# Patient Record
Sex: MALE | Race: WHITE | NOT HISPANIC OR LATINO | Employment: FULL TIME | URBAN - METROPOLITAN AREA
[De-identification: names, ages, dates, MRNs, and addresses within clinical notes are randomized per-mention and may not be internally consistent; named-entity substitution may affect disease eponyms.]

---

## 2017-02-02 ENCOUNTER — ANESTHESIA EVENT (OUTPATIENT)
Dept: PERIOP | Facility: HOSPITAL | Age: 57
End: 2017-02-02
Payer: COMMERCIAL

## 2017-02-03 ENCOUNTER — HOSPITAL ENCOUNTER (OUTPATIENT)
Dept: RADIOLOGY | Facility: HOSPITAL | Age: 57
Setting detail: OUTPATIENT SURGERY
Discharge: HOME | End: 2017-02-03
Payer: COMMERCIAL

## 2017-02-03 ENCOUNTER — ANESTHESIA (OUTPATIENT)
Dept: PERIOP | Facility: HOSPITAL | Age: 57
End: 2017-02-03
Payer: COMMERCIAL

## 2017-02-03 ENCOUNTER — HOSPITAL ENCOUNTER (OUTPATIENT)
Facility: HOSPITAL | Age: 57
Setting detail: OUTPATIENT SURGERY
Discharge: HOME | End: 2017-02-03
Attending: UROLOGY | Admitting: UROLOGY
Payer: COMMERCIAL

## 2017-02-03 VITALS
OXYGEN SATURATION: 96 % | SYSTOLIC BLOOD PRESSURE: 126 MMHG | TEMPERATURE: 98.2 F | DIASTOLIC BLOOD PRESSURE: 74 MMHG | HEART RATE: 55 BPM | RESPIRATION RATE: 18 BRPM

## 2017-02-03 PROCEDURE — 74450 X-RAY URETHRA/BLADDER: CPT

## 2017-02-03 RX ORDER — PROPOFOL 10 MG/ML
INJECTION, EMULSION INTRAVENOUS CONTINUOUS PRN
Status: DISCONTINUED | OUTPATIENT
Start: 2017-02-03 | End: 2017-02-03 | Stop reason: SURG

## 2017-02-03 RX ORDER — FENTANYL CITRATE 50 UG/ML
INJECTION, SOLUTION INTRAMUSCULAR; INTRAVENOUS AS NEEDED
Status: DISCONTINUED | OUTPATIENT
Start: 2017-02-03 | End: 2017-02-03 | Stop reason: SURG

## 2017-02-03 RX ORDER — ONDANSETRON 2 MG/ML
INJECTION INTRAMUSCULAR; INTRAVENOUS AS NEEDED
Status: DISCONTINUED | OUTPATIENT
Start: 2017-02-03 | End: 2017-02-03 | Stop reason: SURG

## 2017-02-03 RX ORDER — PROPOFOL 10 MG/ML
INJECTION, EMULSION INTRAVENOUS AS NEEDED
Status: DISCONTINUED | OUTPATIENT
Start: 2017-02-03 | End: 2017-02-03 | Stop reason: SURG

## 2017-02-03 RX ORDER — SODIUM CHLORIDE, SODIUM LACTATE, POTASSIUM CHLORIDE, CALCIUM CHLORIDE 600; 310; 30; 20 MG/100ML; MG/100ML; MG/100ML; MG/100ML
125 INJECTION, SOLUTION INTRAVENOUS CONTINUOUS
Status: DISCONTINUED | OUTPATIENT
Start: 2017-02-03 | End: 2017-02-03 | Stop reason: HOSPADM

## 2017-02-03 RX ORDER — GENTAMICIN SULFATE 80 MG/50ML
80 INJECTION, SOLUTION INTRAVENOUS ONCE
Status: DISCONTINUED | OUTPATIENT
Start: 2017-02-03 | End: 2017-02-03 | Stop reason: HOSPADM

## 2017-02-03 RX ORDER — MIDAZOLAM HYDROCHLORIDE 1 MG/ML
INJECTION INTRAMUSCULAR; INTRAVENOUS AS NEEDED
Status: DISCONTINUED | OUTPATIENT
Start: 2017-02-03 | End: 2017-02-03 | Stop reason: SURG

## 2017-02-03 RX ORDER — FENTANYL CITRATE/PF 50 MCG/ML
25 SYRINGE (ML) INJECTION
Status: DISCONTINUED | OUTPATIENT
Start: 2017-02-03 | End: 2017-02-03 | Stop reason: HOSPADM

## 2017-02-03 RX ORDER — MAGNESIUM HYDROXIDE 1200 MG/15ML
LIQUID ORAL AS NEEDED
Status: DISCONTINUED | OUTPATIENT
Start: 2017-02-03 | End: 2017-02-03 | Stop reason: HOSPADM

## 2017-02-03 RX ADMIN — FENTANYL CITRATE 50 MCG: 50 INJECTION, SOLUTION INTRAMUSCULAR; INTRAVENOUS at 07:39

## 2017-02-03 RX ADMIN — SODIUM CHLORIDE, SODIUM LACTATE, POTASSIUM CHLORIDE, AND CALCIUM CHLORIDE 125 ML/HR: .6; .31; .03; .02 INJECTION, SOLUTION INTRAVENOUS at 06:58

## 2017-02-03 RX ADMIN — ONDANSETRON 4 MG: 2 INJECTION INTRAMUSCULAR; INTRAVENOUS at 07:50

## 2017-02-03 RX ADMIN — MIDAZOLAM HYDROCHLORIDE 2 MG: 1 INJECTION, SOLUTION INTRAMUSCULAR; INTRAVENOUS at 07:38

## 2017-02-03 RX ADMIN — LIDOCAINE HYDROCHLORIDE 50 MG: 20 INJECTION, SOLUTION INTRAVENOUS at 07:41

## 2017-02-03 RX ADMIN — FENTANYL CITRATE 50 MCG: 50 INJECTION, SOLUTION INTRAMUSCULAR; INTRAVENOUS at 07:45

## 2017-02-03 RX ADMIN — CEFAZOLIN SODIUM 1000 MG: 1 SOLUTION INTRAVENOUS at 07:38

## 2017-02-03 RX ADMIN — PROPOFOL 100 MCG/KG/MIN: 10 INJECTION, EMULSION INTRAVENOUS at 07:42

## 2017-02-03 RX ADMIN — PROPOFOL 100 MG: 10 INJECTION, EMULSION INTRAVENOUS at 07:42

## 2017-02-17 ENCOUNTER — TRANSCRIBE ORDERS (OUTPATIENT)
Dept: ADMINISTRATIVE | Facility: HOSPITAL | Age: 57
End: 2017-02-17

## 2017-02-17 DIAGNOSIS — N13.1 HYDRONEPHROSIS WITH URETERAL STRICTURE: Primary | ICD-10-CM

## 2017-02-25 ENCOUNTER — HOSPITAL ENCOUNTER (OUTPATIENT)
Dept: RADIOLOGY | Facility: HOSPITAL | Age: 57
Discharge: HOME | End: 2017-02-25
Attending: INTERNAL MEDICINE
Payer: COMMERCIAL

## 2017-02-25 DIAGNOSIS — N13.1 HYDRONEPHROSIS WITH URETERAL STRICTURE: ICD-10-CM

## 2017-02-25 PROCEDURE — 76770 US EXAM ABDO BACK WALL COMP: CPT

## 2017-10-06 ENCOUNTER — ALLSCRIPTS OFFICE VISIT (OUTPATIENT)
Dept: OTHER | Facility: OTHER | Age: 57
End: 2017-10-06

## 2017-10-06 DIAGNOSIS — N13.30 HYDRONEPHROSIS: ICD-10-CM

## 2017-10-06 DIAGNOSIS — K57.32 DIVERTICULITIS OF LARGE INTESTINE WITHOUT PERFORATION OR ABSCESS WITHOUT BLEEDING: ICD-10-CM

## 2017-10-07 NOTE — CONSULTS
Assessment  1  History of hydronephrosis (V13 09) (Z87 448)   2  Diverticulitis of colon (562 11) (K57 32)    Plan  Diverticulitis of colon, Hydronephrosis of left kidney    · US KIDNEY AND BLADDER; Status:Hold For - Scheduling; Requested for:[id number];    Perform:[facility] Radiology; Order Comments:Patient with history of diverticulitis and extensive dissection around the left ureter with concern for left ureteral stricture; Due:06Oct2018; Ordered; For:Diverticulitis of colon, Hydronephrosis of left kidney; Ordered By:Tom Gaytan;  Hydronephrosis of left kidney    · * NM KIDNEY W FLOW AND FUNCTION W RX; Status:Need Information - Financial  Authorization; Requested [id number];    Perform:[facility] Radiology; Order Comments:Patient with history of diverticulitis status post sigmoidectomy with extensive dissection around the left ureter and concern for left ureteral stricture with potential obstruction of left kidney; Due:06Oct2018; Ordered;For:Hydronephrosis of left kidney; Ordered By:Tom Gaytan;   · Follow-up visit in 6 weeks Evaluation and Treatment  Follow-up in 6 weeks with Dr Roper at the [facility] for review of renal ultrasound and nuclear medicine  renal scan  Status: Hold For - Scheduling  Requested for: [id number]   Ordered; For: Hydronephrosis of left kidney; Ordered By: Kevin Bhatia Performed:  Due: [id number]    Discussion/Summary  Discussion Summary:   I had a productive visit with Nette Molina today  He looked this up for further urologic follow-up after his last urologist was unable to take his insurance any longer  He does have a very complex urologic history with regard to his history of perforated diverticulitis with abscess and phlegmon requiring extensive dissection around the left ureter with concern for a distal pelvic ureteral narrowing or stricture   Of note, he does not have recurrent infections or stones in his left kidney but does feel that something is going on with his left kidney indicating a possible functional abnormality with kidney  discussed multiple issues today including diverticulitis and its relationship to the left ureter and possible effects on the left ureter after extensive surgery for this  We talked about the ureteral anatomy as well as that of the kidney and discussed various reconstructive surgical procedures in the event that a ureteral stricture is found as the cause of his problems  We discussed that the pacemaker cells for ureteral peristalsis reside in the renal pelvis and that these peristaltic waves are important for proper drainage of the ureter  We discussed that occasionally while the anatomic appearance of the ureter is patent on a retrograde pyelogram that certain segments of the ureter may become functionally silent and cause a functional obstruction of the left renal unit  discussed further workup from a structural and functional perspective including renal and bladder ultrasound as well as a nuclear medicine renal scan with Lasix to assess for split kidney function as well as perfusion and drainage of the left renal unit  I did review with him his operative note from earlier this year with Dr German Lizarraga and gave him a copy of this report for his records  That operative note shows narrowing in the distal left ureter but did show drainage of contrast in real time in the anesthetized setting  discussed ureteral ureterostomy, ureteral calicostomy, the psoas hitch procedure, the Boari flap procedure, simple distal ureteral reimplantation, and autotransplantation for the treatment of ureteral strictures  We did talk about the benefits and the risks of each of these procedures but I did advise him that we are getting ahead of ourselves without knowing whether not there is a functional blockage in his kidney  has no worrisome findings on my examination today    I have ordered a renal and bladder ultrasound for a functional assessment of his left renal unit, I have also ordered a nuclear medicine renal scan with Lasix to assess for the functional drainage of his left kidney as well as split renal function  I have made him a follow-up appointment in the next 4-6 weeks to see me to discuss these results from the studies  I will call him with these results as well as he has requested this  He is a  and has an impressive degree of understanding of the surgical procedures  Chief Complaint  Chief Complaint Free Text Note Form: Patient presents for history of hydronephrosis      History of Present Illness  HPI: Juan Luis Orourke is a 61-year-old gentleman who is referred to the urology clinic for chief complaint of hydronephrosis    hemoglobin in January 2017 was 13 4 and his creatinine from the same set of labs is 1 13 milligrams/deciliter  History of calcium is 9 1 milligrams/deciliter which is within the normal range and his serum carbon dioxide is also normal range at 24 millimoles per liter  A urinalysis from January 2017 is significant for trace leukocyte esterase, 1+ protein, large blood, 10-20 white blood cells per high-powered field, and 20-30 red blood cells per high-powered field  He does not have a PSA on file for my review  renal bladder ultrasound from February 25, 2017 ordered for an indication of history of hydronephrosis and stent placement shows left upper to mid pole scarring the kidney with no hydronephrosis and no shadowing calculi  These images were reviewed by me personally  appears that he has previously followed at the [facility] with Dr Chito Hopper for hydronephrosis and ureteral stricture  A urology consultation note from 12/04/2016 makes reference of a CT scan showing hydronephrosis and hydroureter in the left mid ureter next to a large ileo psoas mass and phlegmon from diverticulitis perforation  He has previously had a 6 [place] by 28 centimeter left ureteral stent    did require resection of his sigmoid colon and extensive dissection around the left ureter for 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  The stent was in place in the left ureter during this time and he returned to the operating room with Dr Arielle Gasca in February of 2017 for cystoscopy and retrograde pyelography, left stent removal, and instillation of gentamicin into the left renal pelvis  operative note makes note of narrowing in the area of the left true pelvis for a few centimeters the length of the left ureter   Preoperative note goes on to say that the left renal unit after retrograde pyelography seems to drain well enough  He also had a small bulbar urethral stricture that easily permitted the cystoscope  presents today the urology office to establish follow-up with a urologist as his previous urologist was not able to see him any longer due to insurance problems  On his urologic review of systems today he denies dysuria, incontinence, urinary hesitancy, gross hematuria, urinary urgency, he wakes 0-2 times per night to urinate, he has an empty sensation after voiding, and his stream quality is good  does endorse occasional feelings that do not feel normal on his left side  He does not have recurrent pyelonephritis but he does feel that something is going on in his left kidney  Review of Systems  Complete-Male Urology:   Constitutional: No fever or chills, feels well, no tiredness, no recent weight gain or weight loss  Respiratory: No complaints of shortness of breath, no wheezing, no cough, no SOB on exertion, no orthopnea or PND  Cardiovascular: No complaints of slow heart rate, no fast heart rate, no chest pain, no palpitations, no leg claudication, no lower extremity  Gastrointestinal: No complaints of abdominal pain, no constipation, no nausea or vomiting, no diarrhea or bloody stools     Genitourinary: Empty sensation-and-stream quality good, but-no dysuria,-no urinary hesitancy,-no hematuria,-no incontinence-and-no feelings of urinary urgency-   The patient presents with complaints of no nocturia (0-2 times)  Musculoskeletal: No complaints of arthralgia, no myalgias, no joint swelling or stiffness, no limb pain or swelling  Integumentary: No complaints of skin rash or skin lesions, no itching, no skin wound, no dry skin  Hematologic/Lymphatic: No complaints of swollen glands, no swollen glands in the neck, does not bleed easily, no easy bruising  Neurological: No compliants of headache, no confusion, no convulsions, no numbness or tingling, no dizziness or fainting, no limb weakness, no difficulty walking  ROS Reviewed:   ROS reviewed  Active Problems  1  Diastasis recti (728 84) (M62 08)   2  Diverticulitis of colon (562 11) (K57 32)   3  H/O ventral hernia repair (V15 29) (Z98 890,Z87 19)   4  Left inguinal hernia (550 90) (K40 90)   5  Left lower quadrant abdominal wall mass (789 34) (R19 04)   6  Right inguinal hernia (550 90) (K40 90)    Past Medical History  1  History of hydronephrosis (V13 09) (Z87 448)   2  History of sleep apnea (V13 89) (Z86 69)   3  History of varicose veins (V12 59) (Z86 79)   4  History of Meniscus tear (836 2) (S83 209A)   5  History of Umbilical hernia (159 8) (K42 9)  Active Problems And Past Medical History Reviewed: The active problems and past medical history were reviewed and updated today  Surgical History  1  History of Colon Surgery  Surgical History Reviewed: The surgical history was reviewed and updated today  Family History  Mother    1  Family history of atrial fibrillation (V17 49) (Z82 49)   2  Family history of diabetes mellitus (V18 0) (Z83 3)   3  Family history of Parkinson's disease (V17 2) (Z82 0)   4  Family history of spinal stenosis (V17 89) (Z82 69)  Father    5  Family history of    6  Family history of aortic aneurysm (V17 49) (Z82 49)  Sibling    7  Family history of    6   Family history of cardiac arrest (V17 49) (Z82 49)   9  Family history of colorectal cancer (V16 0) (Z80 0)   10  Family history of diabetes mellitus (V18 0) (Z83 3)   11  Family history of peritonitis (V18 8) (Z83 1)  Brother    15  Family history of diabetes mellitus (V18 0) (Z83 3)  Family History Reviewed: The family history was reviewed and updated today  Social History   · Current every day smoker (305 1) (F17 200)   · Denied: History of Drug use   · Moderate alcohol use   · Single  Social History Reviewed: The social history was reviewed and updated today  The social history was reviewed and is unchanged  Current Meds   1  Adult Low Dose Aspirin 81 MG TABS; Therapy: (Recorded:06Oct2017) to Recorded   2  Atorvastatin Calcium 10 MG Oral Tablet; Therapy: (Recorded:06Oct2017) to Recorded   3  Centrum Silver Oral Tablet; Therapy: (Recorded:06Oct2017) to Recorded   4  Clarinex TABS; Therapy: (Recorded:[id number]) to Recorded   5  Colace CAPS; Therapy: (Recorded:06Oct2017) to Recorded   6  Fish Oil CAPS; Therapy: (Recorded:37Fzn8618) to Recorded   7  Lipitor 10 MG Oral Tablet; TAKE 1 TABLET AT BEDTIME Recorded   8  Milk Thistle CAPS; Therapy: (Recorded:14May2015) to Recorded   9  Nasonex 50 MCG/ACT Nasal Suspension; Therapy: [id number] to Recorded   10  RaNITidine HCl - 150 MG Oral Capsule; Therapy: (Recorded:06Oct2017) to Recorded   11  Valsartan-Hydrochlorothiazide 160-25 MG Oral Tablet; Take 1 tablet daily Recorded   12  Vitamin C TABS; Therapy: (Recorded:33Shz9492) to Recorded   13  Vitamin D (Ergocalciferol) 01348 UNIT Oral Capsule; Therapy: [id number] to Recorded  Medication List Reviewed: The medication list was reviewed and updated today  Allergies  1  Levaquin TABS   2   Sulfa Drugs    Vitals  Vital Signs    Recorded: [id number] 10:33AM   Heart Rate 60   Systolic 280   Diastolic 80   Height 5 ft 11 in   Weight 277 lb 8 oz   BMI Calculated 38 7   BSA Calculated 2 42     Results/Data  AUA Symptom Score [id number] 10:35AM User, Ahs     Test Name Result Flag Reference   AUA Symptom Score (for prostate disease) 3     Incomplete emptying: Not at all (0)  Frequency: Less than 1 time in 5 (1)  Intermittency: Not at all (0)  Urgency: Not at all (0)  Weak-stream: Not at all (0)  Straining: Not at all (0)  Nocturia: Less than half the time (2)   AUA Symptom Score (for prostate disease) - Quality of Life Due to Urinary Symptoms Delighted     AUA Symptom Score (for prostate disease) - Score Category Mild       Lab Studies Reviewed: In total, 11 radiological studies including CT scans of the abdomen pelvis, interventional radiology procedure imaging, and retrograde pyelography were reviewed by me today personally        Signatures   Electronically signed by : ROSALINA Church ; Oct  6 2017 11:08AM EST                       (Author)

## 2017-11-10 ENCOUNTER — TRANSCRIBE ORDERS (OUTPATIENT)
Dept: ADMINISTRATIVE | Facility: HOSPITAL | Age: 57
End: 2017-11-10

## 2017-11-10 DIAGNOSIS — N13.30 HYDRONEPHROSIS, UNSPECIFIED HYDRONEPHROSIS TYPE: Primary | ICD-10-CM

## 2017-11-19 ENCOUNTER — HOSPITAL ENCOUNTER (OUTPATIENT)
Dept: ULTRASOUND IMAGING | Facility: HOSPITAL | Age: 57
Discharge: HOME | End: 2017-11-19
Attending: UROLOGY
Payer: COMMERCIAL

## 2017-11-19 ENCOUNTER — HOSPITAL ENCOUNTER (OUTPATIENT)
Dept: NUCLEAR MEDICINE | Facility: HOSPITAL | Age: 57
Discharge: HOME | End: 2017-11-19
Attending: UROLOGY
Payer: COMMERCIAL

## 2017-11-19 DIAGNOSIS — N13.30 HYDRONEPHROSIS, UNSPECIFIED HYDRONEPHROSIS TYPE: ICD-10-CM

## 2017-11-19 DIAGNOSIS — N13.30 HYDRONEPHROSIS: ICD-10-CM

## 2017-11-19 DIAGNOSIS — K57.32 DIVERTICULITIS OF LARGE INTESTINE WITHOUT PERFORATION OR ABSCESS WITHOUT BLEEDING: ICD-10-CM

## 2017-11-19 PROCEDURE — 76770 US EXAM ABDO BACK WALL COMP: CPT

## 2017-11-19 PROCEDURE — A9562 TC99M MERTIATIDE: HCPCS

## 2017-11-19 PROCEDURE — 78708 K FLOW/FUNCT IMAGE W/DRUG: CPT

## 2017-11-22 ENCOUNTER — APPOINTMENT (INPATIENT)
Dept: NON INVASIVE DIAGNOSTICS | Facility: HOSPITAL | Age: 57
DRG: 287 | End: 2017-11-22
Payer: COMMERCIAL

## 2017-11-22 ENCOUNTER — GENERIC CONVERSION - ENCOUNTER (OUTPATIENT)
Dept: OTHER | Facility: OTHER | Age: 57
End: 2017-11-22

## 2017-11-22 ENCOUNTER — HOSPITAL ENCOUNTER (INPATIENT)
Facility: HOSPITAL | Age: 57
LOS: 1 days | DRG: 287 | End: 2017-11-23
Attending: EMERGENCY MEDICINE | Admitting: FAMILY MEDICINE
Payer: COMMERCIAL

## 2017-11-22 ENCOUNTER — APPOINTMENT (EMERGENCY)
Dept: RADIOLOGY | Facility: HOSPITAL | Age: 57
DRG: 287 | End: 2017-11-22
Payer: COMMERCIAL

## 2017-11-22 ENCOUNTER — APPOINTMENT (INPATIENT)
Dept: NON INVASIVE DIAGNOSTICS | Facility: HOSPITAL | Age: 57
DRG: 287 | End: 2017-11-22
Attending: INTERNAL MEDICINE
Payer: COMMERCIAL

## 2017-11-22 DIAGNOSIS — I24.9 ACS (ACUTE CORONARY SYNDROME) (HCC): Primary | ICD-10-CM

## 2017-11-22 PROBLEM — Z72.0 NICOTINE ABUSE: Status: ACTIVE | Noted: 2017-11-22

## 2017-11-22 PROBLEM — E66.9 OBESITY: Status: ACTIVE | Noted: 2017-11-22

## 2017-11-22 LAB
ALBUMIN SERPL BCP-MCNC: 4.2 G/DL (ref 3.5–5)
ALP SERPL-CCNC: 91 U/L (ref 46–116)
ALT SERPL W P-5'-P-CCNC: 58 U/L (ref 12–78)
ANION GAP SERPL CALCULATED.3IONS-SCNC: 10 MMOL/L (ref 4–13)
APTT PPP: 24 SECONDS (ref 24–33)
APTT PPP: 28 SECONDS (ref 23–35)
AST SERPL W P-5'-P-CCNC: 34 U/L (ref 5–45)
ATRIAL RATE: 103 BPM
ATRIAL RATE: 63 BPM
ATRIAL RATE: 70 BPM
BASOPHILS # BLD AUTO: 0.1 THOUSANDS/ΜL (ref 0–0.1)
BASOPHILS NFR BLD AUTO: 1 % (ref 0–1)
BILIRUB SERPL-MCNC: 0.6 MG/DL (ref 0.2–1)
BILIRUB UR QL STRIP: NEGATIVE
BUN SERPL-MCNC: 15 MG/DL (ref 5–25)
CALCIUM SERPL-MCNC: 9 MG/DL (ref 8.3–10.1)
CHLORIDE SERPL-SCNC: 102 MMOL/L (ref 100–108)
CHOLEST SERPL-MCNC: 139 MG/DL (ref 50–200)
CLARITY UR: CLEAR
CO2 SERPL-SCNC: 28 MMOL/L (ref 21–32)
COLOR UR: YELLOW
CREAT SERPL-MCNC: 1.13 MG/DL (ref 0.6–1.3)
EOSINOPHIL # BLD AUTO: 0.3 THOUSAND/ΜL (ref 0–0.61)
EOSINOPHIL NFR BLD AUTO: 3 % (ref 0–6)
ERYTHROCYTE [DISTWIDTH] IN BLOOD BY AUTOMATED COUNT: 13.6 % (ref 11.6–15.1)
EST. AVERAGE GLUCOSE BLD GHB EST-MCNC: 140 MG/DL
ETHANOL SERPL-MCNC: <3 MG/DL (ref 0–3)
GFR SERPL CREATININE-BSD FRML MDRD: 73 ML/MIN/1.73SQ M
GLUCOSE SERPL-MCNC: 101 MG/DL (ref 65–140)
GLUCOSE SERPL-MCNC: 110 MG/DL (ref 65–140)
GLUCOSE SERPL-MCNC: 115 MG/DL (ref 65–140)
GLUCOSE SERPL-MCNC: 153 MG/DL (ref 65–140)
GLUCOSE SERPL-MCNC: 187 MG/DL (ref 65–140)
GLUCOSE UR STRIP-MCNC: NEGATIVE MG/DL
HBA1C MFR BLD: 6.5 % (ref 4.2–6.3)
HCT VFR BLD AUTO: 48 % (ref 42–52)
HDLC SERPL-MCNC: 33 MG/DL (ref 40–60)
HGB BLD-MCNC: 16.5 G/DL (ref 14–18)
HGB UR QL STRIP.AUTO: NEGATIVE
INR PPP: 0.97 (ref 0.86–1.16)
KETONES UR STRIP-MCNC: NEGATIVE MG/DL
LDLC SERPL CALC-MCNC: 57 MG/DL (ref 0–100)
LEUKOCYTE ESTERASE UR QL STRIP: NEGATIVE
LYMPHOCYTES # BLD AUTO: 2.4 THOUSANDS/ΜL (ref 0.6–4.47)
LYMPHOCYTES NFR BLD AUTO: 25 % (ref 14–44)
MCH RBC QN AUTO: 34.4 PG (ref 27–31)
MCHC RBC AUTO-ENTMCNC: 34.3 G/DL (ref 31.4–37.4)
MCV RBC AUTO: 101 FL (ref 82–98)
MONOCYTES # BLD AUTO: 0.7 THOUSAND/ΜL (ref 0.17–1.22)
MONOCYTES NFR BLD AUTO: 7 % (ref 4–12)
NEUTROPHILS # BLD AUTO: 6.3 THOUSANDS/ΜL (ref 1.85–7.62)
NEUTS SEG NFR BLD AUTO: 64 % (ref 43–75)
NITRITE UR QL STRIP: NEGATIVE
NRBC BLD AUTO-RTO: 0 /100 WBCS
NT-PROBNP SERPL-MCNC: 26 PG/ML
P AXIS: 61 DEGREES
P AXIS: 64 DEGREES
P AXIS: 71 DEGREES
PH UR STRIP.AUTO: 7 [PH] (ref 5–9)
PLATELET # BLD AUTO: 281 THOUSANDS/UL (ref 130–400)
PMV BLD AUTO: 7.5 FL (ref 8.9–12.7)
POTASSIUM SERPL-SCNC: 3.5 MMOL/L (ref 3.5–5.3)
PR INTERVAL: 170 MS
PR INTERVAL: 180 MS
PROT SERPL-MCNC: 8.5 G/DL (ref 6.4–8.2)
PROT UR STRIP-MCNC: NEGATIVE MG/DL
PROTHROMBIN TIME: 10.2 SECONDS (ref 9.4–11.7)
QRS AXIS: 12 DEGREES
QRS AXIS: 16 DEGREES
QRS AXIS: 26 DEGREES
QRSD INTERVAL: 102 MS
QRSD INTERVAL: 104 MS
QRSD INTERVAL: 98 MS
QT INTERVAL: 334 MS
QT INTERVAL: 382 MS
QT INTERVAL: 400 MS
QTC INTERVAL: 409 MS
QTC INTERVAL: 412 MS
QTC INTERVAL: 437 MS
RBC # BLD AUTO: 4.78 MILLION/UL (ref 4.7–6.1)
SODIUM SERPL-SCNC: 140 MMOL/L (ref 136–145)
SP GR UR STRIP.AUTO: 1.01 (ref 1–1.03)
T WAVE AXIS: -15 DEGREES
T WAVE AXIS: -3 DEGREES
T WAVE AXIS: 3 DEGREES
T4 FREE SERPL-MCNC: 1 NG/DL (ref 0.76–1.46)
TRIGL SERPL-MCNC: 244 MG/DL
TROPONIN I SERPL-MCNC: 0.02 NG/ML
TROPONIN I SERPL-MCNC: <0.02 NG/ML
TROPONIN I SERPL-MCNC: <0.02 NG/ML
TSH SERPL DL<=0.05 MIU/L-ACNC: 2.81 UIU/ML (ref 0.36–3.74)
UROBILINOGEN UR QL STRIP.AUTO: 0.2 E.U./DL
VENTRICULAR RATE: 103 BPM
VENTRICULAR RATE: 63 BPM
VENTRICULAR RATE: 70 BPM
WBC # BLD AUTO: 9.8 THOUSAND/UL (ref 4.8–10.8)

## 2017-11-22 PROCEDURE — 93005 ELECTROCARDIOGRAM TRACING: CPT | Performed by: EMERGENCY MEDICINE

## 2017-11-22 PROCEDURE — C1769 GUIDE WIRE: HCPCS

## 2017-11-22 PROCEDURE — 84439 ASSAY OF FREE THYROXINE: CPT | Performed by: NURSE PRACTITIONER

## 2017-11-22 PROCEDURE — 83880 ASSAY OF NATRIURETIC PEPTIDE: CPT | Performed by: EMERGENCY MEDICINE

## 2017-11-22 PROCEDURE — B215YZZ FLUOROSCOPY OF LEFT HEART USING OTHER CONTRAST: ICD-10-PCS | Performed by: INTERNAL MEDICINE

## 2017-11-22 PROCEDURE — 83036 HEMOGLOBIN GLYCOSYLATED A1C: CPT | Performed by: NURSE PRACTITIONER

## 2017-11-22 PROCEDURE — 84484 ASSAY OF TROPONIN QUANT: CPT | Performed by: EMERGENCY MEDICINE

## 2017-11-22 PROCEDURE — 84484 ASSAY OF TROPONIN QUANT: CPT | Performed by: NURSE PRACTITIONER

## 2017-11-22 PROCEDURE — 71010 HB CHEST X-RAY 1 VIEW FRONTAL (PORTABLE): CPT

## 2017-11-22 PROCEDURE — 85730 THROMBOPLASTIN TIME PARTIAL: CPT | Performed by: NURSE PRACTITIONER

## 2017-11-22 PROCEDURE — 96374 THER/PROPH/DIAG INJ IV PUSH: CPT

## 2017-11-22 PROCEDURE — 84443 ASSAY THYROID STIM HORMONE: CPT | Performed by: NURSE PRACTITIONER

## 2017-11-22 PROCEDURE — 80053 COMPREHEN METABOLIC PANEL: CPT | Performed by: EMERGENCY MEDICINE

## 2017-11-22 PROCEDURE — 85610 PROTHROMBIN TIME: CPT | Performed by: EMERGENCY MEDICINE

## 2017-11-22 PROCEDURE — 85025 COMPLETE CBC W/AUTO DIFF WBC: CPT | Performed by: EMERGENCY MEDICINE

## 2017-11-22 PROCEDURE — 85730 THROMBOPLASTIN TIME PARTIAL: CPT | Performed by: EMERGENCY MEDICINE

## 2017-11-22 PROCEDURE — 99285 EMERGENCY DEPT VISIT HI MDM: CPT

## 2017-11-22 PROCEDURE — 96375 TX/PRO/DX INJ NEW DRUG ADDON: CPT

## 2017-11-22 PROCEDURE — 80320 DRUG SCREEN QUANTALCOHOLS: CPT | Performed by: NURSE PRACTITIONER

## 2017-11-22 PROCEDURE — 36415 COLL VENOUS BLD VENIPUNCTURE: CPT | Performed by: EMERGENCY MEDICINE

## 2017-11-22 PROCEDURE — C1894 INTRO/SHEATH, NON-LASER: HCPCS

## 2017-11-22 PROCEDURE — B210YZZ FLUOROSCOPY OF SINGLE CORONARY ARTERY USING OTHER CONTRAST: ICD-10-PCS | Performed by: INTERNAL MEDICINE

## 2017-11-22 PROCEDURE — 80061 LIPID PANEL: CPT | Performed by: NURSE PRACTITIONER

## 2017-11-22 PROCEDURE — 87081 CULTURE SCREEN ONLY: CPT | Performed by: NURSE PRACTITIONER

## 2017-11-22 PROCEDURE — 93458 L HRT ARTERY/VENTRICLE ANGIO: CPT

## 2017-11-22 PROCEDURE — 82948 REAGENT STRIP/BLOOD GLUCOSE: CPT

## 2017-11-22 PROCEDURE — 81003 URINALYSIS AUTO W/O SCOPE: CPT | Performed by: NURSE PRACTITIONER

## 2017-11-22 PROCEDURE — 4A023N7 MEASUREMENT OF CARDIAC SAMPLING AND PRESSURE, LEFT HEART, PERCUTANEOUS APPROACH: ICD-10-PCS | Performed by: INTERNAL MEDICINE

## 2017-11-22 PROCEDURE — 93306 TTE W/DOPPLER COMPLETE: CPT

## 2017-11-22 RX ORDER — ASPIRIN 81 MG/1
243 TABLET, CHEWABLE ORAL ONCE
Status: COMPLETED | OUTPATIENT
Start: 2017-11-22 | End: 2017-11-22

## 2017-11-22 RX ORDER — ATORVASTATIN CALCIUM 10 MG/1
20 TABLET, FILM COATED ORAL
COMMUNITY
End: 2017-11-25 | Stop reason: HOSPADM

## 2017-11-22 RX ORDER — BIOTIN 1 MG
TABLET ORAL DAILY
COMMUNITY
Start: 2015-03-21

## 2017-11-22 RX ORDER — MELATONIN
2000 DAILY
Status: DISCONTINUED | OUTPATIENT
Start: 2017-11-22 | End: 2017-11-23 | Stop reason: HOSPADM

## 2017-11-22 RX ORDER — DIPHENHYDRAMINE HYDROCHLORIDE 50 MG/ML
INJECTION INTRAMUSCULAR; INTRAVENOUS CODE/TRAUMA/SEDATION MEDICATION
Status: COMPLETED | OUTPATIENT
Start: 2017-11-22 | End: 2017-11-22

## 2017-11-22 RX ORDER — ATORVASTATIN CALCIUM 20 MG/1
20 TABLET, FILM COATED ORAL
Status: DISCONTINUED | OUTPATIENT
Start: 2017-11-22 | End: 2017-11-23 | Stop reason: HOSPADM

## 2017-11-22 RX ORDER — LIDOCAINE HYDROCHLORIDE 10 MG/ML
INJECTION, SOLUTION INFILTRATION; PERINEURAL CODE/TRAUMA/SEDATION MEDICATION
Status: COMPLETED | OUTPATIENT
Start: 2017-11-22 | End: 2017-11-22

## 2017-11-22 RX ORDER — RIBOFLAVIN (VITAMIN B2) 100 MG
100 TABLET ORAL DAILY
Status: DISCONTINUED | OUTPATIENT
Start: 2017-11-22 | End: 2017-11-22 | Stop reason: DRUGHIGH

## 2017-11-22 RX ORDER — HEPARIN SODIUM 1000 [USP'U]/ML
INJECTION, SOLUTION INTRAVENOUS; SUBCUTANEOUS CODE/TRAUMA/SEDATION MEDICATION
Status: COMPLETED | OUTPATIENT
Start: 2017-11-22 | End: 2017-11-22

## 2017-11-22 RX ORDER — FENTANYL CITRATE 50 UG/ML
INJECTION, SOLUTION INTRAMUSCULAR; INTRAVENOUS CODE/TRAUMA/SEDATION MEDICATION
Status: COMPLETED | OUTPATIENT
Start: 2017-11-22 | End: 2017-11-22

## 2017-11-22 RX ORDER — HEPARIN SODIUM 1000 [USP'U]/ML
4000 INJECTION, SOLUTION INTRAVENOUS; SUBCUTANEOUS ONCE
Status: COMPLETED | OUTPATIENT
Start: 2017-11-22 | End: 2017-11-22

## 2017-11-22 RX ORDER — NITROGLYCERIN 0.3 MG/1
0.3 TABLET SUBLINGUAL
COMMUNITY
End: 2020-04-17 | Stop reason: SDUPTHER

## 2017-11-22 RX ORDER — HEPARIN SODIUM 10000 [USP'U]/100ML
3-20 INJECTION, SOLUTION INTRAVENOUS
Status: DISCONTINUED | OUTPATIENT
Start: 2017-11-22 | End: 2017-11-22

## 2017-11-22 RX ORDER — NITROGLYCERIN 20 MG/100ML
INJECTION INTRAVENOUS CODE/TRAUMA/SEDATION MEDICATION
Status: COMPLETED | OUTPATIENT
Start: 2017-11-22 | End: 2017-11-22

## 2017-11-22 RX ORDER — ONDANSETRON 2 MG/ML
4 INJECTION INTRAMUSCULAR; INTRAVENOUS EVERY 6 HOURS PRN
Status: DISCONTINUED | OUTPATIENT
Start: 2017-11-22 | End: 2017-11-23 | Stop reason: HOSPADM

## 2017-11-22 RX ORDER — ACETAMINOPHEN 325 MG/1
650 TABLET ORAL EVERY 4 HOURS PRN
Status: DISCONTINUED | OUTPATIENT
Start: 2017-11-22 | End: 2017-11-23 | Stop reason: HOSPADM

## 2017-11-22 RX ORDER — MULTIVIT WITH MINERALS/LUTEIN
250 TABLET ORAL DAILY
Status: DISCONTINUED | OUTPATIENT
Start: 2017-11-22 | End: 2017-11-23 | Stop reason: HOSPADM

## 2017-11-22 RX ORDER — FLUTICASONE PROPIONATE 50 MCG
2 SPRAY, SUSPENSION (ML) NASAL DAILY
Status: DISCONTINUED | OUTPATIENT
Start: 2017-11-22 | End: 2017-11-23 | Stop reason: HOSPADM

## 2017-11-22 RX ORDER — MULTIVIT-MINERALS/FA/LYCOPENE 0.4 MG-6
TABLET ORAL
COMMUNITY
End: 2017-11-22

## 2017-11-22 RX ORDER — NICOTINE 21 MG/24HR
1 PATCH, TRANSDERMAL 24 HOURS TRANSDERMAL DAILY
Status: DISCONTINUED | OUTPATIENT
Start: 2017-11-22 | End: 2017-11-23 | Stop reason: HOSPADM

## 2017-11-22 RX ORDER — HEPARIN SODIUM 1000 [USP'U]/ML
4000 INJECTION, SOLUTION INTRAVENOUS; SUBCUTANEOUS AS NEEDED
Status: DISCONTINUED | OUTPATIENT
Start: 2017-11-22 | End: 2017-11-22

## 2017-11-22 RX ORDER — AMLODIPINE BESYLATE 5 MG/1
5 TABLET ORAL DAILY
COMMUNITY
End: 2019-07-16 | Stop reason: ALTCHOICE

## 2017-11-22 RX ORDER — CHLORAL HYDRATE 500 MG
CAPSULE ORAL 2 TIMES DAILY
COMMUNITY
End: 2020-11-03

## 2017-11-22 RX ORDER — MOMETASONE FUROATE 50 UG/1
SPRAY, METERED NASAL
Status: ON HOLD | COMMUNITY
End: 2017-11-23

## 2017-11-22 RX ORDER — FAMOTIDINE 20 MG/1
20 TABLET, FILM COATED ORAL DAILY
Status: DISCONTINUED | OUTPATIENT
Start: 2017-11-22 | End: 2017-11-23 | Stop reason: HOSPADM

## 2017-11-22 RX ORDER — LORATADINE 10 MG/1
10 TABLET ORAL
Status: DISCONTINUED | OUTPATIENT
Start: 2017-11-22 | End: 2017-11-23 | Stop reason: HOSPADM

## 2017-11-22 RX ORDER — LANOLIN ALCOHOL/MO/W.PET/CERES
1 CREAM (GRAM) TOPICAL DAILY
Status: DISCONTINUED | OUTPATIENT
Start: 2017-11-22 | End: 2017-11-22

## 2017-11-22 RX ORDER — HEPARIN SODIUM 1000 [USP'U]/ML
2000 INJECTION, SOLUTION INTRAVENOUS; SUBCUTANEOUS AS NEEDED
Status: DISCONTINUED | OUTPATIENT
Start: 2017-11-22 | End: 2017-11-22

## 2017-11-22 RX ORDER — CHLORAL HYDRATE 500 MG
1000 CAPSULE ORAL DAILY
Status: DISCONTINUED | OUTPATIENT
Start: 2017-11-22 | End: 2017-11-23 | Stop reason: HOSPADM

## 2017-11-22 RX ORDER — VERAPAMIL HCL 2.5 MG/ML
AMPUL (ML) INTRAVENOUS CODE/TRAUMA/SEDATION MEDICATION
Status: COMPLETED | OUTPATIENT
Start: 2017-11-22 | End: 2017-11-22

## 2017-11-22 RX ORDER — METOPROLOL TARTRATE 5 MG/5ML
5 INJECTION INTRAVENOUS ONCE
Status: COMPLETED | OUTPATIENT
Start: 2017-11-22 | End: 2017-11-22

## 2017-11-22 RX ORDER — AMLODIPINE BESYLATE 5 MG/1
5 TABLET ORAL DAILY
Status: DISCONTINUED | OUTPATIENT
Start: 2017-11-22 | End: 2017-11-23 | Stop reason: HOSPADM

## 2017-11-22 RX ORDER — NITROGLYCERIN 0.4 MG/1
0.4 TABLET SUBLINGUAL ONCE
Status: DISCONTINUED | OUTPATIENT
Start: 2017-11-22 | End: 2017-11-22

## 2017-11-22 RX ORDER — ASPIRIN 81 MG/1
81 TABLET, CHEWABLE ORAL DAILY
Status: DISCONTINUED | OUTPATIENT
Start: 2017-11-23 | End: 2017-11-23 | Stop reason: HOSPADM

## 2017-11-22 RX ORDER — MIDAZOLAM HYDROCHLORIDE 1 MG/ML
INJECTION INTRAMUSCULAR; INTRAVENOUS CODE/TRAUMA/SEDATION MEDICATION
Status: COMPLETED | OUTPATIENT
Start: 2017-11-22 | End: 2017-11-22

## 2017-11-22 RX ORDER — RANITIDINE 150 MG/1
TABLET ORAL
COMMUNITY
End: 2017-11-22

## 2017-11-22 RX ORDER — MAGNESIUM HYDROXIDE/ALUMINUM HYDROXICE/SIMETHICONE 120; 1200; 1200 MG/30ML; MG/30ML; MG/30ML
30 SUSPENSION ORAL EVERY 6 HOURS PRN
Status: DISCONTINUED | OUTPATIENT
Start: 2017-11-22 | End: 2017-11-23 | Stop reason: HOSPADM

## 2017-11-22 RX ORDER — ASPIRIN 81 MG/1
TABLET, CHEWABLE ORAL
Status: COMPLETED
Start: 2017-11-22 | End: 2017-11-22

## 2017-11-22 RX ORDER — RIBOFLAVIN (VITAMIN B2) 100 MG
500 TABLET ORAL
COMMUNITY

## 2017-11-22 RX ADMIN — ASPIRIN 243 MG: 81 TABLET, CHEWABLE ORAL at 00:16

## 2017-11-22 RX ADMIN — HEPARIN SODIUM AND DEXTROSE 11.1 UNITS/KG/HR: 10000; 5 INJECTION INTRAVENOUS at 01:03

## 2017-11-22 RX ADMIN — FENTANYL CITRATE 25 MCG: 50 INJECTION, SOLUTION INTRAMUSCULAR; INTRAVENOUS at 12:38

## 2017-11-22 RX ADMIN — VERAPAMIL HYDROCHLORIDE 2.5 MG: 2.5 INJECTION, SOLUTION INTRAVENOUS at 12:36

## 2017-11-22 RX ADMIN — ASCORBIC ACID TAB 250 MG 250 MG: 250 TAB at 09:45

## 2017-11-22 RX ADMIN — FAMOTIDINE 20 MG: 20 TABLET, FILM COATED ORAL at 17:30

## 2017-11-22 RX ADMIN — ATORVASTATIN CALCIUM 20 MG: 20 TABLET, FILM COATED ORAL at 17:35

## 2017-11-22 RX ADMIN — IODIXANOL 85 ML: 320 INJECTION, SOLUTION INTRAVASCULAR at 14:22

## 2017-11-22 RX ADMIN — LORATADINE 10 MG: 10 TABLET ORAL at 21:36

## 2017-11-22 RX ADMIN — DIPHENHYDRAMINE HYDROCHLORIDE 25 MG: 50 INJECTION INTRAMUSCULAR; INTRAVENOUS at 12:32

## 2017-11-22 RX ADMIN — NICOTINE 1 PATCH: 14 PATCH TRANSDERMAL at 09:45

## 2017-11-22 RX ADMIN — FENTANYL CITRATE 25 MCG: 50 INJECTION, SOLUTION INTRAMUSCULAR; INTRAVENOUS at 12:46

## 2017-11-22 RX ADMIN — NITROGLYCERIN 200 MCG: 20 INJECTION INTRAVENOUS at 12:36

## 2017-11-22 RX ADMIN — METOPROLOL TARTRATE 5 MG: 5 INJECTION, SOLUTION INTRAVENOUS at 00:25

## 2017-11-22 RX ADMIN — TICAGRELOR 180 MG: 90 TABLET ORAL at 20:31

## 2017-11-22 RX ADMIN — ASPIRIN 81 MG 243 MG: 81 TABLET ORAL at 00:16

## 2017-11-22 RX ADMIN — AMLODIPINE BESYLATE 5 MG: 5 TABLET ORAL at 09:44

## 2017-11-22 RX ADMIN — INSULIN LISPRO 1 UNITS: 100 INJECTION, SOLUTION INTRAVENOUS; SUBCUTANEOUS at 17:42

## 2017-11-22 RX ADMIN — FENTANYL CITRATE 50 MCG: 50 INJECTION, SOLUTION INTRAMUSCULAR; INTRAVENOUS at 12:28

## 2017-11-22 RX ADMIN — MIDAZOLAM HYDROCHLORIDE 0.5 MG: 1 INJECTION, SOLUTION INTRAMUSCULAR; INTRAVENOUS at 12:38

## 2017-11-22 RX ADMIN — Medication 1000 MG: at 09:44

## 2017-11-22 RX ADMIN — MIDAZOLAM HYDROCHLORIDE 1 MG: 1 INJECTION, SOLUTION INTRAMUSCULAR; INTRAVENOUS at 12:28

## 2017-11-22 RX ADMIN — ACETAMINOPHEN 650 MG: 325 TABLET, FILM COATED ORAL at 17:39

## 2017-11-22 RX ADMIN — CHOLECALCIFEROL TAB 25 MCG (1000 UNIT) 2000 UNITS: 25 TAB at 09:45

## 2017-11-22 RX ADMIN — HEPARIN SODIUM 2000 UNITS: 1000 INJECTION INTRAVENOUS; SUBCUTANEOUS at 12:36

## 2017-11-22 RX ADMIN — VERAPAMIL HYDROCHLORIDE 2.5 MG: 2.5 INJECTION, SOLUTION INTRAVENOUS at 12:45

## 2017-11-22 RX ADMIN — Medication 1 TABLET: at 09:45

## 2017-11-22 RX ADMIN — NITROGLYCERIN 200 MCG: 20 INJECTION INTRAVENOUS at 12:47

## 2017-11-22 RX ADMIN — HEPARIN SODIUM 4000 UNITS: 1000 INJECTION, SOLUTION INTRAVENOUS; SUBCUTANEOUS at 01:01

## 2017-11-22 RX ADMIN — LIDOCAINE HYDROCHLORIDE 4 ML: 10 INJECTION, SOLUTION INFILTRATION; PERINEURAL at 12:34

## 2017-11-22 NOTE — ED PROVIDER NOTES
History  Chief Complaint   Patient presents with    Chest Pain     Pt states he has been having chest pain for the last 1 5 weeks, pt saw PMD and given new BP meds and Nitro, pt states tonight the pain went into his left arm      56M hx poorly controlled HTN, hyperlipidemia, diverticulitis, hydronephrosis, PUD, smoker, c/o intermittent chest pains over the past 1-2 weeks  Given a Rx for nitroglycerin PRN by his physician and set up to see Dr Yazmin Molina on 12/1 for cardiology evaluation  Came in tonight because the chest pressure was more intense and associated with L arm tingling  Currently pain free, just has a little HA and flushing of the face  Took his meds today  Prior to Admission Medications   Prescriptions Last Dose Informant Patient Reported? Taking?    Ascorbic Acid (VITAMIN C) 100 MG tablet   Yes Yes   Sig: Take by mouth   Cholecalciferol (VITAMIN D) 2000 UNITS tablet   Yes Yes   Sig: Take 2,000 Units by mouth daily   Cholecalciferol (VITAMIN D3) 1000 units CAPS   Yes Yes   Sig: Take by mouth   Milk Thistle 1000 MG CAPS   Yes Yes   Sig: Take by mouth   Multiple Vitamin (MULTIVITAMIN) capsule   Yes Yes   Sig: Take 1 capsule by mouth daily   Omega-3 Fatty Acids (FISH OIL) 1,000 mg   Yes Yes   Sig: Take by mouth   Triamcinolone Acetonide 55 MCG/ACT AERO   Yes Yes   Sig: into each nostril as needed   amLODIPine (NORVASC) 5 mg tablet   Yes Yes   Sig: Take 5 mg by mouth daily   aspirin 81 MG tablet   Yes Yes   Sig: Take by mouth   atorvastatin (LIPITOR) 10 mg tablet   Yes Yes   Sig: Take 20 mg by mouth   desloratadine (CLARINEX) 5 MG tablet   Yes Yes   Sig: Take 5 mg by mouth daily at bedtime     docusate sodium (COLACE) 100 mg capsule   No No   Sig: Take 1 capsule by mouth daily for 30 days   glucosamine-chondroitin 500-400 MG tablet   Yes Yes   Sig: Take 1 tablet by mouth daily   loratadine (CLARITIN REDITABS) 10 MG dissolvable tablet   Yes Yes   Sig: Take by mouth   mometasone (NASONEX) 50 mcg/act nasal spray   Yes Yes   Sig: into each nostril   nitroglycerin (NITROSTAT) 0 3 mg SL tablet   Yes Yes   Sig: Place 0 3 mg under the tongue every 5 (five) minutes as needed for chest pain   oxyCODONE-acetaminophen (PERCOCET) 5-325 mg per tablet   No No   Sig: Take 2 tablets by mouth every 4 (four) hours as needed for moderate pain Max Daily Amount: 12 tablets   ranitidine (ZANTAC) 150 MG capsule   Yes Yes   Sig: Take 150 mg by mouth every evening      Facility-Administered Medications: None       Past Medical History:   Diagnosis Date    Anemia     Arthritis     Bundle branch block, right     CPAP (continuous positive airway pressure) dependence     Diabetes mellitus (HCC)     borderline, controlled with diet and activity    Diverticulitis     Diverticulitis of large intestine with abscess without bleeding 12/7/2016    GERD (gastroesophageal reflux disease)     Hyperlipidemia     Hypertension     Nasal septal deformity     Seasonal allergies     Sleep apnea     wears c-pap       Past Surgical History:   Procedure Laterality Date    COLON SIGMOID RESECTION N/A 12/16/2016    Procedure: RESECTION COLON SIGMOID;  Surgeon: Andrea Powell MD;  Location: [facility];  Service:     COLON SIGMOID RESECTION LAPAROSCOPIC N/A 12/16/2016    Procedure: RESECTION COLON SIGMOID LAPAROSCOPIC:CONVERTED TO [email or web address];  Surgeon: Andrea Powell MD;  [facility];  Service:     COLON SURGERY      COLONOSCOPY N/A 10/6/2016    Procedure: COLONOSCOPY;  Surgeon: Ludin Morrison MD;  Location: [facility]; Service:    [employer] CYSTOSCOPY W/ RETROGRADES Left 2/3/2017    Procedure: CYSTOSCOPY WITH BILATERAL RETROGRADES, LEFT STENT REMOVAL;  Surgeon: Delonte Warner MD;  Location: [address] [place] [address];  Service:     ESOPHAGOGASTRODUODENOSCOPY N/A 10/6/2016    Procedure: ESOPHAGOGASTRODUODENOSCOPY (EGD); Surgeon: Ludin Morrison MD;  Location: [facility];   Service:     HERNIA REPAIR      KNEE ARTHROSCOPY W/ MENISCECTOMY      MD CYSTOURETHROSCOPY,URETER CATHETER Left 12/4/2016    Procedure: CYSTOSCOPY RETROGRADE PYELOGRAM WITH INSERTION STENT URETERAL;  Surgeon: Delonte Warner MD;  Location: [address] [place] [address];  Service: Urology    [address]         History reviewed  No pertinent family history  I have reviewed and agree with the history as documented  Social History   Substance Use Topics    Smoking status: Current Every Day Smoker     Packs/day: 0 50     Years: 37 00     Types: Cigarettes    Smokeless tobacco: Never Used      Comment: patient refused for now    Alcohol use Yes      Comment: none x 1 month        Review of Systems   Constitutional: Negative for fever  Respiratory: Negative for cough  Gastrointestinal: Negative for abdominal pain  Musculoskeletal: Positive for back pain (one episode earlier today when bending over, resolved)  Neurological: Positive for headaches  Negative for light-headedness  All other systems reviewed and are negative  Physical Exam  ED Triage Vitals   Temperature Pulse Respirations Blood Pressure SpO2   11/22/17 0019 11/22/17 0009 11/22/17 0009 11/22/17 0009 11/22/17 0009   98 7 °F (37 1 °C) (!) 106 18 (!) 232/110 98 %      Temp Source Heart Rate Source Patient Position - Orthostatic VS BP Location FiO2 (%)   11/22/17 0019 11/22/17 0009 11/22/17 0009 11/22/17 0009 --   Tympanic Monitor Sitting Left arm       Pain Score       11/22/17 0009       No Pain           Orthostatic Vital Signs  Vitals:    11/22/17 0009 11/22/17 0030 11/22/17 0045   BP: (!) 232/110 (!) 191/86 145/70   Pulse: (!) 106 90 66   Patient Position - Orthostatic VS: Sitting Sitting        Physical Exam   Constitutional: He is oriented to person, place, and time  He appears well-developed  HENT:   Mouth/Throat: Oropharynx is clear and moist    Eyes: Conjunctivae are normal    Neck: Neck supple  Cardiovascular: Regular rhythm      Hypertensive, mildly tachycardic   Pulmonary/Chest: Effort normal and breath sounds normal  He has no rales  Abdominal: Soft  Bowel sounds are normal    Musculoskeletal: He exhibits no edema  Neurological: He is alert and oriented to person, place, and time  Skin: Skin is warm and dry  Psychiatric: He has a normal mood and affect  Vitals reviewed  ED Medications  Medications   nitroglycerin (NITROSTAT) SL tablet 0 4 mg (0 mg Sublingual Hold 11/22/17 0043)   heparin (porcine) 25,000 units in 250 mL infusion (premix) (11 1 Units/kg/hr × 90 kg (Order-Specific) Intravenous New Bag 11/22/17 0103)   heparin (porcine) injection 4,000 Units (not administered)   heparin (porcine) injection 2,000 Units (not administered)   aspirin chewable tablet 243 mg (243 mg Oral Given 11/22/17 0016)   metoprolol (LOPRESSOR) injection 5 mg (5 mg Intravenous Given 11/22/17 0025)   heparin (porcine) injection 4,000 Units (4,000 Units Intravenous Given 11/22/17 0101)       Diagnostic Studies  Results Reviewed     Procedure Component Value Units Date/Time    Comprehensive metabolic panel [[id number]]  (Abnormal) Collected:  11/22/17 0019    Lab Status:  Final result Specimen:  Blood from Arm, Right Updated:  11/22/17 0053     Sodium 140 mmol/L      Potassium 3 5 mmol/L      Chloride 102 mmol/L      CO2 28 mmol/L      Anion Gap 10 mmol/L      BUN 15 mg/dL      Creatinine 1 13 mg/dL      Glucose 187 (H) mg/dL      Calcium 9 0 mg/dL      AST 34 U/L      ALT 58 U/L      Alkaline Phosphatase 91 U/L      Total Protein 8 5 (H) g/dL      Albumin 4 2 g/dL      Total Bilirubin 0 60 mg/dL      eGFR 73 ml/min/1 73sq m     Narrative:         National Kidney Disease Education Program recommendations are as follows:  GFR calculation is accurate only with a steady state creatinine  Chronic Kidney disease less than 60 ml/min/1 73 sq  meters  Kidney failure less than 15 ml/min/1 73 sq  meters      APTT six (6) hours after Heparin bolus/drip initiation or dosing change [[id number]]     Lab Status:  No result Specimen:  Blood     NT-BNP PRO [[id number]]  (Normal) Collected:  11/22/17 0019    Lab Status:  Final result Specimen:  Blood from Arm, Right Updated:  11/22/17 0049     NT-proBNP 26 pg/mL     Troponin I [[id number]]  (Normal) Collected:  11/22/17 0019    Lab Status:  Final result Specimen:  Blood from Arm, Right Updated:  11/22/17 0047     Troponin I <0 02 ng/mL     Narrative:         [employer] Chemistry analyzer 99% cutoff is > 0 04 ng/mL in network labs    o cTnI 99% cutoff is useful only when applied to patients in the clinical setting of myocardial ischemia  o cTnI 99% cutoff should be interpreted in the context of clinical history, ECG findings and possibly cardiac imaging to establish correct diagnosis  o cTnI 99% cutoff may be suggestive but clearly not indicative of a coronary event without the clinical setting of myocardial ischemia  Protime-INR [[id number]]  (Normal) Collected:  11/22/17 0019    Lab Status:  Final result Specimen:  Blood from Arm, Right Updated:  11/22/17 0041     Protime 10 2 seconds      INR 0 97    APTT [[id number]]  (Normal) Collected:  11/22/17 0019    Lab Status:  Final result Specimen:  Blood from Arm, Right Updated:  11/22/17 0041     PTT 24 seconds     Narrative:          Therapeutic Heparin Range = 60-90 seconds    CBC and differential [[id number]]  (Abnormal) Collected:  11/22/17 0019    Lab Status:  Final result Specimen:  Blood from Arm, Right Updated:  11/22/17 0030     WBC 9 80 Thousand/uL      RBC 4 78 Million/uL      Hemoglobin 16 5 g/dL      Hematocrit 48 0 %       (H) fL      MCH 34 4 (H) pg      MCHC 34 3 g/dL      RDW 13 6 %      MPV 7 5 (L) fL      Platelets 006 Thousands/uL      nRBC 0 /100 WBCs      Neutrophils Relative 64 %      Lymphocytes Relative 25 %      Monocytes Relative 7 %      Eosinophils Relative 3 %      Basophils Relative 1 %      Neutrophils Absolute 6 30 Thousands/µL      Lymphocytes Absolute 2 40 Thousands/µL      Monocytes Absolute 0 70 Thousand/µL      Eosinophils Absolute 0 30 Thousand/µL      Basophils Absolute 0 10 Thousands/µL                  X-ray chest 1 view portable    (Results Pending)              Procedures  Procedures       Phone Contacts  ED Phone Contact    ED Course  ED Course                                MDM  Number of Diagnoses or Management Options  Diagnosis management comments: Pt with concerning story for unstable angina; EKG shows sinus tach with mild ST depressions laterally  No THEE  Normal intervals  Pt given 243mg aspirin PO and metoprolol 5mg IV, started on 2LNC  Developed "burning" sensation in chest; repeat EKG improved from prior  Nitro ordered but not given as pain resolved spontaneously  Discussed case with Dr Rufina Delgado, who agrees with aspirin, heparin and admission here tonight; can transfer out for cath tomorrow if necessary  Pt admitted to tele under Dr Sadiq Bustillo for serial enzymes and cardiology evaluation in AM        The patient presented with a condition in which there was a high probability of imminent or life-threatening deterioration, and critical care services (excluding separately billable procedures) totalled 30-74 minutes  Disposition  Final diagnoses:   ACS (acute coronary syndrome) ([facility] [address] )     Time reflects when diagnosis was documented in both MDM as applicable and the Disposition within this note     Time User Action Codes Description Comment    11/22/2017  1:00 AM Almer Apley Add [I24 9] ACS (acute coronary syndrome) [facility])       ED Disposition     ED Disposition Condition Comment    Admit  Case was discussed with Monalisa DAVILA and Dr uRfina Delgado, and the patient's admission status was agreed to be full inpt tele to the service of Dr Sadiq Bustillo  Follow-up Information    None       Patient's Medications   Discharge Prescriptions    No medications on file     No discharge procedures on file      ED Provider  Electronically Signed by           Kaitlyn Loera DO  11/22/17 0105

## 2017-11-22 NOTE — PROCEDURES
Procedures   Cardiac Catheterization Operative Report    Maylin Johnson  [id number]  11/22/2017  Cyndie Ge    Indication: Abnormal ekg, chest pain  Dm2, htn, smoking  : Kun Handley MD  Contrast: 85cc of Visipaque  Procedure: L Heart Catheterization                     Ventriculogram                     Coronary Angiogram    Procedure Details  The risks, benefits, complications, treatment options, and expected outcomes were discussed with the patient  The patient and/or family concurred with the proposed plan, giving informed consent  Patient was brought to the cath lab after IV hydration was begun and oral premedication was given  He was further sedated with fentanyl and midazolam  He was prepped and draped in the usual manner  Using the modified Seldinger access technique, a 5 Irish sheath was placed in the radial arterys  Heparin, verapamil, and nitroglycerin were administered intra-arterial   A JR4 catheter was used to cannulate the right coronary artery  The JR4 catheter passed through the aortic valve and therefore a catheterization and ventriculogram were performed  After the procedure was completed, sedation was stopped and the sheaths and catheters were all removed  Hemostasis was achieved with TR band    Findings:    Hemodynamics  LVEDP 10, no significant gradient pullback   Left Main Average caliber vessel which bifurcates to LAD and left left circumflex widely patent   RCA  Co-dominant average caliber vessel which supplies an average posterior descending artery  There is a 30-40% proximal RCA stenosis after which there is mild diffuse luminal irregularities   LAD  Average caliber vessel which distally tapers to supply the inferior apex  Mild diffuse luminal irregularities   Circ  Large caliber vessel, co-dominant with a focal 70% stenosis mid-vessel at the level of om1 bifurcation  There is pre and post stenotic dilitation noted      LV  Normal LV size, EF55-60%, No focal wall motion abnormalities       Complications  None     Estimated Blood Loss:  Minimal         Complications:  None; patient tolerated the procedure well             Disposition: hemodynamically stable and PACU - hemodynamically stable           Condition: stable    A/P Severe mid-circumflex stenosis- likely culprit for ekg changes with elevated SBP         Normal filing pressures/EF  -- TR band 2 hours  -- transfer for PCI  -- load with brillinta 180mg tonight  -- IV fluids  -- blood pressure management  -- smoking cessation

## 2017-11-22 NOTE — PLAN OF CARE
DISCHARGE PLANNING     Discharge to home or other facility with appropriate resources Not Progressing        INFECTION - ADULT     Absence or prevention of progression during hospitalization Not Progressing     Absence of fever/infection during neutropenic period Not Progressing        Knowledge Deficit     Patient/family/caregiver demonstrates understanding of disease process, treatment plan, medications, and discharge instructions Not Progressing        PAIN - ADULT     Verbalizes/displays adequate comfort level or baseline comfort level Not Progressing        Potential for Falls     Patient will remain free of falls Not Progressing        SAFETY ADULT     Maintain or return to baseline ADL function Not Progressing     Maintain or return mobility status to optimal level Not Progressing     Patient will remain free of falls Not Progressing

## 2017-11-22 NOTE — CONSULTS
CARDIOLOGY CONSULTATION  Tiki Christopher 49 y o  male MRN: [id number]  Unit/Bed#: [address] [place] [address] Encounter: [id number]      History of Present Illness   Physician Requesting Consult: Gilmar Peña MD  Reason for Consult / Principal Problem: Chest pain    Assessment/Plan   1  Htn urgency with chest pain r/o acute coronary syndrome- noted to have tachycardia with lateral st depressions/SBP above 230/110 on arrival  Trop *2 negative  Given high pre-test probability cardiac cath was done instead of nuclear stress test  Noted with severe focal circumflex stenosis  No evidence of Lmain/proximal LAD  Plan to load with brillinta 180mg today than 90mg twice a day  Transfer for PCI on Friday if creatinine stable  2  Hld/elevated triglycerides- atorvastatin 80mg daily   3  Sleep apnea- CPA   4  Dm2- tight control  5  Nicotine dependence        HPI: Tiki Christopher is a 49y o  year old male admitted with stuttering chest pain with eating/exertion for the past two weeks  Did not want to come in due to work commitments  Night prior to admission ate large meal and than starting having chest pain/tingling in arm + surge in his blood pressures  In ED noted to be tachycardic and hypertensive  Improved with blood pressure control/nitroglycerin  Denies having chest pain since        Historical Information   Past Medical History:   Diagnosis Date    Anemia     Arthritis     Bundle branch block, right     CPAP (continuous positive airway pressure) dependence     Diabetes mellitus (HCC)     borderline, controlled with diet and activity    Diverticulitis     Diverticulitis of large intestine with abscess without bleeding 12/7/2016    GERD (gastroesophageal reflux disease)     Hyperlipidemia     Hypertension     Nasal septal deformity     Seasonal allergies     Sleep apnea     wears c-pap     Past Surgical History:   Procedure Laterality Date    COLON SIGMOID RESECTION N/A 12/16/2016    Procedure: RESECTION COLON SIGMOID;  Surgeon: Sunny Carballo MD;  Location: [facility];  Service:     COLON SIGMOID RESECTION LAPAROSCOPIC N/A 12/16/2016    Procedure: RESECTION COLON SIGMOID LAPAROSCOPIC:CONVERTED TO [email or web address];  Surgeon: Sunny Carballo MD;  Location[facility];  Service:     COLON SURGERY      COLONOSCOPY N/A 10/6/2016    Procedure: COLONOSCOPY;  Surgeon: Telma Bailey MD;  Location: [facility] [address] [facility]; Service:    Stewart Mchugh CYSTOSCOPY W/ RETROGRADES Left 2/3/2017    Procedure: CYSTOSCOPY WITH BILATERAL RETROGRADES, LEFT STENT REMOVAL;  Surgeon: Santosh Encarnacion MD;  Location: [address] [place] [address];  Service:     ESOPHAGOGASTRODUODENOSCOPY N/A 10/6/2016    Procedure: ESOPHAGOGASTRODUODENOSCOPY (EGD); Surgeon: Telma Bailey MD;  Location: [facility] [address] [facility]; Service:     HERNIA REPAIR      KNEE ARTHROSCOPY W/ MENISCECTOMY      VT CYSTOURETHROSCOPY,URETER CATHETER Left 12/4/2016    Procedure: CYSTOSCOPY RETROGRADE PYELOGRAM WITH INSERTION STENT URETERAL;  Surgeon: Santosh Encarnacion MD;  Location: [address] [place] [address];  Service: Urology    VARICOSE VEIN SURGERY       History   Alcohol Use    1 2 oz/week    2 Glasses of wine per week     Comment: last drink was yesterday  History   Drug Use No     History   Smoking Status    Current Every Day Smoker    Packs/day: 0 50    Years: 37 00    Types: Cigarettes   Smokeless Tobacco    Never Used     Comment: patient refused for now     History reviewed  No pertinent family history  Meds/Allergies   Prior to Admission medications    Medication Sig Start Date End Date Taking?  Authorizing Provider   amLODIPine (NORVASC) 5 mg tablet Take 5 mg by mouth daily   Yes Historical Provider, MD   Ascorbic Acid (VITAMIN C) 100 MG tablet Take by mouth   Yes Historical Provider, MD   aspirin 81 MG tablet Take by mouth   Yes Historical Provider, MD   atorvastatin (LIPITOR) 10 mg tablet Take 20 mg by mouth   Yes Historical Provider, MD   Cholecalciferol (VITAMIN D) 2000 UNITS tablet Take 2,000 Units by mouth daily   Yes Historical Provider, MD   Cholecalciferol (VITAMIN D3) 1000 units CAPS Take by mouth 3/21/15  Yes Historical Provider, MD   desloratadine (CLARINEX) 5 MG tablet Take 5 mg by mouth daily at bedtime     Yes Historical Provider, MD   glucosamine-chondroitin 500-400 MG tablet Take 1 tablet by mouth daily   Yes Historical Provider, MD   Multiple Vitamin (MULTIVITAMIN) capsule Take 1 capsule by mouth daily   Yes Historical Provider, MD   Omega-3 Fatty Acids (FISH OIL) 1,000 mg Take by mouth   Yes Historical Provider, MD   ranitidine (ZANTAC) 150 MG capsule Take 150 mg by mouth every evening   Yes Historical Provider, MD   docusate sodium (COLACE) 100 mg capsule Take 1 capsule by mouth daily for 30 days 12/21/16 2/3/17  W Mick Dukes MD   loratadine (CLARITIN REDITABS) 10 MG dissolvable tablet Take by mouth    Historical Provider, MD   Milk Thistle 1000 MG CAPS Take by mouth    Historical Provider, MD   mometasone (NASONEX) 50 mcg/act nasal spray into each nostril    Historical Provider, MD   nitroglycerin (NITROSTAT) 0 3 mg SL tablet Place 0 3 mg under the tongue every 5 (five) minutes as needed for chest pain    Historical Provider, MD   oxyCODONE-acetaminophen (PERCOCET) 5-325 mg per tablet Take 2 tablets by mouth every 4 (four) hours as needed for moderate pain Max Daily Amount: 12 tablets 12/21/16   W Mick Dukes MD   Triamcinolone Acetonide 55 MCG/ACT AERO into each nostril as needed    Historical Provider, MD   Multiple Vitamins-Minerals (PX MENS MULTIVITAMINS) TABS Take by mouth  11/22/17  Historical Provider, MD   polyethylene glycol (MIRALAX) 17 g packet Take 17 g by mouth daily  11/22/17  Historical Provider, MD   ranitidine (ZANTAC) 150 mg tablet Take by mouth  11/22/17  Historical Provider, MD     Current Facility-Administered Medications   Medication Dose Route Frequency Provider Last Rate Last Dose    acetaminophen (TYLENOL) tablet 650 mg  650 mg Oral Q4H PRN LUCERO Figueredo  aluminum-magnesium hydroxide-simethicone (MYLANTA) 200-200-20 mg/5 mL oral suspension 30 mL  30 mL Oral Q6H PRN LUCERO Santiago        amLODIPine (NORVASC) tablet 5 mg  5 mg Oral Daily LUCERO Santiago   5 mg at 11/22/17 0944    ascorbic acid (VITAMIN C) tablet 250 mg  250 mg Oral Daily Dimitrios Toussaint MD   250 mg at 11/22/17 0945    [START ON 11/23/2017] aspirin chewable tablet 81 mg  81 mg Oral Daily LUCERO Santiago        atorvastatin (LIPITOR) tablet 20 mg  20 mg Oral Daily With LUCERO Pace        cholecalciferol (VITAMIN D3) tablet 2,000 Units  2,000 Units Oral Daily LUCERO Santiago   2,000 Units at 11/22/17 0945    famotidine (PEPCID) tablet 20 mg  20 mg Oral Daily LUCERO Santiago        fish oil capsule 1,000 mg  1,000 mg Oral Daily LUCERO Santiago   1,000 mg at 11/22/17 0944    fluticasone (FLONASE) 50 mcg/act nasal spray 2 spray  2 spray Each Nare Daily LUCERO Santiago        heparin (porcine) 25,000 units in 250 mL infusion (premix)  3-20 Units/kg/hr (Order-Specific) Intravenous Titrated Theodoro Square, DO   Stopped at 11/22/17 0932    heparin (porcine) injection 2,000 Units  2,000 Units Intravenous PRN Theodoro Square, DO        heparin (porcine) injection 4,000 Units  4,000 Units Intravenous PRN Theodoro Square, DO        insulin lispro (HumaLOG) 100 units/mL subcutaneous injection 1-5 Units  1-5 Units Subcutaneous Q6H [address] LUCERO Santiago        loratadine (CLARITIN) tablet 10 mg  10 mg Oral HS LUCERO Santiago        multivitamin-minerals (CENTRUM) tablet 1 tablet  1 tablet Oral Daily LUCERO Santiago   1 tablet at 11/22/17 0945    nicotine (NICODERM CQ) 14 mg/24hr TD 24 hr patch 1 patch  1 patch Transdermal Daily LUCERO Santiago   1 patch at 11/22/17 0945    ondansetron (ZOFRAN) injection 4 mg  4 mg Intravenous Q6H PRN LUCERO Santiago         Allergies   Allergen Reactions    Levaquin [Levofloxacin In D5w] Swelling     Knee swelling    friction rub  No murmur heard  Pulmonary/Chest: Effort normal and breath sounds normal  No stridor  No respiratory distress  He has no wheezes  He has no rales  He exhibits no tenderness  Abdominal: Soft  Bowel sounds are normal  He exhibits no distension and no mass  There is no tenderness  There is no rebound and no guarding  Genitourinary:   Genitourinary Comments: No CVA tenderness   Musculoskeletal: He exhibits no edema, tenderness or deformity  Neurological: He is alert and oriented to person, place, and time  He displays normal reflexes  He exhibits normal muscle tone  Skin: Skin is warm and dry  No rash noted  He is not diaphoretic  No erythema  Psychiatric: He has a normal mood and affect  His behavior is normal  Judgment and thought content normal    Nursing note and vitals reviewed              Recent Results (from the past 24 hour(s))   ECG 12 lead    Collection Time: 11/22/17 12:09 AM   Result Value Ref Range    Ventricular Rate 103 BPM    Atrial Rate 103 BPM    PA Interval  ms    QRSD Interval 98 ms    QT Interval 334 ms    QTC Interval 437 ms    P Axis 71 degrees    QRS Axis 26 degrees    T Wave Axis -3 degrees   Comprehensive metabolic panel    Collection Time: 11/22/17 12:19 AM   Result Value Ref Range    Sodium 140 136 - 145 mmol/L    Potassium 3 5 3 5 - 5 3 mmol/L    Chloride 102 100 - 108 mmol/L    CO2 28 21 - 32 mmol/L    Anion Gap 10 4 - 13 mmol/L    BUN 15 5 - 25 mg/dL    Creatinine 1 13 0 60 - 1 30 mg/dL    Glucose 187 (H) 65 - 140 mg/dL    Calcium 9 0 8 3 - 10 1 mg/dL    AST 34 5 - 45 U/L    ALT 58 12 - 78 U/L    Alkaline Phosphatase 91 46 - 116 U/L    Total Protein 8 5 (H) 6 4 - 8 2 g/dL    Albumin 4 2 3 5 - 5 0 g/dL    Total Bilirubin 0 60 0 20 - 1 00 mg/dL    eGFR 73 ml/min/1 73sq m   CBC and differential    Collection Time: 11/22/17 12:19 AM   Result Value Ref Range    WBC 9 80 4 80 - 10 80 Thousand/uL    RBC 4 78 4 70 - 6 10 Million/uL    Hemoglobin 16 5 14 0 - 18 0 g/dL Hematocrit 48 0 42 0 - 52 0 %     (H) 82 - 98 fL    MCH 34 4 (H) 27 0 - 31 0 pg    MCHC 34 3 31 4 - 37 4 g/dL    RDW 13 6 11 6 - 15 1 %    MPV 7 5 (L) 8 9 - 12 7 fL    Platelets 692 559 - 992 Thousands/uL    nRBC 0 /100 WBCs    Neutrophils Relative 64 43 - 75 %    Lymphocytes Relative 25 14 - 44 %    Monocytes Relative 7 4 - 12 %    Eosinophils Relative 3 0 - 6 %    Basophils Relative 1 0 - 1 %    Neutrophils Absolute 6 30 1 85 - 7 62 Thousands/µL    Lymphocytes Absolute 2 40 0 60 - 4 47 Thousands/µL    Monocytes Absolute 0 70 0 17 - 1 22 Thousand/µL    Eosinophils Absolute 0 30 0 00 - 0 61 Thousand/µL    Basophils Absolute 0 10 0 00 - 0 10 Thousands/µL   Protime-INR    Collection Time: 11/22/17 12:19 AM   Result Value Ref Range    Protime 10 2 9 4 - 11 7 seconds    INR 0 97 0 86 - 1 16   APTT    Collection Time: 11/22/17 12:19 AM   Result Value Ref Range    PTT 24 24 - 33 seconds   Troponin I    Collection Time: 11/22/17 12:19 AM   Result Value Ref Range    Troponin I <0 02 <=0 04 ng/mL   NT-BNP PRO    Collection Time: 11/22/17 12:19 AM   Result Value Ref Range    NT-proBNP 26 <125 pg/mL   ECG 12 lead    Collection Time: 11/22/17 12:30 AM   Result Value Ref Range    Ventricular Rate 70 BPM    Atrial Rate 70 BPM    SD Interval 180 ms    QRSD Interval 102 ms    QT Interval 382 ms    QTC Interval 412 ms    P Var[place] 64 degrees    QRS Axis 16 degrees    T Wave Axis 3 degrees   ECG 12 lead    Collection Time: 11/22/17  1:09 AM   Result Value Ref Range    Ventricular Rate 63 BPM    Atrial Rate 63 BPM    SD Interval 170 ms    QRSD Interval 104 ms    QT Interval 400 ms    QTC Interval 409 ms    P Var[place] 61 degrees    QRS Axis 12 degrees    T Wave Axis -15 degrees   Troponin I    Collection Time: 11/22/17  3:59 AM   Result Value Ref Range    Troponin I 0 02 <=0 04 ng/mL   T4, free    Collection Time: 11/22/17  4:04 AM   Result Value Ref Range    Free T4 1 00 0 76 - 1 46 ng/dL   Troponin I    Collection Time: 11/22/17 7:08 AM   Result Value Ref Range    Troponin I <0 02 <=0 04 ng/mL   APTT    Collection Time: 11/22/17  7:08 AM   Result Value Ref Range    PTT 28 23 - 35 seconds   Lipid Panel with Direct LDL reflex    Collection Time: 11/22/17  7:08 AM   Result Value Ref Range    Cholesterol 139 50 - 200 mg/dL    Triglycerides 244 (H) <=150 mg/dL    HDL, Direct 33 (L) 40 - 60 mg/dL    LDL Calculated 57 0 - 100 mg/dL   TSH, 3rd generation    Collection Time: 11/22/17  7:08 AM   Result Value Ref Range    TSH 3RD GENERATON 2 809 0 358 - 3 740 uIU/mL   Hemoglobin A1c    Collection Time: 11/22/17  7:08 AM   Result Value Ref Range    Hemoglobin A1C 6 5 (H) 4 2 - 6 3 %     mg/dl   Ethanol    Collection Time: 11/22/17  8:12 AM   Result Value Ref Range    Ethanol Lvl <3 0 - 3 mg/dL   Fingerstick Glucose (POCT)    Collection Time: 11/22/17 11:18 AM   Result Value Ref Range    POC Glucose 110 65 - 140 mg/dl   UA w Reflex to Microscopic w Reflex to Culture    Collection Time: 11/22/17 11:23 AM   Result Value Ref Range    Color, UA Yellow     Clarity, UA Clear     Specific [place], UA 1 010 1 000 - 1 030    pH, UA 7 0 5 0 - 9 0    Leukocytes, UA Negative Negative    Nitrite, UA Negative Negative    Protein, UA Negative Negative mg/dl    Glucose, UA Negative Negative mg/dl    Ketones, UA Negative Negative mg/dl    Urobilinogen, UA 0 2 0 2, 1 0 E U /dl E U /dl    Bilirubin, UA Negative Negative    Blood, UA Negative Negative     Imaging: I have personally reviewed pertinent films in PACS  EKG: normal sinus rhythm with dynamic lateral 2mm st depression with SBP of 180              Resting ekg normal sinus rhythm nonspecific st changes      Counseling / Coordination of Care  Total floor / unit time spent today 70 minutes   Greater than fifty percent of time spent at bedside for coordination of care, patient counseling, history taking: Htn urgency/chest pain treatment

## 2017-11-22 NOTE — H&P
History and Physical - [facility] Internal Medicine    Patient Information: Kimberly Gallagher 49 y o  male MRN: [id number]  Unit/Bed#: [address] [place] [address] Encounter: [id number]  Admitting Physician: LUCERO Lopez  PCP: Brooke Castillo  Date of Admission:  11/22/17    Assessment/Plan:    Hospital Problem List:     Principal Problem:    ACS (acute coronary syndrome) [facility])  Active Problems:    Hypertension    Obesity    Nicotine abuse      Plan for the Primary Problem(s):  · ACS  Initial EKG in ED showed ST depression in lead V3-V6 which resolved in repeat EKG in about 10 minutes per ED physician  Initial troponin negative  Chest x-ray unremarkable  Will trend troponin, telemetry  Patient was started on heparin drip in ED  Was given aspirin 243 mg po in ED(pt took 81mg at home)  ED physician discussed patient's case with cardiologist on call Dr Robert Hobbs, no urgent intervention necessary tonight  Will check A1c, lipid panel, TSH  Check 2D echo  Keep patient NPO  Consult Cardiology  Plan for Additional Problems:   · Hypertension  /110 on ED arrival   Most likely reactive  Patient received metoprolol 5 mg IV x1 in ED  Latest /63  Continue home medication Norvasc  Monitor  · T2DM  Glucose 187  Patient is diet controlled home  Check A1c  Will order SSI  · GERD  Continue Zantac  · Hyperlipidemia  Continue Lipitor  Check lipid panel  · History of sigmoid diverticulitis with perforation abscess, status post sigmoid colon resection in 12/2016  · History of left hydronephrosis  Recent NM kidney study was unremarkable  · Sleep apnea  Continue CPAP at 9 cmh2o at HS  · Obesity  Body mass index is 38 77 kg/m²  Diet and lifestyle modification  · Nicotine abuse  Smoking cessation, nicotine patch  · Possible alcohol abuse  Pt reports drinking a couple of days a week  Pt appears intoxicated to me during interview and RN reports possible smelling of alcohol   Will order stat alcohol level       VTE Prophylaxis: Heparin Drip  / reason for no mechanical VTE prophylaxis on heparin drip  Code Status: Full code  POLST: POLST form is not discussed and not completed at this time  Anticipated Length of Stay:  Patient will be admitted on an Inpatient basis with an anticipated length of stay of  > 2 midnights  Justification for Hospital Stay: ACS  Total Time for Visit, including Counseling / Coordination of Care: 45 minutes  Greater than 50% of this total time spent on direct patient counseling and coordination of care  Chief Complaint:   Chest pain  History of Present Illness:    Mustapha Pittman is a 49 y o  male with PMH of hypertension, hyperlipidemia, GERD, T2DM, sleep apnea, nicotine abuse, obesity who presents with chest pain  He states he has been having chest pain on and off for a week and half  Usually happens at night while he is in bed  Last night chest pain occurred after he came back from work, it was located in the left side of the chest, radiates down to left arm( reports left arm numbness/ tingling and left small finger pain), intermittent,"annoyance" kind pain, associated with palpitations  He denies headaches, dizziness, SOB, nauseous, vomiting, diaphoresis associated with episodes  He took 1 baby aspirin at home before came to ED  He reports "flick" pain in chest now and then currently  Patient reports SOB at times due to "the shape he is in"  Patient reports he saw his PCP about chest pain, he was started on Norvasc and Nitrostat p r n  Wendi Sinks Patient was scheduled to see cardiologist on 12/1/2017 for chest pain  Patient states he had episode of hematuria on 11/13/2017 which resolved on its own  Patient denies urinary urgency or frequency or dysuria  Denies fever or chills  Patient denies family history of MI, denies regular exercise  Reports 37 years smoking history  Patient reports he had stress test in September 2015 with negative findings    Patient denies history of cardiac catheterization  In ED, patient was started on heparin drip for ACS  Patient received aspirin 243 mg, METOPROLOL 5 MG IV x 1 in ED as well  Review of Systems:    Review of Systems   Respiratory: Positive for shortness of breath  Cardiovascular: Positive for chest pain and palpitations  Negative for leg swelling  Genitourinary: Positive for hematuria  All other systems reviewed and are negative  Past Medical and Surgical History:     Past Medical History:   Diagnosis Date    Anemia     Arthritis     Bundle branch block, right     CPAP (continuous positive airway pressure) dependence     Diabetes mellitus (HCC)     borderline, controlled with diet and activity    Diverticulitis     Diverticulitis of large intestine with abscess without bleeding 12/7/2016    GERD (gastroesophageal reflux disease)     Hyperlipidemia     Hypertension     Nasal septal deformity     Seasonal allergies     Sleep apnea     wears c-pap       Past Surgical History:   Procedure Laterality Date    COLON SIGMOID RESECTION N/A 12/16/2016    Procedure: RESECTION COLON SIGMOID;  Surgeon: Hiro Rodriguez MD;  Location: [facility];  Service:     COLON SIGMOID RESECTION LAPAROSCOPIC N/A 12/16/2016    Procedure: RESECTION COLON SIGMOID LAPAROSCOPIC:CONVERTED TO [email or web address];  Surgeon: Hiro Rodriguez MD;  [facility];  Service:     COLON SURGERY      COLONOSCOPY N/A 10/6/2016    Procedure: COLONOSCOPY;  Surgeon: Kell Howell MD;  Location: [facility] GI LAB; Service:    Monisha Fujita CYSTOSCOPY W/ RETROGRADES Left 2/3/2017    Procedure: CYSTOSCOPY WITH BILATERAL RETROGRADES, LEFT STENT REMOVAL;  Surgeon: Zuhair Ramirez MD;  Location: [address] [place] [address];  Service:     ESOPHAGOGASTRODUODENOSCOPY N/A 10/6/2016    Procedure: ESOPHAGOGASTRODUODENOSCOPY (EGD); Surgeon: Kell Howell MD;  Location: [facility] GI LAB;   Service:     HERNIA REPAIR      KNEE ARTHROSCOPY W/ MENISCECTOMY      VT CYSTOURETHROSCOPY,URETER CATHETER Left 12/4/2016    Procedure: CYSTOSCOPY RETROGRADE PYELOGRAM WITH INSERTION STENT URETERAL;  Surgeon: Olga Nunes MD;  Location: [address] [place] [address];  Service: Urology    VARICOSE VEIN SURGERY         Meds/Allergies:    Prior to Admission medications    Medication Sig Start Date End Date Taking?  Authorizing Provider   amLODIPine (NORVASC) 5 mg tablet Take 5 mg by mouth daily   Yes Historical Provider, MD   Ascorbic Acid (VITAMIN C) 100 MG tablet Take by mouth   Yes Historical Provider, MD   aspirin 81 MG tablet Take by mouth   Yes Historical Provider, MD   atorvastatin (LIPITOR) 10 mg tablet Take 20 mg by mouth   Yes Historical Provider, MD   Cholecalciferol (VITAMIN D) 2000 UNITS tablet Take 2,000 Units by mouth daily   Yes Historical Provider, MD   Cholecalciferol (VITAMIN D3) 1000 units CAPS Take by mouth 3/21/15  Yes Historical Provider, MD   desloratadine (CLARINEX) 5 MG tablet Take 5 mg by mouth daily at bedtime     Yes Historical Provider, MD   glucosamine-chondroitin 500-400 MG tablet Take 1 tablet by mouth daily   Yes Historical Provider, MD   Multiple Vitamin (MULTIVITAMIN) capsule Take 1 capsule by mouth daily   Yes Historical Provider, MD   Omega-3 Fatty Acids (FISH OIL) 1,000 mg Take by mouth   Yes Historical Provider, MD   ranitidine (ZANTAC) 150 MG capsule Take 150 mg by mouth every evening   Yes Historical Provider, MD   docusate sodium (COLACE) 100 mg capsule Take 1 capsule by mouth daily for 30 days 12/21/16 2/3/17  Joe Broussard MD   loratadine (CLARITIN REDITABS) 10 MG dissolvable tablet Take by mouth    Historical Provider, MD   Milk Thistle 1000 MG CAPS Take by mouth    Historical Provider, MD   mometasone (NASONEX) 50 mcg/act nasal spray into each nostril    Historical Provider, MD   nitroglycerin (NITROSTAT) 0 3 mg SL tablet Place 0 3 mg under the tongue every 5 (five) minutes as needed for chest pain    Historical Provider, MD   oxyCODONE-acetaminophen (PERCOCET) 5-325 mg per tablet Take 2 tablets by mouth every 4 (four) hours as needed for moderate pain Max Daily Amount: 12 tablets 12/21/16   W Kristie Lynn MD   Triamcinolone Acetonide 55 MCG/ACT AERO into each nostril as needed    Historical Provider, MD   Multiple Vitamins-Minerals ([facility] MULTIVITAMINS) TABS Take by mouth  11/22/17  Historical Provider, MD   polyethylene glycol (MIRALAX) 17 g packet Take 17 g by mouth daily  11/22/17  Historical Provider, MD   ranitidine (ZANTAC) 150 mg tablet Take by mouth  11/22/17  Historical Provider, MD     I have reviewed home medications with patient personally  Allergies: Allergies   Allergen Reactions    Levaquin [Levofloxacin In D5w] Swelling     Knee swelling    Sulfa Antibiotics GI Intolerance       Social History:     Marital Status: Single   Occupation:    Patient Pre-hospital Living Situation:  Lives alone  Patient Pre-hospital Level of Mobility:  Independent  Patient Pre-hospital Diet Restrictions:  Diabetic diet  Substance Use History:   History   Alcohol Use    1 2 oz/week    2 Glasses of wine per week     Comment: last drink was yesterday  History   Smoking Status    Current Every Day Smoker    Packs/day: 0 50    Years: 37 00    Types: Cigarettes   Smokeless Tobacco    Never Used     Comment: patient refused for now     History   Drug Use No       Family History:    History reviewed  No pertinent family history  Physical Exam:     Vitals:   Blood Pressure: 123/63 (11/22/17 0424)  Pulse: 60 (11/22/17 0424)  Temperature: 98 3 °F (36 8 °C) (11/22/17 0424)  Temp Source: Oral (11/22/17 0424)  Respirations: 18 (11/22/17 0424)  Height: 5' 11" (180 3 cm) (11/22/17 0305)  Weight - Scale: 126 kg (278 lb) (11/22/17 0305)  SpO2: 98 % (11/22/17 0424)    Physical Exam   Constitutional: He is oriented to person, place, and time  The patient is obese,Body mass index is 38 77 kg/m²  HENT:   Head: Normocephalic and atraumatic  Neck: Normal range of motion  Neck supple  Cardiovascular: Normal rate and regular rhythm  Exam reveals no gallop and no friction rub  No murmur heard  Pulmonary/Chest: Effort normal and breath sounds normal  No respiratory distress  He has no wheezes  He has no rales  Abdominal: Soft  Bowel sounds are normal  He exhibits no distension  There is no tenderness  There is no rebound  Musculoskeletal: Normal range of motion  He exhibits no edema, tenderness or deformity  Neurological: He is alert and oriented to person, place, and time  Skin: Skin is warm and dry  Psychiatric: He has a normal mood and affect  Nursing note and vitals reviewed  Additional Data:     Lab Results: I have personally reviewed pertinent reports  Results from last 7 days  Lab Units 11/22/17  0019   WBC Thousand/uL 9 80   HEMOGLOBIN g/dL 16 5   HEMATOCRIT % 48 0   PLATELETS Thousands/uL 281   NEUTROS PCT % 64   LYMPHS PCT % 25   MONOS PCT % 7   EOS PCT % 3       Results from last 7 days  Lab Units 11/22/17  0019   SODIUM mmol/L 140   POTASSIUM mmol/L 3 5   CHLORIDE mmol/L 102   CO2 mmol/L 28   BUN mg/dL 15   CREATININE mg/dL 1 13   CALCIUM mg/dL 9 0   TOTAL PROTEIN g/dL 8 5*   BILIRUBIN TOTAL mg/dL 0 60   ALK PHOS U/L 91   ALT U/L 58   AST U/L 34   GLUCOSE RANDOM mg/dL 187*       Results from last 7 days  Lab Units 11/22/17  0019   INR  0 97       Imaging: I have personally reviewed pertinent reports  Nm Kidney W Flow And Function W Rx    Result Date: 11/20/2017  Narrative: NUCLEAR RENAL SCAN INDICATION: N13 30: Unspecified hydronephrosis  Left hydronephrosis  History taken directly from the electronic ordering system  COMPARISON:   Ultrasound 11/19/2017 and priors TECHNIQUE: The study was obtained following the intravenous administration of 10 24 mCi Tc-99m MAG-3  Posterior, dynamic flow images were obtained at one minute intervals for thirty minutes   Static, posterior, pre and post void images were then obtained  FINDINGS: PERFUSION:   Left kidney:  53 1% Right kidney:  46 9%    UPTAKE/EXCRETION: Time to 1/2 Peak: Left kidney:  1 3 minutes Right kidney:  1 2 minutes    Time to Peak: Left kidney:  3 9 minutes Right kidney:  2 9 minutes    Peak to 1/2 Peak: Left kidney:  6 minutes Right kidney:  7 minutes    Split renal Function: Left kidney:  50 4% Right kidney:  49 6% There is prompt perfusion, cortical uptake, transit and excretion bilaterally  There is no obstructive uropathy  Impression: 1  Unremarkable exam   Symmetric renal function  No scintigraphic evidence for obstructive uropathy  Workstation performed: [id number]       EKG, Pathology, and Other Studies Reviewed on Admission:   · EKG:  Sinus tachycardia, ST depression in V3-V6,rate 103 on ED arrival      [employer] Records Reviewed: Yes     ** Please Note: Dragon 360 Dictation voice to text software may have been used in the creation of this document   **

## 2017-11-22 NOTE — PLAN OF CARE
Problem: DISCHARGE PLANNING - CARE MANAGEMENT  Goal: Discharge to post-acute care or home with appropriate resources  INTERVENTIONS:  - Conduct assessment to determine patient/family and health care team treatment goals, and need for post-acute services based on payer coverage, community resources, and patient preferences, and barriers to discharge  - Address psychosocial, clinical, and financial barriers to discharge as identified in assessment in conjunction with the patient/family and health care team  - Arrange appropriate level of post-acute services according to patient's   needs and preference and payer coverage in collaboration with the physician and health care team  - Communicate with and update the patient/family, physician, and health care team regarding progress on the discharge plan  - Arrange appropriate transportation to post-acute venues  Outcome: Adequate for Discharge  Plan:  Discharged to home vs transfer to other hospital if required

## 2017-11-22 NOTE — CASE MANAGEMENT
Initial Clinical Review    Admission: Date/Time/Statement: 11/22/17 @ 0101     Orders Placed This Encounter   Procedures    Inpatient Admission (expected length of stay for this patient is greater than two midnights)     Standing Status:   Standing     Number of Occurrences:   1     Order Specific Question:   Admitting Physician     Answer:   Tish Espinoza [[id number]]     Order Specific Question:   Level of Care     Answer:   Med Surg [[id number]]     Order Specific Question:   Estimated length of stay     Answer:   More than 2 Midnights     Order Specific Question:   Certification     Answer:   I certify that inpatient services are medically necessary for this patient for a duration of greater than two midnights  See H&P and MD Progress Notes for additional information about the patient's course of treatment  ED: Date/Time/Mode of Arrival:   ED Arrival Information     Expected Arrival Acuity Means of Arrival Escorted By Service Admission Type    - 11/22/2017 00:04 Emergent Walk-In Self General Medicine Emergency    Arrival Complaint    chest pain          Chief Complaint:   Chief Complaint   Patient presents with    Chest Pain     Pt states he has been having chest pain for the last 1 5 weeks, pt saw PMD and given new BP meds and Nitro, pt states tonight the pain went into his left arm        History of Illness: 49 y o  male with PMH of hypertension, hyperlipidemia, GERD, T2DM, sleep apnea, nicotine abuse, obesity who presents with chest pain  He states he has been having chest pain on and off for a week and half  Usually happens at night while he is in bed  Last night chest pain occurred after he came back from work, it was located in the left side of the chest, radiates down to left arm( reports left arm numbness/ tingling and left small finger pain), intermittent,"annoyance" kind pain, associated with palpitations  He took 1 baby aspirin at home before came to ED    He reports "flick" pain in chest now and then currently  Patient reports SOB at times due to "the shape he is in"  Patient reports he saw his PCP about chest pain, he was started on Norvasc and Nitrostat p r n  Wilberto Juan Patient was scheduled to see cardiologist on 2017 for chest pain  Patient states he had episode of hematuria on 2017 which resolved on its own  Patient denies urinary urgency or frequency or dysuria  Denies fever or chills  Patient denies family history of MI, denies regular exercise  Reports 37 years smoking history  Patient reports he had stress test in 2015 with negative findings  ED Vital Signs:   ED Triage Vitals   Temperature Pulse Respirations Blood Pressure SpO2   17 00117   98 7 °F (37 1 °C) (!) 106 18 (!) 232/110 98 %      Temp Source Heart Rate Source Patient Position - Orthostatic VS BP Location FiO2 (%)   17 --   Tympanic Monitor Sitting Left arm       Pain Score       17       No Pain        Wt Readings from Last 1 Encounters:   17 126 kg (278 lb)       Vital Signs (abnormal): Abnormal Labs/Diagnostic Test Results:    Glucose 187 (H) mg/dL       ED Treatment:   Medication Administration from 2017 0004 to 2017 0155       Date/Time Order Dose Route Action Action by Comments     2017 0016 aspirin chewable tablet 243 mg 243 mg Oral Given Lisa E Apgar, RN      2017 0025 metoprolol (LOPRESSOR) injection 5 mg 5 mg Intravenous Given Rocael Doughty E Apgar, RN      2017 0043 nitroglycerin (NITROSTAT) SL tablet 0 4 mg 0 mg Sublingual Hold Lisa E Apgar, RN      2017 0101 heparin (porcine) injection 4,000 Units 4,000 Units Intravenous Given Lisa E Apgar, RN      2017 0103 heparin (porcine) 25,000 units in 250 mL infusion (premix) 11 1 Units/kg/hr Intravenous New Bag Lisa E Apgar, RN           Past Medical/Surgical History:    Active Ambulatory Problems Diagnosis Date Noted    Diabetes mellitus ([place] [address] )     Hyperlipidemia     Hypertension     Sleep apnea     Sepsis ([place] [address] ) 12/04/2016    Abscess, abdomen ([place] [address] ) 12/04/2016    Sigmoid diverticulitis 12/04/2016    Fatigue 12/04/2016    Elevated liver enzymes 12/04/2016    Polyarthralgia 12/04/2016    Vesicocolonic fistula 12/04/2016    Hydronephrosis 12/04/2016    NATALYA (acute kidney injury) ([place] [address] ) 12/04/2016    Anemia 12/04/2016    Diverticulitis of large intestine with abscess without bleeding 12/07/2016     Resolved Ambulatory Problems     Diagnosis Date Noted    No Resolved Ambulatory Problems     Past Medical History:   Diagnosis Date    Anemia     Arthritis     Bundle branch block, right     CPAP (continuous positive airway pressure) dependence     Diabetes mellitus ([place] [address] )     Diverticulitis     Diverticulitis of large intestine with abscess without bleeding 12/7/2016    GERD (gastroesophageal reflux disease)     Hyperlipidemia     Hypertension     Nasal septal deformity     Seasonal allergies     Sleep apnea        Admitting Diagnosis: Chest pain [R07 9]  ACS (acute coronary syndrome) ([place] [address] ) [I24 9]    Age/Sex: 49 y o  male    PER MD   ADMITT FOR UNSTABLE ANGINA AND ST DEPRESSION EKG   Principal Problem:    ACS (acute coronary syndrome) (HCC)  Active Problems:    Hypertension    Obesity    Nicotine abuse  Plan for the Primary Problem(s):  · ACS  Initial EKG in ED showed ST depression in lead V3-V6   Initial troponin negative  Chest x-ray unremarkable  Will trend troponin, telemetry  Patient was started on heparin drip in ED  Was given aspirin 243 mg po in ED(pt took 81mg at home)  ED physician discussed patient's case with cardiologist on call Dr Susana Ortiz, no urgent intervention necessary tonight  Will check A1c, lipid panel, TSH  Check 2D echo  Keep patient NPO  Consult Cardiology      Plan for Additional Problems:   · Hypertension    /110 on ED arrival   Most likely reactive  Patient received metoprolol 5 mg IV x1 in ED  Latest /63  Continue home medication Norvasc  Monitor  · T2DM  Glucose 187  Patient is diet controlled home  Check A1c  Will order SSI  · GERD  Continue Zantac  · Hyperlipidemia  Continue Lipitor  Check lipid panel  · History of sigmoid diverticulitis with perforation abscess, status post sigmoid colon resection in 12/2016  · History of left hydronephrosis  Recent NM kidney study was unremarkable  · Sleep apnea  Continue CPAP at 9 cmh2o at HS  · Obesity  Body mass index is 38 77 kg/m²  Diet and lifestyle modification  · Nicotine abuse  Smoking cessation, nicotine patch  · Possible alcohol abuse  Pt reports drinking a couple of days a week  Pt appears intoxicated to me during interview and RN reports possible smelling of alcohol  Will order stat alcohol level    VTE Prophylaxis: Heparin Drip  / reason for no mechanical VTE prophylaxis on heparin drip  Code Status: Full code  POLST: POLST form is not discussed and not completed at this time  Anticipated Length of Stay:  Patient will be admitted on an Inpatient basis with an anticipated length of stay of  > 2 midnights  Justification for Hospital Stay: ACS     CARDIO  Assessment/Plan      1  Htn urgency with chest pain r/o acute coronary syndrome- noted to have tachycardia with lateral st depressions/SBP above 230/110 on arrival  Trop *2 negative  Given high pre-test probability cardiac cath was done instead of nuclear stress test  Noted with severe focal circumflex stenosis  No evidence of Lmain/proximal LAD  Plan to load with brillinta 180mg today than 90mg twice a day  Transfer for PCI on Friday if creatinine stable  2  Hld/elevated triglycerides- atorvastatin 80mg daily   3  Sleep apnea- CPA   4  Dm2- tight control  5   Nicotine dependence      Admission Orders:  TELE MON  NPO  CONSULT CARDIO  CARIDAC CATHETERIZATION   UA CS  SERIAL TROPONIN  HEPARIN GTT APTT Q6H HOLD WHEN CARDIAC CATH  Scheduled Meds:   amLODIPine 5 mg Oral Daily   vitamin C 250 mg Oral Daily   [START ON 11/23/2017] aspirin 81 mg Oral Daily   atorvastatin 20 mg Oral Daily With Dinner   cholecalciferol 2,000 Units Oral Daily   famotidine 20 mg Oral Daily   fish oil 1,000 mg Oral Daily   fluticasone 2 spray Each Nare Daily   insulin lispro 1-5 Units Subcutaneous Q6H [address]   loratadine 10 mg Oral HS   multivitamin-minerals 1 tablet Oral Daily   nicotine 1 patch Transdermal Daily     Continuous Infusions:   heparin (porcine) 3-20 Units/kg/hr (Order-Specific) Last Rate: Stopped (11/22/17 0932)     PRN Meds:   acetaminophen    aluminum-magnesium hydroxide-simethicone    heparin (porcine)    heparin (porcine)    ondansetron

## 2017-11-22 NOTE — SOCIAL WORK
Met with pt  Resides alone in his home with ramp to enter  Uses no dme, but has some from his mother  Has RW, cane, and shower seat  Has been independent and driving  No hhc  Awaiting result of cardiac cath to determine management  Explained role of cm, no anticipated d/c needs  CM reviewed d/c planning process including the following: identifying help at home, patient preference for d/c planning needs, and availability of the treatment team to discuss questions or concerns patient and/or family may have regarding understanding of medications and recognizing signs and symptoms once discharged  CM also encouraged patient to follow up with all recommended appointments after discharge  Patient advised of importance for patient and family to participate in managing patient's medical well being

## 2017-11-23 ENCOUNTER — HOSPITAL ENCOUNTER (INPATIENT)
Facility: HOSPITAL | Age: 57
LOS: 2 days | Discharge: HOME | DRG: 247 | End: 2017-11-25
Attending: INTERNAL MEDICINE | Admitting: INTERNAL MEDICINE
Payer: COMMERCIAL

## 2017-11-23 VITALS
HEIGHT: 71 IN | TEMPERATURE: 98.4 F | WEIGHT: 278 LBS | BODY MASS INDEX: 38.92 KG/M2 | HEART RATE: 52 BPM | SYSTOLIC BLOOD PRESSURE: 124 MMHG | OXYGEN SATURATION: 96 % | RESPIRATION RATE: 18 BRPM | DIASTOLIC BLOOD PRESSURE: 69 MMHG

## 2017-11-23 DIAGNOSIS — I24.9 ACS (ACUTE CORONARY SYNDROME) (HCC): Primary | ICD-10-CM

## 2017-11-23 LAB
ALBUMIN SERPL BCP-MCNC: 3.5 G/DL (ref 3.5–5)
ALP SERPL-CCNC: 67 U/L (ref 46–116)
ALT SERPL W P-5'-P-CCNC: 47 U/L (ref 12–78)
ANION GAP SERPL CALCULATED.3IONS-SCNC: 9 MMOL/L (ref 4–13)
AST SERPL W P-5'-P-CCNC: 21 U/L (ref 5–45)
BASOPHILS # BLD AUTO: 0 THOUSANDS/ΜL (ref 0–0.1)
BASOPHILS NFR BLD AUTO: 1 % (ref 0–1)
BILIRUB SERPL-MCNC: 0.6 MG/DL (ref 0.2–1)
BUN SERPL-MCNC: 12 MG/DL (ref 5–25)
CALCIUM SERPL-MCNC: 8.8 MG/DL (ref 8.3–10.1)
CHLORIDE SERPL-SCNC: 106 MMOL/L (ref 100–108)
CO2 SERPL-SCNC: 25 MMOL/L (ref 21–32)
CREAT SERPL-MCNC: 1.08 MG/DL (ref 0.6–1.3)
EOSINOPHIL # BLD AUTO: 0.3 THOUSAND/ΜL (ref 0–0.61)
EOSINOPHIL NFR BLD AUTO: 4 % (ref 0–6)
ERYTHROCYTE [DISTWIDTH] IN BLOOD BY AUTOMATED COUNT: 13.6 % (ref 11.6–15.1)
GFR SERPL CREATININE-BSD FRML MDRD: 77 ML/MIN/1.73SQ M
GLUCOSE SERPL-MCNC: 108 MG/DL (ref 65–140)
GLUCOSE SERPL-MCNC: 115 MG/DL (ref 65–140)
GLUCOSE SERPL-MCNC: 116 MG/DL (ref 65–140)
GLUCOSE SERPL-MCNC: 155 MG/DL (ref 65–140)
GLUCOSE SERPL-MCNC: 88 MG/DL (ref 65–140)
GLUCOSE SERPL-MCNC: 90 MG/DL (ref 65–140)
HCT VFR BLD AUTO: 43.1 % (ref 42–52)
HGB BLD-MCNC: 14.9 G/DL (ref 14–18)
LYMPHOCYTES # BLD AUTO: 1.7 THOUSANDS/ΜL (ref 0.6–4.47)
LYMPHOCYTES NFR BLD AUTO: 20 % (ref 14–44)
MAGNESIUM SERPL-MCNC: 2.2 MG/DL (ref 1.6–2.6)
MCH RBC QN AUTO: 34.6 PG (ref 27–31)
MCHC RBC AUTO-ENTMCNC: 34.5 G/DL (ref 31.4–37.4)
MCV RBC AUTO: 100 FL (ref 82–98)
MONOCYTES # BLD AUTO: 0.6 THOUSAND/ΜL (ref 0.17–1.22)
MONOCYTES NFR BLD AUTO: 8 % (ref 4–12)
MRSA NOSE QL CULT: NORMAL
NEUTROPHILS # BLD AUTO: 5.6 THOUSANDS/ΜL (ref 1.85–7.62)
NEUTS SEG NFR BLD AUTO: 68 % (ref 43–75)
NRBC BLD AUTO-RTO: 0 /100 WBCS
PLATELET # BLD AUTO: 220 THOUSANDS/UL (ref 130–400)
PMV BLD AUTO: 7.3 FL (ref 8.9–12.7)
POTASSIUM SERPL-SCNC: 3.8 MMOL/L (ref 3.5–5.3)
PROT SERPL-MCNC: 7.1 G/DL (ref 6.4–8.2)
RBC # BLD AUTO: 4.3 MILLION/UL (ref 4.7–6.1)
SODIUM SERPL-SCNC: 140 MMOL/L (ref 136–145)
WBC # BLD AUTO: 8.3 THOUSAND/UL (ref 4.8–10.8)

## 2017-11-23 PROCEDURE — 83735 ASSAY OF MAGNESIUM: CPT | Performed by: STUDENT IN AN ORGANIZED HEALTH CARE EDUCATION/TRAINING PROGRAM

## 2017-11-23 PROCEDURE — 82948 REAGENT STRIP/BLOOD GLUCOSE: CPT

## 2017-11-23 PROCEDURE — 85025 COMPLETE CBC W/AUTO DIFF WBC: CPT | Performed by: INTERNAL MEDICINE

## 2017-11-23 PROCEDURE — 80053 COMPREHEN METABOLIC PANEL: CPT | Performed by: INTERNAL MEDICINE

## 2017-11-23 RX ORDER — LORATADINE 10 MG/1
10 TABLET ORAL
Status: CANCELLED | OUTPATIENT
Start: 2017-11-23

## 2017-11-23 RX ORDER — ATORVASTATIN CALCIUM 20 MG/1
20 TABLET, FILM COATED ORAL
Status: DISCONTINUED | OUTPATIENT
Start: 2017-11-23 | End: 2017-11-23

## 2017-11-23 RX ORDER — ATORVASTATIN CALCIUM 20 MG/1
20 TABLET, FILM COATED ORAL
Status: CANCELLED | OUTPATIENT
Start: 2017-11-23

## 2017-11-23 RX ORDER — ASCORBIC ACID 500 MG
250 TABLET ORAL DAILY
Status: DISCONTINUED | OUTPATIENT
Start: 2017-11-24 | End: 2017-11-25 | Stop reason: HOSPADM

## 2017-11-23 RX ORDER — MULTIVIT WITH MINERALS/LUTEIN
250 TABLET ORAL DAILY
Status: CANCELLED | OUTPATIENT
Start: 2017-11-24

## 2017-11-23 RX ORDER — FLUTICASONE PROPIONATE 50 MCG
2 SPRAY, SUSPENSION (ML) NASAL DAILY
Status: DISCONTINUED | OUTPATIENT
Start: 2017-11-24 | End: 2017-11-25 | Stop reason: HOSPADM

## 2017-11-23 RX ORDER — MAGNESIUM HYDROXIDE/ALUMINUM HYDROXICE/SIMETHICONE 120; 1200; 1200 MG/30ML; MG/30ML; MG/30ML
30 SUSPENSION ORAL EVERY 6 HOURS PRN
Status: DISCONTINUED | OUTPATIENT
Start: 2017-11-23 | End: 2017-11-25 | Stop reason: HOSPADM

## 2017-11-23 RX ORDER — ATORVASTATIN CALCIUM 40 MG/1
40 TABLET, FILM COATED ORAL
Status: DISCONTINUED | OUTPATIENT
Start: 2017-11-23 | End: 2017-11-25 | Stop reason: HOSPADM

## 2017-11-23 RX ORDER — NICOTINE 21 MG/24HR
1 PATCH, TRANSDERMAL 24 HOURS TRANSDERMAL DAILY
Status: DISCONTINUED | OUTPATIENT
Start: 2017-11-24 | End: 2017-11-25 | Stop reason: HOSPADM

## 2017-11-23 RX ORDER — ACETAMINOPHEN 325 MG/1
650 TABLET ORAL EVERY 4 HOURS PRN
Status: CANCELLED | OUTPATIENT
Start: 2017-11-23

## 2017-11-23 RX ORDER — FLUTICASONE PROPIONATE 50 MCG
2 SPRAY, SUSPENSION (ML) NASAL DAILY
Status: CANCELLED | OUTPATIENT
Start: 2017-11-24

## 2017-11-23 RX ORDER — CHLORAL HYDRATE 500 MG
1000 CAPSULE ORAL DAILY
Status: DISCONTINUED | OUTPATIENT
Start: 2017-11-24 | End: 2017-11-25 | Stop reason: HOSPADM

## 2017-11-23 RX ORDER — ONDANSETRON 2 MG/ML
4 INJECTION INTRAMUSCULAR; INTRAVENOUS EVERY 6 HOURS PRN
Status: CANCELLED | OUTPATIENT
Start: 2017-11-23

## 2017-11-23 RX ORDER — ACETAMINOPHEN 325 MG/1
650 TABLET ORAL EVERY 4 HOURS PRN
Status: DISCONTINUED | OUTPATIENT
Start: 2017-11-23 | End: 2017-11-25 | Stop reason: HOSPADM

## 2017-11-23 RX ORDER — POTASSIUM CHLORIDE 20 MEQ/1
20 TABLET, EXTENDED RELEASE ORAL ONCE
Status: COMPLETED | OUTPATIENT
Start: 2017-11-23 | End: 2017-11-23

## 2017-11-23 RX ORDER — ASPIRIN 81 MG/1
81 TABLET, CHEWABLE ORAL DAILY
Status: DISCONTINUED | OUTPATIENT
Start: 2017-11-24 | End: 2017-11-25 | Stop reason: HOSPADM

## 2017-11-23 RX ORDER — FAMOTIDINE 20 MG/1
20 TABLET, FILM COATED ORAL DAILY
Status: CANCELLED | OUTPATIENT
Start: 2017-11-23

## 2017-11-23 RX ORDER — NICOTINE 21 MG/24HR
1 PATCH, TRANSDERMAL 24 HOURS TRANSDERMAL DAILY
Status: CANCELLED | OUTPATIENT
Start: 2017-11-24

## 2017-11-23 RX ORDER — AMLODIPINE BESYLATE 5 MG/1
5 TABLET ORAL DAILY
Status: DISCONTINUED | OUTPATIENT
Start: 2017-11-24 | End: 2017-11-25 | Stop reason: HOSPADM

## 2017-11-23 RX ORDER — ONDANSETRON 2 MG/ML
4 INJECTION INTRAMUSCULAR; INTRAVENOUS EVERY 6 HOURS PRN
Status: DISCONTINUED | OUTPATIENT
Start: 2017-11-23 | End: 2017-11-23

## 2017-11-23 RX ORDER — LORATADINE 10 MG/1
10 TABLET ORAL
Status: DISCONTINUED | OUTPATIENT
Start: 2017-11-23 | End: 2017-11-25 | Stop reason: HOSPADM

## 2017-11-23 RX ORDER — CHLORAL HYDRATE 500 MG
1000 CAPSULE ORAL DAILY
Status: CANCELLED | OUTPATIENT
Start: 2017-11-24

## 2017-11-23 RX ORDER — MELATONIN
2000 DAILY
Status: DISCONTINUED | OUTPATIENT
Start: 2017-11-24 | End: 2017-11-25 | Stop reason: HOSPADM

## 2017-11-23 RX ORDER — MAGNESIUM HYDROXIDE/ALUMINUM HYDROXICE/SIMETHICONE 120; 1200; 1200 MG/30ML; MG/30ML; MG/30ML
30 SUSPENSION ORAL EVERY 6 HOURS PRN
Status: CANCELLED | OUTPATIENT
Start: 2017-11-23

## 2017-11-23 RX ORDER — AMLODIPINE BESYLATE 5 MG/1
5 TABLET ORAL DAILY
Status: CANCELLED | OUTPATIENT
Start: 2017-11-24

## 2017-11-23 RX ORDER — MELATONIN
2000 DAILY
Status: CANCELLED | OUTPATIENT
Start: 2017-11-24

## 2017-11-23 RX ORDER — FAMOTIDINE 20 MG/1
20 TABLET, FILM COATED ORAL DAILY
Status: DISCONTINUED | OUTPATIENT
Start: 2017-11-23 | End: 2017-11-25 | Stop reason: HOSPADM

## 2017-11-23 RX ORDER — ASPIRIN 81 MG/1
81 TABLET, CHEWABLE ORAL DAILY
Status: CANCELLED | OUTPATIENT
Start: 2017-11-24

## 2017-11-23 RX ADMIN — TICAGRELOR 90 MG: 90 TABLET ORAL at 09:09

## 2017-11-23 RX ADMIN — ATORVASTATIN CALCIUM 40 MG: 40 TABLET, FILM COATED ORAL at 17:13

## 2017-11-23 RX ADMIN — FAMOTIDINE 20 MG: 20 TABLET, FILM COATED ORAL at 17:13

## 2017-11-23 RX ADMIN — Medication 1000 MG: at 09:09

## 2017-11-23 RX ADMIN — ASPIRIN 81 MG 81 MG: 81 TABLET ORAL at 09:10

## 2017-11-23 RX ADMIN — Medication 1 TABLET: at 09:09

## 2017-11-23 RX ADMIN — AMLODIPINE BESYLATE 5 MG: 5 TABLET ORAL at 09:09

## 2017-11-23 RX ADMIN — NICOTINE 1 PATCH: 14 PATCH TRANSDERMAL at 09:11

## 2017-11-23 RX ADMIN — TICAGRELOR 90 MG: 90 TABLET ORAL at 21:01

## 2017-11-23 RX ADMIN — ASCORBIC ACID TAB 250 MG 250 MG: 250 TAB at 09:09

## 2017-11-23 RX ADMIN — CHOLECALCIFEROL TAB 25 MCG (1000 UNIT) 2000 UNITS: 25 TAB at 09:09

## 2017-11-23 RX ADMIN — INSULIN LISPRO 1 UNITS: 100 INJECTION, SOLUTION INTRAVENOUS; SUBCUTANEOUS at 18:35

## 2017-11-23 RX ADMIN — POTASSIUM CHLORIDE 20 MEQ: 1500 TABLET, EXTENDED RELEASE ORAL at 15:44

## 2017-11-23 RX ADMIN — LORATADINE 10 MG: 10 TABLET ORAL at 21:01

## 2017-11-23 NOTE — H&P
Please use the cardiology consult note at [facility] as [address]  Patient was admitted for Hypertensive urgency and chest pain  Troponins negative  EKG showed some ST depressions in lateral leads so Cardiac cath was done which showed significant Mid Cx stenosis  Patient transferred for [facility] tomorrow  NPO post midnight  C/w aspirin, brilinta, statin  Scheduled for cath in AM  Denies any complaints  No chest pain  Patient not on beta blockers as HR in 50s  C/w amlodipine

## 2017-11-23 NOTE — PROGRESS NOTES
Progress Note - Cardiology   Chris Fields 56 y o  male MRN: [id number]  Unit/Bed#: [address] Encounter: [id number]  11/23/17  12:11 PM        Assessment:  1  CAD - Circumflex stenosis seen on catheterization - will transfer to [address] [place] [address] today for percutaneous intervention tomorrow  - continue aspirin and Brilinta  2  Hypertensive urgency with chest pain - troponin was negative  -blood pressure improved with amlodipine   2D echocardiogram was normal   3   Dyslipidemia - atorvastatin   4  Diabetes  5  Obstructive sleep apnea-on CPAP  6  Nicotine abuse - smoking cessation    Plan:  As above  Discussed with hospitalist and cardiologist at [facility]    Subjective: Chris Fields had episode of chest pain overnight which resolved spontaneously  He denies any shortness of breath        Meds/Allergies   current meds:   Current Facility-Administered Medications   Medication Dose Route Frequency    acetaminophen (TYLENOL) tablet 650 mg  650 mg Oral Q4H PRN    aluminum-magnesium hydroxide-simethicone (MYLANTA) 200-200-20 mg/5 mL oral suspension 30 mL  30 mL Oral Q6H PRN    amLODIPine (NORVASC) tablet 5 mg  5 mg Oral Daily    ascorbic acid (VITAMIN C) tablet 250 mg  250 mg Oral Daily    aspirin chewable tablet 81 mg  81 mg Oral Daily    atorvastatin (LIPITOR) tablet 20 mg  20 mg Oral Daily With Dinner    cholecalciferol (VITAMIN D3) tablet 2,000 Units  2,000 Units Oral Daily    famotidine (PEPCID) tablet 20 mg  20 mg Oral Daily    fish oil capsule 1,000 mg  1,000 mg Oral Daily    fluticasone (FLONASE) 50 mcg/act nasal spray 2 spray  2 spray Each Nare Daily    insulin lispro (HumaLOG) 100 units/mL subcutaneous injection 1-5 Units  1-5 Units Subcutaneous TID With Meals    loratadine (CLARITIN) tablet 10 mg  10 mg Oral HS    multivitamin-minerals (CENTRUM) tablet 1 tablet  1 tablet Oral Daily    nicotine (NICODERM CQ) 14 mg/24hr TD 24 hr patch 1 patch  1 patch Transdermal Daily    ondansetron (ZOFRAN) injection 4 mg  4 mg Intravenous Q6H PRN    ticagrelor (BRILINTA) tablet 90 mg  90 mg Oral Q12H [facility]     Allergies   Allergen Reactions    Levaquin [Levofloxacin In D5w] Swelling     Knee swelling    Sulfa Antibiotics GI Intolerance       Objective   Vitals: Blood pressure 124/69, pulse (!) 52, temperature 98 4 °F (36 9 °C), temperature source Oral, resp  rate 18, height 5' 11" (1 803 m), weight 126 kg (278 lb), SpO2 96 %  , Body mass index is 38 77 kg/m²  Intake/Output Summary (Last 24 hours) at 11/23/17 1211  Last data filed at 11/23/17 0701   Gross per 24 hour   Intake                0 ml   Output              850 ml   Net             -850 ml       Physical Exam   Constitutional: He appears healthy  No distress  HENT:   Nose: Nose normal    Mouth/Throat: Dentition is normal  Oropharynx is clear  Eyes: Conjunctivae are normal  Pupils are equal, round, and reactive to light  Neck: Normal range of motion and thyroid normal  Neck supple  No JVD present  Cardiovascular: Normal rate, regular rhythm, normal heart sounds and normal pulses  Exam reveals no gallop and no friction rub  No murmur heard  Pulmonary/Chest: Effort normal and breath sounds normal  He has no wheezes  He has no rales  He exhibits no tenderness  Abdominal: Soft  Bowel sounds are normal  He exhibits no distension  There is no tenderness  Musculoskeletal: He exhibits no edema  Neurological: He is alert and oriented to person, place, and time  Skin: Skin is warm and dry         Lab Results:     Troponins:   Results from last 7 days  Lab Units 11/22/17  0708 11/22/17  0359 11/22/17  0019   TROPONIN I ng/mL <0 02 0 02 <0 02       Recent Results (from the past 24 hour(s))   Fingerstick Glucose (POCT)    Collection Time: 11/22/17  4:15 PM   Result Value Ref Range    POC Glucose 153 (H) 65 - 140 mg/dl   Fingerstick Glucose (POCT)    Collection Time: 11/22/17  8:11 PM   Result Value Ref Range    POC Glucose 115 65 - 140 mg/dl   Fingerstick Glucose (POCT)    Collection Time: 11/22/17 11:19 PM   Result Value Ref Range    POC Glucose 101 65 - 140 mg/dl   Fingerstick Glucose (POCT)    Collection Time: 11/23/17  5:26 AM   Result Value Ref Range    POC Glucose 116 65 - 140 mg/dl   CBC and differential    Collection Time: 11/23/17  5:32 AM   Result Value Ref Range    WBC 8 30 4 80 - 10 80 Thousand/uL    RBC 4 30 (L) 4 70 - 6 10 Million/uL    Hemoglobin 14 9 14 0 - 18 0 g/dL    Hematocrit 43 1 42 0 - 52 0 %     (H) 82 - 98 fL    MCH 34 6 (H) 27 0 - 31 0 pg    MCHC 34 5 31 4 - 37 4 g/dL    RDW 13 6 11 6 - 15 1 %    MPV 7 3 (L) 8 9 - 12 7 fL    Platelets 654 336 - 565 Thousands/uL    nRBC 0 /100 WBCs    Neutrophils Relative 68 43 - 75 %    Lymphocytes Relative 20 14 - 44 %    Monocytes Relative 8 4 - 12 %    Eosinophils Relative 4 0 - 6 %    Basophils Relative 1 0 - 1 %    Neutrophils Absolute 5 60 1 85 - 7 62 Thousands/µL    Lymphocytes Absolute 1 70 0 60 - 4 47 Thousands/µL    Monocytes Absolute 0 60 0 17 - 1 22 Thousand/µL    Eosinophils Absolute 0 30 0 00 - 0 61 Thousand/µL    Basophils Absolute 0 00 0 00 - 0 10 Thousands/µL   Comprehensive metabolic panel    Collection Time: 11/23/17  5:32 AM   Result Value Ref Range    Sodium 140 136 - 145 mmol/L    Potassium 3 8 3 5 - 5 3 mmol/L    Chloride 106 100 - 108 mmol/L    CO2 25 21 - 32 mmol/L    Anion Gap 9 4 - 13 mmol/L    BUN 12 5 - 25 mg/dL    Creatinine 1 08 0 60 - 1 30 mg/dL    Glucose 115 65 - 140 mg/dL    Calcium 8 8 8 3 - 10 1 mg/dL    AST 21 5 - 45 U/L    ALT 47 12 - 78 U/L    Alkaline Phosphatase 67 46 - 116 U/L    Total Protein 7 1 6 4 - 8 2 g/dL    Albumin 3 5 3 5 - 5 0 g/dL    Total Bilirubin 0 60 0 20 - 1 00 mg/dL    eGFR 77 ml/min/1 73sq m   Fingerstick Glucose (POCT)    Collection Time: 11/23/17  7:18 AM   Result Value Ref Range    POC Glucose 108 65 - 140 mg/dl   Fingerstick Glucose (POCT)    Collection Time: 11/23/17 11:26 AM Result Value Ref Range    POC Glucose 88 65 - 140 mg/dl        Cardiac testing:   Cardiac catheterization reviewed with patient  Circumflex disease noted  Echocardiogram:  Mild concentric LVH with normal systolic function  Normal valve function  Telemetry: Personally reviewed  no events    Imaging: I have personally reviewed pertinent reports

## 2017-11-23 NOTE — EMTALA/ACUTE CARE TRANSFER
[address] [facility] [address] [id number]  Dept: [phone]      ACUTE CARE TRANSFER CONSENT    NAME Maryann Sibley                                         1960                              MRN [id number]    I have been informed of my rights regarding examination, treatment, and transfer   by Dr Sawyer Wynne MD    Benefits: Specialized equipment and/or services available at the receiving facility (Include comment)________________________ (PCI)    Risks: Potential for delay in receiving treatment, Potential deterioration of medical condition, Loss of IV, Increased discomfort during transfer, Possible worsening of condition or death during transfer      Transfer Request:  I acknowledge that my medical condition has been evaluated and explained to me by the treating physician or other qualified medical person and/or my attending physician who has recommended and offered to me further medical examination and treatment  I understand the Hospital's obligation with respect to the treatment and stabilization of my medical condition  I nevertheless request to be transferred  I release the Hospital, the doctor, and any other persons caring for me from all responsibility or liability for any injury or ill effects that may result from my transfer and agree to accept all responsibility for the consequences of my choice to transfer, rather than receive stabilizing treatment at the Hospital  I understand that because the transfer is my request, my insurance may not provide reimbursement for the services  The Hospital will assist and direct me and my family in how to make arrangements for transfer, but the hospital is not liable for any fees charged by the transport service  In spite of this understanding, I refuse to consent to further medical examination and treatment which has been offered to me, and request transfer to  [facility] Name, [address] : [place]   I authorize the performance of emergency medical procedures and treatments upon me in both transit and upon arrival at the receiving facility  Additionally, I authorize the release of any and all medical records to the receiving facility and request they be transported with me, if possible  I authorize the performance of emergency medical procedures and treatments upon me in both transit and upon arrival at the receiving facility  Additionally, I authorize the release of any and all medical records to the receiving facility and request they be transported with me, if possible  I understand that the safest mode of transportation during a medical emergency is an ambulance and that the Hospital advocates the use of this mode of transport  Risks of traveling to the receiving facility by car, including absence of medical control, life sustaining equipment, such as oxygen, and medical personnel has been explained to me and I fully understand them  (CLEMENTE CORRECT BOX BELOW)  [ X ]  I consent to the stated transfer and to be transported by ambulance/helicopter  [  ]  I consent to the stated transfer, but refuse transportation by ambulance and accept full responsibility for my transportation by car  I understand the risks of non-ambulance transfers and I exonerate the Hospital and its staff from any deterioration in my condition that results from this refusal     X___________________________________________    DATE  17  TIME________  Signature of patient or legally responsible individual signing on patient behalf           RELATIONSHIP TO PATIENT_________________________          Provider Certification    NAME Mary Cheng                                        [facility] 1960                              MRN [id number]    A medical screening exam was performed on the above named patient    Based on the examination:    Condition Necessitating Transfer CAD requiring PCI    Patient Condition: The patient has been stabilized such that within reasonable medical probability, no material deterioration of the patient condition or the condition of the unborn child(khloe) is likely to result from the transfer    Reason for Transfer: Level of Care needed not available at this facility    Transfer Requirements: Facility \[facility]\""   · Space available and qualified personnel available for treatment as acknowledged by    · Agreed to accept transfer and to provide appropriate medical treatment as acknowledged by       Dr Dread Villa  · Appropriate medical records of the examination and treatment of the patient are provided at the time of transfer   [address] [facility], [address] _______  · Transfer will be performed by qualified personnel from    and appropriate transfer equipment as required, including the use of necessary and appropriate life support measures  Provider Certification: I have examined the patient and explained the following risks and benefits of being transferred/refusing transfer to the patient/family:  General risk, such as traffic hazards, adverse weather conditions, rough terrain or turbulence, possible failure of equipment (including vehicle or aircraft), or consequences of actions of persons outside the control of the transport personnel, Unanticipated needs of medical equipment and personnel during transport, Risk of worsening condition      Based on these reasonable risks and benefits to the patient and/or the unborn child(khloe), and based upon the information available at the time of the patients examination, I certify that the medical benefits reasonably to be expected from the provision of appropriate medical treatments at another medical facility outweigh the increasing risks, if any, to the individuals medical condition, and in the case of labor to the unborn child, from effecting the transfer      X____________________________________________ DATE 11/23/17        TIME_______      ORIGINAL - SEND TO MEDICAL RECORDS COPY - SEND WITH PATIENT DURING TRANSFER

## 2017-11-23 NOTE — DISCHARGE SUMMARY
Discharge Summary - [facility]    Patient Information: Sadi Leavitt 56 y o  male MRN: [id number]  Unit/Bed#: [address] [place] [address] Encounter: [id number]    Discharging Physician / Practitioner: Leeanne Aase, CRNP  PCP: Shiva Nevarez  Admission Date: 11/22/2017  Discharge Date: 11/23/17    Reason for Admission:  Acute coronary syndrome    Discharge Diagnoses:     Principal Problem:    ACS (acute coronary syndrome) [facility])  Active Problems:    Hypertension    Obesity    Nicotine abuse  Resolved Problems:    * No resolved hospital problems  *      Consultations During Hospital Stay:  Cardiology    Procedures Performed:   Lacie December silhouette appears unremarkable  The lungs are clear  No pneumothorax or pleural effusion  Visualized osseous structures appear within normal limits for the patient's age  IMPRESSION: No active pulmonary disease  Significant Findings:   Cardiac catheterization, severe mid circumflex stenosis      Incidental Findings:   · none    Test Results Pending at Discharge (will require follow up):   · none     Outpatient Tests Requested:  · none    Complications:  none    Hospital Course:     Sdai Leavitt is a 56 y o  male patient who originally presented to the hospital on 11/22/2017 with a PMH of hypertension, dyslipidemia, obesity, sleep apnea on CPAP, diet controlled DM type 2, nicotine dependence who presents with left precordial pressure-like chest discomfort with associated left arm tingling and numbness  In the ED, patient was noted to be tachycardic and hypertensive in the EKG revealed lateral T-wave depressions  He was treated  with aspirin, IV heparin, and antihypertensive  His BP normalized  He was admitted to the hospital and place on telemetry  His telemetry revealed no arrhythmias  Serial troponins were negative    Hemoglobin A1c 6 5 and TSH normal  Cardiology was consulted and a cardiac catheterization was completed and showed severe mild circumflex stenosis  IV heparin was discontinued and patient was loaded with Brilinta 180mg and then 90 mg bid  He was transferred to [facility]  Under the care of Dr Steven Barker for PCI  Condition at Discharge: stable     Discharge Day Visit / Exam:     Subjective:  He is observed sitting in bed complaining of intermittent chest discomfort that occurred earlier in the a m  but denies at present  He states he is eating and tolerating his food well  Denies abdominal pain or headache  Denies nausea, vomiting, or diarrhea  Denies shortness of breath, dizziness or lightheadedness  Vitals: Blood Pressure: 124/69 (11/23/17 1152)  Pulse: (!) 52 (11/23/17 1152)  Temperature: 98 4 °F (36 9 °C) (11/23/17 1152)  Temp Source: Oral (11/23/17 1152)  Respirations: 18 (11/23/17 1152)  Height: 5' 11" (180 3 cm) (11/22/17 0305)  Weight - Scale: 126 kg (278 lb) (11/22/17 0305)  SpO2: 96 % (11/23/17 1152)  Exam:   Physical Exam   Constitutional: He is oriented to person, place, and time  He appears well-developed and well-nourished  No distress  Obese   HENT:   Head: Normocephalic and atraumatic  Neck: Normal range of motion  Neck supple  Cardiovascular: Normal rate, regular rhythm and normal heart sounds  Exam reveals no gallop and no friction rub  No murmur heard  NSR to sinus Lucrezia Silvana   Pulmonary/Chest: Effort normal and breath sounds normal  No respiratory distress  He has no wheezes  He has no rales  He exhibits no tenderness  Abdominal: Soft  Bowel sounds are normal  He exhibits no distension  There is no tenderness  There is no rebound and no guarding  Musculoskeletal: Normal range of motion  He exhibits edema  He exhibits no tenderness or deformity  Neurological: He is alert and oriented to person, place, and time  Skin: Skin is warm and dry  No rash noted  He is not diaphoretic  No erythema  No pallor  Psychiatric: He has a normal mood and affect   His behavior is normal  Judgment and thought content normal  Discharge instructions/Information to patient and family:   See after visit summary for information provided to patient and family  Provisions for Follow-Up Care:  See after visit summary for information related to follow-up care and any pertinent home health orders  Disposition:     [address] Transfer to [facility]    For Discharges to [facility] SNF:   · Not Applicable to this Patient - Not Applicable to this Patient    Planned Readmission:  Not anticipated     Discharge Statement:  I spent 30 minutes discharging the patient  This time was spent on the day of discharge  I had direct contact with the patient on the day of discharge  Greater than 50% of the total time was spent examining patient, answering all patient questions, arranging and discussing plan of care with patient as well as directly providing post-discharge instructions  Additional time then spent on discharge activities  Discharge Medications:  See after visit summary for reconciled discharge medications provided to patient and family  ** Please Note: Dragon 360 Dictation voice to text software may have been used in the creation of this document   **

## 2017-11-23 NOTE — PLAN OF CARE
CARDIOVASCULAR - ADULT     Maintains optimal cardiac output and hemodynamic stability Progressing     Absence of cardiac dysrhythmias or at baseline rhythm Progressing        DISCHARGE PLANNING     Discharge to home or other facility with appropriate resources Progressing        DISCHARGE PLANNING - CARE MANAGEMENT     Discharge to post-acute care or home with appropriate resources Progressing        INFECTION - ADULT     Absence or prevention of progression during hospitalization Progressing        Knowledge Deficit     Patient/family/caregiver demonstrates understanding of disease process, treatment plan, medications, and discharge instructions Progressing        METABOLIC, FLUID AND ELECTROLYTES - ADULT     Glucose maintained within target range Progressing        PAIN - ADULT     Verbalizes/displays adequate comfort level or baseline comfort level Progressing        Potential for Falls     Patient will remain free of falls Progressing        SAFETY ADULT     Maintain or return to baseline ADL function Progressing     Maintain or return mobility status to optimal level Progressing     Patient will remain free of falls Progressing

## 2017-11-23 NOTE — PROGRESS NOTES
The patient is transferred to [facility] service of Dr Titi Vu, the patient is in stable condition, in no distress, denies pain in no apparent distress, accompanied by SLETS, with ACLS

## 2017-11-24 ENCOUNTER — APPOINTMENT (INPATIENT)
Dept: NON INVASIVE DIAGNOSTICS | Facility: HOSPITAL | Age: 57
DRG: 247 | End: 2017-11-24
Payer: COMMERCIAL

## 2017-11-24 LAB
ANION GAP SERPL CALCULATED.3IONS-SCNC: 8 MMOL/L (ref 4–13)
BASOPHILS # BLD AUTO: 0.03 THOUSANDS/ΜL (ref 0–0.1)
BASOPHILS NFR BLD AUTO: 0 % (ref 0–1)
BUN SERPL-MCNC: 14 MG/DL (ref 5–25)
CALCIUM SERPL-MCNC: 8.7 MG/DL (ref 8.3–10.1)
CHLORIDE SERPL-SCNC: 110 MMOL/L (ref 100–108)
CO2 SERPL-SCNC: 22 MMOL/L (ref 21–32)
CREAT SERPL-MCNC: 1.1 MG/DL (ref 0.6–1.3)
EOSINOPHIL # BLD AUTO: 0.29 THOUSAND/ΜL (ref 0–0.61)
EOSINOPHIL NFR BLD AUTO: 4 % (ref 0–6)
ERYTHROCYTE [DISTWIDTH] IN BLOOD BY AUTOMATED COUNT: 13.6 % (ref 11.6–15.1)
GFR SERPL CREATININE-BSD FRML MDRD: 75 ML/MIN/1.73SQ M
GLUCOSE SERPL-MCNC: 103 MG/DL (ref 65–140)
GLUCOSE SERPL-MCNC: 113 MG/DL (ref 65–140)
GLUCOSE SERPL-MCNC: 130 MG/DL (ref 65–140)
GLUCOSE SERPL-MCNC: 79 MG/DL (ref 65–140)
GLUCOSE SERPL-MCNC: 90 MG/DL (ref 65–140)
GLUCOSE SERPL-MCNC: 95 MG/DL (ref 65–140)
HCT VFR BLD AUTO: 41.7 % (ref 36.5–49.3)
HGB BLD-MCNC: 14.4 G/DL (ref 12–17)
KCT BLD-ACNC: 342 SEC (ref 89–137)
LYMPHOCYTES # BLD AUTO: 2.15 THOUSANDS/ΜL (ref 0.6–4.47)
LYMPHOCYTES NFR BLD AUTO: 31 % (ref 14–44)
MAGNESIUM SERPL-MCNC: 2.3 MG/DL (ref 1.6–2.6)
MCH RBC QN AUTO: 34.1 PG (ref 26.8–34.3)
MCHC RBC AUTO-ENTMCNC: 34.5 G/DL (ref 31.4–37.4)
MCV RBC AUTO: 99 FL (ref 82–98)
MONOCYTES # BLD AUTO: 0.69 THOUSAND/ΜL (ref 0.17–1.22)
MONOCYTES NFR BLD AUTO: 10 % (ref 4–12)
NEUTROPHILS # BLD AUTO: 3.72 THOUSANDS/ΜL (ref 1.85–7.62)
NEUTS SEG NFR BLD AUTO: 55 % (ref 43–75)
NRBC BLD AUTO-RTO: 0 /100 WBCS
PLATELET # BLD AUTO: 213 THOUSANDS/UL (ref 149–390)
PMV BLD AUTO: 9.5 FL (ref 8.9–12.7)
POTASSIUM SERPL-SCNC: 3.9 MMOL/L (ref 3.5–5.3)
RBC # BLD AUTO: 4.22 MILLION/UL (ref 3.88–5.62)
SODIUM SERPL-SCNC: 140 MMOL/L (ref 136–145)
SPECIMEN SOURCE: ABNORMAL
WBC # BLD AUTO: 6.91 THOUSAND/UL (ref 4.31–10.16)

## 2017-11-24 PROCEDURE — 85025 COMPLETE CBC W/AUTO DIFF WBC: CPT | Performed by: STUDENT IN AN ORGANIZED HEALTH CARE EDUCATION/TRAINING PROGRAM

## 2017-11-24 PROCEDURE — C1887 CATHETER, GUIDING: HCPCS | Performed by: STUDENT IN AN ORGANIZED HEALTH CARE EDUCATION/TRAINING PROGRAM

## 2017-11-24 PROCEDURE — C1769 GUIDE WIRE: HCPCS | Performed by: STUDENT IN AN ORGANIZED HEALTH CARE EDUCATION/TRAINING PROGRAM

## 2017-11-24 PROCEDURE — 92928 PRQ TCAT PLMT NTRAC ST 1 LES: CPT | Performed by: STUDENT IN AN ORGANIZED HEALTH CARE EDUCATION/TRAINING PROGRAM

## 2017-11-24 PROCEDURE — 99152 MOD SED SAME PHYS/QHP 5/>YRS: CPT | Performed by: STUDENT IN AN ORGANIZED HEALTH CARE EDUCATION/TRAINING PROGRAM

## 2017-11-24 PROCEDURE — 83735 ASSAY OF MAGNESIUM: CPT | Performed by: STUDENT IN AN ORGANIZED HEALTH CARE EDUCATION/TRAINING PROGRAM

## 2017-11-24 PROCEDURE — C1874 STENT, COATED/COV W/DEL SYS: HCPCS

## 2017-11-24 PROCEDURE — 027135Z DILATION OF CORONARY ARTERY, TWO ARTERIES WITH TWO DRUG-ELUTING INTRALUMINAL DEVICES, PERCUTANEOUS APPROACH: ICD-10-PCS | Performed by: INTERNAL MEDICINE

## 2017-11-24 PROCEDURE — 82948 REAGENT STRIP/BLOOD GLUCOSE: CPT

## 2017-11-24 PROCEDURE — C9601 PERC DRUG-EL COR STENT BRAN: HCPCS | Performed by: STUDENT IN AN ORGANIZED HEALTH CARE EDUCATION/TRAINING PROGRAM

## 2017-11-24 PROCEDURE — 99153 MOD SED SAME PHYS/QHP EA: CPT | Performed by: STUDENT IN AN ORGANIZED HEALTH CARE EDUCATION/TRAINING PROGRAM

## 2017-11-24 PROCEDURE — 4A023N6 MEASUREMENT OF CARDIAC SAMPLING AND PRESSURE, RIGHT HEART, PERCUTANEOUS APPROACH: ICD-10-PCS | Performed by: INTERNAL MEDICINE

## 2017-11-24 PROCEDURE — 92929 HB PERC DRUG-EL COR STENT BRAN: CPT | Performed by: STUDENT IN AN ORGANIZED HEALTH CARE EDUCATION/TRAINING PROGRAM

## 2017-11-24 PROCEDURE — 80048 BASIC METABOLIC PNL TOTAL CA: CPT | Performed by: STUDENT IN AN ORGANIZED HEALTH CARE EDUCATION/TRAINING PROGRAM

## 2017-11-24 PROCEDURE — B211YZZ FLUOROSCOPY OF MULTIPLE CORONARY ARTERIES USING OTHER CONTRAST: ICD-10-PCS | Performed by: INTERNAL MEDICINE

## 2017-11-24 PROCEDURE — 85347 COAGULATION TIME ACTIVATED: CPT

## 2017-11-24 PROCEDURE — C1725 CATH, TRANSLUMIN NON-LASER: HCPCS | Performed by: STUDENT IN AN ORGANIZED HEALTH CARE EDUCATION/TRAINING PROGRAM

## 2017-11-24 PROCEDURE — C9600 PERC DRUG-EL COR STENT SING: HCPCS | Performed by: STUDENT IN AN ORGANIZED HEALTH CARE EDUCATION/TRAINING PROGRAM

## 2017-11-24 PROCEDURE — 93454 CORONARY ARTERY ANGIO S&I: CPT | Performed by: STUDENT IN AN ORGANIZED HEALTH CARE EDUCATION/TRAINING PROGRAM

## 2017-11-24 PROCEDURE — C1894 INTRO/SHEATH, NON-LASER: HCPCS | Performed by: STUDENT IN AN ORGANIZED HEALTH CARE EDUCATION/TRAINING PROGRAM

## 2017-11-24 RX ORDER — NITROGLYCERIN 0.4 MG/1
0.4 TABLET SUBLINGUAL
Status: DISCONTINUED | OUTPATIENT
Start: 2017-11-24 | End: 2017-11-25 | Stop reason: HOSPADM

## 2017-11-24 RX ORDER — HEPARIN SODIUM 1000 [USP'U]/ML
INJECTION, SOLUTION INTRAVENOUS; SUBCUTANEOUS CODE/TRAUMA/SEDATION MEDICATION
Status: COMPLETED | OUTPATIENT
Start: 2017-11-24 | End: 2017-11-24

## 2017-11-24 RX ORDER — NICOTINE 21 MG/24HR
1 PATCH, TRANSDERMAL 24 HOURS TRANSDERMAL DAILY
Qty: 28 PATCH | Refills: 3 | Status: CANCELLED | OUTPATIENT
Start: 2017-11-25

## 2017-11-24 RX ORDER — VERAPAMIL HYDROCHLORIDE 2.5 MG/ML
INJECTION, SOLUTION INTRAVENOUS CODE/TRAUMA/SEDATION MEDICATION
Status: COMPLETED | OUTPATIENT
Start: 2017-11-24 | End: 2017-11-24

## 2017-11-24 RX ORDER — FENTANYL CITRATE 50 UG/ML
INJECTION, SOLUTION INTRAMUSCULAR; INTRAVENOUS CODE/TRAUMA/SEDATION MEDICATION
Status: COMPLETED | OUTPATIENT
Start: 2017-11-24 | End: 2017-11-24

## 2017-11-24 RX ORDER — NITROGLYCERIN 20 MG/100ML
INJECTION INTRAVENOUS CODE/TRAUMA/SEDATION MEDICATION
Status: COMPLETED | OUTPATIENT
Start: 2017-11-24 | End: 2017-11-24

## 2017-11-24 RX ORDER — SODIUM CHLORIDE 9 MG/ML
75 INJECTION, SOLUTION INTRAVENOUS CONTINUOUS
Status: DISCONTINUED | OUTPATIENT
Start: 2017-11-24 | End: 2017-11-24

## 2017-11-24 RX ORDER — MIDAZOLAM HYDROCHLORIDE 1 MG/ML
INJECTION INTRAMUSCULAR; INTRAVENOUS CODE/TRAUMA/SEDATION MEDICATION
Status: COMPLETED | OUTPATIENT
Start: 2017-11-24 | End: 2017-11-24

## 2017-11-24 RX ORDER — LIDOCAINE HYDROCHLORIDE 10 MG/ML
INJECTION, SOLUTION INFILTRATION; PERINEURAL CODE/TRAUMA/SEDATION MEDICATION
Status: COMPLETED | OUTPATIENT
Start: 2017-11-24 | End: 2017-11-24

## 2017-11-24 RX ORDER — SODIUM CHLORIDE 9 MG/ML
100 INJECTION, SOLUTION INTRAVENOUS CONTINUOUS
Status: DISPENSED | OUTPATIENT
Start: 2017-11-24 | End: 2017-11-25

## 2017-11-24 RX ADMIN — VITAMIN D, TAB 1000IU (100/BT) 2000 UNITS: 25 TAB at 08:03

## 2017-11-24 RX ADMIN — TICAGRELOR 90 MG: 90 TABLET ORAL at 20:19

## 2017-11-24 RX ADMIN — NITROGLYCERIN 200 MCG: 20 INJECTION INTRAVENOUS at 16:12

## 2017-11-24 RX ADMIN — MIDAZOLAM 2 MG: 1 INJECTION INTRAMUSCULAR; INTRAVENOUS at 15:43

## 2017-11-24 RX ADMIN — Medication 1000 MG: at 08:03

## 2017-11-24 RX ADMIN — FENTANYL CITRATE 50 MCG: 50 INJECTION, SOLUTION INTRAMUSCULAR; INTRAVENOUS at 15:43

## 2017-11-24 RX ADMIN — NICOTINE 1 PATCH: 14 PATCH, EXTENDED RELEASE TRANSDERMAL at 08:03

## 2017-11-24 RX ADMIN — TICAGRELOR 90 MG: 90 TABLET ORAL at 08:03

## 2017-11-24 RX ADMIN — ACETAMINOPHEN 650 MG: 325 TABLET, FILM COATED ORAL at 19:55

## 2017-11-24 RX ADMIN — AMLODIPINE BESYLATE 5 MG: 5 TABLET ORAL at 08:03

## 2017-11-24 RX ADMIN — NITROGLYCERIN 200 MCG: 20 INJECTION INTRAVENOUS at 15:51

## 2017-11-24 RX ADMIN — MIDAZOLAM 1 MG: 1 INJECTION INTRAMUSCULAR; INTRAVENOUS at 16:02

## 2017-11-24 RX ADMIN — FENTANYL CITRATE 50 MCG: 50 INJECTION, SOLUTION INTRAMUSCULAR; INTRAVENOUS at 16:22

## 2017-11-24 RX ADMIN — MIDAZOLAM 1 MG: 1 INJECTION INTRAMUSCULAR; INTRAVENOUS at 15:53

## 2017-11-24 RX ADMIN — SODIUM CHLORIDE 100 ML/HR: 0.9 INJECTION, SOLUTION INTRAVENOUS at 17:02

## 2017-11-24 RX ADMIN — VERAPAMIL HYDROCHLORIDE 2.5 MG: 2.5 INJECTION, SOLUTION INTRAVENOUS at 15:51

## 2017-11-24 RX ADMIN — IOHEXOL 100 ML: 350 INJECTION, SOLUTION INTRAVENOUS at 16:30

## 2017-11-24 RX ADMIN — FAMOTIDINE 20 MG: 20 TABLET, FILM COATED ORAL at 17:01

## 2017-11-24 RX ADMIN — SODIUM CHLORIDE 75 ML/HR: 0.9 INJECTION, SOLUTION INTRAVENOUS at 11:04

## 2017-11-24 RX ADMIN — FENTANYL CITRATE 50 MCG: 50 INJECTION, SOLUTION INTRAMUSCULAR; INTRAVENOUS at 16:02

## 2017-11-24 RX ADMIN — NITROGLYCERIN 200 MCG: 20 INJECTION INTRAVENOUS at 16:04

## 2017-11-24 RX ADMIN — LIDOCAINE HYDROCHLORIDE 1 ML: 10 INJECTION, SOLUTION INFILTRATION; PERINEURAL at 15:51

## 2017-11-24 RX ADMIN — FENTANYL CITRATE 50 MCG: 50 INJECTION, SOLUTION INTRAMUSCULAR; INTRAVENOUS at 15:52

## 2017-11-24 RX ADMIN — NITROGLYCERIN 1 INCH: 20 OINTMENT TOPICAL at 15:54

## 2017-11-24 RX ADMIN — OXYCODONE HYDROCHLORIDE AND ACETAMINOPHEN 250 MG: 500 TABLET ORAL at 08:03

## 2017-11-24 RX ADMIN — Medication 1 TABLET: at 08:03

## 2017-11-24 RX ADMIN — ASPIRIN 81 MG 81 MG: 81 TABLET ORAL at 08:03

## 2017-11-24 RX ADMIN — ATORVASTATIN CALCIUM 40 MG: 40 TABLET, FILM COATED ORAL at 17:01

## 2017-11-24 RX ADMIN — HEPARIN SODIUM 11000 UNITS: 1000 INJECTION INTRAVENOUS; SUBCUTANEOUS at 15:52

## 2017-11-24 RX ADMIN — LORATADINE 10 MG: 10 TABLET ORAL at 21:18

## 2017-11-24 NOTE — SOCIAL WORK
Cm met with pt to discuss DCP and cm role  Pt lives alone in [address] [facility] with [address] access  Prior to admission pt was independent with ambulation and with ADLS  No prior hx of VNA  Pt's preference for pharmacy is [facility] in [place]  Pt reports no hx of drug/etoh  No previous inpatient rehab stays  Dc checklist reviewed and provided to pt  CM reviewed d/c planning process including the following: identifying help at home, patient preference for d/c planning needs, Discharge Lounge, [facility] Meds to Bed program, availability of treatment team to discuss questions or concerns patient and/or family may have regarding understanding medications and recognizing signs and symptoms once discharged  CM also encouraged patient to follow up with all recommended appointments after discharge  Patient advised of importance for patient and family to participate in managing patients medical well being

## 2017-11-24 NOTE — CASE MANAGEMENT
[address] [place] [address] in the [facility] by Jm Garcia for 2017  Network Utilization Review Department  Phone: [phone]; Fax [phone]  ATTENTION: The Network Utilization Review Department is now centralized for our 7 Facilities  Make a note that we have a new phone and fax numbers for our Department  Please call with any questions or concerns to [phone] and carefully follow the prompts so that you are directed to the right person  All voicemails are confidential  Fax any determinations, approvals, denials, and requests for initial or continue stay review clinical to [phone]  Due to HIGH CALL volume, it would be easier if you could please send faxed requests to expedite your requests and in part, help us provide discharge notifications faster     ======================================================================================    Initial Clinical Review    Admission: Date/Time/Statement: 11/23/17 @ 1346  Orders Placed This Encounter   Procedures    Inpatient Admission     Standing Status:   Standing     Number of Occurrences:   1     Order Specific Question:   Admitting Physician     Answer:   Allyson Duncan     Order Specific Question:   Level of Care     Answer:   Med Surg [16]     Order Specific Question:   Estimated length of stay     Answer:   More than 2 Midnights     Order Specific Question:   Certification     Answer:   I certify that inpatient services are medically necessary for this patient for a duration of greater than two midnights  See H&P and MD Progress Notes for additional information about the patient's course of treatment       Presented to the ED @ [facility], [facility]    Chief Complaint: Chest Pain    History of Illness:   Lian Vo is a 56 y o  male patient who originally presented to the hospital on 11/22/2017 with a PMH of hypertension, dyslipidemia, obesity, sleep apnea on CPAP, diet controlled DM type 2, nicotine dependence who presents with left precordial pressure-like chest discomfort with associated left arm tingling and numbness  In the ED, patient was noted to be tachycardic and hypertensive in the EKG revealed lateral T-wave depressions  He was treated  with aspirin, IV heparin, and antihypertensive  His BP normalized  He was admitted to the hospital and place on telemetry  His telemetry revealed no arrhythmias  Serial troponins were negative  Hemoglobin A1c 6 5 and TSH normal  Cardiology was consulted and a cardiac catheterization was completed and showed severe mild circumflex stenosis  IV heparin was discontinued and patient was loaded with Brilinta 180mg and then 90 mg bid  He was transferred to [facility]  Under the care of Dr Xena Woods for PCI      Vital Signs:   Temperature Pulse Respirations Blood Pressure SpO2   11/23/17 1324 11/23/17 1324 11/23/17 1324 11/23/17 1324 11/23/17 1324   98 6 °F (37 °C) (!) 52 20 136/67 94 %      Temp Source Heart Rate Source Patient Position - Orthostatic VS BP Location FiO2 (%)   11/23/17 1324 11/24/17 0740 11/23/17 1324 11/23/17 1324 --   Oral Monitor Sitting Left arm       Pain Score       11/23/17 1324       No Pain        Wt Readings from Last 1 Encounters:   11/24/17 122 kg (269 lb 2 9 oz)     Abnormal Labs/Diagnostic Test Results:    Magnesium 11/23/2017 2 2     POC Glucose 11/23/2017 155*    POC Glucose 11/23/2017 90     POC Glucose 11/24/2017 90     WBC 11/24/2017 6 91     RBC 11/24/2017 4 22     Hemoglobin 11/24/2017 14 4     Hematocrit 11/24/2017 41 7     MCV 11/24/2017 99*    MCH 11/24/2017 34 1     MCHC 11/24/2017 34 5     RDW 11/24/2017 13 6     MPV 11/24/2017 9 5     Platelets 38/56/2505 213     nRBC 11/24/2017 0     Neutrophils Relative 11/24/2017 55     Lymphocytes Relative 11/24/2017 31     Monocytes Relative 11/24/2017 10     Eosinophils Relative 11/24/2017 4     Basophils Relative 11/24/2017 0     Neutrophils Absolute 11/24/2017 3 72     Lymphocytes Absolute 11/24/2017 2 15     Monocytes Absolute 11/24/2017 0 69     Eosinophils Absolute 11/24/2017 0 29     Basophils Absolute 11/24/2017 0 03     Sodium 11/24/2017 140     Potassium 11/24/2017 3 9     Chloride 11/24/2017 110*    CO2 11/24/2017 22     Anion Gap 11/24/2017 8     BUN 11/24/2017 14     Creatinine 11/24/2017 1 10     Glucose 11/24/2017 113     Calcium 11/24/2017 8 7     eGFR 11/24/2017 75     Magnesium 11/24/2017 2 3     POC Glucose 11/24/2017 103        Past Medical/Surgical History: Active Ambulatory Problems     Diagnosis Date Noted    Diabetes mellitus ([facility] )     Hyperlipidemia     Hypertension     Sleep apnea     Sepsis ([facility] ) 12/04/2016    Abscess, abdomen ([facility] ) 12/04/2016    Sigmoid diverticulitis 12/04/2016    Fatigue 12/04/2016    Elevated liver enzymes 12/04/2016    Polyarthralgia 12/04/2016    Vesicocolonic fistula 12/04/2016    Hydronephrosis 12/04/2016    NATALYA (acute kidney injury) ([facility] ) 12/04/2016    Anemia 12/04/2016    Diverticulitis of large intestine with abscess without bleeding 12/07/2016    ACS (acute coronary syndrome) ([facility] ) 11/22/2017    Obesity 11/22/2017    Nicotine abuse 11/22/2017     Past Medical History:   Diagnosis Date    Anemia     Arthritis     Bundle branch block, right     CPAP (continuous positive airway pressure) dependence     Diabetes mellitus ([facility] )     Diverticulitis     Diverticulitis of large intestine with abscess without bleeding 12/7/2016    GERD (gastroesophageal reflux disease)     Hyperlipidemia     Hypertension     Nasal septal deformity     Renal disorder     Seasonal allergies     Sleep apnea        Admitting Diagnosis: ACS (acute coronary syndrome) ([facility] ) [I24 9]    Age/Sex: 56 y o  male    Assessment/Plan: 1  Htn urgency with chest pain r/o acute coronary syndrome- noted to have tachycardia with lateral st depressions/SBP above 230/110 on arrival  Trop *2 negative  Given high pre-test probability cardiac cath was done instead of nuclear stress test  Noted with severe focal circumflex stenosis  No evidence of Lmain/proximal LAD  Plan to load with brillinta 180mg today than 90mg twice a day  Transfer for PCI on Friday if creatinine stable  2  Hld/elevated triglycerides- atorvastatin 80mg daily   3  Sleep apnea- CPA   4  Dm2- tight control  5   Nicotine dependence      Admission Orders:  Scheduled Meds:   amLODIPine 5 mg Oral Daily   vitamin C 250 mg Oral Daily   aspirin 81 mg Oral Daily   atorvastatin 40 mg Oral Daily With Dinner   cholecalciferol 2,000 Units Oral Daily   famotidine 20 mg Oral Daily   fish oil 1,000 mg Oral Daily   fluticasone 2 spray Each Nare Daily   insulin lispro 1-5 Units Subcutaneous TID With Meals   loratadine 10 mg Oral HS   multivitamin-minerals 1 tablet Oral Daily   nicotine 1 patch Transdermal Daily   ticagrelor 90 mg Oral Q12H [facility]     Continuous Infusions:   sodium chloride 75 mL/hr Last Rate: 75 mL/hr (11/24/17 1104)     PRN Meds:   acetaminophen    aluminum-magnesium hydroxide-simethicone    NPO > procedure  Cardiac Cath  Accuchecks  TELM

## 2017-11-24 NOTE — DISCHARGE INSTRUCTIONS
11  Please see the post angioplasty discharge instructions  No heavy lifting, greater than 10 lbs  or strenuous activity for 1 week  Follow angioplasty discharge instructions  2 Remove band aid tomorrow  Shower and wash area- {Blank single:10511::"wrist","groin"} gently with soap and water- beginning tomorrow  Rinse and pat dry  Apply new water seal band aid  Repeat this process for 5 days  No powders, creams lotions or antibiotic ointments  for 5 days  No tub baths, hot tubs or swimming for 5 days  3  Call [facility] Office ([phone]) if you develop a fever, redness or drainage at your {Blank single:69597::"wrist","groin"} access site  4  No driving for 2 days    5  Do not stop aspirin or Brilinta (Ticagrelor) any reason without a cardiologists consent, or the stent could block up and cause a heart attack  6  Stent card and book  Coronary Artery Disease   WHAT YOU NEED TO KNOW:   Coronary artery disease (CAD) is narrowing of the arteries to your heart caused by a buildup of plaque  Plaque is made up of cholesterol and other substances  The narrowing in your arteries decreases the amount of blood that can flow to your heart  This causes your heart to get less oxygen  DISCHARGE INSTRUCTIONS:   Call 911 for any of the following:   · You have any of the following signs of a heart attack:      ¨ Squeezing, pressure, or pain in your chest that lasts longer than 5 minutes or returns    ¨ Discomfort or pain in your back, neck, jaw, stomach, or arm     ¨ Trouble breathing    ¨ Nausea or vomiting    ¨ Lightheadedness or a sudden cold sweat, especially with chest pain or trouble breathing    Contact your healthcare provider if:   · You have chest pain that is more frequent, or you have chest pain at rest     · You have questions or concerns about your condition or care    Cardiac rehabilitation:  Your healthcare provider may recommend that you attend cardiac rehabilitation (rehab)  This is a program run by specialists who will help you safely strengthen your heart and reduce the risk for more heart disease  The plan includes exercise, relaxation, stress management, and heart-healthy nutrition  Healthcare providers will also check to make sure any medicines you are taking are working  Medicines: You may  need any of the following:  · Blood pressure medicines  are given to lower your blood pressure  These medicines may include ACE inhibitors and beta-blockers  ACE inhibitors help keep your blood vessels relaxed and open, which helps keep blood flowing into your heart  Beta-blockers keep your heart pumping strongly and regularly  This helps keep your heart from working too hard to get oxygen  · Cholesterol medicines  help lower blood cholesterol levels  · Nitrates , such as nitroglycerin, relax the arteries of your heart so it gets more oxygen  They help to relieve your chest pain  · Antiplatelets , such as aspirin, help prevent blood clots  Take your antiplatelet medicine exactly as directed  These medicines make it more likely for you to bleed or bruise  If you are told to take aspirin, do not take acetaminophen or ibuprofen instead  · Blood thinners    help prevent blood clots  Examples of blood thinners include heparin and warfarin  Clots can cause strokes, heart attacks, and death  The following are general safety guidelines to follow while you are taking a blood thinner:    ¨ Watch for bleeding and bruising while you take blood thinners  Watch for bleeding from your gums or nose  Watch for blood in your urine and bowel movements  Use a soft washcloth on your skin, and a soft toothbrush to brush your teeth  This can keep your skin and gums from bleeding  If you shave, use an electric shaver  Do not play contact sports  ¨ Tell your dentist and other healthcare providers that you take anticoagulants  Wear a bracelet or necklace that says you take this medicine  ¨ Do not start or stop any medicines unless your healthcare provider tells you to  Many medicines cannot be used with blood thinners  ¨ Tell your healthcare provider right away if you forget to take the medicine, or if you take too much  ¨ Warfarin  is a blood thinner that you may need to take  The following are things you should be aware of if you take warfarin  § Foods and medicines can affect the amount of warfarin in your blood  Do not make major changes to your diet while you take warfarin  Warfarin works best when you eat about the same amount of vitamin K every day  Vitamin K is found in green leafy vegetables and certain other foods  Ask for more information about what to eat when you are taking warfarin  § You will need to see your healthcare provider for follow-up visits when you are on warfarin  You will need regular blood tests  These tests are used to decide how much medicine you need  · Do not take certain medicines without asking your healthcare provider first   These include NSAIDs, herbal or vitamin supplements, or hormones (estrogen or progestin)  · Take your medicine as directed  Contact your healthcare provider if you think your medicine is not helping or if you have side effects  Tell him or her if you are allergic to any medicine  Keep a list of the medicines, vitamins, and herbs you take  Include the amounts, and when and why you take them  Bring the list or the pill bottles to follow-up visits  Carry your medicine list with you in case of an emergency  Follow up with your healthcare provider as directed: You may need to return for other tests  You may also be referred to a cardiac surgeon  Write down your questions so you remember to ask them during your visits  Manage CAD:   · Do not smoke  Nicotine and other chemicals in cigarettes and cigars can cause heart and lung damage   Ask your healthcare provider for information if you currently smoke and need help to quit  E-cigarettes or smokeless tobacco still contain nicotine  Talk to your healthcare provider before you use these products  · Exercise regularly  Exercise at least 30 minutes each day, on most days of the week  Exercise helps to lower high cholesterol and high blood pressure  It can also help you maintain a healthy weight  Ask your healthcare provider about the kind of exercise you should do and how to get started  · Maintain a healthy weight  If you are overweight, talk to your healthcare provider about how to lose weight  A weight loss of 10% can improve your heart health  · Eat heart-healthy foods  Include fresh fruits and vegetables in your meal plan  Choose low-fat foods, such as skim or 1% fat milk, low-fat cheese and yogurt, fish, chicken (without skin), and lean meats  Eat two 4-ounce servings of fish high in omega-3 fats each week, such as salmon, fresh tuna, and herring  Do not eat foods that are high in sodium, such as canned foods, potato chips, salty snacks, and cold cuts  Put less table salt on your food  · Limit or do not drink alcohol  A drink of alcohol is 12 ounces of beer, 5 ounces of wine, or 1½ ounces of liquor  · Manage other health conditions  Follow your healthcare provider's advice on how to manage other conditions that can affect your heart health  These include diabetes, high blood pressure, and high cholesterol  You may need to take medicines for these conditions and make other lifestyle changes  · Ask if you should have a flu vaccine  The flu can be dangerous for a person who has CAD  The flu vaccine is available every year in the fall  © 2017 [facility] Information is for End User's use only and may not be sold, redistributed or otherwise used for commercial purposes  All illustrations and images included in CareNotes® are the copyrighted property of [employer]  or [address]    The above information is an educational aid only  It is not intended as medical advice for individual conditions or treatments  Talk to your doctor, nurse or pharmacist before following any medical regimen to see if it is safe and effective for you  Coronary Intravascular Stent Placement   WHAT YOU SHOULD KNOW:   Coronary intravascular stent placement is a procedure to place a stent in an artery of your heart that has plaque buildup  Plaque is a mixture of fat and cholesterol  A stent is a small mesh tube made of metal that helps keep your artery open  Your caregiver may place a bare metal stent or a drug-eluting stent (KARY) in your artery  A KARY is coated with medicine that is slowly released and helps prevent more plaque buildup in the area where the stent is placed  The stent remains in your artery for life  You may need more than one stent  AFTER YOU LEAVE:   Medicines: You will be given any of the following:  · Antiplatelets  prevent blood clots from forming  You will need to take aspirin and another type of platelet medicine  Take this medicine daily as directed  Do not stop taking aspirin or other type of antiplatelet medicine  · Nitrates , such as nitroglycerin, relax the arteries of your heart so it gets more oxygen  This medicine helps to relieve chest pain  · Cholesterol medicine  helps decrease the amount of cholesterol in your blood  · Blood pressure medicine  lowers your blood pressure  · Take your medicine as directed  Call your healthcare provider if you think your medicine is not helping or if you have side effects  Tell him if you are allergic to any medicine  Keep a list of the medicines, vitamins, and herbs you take  Include the amounts, and when and why you take them  Bring the list or the pill bottles to follow-up visits  Carry your medicine list with you in case of an emergency    Follow up with your cardiologist as directed:  Write down your questions so you remember to ask them during your visits  Activity:   · Avoid unnecessary stair climbing for 48 hours, if a catheter was put in your groin  · Do not place pressure on your arm, hand, or wrist, if the catheter was placed in your wrist  Avoid pushing, pulling, or heavy lifting with that arm  · If you need to cough, support the area where the catheter was inserted with your hand  · Ask your cardiologist how long you need to limit movement and avoid certain activities  · You may feel like resting more after your procedure  Slowly start to do more each day  Rest when you feel it is needed  Wound care:  Ask your cardiologist about how to care for your incision wound  Ask when you can get into a tub, shower, or pool  Do not smoke: If you smoke, it is never too late to quit  Smoking increases your risk for heart disease and stroke  Ask your cardiologist for information if you need help quitting  Cardiac rehab:  Your cardiologist may recommend that you attend cardiac rehabilitation (rehab)  This is a program run by specialists who will help you safely strengthen your heart and reduce the risk of more heart disease  The plan includes exercise, relaxation, stress management, and heart-healthy nutrition  Caregivers will also check to make sure any medicines you are taking are working  Contact your cardiologist if:   · You have a fever or chills  · You have questions or concerns about your condition or care  Seek care immediately or call 911 if:   · Your leg or arm, used for the procedure, becomes numb or turns white or blue  · The area where the catheter was placed is swollen, red, or has pus or foul-smelling fluid coming from it  · Your arm or leg feels warm, tender, and painful  It may look swollen and red  · You start to bleed from your catheter site again      · You have any of the following signs of a heart attack:     ¨ Squeezing, pressure, fullness, or pain in your chest that lasts longer than a few minutes or returns ¨ Discomfort or pain in your back, neck, jaw, stomach, or arm    ¨ Shortness of breath or breathing problems    ¨ A sudden cold sweat, lightheadedness, dizziness, or nausea, especially with chest pain or trouble breathing    · You have any of the following signs of a stroke:     ¨ Part of your face droops or is numb    ¨ Weakness in an arm or leg    ¨ Confusion or difficulty speaking    ¨ Dizziness, a severe headache, or vision loss  © 2014 [address] [employer] is for End User's use only and may not be sold, redistributed or otherwise used for commercial purposes  All illustrations and images included in CareNotes® are the copyrighted property of A [employer] M [employer]  or Jm Garcia  The above information is an  only  It is not intended as medical advice for individual conditions or treatments  Talk to your doctor, nurse or pharmacist before following any medical regimen to see if it is safe and effective for you

## 2017-11-24 NOTE — PLAN OF CARE
Problem: Potential for Falls  Goal: Patient will remain free of falls  INTERVENTIONS:  - Assess patient frequently for physical needs  -  Identify cognitive and physical deficits and behaviors that affect risk of falls  -  [place] fall precautions as indicated by assessment   - Educate patient/family on patient safety including physical limitations  - Instruct patient to call for assistance with activity based on assessment  - Modify environment to reduce risk of injury  - Consider OT/PT consult to assist with strengthening/mobility   Outcome: Progressing      Problem: CARDIOVASCULAR - ADULT  Goal: Maintains optimal cardiac output and hemodynamic stability  INTERVENTIONS:  - Monitor I/O, vital signs and rhythm  - Monitor for S/S and trends of decreased cardiac output i e  bleeding, hypotension  - Administer and titrate ordered vasoactive medications to optimize hemodynamic stability  - Assess quality of pulses, skin color and temperature  - Assess for signs of decreased coronary artery perfusion - ex   Angina  - Instruct patient to report change in severity of symptoms  Outcome: Progressing    Goal: Absence of cardiac dysrhythmias or at baseline rhythm  INTERVENTIONS:  - Continuous cardiac monitoring, monitor vital signs, obtain 12 lead EKG if indicated  - Administer antiarrhythmic and heart rate control medications as ordered  - Monitor electrolytes and administer replacement therapy as ordered  Outcome: Progressing      Problem: SKIN/TISSUE INTEGRITY - ADULT  Goal: Incision(s), wounds(s) or drain site(s) healing without S/S of infection  INTERVENTIONS  - Assess and document risk factors for skin impairment   - Assess and document dressing, incision, wound bed, drain sites and surrounding tissue  - Initiate Nutrition services consult and/or wound management as needed  Outcome: Progressing      Problem: DISCHARGE PLANNING  Goal: Discharge to home or other facility with appropriate resources  INTERVENTIONS:  - Identify barriers to discharge w/patient and caregiver  - Arrange for needed discharge resources and transportation as appropriate  - Identify discharge learning needs (meds, wound care, etc )  - Arrange for interpretive services to assist at discharge as needed  - Refer to Case Management Department for coordinating discharge planning if the patient needs post-hospital services based on physician/advanced practitioner order or complex needs related to functional status, cognitive ability, or social support system  Outcome: Progressing      Problem: Knowledge Deficit  Goal: Patient/family/caregiver demonstrates understanding of disease process, treatment plan, medications, and discharge instructions  Complete learning assessment and assess knowledge base    Interventions:  - Provide teaching at level of understanding  - Provide teaching via preferred learning methods  Outcome: Progressing

## 2017-11-24 NOTE — PROGRESS NOTES
Progress Note - Cardiology   Radha Cueto 56 y o  male MRN: [id number]  Unit/Bed#: [facility] 503-01 Encounter: [id number]    Assessment:  56year old man with unstable angina  Diagnostic cath at [facility] showed disease of the circumflex system  Transferred for PCI  Planned for today  Recommendations:  Dual antiplatelet therapy  He has a hernia, not incarcerated  Understands need for uninterrupted DAPT for 1 year ideally  BP is controlled  HR on lower side, not on beta blocker  High intensity atorvastatin  Anticipate discharge home tomorrow  Subjective/Objective     Subjective:  Awaiting cardiac cath  Denies other complaints  Objective:    Vitals: /73   Pulse (!) 49   Temp 97 7 °F (36 5 °C) (Oral)   Resp 16   Ht 5' 11" (1 803 m)   Wt 122 kg (269 lb 2 9 oz)   SpO2 95%   BMI 37 54 kg/m²   Vitals:    11/23/17 1324 11/24/17 0600   Weight: 122 kg (268 lb 9 6 oz) 122 kg (269 lb 2 9 oz)     Orthostatic Blood Pressures    Flowsheet Row Most Recent Value   Blood Pressure  134/73 filed at 11/24/2017 1108   Patient Position - Orthostatic VS  Lying filed at 11/24/2017 1108            Intake/Output Summary (Last 24 hours) at 11/24/17 1637  Last data filed at 11/24/17 1316   Gross per 24 hour   Intake              716 ml   Output             1025 ml   Net             -309 ml       Physical Exam:   General appearance: alert and in no acute distress  Head: Normocephalic, without obvious abnormality, atraumatic  Neck: no carotid bruit, no JVD and supple, symmetrical, trachea midline  Lungs: clear to auscultation bilaterally  Normal air entry  Normal effort  Heart: S1, S2 normal and no S3 or S4  No murmurs    Abdomen: soft, non-tender; bowel sounds normal; no masses,  no organomegaly  Extremities: extremities normal, atraumatic, no cyanosis or edema  Pulses: 2+ and symmetric bilaterally  Skin: Skin color, texture, turgor normal  No rashes or lesions  Neurologic: Grossly normal  Alert and oriented      Medications:    Current Facility-Administered Medications:     acetaminophen (TYLENOL) tablet 650 mg, 650 mg, Oral, Q4H PRN, LUCERO Anderson    aluminum-magnesium hydroxide-simethicone (MYLANTA) 200-200-20 mg/5 mL oral suspension 30 mL, 30 mL, Oral, Q6H PRN, LUCERO Anderson    amLODIPine (NORVASC) tablet 5 mg, 5 mg, Oral, Daily, LUCERO Michaud, 5 mg at 11/24/17 0803    ascorbic acid (VITAMIN C) tablet 250 mg, 250 mg, Oral, Daily, LUCERO Michaud, 250 mg at 11/24/17 0803    aspirin chewable tablet 81 mg, 81 mg, Oral, Daily, LUCERO Anderson, 81 mg at 11/24/17 0803    atorvastatin (LIPITOR) tablet 40 mg, 40 mg, Oral, Daily With Laura Arreola MD, 40 mg at 11/23/17 1713    cholecalciferol (VITAMIN D3) tablet 2,000 Units, 2,000 Units, Oral, Daily, LUCERO Anderson, 2,000 Units at 11/24/17 0803    famotidine (PEPCID) tablet 20 mg, 20 mg, Oral, Daily, LUCERO Michaud, 20 mg at 11/23/17 1713    fish oil capsule 1,000 mg, 1,000 mg, Oral, Daily, LUCERO Michaud, 1,000 mg at 11/24/17 0803    fluticasone (FLONASE) 50 mcg/act nasal spray 2 spray, 2 spray, Each Nare, Daily, LUCERO Michaud    insulin lispro (HumaLOG) 100 units/mL subcutaneous injection 1-5 Units, 1-5 Units, Subcutaneous, TID With Meals, 1 Units at 11/23/17 1835 **AND** Fingerstick Glucose (POCT), , , 4x Daily AC and at bedtime, LUCERO Anderson    loratadine (CLARITIN) tablet 10 mg, 10 mg, Oral, HS, LUCERO Michaud, 10 mg at 11/23/17 2101    multivitamin-minerals (CENTRUM) tablet 1 tablet, 1 tablet, Oral, Daily, LUCERO Anderson, 1 tablet at 11/24/17 0803    nicotine (NICODERM CQ) 14 mg/24hr TD 24 hr patch 1 patch, 1 patch, Transdermal, Daily, LUCERO Anderson, 1 patch at 11/24/17 0803    ticagrelor (BRILINTA) tablet 90 mg, 90 mg, Oral, Q12H [address], LUCERO Anderson, 90 mg at 11/24/17 9611    Lab Results:    Results from last 7 days  Lab Units 11/22/17  0708 11/22/17  0359 11/22/17  0019   TROPONIN I ng/mL <0 02 0 02 <0 02       Results from last 7 days  Lab Units 11/24/17  0555 11/23/17  0532 11/22/17  0019   WBC Thousand/uL 6 91 8 30 9 80   HEMOGLOBIN g/dL 14 4 14 9 16 5   HEMATOCRIT % 41 7 43 1 48 0   PLATELETS Thousands/uL 213 220 281       Results from last 7 days  Lab Units 11/22/17  0708   CHOLESTEROL mg/dL 139   TRIGLYCERIDES mg/dL 244*   HDL mg/dL 33*       Results from last 7 days  Lab Units 11/24/17  0555 11/23/17  0532 11/22/17  0019   SODIUM mmol/L 140 140 140   POTASSIUM mmol/L 3 9 3 8 3 5   CHLORIDE mmol/L 110* 106 102   CO2 mmol/L 22 25 28   BUN mg/dL 14 12 15   CREATININE mg/dL 1 10 1 08 1 13   CALCIUM mg/dL 8 7 8 8 9 0   TOTAL PROTEIN g/dL  --  7 1 8 5*   BILIRUBIN TOTAL mg/dL  --  0 60 0 60   ALK PHOS U/L  --  67 91   ALT U/L  --  47 58   AST U/L  --  21 34   GLUCOSE RANDOM mg/dL 113 115 187*       Results from last 7 days  Lab Units 11/22/17  0708 11/22/17  0019   INR   --  0 97   PTT seconds 28 24       Results from last 7 days  Lab Units 11/24/17  0555 11/23/17  1542   MAGNESIUM mg/dL 2 3 2 2

## 2017-11-24 NOTE — DISCHARGE SUMMARY
Discharge Summary - Misha Robb 56 y o  male MRN: [id number]    Unit/Bed#: [facility] 503-01 Encounter: [id number]    Admission Date: 11/23/2017   Discharge Date: 11/25/17    Disposition: Home      Discharge Diagnosis: Coronary artery disease status post drug-eluting stent placement to left circumflex and OM 2, hypertensive urgency, HLD, BING, tobacco abuse      Principal Problem:    ACS (acute coronary syndrome) ([address] )        Condition at Discharge: good   Consultants: none    HPI and Hospital Course: 56year old male admitted to [address] [place] [address] with stuttering CP and hypertensive urgency  CC showed severe focal circumflex stenosis thus was transferred to [facility] for planned PCI  He was loaded with brilinta  He was placed on atorvastatin 80mg daily, smoking cessation was advised  D/T bradycardia his BB was discontinued  11/24 he underwent PCI with drug-eluting stents placed to proximal left circumflex and OM 2  Patient was monitored overnight and was examined this morning  Vital signs, laboratory data and exam are stable  Patient had no complaints of chest pain but does feel extra heartbeats once in a while  He states he has been feeling this since he was younger  We reviewed that this could be PACs or PVCs and are not dangerous  Telemetry showed normal sinus rhythm with heart rate to 38 beats per minute overnight  Patient is compliant with his CPAP for obstructive sleep apnea  We reviewed the importance of smoking cessation regarding his coronary artery disease  He is currently on the patch and would like Wellbutrin prescribed at discharge  I encouraged him to follow up with his primary care physician in the next 1-2 weeks  He will follow up with Cardiology as scheduled  The importance of dual anti-platelet therapy for the next year was discussed in detail the consequences of not taking these  Brilinta was priced prior to patient's discharge home    If this becomes expensive in the future, he can switch to Plavix and will call his primary cardiologist immediately  The patient was not prescribed a beta-blocker discharge due to baseline bradycardia  This could be added as an outpatient if needed  Increased cardiovascular exercise and dietary modifications were reviewed with the patient for long-term cardiovascular health  Weight loss was also encouraged  The use of nitroglycerin was also reviewed in detail  All questions were answered  Patient will be discharged home today  Echocardiogram revealed normal LVEF with no WMA  Discharge Medications:  See after visit summary for reconciled discharge medications provided to patient and family          Pertinent Labs/diagnostics:  Admission on 11/23/2017   Component Date Value    Magnesium 11/23/2017 2 2     POC Glucose 11/23/2017 155*    POC Glucose 11/23/2017 90     POC Glucose 11/24/2017 90     WBC 11/24/2017 6 91     RBC 11/24/2017 4 22     Hemoglobin 11/24/2017 14 4     Hematocrit 11/24/2017 41 7     MCV 11/24/2017 99*    MCH 11/24/2017 34 1     MCHC 11/24/2017 34 5     RDW 11/24/2017 13 6     MPV 11/24/2017 9 5     Platelets 21/43/5175 213     nRBC 11/24/2017 0     Neutrophils Relative 11/24/2017 55     Lymphocytes Relative 11/24/2017 31     Monocytes Relative 11/24/2017 10     Eosinophils Relative 11/24/2017 4     Basophils Relative 11/24/2017 0     Neutrophils Absolute 11/24/2017 3 72     Lymphocytes Absolute 11/24/2017 2 15     Monocytes Absolute 11/24/2017 0 69     Eosinophils Absolute 11/24/2017 0 29     Basophils Absolute 11/24/2017 0 03     Sodium 11/24/2017 140     Potassium 11/24/2017 3 9     Chloride 11/24/2017 110*    CO2 11/24/2017 22     Anion Gap 11/24/2017 8     BUN 11/24/2017 14     Creatinine 11/24/2017 1 10     Glucose 11/24/2017 113     Calcium 11/24/2017 8 7     eGFR 11/24/2017 75     Magnesium 11/24/2017 2 3     POC Glucose 11/24/2017 103    Admission on 11/22/2017, Discharged on 11/23/2017   Component Date Value    Sodium 11/22/2017 140     Potassium 11/22/2017 3 5     Chloride 11/22/2017 102     CO2 11/22/2017 28     Anion Gap 11/22/2017 10     BUN 11/22/2017 15     Creatinine 11/22/2017 1 13     Glucose 11/22/2017 187*    Calcium 11/22/2017 9 0     AST 11/22/2017 34     ALT 11/22/2017 58     Alkaline Phosphatase 11/22/2017 91     Total Protein 11/22/2017 8 5*    Albumin 11/22/2017 4 2     Total Bilirubin 11/22/2017 0 60     eGFR 11/22/2017 73     WBC 11/22/2017 9 80     RBC 11/22/2017 4 78     Hemoglobin 11/22/2017 16 5     Hematocrit 11/22/2017 48 0     MCV 11/22/2017 101*    MCH 11/22/2017 34 4*    MCHC 11/22/2017 34 3     RDW 11/22/2017 13 6     MPV 11/22/2017 7 5*    Platelets 01/15/9504 281     nRBC 11/22/2017 0     Neutrophils Relative 11/22/2017 64     Lymphocytes Relative 11/22/2017 25     Monocytes Relative 11/22/2017 7     Eosinophils Relative 11/22/2017 3     Basophils Relative 11/22/2017 1     Neutrophils Absolute 11/22/2017 6 30     Lymphocytes Absolute 11/22/2017 2 40     Monocytes Absolute 11/22/2017 0 70     Eosinophils Absolute 11/22/2017 0 30     Basophils Absolute 11/22/2017 0 10     Ventricular Rate 11/22/2017 63     Atrial Rate 11/22/2017 63     ND Interval 11/22/2017 170     QRSD Interval 11/22/2017 104     QT Interval 11/22/2017 400     QTC Interval 11/22/2017 409     P Axis 11/22/2017 61     QRS Axis 11/22/2017 12     T Wave Axis 11/22/2017 -15     Protime 11/22/2017 10 2     INR 11/22/2017 0 97     PTT 11/22/2017 24     Troponin I 11/22/2017 <0 02     NT-proBNP 11/22/2017 26     Ventricular Rate 11/22/2017 70     Atrial Rate 11/22/2017 70     ND Interval 11/22/2017 180     QRSD Interval 11/22/2017 102     QT Interval 11/22/2017 382     QTC Interval 11/22/2017 412     P Axis 11/22/2017 64     QRS Axis 11/22/2017 16     T Wave Axis 11/22/2017 3     Ventricular Rate 11/22/2017 103     Atrial Rate 11/22/2017 103     QRSD Interval 11/22/2017 98     QT Interval 11/22/2017 334     QTC Interval 11/22/2017 437     P Axis 11/22/2017 71     QRS Axis 11/22/2017 26     T Wave Axis 11/22/2017 -3     MRSA Culture Only 11/23/2017 No Methicillin Resistant Staphlyococcus aureus (MRSA) isolated     Troponin I 11/22/2017 0 02     Troponin I 11/22/2017 <0 02     PTT 11/22/2017 28     Cholesterol 11/22/2017 139     Triglycerides 11/22/2017 244*    HDL, Direct 11/22/2017 33*    LDL Calculated 11/22/2017 57     TSH 3RD GENERATON 11/22/2017 2 809     Free T4 11/22/2017 1 00     Hemoglobin A1C 11/22/2017 6 5*    EAG 11/22/2017 140     Color, UA 11/22/2017 Yellow     Clarity, UA 11/22/2017 Clear     Specific Gravity, UA 11/22/2017 1 010     pH, UA 11/22/2017 7 0     Leukocytes, UA 11/22/2017 Negative     Nitrite, UA 11/22/2017 Negative     Protein, UA 11/22/2017 Negative     Glucose, UA 11/22/2017 Negative     Ketones, UA 11/22/2017 Negative     Urobilinogen, UA 11/22/2017 0 2     Bilirubin, UA 11/22/2017 Negative     Blood, UA 11/22/2017 Negative     Ethanol Lvl 11/22/2017 <3     POC Glucose 11/22/2017 110     POC Glucose 11/22/2017 153*    POC Glucose 11/22/2017 115     POC Glucose 11/22/2017 101     WBC 11/23/2017 8 30     RBC 11/23/2017 4 30*    Hemoglobin 11/23/2017 14 9     Hematocrit 11/23/2017 43 1     MCV 11/23/2017 100*    MCH 11/23/2017 34 6*    MCHC 11/23/2017 34 5     RDW 11/23/2017 13 6     MPV 11/23/2017 7 3*    Platelets 01/68/8257 220     nRBC 11/23/2017 0     Neutrophils Relative 11/23/2017 68     Lymphocytes Relative 11/23/2017 20     Monocytes Relative 11/23/2017 8     Eosinophils Relative 11/23/2017 4     Basophils Relative 11/23/2017 1     Neutrophils Absolute 11/23/2017 5 60     Lymphocytes Absolute 11/23/2017 1 70     Monocytes Absolute 11/23/2017 0 60     Eosinophils Absolute 11/23/2017 0 30     Basophils Absolute 11/23/2017 0 00     Sodium 11/23/2017 140     Potassium 11/23/2017 3 8  Chloride 2017 106     CO2 2017 25     Anion Gap 2017 9     BUN 2017 12     Creatinine 2017 1 08     Glucose 2017 115     Calcium 2017 8 8     AST 2017 21     ALT 2017 47     Alkaline Phosphatase 2017 67     Total Protein 2017 7 1     Albumin 2017 3 5     Total Bilirubin 2017 0 60     eGFR 2017 77     POC Glucose 2017 116     POC Glucose 2017 108     POC Glucose 2017 88          None      Condition at Discharge: good      Vitals:    17 1324 17 0600   Weight: 122 kg (268 lb 9 6 oz) 122 kg (269 lb 2 9 oz)          St  [address]  [address] [facility], [facility]  [phone]     Transthoracic Echocardiogram  2D, M-mode, Doppler, and Color Doppler     Study date:  2017     Patient: Nuno Mccormick  MR number: [id number]  Account number: [de-identified]  : 1960  Age: 49 years  Gender: Male  Status: Routine  Location: Echo lab  Height: 71 in  Weight: 277 4 lb  BP: 123/ 63 mmHg     Indications: Chest Tightness     Diagnoses: R07 9 - Chest pain, unspecified     Sonographer:  MAURILIO Elias  Primary Physician: Amy Lang MD  Group:  Rylee Griffin  Interpreting Physician:  Betsey Beckford MD     SUMMARY     LEFT VENTRICLE:  Systolic function was normal  Ejection fraction was estimated in the range of 55 % to 60 %  There were no regional wall motion abnormalities  Wall thickness was mildly increased      TRICUSPID VALVE:  The tricuspid jet envelope definition was inadequate for estimation of RV systolic pressure   There are no indirect findings (abnormal RV volume or geometry, altered pulmonary flow velocity profile, or leftward septal displacement) which  would suggest moderate or severe pulmonary hypertension      HISTORY: PRIOR HISTORY: RBBB,CPAP, Diabetes, Hyperlipidemia, HTN, Diverticulitis     PROCEDURE: The procedure was performed in the echo lab  This was a routine study  The transthoracic approach was used  The study included complete 2D imaging, M-mode, complete spectral Doppler, and color Doppler  The heart rate was 50 bpm,  at the start of the study  Images were obtained from the parasternal, apical, subcostal, and suprasternal notch acoustic windows  Image quality was adequate      LEFT VENTRICLE: Size was normal  Systolic function was normal  Ejection fraction was estimated in the range of 55 % to 60 %  There were no regional wall motion abnormalities  Wall thickness was mildly increased  No evidence of apical  thrombus  DOPPLER: Left ventricular diastolic function parameters were normal for the patient's age      RIGHT VENTRICLE: The size was normal  Systolic function was normal  Wall thickness was normal      LEFT ATRIUM: Size was normal      RIGHT ATRIUM: Size was at the upper limits of normal      MITRAL VALVE: Valve structure was normal  There was normal leaflet separation  DOPPLER: The transmitral velocity was within the normal range  There was no evidence for stenosis  There was no significant regurgitation      AORTIC VALVE: The valve was trileaflet  Leaflets exhibited mildly increased thickness and normal cuspal separation  DOPPLER: Transaortic velocity was within the normal range  There was no evidence for stenosis  There was no significant  regurgitation      TRICUSPID VALVE: The valve structure was normal  There was normal leaflet separation  DOPPLER: The transtricuspid velocity was within the normal range  There was no evidence for stenosis  There was trace regurgitation  The tricuspid jet  envelope definition was inadequate for estimation of RV systolic pressure   There are no indirect findings (abnormal RV volume or geometry, altered pulmonary flow velocity profile, or leftward septal displacement) which would suggest  moderate or severe pulmonary hypertension      PULMONIC VALVE: Leaflets exhibited normal thickness, no calcification, and normal cuspal separation  DOPPLER: The transpulmonic velocity was within the normal range  There was no significant regurgitation      PERICARDIUM: There was no pericardial effusion  The pericardium was normal in appearance      AORTA: The root exhibited normal size      SYSTEMIC VEINS: IVC: The inferior vena cava was normal in size      SYSTEM MEASUREMENT TABLES     2D mode  AoR Diam 2D: 3 5 cm  LA Diam (2D): 3 7 cm  LA/Ao (2D): 1 06  FS (2D Teich): 28 2 %  IVSd (2D): 1 18 cm  LVDEV: 116 cm³  LVESV: 53 cm³  LVIDd(2D): 4 96 cm  LVISd (2D): 3 56 cm  LVOT Area 2D: 3 46 cm squared  LVPWd (2D): 1 32 cm  SV (Teich): 63 cm³     Apical four chamber  LVEF A4C: 61 %     Unspecified Scan Mode  JADE Cont Eq (Peak Radames): 2 67 cm squared  LVOT Diam : 2 1 cm  LVOT Vmax: 1320 mm/s  LVOT Vmax; Mean: 1320 mm/s  Peak Grad ; Mean: 7 mm[Hg]  MV Peak A Radames: 854 mm/s  MV Peak E Radames  Mean: 803 mm/s  MVA (PHT): 4 49 cm squared  PHT: 49 ms  Max P mm[Hg]  V Max: 2250 mm/s  Vmax: 2270 mm/s  RA Area: 22 4 cm squared  RA Volume: 71 5 cm³  TAPSE: 2 8 cm     [facility]     Prepared and electronically signed by  Prince Landrum MD  Signed [id number] 11:12:49         Discharge instructions/Information to patient and family:   See after visit summary for information provided to patient and family  Provisions for Follow-Up Care:  See after visit summary for information related to follow-up care and any pertinent home health orders  Planned Readmission: No    Discharge Statement   I spent 45 minutes minutes discharging the patient  This time was spent on the day of discharge  I had direct contact with the patient on the day of discharge  Additional documentation is required if more than 30 minutes were spent on discharge

## 2017-11-24 NOTE — PROGRESS NOTES
Called Cardiology, made them aware that patient's hr is 38-39, patient asymptomatic, bp 119/67  No new orders placed at this time

## 2017-11-25 VITALS
BODY MASS INDEX: 37.69 KG/M2 | DIASTOLIC BLOOD PRESSURE: 83 MMHG | RESPIRATION RATE: 17 BRPM | SYSTOLIC BLOOD PRESSURE: 153 MMHG | HEART RATE: 60 BPM | HEIGHT: 71 IN | WEIGHT: 269.18 LBS | OXYGEN SATURATION: 95 % | TEMPERATURE: 97.6 F

## 2017-11-25 LAB
ANION GAP SERPL CALCULATED.3IONS-SCNC: 5 MMOL/L (ref 4–13)
BUN SERPL-MCNC: 15 MG/DL (ref 5–25)
CALCIUM SERPL-MCNC: 8.2 MG/DL (ref 8.3–10.1)
CHLORIDE SERPL-SCNC: 109 MMOL/L (ref 100–108)
CO2 SERPL-SCNC: 24 MMOL/L (ref 21–32)
CREAT SERPL-MCNC: 1.13 MG/DL (ref 0.6–1.3)
GFR SERPL CREATININE-BSD FRML MDRD: 73 ML/MIN/1.73SQ M
GLUCOSE SERPL-MCNC: 116 MG/DL (ref 65–140)
GLUCOSE SERPL-MCNC: 143 MG/DL (ref 65–140)
GLUCOSE SERPL-MCNC: 158 MG/DL (ref 65–140)
MAGNESIUM SERPL-MCNC: 2.3 MG/DL (ref 1.6–2.6)
POTASSIUM SERPL-SCNC: 4.5 MMOL/L (ref 3.5–5.3)
SODIUM SERPL-SCNC: 138 MMOL/L (ref 136–145)

## 2017-11-25 PROCEDURE — 80048 BASIC METABOLIC PNL TOTAL CA: CPT | Performed by: INTERNAL MEDICINE

## 2017-11-25 PROCEDURE — 83735 ASSAY OF MAGNESIUM: CPT | Performed by: INTERNAL MEDICINE

## 2017-11-25 PROCEDURE — 82948 REAGENT STRIP/BLOOD GLUCOSE: CPT

## 2017-11-25 RX ORDER — ATORVASTATIN CALCIUM 40 MG/1
40 TABLET, FILM COATED ORAL
Qty: 30 TABLET | Refills: 4 | Status: SHIPPED | OUTPATIENT
Start: 2017-11-25 | End: 2020-01-03 | Stop reason: SDUPTHER

## 2017-11-25 RX ORDER — NICOTINE 21 MG/24HR
1 PATCH, TRANSDERMAL 24 HOURS TRANSDERMAL DAILY
Qty: 28 PATCH | Refills: 0 | Status: SHIPPED | OUTPATIENT
Start: 2017-11-26 | End: 2017-11-25 | Stop reason: HOSPADM

## 2017-11-25 RX ORDER — BUPROPION HYDROCHLORIDE 150 MG/1
150 TABLET ORAL DAILY
Qty: 60 TABLET | Refills: 0 | Status: SHIPPED | OUTPATIENT
Start: 2017-11-25 | End: 2019-04-15

## 2017-11-25 RX ORDER — NICOTINE 21 MG/24HR
1 PATCH, TRANSDERMAL 24 HOURS TRANSDERMAL DAILY
Qty: 28 PATCH | Refills: 0 | Status: SHIPPED | OUTPATIENT
Start: 2017-11-25 | End: 2019-04-15

## 2017-11-25 RX ADMIN — Medication 1000 MG: at 08:46

## 2017-11-25 RX ADMIN — TICAGRELOR 90 MG: 90 TABLET ORAL at 08:47

## 2017-11-25 RX ADMIN — ASPIRIN 81 MG 81 MG: 81 TABLET ORAL at 08:46

## 2017-11-25 RX ADMIN — NITROGLYCERIN 0.4 MG: 0.4 TABLET SUBLINGUAL at 06:46

## 2017-11-25 RX ADMIN — FLUTICASONE PROPIONATE 2 SPRAY: 50 SPRAY, METERED NASAL at 08:47

## 2017-11-25 RX ADMIN — VITAMIN D, TAB 1000IU (100/BT) 2000 UNITS: 25 TAB at 08:43

## 2017-11-25 RX ADMIN — Medication 1 TABLET: at 08:43

## 2017-11-25 RX ADMIN — OXYCODONE HYDROCHLORIDE AND ACETAMINOPHEN 250 MG: 500 TABLET ORAL at 08:43

## 2017-11-25 RX ADMIN — AMLODIPINE BESYLATE 5 MG: 5 TABLET ORAL at 09:02

## 2017-11-25 RX ADMIN — NICOTINE 1 PATCH: 14 PATCH, EXTENDED RELEASE TRANSDERMAL at 08:48

## 2017-11-25 NOTE — PLAN OF CARE
Problem: Potential for Falls  Goal: Patient will remain free of falls  INTERVENTIONS:  - Assess patient frequently for physical needs  -  Identify cognitive and physical deficits and behaviors that affect risk of falls  -  [place] fall precautions as indicated by assessment   - Educate patient/family on patient safety including physical limitations  - Instruct patient to call for assistance with activity based on assessment  - Modify environment to reduce risk of injury  - Consider OT/PT consult to assist with strengthening/mobility   Outcome: Progressing      Problem: CARDIOVASCULAR - ADULT  Goal: Maintains optimal cardiac output and hemodynamic stability  INTERVENTIONS:  - Monitor I/O, vital signs and rhythm  - Monitor for S/S and trends of decreased cardiac output i e  bleeding, hypotension  - Administer and titrate ordered vasoactive medications to optimize hemodynamic stability  - Assess quality of pulses, skin color and temperature  - Assess for signs of decreased coronary artery perfusion - ex   Angina  - Instruct patient to report change in severity of symptoms   Outcome: Progressing    Goal: Absence of cardiac dysrhythmias or at baseline rhythm  INTERVENTIONS:  - Continuous cardiac monitoring, monitor vital signs, obtain 12 lead EKG if indicated  - Administer antiarrhythmic and heart rate control medications as ordered  - Monitor electrolytes and administer replacement therapy as ordered   Outcome: Progressing      Problem: SKIN/TISSUE INTEGRITY - ADULT  Goal: Incision(s), wounds(s) or drain site(s) healing without S/S of infection  INTERVENTIONS  - Assess and document risk factors for skin impairment   - Assess and document dressing, incision, wound bed, drain sites and surrounding tissue  - Initiate Nutrition services consult and/or wound management as needed   Outcome: Progressing      Problem: DISCHARGE PLANNING  Goal: Discharge to home or other facility with appropriate resources  INTERVENTIONS:  - Identify barriers to discharge w/patient and caregiver  - Arrange for needed discharge resources and transportation as appropriate  - Identify discharge learning needs (meds, wound care, etc )  - Arrange for interpretive services to assist at discharge as needed  - Refer to Case Management Department for coordinating discharge planning if the patient needs post-hospital services based on physician/advanced practitioner order or complex needs related to functional status, cognitive ability, or social support system   Outcome: Progressing      Problem: Knowledge Deficit  Goal: Patient/family/caregiver demonstrates understanding of disease process, treatment plan, medications, and discharge instructions  Complete learning assessment and assess knowledge base    Interventions:  - Provide teaching at level of understanding  - Provide teaching via preferred learning methods   Outcome: Progressing      Problem: DISCHARGE PLANNING - CARE MANAGEMENT  Goal: Discharge to post-acute care or home with appropriate resources  INTERVENTIONS:  - Conduct assessment to determine patient/family and health care team treatment goals, and need for post-acute services based on payer coverage, community resources, and patient preferences, and barriers to discharge  - Address psychosocial, clinical, and financial barriers to discharge as identified in assessment in conjunction with the patient/family and health care team  - Arrange appropriate level of post-acute services according to patient's   needs and preference and payer coverage in collaboration with the physician and health care team  - Communicate with and update the patient/family, physician, and health care team regarding progress on the discharge plan  - Arrange appropriate transportation to post-acute venues   Outcome: Progressing

## 2017-11-25 NOTE — SOCIAL WORK
Script for Tera Lyons and &0 copay card were sent to [address] as patient's preference  [facility] will fill 30 free supply and cost of copay for refill is $125 oo  Informed patient and cardiology   Patient will fill free 30 day and will discuss at follow up with his cardiologist

## 2017-11-25 NOTE — NURSING NOTE
Pt d/c home  D/c instructions reviewed  Pt picked up meds from [facility] pharmacy  Pt wheeled off unit accompanied by pca and family

## 2017-11-25 NOTE — PROGRESS NOTES
Upon assessment pt saying that he felt slightly SOB after taking off Cipap which seems to have gone away  Pt o2 sats 94% on RA  Pt then stating that intermittently he is getting what he describes as spasms on the Lower left side of chest  Pt denies that it is chest pain, but does say that he had similar spasms prior to coming in that were much greater in intensity  /57 HR 57  Cardiology notified  Was told by the cardiology fellow to try pt prn nitro  One tablet administer sublingal  Will continue to monitor

## 2017-11-25 NOTE — PROGRESS NOTES
Progress Note - Cardiology   Lian Vo 56 y o  male MRN: [id number]  Unit/Bed#: [facility] 503-01 Encounter: [id number]    Assessment:  56year old man with unstable angina  Diagnostic cath at [facility] showed disease of the circumflex system  Transferred here for PCI, which he underwent yesterday  2DES to LCx system    Recommendations:  ASA, Brilinta  High intensity atorvastatin  No beta blocker given bradycardia  BP control with norvasc  Smoking cessation - requests Wellbutrin  150mg daily for 3 days, then increase to twice daily  Nicotine replacement  Follow up with Dr Joe Medina in [place] already scheduled  Subjective/Objective     Subjective:  One episode of atypical CP/shortness of breath overnight  No further symptoms  PCI yesterday x 2  Objective:    Vitals: /83   Pulse 60   Temp 97 6 °F (36 4 °C) (Oral)   Resp 17   Ht 5' 11" (1 803 m)   Wt 122 kg (269 lb 2 9 oz)   SpO2 95%   BMI 37 54 kg/m²   Vitals:    11/23/17 1324 11/24/17 0600   Weight: 122 kg (268 lb 9 6 oz) 122 kg (269 lb 2 9 oz)     Orthostatic Blood Pressures    Flowsheet Row Most Recent Value   Blood Pressure  153/83 filed at 11/25/2017 0902   Patient Position - Orthostatic VS  Lying filed at 11/25/2017 0654            Intake/Output Summary (Last 24 hours) at 11/25/17 0948  Last data filed at 11/25/17 2750   Gross per 24 hour   Intake             1276 ml   Output              500 ml   Net              776 ml     Physical Exam:   General appearance: alert and in no acute distress  Head: Normocephalic, without obvious abnormality, atraumatic  Neck: no carotid bruit, no JVD and supple, symmetrical, trachea midline  Lungs: clear to auscultation bilaterally  Normal air entry  Normal effort  Heart: S1, S2 normal and no S3 or S4  No murmurs    Abdomen: soft, non-tender; bowel sounds normal; no masses,  no organomegaly  Extremities: extremities normal, atraumatic, no cyanosis or edema  Pulses: 2+ and symmetric bilaterally  Skin: Skin color, texture, turgor normal  No rashes or lesions  Neurologic: Grossly normal  Alert and oriented      Medications:    Current Facility-Administered Medications:     acetaminophen (TYLENOL) tablet 650 mg, 650 mg, Oral, Q4H PRN, LUCERO Rodriguez, 650 mg at 11/24/17 1955    aluminum-magnesium hydroxide-simethicone (MYLANTA) 200-200-20 mg/5 mL oral suspension 30 mL, 30 mL, Oral, Q6H PRN, LUCERO Rodriguez    amLODIPine (NORVASC) tablet 5 mg, 5 mg, Oral, Daily, Ginger LUCERO Belcher, 5 mg at 11/25/17 0902    ascorbic acid (VITAMIN C) tablet 250 mg, 250 mg, Oral, Daily, Ginger LUCERO Belcher, 250 mg at 11/25/17 2551    aspirin chewable tablet 81 mg, 81 mg, Oral, Daily, GingerLUCERO Delgado, 81 mg at 11/25/17 7950    atorvastatin (LIPITOR) tablet 40 mg, 40 mg, Oral, Daily With Dewayne Mckeon MD, 40 mg at 11/24/17 1701    cholecalciferol (VITAMIN D3) tablet 2,000 Units, 2,000 Units, Oral, Daily, LUCERO Rodriguez, 2,000 Units at 11/25/17 0843    famotidine (PEPCID) tablet 20 mg, 20 mg, Oral, Daily, LUCERO Michaud, 20 mg at 11/24/17 1701    fish oil capsule 1,000 mg, 1,000 mg, Oral, Daily, LUCERO Michaud, 1,000 mg at 11/25/17 0846    fluticasone (FLONASE) 50 mcg/act nasal spray 2 spray, 2 spray, Each Nare, Daily, LUCERO Rodriguez, 2 spray at 11/25/17 0847    insulin lispro (HumaLOG) 100 units/mL subcutaneous injection 1-5 Units, 1-5 Units, Subcutaneous, TID With Meals, 1 Units at 11/23/17 1835 **AND** Fingerstick Glucose (POCT), , , 4x Daily AC and at bedtime, LUCERO Rodriguez    loratadine (CLARITIN) tablet 10 mg, 10 mg, Oral, HS, LUCERO Michaud, 10 mg at 11/24/17 2118    multivitamin-minerals (CENTRUM) tablet 1 tablet, 1 tablet, Oral, Daily, LUCERO Rodriguez, 1 tablet at 11/25/17 0843    nicotine (NICODERM CQ) 14 mg/24hr TD 24 hr patch 1 patch, 1 patch, Transdermal, Daily, LUCERO Rodriguez, 1 patch at 11/25/17 0848    nitroglycerin (NITROSTAT) SL tablet 0 4 mg, 0 4 mg, Sublingual, Q5 Min PRN, Ana Orantes MD, 0 4 mg at 11/25/17 0646    ticagrelor (BRILINTA) tablet 90 mg, 90 mg, Oral, Q12H [facility], Alfred Cohen, LUHNP, 90 mg at 11/25/17 0847    Lab Results:    Results from last 7 days  Lab Units 11/22/17  0708 11/22/17  0359 11/22/17  0019   TROPONIN I ng/mL <0 02 0 02 <0 02       Results from last 7 days  Lab Units 11/24/17  0555 11/23/17  0532 11/22/17  0019   WBC Thousand/uL 6 91 8 30 9 80   HEMOGLOBIN g/dL 14 4 14 9 16 5   HEMATOCRIT % 41 7 43 1 48 0   PLATELETS Thousands/uL 213 220 281       Results from last 7 days  Lab Units 11/22/17  0708   CHOLESTEROL mg/dL 139   TRIGLYCERIDES mg/dL 244*   HDL mg/dL 33*       Results from last 7 days  Lab Units 11/25/17  0436 11/24/17  0555 11/23/17  0532 11/22/17  0019   SODIUM mmol/L 138 140 140 140   POTASSIUM mmol/L 4 5 3 9 3 8 3 5   CHLORIDE mmol/L 109* 110* 106 102   CO2 mmol/L 24 22 25 28   BUN mg/dL 15 14 12 15   CREATININE mg/dL 1 13 1 10 1 08 1 13   CALCIUM mg/dL 8 2* 8 7 8 8 9 0   TOTAL PROTEIN g/dL  --   --  7 1 8 5*   BILIRUBIN TOTAL mg/dL  --   --  0 60 0 60   ALK PHOS U/L  --   --  67 91   ALT U/L  --   --  47 58   AST U/L  --   --  21 34   GLUCOSE RANDOM mg/dL 158* 113 115 187*       Results from last 7 days  Lab Units 11/22/17  0708 11/22/17  0019   INR   --  0 97   PTT seconds 28 24       Results from last 7 days  Lab Units 11/25/17  0436 11/24/17  0555 11/23/17  1542   MAGNESIUM mg/dL 2 3 2 3 2 2

## 2017-11-27 NOTE — CASE MANAGEMENT
Uriah Upton PA-C Physician Assistant Signed Cardiology  Discharge Summaries Date of Service: 11/24/2017  2:20 PM   Related encounter: Admission (Discharged) from 11/23/2017 in [address] [place] [address] 5          []Hide copied text        Discharge Summary - Renetta Cabrera 56 y o  male MRN: [id number]     Unit/Bed#: [place] 503-01 Encounter: [id number]     Admission Date: 11/23/2017   Discharge Date:      Disposition: Home        Discharge Diagnosis: Coronary artery disease status post drug-eluting stent placement to left circumflex and OM 2, hypertensive urgency, HLD, BING, tobacco abuse        Principal Problem:    ACS (acute coronary syndrome) ([facility] [address] )           Condition at Discharge: good   Consultants: none     HPI and Hospital Course: 56year old male admitted to [facility] with stuttering CP and hypertensive urgency  CC showed severe focal circumflex stenosis thus was transferred to [facility] for planned PCI  He was loaded with brilinta  He was placed on atorvastatin 80mg daily, smoking cessation was advised  D/T bradycardia his BB was discontinued  11/24 he underwent PCI with drug-eluting stents placed to proximal left circumflex and OM 2  Patient was monitored overnight and was examined this morning  Vital signs, laboratory data and exam are stable  Patient had no complaints of chest pain but does feel extra heartbeats once in a while  He states he has been feeling this since he was younger  We reviewed that this could be PACs or PVCs and are not dangerous  Telemetry showed normal sinus rhythm with heart rate to 38 beats per minute overnight  Patient is compliant with his CPAP for obstructive sleep apnea  We reviewed the importance of smoking cessation regarding his coronary artery disease  He is currently on the patch and would like Wellbutrin prescribed at discharge  I encouraged him to follow up with his primary care physician in the next 1-2 weeks    He will follow up with Cardiology as scheduled  The importance of dual anti-platelet therapy for the next year was discussed in detail the consequences of not taking these  Brilinta was priced prior to patient's discharge home  If this becomes expensive in the future, he can switch to Plavix and will call his primary cardiologist immediately  The patient was not prescribed a beta-blocker discharge due to baseline bradycardia  This could be added as an outpatient if needed  Increased cardiovascular exercise and dietary modifications were reviewed with the patient for long-term cardiovascular health  Weight loss was also encouraged  The use of nitroglycerin was also reviewed in detail  All questions were answered  Patient will be discharged home today      Echocardiogram revealed normal LVEF with no WMA           Discharge Medications:  See after visit summary for reconciled discharge medications provided to patient and family           Pertinent Labs/diagnostics:        Admission on 11/23/2017   Component Date Value    Magnesium 11/23/2017 2 2     POC Glucose 11/23/2017 155*    POC Glucose 11/23/2017 90     POC Glucose 11/24/2017 90     WBC 11/24/2017 6 91     RBC 11/24/2017 4 22     Hemoglobin 11/24/2017 14 4     Hematocrit 11/24/2017 41 7     MCV 11/24/2017 99*    MCH 11/24/2017 34 1     MCHC 11/24/2017 34 5     RDW 11/24/2017 13 6     MPV 11/24/2017 9 5     Platelets 31/81/5496 213     nRBC 11/24/2017 0     Neutrophils Relative 11/24/2017 55     Lymphocytes Relative 11/24/2017 31     Monocytes Relative 11/24/2017 10     Eosinophils Relative 11/24/2017 4     Basophils Relative 11/24/2017 0     Neutrophils Absolute 11/24/2017 3 72     Lymphocytes Absolute 11/24/2017 2 15     Monocytes Absolute 11/24/2017 0 69     Eosinophils Absolute 11/24/2017 0 29     Basophils Absolute 11/24/2017 0 03     Sodium 11/24/2017 140     Potassium 11/24/2017 3 9     Chloride 11/24/2017 110*    CO2 11/24/2017 22     Anion Gap 11/24/2017 8     BUN 11/24/2017 14     Creatinine 11/24/2017 1 10     Glucose 11/24/2017 113     Calcium 11/24/2017 8 7     eGFR 11/24/2017 75     Magnesium 11/24/2017 2 3     POC Glucose 11/24/2017 103    Admission on 11/22/2017, Discharged on 11/23/2017   Component Date Value    Sodium 11/22/2017 140     Potassium 11/22/2017 3 5     Chloride 11/22/2017 102     CO2 11/22/2017 28     Anion Gap 11/22/2017 10     BUN 11/22/2017 15     Creatinine 11/22/2017 1 13     Glucose 11/22/2017 187*    Calcium 11/22/2017 9 0     AST 11/22/2017 34     ALT 11/22/2017 58     Alkaline Phosphatase 11/22/2017 91     Total Protein 11/22/2017 8 5*    Albumin 11/22/2017 4 2     Total Bilirubin 11/22/2017 0 60     eGFR 11/22/2017 73     WBC 11/22/2017 9 80     RBC 11/22/2017 4 78     Hemoglobin 11/22/2017 16 5     Hematocrit 11/22/2017 48 0     MCV 11/22/2017 101*    MCH 11/22/2017 34 4*    MCHC 11/22/2017 34 3     RDW 11/22/2017 13 6     MPV 11/22/2017 7 5*    Platelets 22/81/3262 281     nRBC 11/22/2017 0     Neutrophils Relative 11/22/2017 64     Lymphocytes Relative 11/22/2017 25     Monocytes Relative 11/22/2017 7     Eosinophils Relative 11/22/2017 3     Basophils Relative 11/22/2017 1     Neutrophils Absolute 11/22/2017 6 30     Lymphocytes Absolute 11/22/2017 2 40     Monocytes Absolute 11/22/2017 0 70     Eosinophils Absolute 11/22/2017 0 30     Basophils Absolute 11/22/2017 0 10     Ventricular Rate 11/22/2017 63     Atrial Rate 11/22/2017 63     OK Interval 11/22/2017 170     QRSD Interval 11/22/2017 104     QT Interval 11/22/2017 400     QTC Interval 11/22/2017 409     P Axis 11/22/2017 61     QRS Axis 11/22/2017 12     T Wave Axis 11/22/2017 -15     Protime 11/22/2017 10 2     INR 11/22/2017 0 97     PTT 11/22/2017 24     Troponin I 11/22/2017 <0 02     NT-proBNP 11/22/2017 26     Ventricular Rate 11/22/2017 70     Atrial Rate 11/22/2017 70     OK Interval 11/22/2017 180     QRSD Interval 11/22/2017 102     QT Interval 11/22/2017 382     QTC Interval 11/22/2017 412     P Axis 11/22/2017 64     QRS Axis 11/22/2017 16     T Wave Axis 11/22/2017 3     Ventricular Rate 11/22/2017 103     Atrial Rate 11/22/2017 103     QRSD Interval 11/22/2017 98     QT Interval 11/22/2017 334     QTC Interval 11/22/2017 437     P Axis 11/22/2017 71     QRS Axis 11/22/2017 26     T Wave Axis 11/22/2017 -3     MRSA Culture Only 11/23/2017 No Methicillin Resistant Staphlyococcus aureus (MRSA) isolated     Troponin I 11/22/2017 0 02     Troponin I 11/22/2017 <0 02     PTT 11/22/2017 28     Cholesterol 11/22/2017 139     Triglycerides 11/22/2017 244*    HDL, Direct 11/22/2017 33*    LDL Calculated 11/22/2017 57     TSH 3RD GENERATON 11/22/2017 2 809     Free T4 11/22/2017 1 00     Hemoglobin A1C 11/22/2017 6 5*    EAG 11/22/2017 140     Color, UA 11/22/2017 Yellow     Clarity, UA 11/22/2017 Clear     Specific Gravity, UA 11/22/2017 1 010     pH, UA 11/22/2017 7 0     Leukocytes, UA 11/22/2017 Negative     Nitrite, UA 11/22/2017 Negative     Protein, UA 11/22/2017 Negative     Glucose, UA 11/22/2017 Negative     Ketones, UA 11/22/2017 Negative     Urobilinogen, UA 11/22/2017 0 2     Bilirubin, UA 11/22/2017 Negative     Blood, UA 11/22/2017 Negative     Ethanol Lvl 11/22/2017 <3     POC Glucose 11/22/2017 110     POC Glucose 11/22/2017 153*    POC Glucose 11/22/2017 115     POC Glucose 11/22/2017 101     WBC 11/23/2017 8 30     RBC 11/23/2017 4 30*    Hemoglobin 11/23/2017 14 9     Hematocrit 11/23/2017 43 1     MCV 11/23/2017 100*    MCH 11/23/2017 34 6*    MCHC 11/23/2017 34 5     RDW 11/23/2017 13 6     MPV 11/23/2017 7 3*    Platelets 62/12/9080 220     nRBC 11/23/2017 0     Neutrophils Relative 11/23/2017 68     Lymphocytes Relative 11/23/2017 20     Monocytes Relative 11/23/2017 8     Eosinophils Relative 11/23/2017 4     Basophils Relative 2017 1     Neutrophils Absolute 2017 5 60     Lymphocytes Absolute 2017 1 70     Monocytes Absolute 2017 0 60     Eosinophils Absolute 2017 0 30     Basophils Absolute 2017 0 00     Sodium 2017 140     Potassium 2017 3 8     Chloride 2017 106     CO2 2017 25     Anion Gap 2017 9     BUN 2017 12     Creatinine 2017 1 08     Glucose 2017 115     Calcium 2017 8 8     AST 2017 21     ALT 2017 47     Alkaline Phosphatase 2017 67     Total Protein 2017 7 1     Albumin 2017 3 5     Total Bilirubin 2017 0 60     eGFR 2017 77     POC Glucose 2017 116     POC Glucose 2017 108     POC Glucose 2017 88             None        Condition at Discharge: good           Vitals:     17 1324 17 0600   Weight: 122 kg (268 lb 9 6 oz) 122 kg (269 lb 2 9 oz)            St  [address]  [address] [facility] [address]  [phone]     Transthoracic Echocardiogram  2D, M-mode, Doppler, and Color Doppler     Study date:       Patient: Aniya Bland  MR number: [id number]  Account number: [de-identified]  : 1960  Age: 49 years  Gender: Male  Status: Routine  Location: Echo lab  Height: 71 in  Weight: 277 4 lb  BP: 123/ 63 mmHg     Indications: Chest Tightness     Diagnoses: R07 9 - Chest pain, unspecified     Sonographer: MAURILIO Montaño  Primary Physician: Tom Doshi MD  Group: Courtney Ferguson  Interpreting Physician: Arnaud Humphreys MD     SUMMARY     LEFT VENTRICLE:  Systolic function was normal  Ejection fraction was estimated in the range of 55 % to 60 %  There were no regional wall motion abnormalities  Wall thickness was mildly increased      TRICUSPID VALVE:  The tricuspid jet envelope definition was inadequate for estimation of RV systolic pressure   There are no indirect findings (abnormal RV volume or geometry, altered pulmonary flow velocity profile, or leftward septal displacement) which  would suggest moderate or severe pulmonary hypertension      HISTORY: PRIOR HISTORY: RBBB,CPAP, Diabetes, Hyperlipidemia, HTN, Diverticulitis     PROCEDURE: The procedure was performed in the echo lab  This was a routine study  The transthoracic approach was used  The study included complete 2D imaging, M-mode, complete spectral Doppler, and color Doppler  The heart rate was 50 bpm,  at the start of the study  Images were obtained from the parasternal, apical, subcostal, and suprasternal notch acoustic windows  Image quality was adequate      LEFT VENTRICLE: Size was normal  Systolic function was normal  Ejection fraction was estimated in the range of 55 % to 60 %  There were no regional wall motion abnormalities  Wall thickness was mildly increased  No evidence of apical  thrombus  DOPPLER: Left ventricular diastolic function parameters were normal for the patient's age      RIGHT VENTRICLE: The size was normal  Systolic function was normal  Wall thickness was normal      LEFT ATRIUM: Size was normal      RIGHT ATRIUM: Size was at the upper limits of normal      MITRAL VALVE: Valve structure was normal  There was normal leaflet separation  DOPPLER: The transmitral velocity was within the normal range  There was no evidence for stenosis  There was no significant regurgitation      AORTIC VALVE: The valve was trileaflet  Leaflets exhibited mildly increased thickness and normal cuspal separation  DOPPLER: Transaortic velocity was within the normal range  There was no evidence for stenosis  There was no significant  regurgitation      TRICUSPID VALVE: The valve structure was normal  There was normal leaflet separation  DOPPLER: The transtricuspid velocity was within the normal range  There was no evidence for stenosis  There was trace regurgitation   The tricuspid jet  envelope definition was inadequate for estimation of RV systolic pressure  There are no indirect findings (abnormal RV volume or geometry, altered pulmonary flow velocity profile, or leftward septal displacement) which would suggest  moderate or severe pulmonary hypertension      PULMONIC VALVE: Leaflets exhibited normal thickness, no calcification, and normal cuspal separation  DOPPLER: The transpulmonic velocity was within the normal range  There was no significant regurgitation      PERICARDIUM: There was no pericardial effusion  The pericardium was normal in appearance      AORTA: The root exhibited normal size      SYSTEMIC VEINS: IVC: The inferior vena cava was normal in size      SYSTEM MEASUREMENT TABLES     2D mode  AoR Diam 2D: 3 5 cm  LA Diam (2D): 3 7 cm  LA/Ao (2D): 1 06  FS (2D Teich): 28 2 %  IVSd (2D): 1 18 cm  LVDEV: 116 cm³  LVESV: 53 cm³  LVIDd(2D): 4 96 cm  LVISd (2D): 3 56 cm  LVOT Area 2D: 3 46 cm squared  LVPWd (2D): 1 32 cm  SV (Teich): 63 cm³     Apical four chamber  LVEF A4C: 61 %     Unspecified Scan Mode  JADE Cont Eq (Peak Radames): 2 67 cm squared  LVOT Diam : 2 1 cm  LVOT Vmax: 1320 mm/s  LVOT Vmax; Mean: 1320 mm/s  Peak Grad ; Mean: 7 mm[Hg]  MV Peak A Radames: 854 mm/s  MV Peak E Radames  Mean: 803 mm/s  MVA (PHT): 4 49 cm squared  PHT: 49 ms  Max P mm[Hg]  V Max: 2250 mm/s  Vmax: 2270 mm/s  RA Area: 22 4 cm squared  RA Volume: 71 5 cm³  TAPSE: 2 8 cm     [facility] Accredited Echocardiography Laboratory     Prepared and electronically signed by  Prince Landrum MD  Signed [id number] 11:12:49           Discharge instructions/Information to patient and family:   See after visit summary for information provided to patient and family        Provisions for Follow-Up Care:  See after visit summary for information related to follow-up care and any pertinent home health orders        Planned Readmission: No     Discharge Statement   I spent 45 minutes minutes discharging the patient   This time was spent on the day of discharge  I had direct contact with the patient on the day of discharge   Additional documentation is required if more than 30 minutes were spent on discharge                 Cosigned by: Shen Skinner MD at 11/25/2017  2:26 PM   Revision History

## 2017-11-28 ENCOUNTER — TRANSCRIBE ORDERS (OUTPATIENT)
Dept: ADMINISTRATIVE | Facility: HOSPITAL | Age: 57
End: 2017-11-28

## 2017-11-28 ENCOUNTER — ALLSCRIPTS OFFICE VISIT (OUTPATIENT)
Dept: OTHER | Facility: OTHER | Age: 57
End: 2017-11-28

## 2017-11-28 DIAGNOSIS — R31.0 GROSS HEMATURIA: Primary | ICD-10-CM

## 2017-11-29 NOTE — PROGRESS NOTES
Assessment    1  Hydronephrosis of left kidney (591) (N13 30)   2  Gross hematuria (599 71) (R31 0)    Plan  Gross hematuria    · CT ABDOMEN PELVIS W WO CONTRAST; Status:Need Information - FinancialAuthorization; Requested for:28Nov2017;    Perform:[facility] Radiology; Order Comments:Please schedule CT scan abdomen pelvis with and without contrast with delayed images for characterization of the collecting system (CT urogram) ; Due:28Nov2018; Ordered;hematuria; Ordered By:Tabby Gaytan;   · Urology Follow Up Evaluation and Treatment  Please schedule cystoscopy at [facility] with Dr Rosalind Martinez after CT urogram is performed  Status: Hold For- Scheduling  Requested for: 28Nov2017   Ordered;Gross hematuria; Ordered By: Laine Jordan Performed:  Due: 83HYV6276    Discussion/Summary  Discussion Summary:   Functionally, Denys Azul has no problems with his left renal unit as there is no hydronephrosis and there is normal split renal function with no evidence of obstruction on renal scan  However, he does have new onset gross hematuria and thickened bladder wall on ultrasound  We did discuss gross hematuria and its potential meanings including possible urologic malignancy versus an inflammatory or infectious lesion or other anatomic abnormality given his history of diverticulitis with phlegmon and abscess  discussed the workup of gross hematuria including CT urogram and cystoscopy and the benefit of increased diagnostic information and the risks of potential infection, bleeding, pain, damage to surrounding structures, and risk of diagnosing a urologic malignancy  He does wish to proceed with this workup  Schedule CT urogram and cystoscopy for the near future to workup his gross hematuria  He does have adequate renal function on a recent BMP  Goals and Barriers: The patient has the current Goals: To workup his gross hematuria  The patent has the current Barriers: The patient has no barriers to achieving his goals  Patient's Capacity to Self-Care: Patient is able to Self-Care  Patient Education: Educational resources provided: The patient was given information on gross hematuria, information on the patient's renal scan and renal ultrasound was given to him and printed reports were also given to him from the studies  Medication SE Review and Pt Understands Tx: The treatment plan was reviewed with the patient/guardian  The patient/guardian understands and agrees with the treatment plan   Counseling Documentation With Imm: The patient was counseled regarding diagnostic results,-- instructions for management,-- risk factor reductions,-- prognosis,-- patient and family education,-- impressions,-- risks and benefits of treatment options,-- importance of compliance with treatment  Chief Complaint  Chief Complaint Free Text Note Form: Patient presents for history of hydronephrosis, new finding of gross hematuria      History of Present Illness  HPI: Juan Luis Orourke returns today for urologic follow-up for his very complex urologic history of left hydronephrosis and left ureteral stenosis/stricture in the setting of previous perforated diverticulitis with abscess and phlegmon  At our last visit he did complain of occasional feelings that were ânot normalâ on his left flank and he wished to follow-up to see if he had any functional abnormality on the left side in the left renal unit   did undergo a renal and bladder ultrasound which showed a thickened bladder wall with potential cystitis but no left or right hydronephrosis  A nuclear medicine renal scan was also performed which showed a normal T1 half of drainage from the right and left renal unit and a split renal function which was essentially 50-50 renal function on the right and the left  This indicates that he has no functional abnormalities in his left renal unit    he did recently require cardiac catheterization with placement of 2 cardiac stents due to angina and chest pain  In that time new medications were added including a calcium channel blocker as well as a statin medication and Brilinta and aspirin  In that time he has felt ârun down, washed out, and tired  his urologic review of systems he has had episode of gross hematuria x2 last seen on 11/13/2017  He has had no other symptoms with this including no fever or malaise, and his gross hematuria has since improved  His most recent urinalysis in late November was normal for any abnormalities  remainder of his urologic review of systems is negative for dysuria, incontinence, hesitancy of urination, further gross hematuria, urgency of urination, he has nocturia 0-1 times per night, he is empty after voiding and his stream quality is good  He did not fill out an AUA symptom score today  of systems is otherwise negative except as listed above and below      Review of Systems  Complete-Male Urology:  Constitutional: No fever or chills, feels well, no tiredness, no recent weight gain or weight loss  Respiratory: No complaints of shortness of breath, no wheezing, no cough, no SOB on exertion, no orthopnea or PND  Cardiovascular: No complaints of slow heart rate, no fast heart rate, no chest pain, no palpitations, no leg claudication, no lower extremity  Gastrointestinal: No complaints of abdominal pain, no constipation, no nausea or vomiting, no diarrhea or bloody stools  Genitourinary: Empty sensation-- and-- stream quality good, but-- no dysuria,-- no urinary hesitancy,-- no incontinence-- and-- no feelings of urinary urgency--   The patient presents with complaints of no hematuria (last saw on 11/13)  The patient presents with complaints of no nocturia (0-1 times)  Musculoskeletal: No complaints of arthralgia, no myalgias, no joint swelling or stiffness, no limb pain or swelling  Integumentary: No complaints of skin rash or skin lesions, no itching, no skin wound, no dry skin    Hematologic/Lymphatic: No complaints of swollen glands, no swollen glands in the neck, does not bleed easily, no easy bruising  Neurological: No compliants of headache, no confusion, no convulsions, no numbness or tingling, no dizziness or fainting, no limb weakness, no difficulty walking  ROS Reviewed:   ROS reviewed  Active Problems  1  Arthritis (716 90) (M19 90)   2  Chest pain (786 50) (R07 9)   3  Diabetes (250 00) (E11 9)   4  Diastasis recti (728 84) (M62 08)   5  Diverticulitis of colon (562 11) (K57 32)   6  H/O ventral hernia repair (V15 29) (Z98 890,Z87 19)   7  High blood pressure (401 9) (I10)   8  Hydronephrosis of left kidney (591) (N13 30)   9  Irregular heartbeat (427 9) (I49 9)   10  Left inguinal hernia (550 90) (K40 90)   11  Left lower quadrant abdominal wall mass (789 34) (R19 04)   12  Moderate pain (780 96) (R52)   13  Right inguinal hernia (550 90) (K40 90)   14  Stomach ulcer (531 90) (K25 9)    Past Medical History  1  History of hydronephrosis (V13 09) (Z87 448)   2  History of sleep apnea (V13 89) (Z86 69)   3  History of varicose veins (V12 59) (Z86 79)   4  History of Meniscus tear (836 2) (S83 209A)   5  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)   6  History of Umbilical hernia (595 9) (K42 9)  Active Problems And Past Medical History Reviewed: The active problems and past medical history were reviewed and updated today  Surgical History  1  History of Colon Surgery  Surgical History Reviewed: The surgical history was reviewed and updated today  Family History  Mother    1  Family history of atrial fibrillation (V17 49) (Z82 49)   2  Family history of diabetes mellitus (V18 0) (Z83 3)   3  Family history of hypertension (V17 49) (Z82 49)   4  Family history of Parkinson's disease (V17 2) (Z82 0)   5  Family history of spinal stenosis (V17 89) (Z82 69)  Father    6  Family history of    7  Family history of aortic aneurysm (V17 49) (Z82 49)  Sibling    6  Family history of    5  Family history of cardiac arrest (V17 49) (Z82 49)   10  Family history of colorectal cancer (V16 0) (Z80 0)   11  Family history of diabetes mellitus (V18 0) (Z83 3)   12  Family history of peritonitis (V18 8) (Z83 1)  Brother    15  Family history of colon cancer (V16 0) (Z80 0)   14  Family history of diabetes mellitus (V18 0) (Z83 3)   15  Family history of lymphoma (V16 7) (Z80 2)   16  Family history of malignant neoplasm of thyroid (V16 8) (Z80 8)  Family History Reviewed: The family history was reviewed and updated today  Social History     · Current every day smoker (305 1) (F17 200)   · Denied: History of Drug use   · Moderate alcohol use   · Single  Social History Reviewed: The social history was reviewed and updated today  The social history was reviewed and is unchanged  Current Meds   1  Adult Low Dose Aspirin 81 MG TABS; TAKE 1 TABLET DAILY; Therapy: (Recorded:24Nov2017) to Recorded   2  AmLODIPine Besylate 5 MG Oral Tablet; TAKE 1 TABLET DAILY AS DIRECTED; Therapy: (Recorded:24Nov2017) to Recorded   3  Atorvastatin Calcium 10 MG Oral Tablet; TAKE 1 TABLET AT BEDTIME; Therapy: (Recorded:24Nov2017) to Recorded   4  Brilinta TABS; Therapy: (Recorded:28Nov2017) to Recorded   5  BuPROPion HCl - 100 MG Oral Tablet; Therapy: (Recorded:28Nov2017) to Recorded   6  Centrum Silver Oral Tablet; TAKE 1 TABLET DAILY; Therapy: (Recorded:24Nov2017) to Recorded   7  Clarinex 5 MG Oral Tablet; TAKE 1 TABLET DAILY; Therapy: (Recorded:24Nov2017) to Recorded   8  Desloratadine TABS; Therapy: (Recorded:28Nov2017) to Recorded   9  Docusate Sodium 100 MG Oral Capsule; take 1 capsule daily; Therapy: (Recorded:24Nov2017) to Recorded   10  Fish Oil CAPS; TAKE 1 CAPSULE Daily TDD:1000 MG; Therapy: (Recorded:24Nov2017) to Recorded   11  Lipitor 10 MG Oral Tablet; TAKE 1 TABLET AT BEDTIME Recorded   12  Loratadine 10 MG Oral Tablet; TAKE 1 TABLET DAILY AS NEEDED; Therapy: (Recorded:24Nov2017) to Recorded   13   Milk Thistle 1000 MG Oral Capsule; take 1 capsule daily; Therapy: (Recorded:24Nov2017) to Recorded   14  Nasonex 50 MCG/ACT Nasal Suspension; Therapy: 27RNF9394 to Recorded   15  Nitrostat 0 3 MG Sublingual Tablet Sublingual; TAKE AS DIRECTED; Therapy: (Recorded:24Nov2017) to Recorded   16  Oxycodone-Acetaminophen 5-325 MG Oral Tablet; TAKE 2 TABLET Every 4 hours PRN  MODERATE PAIN;  Therapy: (Recorded:24Nov2017) to Recorded   17  RaNITidine HCl - 150 MG Oral Capsule; TAKE 1 CAPSULE AT BEDTIME; Therapy: (Recorded:24Nov2017) to Recorded   18  Ranitidine TABS; Therapy: (Recorded:28Nov2017) to Recorded   19  Valsartan-Hydrochlorothiazide 160-25 MG Oral Tablet; Take 1 tablet daily Recorded   20  Vitamin C TABS; TAKE 1 TABLET DAILY AS DIRECTED; Therapy: (Recorded:24Nov2017) to Recorded  Medication List Reviewed: The medication list was reviewed and updated today  Allergies  1  Levaquin TABS   2  Sulfa Drugs    Vitals  Vital Signs    Recorded: 15TIH2328 02:55PM   Heart Rate 72   Systolic 402   Diastolic 72   Height 5 ft 8 in   Weight 269 lb    BMI Calculated 40 9   BSA Calculated 2 32       Physical Exam   Constitutional Alert, oriented, normal mood and affect, appears nontoxic, patient is overweight  Pulmonary Breathing comfortably, no increased work of breathing, no coughing, no wheezing  Cardiovascular Peripheral pulses are strong and regular  Examination of extremities for edema and/or varicosities: Normal    Abdomen  Abdomen: Non-tender, no masses  Musculoskeletal  Gait and station: Normal    Digits and nails: Normal without clubbing or cyanosis  Inspection/palpation of joints, bones, and muscles: Normal    Neurologic  Cranial nerves: Cranial nerves 2-12 intact         Future Appointments    Date/Time Provider Specialty Site   12/01/2017 01:40 PM Niya Wicnhester DO Cardiology [facility] [address] [place] [address]       Signatures   Electronically signed by : ROSALINA Phillips ; Nov 28 2017  3:21PM EST (Author)

## 2017-11-30 ENCOUNTER — GENERIC CONVERSION - ENCOUNTER (OUTPATIENT)
Dept: OTHER | Facility: OTHER | Age: 57
End: 2017-11-30

## 2017-12-01 ENCOUNTER — ALLSCRIPTS OFFICE VISIT (OUTPATIENT)
Dept: OTHER | Facility: OTHER | Age: 57
End: 2017-12-01

## 2017-12-01 LAB
BASOPHILS # BLD AUTO: 0.1 X10E3/UL (ref 0–0.2)
BASOPHILS # BLD AUTO: 1 %
BUN SERPL-MCNC: 17 MG/DL (ref 6–24)
BUN/CREA RATIO (HISTORICAL): 16 (ref 9–20)
CALCIUM SERPL-MCNC: 9.4 MG/DL (ref 8.7–10.2)
CHLORIDE SERPL-SCNC: 102 MMOL/L (ref 96–106)
CO2 SERPL-SCNC: 22 MMOL/L (ref 18–29)
CREAT SERPL-MCNC: 1.08 MG/DL (ref 0.76–1.27)
DEPRECATED RDW RBC AUTO: 13.5 % (ref 12.3–15.4)
EGFR AFRICAN AMERICAN (HISTORICAL): 89 ML/MIN/1.73
EGFR-AMERICAN CALC (HISTORICAL): 77 ML/MIN/1.73
EOSINOPHIL # BLD AUTO: 0.3 X10E3/UL (ref 0–0.4)
EOSINOPHIL # BLD AUTO: 3 %
GLUCOSE SERPL-MCNC: 102 MG/DL (ref 65–99)
HCT VFR BLD AUTO: 43.1 % (ref 37.5–51)
HGB BLD-MCNC: 14.7 G/DL (ref 12.6–17.7)
IMM.GRANULOCYTES (CD4/8) (HISTORICAL): 0.1 X10E3/UL (ref 0–0.1)
IMM.GRANULOCYTES (CD4/8) (HISTORICAL): 1 %
LYMPHOCYTES # BLD AUTO: 1.8 X10E3/UL (ref 0.7–3.1)
LYMPHOCYTES # BLD AUTO: 20 %
MCH RBC QN AUTO: 33.9 PG (ref 26.6–33)
MCHC RBC AUTO-ENTMCNC: 34.1 G/DL (ref 31.5–35.7)
MCV RBC AUTO: 100 FL (ref 79–97)
MONOCYTES # BLD AUTO: 0.9 X10E3/UL (ref 0.1–0.9)
MONOCYTES (HISTORICAL): 10 %
NEUTROPHILS # BLD AUTO: 5.9 X10E3/UL (ref 1.4–7)
NEUTROPHILS # BLD AUTO: 65 %
PLATELET # BLD AUTO: 311 X10E3/UL (ref 150–379)
POTASSIUM SERPL-SCNC: 4.2 MMOL/L (ref 3.5–5.2)
RBC (HISTORICAL): 4.33 X10E6/UL (ref 4.14–5.8)
SODIUM SERPL-SCNC: 141 MMOL/L (ref 134–144)
WBC # BLD AUTO: 9 X10E3/UL (ref 3.4–10.8)

## 2017-12-02 ENCOUNTER — TRANSCRIBE ORDERS (OUTPATIENT)
Dept: ADMINISTRATIVE | Facility: HOSPITAL | Age: 57
End: 2017-12-02

## 2017-12-02 ENCOUNTER — HOSPITAL ENCOUNTER (OUTPATIENT)
Dept: RADIOLOGY | Facility: HOSPITAL | Age: 57
Discharge: HOME | End: 2017-12-02
Attending: INTERNAL MEDICINE
Payer: COMMERCIAL

## 2017-12-02 ENCOUNTER — GENERIC CONVERSION - ENCOUNTER (OUTPATIENT)
Dept: OTHER | Facility: OTHER | Age: 57
End: 2017-12-02

## 2017-12-02 DIAGNOSIS — R06.02 SOB (SHORTNESS OF BREATH): Primary | ICD-10-CM

## 2017-12-02 DIAGNOSIS — R06.02 SOB (SHORTNESS OF BREATH): ICD-10-CM

## 2017-12-02 PROCEDURE — 71020 HB CHEST X-RAY 2VW FRONTAL&LATL: CPT

## 2017-12-05 NOTE — PROGRESS NOTES
Assessment   Assessed    1  Exertional shortness of breath (786 05) (R06 02)   2  CAD (coronary artery disease) (414 00) (I25 10)   3  High blood pressure (401 9) (I10)   4  S/P cardiac cath (V45 89) (Z98 890)   5  Dyslipidemia (272 4) (E78 5)    Plan   CAD (coronary artery disease)    · (1) COMPREHENSIVE METABOLIC PANEL; Status:Hold For - Exact Date; Requested    for:Approx [id number]; Perform:[facility];Ordered; For:CAD (coronary artery disease); Ordered By:Giselle Case;   · (1) LIPID PANEL, FASTING; Status:Hold For - Exact Date; Requested for:Approx    [id number]; Perform:[facility];Ordered; For:CAD (coronary artery disease); Ordered By:Giselle Case;  CAD (coronary artery disease), Exertional shortness of breath    · Clopidogrel Bisulfate 75 MG Oral Tablet (Plavix); Take 1 tablet daily   Rx By: Gustavo Li; Dispense: 30 Days ; #:30 Tablet; Refill: 3;For: CAD (coronary artery disease), Exertional shortness of breath; CRISTINE = N; Rx auto-faxed to [id number]; Last Updated By: System, [employer]; 12/1/2017 1:58:34 PM  CAD (coronary artery disease), S/P cardiac cath    · *1 - [facility]  *  Status: Hold For - Scheduling     Requested for: 29VOG2711   Ordered; For: CAD (coronary artery disease), S/P cardiac cath; Ordered By: Gustavo Li Performed:  Due: 44KAH7704  Care Summary provided  : Yes  Chest pain    · EKG/ECG- POC; Status:Complete;   Done: 76TCQ9818   Perform: In Office; [phone]; Last Updated By:Clark Romero; 12/1/2017 1:24:00 PM;Ordered; For:Chest pain; Ordered By:Giselle Case;  S/P cardiac cath    · Brilinta 90 MG Oral Tablet   Dispense: 0 Days ; #: Sufficient Tablet; Refill: 0;For: S/P cardiac cath; CRISTINE = N; Record; Last Updated By: Gustavo Li; 12/1/2017 1:42:36 PM    Discussion/Summary   Cardiology Discussion Summary Free Text Note Form [facility]:    1  CAD - s/p PCI to circumflex and OM  Continue ASA   He has been getting shortness of breath since the stent placement  May represent side effect of Brilinta as he was not getting it previously (even while at [facility])  Will DC Brilinta and begin Plavix  Continue Atorvastatin 80 mg daily  Lipid profile in 3 months  BING - Will follow up with Dr Pasquale Matson for possible CPAP titration Dyslipidemia - Atorvastatin 80 mg  Hypertension - Amlodipine  Chief Complaint   Chief Complaint Free Text Note Form: Veronica Bearden is here today for a followup  He states that he has intermittent chest pain  An EKG was performed  JW      History of Present Illness   Cardiology HPI Free Text Note Form [facility]: 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 for percutaneous intervention  He was loaded with Brilinta and underwent drug-eluting stent placement to left circumflex and OM 2 lesions  hospital discharge, he has been feeling shortness of breath with exertion  He did not have this prior to procedure  He denies any chest pain, palpitations, orthopnea or paroxysmal nocturnal dyspnea  He also denies any lower extremity edema  has smoked 3-4 cigarettes a day but has not smoked today  is taking atorvastatin 80 mg daily  Review of Systems   Cardiology Male ROS:         Cardiac: as noted in HPI  Skin: No complaints of nonhealing sores or skin rash  Genitourinary: No complaints of recurrent urinary tract infections, frequent urination at night, difficult urination, blood in urine, kidney stones, loss of bladder control, no kidney or prostate problems, no erectile dysfunction  Psychological: No complaints of feeling depressed, anxiety, panic attacks, or difficulty concentrating  General: lack of energy/fatigue  Respiratory: shortness of breath,-- cough/sputum-- and-- phlegm  Gastrointestinal: No complaints of liver problems, nausea, vomiting, heartburn, constipation, bloody stools, diarrhea, problems swallowing, adbominal pain, or rectal bleeding  Hematologic: No complaints of bleeding disorders, anemia, blood clots, or excessive brusing  Musculoskeletal: No complaints of arthritis, back pain, or painfull swelling  ROS Reviewed:    ROS reviewed  Active Problems   Problems    1  Arthritis (716 90) (M19 90)   2  CAD (coronary artery disease) (414 00) (I25 10)   3  Chest pain (786 50) (R07 9)   4  Diabetes (250 00) (E11 9)   5  Diastasis recti (728 84) (M62 08)   6  Diverticulitis of colon (562 11) (K57 32)   7  Gross hematuria (599 71) (R31 0)   8  H/O ventral hernia repair (V15 29) (Z98 890,Z87 19)   9  High blood pressure (401 9) (I10)   10  Hydronephrosis of left kidney (591) (N13 30)   11  Irregular heartbeat (427 9) (I49 9)   12  Left inguinal hernia (550 90) (K40 90)   13  Left lower quadrant abdominal wall mass (789 34) (R19 04)   14  Moderate pain (780 96) (R52)   15  Right inguinal hernia (550 90) (K40 90)   16  S/P cardiac cath (V45 89) (Z98 890)   17  Stomach ulcer (531 90) (K25 9)    Past Medical History   Problems    1  History of hydronephrosis (V13 09) (Z87 448)   2  History of sleep apnea (V13 89) (Z86 69)   3  History of varicose veins (V12 59) (Z86 79)   4  History of Meniscus tear (836 2) (S83 209A)   5  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)   6  History of Umbilical hernia (234 2) (K42 9)  Active Problems And Past Medical History Reviewed: The active problems and past medical history were reviewed and updated today  Surgical History   Problems    1  History of Colon Surgery  Surgical History Reviewed: The surgical history was reviewed and updated today  Family History   Mother    1  Family history of atrial fibrillation (V17 49) (Z82 49)   2   Family history of diabetes mellitus (V18 0) (Z83 3)   3  Family history of hypertension (V17 49) (Z82 49)   4  Family history of Parkinson's disease (V17 2) (Z82 0)   5  Family history of spinal stenosis (V17 89) (Z82 69)  Father    6  Family history of    7  Family history of aortic aneurysm (V17 49) (Z82 49)  Sibling    6  Family history of    5  Family history of cardiac arrest (V17 49) (Z82 49)   10  Family history of colorectal cancer (V16 0) (Z80 0)   11  Family history of diabetes mellitus (V18 0) (Z83 3)   12  Family history of peritonitis (V18 8) (Z83 1)  Brother    15  Family history of colon cancer (V16 0) (Z80 0)   14  Family history of diabetes mellitus (V18 0) (Z83 3)   15  Family history of lymphoma (V16 7) (Z80 2)   16  Family history of malignant neoplasm of thyroid (V16 8) (Z80 8)  Family History Reviewed: The family history was reviewed and updated today  Social History   Problems    · Current every day smoker (305 1) (F17 200)   · Denied: History of Drug use   · Moderate alcohol use   · Single  Social History Reviewed: The social history was reviewed and updated today  Current Meds    1  Adult Low Dose Aspirin 81 MG TABS; TAKE 1 TABLET DAILY; Therapy: (Recorded:2017) to Recorded   2  AmLODIPine Besylate 5 MG Oral Tablet; TAKE 1 TABLET DAILY AS DIRECTED; Therapy: (Recorded:2017) to Recorded   3  Atorvastatin Calcium 40 MG Oral Tablet; Take 1 tablet daily; Therapy: (Recorded:2017) to Recorded   4  Brilinta 90 MG Oral Tablet; TAKE 1 TABLET TWICE DAILY; Therapy: (Recorded:2017) to Recorded   5  BuPROPion HCl - 100 MG Oral Tablet; TAKE 150 MG Twice daily; Therapy: (Recorded:2017) to Recorded   6  Centrum Silver Oral Tablet; TAKE 1 TABLET DAILY; Therapy: (Recorded:2017) to Recorded   7  Clarinex 5 MG Oral Tablet; TAKE 1 TABLET DAILY; Therapy: (Recorded:2017) to Recorded   8   MG Oral Capsule;      Therapy: 80ACQ3931 to Recorded   9  Fish Oil CAPS; TAKE 1 CAPSULE Daily TDD:1000 MG; Therapy: (Recorded:24Nov2017) to Recorded   10  Nasonex 50 MCG/ACT Nasal Suspension; Therapy: 32ZGC4454 to Recorded   11  Nitrostat 0 3 MG Sublingual Tablet Sublingual; TAKE AS DIRECTED; Therapy: (Recorded:24Nov2017) to Recorded   12  Osteo Bi-Flex Adv Double St Oral Tablet; TAKE 1 TABLET DAILY AS DIRECTED; Therapy: 70FFY9630 to Recorded   13  RaNITidine HCl - 150 MG Oral Capsule; TAKE 1 CAPSULE AT BEDTIME; Therapy: (Recorded:24Nov2017) to Recorded   14  Vitamin C TABS; TAKE 1 TABLET DAILY AS DIRECTED; Therapy: (Recorded:24Nov2017) to Recorded   15  Vitamin D3 1000 UNIT Oral Tablet; TAKE 1 TABLET DAILY; Therapy: 78PVK2092 to Recorded  Medication List Reviewed: The medication list was reviewed and updated today  Allergies   Medication    1  Levaquin TABS   2  Sulfa Drugs    Vitals   Vital Signs    Recorded: 17WZB5895 01:21PM   Heart Rate 55, Apical   Systolic 212, LUE, Sitting   Diastolic 70, LUE, Sitting   BP CUFF SIZE Large   Height 5 ft 8 in   Weight 268 lb 6 4 oz   BMI Calculated 40 81   BSA Calculated 2 32   O2 Saturation 98, RA     Physical Exam        Constitutional - General appearance: No acute distress, well appearing and well nourished  Eyes - Conjunctiva and Sclera examination: Conjunctiva pink, sclera anicteric  Neck - Normal, no JVD   Pulmonary - Respiratory effort: No signs of respiratory distress  -- Auscultation of lungs: Clear to auscultation  Cardiovascular - Auscultation of heart: Normal rate and rhythm, normal S1 and S2, no murmurs  -- Pedal pulses: Normal, 2+ bilaterally  -- Examination of extremities for edema and/or varicosities: Normal        Abdomen - Soft  Musculoskeletal - Gait and station: Normal gait  Skin - Skin: Normal without rashes  Skin is warm and well perfused  Neurologic - Speech normal  No focal deficits        Psychiatric - Orientation to person, place, and time: Normal -- Mood and affect: Normal       Results/Data   Diagnostic Studies Reviewed Cardio:      Echocardiogram/ESTELA: Ejection fraction 50-60%  ECG Report:      Rhythm and rate:  normal sinus rhythm  P-waves:   the P wave is normal -- ME interval is normal       QRS: the QRS is normal      ST segment: the ST segments are normal       Future Appointments      Date/Time Provider Specialty Site   01/23/2018 02:00 PM ROSALINA Espinal   Urology [facility]     Signatures    Electronically signed by : Angeles Graham DO; Dec  1 2017  5:51PM EST                       (Author)

## 2017-12-14 ENCOUNTER — APPOINTMENT (OUTPATIENT)
Dept: CARDIAC REHAB | Facility: CLINIC | Age: 57
End: 2017-12-14
Payer: COMMERCIAL

## 2017-12-14 DIAGNOSIS — Z98.61 POSTSURGICAL PERCUTANEOUS TRANSLUMINAL CORONARY ANGIOPLASTY STATUS: ICD-10-CM

## 2017-12-14 PROCEDURE — 93797 PHYS/QHP OP CAR RHAB WO ECG: CPT

## 2017-12-18 ENCOUNTER — APPOINTMENT (OUTPATIENT)
Dept: CARDIAC REHAB | Facility: CLINIC | Age: 57
End: 2017-12-18
Payer: COMMERCIAL

## 2017-12-18 DIAGNOSIS — Z98.61 POSTSURGICAL PERCUTANEOUS TRANSLUMINAL CORONARY ANGIOPLASTY STATUS: ICD-10-CM

## 2017-12-18 PROCEDURE — 93798 PHYS/QHP OP CAR RHAB W/ECG: CPT

## 2017-12-20 ENCOUNTER — APPOINTMENT (OUTPATIENT)
Dept: CARDIAC REHAB | Facility: CLINIC | Age: 57
End: 2017-12-20
Payer: COMMERCIAL

## 2017-12-20 DIAGNOSIS — Z98.61 POSTSURGICAL PERCUTANEOUS TRANSLUMINAL CORONARY ANGIOPLASTY STATUS: ICD-10-CM

## 2017-12-20 PROCEDURE — 93798 PHYS/QHP OP CAR RHAB W/ECG: CPT

## 2017-12-22 ENCOUNTER — APPOINTMENT (OUTPATIENT)
Dept: CARDIAC REHAB | Facility: CLINIC | Age: 57
End: 2017-12-22
Payer: COMMERCIAL

## 2017-12-22 DIAGNOSIS — Z98.61 POSTSURGICAL PERCUTANEOUS TRANSLUMINAL CORONARY ANGIOPLASTY STATUS: ICD-10-CM

## 2017-12-22 PROCEDURE — 93798 PHYS/QHP OP CAR RHAB W/ECG: CPT

## 2017-12-27 ENCOUNTER — APPOINTMENT (OUTPATIENT)
Dept: CARDIAC REHAB | Facility: CLINIC | Age: 57
End: 2017-12-27
Payer: COMMERCIAL

## 2017-12-27 DIAGNOSIS — Z98.61 POSTSURGICAL PERCUTANEOUS TRANSLUMINAL CORONARY ANGIOPLASTY STATUS: ICD-10-CM

## 2017-12-27 PROCEDURE — 93798 PHYS/QHP OP CAR RHAB W/ECG: CPT

## 2017-12-29 ENCOUNTER — APPOINTMENT (OUTPATIENT)
Dept: CARDIAC REHAB | Facility: CLINIC | Age: 57
End: 2017-12-29
Payer: COMMERCIAL

## 2017-12-29 DIAGNOSIS — Z98.61 POSTSURGICAL PERCUTANEOUS TRANSLUMINAL CORONARY ANGIOPLASTY STATUS: ICD-10-CM

## 2017-12-29 PROCEDURE — 93798 PHYS/QHP OP CAR RHAB W/ECG: CPT

## 2018-01-06 ENCOUNTER — HOSPITAL ENCOUNTER (OUTPATIENT)
Dept: RADIOLOGY | Facility: HOSPITAL | Age: 58
Discharge: HOME | End: 2018-01-06
Attending: UROLOGY
Payer: COMMERCIAL

## 2018-01-06 DIAGNOSIS — R31.0 GROSS HEMATURIA: ICD-10-CM

## 2018-01-06 PROCEDURE — 74178 CT ABD&PLV WO CNTR FLWD CNTR: CPT

## 2018-01-06 RX ADMIN — IOHEXOL 100 ML: 350 INJECTION, SOLUTION INTRAVENOUS at 12:34

## 2018-01-10 NOTE — CASE MANAGEMENT
Pending authorization # [id number]  Karthik Morfin, RN Registered Nurse Signed   Case Management Date of Service: 11/24/2017  2:26 PM         []Hide copied text  [address]  [facility] in the [facility] by Jm Garcia for 2017  Network Utilization Review Department  Phone: [phone]; Fax [phone]  ATTENTION: The Network Utilization Review Department is now centralized for our 7 Facilities  Make a note that we have a new phone and fax numbers for our Department  Please call with any questions or concerns to [phone] and carefully follow the prompts so that you are directed to the right person  All voicemails are confidential  Fax any determinations, approvals, denials, and requests for initial or continue stay review clinical to [phone]  Due to HIGH CALL volume, it would be easier if you could please send faxed requests to expedite your requests and in part, help us provide discharge notifications faster      ======================================================================================     Initial Clinical Review     Admission: Date/Time/Statement: 11/23/17 @ 1346        Orders Placed This Encounter   Procedures    Inpatient Admission       Standing Status:   Standing       Number of Occurrences:   1       Order Specific Question:   Admitting Physician       Answer:   NENITA Mateo [address]       Order Specific Question:   Level of Care       Answer:   Med Surg [16]       Order Specific Question:   Estimated length of stay       Answer:   More than 2 Midnights       Order Specific Question:   Certification       Answer:   I certify that inpatient services are medically necessary for this patient for a duration of greater than two midnights   See H&P and MD Progress Notes for additional information about the patient's course of treatment       Presented to the ED @ [facility], transferred [facility] via EMS     Chief Complaint: Chest Pain     History of Illness:   Christiane Cheng is a 56 y  o  male patient who originally presented to the hospital on 11/22/2017 with a PMH of hypertension, dyslipidemia, obesity, sleep apnea on CPAP, diet controlled DM type 2, nicotine dependence who presents with left precordial pressure-like chest discomfort with associated left arm tingling and numbness   In the ED, patient was noted to be tachycardic and hypertensive in the EKG revealed lateral T-wave depressions  He was treated  with aspirin, IV heparin, and antihypertensive  His BP normalized  He was admitted to the hospital and place on telemetry   His telemetry revealed no arrhythmias    Serial troponins were negative   Hemoglobin A1c 6 5 and TSH normal  Cardiology was consulted and a cardiac catheterization was completed and showed severe mild circumflex stenosis   IV heparin was discontinued and patient was loaded with Brilinta 180mg and then 90 mg bid   He was transferred to [address] the care of Dr Andrew Chang for PCI      Vital Signs:            Temperature Pulse Respirations Blood Pressure SpO2   11/23/17 1324 11/23/17 1324 11/23/17 1324 11/23/17 1324 11/23/17 1324   98 6 °F (37 °C) (!) 52 20 136/67 94 %       Temp Source Heart Rate Source Patient Position - Orthostatic VS BP Location FiO2 (%)   11/23/17 1324 11/24/17 0740 11/23/17 1324 11/23/17 1324 --   Oral Monitor Sitting Left arm         Pain Score           11/23/17 1324           No Pain                Wt Readings from Last 1 Encounters:   11/24/17 122 kg (269 lb 2 9 oz)      Abnormal Labs/Diagnostic Test Results:    Magnesium 11/23/2017 2 2     POC Glucose 11/23/2017 155*    POC Glucose 11/23/2017 90     POC Glucose 11/24/2017 90     WBC 11/24/2017 6 91     RBC 11/24/2017 4 22     Hemoglobin 11/24/2017 14 4     Hematocrit 11/24/2017 41 7     MCV 11/24/2017 99*    MCH 11/24/2017 34 1     MCHC 11/24/2017 34 5     RDW 11/24/2017 13 6     MPV 11/24/2017 9 5     Platelets 40/93/3822 213  nRBC 11/24/2017 0     Neutrophils Relative 11/24/2017 55     Lymphocytes Relative 11/24/2017 31     Monocytes Relative 11/24/2017 10     Eosinophils Relative 11/24/2017 4     Basophils Relative 11/24/2017 0     Neutrophils Absolute 11/24/2017 3 72     Lymphocytes Absolute 11/24/2017 2 15     Monocytes Absolute 11/24/2017 0 69     Eosinophils Absolute 11/24/2017 0 29     Basophils Absolute 11/24/2017 0 03     Sodium 11/24/2017 140     Potassium 11/24/2017 3 9     Chloride 11/24/2017 110*    CO2 11/24/2017 22     Anion Gap 11/24/2017 8     BUN 11/24/2017 14     Creatinine 11/24/2017 1 10     Glucose 11/24/2017 113     Calcium 11/24/2017 8 7     eGFR 11/24/2017 75     Magnesium 11/24/2017 2 3     POC Glucose 11/24/2017 103          Past Medical/Surgical History:         Active Ambulatory Problems     Diagnosis Date Noted    Diabetes mellitus ([facility] [address] )      Hyperlipidemia      Hypertension      Sleep apnea      [facility]epsis [facility] 12/04/2016    Abscess, abdomen ([facility] 12/04/2016    Sigmoid diverticulitis 12/04/2016    Fatigue 12/04/2016    Elevated liver enzymes 12/04/2016    Polyarthralgia 12/04/2016    Vesicocolonic fistula 12/04/2016    Hydronephrosis 12/04/2016    NATALYA (acute kidney injury) ([facility] [facility] ) 12/04/2016    Anemia 12/04/2016    Diverticulitis of large intestine with abscess without bleeding 12/07/2016    ACS (acute coronary syndrome) ([facility] [address] [facility] ) 11/22/2017    Obesity 11/22/2017    Nicotine abuse 11/22/2017           Past Medical History:   Diagnosis Date    Anemia      Arthritis      Bundle branch block, right      CPAP (continuous positive airway pressure) dependence      Diabetes mellitus ([facility] [facility] )      Diverticulitis      Diverticulitis of large intestine with abscess without bleeding 12/7/2016    GERD (gastroesophageal reflux disease)      Hyperlipidemia      Hypertension      Nasal septal deformity      Renal disorder      Seasonal allergies      Sleep apnea         Admitting Diagnosis: ACS (acute coronary syndrome) ([facility] [address] ) [I24 9]     Age/Sex: 56 y o  male     Assessment/Plan: 1  Htn urgency with chest pain r/o acute coronary syndrome- noted to have tachycardia with lateral st depressions/SBP above 230/110 on arrival  Trop *2 negative  Given high pre-test probability cardiac cath was done instead of nuclear stress test  Noted with severe focal circumflex stenosis  No evidence of Lmain/proximal LAD  Plan to load with brillinta 180mg today than 90mg twice a day  Transfer for PCI on Friday if creatinine stable  2  Hld/elevated triglycerides- atorvastatin 80mg daily   3  Sleep apnea- CPA   4  Dm2- tight control  5  Nicotine dependence       Admission Orders:  Scheduled Meds:   amLODIPine 5 mg Oral Daily   vitamin C 250 mg Oral Daily   aspirin 81 mg Oral Daily   atorvastatin 40 mg Oral Daily With Dinner   cholecalciferol 2,000 Units Oral Daily   famotidine 20 mg Oral Daily   fish oil 1,000 mg Oral Daily   fluticasone 2 spray Each Nare Daily   insulin lispro 1-5 Units Subcutaneous TID With Meals   loratadine 10 mg Oral HS   multivitamin-minerals 1 tablet Oral Daily   nicotine 1 patch Transdermal Daily   ticagrelor 90 mg Oral Q12H [address]      Continuous Infusions:   sodium chloride 75 mL/hr Last Rate: 75 mL/hr (11/24/17 1104)      PRN Meds:   acetaminophen    aluminum-magnesium hydroxide-simethicone     NPO > procedure  Cardiac Cath  Accuchecks  TELM        Thank you,  [address] [facility] in the [facility] by Jm Garcia for 2017  Network Utilization Review Department  Phone: [phone]; Fax [phone]  ATTENTION: The Network Utilization Review Department is now centralized for our 7 Facilities  Make a note that we have a new phone and fax numbers for our Department  Please call with any questions or concerns to [phone] and carefully follow the prompts so that you are directed to the right person   All voicemails are confidential  Fax any determinations, approvals, denials, and requests for initial or continue stay review clinical to [phone]  Due to HIGH CALL volume, it would be easier if you could please send faxed requests to expedite your requests and in part, help us provide discharge notifications faster

## 2018-01-12 VITALS
HEIGHT: 68 IN | BODY MASS INDEX: 40.77 KG/M2 | WEIGHT: 269 LBS | SYSTOLIC BLOOD PRESSURE: 114 MMHG | DIASTOLIC BLOOD PRESSURE: 72 MMHG | HEART RATE: 72 BPM

## 2018-01-12 NOTE — PROCEDURES
Procedures by Juliana Martines MD at 2016 11:44 AM      Author:  Juliana Martines MD Service:  Interventional Radiology  Author Type:  Physician     Filed:  2016 11:47 AM Date of Service:  2016 11:44 AM Status:  Signed     :  Juliana Martines MD (Physician)            Procedures  Interventional Radiology Procedure Note    PATIENT NAME: Nelda Niño  : 1960  MRN: [id number]    Procedure: Left iliopsoas drain check, exchange, reposition    Estimated Blood Loss: minimal     Findings: Small residual collection  Existing drain exchange and repositioned into residual collection  Plan to discontinue flushing and follow-up tube check later this week for possible removal  No fistula is seen and there is no communication  with any other collection      Specimens: None    Complications:  None    Anesthesia: Local and IV fentanyl    Valdez Landrum MD     Date: 2016  Time: 11:44 AM           Received for:Antonio Paniagua MD  Dec 19 2016 11:47AM Eastern Standard Time

## 2018-01-13 NOTE — PROCEDURES
Procedures by Gia Lincoln MD at 11/22/2017   1:13 PM      Author:  Gia Lincoln MD Service:  Cardiology Author Type:  Physician    Filed:  11/22/2017  1:25 PM Date of Service:  11/22/2017  1:13 PM Status:  Signed    :  Gia Lincoln MD (Physician)         Procedures   Cardiac Catheterization Operative Report    Boy Manrique  [id number]  11/22/2017  Bita Bergeron    Indication: Abnormal ekg, chest pain  Dm2, htn, smoking  : Gia Lincoln MD  Contrast: 85cc of Visipaque  Procedure: L Heart Catheterization                     Ventriculogram                     Coronary Angiogram    Procedure Details  The risks, benefits, complications, treatment options, and expected outcomes were discussed with the patient  The patient and/or family concurred with the proposed plan, giving informed consent  Patient was brought to the cath lab after IV hydration was  begun and oral premedication was given  He was further sedated with fentanyl and midazolam  He was prepped and draped in the usual manner  Using the modified Seldinger access technique, a 5 [place] sheath was placed in the  radial arterys  Heparin, verapamil, and nitroglycerin were administered intra-arterial   A JR4 catheter was used to cannulate the right coronary artery  The JR4 catheter passed through the aortic valve and therefore a catheterization and ventriculogram  were performed  After the procedure was completed, sedation was stopped and the sheaths and catheters were all removed  Hemostasis was achieved with TR band    Findings:    Hemodynamics  LVEDP 10, no significant gradient pullback   Left Main Average caliber vessel which bifurcates to LAD and left left circumflex widely patent   RCA  Co-dominant average caliber vessel which supplies an average posterior descending artery   There is a 30-40% proximal RCA stenosis after which there is mild diffuse luminal  irregularities   LAD  Average caliber vessel which distally tapers to supply the inferior apex  Mild diffuse luminal irregularities   Circ  Large caliber vessel, co-dominant with a focal 70% stenosis mid-vessel at the level of om1 bifurcation  There is pre and post stenotic dilitation noted  LV  Normal LV size, EF55-60%, No focal wall motion abnormalities       Complications  None     Estimated Blood Loss:  Minimal         Complications:  None; patient tolerated the procedure well  Disposition: hemodynamically stable and PACU - hemodynamically stable           Condition: stable    A/P Severe mid-circumflex stenosis- likely culprit for ekg changes with elevated SBP         Normal filing pressures/EF  -- TR band 2 hours  -- transfer for PCI  -- load with brillinta 180mg tonight  -- IV fluids  -- blood pressure management  -- smoking cessation               Ladarius OGDEN    Nov 22 2017  1:26PM [facility] Standard Time

## 2018-01-14 VITALS
HEIGHT: 71 IN | DIASTOLIC BLOOD PRESSURE: 80 MMHG | SYSTOLIC BLOOD PRESSURE: 148 MMHG | HEART RATE: 60 BPM | WEIGHT: 277.5 LBS | BODY MASS INDEX: 38.85 KG/M2

## 2018-01-23 ENCOUNTER — ALLSCRIPTS OFFICE VISIT (OUTPATIENT)
Dept: OTHER | Facility: OTHER | Age: 58
End: 2018-01-23

## 2018-01-23 VITALS
SYSTOLIC BLOOD PRESSURE: 130 MMHG | HEART RATE: 55 BPM | OXYGEN SATURATION: 98 % | HEIGHT: 68 IN | DIASTOLIC BLOOD PRESSURE: 70 MMHG | BODY MASS INDEX: 40.68 KG/M2 | WEIGHT: 268.4 LBS

## 2018-01-23 LAB
BILIRUB UR QL STRIP: NORMAL
CLARITY UR: NORMAL
COLOR UR: YELLOW
GLUCOSE (HISTORICAL): NORMAL
HGB UR QL STRIP.AUTO: NORMAL
KETONES UR STRIP-MCNC: NORMAL MG/DL
LEUKOCYTE ESTERASE UR QL STRIP: NORMAL
NITRITE UR QL STRIP: NORMAL
PH UR STRIP.AUTO: 6.5 [PH]
PROT UR STRIP-MCNC: NORMAL MG/DL
SP GR UR STRIP.AUTO: 1
UROBILINOGEN UR QL STRIP.AUTO: NORMAL

## 2018-01-24 NOTE — PROCEDURES
Assessment    1  Gross hematuria (599 71) (R31 0)    Plan  Gross hematuria    · Urine Dip Non-Automated- POC; Status:Complete - Retrospective By Protocol  Authorization;   Done: [id number] 01:56PM   Performed: In Office; SELENE:[id number]; Last Updated By:Ying Gonzalez; 1/23/2018 1:56:34 PM;Ordered; For:Gross hematuria; Ordered By:Heather Gaytan Bound;   · Urology Follow Up Evaluation and Treatment  Follow-up in 6 months with Dr Dominic Hill at  the [address] [place] [address] office  Status: Hold For - Scheduling  Requested for: [id number]   Ordered; For: Gross hematuria; Ordered By: Yris Khan Performed:  Due: 39YVX4923    Discussion/Summary  Discussion Summary:   Ranjan Hawley returns today for cystoscopy for his gross hematuria  I was happy to be able to show him that his CT renal protocol showed no renal masses, no ureteral masses, and no bladder masses or other malignant cause of his gross hematuria  He is likely having bleeding from his enlarged prostate and prostatic varices which is a benign cause  We discussed the use of 5 alpha reductase inhibitors to decrease prostatic size and prostatic vascularity we also discussed Transurethral resection of prostate in the indications for this procedure namely recurrent urinary tract infections, uncontrolled LUTS, bladder stones, bleeding from the prostate which is recurrent causing clot retention, and renal failure due to bladder outlet obstruction  He needs no absolute criteria for bladder outlet surgery at this time and he wishes to consider his options regarding the use of 5 alpha reductase inhibitor in the future  Regarding his history of ureteral dissection as a part of his previous surgery for perforated diverticulitis he appears to have no long-term sequela with normal split renal function on his renal renal scan and no persistent hydronephrosis on a follow-up CT scan  This is good news      Plan: He will follow up in 6 months for a discussion of his lower urinary tract symptoms and for further discussion of finasteride addition versus surgical procedure for bladder outlet obstruction  Medication SE Review and Pt Understands Tx: The treatment plan was reviewed with the patient/guardian  The patient/guardian understands and agrees with the treatment plan   Understands and agrees with treatment plan: The treatment plan was reviewed with the patient/guardian  The patient/guardian understands and agrees with the treatment plan   Counseling Documentation With Imm: The patient was counseled regarding diagnostic results, instructions for management, risk factor reductions, prognosis, impressions, risks and benefits of treatment options, importance of compliance with treatment  Chief Complaint  Chief Complaint Free Text Note Form: Patient presents for cysto; gross hematuria      History of Present Illness  HPI: Danyelle Esparza returns today for cystoscopy as part of his gross hematuria workup  Informed consent for cystoscopy was obtained and he wishes to proceed with this test  Urine dip analysis today showed only trace blood negative for leukocytes nitrites protein ketones and glucose  His urologic review of systems today is negative for dysuria, incontinence, he does occasionally have urinary hesitancy, he has no gross hematuria he has no urgency of urination, he has nocturia 0-1 times per night, he feels empty after voiding and stream quality is âfairâ his AUA symptom score today is a total score of 0 indicating mild to no symptoms and he is delighted with his urinary quality of life at today's visit  His CT scan renal protocol was negative for any malignant causes of gross hematuria  Review of Systems  Complete-Male Urology:   Constitutional: No fever or chills, feels well, no tiredness, no recent weight gain or weight loss  Respiratory: No complaints of shortness of breath, no wheezing, no cough, no SOB on exertion, no orthopnea or PND     Cardiovascular: No complaints of slow heart rate, no fast heart rate, no chest pain, no palpitations, no leg claudication, no lower extremity  Gastrointestinal: No complaints of abdominal pain, no constipation, no nausea or vomiting, no diarrhea or bloody stools  Genitourinary: Empty sensation and stream quality fair, but no dysuria, no hematuria, no incontinence and no feelings of urinary urgency    The patient presents with complaints of urinary hesitancy (sometimes)  The patient presents with complaints of no nocturia (0-1 time)   Musculoskeletal: No complaints of arthralgia, no myalgias, no joint swelling or stiffness, no limb pain or swelling  Integumentary: No complaints of skin rash or skin lesions, no itching, no skin wound, no dry skin  Hematologic/Lymphatic: No complaints of swollen glands, no swollen glands in the neck, does not bleed easily, no easy bruising  Neurological: No compliants of headache, no confusion, no convulsions, no numbness or tingling, no dizziness or fainting, no limb weakness, no difficulty walking  ROS Reviewed:   ROS reviewed  Active Problems    1  Arthritis (716 90) (M19 90)   2  CAD (coronary artery disease) (414 00) (I25 10)   3  Chest pain (786 50) (R07 9)   4  Diabetes (250 00) (E11 9)   5  Diastasis recti (728 84) (M62 08)   6  Diverticulitis of colon (562 11) (K57 32)   7  Dyslipidemia (272 4) (E78 5)   8  Exertional shortness of breath (786 05) (R06 02)   9  Gross hematuria (599 71) (R31 0)   10  H/O ventral hernia repair (V15 29) (Z98 890,Z87 19)   11  High blood pressure (401 9) (I10)   12  History of PTCA (V45 82) (Z98 61)   13  Hydronephrosis of left kidney (591) (N13 30)   14  Irregular heartbeat (427 9) (I49 9)   15  Left inguinal hernia (550 90) (K40 90)   16  Left lower quadrant abdominal wall mass (789 34) (R19 04)   17  Moderate pain (780 96) (R52)   18  Right inguinal hernia (550 90) (K40 90)   19  S/P cardiac cath (V45 89) (Z98 890)   20   Stomach ulcer (531 90) (K25 9)    Past Medical History    1  History of hydronephrosis (V13 09) (Z87 448)   2  History of sleep apnea (V13 89) (Z86 69)   3  History of varicose veins (V12 59) (Z86 79)   4  History of Meniscus tear (836 2) (S83 209A)   5  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)   6  History of Umbilical hernia (820 9) (K42 9)  Active Problems And Past Medical History Reviewed: The active problems and past medical history were reviewed and updated today  Surgical History    1  History of Colon Surgery  Surgical History Reviewed: The surgical history was reviewed and updated today  Family History  Mother    1  Family history of atrial fibrillation (V17 49) (Z82 49)   2  Family history of diabetes mellitus (V18 0) (Z83 3)   3  Family history of hypertension (V17 49) (Z82 49)   4  Family history of Parkinson's disease (V17 2) (Z82 0)   5  Family history of spinal stenosis (V17 89) (Z82 69)  Father    6  Family history of    7  Family history of aortic aneurysm (V17 49) (Z82 49)  Sibling    6  Family history of    5  Family history of cardiac arrest (V17 49) (Z82 49)   10  Family history of colorectal cancer (V16 0) (Z80 0)   11  Family history of diabetes mellitus (V18 0) (Z83 3)   12  Family history of peritonitis (V18 8) (Z83 1)  Brother    15  Family history of colon cancer (V16 0) (Z80 0)   14  Family history of diabetes mellitus (V18 0) (Z83 3)   15  Family history of lymphoma (V16 7) (Z80 2)   16  Family history of malignant neoplasm of thyroid (V16 8) (Z80 8)  Family History Reviewed: The family history was reviewed and updated today  Social History    · Current every day smoker (305 1) (F17 200)   · Denied: History of Drug use   · Moderate alcohol use   · Single  Social History Reviewed: The social history was reviewed and updated today  The social history was reviewed and is unchanged  Current Meds   1   Adult Low Dose Aspirin 81 MG TABS; TAKE 1 TABLET DAILY; Therapy: (Recorded:24Nov2017) to Recorded   2  AmLODIPine Besylate 5 MG Oral Tablet; TAKE 1 TABLET DAILY AS DIRECTED; Therapy: (Recorded:24Nov2017) to Recorded   3  Atorvastatin Calcium 40 MG Oral Tablet; Take 1 tablet daily; Therapy: (Recorded:01Dec2017) to Recorded   4  BuPROPion HCl - 100 MG Oral Tablet; TAKE 150 MG Twice daily; Therapy: (Recorded:01Dec2017) to Recorded   5  Centrum Silver Oral Tablet; TAKE 1 TABLET DAILY; Therapy: (Recorded:24Nov2017) to Recorded   6  Clarinex 5 MG Oral Tablet; TAKE 1 TABLET DAILY; Therapy: (Recorded:24Nov2017) to Recorded   7  Clopidogrel Bisulfate 75 MG Oral Tablet; Take 1 tablet daily; Therapy: 86MBP8855 to (Nataliia Barrios)  Requested for: 00NAB4781; Last   Rx:01Dec2017 Ordered   8   MG Oral Capsule; Therapy: 70PQK9107 to Recorded   9  Fish Oil CAPS; TAKE 1 CAPSULE Daily TDD:1000 MG; Therapy: (Recorded:24Nov2017) to Recorded   10  Nasonex 50 MCG/ACT Nasal Suspension; Therapy: 03VOT4785 to Recorded   11  Nitrostat 0 3 MG Sublingual Tablet Sublingual; TAKE AS DIRECTED; Therapy: (Recorded:24Nov2017) to Recorded   12  Osteo Bi-Flex Adv Double St Oral Tablet; TAKE 1 TABLET DAILY AS DIRECTED; Therapy: 91WOU8195 to Recorded   13  RaNITidine HCl - 150 MG Oral Capsule; TAKE 1 CAPSULE AT BEDTIME; Therapy: (Recorded:24Nov2017) to Recorded   14  Vitamin C TABS; TAKE 1 TABLET DAILY AS DIRECTED; Therapy: (Recorded:24Nov2017) to Recorded   15  Vitamin D3 1000 UNIT Oral Tablet; TAKE 1 TABLET DAILY; Therapy: 57RPT6819 to Recorded  Medication List Reviewed: The medication list was reviewed and updated today  Allergies    1  Levaquin TABS   2   Sulfa Drugs    Vitals  Vital Signs    Recorded: 46MMJ5339 01:45PM   Heart Rate 82   Systolic 658   Diastolic 88   Height 5 ft 8 in   Weight 269 lb 8 oz   BMI Calculated 40 98   BSA Calculated 2 32     Physical Exam    Constitutional   General appearance: No acute distress, well appearing and well nourished  Head and Face   Head and face: Normal     Eyes   Conjunctiva and lids: No erythema, swelling or discharge  Pupils and irises: Equal, round, reactive to light  Ears, Nose, Mouth, and Throat   External inspection of ears and nose: Normal     Hearing: Normal     Lips, teeth, and gums: Normal, good dentition  Neck   Neck: Supple, symmetric, trachea midline, no masses  Pulmonary   Respiratory effort: No increased work of breathing or signs of respiratory distress  Cardiovascular   Examination of extremities for edema and/or varicosities: Normal     Chest   Chest: Normal     Abdomen   Abdomen: Abnormal   Lower midline scars present consistent with history of sigmoid colon resection  Examination for hernias: No hernias appreciated  Genitourinary   Scrotum contents: Normal size, no masses  Epididymis: Normal, no masses  Testes: Normal testes, no masses  Urethral meatus: Normal, no lesions  Penis: Normal, no lesions  Lymphatic   Palpation of lymph nodes in groin: No lymphadenopathy  Palpation of lymph nodes in other areas: No lymphadenopathy  Musculoskeletal   Gait and station: Normal     Inspection/palpation of digits and nails: Normal without clubbing or cyanosis  Inspection/palpation of joints, bones, and muscles: Normal     Stability: Normal     Muscle strength/tone: Normal     Skin   Skin and subcutaneous tissue: Normal without rashes or lesions  Neurologic   Cranial nerves: Cranial nerves 2-12 intact  Cortical function: Normal mental status  Coordination: Normal finger to nose and heel to shin  Psychiatric   Judgment and insight: Normal     Orientation to person, place and time: Normal     Recent and remote memory: Intact      Mood and affect: Normal        Results/Data  Urine Dip Non-Automated- POC [id number] 01:56PM Apple Stanton     Test Name Result Flag Reference   Color Yellow     Clarity Transparent     Leukocytes -     Nitrite -     Blood trace Bilirubin -     Urobilinogen -     Protein -     Ph 6 5     Specific Gravity 1 005     Ketone -     Glucose -       AUA Symptom Score 23Jan2018 01:44PM User, Buddy     Test Name Result Flag Reference   AUA Symptom Score (for prostate disease) 0     Incomplete emptying: Not at all (0)  Frequency: Not at all (0)  Intermittency: Not at all (0)  Urgency: Not at all (0)  Weak-stream: Not at all (0)  Straining: Not at all (0)  Nocturia: Not at all (0)   AUA Symptom Score (for prostate disease) - Quality of Life Due to Urinary Symptoms Delighted     AUA Symptom Score (for prostate disease) - Score Category N/A       CT RENAL PROTOCOL [id number] 11:31AM [facility] Order Number: [id number]   Performing Comments: [facility]   Please schedule CT scan abdomen pelvis with and without contrast with delayed images for characterization of the collecting system (CT urogram)  - Patient Instructions: FAST 3 HOURS PRIOR   ALLOWED CLEAR LIQUIDS ONLY   To reschedule this appointment, please contact Central Scheduling at [phone]  Test Name Result Flag Reference   CT RENAL PROTOCOL (Report)     CT ABDOMEN AND PELVIS WITH AND WITHOUT IV CONTRAST     INDICATION: Hematuria  COMPARISON: 12/13/2016  TECHNIQUE: CT of the kidneys was performed without intravenous contrast  Dynamic postcontrast CT evaluation of the abdomen and pelvis was performed in both nephrographic and delayed phases after the administration of intravenous contrast  Reformatted    images were created in axial, sagittal, and coronal planes  Radiation dose length product (DLP) for this visit: 3998 37 mGy-cm   This examination, like all CT scans performed in the [facility], was performed utilizing techniques to minimize radiation dose exposure, including the use of    iterative reconstruction and automated exposure control       IV Contrast: 100 mL of iohexol (OMNIPAQUE)        Enteric Contrast:        FINDINGS: ABDOMEN     RIGHT KIDNEY AND URETER:   No solid renal mass  No detectable ureteral mass  No hydronephrosis or hydroureter  No urinary tract calculi  No perinephric collection  LEFT KIDNEY AND URETER:   No solid renal mass  No detectable ureteral mass  No hydronephrosis or hydroureter  No urinary tract calculi  No perinephric collection  URINARY BLADDER:   No bladder wall mass  No calculi  LUNG BASES: Unremarkable  LIVER/BILIARY TREE: Unremarkable  GALLBLADDER: No calcified gallstones  No pericholecystic inflammatory change  SPLEEN: Unremarkable  PANCREAS: Unremarkable  ADRENAL GLANDS: Unremarkable  STOMACH, BOWEL AND APPENDIX: Colonic diverticulosis is present  ABDOMINOPELVIC CAVITY: No ascites  No free intraperitoneal air  No lymphadenopathy  VESSELS: Unremarkable for patient's age  PELVIS     REPRODUCTIVE ORGANS: Unremarkable for patient's age  ABDOMINAL WALL/INGUINAL REGIONS: Unremarkable  OSSEOUS STRUCTURES: No acute fracture or destructive osseous lesion  IMPRESSION:     No CT abnormality identified to account for hematuria  Workstation performed: [id number]     Signed by: Vesta East MD   1/8/18     Procedure    Procedure: Indications for the procedure include hematuria  Risks, benefits, alternatives, risk of bleeding, infection risks and possible injury to the urethra, bladder and/or ureters were discussed with the patient  Written consent was obtained prior to the procedure and is detailed in the patient's record  Pre-op evaluation: urinalysis was performed with abnormal results and Hematuria on urine dip  Anesthesia: the urethra was lubricated with 2% lidocaine gel  Procedure Note:    The patient was placed in the supine position  The patient was prepped and draped in the usual sterile fashion using betadine     The periurethral area was exposed and a lubricated 16 [place] flexible cystoscope was introduced into the urethral meatus  The urethra was normal  The cystoscope was advanced to the urethrovesical junction and the bladder was distended with saline  The prostate was visualized at the proximal urethra and bladder neck and the prostate appeared abnormal and Bilateral lateral hypertrophy of the prostate is noted with indentation of the bladder neck and projection of the right and left lateral lobes of the prostate into the body of the bladder  There is no median lobe of the prostate  All regions of the bladder were systematically inspected and the bladder appeared abnormal and Mild to moderate trabeculation of bladder with associated saccules and cellules is noted indicating bladder outlet obstruction  Post-procedure: the bladder was drained and the cystoscope was removed the patient tolerated the procedure well  Complications: none  The patient was instructed to notify a provider for bleeding and signs and symptoms of a urinary tract infection  Prepped and draped in usual fashion in the supine position, urethra is normal, intact sphincter mechanism, normal verumontanum, bilateral hypertrophy of the prostatic lobes is noted with projection the prostate into the bladder without a median lobe  There are no bladder masses lesions or defects or stones, ureteral orifices are orthotopic and effluxing clear yellow urine these findings are confirmed on retroflexed view        Signatures   Electronically signed by : ROSALIAN Martin ; Jan 23 2018  2:46PM EST                       (Author)

## 2018-03-06 ENCOUNTER — APPOINTMENT (OUTPATIENT)
Dept: RADIOLOGY | Facility: CLINIC | Age: 58
End: 2018-03-06
Payer: COMMERCIAL

## 2018-03-06 ENCOUNTER — OFFICE VISIT (OUTPATIENT)
Dept: OBGYN CLINIC | Facility: CLINIC | Age: 58
End: 2018-03-06
Payer: COMMERCIAL

## 2018-03-06 VITALS
SYSTOLIC BLOOD PRESSURE: 151 MMHG | HEART RATE: 52 BPM | HEIGHT: 68 IN | WEIGHT: 270.6 LBS | BODY MASS INDEX: 41.01 KG/M2 | DIASTOLIC BLOOD PRESSURE: 70 MMHG

## 2018-03-06 DIAGNOSIS — M25.562 PAIN IN BOTH KNEES, UNSPECIFIED CHRONICITY: Primary | ICD-10-CM

## 2018-03-06 DIAGNOSIS — M17.0 PRIMARY OSTEOARTHRITIS OF KNEES, BILATERAL: ICD-10-CM

## 2018-03-06 DIAGNOSIS — M25.561 PAIN IN BOTH KNEES, UNSPECIFIED CHRONICITY: Primary | ICD-10-CM

## 2018-03-06 PROBLEM — G89.29 CHRONIC PAIN OF BOTH KNEES: Status: ACTIVE | Noted: 2018-03-06

## 2018-03-06 PROCEDURE — 20610 DRAIN/INJ JOINT/BURSA W/O US: CPT | Performed by: ORTHOPAEDIC SURGERY

## 2018-03-06 PROCEDURE — 99204 OFFICE O/P NEW MOD 45 MIN: CPT | Performed by: ORTHOPAEDIC SURGERY

## 2018-03-06 PROCEDURE — 73562 X-RAY EXAM OF KNEE 3: CPT

## 2018-03-06 RX ORDER — TRIAMCINOLONE ACETONIDE 40 MG/ML
40 INJECTION, SUSPENSION INTRA-ARTICULAR; INTRAMUSCULAR
Status: COMPLETED | OUTPATIENT
Start: 2018-03-06 | End: 2018-03-06

## 2018-03-06 RX ORDER — BUPIVACAINE HYDROCHLORIDE 5 MG/ML
6 INJECTION, SOLUTION EPIDURAL; INTRACAUDAL
Status: COMPLETED | OUTPATIENT
Start: 2018-03-06 | End: 2018-03-06

## 2018-03-06 RX ORDER — CLOPIDOGREL BISULFATE 75 MG/1
TABLET ORAL
Refills: 1 | COMMUNITY
Start: 2018-02-25 | End: 2018-05-31 | Stop reason: SDUPTHER

## 2018-03-06 RX ADMIN — TRIAMCINOLONE ACETONIDE 40 MG: 40 INJECTION, SUSPENSION INTRA-ARTICULAR; INTRAMUSCULAR at 09:11

## 2018-03-06 RX ADMIN — BUPIVACAINE HYDROCHLORIDE 6 ML: 5 INJECTION, SOLUTION EPIDURAL; INTRACAUDAL at 09:11

## 2018-03-06 NOTE — PROGRESS NOTES
Assessment/Plan:  1  Pain in both knees, unspecified chronicity  XR knee 3 vw left non injury    XR knee 3 vw right non injury   2  Primary osteoarthritis of knees, bilateral       Patient is a 56-year-old male here for evaluation of left worse than right activity-related post activity-related knee pain  After history, clinical exam, and review of his x-rays he is experiencing pain and symptoms from his underlying osteoarthritis with the left worse than the right however his x-rays demonstrate his right is worse than the left  His pain is most consistent with left knee patellofemoral osteoarthritis and we discussed this here in the office  He is on Plavix and aspirin due to her recent PCI that occurred in November of 2017 and needs to continue to be able to be mobile for cardiac rehab and his weight loss efforts  With this in mind I have given him a left knee steroid injection here today  We discussed the risks and benefits of this  After the risk benefit discussion he tolerated the procedure well  I advised that he may take Tylenol extra-strength for any breakthrough pain and discomfort he may be having  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  We discussed activity modification and proper lifting mechanics  I advised him to continue his weight loss efforts as well as advised him on low-impact activities  Like to see him back in approximately 4 months time pending the efficacy of the above injection  If he does not receive adequate pain relief from his left knee injection we can consider viscosupplementation injections  No new x-rays are needed as next visit  The patient may call the office at anytime if any questions or concerns should arise      Large joint arthrocentesis  Date/Time: 3/6/2018 9:11 AM  Consent given by: patient  Site marked: site marked  Timeout: Immediately prior to procedure a time out was called to verify the correct patient, procedure, equipment, support staff and site/side marked as required   Supporting Documentation  Indications: pain   Procedure Details  Location: knee - L knee  Preparation: Patient was prepped and draped in the usual sterile fashion  Needle size: 20 G  Ultrasound guidance: no  Approach: anterolateral  Medications administered: 6 mL bupivacaine (PF) 0 5 %; 40 mg triamcinolone acetonide 40 mg/mL    Patient tolerance: patient tolerated the procedure well with no immediate complications  Dressing:  Sterile dressing applied          Subjective:  Left knee pain, right knee stiffness  Patient ID: Samantha Mills is a 56 y o  male  HPI  Patient is pleasant 65-year-old male that goes by "Bud" and is a that in the area locally  He presents today with complaint of left knee pain and right knee stiffness  He states he did have a surgical arthroscopy of his right knee back in 1984 for meniscus tear  He denies any surgeries on the left knee  He currently states his right knee is not much of a problem it is just stiff on occasion and does not cause significant pain  The left knee is more painful and developed a sharp pain over the lateral portion of the knee that radiates diffusely  The pain starts on the anterior lateral portion of the knee and is exacerbated with activity, going up and down steps, getting off a low seated chair, and intensifies after periods of longer activity such as his cardiac rehab  The pain is 5/10 on the pain scale and keeps him from doing his exercises at the gym  He denies any instability  He states he does have some stiffness in his left knee that ultimately resolved on his own after activity  He denies any pain in the right knee only reports stiffness mostly in the morning  He denies swelling in bilateral knees currently  He denies mechanical symptoms in the bilateral knees    He cannot take anti-inflammatories due to his use of Plavix and aspirin from a recent PCI back in November 2017  He has not tried any other forms of conservative measures of treatment  Review of Systems   Constitutional: Positive for activity change  HENT: Negative  Eyes: Negative  Respiratory: Negative  Cardiovascular: Positive for palpitations and leg swelling  Gastrointestinal: Negative  Endocrine: Negative  Musculoskeletal: Positive for arthralgias, joint swelling and myalgias  Skin: Positive for rash  Neurological: Positive for numbness  Psychiatric/Behavioral: Negative  Past Medical History:   Diagnosis Date    Anemia     Arthritis     Bundle branch block, right     CPAP (continuous positive airway pressure) dependence     Diabetes mellitus (HCC)     borderline, controlled with diet and activity    Diverticulitis     Diverticulitis of large intestine with abscess without bleeding 12/7/2016    GERD (gastroesophageal reflux disease)     Hyperlipidemia     Hypertension     Nasal septal deformity     Renal disorder     Seasonal allergies     Sleep apnea     wears c-pap       Past Surgical History:   Procedure Laterality Date    COLON SIGMOID RESECTION N/A 12/16/2016    Procedure: RESECTION COLON SIGMOID;  Surgeon: Sandro Galindo MD;  [facility];  Service:     COLON SIGMOID RESECTION LAPAROSCOPIC N/A 12/16/2016    Procedure: RESECTION COLON SIGMOID LAPAROSCOPIC:CONVERTED TO [email or web address];  Surgeon: Sandro Galindo MD;  [facility];  Service:     COLON SURGERY      COLONOSCOPY N/A 10/6/2016    Procedure: COLONOSCOPY;  Surgeon: Herson Lamb MD;  Location: [place] [address] [facility]; Service:    Clmeredithm Salinas CYSTOSCOPY W/ RETROGRADES Left 2/3/2017    Procedure: CYSTOSCOPY WITH BILATERAL RETROGRADES, LEFT STENT REMOVAL;  Surgeon: Mary Lara MD;  Location: [address] [place] [address];  Service:     ESOPHAGOGASTRODUODENOSCOPY N/A 10/6/2016    Procedure: ESOPHAGOGASTRODUODENOSCOPY (EGD); Surgeon: Herson Lamb MD;  Location: [place] [address] [facility];   Service:    [address] [facility]  KNEE ARTHROSCOPY W/ MENISCECTOMY      MD CYSTOURETHROSCOPY,URETER CATHETER Left 12/4/2016    Procedure: CYSTOSCOPY RETROGRADE PYELOGRAM WITH INSERTION STENT URETERAL;  Surgeon: Lizandro Ramirez MD;  Location: [facility];  Service: Urology    VARICOSE VEIN SURGERY         Family History   Problem Relation Age of Onset    Diabetes Brother     Thyroid cancer Brother     Osteoarthritis Mother     Atrial fibrillation Mother     Aortic aneurysm Father        Social History     Occupational History    Not on file  Social History Main Topics    Smoking status: Former Smoker     Packs/day: 0 50     Years: 37 00     Types: Cigarettes     Quit date: 12/9/2017    Smokeless tobacco: Never Used      Comment: patient refused for now    Alcohol use 1 2 oz/week     2 Glasses of wine per week      Comment: last drink was yesterday       Drug use: No    Sexual activity: No         Current Outpatient Prescriptions:     amLODIPine (NORVASC) 5 mg tablet, Take 5 mg by mouth daily, Disp: , Rfl:     Ascorbic Acid (VITAMIN C) 100 MG tablet, Take by mouth, Disp: , Rfl:     aspirin 81 MG tablet, Take by mouth, Disp: , Rfl:     atorvastatin (LIPITOR) 40 mg tablet, Take 1 tablet by mouth daily with dinner, Disp: 30 tablet, Rfl: 4    buPROPion (WELLBUTRIN XL) 150 mg 24 hr tablet, Take 1 tablet by mouth daily, Disp: 60 tablet, Rfl: 0    Cholecalciferol (VITAMIN D3) 1000 units CAPS, Take by mouth, Disp: , Rfl:     clopidogrel (PLAVIX) 75 mg tablet, , Disp: , Rfl: 1    desloratadine (CLARINEX) 5 MG tablet, Take 5 mg by mouth daily at bedtime  , Disp: , Rfl:     Multiple Vitamin (MULTIVITAMIN) capsule, Take 1 capsule by mouth daily, Disp: , Rfl:     nitroglycerin (NITROSTAT) 0 3 mg SL tablet, Place 0 3 mg under the tongue every 5 (five) minutes as needed for chest pain, Disp: , Rfl:     Omega-3 Fatty Acids (FISH OIL) 1,000 mg, Take by mouth, Disp: , Rfl:     ranitidine (ZANTAC) 150 MG capsule, Take 150 mg by mouth every evening, Disp: , Rfl:     glucosamine-chondroitin 500-400 MG tablet, Take 1 tablet by mouth daily, Disp: , Rfl:     nicotine (NICODERM CQ) 14 mg/24hr TD 24 hr patch, Place 1 patch on the skin daily Apply a new patch every 24 hours to a clean, dry, hairless site on the upper arm or hip , Disp: 28 patch, Rfl: 0    ticagrelor (BRILINTA) 90 MG, Take 1 tablet by mouth every 12 (twelve) hours, Disp: 60 tablet, Rfl: 11    Triamcinolone Acetonide 55 MCG/ACT AERO, into each nostril as needed, Disp: , Rfl:     Allergies   Allergen Reactions    Levaquin [Levofloxacin In D5w] Swelling     Knee swelling    Sulfa Antibiotics GI Intolerance       Objective:  Vitals:    03/06/18 0818   BP: 151/70   Pulse: (!) 52       Body mass index is 41 14 kg/m²  Right Knee Exam     Tenderness   The patient is experiencing tenderness in the medial joint line and patella  Range of Motion   Extension: 0   Flexion: 120     Tests   Juana:  Medial - negative Lateral - negative  Drawer:       Anterior - negative    Posterior - negative  Varus: negative  Valgus: negative  Patellar Apprehension: negative    Other   Erythema: absent  Scars: absent  Sensation: normal  Pulse: present  Swelling: mild (Bilateral pretibial distal)  Other tests: no effusion present    Comments:  Crepitance on AROM and PROM  +PFG  No increased warmth of knee  Knee is stable at 0, 30, 90 degrees         Left Knee Exam     Tenderness   The patient is experiencing tenderness in the lateral joint line and patella      Range of Motion   Extension: 0   Flexion: 120     Tests   Juana:  Medial - negative Lateral - negative  Drawer:       Anterior - negative     Posterior - negative  Varus: negative  Valgus: negative  Patellar Apprehension: negative    Other   Erythema: absent  Scars: absent  Sensation: normal  Pulse: present  Swelling: mild (Bilateral pretibial distal)  Effusion: no effusion present    Comments:  Crepitance on AROM and PROM  +PFG  No increased warmth of knee  Knee is stable at 0, 30, 90 degrees          Observations   Left Knee   Negative for effusion  Right Knee   Negative for effusion  Physical Exam   Constitutional: He is oriented to person, place, and time  He appears well-developed and well-nourished  HENT:   Head: Atraumatic  Eyes: EOM are normal    Neck: Neck supple  Cardiovascular: Normal rate  Pulmonary/Chest: Effort normal    Abdominal: Soft  Musculoskeletal:        Right knee: He exhibits no effusion  Left knee: He exhibits no effusion  See orthopedic exam   Neurological: He is alert and oriented to person, place, and time  Skin: Skin is warm and dry  Psychiatric: He has a normal mood and affect  Nursing note and vitals reviewed  I have personally reviewed pertinent films in PACS  X-rays of the bilateral knee obtained here in the office today demonstrate a right knee with severe osteoarthritis in all 3 compartments with near bone-on-bone contact medially  Sclerosis, osteophyte formation diffusely  There is early tibial subluxation as well  Left knee demonstrates moderate left knee osteoarthritis mostly contained within the patellofemoral joint  There is sclerosis and peripheral osteophyte formation  No lytic or blastic lesions appreciated  No fracture appreciated  Leslie CEBALLOS    Division of Adult Reconstruction  Department of [facility]

## 2018-05-01 ENCOUNTER — OFFICE VISIT (OUTPATIENT)
Dept: CARDIOLOGY CLINIC | Facility: CLINIC | Age: 58
End: 2018-05-01
Payer: COMMERCIAL

## 2018-05-01 VITALS
OXYGEN SATURATION: 97 % | HEIGHT: 68 IN | SYSTOLIC BLOOD PRESSURE: 142 MMHG | BODY MASS INDEX: 41.37 KG/M2 | DIASTOLIC BLOOD PRESSURE: 58 MMHG | WEIGHT: 273 LBS | HEART RATE: 68 BPM

## 2018-05-01 DIAGNOSIS — I50.33 ACUTE ON CHRONIC DIASTOLIC HEART FAILURE (HCC): ICD-10-CM

## 2018-05-01 DIAGNOSIS — I10 ESSENTIAL HYPERTENSION: ICD-10-CM

## 2018-05-01 DIAGNOSIS — I25.10 CORONARY ARTERY DISEASE INVOLVING NATIVE HEART WITHOUT ANGINA PECTORIS, UNSPECIFIED VESSEL OR LESION TYPE: ICD-10-CM

## 2018-05-01 DIAGNOSIS — R06.02 EXERTIONAL SHORTNESS OF BREATH: Primary | ICD-10-CM

## 2018-05-01 DIAGNOSIS — E78.2 MIXED HYPERLIPIDEMIA: ICD-10-CM

## 2018-05-01 DIAGNOSIS — I49.3 ASYMPTOMATIC PVCS: ICD-10-CM

## 2018-05-01 DIAGNOSIS — R42 DIZZINESS: ICD-10-CM

## 2018-05-01 PROBLEM — E78.5 DYSLIPIDEMIA: Status: ACTIVE | Noted: 2017-12-01

## 2018-05-01 PROCEDURE — 93000 ELECTROCARDIOGRAM COMPLETE: CPT | Performed by: INTERNAL MEDICINE

## 2018-05-01 PROCEDURE — 99214 OFFICE O/P EST MOD 30 MIN: CPT | Performed by: INTERNAL MEDICINE

## 2018-05-01 RX ORDER — TORSEMIDE 10 MG/1
10 TABLET ORAL DAILY
Qty: 30 TABLET | Refills: 5 | Status: SHIPPED | OUTPATIENT
Start: 2018-05-01 | End: 2018-11-07 | Stop reason: SDUPTHER

## 2018-05-01 RX ORDER — VALSARTAN 80 MG/1
1 TABLET ORAL DAILY
Refills: 1 | COMMUNITY
Start: 2018-04-10 | End: 2018-08-14 | Stop reason: ALTCHOICE

## 2018-05-01 NOTE — LETTER
May 2, 2018     [facility]  [address]  [address] [facility] [address]    Patient: Maximino Segal   YOB: 1960   Date of Visit: 5/1/2018       Dear Dr Ann Woodford: Thank you for referring Kimberly Mckay to me for evaluation  Below are my notes for this consultation  If you have questions, please do not hesitate to call me  I look forward to following your patient along with you  Sincerely,        Giselle Case DO        CC: No Recipients  Luh Aragon DO  5/2/2018  8:09 AM  Sign at close encounter  Maximino Segal  1960  [id number]  [facility] [address] [address]    Interval History:  Maximino Segal is a 56 y o  male who presents for routine coronary artery disease follow-up  Current symptoms: shortness of breath  Aggravating factors: exertion  He noticed it as he was gardening earlier this week  He denies any orthopnea or PND  Feels intermittent chest discomfort similar to previous angina  Does not get with exertion  He continues to refrain from smoking  Denies LE edema, orthopnea or PND  In November 2017, he underwent drug-eluting stent placement to left circumflex and OM 2 lesions after presenting to hospital with chest pain, elevated BP and ST depressions         Past Medical History:   Diagnosis Date    Anemia     Arthritis     Bundle branch block, right     CPAP (continuous positive airway pressure) dependence     Diabetes mellitus (HCC)     borderline, controlled with diet and activity    Diverticulitis     Diverticulitis of large intestine with abscess without bleeding 12/7/2016    GERD (gastroesophageal reflux disease)     Hyperlipidemia     Hypertension     Nasal septal deformity     Renal disorder     Seasonal allergies     Sleep apnea     wears c-pap     Past Surgical History:   Procedure Laterality Date    COLON SIGMOID RESECTION N/A 12/16/2016    Procedure: RESECTION COLON SIGMOID;  Surgeon: Jes Hawk MD;  Location:  MAIN OR;  Service:     COLON SIGMOID RESECTION LAPAROSCOPIC N/A 12/16/2016    Procedure: RESECTION COLON SIGMOID LAPAROSCOPIC:CONVERTED TO [email or web address];  Surgeon: Jes Hawk MD;  Location:  MAIN OR;  Service:     COLON SURGERY      COLONOSCOPY N/A 10/6/2016    Procedure: COLONOSCOPY;  Surgeon: Orquidea Montilla MD;  Location: [place] [address] [facility]; Service:    Sima Silence CYSTOSCOPY W/ RETROGRADES Left 2/3/2017    Procedure: CYSTOSCOPY WITH BILATERAL RETROGRADES, LEFT STENT REMOVAL;  Surgeon: Vitaliy Gonzalez MD;  Location: [address] [place] [address];  Service:     ESOPHAGOGASTRODUODENOSCOPY N/A 10/6/2016    Procedure: ESOPHAGOGASTRODUODENOSCOPY (EGD); Surgeon: Orquidea Montilla MD;  Location: [place] [address] [facility]; Service:     HERNIA REPAIR      KNEE ARTHROSCOPY W/ MENISCECTOMY      MI CYSTOURETHROSCOPY,URETER CATHETER Left 12/4/2016    Procedure: CYSTOSCOPY RETROGRADE PYELOGRAM WITH INSERTION STENT URETERAL;  Surgeon: Vitaliy Gonzalez MD;  [facility];  Service: Urology    VARICOSE VEIN SURGERY       Social History     Social History    Marital status: Single     Spouse name: N/A    Number of children: N/A    Years of education: N/A     Occupational History    Not on file  Social History Main Topics    Smoking status: Former Smoker     Packs/day: 0 50     Years: 37 00     Types: Cigarettes     Quit date: 12/9/2017    Smokeless tobacco: Never Used      Comment: patient refused for now; requit 3/20/18    Alcohol use Yes      Comment: occ    Drug use: No    Sexual activity: No     Other Topics Concern    Not on file     Social History Narrative    Lives alone       Family History   Problem Relation Age of Onset    Diabetes Brother     Thyroid cancer Brother     Osteoarthritis Mother     Atrial fibrillation Mother     Aortic aneurysm Father        Current Outpatient Prescriptions:     amLODIPine (NORVASC) 5 mg tablet, Take 5 mg by mouth daily, Disp: , Rfl:     Ascorbic Acid (VITAMIN C) 100 MG tablet, Take by mouth, Disp: , Rfl:     aspirin 81 MG tablet, Take by mouth, Disp: , Rfl:     atorvastatin (LIPITOR) 40 mg tablet, Take 1 tablet by mouth daily with dinner, Disp: 30 tablet, Rfl: 4    buPROPion (WELLBUTRIN XL) 150 mg 24 hr tablet, Take 1 tablet by mouth daily, Disp: 60 tablet, Rfl: 0    Cholecalciferol (VITAMIN D3) 1000 units CAPS, Take by mouth, Disp: , Rfl:     clopidogrel (PLAVIX) 75 mg tablet, , Disp: , Rfl: 1    desloratadine (CLARINEX) 5 MG tablet, Take 5 mg by mouth daily at bedtime  , Disp: , Rfl:     glucosamine-chondroitin 500-400 MG tablet, Take 1 tablet by mouth daily, Disp: , Rfl:     Multiple Vitamin (MULTIVITAMIN) capsule, Take 1 capsule by mouth daily, Disp: , Rfl:     nicotine (NICODERM CQ) 14 mg/24hr TD 24 hr patch, Place 1 patch on the skin daily Apply a new patch every 24 hours to a clean, dry, hairless site on the upper arm or hip , Disp: 28 patch, Rfl: 0    nitroglycerin (NITROSTAT) 0 3 mg SL tablet, Place 0 3 mg under the tongue every 5 (five) minutes as needed for chest pain, Disp: , Rfl:     Omega-3 Fatty Acids (FISH OIL) 1,000 mg, Take by mouth, Disp: , Rfl:     ranitidine (ZANTAC) 150 MG capsule, Take 150 mg by mouth every evening, Disp: , Rfl:     Triamcinolone Acetonide 55 MCG/ACT AERO, into each nostril as needed, Disp: , Rfl:     valsartan (DIOVAN) 80 mg tablet, Take 1 tablet by mouth daily, Disp: , Rfl: 1  The following portions of the patient's history were reviewed and updated as appropriate: allergies, current medications, past family history, past medical history, past social history, past surgical history and problem list     Review of Systems:  Review of Systems   Constitutional: Negative for chills, fatigue and fever  HENT: Negative for congestion, nosebleeds and postnasal drip  Respiratory: Positive for shortness of breath  Negative for cough and chest tightness  Cardiovascular: Positive for chest pain and leg swelling  Negative for palpitations  Gastrointestinal: Negative for abdominal distention, abdominal pain, diarrhea, nausea and vomiting  Endocrine: Negative for polydipsia, polyphagia and polyuria  Musculoskeletal: Negative for gait problem and myalgias  Skin: Negative for color change, pallor and rash  Allergic/Immunologic: Negative for environmental allergies, food allergies and immunocompromised state  Neurological: Negative for dizziness, seizures, syncope and light-headedness  Hematological: Negative for adenopathy  Does not bruise/bleed easily  Psychiatric/Behavioral: Negative for dysphoric mood  The patient is not nervous/anxious  Physical Exam:  Physical Exam   Constitutional: He is oriented to person, place, and time  He appears well-developed  No distress  HENT:   Head: Normocephalic and atraumatic  Eyes: Conjunctivae and EOM are normal  Pupils are equal, round, and reactive to light  Neck: Neck supple  No JVD present  No thyromegaly present  Cardiovascular: Normal rate, regular rhythm and normal heart sounds  Exam reveals no gallop and no friction rub  No murmur heard  Pulmonary/Chest: Effort normal and breath sounds normal    Abdominal: Soft  He exhibits no distension  There is no tenderness  Musculoskeletal: He exhibits edema  Neurological: He is alert and oriented to person, place, and time  No cranial nerve deficit  Skin: Skin is warm and dry  No rash noted  He is not diaphoretic  No erythema  Psychiatric: He has a normal mood and affect  His behavior is normal  Judgment and thought content normal        Cardiographics  ECG: sinus bradycardia with Q waves inferiorly  LV Ejection Fraction: LV ejection fraction >= 40%         Lab Review  Lab Results   Component Value Date    CHOL 139 11/22/2017    CHOL 80 12/05/2016    TRIG 244 (H) 11/22/2017    TRIG 106 12/05/2016    HDL 33 (L) 11/22/2017    HDL 13 (L) 12/05/2016     Assessment/Plan   1  Exertional shortness of breath - he has LE edema present on exam as well  ? Diastolic CHF  - torsemide (DEMADEX) 10 mg tablet; Take 1 tablet (10 mg total) by mouth daily    - if no improvement, will obtain BNP, chest Xray and holter monitor  2  Coronary artery disease involving native heart without angina pectoris, unspecified vessel or lesion type  - continue ASA and Plavix  3  Dizziness    4  Asymptomatic PVCs  - POCT ECG    5  Essential hypertension  - BP goal < 130/90     - will adjust medication regimen next visit if BP remains elevated  Continue valsartan  - obtain blood work from Dr Maryann Andrea office  6  Mixed hyperlipidemia  - continue statin   - LDL goal < 70    7  Acute on chronic diastolic heart failure (HCC)  - torsemide (DEMADEX) 10 mg tablet; Take 1 tablet (10 mg total) by mouth daily  Dispense: 30 tablet;  Refill: 5

## 2018-05-01 NOTE — PROGRESS NOTES
Jose Alberto Salazar  1960  [id number]  [facility]  [address] [facility] [address] [address]    Interval History:  Jose Alberto Salazar is a 56 y o  male who presents for routine coronary artery disease follow-up  Current symptoms: shortness of breath  Aggravating factors: exertion  He noticed it as he was gardening earlier this week  He denies any orthopnea or PND  Feels intermittent chest discomfort similar to previous angina  Does not get with exertion  He continues to refrain from smoking  Denies LE edema, orthopnea or PND  In November 2017, he underwent drug-eluting stent placement to left circumflex and OM 2 lesions after presenting to hospital with chest pain, elevated BP and ST depressions  Past Medical History:   Diagnosis Date    Anemia     Arthritis     Bundle branch block, right     CPAP (continuous positive airway pressure) dependence     Diabetes mellitus (HCC)     borderline, controlled with diet and activity    Diverticulitis     Diverticulitis of large intestine with abscess without bleeding 12/7/2016    GERD (gastroesophageal reflux disease)     Hyperlipidemia     Hypertension     Nasal septal deformity     Renal disorder     Seasonal allergies     Sleep apnea     wears c-pap     Past Surgical History:   Procedure Laterality Date    COLON SIGMOID RESECTION N/A 12/16/2016    Procedure: RESECTION COLON SIGMOID;  Surgeon: Harper Herring MD;  [facility];  Service:     COLON SIGMOID RESECTION LAPAROSCOPIC N/A 12/16/2016    Procedure: RESECTION COLON SIGMOID LAPAROSCOPIC:CONVERTED TO [email or web address];  Surgeon: Harper Herring MD;  [facility];  Service:     COLON SURGERY      COLONOSCOPY N/A 10/6/2016    Procedure: COLONOSCOPY;  Surgeon: Maribell Salas MD;  Location: [facility] GI LAB;   Service:     CYSTOSCOPY W/ RETROGRADES Left 2/3/2017    Procedure: CYSTOSCOPY WITH BILATERAL RETROGRADES, LEFT STENT REMOVAL;  Surgeon: Santosh Encarnacion MD;  Location: [address] [place] [address];  Service:     ESOPHAGOGASTRODUODENOSCOPY N/A 10/6/2016    Procedure: ESOPHAGOGASTRODUODENOSCOPY (EGD); Surgeon: Telma Bailey MD;  Location: [facility] GI LAB; Service:     HERNIA REPAIR      KNEE ARTHROSCOPY W/ MENISCECTOMY      VT CYSTOURETHROSCOPY,URETER CATHETER Left 12/4/2016    Procedure: CYSTOSCOPY RETROGRADE PYELOGRAM WITH INSERTION STENT URETERAL;  Surgeon: Santsoh Encarnacion MD;  [facility];  Service: Urology    VARICOSE VEIN SURGERY       Social History     Social History    Marital status: Single     Spouse name: N/A    Number of children: N/A    Years of education: N/A     Occupational History    Not on file  Social History Main Topics    Smoking status: Former Smoker     Packs/day: 0 50     Years: 37 00     Types: Cigarettes     Quit date: 12/9/2017    Smokeless tobacco: Never Used      Comment: patient refused for now; requit 3/20/18    Alcohol use Yes      Comment: occ    Drug use: No    Sexual activity: No     Other Topics Concern    Not on file     Social History Narrative    Lives alone       Family History   Problem Relation Age of Onset    Diabetes Brother     Thyroid cancer Brother     Osteoarthritis Mother     Atrial fibrillation Mother     Aortic aneurysm Father        Current Outpatient Prescriptions:     amLODIPine (NORVASC) 5 mg tablet, Take 5 mg by mouth daily, Disp: , Rfl:     Ascorbic Acid (VITAMIN C) 100 MG tablet, Take by mouth, Disp: , Rfl:     aspirin 81 MG tablet, Take by mouth, Disp: , Rfl:     atorvastatin (LIPITOR) 40 mg tablet, Take 1 tablet by mouth daily with dinner, Disp: 30 tablet, Rfl: 4    buPROPion (WELLBUTRIN XL) 150 mg 24 hr tablet, Take 1 tablet by mouth daily, Disp: 60 tablet, Rfl: 0    Cholecalciferol (VITAMIN D3) 1000 units CAPS, Take by mouth, Disp: , Rfl:     clopidogrel (PLAVIX) 75 mg tablet, , Disp: , Rfl: 1    desloratadine (CLARINEX) 5 MG tablet, Take 5 mg by mouth daily at bedtime  , Disp: , Rfl:     glucosamine-chondroitin 500-400 MG tablet, Take 1 tablet by mouth daily, Disp: , Rfl:     Multiple Vitamin (MULTIVITAMIN) capsule, Take 1 capsule by mouth daily, Disp: , Rfl:     nicotine (NICODERM CQ) 14 mg/24hr TD 24 hr patch, Place 1 patch on the skin daily Apply a new patch every 24 hours to a clean, dry, hairless site on the upper arm or hip , Disp: 28 patch, Rfl: 0    nitroglycerin (NITROSTAT) 0 3 mg SL tablet, Place 0 3 mg under the tongue every 5 (five) minutes as needed for chest pain, Disp: , Rfl:     Omega-3 Fatty Acids (FISH OIL) 1,000 mg, Take by mouth, Disp: , Rfl:     ranitidine (ZANTAC) 150 MG capsule, Take 150 mg by mouth every evening, Disp: , Rfl:     Triamcinolone Acetonide 55 MCG/ACT AERO, into each nostril as needed, Disp: , Rfl:     valsartan (DIOVAN) 80 mg tablet, Take 1 tablet by mouth daily, Disp: , Rfl: 1  The following portions of the patient's history were reviewed and updated as appropriate: allergies, current medications, past family history, past medical history, past social history, past surgical history and problem list     Review of Systems:  Review of Systems   Constitutional: Negative for chills, fatigue and fever  HENT: Negative for congestion, nosebleeds and postnasal drip  Respiratory: Positive for shortness of breath  Negative for cough and chest tightness  Cardiovascular: Positive for chest pain and leg swelling  Negative for palpitations  Gastrointestinal: Negative for abdominal distention, abdominal pain, diarrhea, nausea and vomiting  Endocrine: Negative for polydipsia, polyphagia and polyuria  Musculoskeletal: Negative for gait problem and myalgias  Skin: Negative for color change, pallor and rash  Allergic/Immunologic: Negative for environmental allergies, food allergies and immunocompromised state  Neurological: Negative for dizziness, seizures, syncope and light-headedness  Hematological: Negative for adenopathy  Does not bruise/bleed easily  Psychiatric/Behavioral: Negative for dysphoric mood  The patient is not nervous/anxious  Physical Exam:  Physical Exam   Constitutional: He is oriented to person, place, and time  He appears well-developed  No distress  HENT:   Head: Normocephalic and atraumatic  Eyes: Conjunctivae and EOM are normal  Pupils are equal, round, and reactive to light  Neck: Neck supple  No JVD present  No thyromegaly present  Cardiovascular: Normal rate, regular rhythm and normal heart sounds  Exam reveals no gallop and no friction rub  No murmur heard  Pulmonary/Chest: Effort normal and breath sounds normal    Abdominal: Soft  He exhibits no distension  There is no tenderness  Musculoskeletal: He exhibits edema  Neurological: He is alert and oriented to person, place, and time  No cranial nerve deficit  Skin: Skin is warm and dry  No rash noted  He is not diaphoretic  No erythema  Psychiatric: He has a normal mood and affect  His behavior is normal  Judgment and thought content normal        Cardiographics  ECG: sinus bradycardia with Q waves inferiorly  LV Ejection Fraction: LV ejection fraction >= 40%  Lab Review  Lab Results   Component Value Date    CHOL 139 11/22/2017    CHOL 80 12/05/2016    TRIG 244 (H) 11/22/2017    TRIG 106 12/05/2016    HDL 33 (L) 11/22/2017    HDL 13 (L) 12/05/2016     Assessment/Plan   1  Exertional shortness of breath - he has LE edema present on exam as well  ? Diastolic CHF  - torsemide (DEMADEX) 10 mg tablet; Take 1 tablet (10 mg total) by mouth daily    - if no improvement, will obtain BNP, chest Xray and holter monitor  2  Coronary artery disease involving native heart without angina pectoris, unspecified vessel or lesion type  - continue ASA and Plavix  3  Dizziness    4  Asymptomatic PVCs  - POCT ECG    5   Essential hypertension  - BP goal < 130/90     - will adjust medication regimen next visit if BP remains elevated  Continue valsartan  - obtain blood work from Dr Lawyer Steiner office  6  Mixed hyperlipidemia  - continue statin   - LDL goal < 70    7  Acute on chronic diastolic heart failure (HCC)  - torsemide (DEMADEX) 10 mg tablet; Take 1 tablet (10 mg total) by mouth daily  Dispense: 30 tablet;  Refill: 5

## 2018-05-18 ENCOUNTER — TELEPHONE (OUTPATIENT)
Dept: CARDIOLOGY CLINIC | Facility: CLINIC | Age: 58
End: 2018-05-18

## 2018-05-18 DIAGNOSIS — I50.21 ACUTE SYSTOLIC CONGESTIVE HEART FAILURE (HCC): Primary | ICD-10-CM

## 2018-05-18 NOTE — TELEPHONE ENCOUNTER
Can you ask him to keep it daily for now? We can check some blood work in 2 weeks  Will put order in

## 2018-05-30 RX ORDER — CLOPIDOGREL BISULFATE 75 MG/1
TABLET ORAL
Qty: 30 TABLET | Refills: 3 | Status: CANCELLED | OUTPATIENT
Start: 2018-05-30

## 2018-05-31 DIAGNOSIS — I25.10 CORONARY ARTERY DISEASE INVOLVING NATIVE CORONARY ARTERY OF NATIVE HEART WITHOUT ANGINA PECTORIS: Primary | ICD-10-CM

## 2018-06-01 ENCOUNTER — TELEPHONE (OUTPATIENT)
Dept: CARDIOLOGY CLINIC | Facility: CLINIC | Age: 58
End: 2018-06-01

## 2018-06-01 RX ORDER — CLOPIDOGREL BISULFATE 75 MG/1
75 TABLET ORAL DAILY
Qty: 30 TABLET | Refills: 6 | Status: SHIPPED | OUTPATIENT
Start: 2018-06-01 | End: 2019-01-09 | Stop reason: SDUPTHER

## 2018-06-02 LAB
BUN SERPL-MCNC: 18 MG/DL (ref 6–24)
BUN/CREAT SERPL: 16 (ref 9–20)
CALCIUM SERPL-MCNC: 9.1 MG/DL (ref 8.7–10.2)
CHLORIDE SERPL-SCNC: 107 MMOL/L (ref 96–106)
CO2 SERPL-SCNC: 21 MMOL/L (ref 18–29)
CREAT SERPL-MCNC: 1.16 MG/DL (ref 0.76–1.27)
GLUCOSE SERPL-MCNC: 123 MG/DL (ref 65–99)
POTASSIUM SERPL-SCNC: 4.4 MMOL/L (ref 3.5–5.2)
SL AMB EGFR AFRICAN AMERICAN: 81 ML/MIN/1.73
SL AMB EGFR NON AFRICAN AMERICAN: 70 ML/MIN/1.73
SODIUM SERPL-SCNC: 143 MMOL/L (ref 134–144)

## 2018-07-02 ENCOUNTER — TRANSCRIBE ORDERS (OUTPATIENT)
Dept: ADMINISTRATIVE | Facility: HOSPITAL | Age: 58
End: 2018-07-02

## 2018-07-02 DIAGNOSIS — N13.2 HYDRONEPHROSIS WITH RENAL AND URETERAL CALCULOUS OBSTRUCTION: ICD-10-CM

## 2018-07-02 DIAGNOSIS — N18.30 CHRONIC KIDNEY DISEASE, STAGE III (MODERATE) (HCC): Primary | ICD-10-CM

## 2018-07-05 RX ORDER — BUPROPION HYDROCHLORIDE 100 MG/1
75 TABLET, EXTENDED RELEASE ORAL EVERY OTHER DAY
Refills: 0 | COMMUNITY
Start: 2018-05-19 | End: 2019-04-15

## 2018-07-05 RX ORDER — BUPROPION HYDROCHLORIDE 150 MG/1
TABLET, EXTENDED RELEASE ORAL
Refills: 0 | COMMUNITY
Start: 2018-05-27 | End: 2019-04-15

## 2018-07-06 ENCOUNTER — OFFICE VISIT (OUTPATIENT)
Dept: OBGYN CLINIC | Facility: CLINIC | Age: 58
End: 2018-07-06
Payer: COMMERCIAL

## 2018-07-06 ENCOUNTER — OFFICE VISIT (OUTPATIENT)
Dept: PULMONOLOGY | Facility: MEDICAL CENTER | Age: 58
End: 2018-07-06
Payer: COMMERCIAL

## 2018-07-06 ENCOUNTER — APPOINTMENT (OUTPATIENT)
Dept: RADIOLOGY | Facility: HOSPITAL | Age: 58
End: 2018-07-06
Attending: INTERNAL MEDICINE
Payer: COMMERCIAL

## 2018-07-06 ENCOUNTER — HOSPITAL ENCOUNTER (OUTPATIENT)
Dept: RADIOLOGY | Facility: HOSPITAL | Age: 58
Discharge: HOME | End: 2018-07-06
Attending: INTERNAL MEDICINE
Payer: COMMERCIAL

## 2018-07-06 VITALS
SYSTOLIC BLOOD PRESSURE: 116 MMHG | RESPIRATION RATE: 16 BRPM | HEART RATE: 60 BPM | DIASTOLIC BLOOD PRESSURE: 64 MMHG | OXYGEN SATURATION: 96 % | BODY MASS INDEX: 37.65 KG/M2 | HEIGHT: 72 IN | WEIGHT: 278 LBS | TEMPERATURE: 97.5 F

## 2018-07-06 VITALS
DIASTOLIC BLOOD PRESSURE: 64 MMHG | SYSTOLIC BLOOD PRESSURE: 111 MMHG | BODY MASS INDEX: 42.13 KG/M2 | HEART RATE: 54 BPM | HEIGHT: 68 IN | WEIGHT: 278 LBS

## 2018-07-06 DIAGNOSIS — M17.0 PRIMARY OSTEOARTHRITIS OF KNEES, BILATERAL: Primary | ICD-10-CM

## 2018-07-06 DIAGNOSIS — I10 ESSENTIAL HYPERTENSION: ICD-10-CM

## 2018-07-06 DIAGNOSIS — G89.29 CHRONIC PAIN OF BOTH KNEES: ICD-10-CM

## 2018-07-06 DIAGNOSIS — M25.561 CHRONIC PAIN OF BOTH KNEES: ICD-10-CM

## 2018-07-06 DIAGNOSIS — N13.2 HYDRONEPHROSIS WITH RENAL AND URETERAL CALCULOUS OBSTRUCTION: ICD-10-CM

## 2018-07-06 DIAGNOSIS — N18.30 CHRONIC KIDNEY DISEASE, STAGE III (MODERATE) (HCC): ICD-10-CM

## 2018-07-06 DIAGNOSIS — M25.562 CHRONIC PAIN OF BOTH KNEES: ICD-10-CM

## 2018-07-06 DIAGNOSIS — E66.01 MORBID OBESITY WITH BMI OF 40.0-44.9, ADULT (HCC): ICD-10-CM

## 2018-07-06 DIAGNOSIS — G47.33 OBSTRUCTIVE SLEEP APNEA: Primary | ICD-10-CM

## 2018-07-06 PROCEDURE — 76770 US EXAM ABDO BACK WALL COMP: CPT

## 2018-07-06 PROCEDURE — 99213 OFFICE O/P EST LOW 20 MIN: CPT | Performed by: ORTHOPAEDIC SURGERY

## 2018-07-06 PROCEDURE — 20610 DRAIN/INJ JOINT/BURSA W/O US: CPT | Performed by: PHYSICIAN ASSISTANT

## 2018-07-06 PROCEDURE — 99203 OFFICE O/P NEW LOW 30 MIN: CPT | Performed by: INTERNAL MEDICINE

## 2018-07-06 RX ORDER — TRIAMCINOLONE ACETONIDE 40 MG/ML
40 INJECTION, SUSPENSION INTRA-ARTICULAR; INTRAMUSCULAR
Status: COMPLETED | OUTPATIENT
Start: 2018-07-06 | End: 2018-07-06

## 2018-07-06 RX ORDER — BUPIVACAINE HYDROCHLORIDE 5 MG/ML
6 INJECTION, SOLUTION EPIDURAL; INTRACAUDAL
Status: COMPLETED | OUTPATIENT
Start: 2018-07-06 | End: 2018-07-06

## 2018-07-06 RX ORDER — BUPROPION HYDROCHLORIDE 75 MG/1
TABLET ORAL
Refills: 0 | COMMUNITY
Start: 2018-07-03 | End: 2019-04-15

## 2018-07-06 RX ADMIN — TRIAMCINOLONE ACETONIDE 40 MG: 40 INJECTION, SUSPENSION INTRA-ARTICULAR; INTRAMUSCULAR at 11:26

## 2018-07-06 RX ADMIN — BUPIVACAINE HYDROCHLORIDE 6 ML: 5 INJECTION, SOLUTION EPIDURAL; INTRACAUDAL at 11:26

## 2018-07-06 NOTE — PROGRESS NOTES
Assessment/Plan:  1  Primary osteoarthritis of knees, bilateral  Large joint arthrocentesis    Large joint arthrocentesis   2  Chronic pain of both knees       Large joint arthrocentesis  Date/Time: 7/6/2018 11:26 AM  Consent given by: patient  Site marked: site marked  Timeout: Immediately prior to procedure a time out was called to verify the correct patient, procedure, equipment, support staff and site/side marked as required   Supporting Documentation  Indications: pain   Procedure Details  Location: knee - R knee  Preparation: Patient was prepped and draped in the usual sterile fashion  Needle size: 20 G  Ultrasound guidance: no  Approach: anterolateral  Medications administered: 6 mL bupivacaine (PF) 0 5 %; 40 mg triamcinolone acetonide 40 mg/mL    Patient tolerance: patient tolerated the procedure well with no immediate complications  Dressing:  Sterile dressing applied  Large joint arthrocentesis  Date/Time: 7/6/2018 11:26 AM  Consent given by: patient  Site marked: site marked  Timeout: Immediately prior to procedure a time out was called to verify the correct patient, procedure, equipment, support staff and site/side marked as required   Supporting Documentation  Indications: pain   Procedure Details  Location: knee - L knee  Preparation: Patient was prepped and draped in the usual sterile fashion  Needle size: 20 G  Ultrasound guidance: no  Approach: anterolateral  Medications administered: 6 mL bupivacaine (PF) 0 5 %; 40 mg triamcinolone acetonide 40 mg/mL    Patient tolerance: patient tolerated the procedure well with no immediate complications  Dressing:  Sterile dressing applied      After the risks and benefits of the left knee injection were explained, and verbal consent was obtained for the injection  The left knee was prepped using aseptic technique using isopropyl alcohol and betadine solution    The left knee was successfully injected with 6cc of 0 5% marcaine without epinephrine and 2cc of Kenalog 40 suspension using a 20 gauge needle  After the injection, hemostasis was achieved, and a dressing was applied  Post-injection icing and activity modification instructions were provided  The patient tolerated the procedure well  After the risks and benefits of the right knee injection were explained, and verbal consent was obtained for the injection  The right knee was prepped using aseptic technique using isopropyl alcohol and betadine solution  The right knee was successfully injected with 6cc of 0 5% marcaine without epinephrine and 2cc of Kenalog 40 suspension using a 20 gauge needle  After the injection, hemostasis was achieved, and a dressing was applied  Post-injection icing and activity modification instructions were provided  The patient tolerated the procedure well  Clarisa Cushing is a very pleasant 29-year-old gentleman presenting for bilateral knee pain attributable to his moderate to severe bilateral knee osteoarthritis  He did see significant reduction in his overall pain hip and improved function from his left knee injection 4 months ago  He was recently tested for a hemoglobin A1c which was 5 6  He does still have significant bilateral knee pain and limitations with activities such as stairs, squatting, kneeling, and bending  Therefore, he consented to bilateral knee cortisone injections today, which she tolerated well without difficulty or complication  We did inform him that NSAIDs are contraindicated due to his diuretic use, kidney disease, and use of Plavix  However, it is reasonable for him to take extra-strength Tylenol if his knee pain returns  We would like to see him in 4 months pending the efficacy of today's injections  If he fails to see significant relief we can consider viscosupplementation  All of his questions were addressed today  Subjective: Bilateral knee follow up    Patient ID: Tiki Christopher is a 56 y o  male        Clarisa Cushing is a pleasant 29-year-old gentleman presenting for months since his last appointment for his bilateral knee osteoarthritis  He underwent a cortisone injection in the left knee 4 months ago which she reports provided relief for 2 months  Over the course of the past 2 months, he has had a return of his discomfort  He has not been taking Tylenol  He was recently placed on a diuretic to help with his water retention and weight gain  He remains on Plavix and therefore cannot take any NSAIDs  He has been working to exercise at the gym in an effort to lose weight and strengthen his lower extremities  He has difficulty with stairs, getting off a treadmill, kneeling, bending, and squatting  He denies any paresthesias in the lower extremities  Currently, both knees are a nuisance that limit his ability to perform activities of daily living, but are not nearly as painful as when he originally presented  He is also complaining of cramping in the hamstrings and calves when he 1st wakes up  Review of Systems   Constitutional: Positive for unexpected weight change  HENT: Negative  Eyes: Negative  Respiratory: Positive for shortness of breath  Cardiovascular: Positive for palpitations and leg swelling  Gastrointestinal: Negative  Endocrine: Negative  Genitourinary: Negative  Musculoskeletal: Positive for arthralgias, joint swelling and myalgias  Skin: Negative  Allergic/Immunologic: Negative  Neurological: Negative  Hematological: Negative  Psychiatric/Behavioral: Negative            Past Medical History:   Diagnosis Date    Anemia     Arthritis     Bundle branch block, right     CPAP (continuous positive airway pressure) dependence     Diabetes mellitus (HCC)     borderline, controlled with diet and activity    Diverticulitis     Diverticulitis of large intestine with abscess without bleeding 12/7/2016    GERD (gastroesophageal reflux disease)     Hyperlipidemia     Hypertension     Nasal septal deformity     Renal disorder     Seasonal allergies     Sleep apnea     wears c-pap       Past Surgical History:   Procedure Laterality Date    COLON SIGMOID RESECTION N/A 12/16/2016    Procedure: RESECTION COLON SIGMOID;  Surgeon: Daniel Wang MD;  Location:  MAIN OR;  Service:     COLON SIGMOID RESECTION LAPAROSCOPIC N/A 12/16/2016    Procedure: RESECTION COLON SIGMOID LAPAROSCOPIC:CONVERTED TO [email or web address];  Surgeon: Daniel Wang MD;  Location:  MAIN OR;  Service:     COLON SURGERY      COLONOSCOPY N/A 10/6/2016    Procedure: COLONOSCOPY;  Surgeon: Schuyler Noble MD;  Location: [facility] GI LAB; Service:    Southmayd Miyamoto CYSTOSCOPY W/ RETROGRADES Left 2/3/2017    Procedure: CYSTOSCOPY WITH BILATERAL RETROGRADES, LEFT STENT REMOVAL;  Surgeon: Krissy Anglin MD;  Location: [address] [place] [address];  Service:     ESOPHAGOGASTRODUODENOSCOPY N/A 10/6/2016    Procedure: ESOPHAGOGASTRODUODENOSCOPY (EGD); Surgeon: Schuyler Noble MD;  Location: [facility] GI LAB; Service:     HERNIA REPAIR      KNEE ARTHROSCOPY W/ MENISCECTOMY      IL CYSTOURETHROSCOPY,URETER CATHETER Left 12/4/2016    Procedure: CYSTOSCOPY RETROGRADE PYELOGRAM WITH INSERTION STENT URETERAL;  Surgeon: Krissy Anglin MD;  Location: [facility];  Service: Urology    VARICOSE VEIN SURGERY         Family History   Problem Relation Age of Onset    Diabetes Brother     Thyroid cancer Brother     Osteoarthritis Mother     Atrial fibrillation Mother     Aortic aneurysm Father        Social History     Occupational History    Not on file       Social History Main Topics    Smoking status: Former Smoker     Packs/day: 0 50     Years: 37 00     Types: Cigarettes     Quit date: 3/30/2018    Smokeless tobacco: Never Used      Comment: patient refused for now; requit 3/20/18    Alcohol use Yes      Comment: occ    Drug use: No    Sexual activity: No         Current Outpatient Prescriptions:     amLODIPine (NORVASC) 5 mg tablet, Take 5 mg by mouth daily, Disp: , Rfl:     Ascorbic Acid (VITAMIN C) 100 MG tablet, Take by mouth, Disp: , Rfl:     aspirin 81 MG tablet, Take by mouth, Disp: , Rfl:     atorvastatin (LIPITOR) 40 mg tablet, Take 1 tablet by mouth daily with dinner, Disp: 30 tablet, Rfl: 4    buPROPion (WELLBUTRIN SR) 100 mg 12 hr tablet, , Disp: , Rfl: 0    buPROPion (WELLBUTRIN SR) 150 mg 12 hr tablet, , Disp: , Rfl: 0    buPROPion (WELLBUTRIN XL) 150 mg 24 hr tablet, Take 1 tablet by mouth daily, Disp: 60 tablet, Rfl: 0    buPROPion (WELLBUTRIN) 75 mg tablet, , Disp: , Rfl: 0    Cholecalciferol (VITAMIN D3) 1000 units CAPS, Take by mouth, Disp: , Rfl:     clopidogrel (PLAVIX) 75 mg tablet, Take 1 tablet (75 mg total) by mouth daily, Disp: 30 tablet, Rfl: 6    desloratadine (CLARINEX) 5 MG tablet, Take 5 mg by mouth daily at bedtime  , Disp: , Rfl:     glucosamine-chondroitin 500-400 MG tablet, Take 1 tablet by mouth daily, Disp: , Rfl:     Multiple Vitamin (MULTIVITAMIN) capsule, Take 1 capsule by mouth daily, Disp: , Rfl:     nicotine (NICODERM CQ) 14 mg/24hr TD 24 hr patch, Place 1 patch on the skin daily Apply a new patch every 24 hours to a clean, dry, hairless site on the upper arm or hip , Disp: 28 patch, Rfl: 0    nitroglycerin (NITROSTAT) 0 3 mg SL tablet, Place 0 3 mg under the tongue every 5 (five) minutes as needed for chest pain, Disp: , Rfl:     Omega-3 Fatty Acids (FISH OIL) 1,000 mg, Take by mouth, Disp: , Rfl:     ranitidine (ZANTAC) 150 MG capsule, Take 150 mg by mouth every evening, Disp: , Rfl:     torsemide (DEMADEX) 10 mg tablet, Take 1 tablet (10 mg total) by mouth daily, Disp: 30 tablet, Rfl: 5    Triamcinolone Acetonide 55 MCG/ACT AERO, into each nostril as needed, Disp: , Rfl:     valsartan (DIOVAN) 80 mg tablet, Take 1 tablet by mouth daily, Disp: , Rfl: 1    Allergies   Allergen Reactions    Levaquin [Levofloxacin In D5w] Swelling     Knee swelling    Sulfa Antibiotics GI Intolerance       Objective:  Vitals: 07/06/18 1035   BP: 111/64   Pulse: (!) 54       Body mass index is 42 27 kg/m²  Right Knee Exam     Tenderness   The patient is experiencing tenderness in the medial joint line and patella  Range of Motion   Extension: 0   Flexion: 120     Tests   Juana:  Medial - negative Lateral - negative  Drawer:       Anterior - negative    Posterior - negative  Varus: negative  Valgus: negative  Patellar Apprehension: negative    Other   Erythema: absent  Scars: absent  Sensation: normal  Pulse: present  Swelling: moderate (Bilateral pretibial distal)  Other tests: no effusion present    Comments:  Crepitance on AROM and PROM  +PFG  No increased warmth of knee  Knee is stable at 0, 30, 90 degrees     Chronic venous stasis changes in both lower extremities      Left Knee Exam     Tenderness   The patient is experiencing tenderness in the lateral joint line and patella  Range of Motion   Extension: 0   Flexion: 120     Tests   Juana:  Medial - negative Lateral - negative  Drawer:       Anterior - negative     Posterior - negative  Varus: negative  Valgus: negative  Patellar Apprehension: negative    Other   Erythema: absent  Scars: absent  Sensation: normal  Pulse: present  Swelling: moderate (Bilateral pretibial distal)  Effusion: no effusion present    Comments:  Crepitance on AROM and PROM  +PFG  No increased warmth of knee  Knee is stable at 0, 30, 90 degrees          Observations   Left Knee   Negative for effusion  Right Knee   Negative for effusion  Physical Exam   Constitutional: He is oriented to person, place, and time  He appears well-developed and well-nourished  HENT:   Head: Atraumatic  Eyes: EOM are normal    Neck: Neck supple  Cardiovascular: Normal rate  Pulmonary/Chest: Effort normal    Abdominal: Soft  Musculoskeletal:        Right knee: He exhibits no effusion  Left knee: He exhibits no effusion     See orthopedic exam   Neurological: He is alert and oriented to person, place, and time  Skin: Skin is warm and dry  Psychiatric: He has a normal mood and affect  Nursing note and vitals reviewed

## 2018-07-06 NOTE — ASSESSMENT & PLAN NOTE
Marni Zaman has history of severe obstructive sleep apnea  Initial diagnostic study done March 2015 showed overall AHI of 101 4 with oxygen jodi of 80%  He presently is on CPAP set at 9 cm water and has been compliant with using his CPAP and benefitting from using it  He is not have any excessive daytime somnolence  Compliance data from 6/5 to 7/4/18 was reviewed with the patient  On his setting 9 cm water his average AHI was 4 5  He used the CPAP for an average of 8 hours per night and had 100% usage  He will continue CPAP at the same setting  He does use a [employer]med Airfit F10 full face mask    We did discuss the diagnosis and treatment of obstructive sleep apnea

## 2018-07-06 NOTE — PROGRESS NOTES
Assessment/Plan:       Problem List Items Addressed This Visit        Respiratory    Obstructive sleep apnea - Primary     Jacinto Fernandes has history of severe obstructive sleep apnea  Initial diagnostic study done March 2015 showed overall AHI of 101 4 with oxygen jodi of 80%  He presently is on CPAP set at 9 cm water and has been compliant with using his CPAP and benefitting from using it  He is not have any excessive daytime somnolence  Compliance data from 6/5 to 7/4/18 was reviewed with the patient  On his setting 9 cm water his average AHI was 4 5  He used the CPAP for an average of 8 hours per night and had 100% usage  He will continue CPAP at the same setting  He does use a full face mask  We did discuss the diagnosis and treatment of obstructive sleep apnea            Cardiovascular and Mediastinum    Essential hypertension     Jacinto Fernandes is on medication for his hypertension  Other    Morbid obesity with BMI of 40 0-44 9, adult ([address] )     I did discuss weight loss with the patient                 No Follow-up on file  All questions are answered to the patient's satisfaction and understanding  He verbalizes understanding  He is encouraged to call with any further questions or concerns  Portions of the record may have been created with voice recognition software  Occasional wrong word or "sound a like" substitutions may have occurred due to the inherent limitations of voice recognition software  Read the chart carefully and recognize, using context, where substitutions have occurred  Electronically Signed by Chuy Phillips DO    ______________________________________________________________________    Chief Complaint:   Chief Complaint   Patient presents with    Sleep Apnea     Former patient of Dr Rogelio Diaz        Patient ID: Jacinto Fernandes is a 56 y o  y o  male has a past medical history of Anemia;  Arthritis; Bundle branch block, right; CPAP (continuous positive airway pressure) dependence; Diabetes mellitus ([facility] ); Diverticulitis; Diverticulitis of large intestine with abscess without bleeding (12/7/2016); GERD (gastroesophageal reflux disease); Hyperlipidemia; Hypertension; Nasal septal deformity; Renal disorder; Seasonal allergies; and Sleep apnea  7/6/2018  HPI     Teresa Tavarez is a 61-year-old male who presents for evaluation of his obstructive sleep apnea  He had been following with Dr Paco Marina who just retired  Are 3rd prior to using CPAP did have excessive daytime somnolence as well snoring  He had his initial diagnostic sleep study done March 27, 2015 which showed evidence of severe obstructive sleep apnea  Overall AHI was 101 4 with oxygen jodi of 80%  He had 585 apneas and hypopneas and arousal index of 104 due to his respiratory events  On April 10, 2015 he underwent CPAP titration study and at that time required a CPAP pressure of 10 which reduced his AHI to 7 8  His present CPAP setting is 9 cm of water and he is tolerating this well  He is not have any excessive day time somnolence or nocturnal dyspnea  I did review compliance data from his CPAP machine from June 5 to July 4, 2018  He uses machine 100% of the time and had an average use of approximately 8 hours per night  His resultant average AHI was 4 5 which is satisfactory  Bartholome Pinks He does use a full face mask  Unknown Newness does have a history coronary disease  In November 2017 he was having chest pain and unstable angina  He was transferred to [facility] and had cardiac catheterization done on November 24, 2017  He had a drug eluting stent and angioplasty done of a 70% lesion of the 2nd obtuse marginal artery and also had a 2nd drug-eluting stent and angioplasty done of his 75% lesion of the proximal circumflex coronary artery  Echocardiogram showed normal left ventricular systolic function with ejection fraction of 55-60%  No evidence of any pulmonary artery hypertension      Unknown Newness does have a history of hypertension, diabetes mellitus type 2, and diverticulitis'    He did quit smoking in March of this year and previously smoked a half to 1 pack per day for about 30 years  Past surgical history includes laparoscopic converted to open sigmoidectomy and colorectal anastomosis for diverticular disease/abscess, ureteral stent for left hydronephrosis that developed due to his diverticulitis with left-sided abscess that had developed at the time, inguinal hernia repair, varicose vein stripping from his right lower extremity many years ago    Occupational/Exposure history:   in [place]    Review of Systems   Constitutional: Negative for chills, fever and unexpected weight change  HENT: Negative for congestion, rhinorrhea and sore throat  Eyes: Negative for discharge and redness  Respiratory: Negative for cough and shortness of breath  Cardiovascular: Positive for leg swelling  Negative for chest pain and palpitations  Gastrointestinal: Negative for abdominal distention, abdominal pain and nausea  Endocrine: Negative for polydipsia and polyphagia  Genitourinary: Negative for dysuria  Musculoskeletal: Negative for joint swelling and myalgias  Skin: Negative for rash  Neurological: Negative for light-headedness  Social history: He reports that he quit smoking about 3 months ago  His smoking use included Cigarettes  He has a 18 50 pack-year smoking history  He has never used smokeless tobacco  He reports that he drinks alcohol  He reports that he does not use drugs      Past surgical history:   Past Surgical History:   Procedure Laterality Date    COLON SIGMOID RESECTION N/A 12/16/2016    Procedure: RESECTION COLON SIGMOID;  Surgeon: Sarah Stark MD;  [facility];  Service:     COLON SIGMOID RESECTION LAPAROSCOPIC N/A 12/16/2016    Procedure: RESECTION COLON SIGMOID LAPAROSCOPIC:CONVERTED TO [employer];  Surgeon: Sarah Stark MD;  [facility];  Service:    Vena Carlos Eduardo COLON SURGERY  COLONOSCOPY N/A 10/6/2016    Procedure: COLONOSCOPY;  Surgeon: Lieutenant Ovi MD;  Location: [place] [address] GI LAB; Service:    [facility] CYSTOSCOPY W/ RETROGRADES Left 2/3/2017    Procedure: CYSTOSCOPY WITH BILATERAL RETROGRADES, LEFT STENT REMOVAL;  Surgeon: Celi Brito MD;  Location: [address] [place] [address];  Service:     ESOPHAGOGASTRODUODENOSCOPY N/A 10/6/2016    Procedure: ESOPHAGOGASTRODUODENOSCOPY (EGD); Surgeon: Lieutenant Ovi MD;  Location: [place] [address] GI LAB;   Service:     HERNIA REPAIR      KNEE ARTHROSCOPY W/ MENISCECTOMY      CT CYSTOURETHROSCOPY,URETER CATHETER Left 12/4/2016    Procedure: CYSTOSCOPY RETROGRADE PYELOGRAM WITH INSERTION STENT URETERAL;  Surgeon: Celi Brito MD;  Location: [address] [place] [address];  Service: Urology    VARICOSE VEIN SURGERY       Family history:   Family History   Problem Relation Age of Onset    Diabetes Brother     Thyroid cancer Brother     Osteoarthritis Mother     Atrial fibrillation Mother     Aortic aneurysm Father        Immunization History   Administered Date(s) Administered    Pneumococcal Polysaccharide PPV23 12/06/2016    Tdap 08/07/2016     Current Outpatient Prescriptions   Medication Sig Dispense Refill    amLODIPine (NORVASC) 5 mg tablet Take 5 mg by mouth daily      Ascorbic Acid (VITAMIN C) 100 MG tablet Take by mouth      aspirin 81 MG tablet Take by mouth      atorvastatin (LIPITOR) 40 mg tablet Take 1 tablet by mouth daily with dinner 30 tablet 4    buPROPion (WELLBUTRIN SR) 100 mg 12 hr tablet   0    Cholecalciferol (VITAMIN D3) 1000 units CAPS Take by mouth      clopidogrel (PLAVIX) 75 mg tablet Take 1 tablet (75 mg total) by mouth daily 30 tablet 6    desloratadine (CLARINEX) 5 MG tablet Take 5 mg by mouth daily at bedtime        glucosamine-chondroitin 500-400 MG tablet Take 1 tablet by mouth daily      Multiple Vitamin (MULTIVITAMIN) capsule Take 1 capsule by mouth daily      nitroglycerin (NITROSTAT) 0 3 mg SL tablet Place 0 3 mg under the tongue every 5 (five) minutes as needed for chest pain      Omega-3 Fatty Acids (FISH OIL) 1,000 mg Take by mouth      ranitidine (ZANTAC) 150 MG capsule Take 150 mg by mouth every evening      torsemide (DEMADEX) 10 mg tablet Take 1 tablet (10 mg total) by mouth daily 30 tablet 5    valsartan (DIOVAN) 80 mg tablet Take 1 tablet by mouth daily  1    buPROPion (WELLBUTRIN SR) 150 mg 12 hr tablet   0    buPROPion (WELLBUTRIN XL) 150 mg 24 hr tablet Take 1 tablet by mouth daily 60 tablet 0    buPROPion (WELLBUTRIN) 75 mg tablet   0    nicotine (NICODERM CQ) 14 mg/24hr TD 24 hr patch Place 1 patch on the skin daily Apply a new patch every 24 hours to a clean, dry, hairless site on the upper arm or hip  28 patch 0    Triamcinolone Acetonide 55 MCG/ACT AERO into each nostril as needed       No current facility-administered medications for this visit  Allergies: Levaquin [levofloxacin in d5w] and Sulfa antibiotics    Objective:  Vitals:    07/06/18 0854   BP: 116/64   BP Location: Left arm   Patient Position: Sitting   Cuff Size: Standard   Pulse: 60   Resp: 16   Temp: 97 5 °F (36 4 °C)   TempSrc: Tympanic   SpO2: 96%   Weight: 126 kg (278 lb)   Height: 5' 11 5" (1 816 m)   Oxygen Therapy  SpO2: 96 %    Wt Readings from Last 3 Encounters:   07/06/18 126 kg (278 lb)   07/06/18 126 kg (278 lb)   05/01/18 124 kg (273 lb)     Body mass index is 38 23 kg/m²  Physical Exam   Constitutional: He is oriented to person, place, and time  He appears well-developed and well-nourished  No distress  Patient is overweight  He is awake and alert   HENT:   Head: Normocephalic  Nose: Nose normal    Mouth/Throat: Oropharynx is clear and moist  No oropharyngeal exudate  Mallampati score is 3   Eyes: Conjunctivae are normal  Pupils are equal, round, and reactive to light  Neck: Neck supple  No JVD present  No tracheal deviation present  Cardiovascular: Normal rate, regular rhythm and normal heart sounds  Pulmonary/Chest: Effort normal    Lung sounds are clear to auscultation   Abdominal: Soft  He exhibits no distension  There is no tenderness  There is no guarding  Musculoskeletal: He exhibits edema  Has +1 edema of lower tibial surfaces   Lymphadenopathy:     He has no cervical adenopathy  Neurological: He is alert and oriented to person, place, and time  Skin: Skin is warm and dry  No rash noted  Psychiatric: He has a normal mood and affect   His behavior is normal  Thought content normal

## 2018-07-23 NOTE — PROGRESS NOTES
Problem List Items Addressed This Visit     Hydronephrosis     Patient with a history of severe diverticulitis necessitating surgery with extensive dissection around the ureter, recent ultrasound shows no hydronephrosis, the patient has no flank pain  I previously obtained a renal scan on him which showed essentially even split function between both kidneys and no signs of obstruction  Plan:  The patient is doing well, he does not require long-term imaging of his kidneys, his increasing creatinine to 1 31 is likely explained by his use of diuretics         Gross hematuria - Primary     Patient with a history of gross hematuria, completed a full workup which showed a vascular prostate as the cause of this hematuria  No further episodes of hematuria  His urinalysis is negative today  Plan:  The patient's AUA symptom score is 5 with a bother score of 0 indicating no indication for treatment with finasteride at this time         Relevant Orders    POCT urine dip (Completed)          The patient will follow up in 1 year, at that time we will recheck his AUA symptom score, and send him for PSA testing as part of prostate cancer screening      Assessment and plan:       Please see problem oriented charting for the assessment plan of today's urological complaints    Ashwini Minor MD      Chief Complaint      Benign prostatic enlargement with LUTS  History of gross hematuria  History of hydronephrosis secondary to diverticulitis and subsequent surgery      History of Present Illness     Scarlett Mulligan is a 56 y o   I have been following for gross hematuria as well as history of left hydronephrosis due to history of diverticulitis and surgery for this  I last saw him 6 months ago, he has been doing well since that time  AUA symptom score today is 5 with a bother score of 0 and he would not want any therapies for his lower urinary tract symptoms      Negative urinalysis    On review of systems today he denies dysuria, incontinence, hesitancy of urination, gross hematuria, urgency, nocturia present twice per night, he feels empty after voiding and stream quality is good  A previous cystoscopy showed a large vascular prostate as the cause of his gross hematuria and he has not had any further gross hematuria since that time  He has been placed on diuretics for peripheral edema and his creatinine increased slightly to 1 31, his primary care provider obtained a renal ultrasound which showed no hydronephrosis  A previous renal scan and showed even split renal function with no evidence of obstruction of the left renal unit  He is doing well overall    Detailed Urologic History     - please refer to HPI    Review of Systems     Review of Systems   Constitutional: Negative  HENT: Negative  Eyes: Negative  Respiratory: Negative  Cardiovascular: Positive for leg swelling  Gastrointestinal: Negative  Endocrine: Negative  Genitourinary: Negative  Musculoskeletal: Negative  Skin: Negative  Allergic/Immunologic: Negative  Neurological: Negative  Hematological: Negative  Psychiatric/Behavioral: Negative  Allergies     Allergies   Allergen Reactions    Levaquin [Levofloxacin In D5w] Swelling     Knee swelling    Sulfa Antibiotics GI Intolerance       Physical Exam     Physical Exam   Constitutional: He is oriented to person, place, and time  He appears well-developed and well-nourished  No distress  HENT:   Head: Normocephalic and atraumatic  Right Ear: External ear normal    Left Ear: External ear normal    Mouth/Throat: No oropharyngeal exudate  Eyes: EOM are normal  Pupils are equal, round, and reactive to light  Right eye exhibits no discharge  Left eye exhibits no discharge  No scleral icterus  Neck: Normal range of motion  Neck supple  Cardiovascular: Regular rhythm and intact distal pulses      Pulmonary/Chest: Effort normal  No stridor  No respiratory distress  He has no wheezes  He exhibits no tenderness  Abdominal: Soft  He exhibits no distension and no mass  There is no tenderness  There is no rebound and no guarding  No hernia  There is no CVA tenderness   Musculoskeletal: Normal range of motion  He exhibits edema (Slight)  He exhibits no tenderness or deformity  Neurological: He is alert and oriented to person, place, and time  No cranial nerve deficit  Coordination normal    Skin: Skin is warm and dry  No rash noted  He is not diaphoretic  No erythema  No pallor  Psychiatric: He has a normal mood and affect  His behavior is normal  Judgment and thought content normal    Nursing note and vitals reviewed  Vital Signs  There were no vitals filed for this visit        Current Medications       Current Outpatient Prescriptions:     amLODIPine (NORVASC) 5 mg tablet, Take 5 mg by mouth daily, Disp: , Rfl:     Ascorbic Acid (VITAMIN C) 100 MG tablet, Take by mouth, Disp: , Rfl:     aspirin 81 MG tablet, Take by mouth, Disp: , Rfl:     atorvastatin (LIPITOR) 40 mg tablet, Take 1 tablet by mouth daily with dinner, Disp: 30 tablet, Rfl: 4    buPROPion (WELLBUTRIN SR) 100 mg 12 hr tablet, , Disp: , Rfl: 0    buPROPion (WELLBUTRIN SR) 150 mg 12 hr tablet, , Disp: , Rfl: 0    buPROPion (WELLBUTRIN XL) 150 mg 24 hr tablet, Take 1 tablet by mouth daily, Disp: 60 tablet, Rfl: 0    buPROPion (WELLBUTRIN) 75 mg tablet, , Disp: , Rfl: 0    Cholecalciferol (VITAMIN D3) 1000 units CAPS, Take by mouth, Disp: , Rfl:     clopidogrel (PLAVIX) 75 mg tablet, Take 1 tablet (75 mg total) by mouth daily, Disp: 30 tablet, Rfl: 6    desloratadine (CLARINEX) 5 MG tablet, Take 5 mg by mouth daily at bedtime  , Disp: , Rfl:     glucosamine-chondroitin 500-400 MG tablet, Take 1 tablet by mouth daily, Disp: , Rfl:     Multiple Vitamin (MULTIVITAMIN) capsule, Take 1 capsule by mouth daily, Disp: , Rfl:     nicotine (NICODERM CQ) 14 mg/24hr TD 24 hr patch, Place 1 patch on the skin daily Apply a new patch every 24 hours to a clean, dry, hairless site on the upper arm or hip , Disp: 28 patch, Rfl: 0    nitroglycerin (NITROSTAT) 0 3 mg SL tablet, Place 0 3 mg under the tongue every 5 (five) minutes as needed for chest pain, Disp: , Rfl:     Omega-3 Fatty Acids (FISH OIL) 1,000 mg, Take by mouth, Disp: , Rfl:     ranitidine (ZANTAC) 150 MG capsule, Take 150 mg by mouth every evening, Disp: , Rfl:     torsemide (DEMADEX) 10 mg tablet, Take 1 tablet (10 mg total) by mouth daily, Disp: 30 tablet, Rfl: 5    Triamcinolone Acetonide 55 MCG/ACT AERO, into each nostril as needed, Disp: , Rfl:     valsartan (DIOVAN) 80 mg tablet, Take 1 tablet by mouth daily, Disp: , Rfl: 1      Active Problems     Patient Active Problem List   Diagnosis    Diabetes [facility] )    Hyperlipidemia    Essential hypertension    Sepsis ([facility] )    Abscess, abdomen    Sigmoid diverticulitis    Fatigue    Elevated liver enzymes    Polyarthralgia    Vesicocolonic fistula    Hydronephrosis    NATALYA (acute kidney injury) ([facility] )    Anemia    Diverticulitis of large intestine with abscess without bleeding    ACS (acute coronary syndrome) ([facility])    Obesity    Nicotine abuse    Chronic pain of both knees    Primary osteoarthritis of knees, bilateral    CAD (coronary artery disease)    Dyslipidemia    Exertional shortness of breath    Dizziness    Asymptomatic PVCs    Obstructive sleep apnea    Morbid obesity with BMI of 40 0-44 9, adult ([facility])         Past Medical History     Past Medical History:   Diagnosis Date    Anemia     Arthritis     Bundle branch block, right     CPAP (continuous positive airway pressure) dependence     Diabetes mellitus ([facility])     borderline, controlled with diet and activity    Diverticulitis     Diverticulitis of large intestine with abscess without bleeding 12/7/2016    GERD (gastroesophageal reflux disease)     Hyperlipidemia     Hypertension     Nasal septal deformity     Renal disorder     Seasonal allergies     Sleep apnea     wears c-pap         Surgical History     Past Surgical History:   Procedure Laterality Date    COLON SIGMOID RESECTION N/A 12/16/2016    Procedure: RESECTION COLON SIGMOID;  Surgeon: Lauren Chin MD;  [facility];  Service:     COLON SIGMOID RESECTION LAPAROSCOPIC N/A 12/16/2016    Procedure: RESECTION COLON SIGMOID LAPAROSCOPIC:CONVERTED TO [email or web address];  Surgeon: Lauren Chin MD;  [facility];  Service:     COLON SURGERY      COLONOSCOPY N/A 10/6/2016    Procedure: COLONOSCOPY;  Surgeon: Kapil Colunga MD;  Location: [facility] LAB; Service:    Deadra Kizzy CYSTOSCOPY W/ RETROGRADES Left 2/3/2017    Procedure: CYSTOSCOPY WITH BILATERAL RETROGRADES, LEFT STENT REMOVAL;  Surgeon: Luis F Ramriez MD;  Location: [address] [place] [address];  Service:     ESOPHAGOGASTRODUODENOSCOPY N/A 10/6/2016    Procedure: ESOPHAGOGASTRODUODENOSCOPY (EGD); Surgeon: Kapil Colunga MD;  Location: [facility] GI LAB;   Service:     HERNIA REPAIR      KNEE ARTHROSCOPY W/ MENISCECTOMY      [place] CYSTOURETHROSCOPY,URETER CATHETER Left 12/4/2016    Procedure: CYSTOSCOPY RETROGRADE PYELOGRAM WITH INSERTION STENT URETERAL;  Surgeon: Luis F Ramirez MD;  Location: [address] [place] [address];  Service: Urology    VARICOSE VEIN SURGERY           Family History     Family History   Problem Relation Age of Onset    Diabetes Brother     Thyroid cancer Brother     Osteoarthritis Mother     Atrial fibrillation Mother     Aortic aneurysm Father     Colon cancer Brother          Social History     Social History     History   Smoking Status    Former Smoker    Packs/day: 0 50    Years: 37 00    Types: Cigarettes    Quit date: 3/30/2018   Smokeless Tobacco    Never Used     Comment: patient refused for now; requit 3/20/18         Pertinent Lab Values     Lab Results   Component Value Date    CREATININE 1 16 06/01/2018       No results found for: PSA          Pertinent Imaging      There is no pertinent urological imaging for my review

## 2018-07-23 NOTE — PATIENT INSTRUCTIONS
Decision Aid for Benign Prostatic Hyperplasia   WHAT YOU NEED TO KNOW:   What do I need to know about decisions for benign prostatic hyperplasia (BPH)? You can work with your healthcare provider to make decisions about being screened or treated for BPH  Screening is a test done to find BPH early  Screening is different from diagnosis because screening is used before you first start to have signs or symptoms  This means management or treatment can start early  You can also help plan treatment if BPH is found with screening, or you develop it later on  Your treatment choices include nonsurgical options and surgery  Your healthcare provider may recommend nonsurgical treatments first  Learn about the benefits and risks of nonsurgical treatment and surgery so you can make an informed choice  What do I need to know about BPH?   · BPH is an enlarged prostate  The prostate is a small gland that is part of the reproductive system  It sits around your urethra (tube that carries urine out of your bladder)  The prostate is usually about the size of a walnut  An enlarged prostate will press on the urethra  This can cause problems with storing urine or emptying your bladder completely  · BPH is common in men older than 40 years  The risk increases with age  · Benign means it is not cancer  BPH is not a life-threatening condition, but it can cause problems with your daily activities  BPH usually gets worse over time  Left untreated, BPH can also lead to blood in your urine, bladder stones, or kidney failure  · Talk to your healthcare provider if you think you have signs or symptoms of BPH  Examples include problems starting your urine to flow, or an urgent need to urinate  You may be woken from sleep by the need to urinate  Am I a good candidate for BPH screening? Screening may be helpful for you if any of the following is true:  · You are 40 years or older      · You have a family history of BPH or other prostate problems  · You have symptoms of BPH that cause problems with your daily activities or quality of life  · You want to have treatment as early as possible if needed  · You have heart disease or take a beta-blocker medicine  How is screening done? · The International Prostate Symptom Score  is a set of questions about your ability to urinate over the past month  You will be asked how often you have any of the following:     ¨ A feeling of not fully emptying your bladder when you urinate    ¨ A need to urinate again within 2 hours after you last urinated    ¨ Urine that stops and starts several times when you urinate    ¨ An urgent need to urinate that you could not put off    ¨ A weak urine stream    ¨ Trouble starting your urine flow, or a need to push or strain to get it to start    ¨ Being woken from sleep because you needed to urinate    · A digital rectal exam  is used to check the size of your prostate  Your healthcare provider will insert a gloved finger into your rectum  The provider will be able to feel your prostate  The exam may be repeated over time to check the prostate size  · A PSA test  is used to measure the amount of a protein made by your prostate gland  A blood sample is taken for this test  A high PSA level can increase your risk for more severe urination problems or the need for surgery  What are the benefits and risks of screening? Talk with your healthcare provider about the risks and benefits of screening:  · Benefits  include finding BPH early  This means you can make more decisions about treatments  The PSA test can also find prostate cancer early  Treatment of prostate cancer is more successful when it starts early  · Risks  include a false belief that you will not develop BPH if your screening result is negative  Even though your screening result is negative, you may still develop it later on   You may also need more tests if you have problems urinating but screening shows you do not have BPH  What questions should I ask my healthcare provider to help me make decisions about screening? · How high is my risk for BPH? · How often do I need to have screening? · Where is the screening done? · Do I need to do anything to get ready to have screening? What happens after I have screening? You will meet with your healthcare provider to go over the results of your screening  You may need more tests to diagnose anything that showed up on the screening test  Common tests include a urine test to check for an infection or a biopsy (tissue sample) to check for cancer  You may also need tests to measure the amount of urine left in your bladder after you urinate  The force of your urine flow may also be measured  You and your healthcare provider can talk about your treatment options  Together you can decide which treatment is right for you  How is BPH treated, and what are the benefits of treatment? · Watchful waiting  means you do not receive treatment right away  Your signs and symptoms will be monitored over time to see if they get worse  Your healthcare provider may recommend ways to improve or track your symptoms during watchful waiting  Examples include drinking less liquid or urinating on a regular schedule  Your provider may also recommend lifestyle changes  For example, your symptoms may improve if you lose weight or have less caffeine if needed  You may be asked to keep a record of your symptoms and when you urinate  The record will include when you urinate, how easy or difficult it was, and any changes in urination  You will bring the record to follow-up visits to help you and your provider decide on treatment you may want to try  · Medicines  may be given to help your symptoms and to prevent BPH from getting worse  Medicines may help relax certain muscles to make it easier for you to urinate   You may also need medicine to make your prostate smaller or to relieve an overactive bladder  Medicines may start to relieve your symptoms quickly  Medicines can improve your quality of life  You may also be able to manage your symptoms without surgery if medicine keeps your BPH from getting worse  · Surgery  may be used to relieve your symptoms if other treatments do not work  An ablation is surgery that uses a needle to destroy extra tissue that is causing your symptoms  A laser may instead be used to destroy the tissue  Your prostate may be heated  A tool that gives off heat is inserted into your urethra  Prostate tissue is destroyed, and no other tissues are damaged  Parts of your prostate may be removed during another type of surgery  What are the risks of BPH treatment? · Watchful waiting  may allow BPH to get worse and be more difficult to treat than at an early stage  If you have a high PSA level, you may need to have BPH treated right away  · Medicines  can cause certain side effects, such as erectile dysfunction (ED) or a lowered sex drive  You may also develop urinary retention (trouble starting your urine to flow)  Some medicines can cause hypotension (low blood pressure) when you stand, or dizziness  · Surgery  can damage tissue around your prostate  Surgery can also increase your risk for trouble urinating, incontinence (leaking), or urinary retention  You may bleed more than expected during surgery or develop an infection  You may also need to be treated again if the surgery you have does not relieve your symptoms  What questions should I ask my provider to help me make decisions about treatment? · How will I feel if I continue to have these symptoms for the rest of my life? · Which medicines may work best to treat my symptoms? · What other steps can I take to decrease my symptoms? · Will I have to take medicine for the rest of my life? · What side effects might happen with each medicine I can try?     · Am I a good candidate for surgery? · Which surgery may work best to treat my symptoms? · Where is the surgery done? · How long is recovery after surgery? · What are the possible side effects of surgery? CARE AGREEMENT:   You have the right to help plan your care  Learn about your health condition and how it may be treated  Discuss treatment options with your caregivers to decide what care you want to receive  You always have the right to refuse treatment  The above information is an  only  It is not intended as medical advice for individual conditions or treatments  Talk to your doctor, nurse or pharmacist before following any medical regimen to see if it is safe and effective for you  © 2017 [facility] Information is for End User's use only and may not be sold, redistributed or otherwise used for commercial purposes  All illustrations and images included in CareNotes® are the copyrighted property of A D A M [employer]  or Jm Garcia

## 2018-07-25 ENCOUNTER — OFFICE VISIT (OUTPATIENT)
Dept: UROLOGY | Facility: CLINIC | Age: 58
End: 2018-07-25
Payer: COMMERCIAL

## 2018-07-25 VITALS
BODY MASS INDEX: 38.3 KG/M2 | HEART RATE: 60 BPM | WEIGHT: 273.6 LBS | HEIGHT: 71 IN | SYSTOLIC BLOOD PRESSURE: 122 MMHG | DIASTOLIC BLOOD PRESSURE: 78 MMHG

## 2018-07-25 DIAGNOSIS — N13.30 HYDRONEPHROSIS, UNSPECIFIED HYDRONEPHROSIS TYPE: ICD-10-CM

## 2018-07-25 DIAGNOSIS — R31.0 GROSS HEMATURIA: Primary | ICD-10-CM

## 2018-07-25 LAB
SL AMB  POCT GLUCOSE, UA: NORMAL
SL AMB LEUKOCYTE ESTERASE,UA: NORMAL
SL AMB POCT BILIRUBIN,UA: NORMAL
SL AMB POCT BLOOD,UA: NORMAL
SL AMB POCT CLARITY,UA: CLEAR
SL AMB POCT COLOR,UA: YELLOW
SL AMB POCT KETONES,UA: NORMAL
SL AMB POCT NITRITE,UA: NORMAL
SL AMB POCT PH,UA: 5
SL AMB POCT SPECIFIC GRAVITY,UA: 1.01
SL AMB POCT URINE PROTEIN: NORMAL
SL AMB POCT UROBILINOGEN: NORMAL

## 2018-07-25 PROCEDURE — 81002 URINALYSIS NONAUTO W/O SCOPE: CPT | Performed by: UROLOGY

## 2018-07-25 PROCEDURE — 99214 OFFICE O/P EST MOD 30 MIN: CPT | Performed by: UROLOGY

## 2018-07-25 NOTE — ASSESSMENT & PLAN NOTE
Patient with a history of severe diverticulitis necessitating surgery with extensive dissection around the ureter, recent ultrasound shows no hydronephrosis, the patient has no flank pain  I previously obtained a renal scan on him which showed essentially even split function between both kidneys and no signs of obstruction      Plan:  The patient is doing well, he does not require long-term imaging of his kidneys, his increasing creatinine to 1 31 is likely explained by his use of diuretics

## 2018-07-25 NOTE — ASSESSMENT & PLAN NOTE
Patient with a history of gross hematuria, completed a full workup which showed a vascular prostate as the cause of this hematuria  No further episodes of hematuria  His urinalysis is negative today      Plan:  The patient's AUA symptom score is 5 with a bother score of 0 indicating no indication for treatment with finasteride at this time

## 2018-07-25 NOTE — LETTER
July 25, 2018     Madyson Garcia [facility]  [address] [place] [address]    Patient: Jeniffer Cavazos   YOB: 1960   Date of Visit: 7/25/2018       Dear Dr Jonna Eagle: Thank you for referring Priti Booker to me for evaluation  Below are my notes for this consultation  If you have questions, please do not hesitate to call me  I look forward to following your patient along with you  Sincerely,        Socrates Wheeler MD        CC: No Recipients  Socrates Wheeler MD  7/25/2018  1:17 PM  Sign at close encounter       Problem List Items Addressed This Visit     Hydronephrosis     Patient with a history of severe diverticulitis necessitating surgery with extensive dissection around the ureter, recent ultrasound shows no hydronephrosis, the patient has no flank pain  I previously obtained a renal scan on him which showed essentially even split function between both kidneys and no signs of obstruction  Plan:  The patient is doing well, he does not require long-term imaging of his kidneys, his increasing creatinine to 1 31 is likely explained by his use of diuretics         Gross hematuria - Primary     Patient with a history of gross hematuria, completed a full workup which showed a vascular prostate as the cause of this hematuria  No further episodes of hematuria  His urinalysis is negative today      Plan:  The patient's AUA symptom score is 5 with a bother score of 0 indicating no indication for treatment with finasteride at this time         Relevant Orders    POCT urine dip (Completed)          The patient will follow up in 1 year, at that time we will recheck his AUA symptom score, and send him for PSA testing as part of prostate cancer screening      Assessment and plan:       Please see problem oriented charting for the assessment plan of today's urological complaints    Socrates Wheeler MD      Chief Complaint      Benign prostatic enlargement with LUTS  History of gross hematuria  History of hydronephrosis secondary to diverticulitis and subsequent surgery      History of Present Illness     Renetta Cabrera is a 56 y o   I have been following for gross hematuria as well as history of left hydronephrosis due to history of diverticulitis and surgery for this  I last saw him 6 months ago, he has been doing well since that time  AUA symptom score today is 5 with a bother score of 0 and he would not want any therapies for his lower urinary tract symptoms  Negative urinalysis    On review of systems today he denies dysuria, incontinence, hesitancy of urination, gross hematuria, urgency, nocturia present twice per night, he feels empty after voiding and stream quality is good  A previous cystoscopy showed a large vascular prostate as the cause of his gross hematuria and he has not had any further gross hematuria since that time  He has been placed on diuretics for peripheral edema and his creatinine increased slightly to 1 31, his primary care provider obtained a renal ultrasound which showed no hydronephrosis  A previous renal scan and showed even split renal function with no evidence of obstruction of the left renal unit  He is doing well overall    Detailed Urologic History     - please refer to HPI    Review of Systems     Review of Systems   Constitutional: Negative  HENT: Negative  Eyes: Negative  Respiratory: Negative  Cardiovascular: Positive for leg swelling  Gastrointestinal: Negative  Endocrine: Negative  Genitourinary: Negative  Musculoskeletal: Negative  Skin: Negative  Allergic/Immunologic: Negative  Neurological: Negative  Hematological: Negative  Psychiatric/Behavioral: Negative                Allergies     Allergies   Allergen Reactions    Levaquin [Levofloxacin In D5w] Swelling     Knee swelling    Sulfa Antibiotics GI Intolerance       Physical Exam     Physical Exam Constitutional: He is oriented to person, place, and time  He appears well-developed and well-nourished  No distress  HENT:   Head: Normocephalic and atraumatic  Right Ear: External ear normal    Left Ear: External ear normal    Mouth/Throat: No oropharyngeal exudate  Eyes: EOM are normal  Pupils are equal, round, and reactive to light  Right eye exhibits no discharge  Left eye exhibits no discharge  No scleral icterus  Neck: Normal range of motion  Neck supple  Cardiovascular: Regular rhythm and intact distal pulses  Pulmonary/Chest: Effort normal  No stridor  No respiratory distress  He has no wheezes  He exhibits no tenderness  Abdominal: Soft  He exhibits no distension and no mass  There is no tenderness  There is no rebound and no guarding  No hernia  There is no CVA tenderness   Musculoskeletal: Normal range of motion  He exhibits edema (Slight)  He exhibits no tenderness or deformity  Neurological: He is alert and oriented to person, place, and time  No cranial nerve deficit  Coordination normal    Skin: Skin is warm and dry  No rash noted  He is not diaphoretic  No erythema  No pallor  Psychiatric: He has a normal mood and affect  His behavior is normal  Judgment and thought content normal    Nursing note and vitals reviewed  Vital Signs  There were no vitals filed for this visit        Current Medications       Current Outpatient Prescriptions:     amLODIPine (NORVASC) 5 mg tablet, Take 5 mg by mouth daily, Disp: , Rfl:     Ascorbic Acid (VITAMIN C) 100 MG tablet, Take by mouth, Disp: , Rfl:     aspirin 81 MG tablet, Take by mouth, Disp: , Rfl:     atorvastatin (LIPITOR) 40 mg tablet, Take 1 tablet by mouth daily with dinner, Disp: 30 tablet, Rfl: 4    buPROPion (WELLBUTRIN SR) 100 mg 12 hr tablet, , Disp: , Rfl: 0    buPROPion (WELLBUTRIN SR) 150 mg 12 hr tablet, , Disp: , Rfl: 0    buPROPion (WELLBUTRIN XL) 150 mg 24 hr tablet, Take 1 tablet by mouth daily, Disp: 60 tablet, Rfl: 0    buPROPion (WELLBUTRIN) 75 mg tablet, , Disp: , Rfl: 0    Cholecalciferol (VITAMIN D3) 1000 units CAPS, Take by mouth, Disp: , Rfl:     clopidogrel (PLAVIX) 75 mg tablet, Take 1 tablet (75 mg total) by mouth daily, Disp: 30 tablet, Rfl: 6    desloratadine (CLARINEX) 5 MG tablet, Take 5 mg by mouth daily at bedtime  , Disp: , Rfl:     glucosamine-chondroitin 500-400 MG tablet, Take 1 tablet by mouth daily, Disp: , Rfl:     Multiple Vitamin (MULTIVITAMIN) capsule, Take 1 capsule by mouth daily, Disp: , Rfl:     nicotine (NICODERM CQ) 14 mg/24hr TD 24 hr patch, Place 1 patch on the skin daily Apply a new patch every 24 hours to a clean, dry, hairless site on the upper arm or hip , Disp: 28 patch, Rfl: 0    nitroglycerin (NITROSTAT) 0 3 mg SL tablet, Place 0 3 mg under the tongue every 5 (five) minutes as needed for chest pain, Disp: , Rfl:     Omega-3 Fatty Acids (FISH OIL) 1,000 mg, Take by mouth, Disp: , Rfl:     ranitidine (ZANTAC) 150 MG capsule, Take 150 mg by mouth every evening, Disp: , Rfl:     torsemide (DEMADEX) 10 mg tablet, Take 1 tablet (10 mg total) by mouth daily, Disp: 30 tablet, Rfl: 5    Triamcinolone Acetonide 55 MCG/ACT AERO, into each nostril as needed, Disp: , Rfl:     valsartan (DIOVAN) 80 mg tablet, Take 1 tablet by mouth daily, Disp: , Rfl: 1      Active Problems     Patient Active Problem List   Diagnosis    Diabetes mellitus ([facility] [address] )    Hyperlipidemia    Essential hypertension    Sepsis ([facility] )    Abscess, abdomen    Sigmoid diverticulitis    Fatigue    Elevated liver enzymes    Polyarthralgia    Vesicocolonic fistula    Hydronephrosis    NATALYA (acute kidney injury) ([facility] )    Anemia    Diverticulitis of large intestine with abscess without bleeding    ACS (acute coronary syndrome) (HCC)    Obesity    Nicotine abuse    Chronic pain of both knees    Primary osteoarthritis of knees, bilateral    CAD (coronary artery disease)    Dyslipidemia    Exertional shortness of breath    Dizziness    Asymptomatic PVCs    Obstructive sleep apnea    Morbid obesity with BMI of 40 0-44 9, adult [facility])         Past Medical History     Past Medical History:   Diagnosis Date    Anemia     Arthritis     Bundle branch block, right     CPAP (continuous positive airway pressure) dependence     Diabetes mellitus (HCC)     borderline, controlled with diet and activity    Diverticulitis     Diverticulitis of large intestine with abscess without bleeding 12/7/2016    GERD (gastroesophageal reflux disease)     Hyperlipidemia     Hypertension     Nasal septal deformity     Renal disorder     Seasonal allergies     Sleep apnea     wears c-pap         Surgical History     Past Surgical History:   Procedure Laterality Date    COLON SIGMOID RESECTION N/A 12/16/2016    Procedure: RESECTION COLON SIGMOID;  Surgeon: Charlie Damon MD;  [facility];  Service:     COLON SIGMOID RESECTION LAPAROSCOPIC N/A 12/16/2016    Procedure: RESECTION COLON SIGMOID LAPAROSCOPIC:CONVERTED TO [email or web address];  Surgeon: Charlie Damon MD;  [facility];  Service:     COLON SURGERY      COLONOSCOPY N/A 10/6/2016    Procedure: COLONOSCOPY;  Surgeon: Cas Morrissey MD;  Location: [facility] GI LAB; Service:    [facility] CYSTOSCOPY W/ RETROGRADES Left 2/3/2017    Procedure: CYSTOSCOPY WITH BILATERAL RETROGRADES, LEFT STENT REMOVAL;  Surgeon: Joyce Dodge MD;  Location: [address] [place] [address];  Service:     ESOPHAGOGASTRODUODENOSCOPY N/A 10/6/2016    Procedure: ESOPHAGOGASTRODUODENOSCOPY (EGD); Surgeon: Cas Morrissey MD;  Location: [facility] LAB;   Service:     HERNIA REPAIR      KNEE ARTHROSCOPY W/ MENISCECTOMY      MD CYSTOURETHROSCOPY,URETER CATHETER Left 12/4/2016    Procedure: CYSTOSCOPY RETROGRADE PYELOGRAM WITH INSERTION STENT URETERAL;  Surgeon: Joyce Dodge MD;  Location: [address] [place] [address];  Service: Urology    Alfa 57 History     Family History Problem Relation Age of Onset    Diabetes Brother     Thyroid cancer Brother     Osteoarthritis Mother     Atrial fibrillation Mother     Aortic aneurysm Father     Colon cancer Brother          Social History     Social History     History   Smoking Status    Former Smoker    Packs/day: 0 50    Years: 37 00    Types: Cigarettes    Quit date: 3/30/2018   Smokeless Tobacco    Never Used     Comment: patient refused for now; requit 3/20/18         Pertinent Lab Values     Lab Results   Component Value Date    CREATININE 1 16 06/01/2018       No results found for: PSA          Pertinent Imaging      There is no pertinent urological imaging for my review

## 2018-08-14 ENCOUNTER — OFFICE VISIT (OUTPATIENT)
Dept: CARDIOLOGY CLINIC | Facility: CLINIC | Age: 58
End: 2018-08-14
Payer: COMMERCIAL

## 2018-08-14 VITALS
OXYGEN SATURATION: 96 % | WEIGHT: 276.6 LBS | DIASTOLIC BLOOD PRESSURE: 74 MMHG | SYSTOLIC BLOOD PRESSURE: 140 MMHG | BODY MASS INDEX: 38.58 KG/M2 | HEART RATE: 51 BPM

## 2018-08-14 DIAGNOSIS — R00.1 BRADYCARDIA: Primary | ICD-10-CM

## 2018-08-14 PROCEDURE — 99214 OFFICE O/P EST MOD 30 MIN: CPT | Performed by: INTERNAL MEDICINE

## 2018-08-14 RX ORDER — LOSARTAN POTASSIUM 50 MG/1
50 TABLET ORAL DAILY
Refills: 0 | COMMUNITY
Start: 2018-08-10 | End: 2019-11-18 | Stop reason: SDUPTHER

## 2018-08-14 NOTE — LETTER
August 14, 2018     Samir Garcia [address]  [address] [place] [address]    Patient: Umm Harrison   YOB: 1960   Date of Visit: 8/14/2018       Dear Dr [facility]: Thank you for referring Ishan Morgan to me for evaluation  Below are my notes for this consultation  If you have questions, please do not hesitate to call me  I look forward to following your patient along with you  Sincerely,        Giselle Case DO        CC: No Recipients  Lloyd Ferguson DO  8/14/2018 10:55 PM  Sign at close encounter  Umm Harrison  1960  [id number]  [facility]  [facility] [address] [address]    Interval History:  Umm Harrison is a 56 y o  male who presents for routine coronary artery disease follow-up  He has no symptoms at this time  He denies any chest pressure/discomfort and shortness of breath  Previously was having dyspnea  that improved with torsemide  He denies any LE edema, orthopnea or PND  He continues to refrain from smoking  In November 2017, he underwent drug-eluting stent placement to left circumflex and OM 2 lesions after presenting to hospital with chest pain, elevated BP and ST depressions         Past Medical History:   Diagnosis Date    Anemia     Arthritis     Bundle branch block, right     CPAP (continuous positive airway pressure) dependence     Diabetes mellitus (HCC)     borderline, controlled with diet and activity    Diverticulitis     Diverticulitis of large intestine with abscess without bleeding 12/7/2016    GERD (gastroesophageal reflux disease)     Hyperlipidemia     Hypertension     Nasal septal deformity     Renal disorder     Seasonal allergies     Sleep apnea     wears c-pap     Past Surgical History:   Procedure Laterality Date    COLON SIGMOID RESECTION N/A 12/16/2016    Procedure: RESECTION COLON SIGMOID;  Surgeon: Parker Ritter MD; Location:  MAIN OR;  Service:     COLON SIGMOID RESECTION LAPAROSCOPIC N/A 12/16/2016    Procedure: RESECTION COLON SIGMOID LAPAROSCOPIC:CONVERTED TO [email or web address];  Surgeon: Marlon Ritchie MD;  Location:  MAIN OR;  Service:     COLON SURGERY      COLONOSCOPY N/A 10/6/2016    Procedure: COLONOSCOPY;  Surgeon: Kapil Colunga MD;  Location: [facility] GI LAB; Service:    Deadra Kizzy CYSTOSCOPY W/ RETROGRADES Left 2/3/2017    Procedure: CYSTOSCOPY WITH BILATERAL RETROGRADES, LEFT STENT REMOVAL;  Surgeon: Luis F Ramirez MD;  Location: [address] [place] [address];  Service:     ESOPHAGOGASTRODUODENOSCOPY N/A 10/6/2016    Procedure: ESOPHAGOGASTRODUODENOSCOPY (EGD); Surgeon: Kapil Colunga MD;  Location: [facility] GI LAB; Service:     HERNIA REPAIR      KNEE ARTHROSCOPY W/ MENISCECTOMY      [place] CYSTOURETHROSCOPY,URETER CATHETER Left 12/4/2016    Procedure: CYSTOSCOPY RETROGRADE PYELOGRAM WITH INSERTION STENT URETERAL;  Surgeon: Luis F Ramirez MD;  Location: [facility];  Service: Urology    VARICOSE VEIN SURGERY       Social History     Social History    Marital status: Single     Spouse name: N/A    Number of children: N/A    Years of education: N/A     Occupational History    Not on file  Social History Main Topics    Smoking status: Former Smoker     Packs/day: 0 50     Years: 37 00     Types: Cigarettes     Quit date: 3/30/2018    Smokeless tobacco: Never Used      Comment: patient refused for now; requit 3/30/18    Alcohol use Yes      Comment: occ 0-6    Drug use: No    Sexual activity: No     Other Topics Concern    Not on file     Social History Narrative    Lives alone       Family History   Problem Relation Age of Onset    Diabetes Brother     Thyroid cancer Brother     Osteoarthritis Mother     Atrial fibrillation Mother     Aortic aneurysm Father     Colon cancer Brother        Current Outpatient Prescriptions:     amLODIPine (NORVASC) 5 mg tablet, Take 5 mg by mouth daily, Disp: , Rfl:     Ascorbic Acid (VITAMIN C) 100 MG tablet, Take by mouth, Disp: , Rfl:     aspirin 81 MG tablet, Take by mouth, Disp: , Rfl:     atorvastatin (LIPITOR) 40 mg tablet, Take 1 tablet by mouth daily with dinner, Disp: 30 tablet, Rfl: 4    buPROPion (WELLBUTRIN SR) 100 mg 12 hr tablet, Take 75 mg by mouth every other day  , Disp: , Rfl: 0    Cholecalciferol (VITAMIN D3) 1000 units CAPS, Take by mouth, Disp: , Rfl:     clopidogrel (PLAVIX) 75 mg tablet, Take 1 tablet (75 mg total) by mouth daily, Disp: 30 tablet, Rfl: 6    desloratadine (CLARINEX) 5 MG tablet, Take 5 mg by mouth daily at bedtime  , Disp: , Rfl:     glucosamine-chondroitin 500-400 MG tablet, Take 1 tablet by mouth daily, Disp: , Rfl:     losartan (COZAAR) 50 mg tablet, Take 50 mg by mouth daily, Disp: , Rfl: 0    Multiple Vitamin (MULTIVITAMIN) capsule, Take 1 capsule by mouth daily, Disp: , Rfl:     nitroglycerin (NITROSTAT) 0 3 mg SL tablet, Place 0 3 mg under the tongue every 5 (five) minutes as needed for chest pain, Disp: , Rfl:     Omega-3 Fatty Acids (FISH OIL) 1,000 mg, Take by mouth, Disp: , Rfl:     ranitidine (ZANTAC) 150 MG capsule, Take 150 mg by mouth every evening, Disp: , Rfl:     torsemide (DEMADEX) 10 mg tablet, Take 1 tablet (10 mg total) by mouth daily, Disp: 30 tablet, Rfl: 5    Triamcinolone Acetonide 55 MCG/ACT AERO, into each nostril as needed, Disp: , Rfl:     buPROPion (WELLBUTRIN SR) 150 mg 12 hr tablet, , Disp: , Rfl: 0    buPROPion (WELLBUTRIN XL) 150 mg 24 hr tablet, Take 1 tablet by mouth daily (Patient not taking: Reported on 8/14/2018 ), Disp: 60 tablet, Rfl: 0    buPROPion (WELLBUTRIN) 75 mg tablet, , Disp: , Rfl: 0    nicotine (NICODERM CQ) 14 mg/24hr TD 24 hr patch, Place 1 patch on the skin daily Apply a new patch every 24 hours to a clean, dry, hairless site on the upper arm or hip   (Patient not taking: Reported on 8/14/2018 ), Disp: 28 patch, Rfl: 0  The following portions of the patient's history were reviewed and updated as appropriate: allergies, current medications, past family history, past medical history, past social history, past surgical history and problem list     Review of Systems:  Review of Systems   Constitutional: Negative for chills, fatigue and fever  HENT: Negative for congestion, nosebleeds and postnasal drip  Respiratory: Positive for shortness of breath  Negative for cough and chest tightness  Cardiovascular: Positive for leg swelling  Negative for chest pain and palpitations  Gastrointestinal: Negative for abdominal distention, abdominal pain, diarrhea, nausea and vomiting  Endocrine: Negative for polydipsia, polyphagia and polyuria  Musculoskeletal: Negative for gait problem and myalgias  Skin: Negative for color change, pallor and rash  Allergic/Immunologic: Negative for environmental allergies, food allergies and immunocompromised state  Neurological: Negative for dizziness, seizures, syncope and light-headedness  Hematological: Negative for adenopathy  Does not bruise/bleed easily  Psychiatric/Behavioral: Negative for dysphoric mood  The patient is not nervous/anxious  Physical Exam:  /74 (BP Location: Right arm, Patient Position: Sitting, Cuff Size: Large)   Pulse (!) 51   Wt 125 kg (276 lb 9 6 oz)   SpO2 96%   BMI 38 58 kg/m²      Physical Exam   Constitutional: He is oriented to person, place, and time  He appears well-developed  No distress  HENT:   Head: Normocephalic and atraumatic  Eyes: Conjunctivae and EOM are normal  Pupils are equal, round, and reactive to light  Neck: Neck supple  No JVD present  No thyromegaly present  Cardiovascular: Normal rate and regular rhythm  Exam reveals no gallop and no friction rub  Murmur heard  Pulmonary/Chest: Effort normal and breath sounds normal    Abdominal: Soft  He exhibits no distension  There is no tenderness  Musculoskeletal: He exhibits edema     Neurological: He is alert and oriented to person, place, and time  No cranial nerve deficit  Skin: Skin is warm and dry  No rash noted  He is not diaphoretic  No erythema  Psychiatric: He has a normal mood and affect  His behavior is normal  Judgment and thought content normal        Cardiographics  ECG: sinus bradycardia with Q waves inferiorly  LV Ejection Fraction: LV ejection fraction >= 40%  Lab Review  Lab Results   Component Value Date    CHOL 139 11/22/2017    CHOL 80 12/05/2016    TRIG 244 (H) 11/22/2017    TRIG 106 12/05/2016    HDL 33 (L) 11/22/2017    HDL 13 (L) 12/05/2016     Assessment/Plan   1  Exertional shortness of breath - he has LE edema present on exam as well  ? Diastolic CHF  - continue torsemide (DEMADEX) 10 mg tablet; Take 1 tablet (10 mg total) by mouth daily      2  Coronary artery disease involving native heart without angina pectoris, unspecified vessel or lesion type  - continue ASA and Plavix  3  Dizziness    4  Asymptomatic PVCs  - POCT ECG    5  Essential hypertension  - BP goal < 130/90     - will adjust medication regimen next visit if BP remains elevated  Continue losartan  - obtain blood work from Dr Temple Amen office  6  Mixed hyperlipidemia  - continue statin   - LDL goal < 70    7  Acute on chronic diastolic heart failure (HCC)  - torsemide (DEMADEX) 10 mg tablet; Take 1 tablet (10 mg total) by mouth daily  Dispense: 30 tablet;  Refill: 5

## 2018-08-14 NOTE — PROGRESS NOTES
Nelda Niño  1960  [id number]  [employer]  [facility] [address] [address]    Interval History:  Nelda Niño is a 56 y o  male who presents for routine coronary artery disease follow-up  He has no symptoms at this time  He denies any chest pressure/discomfort and shortness of breath  Previously was having dyspnea  that improved with torsemide  He denies any LE edema, orthopnea or PND  He continues to refrain from smoking  In November 2017, he underwent drug-eluting stent placement to left circumflex and OM 2 lesions after presenting to hospital with chest pain, elevated BP and ST depressions  Past Medical History:   Diagnosis Date    Anemia     Arthritis     Bundle branch block, right     CPAP (continuous positive airway pressure) dependence     Diabetes mellitus (HCC)     borderline, controlled with diet and activity    Diverticulitis     Diverticulitis of large intestine with abscess without bleeding 12/7/2016    GERD (gastroesophageal reflux disease)     Hyperlipidemia     Hypertension     Nasal septal deformity     Renal disorder     Seasonal allergies     Sleep apnea     wears c-pap     Past Surgical History:   Procedure Laterality Date    COLON SIGMOID RESECTION N/A 12/16/2016    Procedure: RESECTION COLON SIGMOID;  Surgeon: Shweta Marroquin MD;  [facility]: [facility];  Service:     COLON SIGMOID RESECTION LAPAROSCOPIC N/A 12/16/2016    Procedure: RESECTION COLON SIGMOID LAPAROSCOPIC:CONVERTED TO [email or web address];  Surgeon: Shweta Marroquin MD;  [facility]: [facility];  Service:     COLON SURGERY      COLONOSCOPY N/A 10/6/2016    Procedure: COLONOSCOPY;  Surgeon: Any Padgett MD;  [facility];   Service:     CYSTOSCOPY W/ RETROGRADES Left 2/3/2017    Procedure: CYSTOSCOPY WITH BILATERAL RETROGRADES, LEFT STENT REMOVAL;  Surgeon: Cathy Rojas MD;  Location: [address] [place] [address];  Service:  ESOPHAGOGASTRODUODENOSCOPY N/A 10/6/2016    Procedure: ESOPHAGOGASTRODUODENOSCOPY (EGD); Surgeon: Leopoldo Salazar MD;  Location: [facility] GI LAB; Service:     HERNIA REPAIR      KNEE ARTHROSCOPY W/ MENISCECTOMY      [place] CYSTOURETHROSCOPY,URETER CATHETER Left 12/4/2016    Procedure: CYSTOSCOPY RETROGRADE PYELOGRAM WITH INSERTION STENT URETERAL;  Surgeon: Alisha Corbin MD;  Location: [facility];  Service: Urology    VARICOSE VEIN SURGERY       Social History     Social History    Marital status: Single     Spouse name: N/A    Number of children: N/A    Years of education: N/A     Occupational History    Not on file  Social History Main Topics    Smoking status: Former Smoker     Packs/day: 0 50     Years: 37 00     Types: Cigarettes     Quit date: 3/30/2018    Smokeless tobacco: Never Used      Comment: patient refused for now; requit 3/30/18    Alcohol use Yes      Comment: occ 0-6    Drug use: No    Sexual activity: No     Other Topics Concern    Not on file     Social History Narrative    Lives alone       Family History   Problem Relation Age of Onset    Diabetes Brother     Thyroid cancer Brother     Osteoarthritis Mother     Atrial fibrillation Mother     Aortic aneurysm Father     Colon cancer Brother        Current Outpatient Prescriptions:     amLODIPine (NORVASC) 5 mg tablet, Take 5 mg by mouth daily, Disp: , Rfl:     Ascorbic Acid (VITAMIN C) 100 MG tablet, Take by mouth, Disp: , Rfl:     aspirin 81 MG tablet, Take by mouth, Disp: , Rfl:     atorvastatin (LIPITOR) 40 mg tablet, Take 1 tablet by mouth daily with dinner, Disp: 30 tablet, Rfl: 4    buPROPion (WELLBUTRIN SR) 100 mg 12 hr tablet, Take 75 mg by mouth every other day  , Disp: , Rfl: 0    Cholecalciferol (VITAMIN D3) 1000 units CAPS, Take by mouth, Disp: , Rfl:     clopidogrel (PLAVIX) 75 mg tablet, Take 1 tablet (75 mg total) by mouth daily, Disp: 30 tablet, Rfl: 6    desloratadine (CLARINEX) 5 MG tablet, Take 5 mg by mouth daily at bedtime  , Disp: , Rfl:     glucosamine-chondroitin 500-400 MG tablet, Take 1 tablet by mouth daily, Disp: , Rfl:     losartan (COZAAR) 50 mg tablet, Take 50 mg by mouth daily, Disp: , Rfl: 0    Multiple Vitamin (MULTIVITAMIN) capsule, Take 1 capsule by mouth daily, Disp: , Rfl:     nitroglycerin (NITROSTAT) 0 3 mg SL tablet, Place 0 3 mg under the tongue every 5 (five) minutes as needed for chest pain, Disp: , Rfl:     Omega-3 Fatty Acids (FISH OIL) 1,000 mg, Take by mouth, Disp: , Rfl:     ranitidine (ZANTAC) 150 MG capsule, Take 150 mg by mouth every evening, Disp: , Rfl:     torsemide (DEMADEX) 10 mg tablet, Take 1 tablet (10 mg total) by mouth daily, Disp: 30 tablet, Rfl: 5    Triamcinolone Acetonide 55 MCG/ACT AERO, into each nostril as needed, Disp: , Rfl:     buPROPion (WELLBUTRIN SR) 150 mg 12 hr tablet, , Disp: , Rfl: 0    buPROPion (WELLBUTRIN XL) 150 mg 24 hr tablet, Take 1 tablet by mouth daily (Patient not taking: Reported on 8/14/2018 ), Disp: 60 tablet, Rfl: 0    buPROPion (WELLBUTRIN) 75 mg tablet, , Disp: , Rfl: 0    nicotine (NICODERM CQ) 14 mg/24hr TD 24 hr patch, Place 1 patch on the skin daily Apply a new patch every 24 hours to a clean, dry, hairless site on the upper arm or hip  (Patient not taking: Reported on 8/14/2018 ), Disp: 28 patch, Rfl: 0  The following portions of the patient's history were reviewed and updated as appropriate: allergies, current medications, past family history, past medical history, past social history, past surgical history and problem list     Review of Systems:  Review of Systems   Constitutional: Negative for chills, fatigue and fever  HENT: Negative for congestion, nosebleeds and postnasal drip  Respiratory: Positive for shortness of breath  Negative for cough and chest tightness  Cardiovascular: Positive for leg swelling  Negative for chest pain and palpitations     Gastrointestinal: Negative for abdominal distention, abdominal pain, diarrhea, nausea and vomiting  Endocrine: Negative for polydipsia, polyphagia and polyuria  Musculoskeletal: Negative for gait problem and myalgias  Skin: Negative for color change, pallor and rash  Allergic/Immunologic: Negative for environmental allergies, food allergies and immunocompromised state  Neurological: Negative for dizziness, seizures, syncope and light-headedness  Hematological: Negative for adenopathy  Does not bruise/bleed easily  Psychiatric/Behavioral: Negative for dysphoric mood  The patient is not nervous/anxious  Physical Exam:  /74 (BP Location: Right arm, Patient Position: Sitting, Cuff Size: Large)   Pulse (!) 51   Wt 125 kg (276 lb 9 6 oz)   SpO2 96%   BMI 38 58 kg/m²     Physical Exam   Constitutional: He is oriented to person, place, and time  He appears well-developed  No distress  HENT:   Head: Normocephalic and atraumatic  Eyes: Conjunctivae and EOM are normal  Pupils are equal, round, and reactive to light  Neck: Neck supple  No JVD present  No thyromegaly present  Cardiovascular: Normal rate and regular rhythm  Exam reveals no gallop and no friction rub  Murmur heard  Pulmonary/Chest: Effort normal and breath sounds normal    Abdominal: Soft  He exhibits no distension  There is no tenderness  Musculoskeletal: He exhibits edema  Neurological: He is alert and oriented to person, place, and time  No cranial nerve deficit  Skin: Skin is warm and dry  No rash noted  He is not diaphoretic  No erythema  Psychiatric: He has a normal mood and affect  His behavior is normal  Judgment and thought content normal        Cardiographics  ECG: sinus bradycardia with Q waves inferiorly  LV Ejection Fraction: LV ejection fraction >= 40%         Lab Review  Lab Results   Component Value Date    CHOL 139 11/22/2017    CHOL 80 12/05/2016    TRIG 244 (H) 11/22/2017    TRIG 106 12/05/2016    HDL 33 (L) 11/22/2017    HDL 13 (L) 12/05/2016 Assessment/Plan   1  Exertional shortness of breath - he has LE edema present on exam as well  ? Diastolic CHF  - continue torsemide (DEMADEX) 10 mg tablet; Take 1 tablet (10 mg total) by mouth daily      2  Coronary artery disease involving native heart without angina pectoris, unspecified vessel or lesion type  - continue ASA and Plavix  3  Dizziness    4  Asymptomatic PVCs  - POCT ECG    5  Essential hypertension  - BP goal < 130/90     - will adjust medication regimen next visit if BP remains elevated  Continue losartan  - obtain blood work from Dr Neville Chou office  6  Mixed hyperlipidemia  - continue statin   - LDL goal < 70    7  Acute on chronic diastolic heart failure (HCC)  - torsemide (DEMADEX) 10 mg tablet; Take 1 tablet (10 mg total) by mouth daily  Dispense: 30 tablet;  Refill: 5

## 2018-09-07 ENCOUNTER — HOSPITAL ENCOUNTER (OUTPATIENT)
Dept: NON INVASIVE DIAGNOSTICS | Facility: HOSPITAL | Age: 58
Discharge: HOME | End: 2018-09-07
Attending: INTERNAL MEDICINE
Payer: COMMERCIAL

## 2018-09-07 DIAGNOSIS — R00.1 BRADYCARDIA: ICD-10-CM

## 2018-09-07 PROCEDURE — 93225 XTRNL ECG REC<48 HRS REC: CPT

## 2018-09-07 PROCEDURE — 93226 XTRNL ECG REC<48 HR SCAN A/R: CPT

## 2018-09-17 PROCEDURE — 93227 XTRNL ECG REC<48 HR R&I: CPT | Performed by: INTERNAL MEDICINE

## 2018-09-24 ENCOUNTER — TELEPHONE (OUTPATIENT)
Dept: CARDIOLOGY CLINIC | Facility: CLINIC | Age: 58
End: 2018-09-24

## 2018-09-28 ENCOUNTER — TELEPHONE (OUTPATIENT)
Dept: CARDIOLOGY CLINIC | Facility: CLINIC | Age: 58
End: 2018-09-28

## 2018-11-07 DIAGNOSIS — I50.33 ACUTE ON CHRONIC DIASTOLIC HEART FAILURE (HCC): ICD-10-CM

## 2018-11-07 DIAGNOSIS — R06.02 EXERTIONAL SHORTNESS OF BREATH: ICD-10-CM

## 2018-11-07 RX ORDER — TORSEMIDE 10 MG/1
10 TABLET ORAL DAILY
Qty: 90 TABLET | Refills: 3 | Status: SHIPPED | OUTPATIENT
Start: 2018-11-07 | End: 2018-11-12 | Stop reason: SDUPTHER

## 2018-11-07 RX ORDER — TORSEMIDE 10 MG/1
TABLET ORAL
Qty: 30 TABLET | Refills: 5 | Status: CANCELLED | OUTPATIENT
Start: 2018-11-07

## 2018-11-07 NOTE — TELEPHONE ENCOUNTER
Patient of Dr Gustavo Soriano only has a few days left of torsemide 10mg takes 1 tablet daily #30 to [facility]

## 2018-11-09 ENCOUNTER — OFFICE VISIT (OUTPATIENT)
Dept: OBGYN CLINIC | Facility: CLINIC | Age: 58
End: 2018-11-09
Payer: COMMERCIAL

## 2018-11-09 VITALS
SYSTOLIC BLOOD PRESSURE: 138 MMHG | HEIGHT: 71 IN | WEIGHT: 280.2 LBS | DIASTOLIC BLOOD PRESSURE: 66 MMHG | BODY MASS INDEX: 39.23 KG/M2 | HEART RATE: 49 BPM

## 2018-11-09 DIAGNOSIS — M17.0 BILATERAL PRIMARY OSTEOARTHRITIS OF KNEE: Primary | ICD-10-CM

## 2018-11-09 PROCEDURE — 99213 OFFICE O/P EST LOW 20 MIN: CPT | Performed by: ORTHOPAEDIC SURGERY

## 2018-11-09 NOTE — PROGRESS NOTES
Assessment/Plan:  1  Bilateral primary osteoarthritis of knee     2  BMI 39 0-39 9,adult       Ora is a pleasant 56year old male with bilateral knee osteoarthritis that is currently asymptomatic  He has done well in losing weight over the past 4 months, which likely has helped with his knee pain  Since he has remained active, is not using tylenol, and does not have any significant limitations in activities of daily living, we have agreed to defer repeat cortisone injections at this time  He understands that there is no role for prophylactic cortisone injections for arthritis  We reaffirmed that he should take tylenol and not NSAIDs if needed due to his plavix use  He will continue with his activity and weight loss efforts  He is welcome to call and return at any time for repeat injections if needed  All questions addressed today  Subjective: Bilateral knee follow up    Patient ID: Chasity Felipe is a 56 y o  male  Ora is a pleasant 56year old gentleman presenting today for follow up of his activity related bilateral knee pain due to his moderate to severe osteoarthritis  He underwent bilateral knee cortisone injections 4 months ago  He reports that he has remained active over the past 4 months and has not had any significant pain  He has not needed tylenol  He notes that he occasionally is slowed down on the stairs, but otherwise does not have any limitations in performing ADLs  He denies any mechanical symptoms or new injuries  Review of Systems   Constitutional: Negative  HENT: Negative  Eyes: Negative  Respiratory: Negative  Cardiovascular: Positive for leg swelling  Gastrointestinal: Negative  Endocrine: Negative  Genitourinary: Negative  Musculoskeletal: Positive for arthralgias and joint swelling  Skin: Negative  Allergic/Immunologic: Negative  Neurological: Negative  Hematological: Negative  Psychiatric/Behavioral: Negative            Past Medical History:   Diagnosis Date    Anemia     Arthritis     Bundle branch block, right     CPAP (continuous positive airway pressure) dependence     Diabetes mellitus (HCC)     borderline, controlled with diet and activity    Diverticulitis     Diverticulitis of large intestine with abscess without bleeding 12/7/2016    GERD (gastroesophageal reflux disease)     Hyperlipidemia     Hypertension     Nasal septal deformity     Renal disorder     Seasonal allergies     Sleep apnea     wears c-pap       Past Surgical History:   Procedure Laterality Date    COLON SIGMOID RESECTION N/A 12/16/2016    Procedure: RESECTION COLON SIGMOID;  Surgeon: Cole Conti MD;  Location:  MAIN OR;  Service:     COLON SIGMOID RESECTION LAPAROSCOPIC N/A 12/16/2016    Procedure: RESECTION COLON SIGMOID LAPAROSCOPIC:CONVERTED TO [email or web address];  Surgeon: Cole Conti MD;  Location:  MAIN OR;  Service:     COLON SURGERY      COLONOSCOPY N/A 10/6/2016    Procedure: COLONOSCOPY;  Surgeon: Glenn Melgar MD;  Location: [facility]; Service:    Paty Leisure CYSTOSCOPY W/ RETROGRADES Left 2/3/2017    Procedure: CYSTOSCOPY WITH BILATERAL RETROGRADES, LEFT STENT REMOVAL;  Surgeon: Jayson Garcia MD;  Location: [address] [place] [address];  Service:     ESOPHAGOGASTRODUODENOSCOPY N/A 10/6/2016    Procedure: ESOPHAGOGASTRODUODENOSCOPY (EGD); Surgeon: Glenn Melgar MD;  Location: [facility];   Service:     HERNIA REPAIR      KNEE ARTHROSCOPY W/ MENISCECTOMY      CT CYSTOURETHROSCOPY,URETER CATHETER Left 12/4/2016    Procedure: CYSTOSCOPY RETROGRADE PYELOGRAM WITH INSERTION STENT URETERAL;  Surgeon: Jayson Garcia MD;  Location: [facility];  Service: Urology    VARICOSE VEIN SURGERY         Family History   Problem Relation Age of Onset    Diabetes Brother     Thyroid cancer Brother     Osteoarthritis Mother     Atrial fibrillation Mother     Aortic aneurysm Father     Colon cancer Brother        Social History     Occupational History  Not on file       Social History Main Topics    Smoking status: Former Smoker     Packs/day: 0 50     Years: 37 00     Types: Cigarettes     Quit date: 3/30/2018    Smokeless tobacco: Never Used      Comment:  requit 3/30/18    Alcohol use Yes      Comment: occ 0-6    Drug use: No    Sexual activity: No         Current Outpatient Prescriptions:     amLODIPine (NORVASC) 5 mg tablet, Take 5 mg by mouth daily, Disp: , Rfl:     Ascorbic Acid (VITAMIN C) 100 MG tablet, Take by mouth, Disp: , Rfl:     aspirin 81 MG tablet, Take by mouth, Disp: , Rfl:     Cholecalciferol (VITAMIN D3) 1000 units CAPS, Take by mouth, Disp: , Rfl:     clopidogrel (PLAVIX) 75 mg tablet, Take 1 tablet (75 mg total) by mouth daily, Disp: 30 tablet, Rfl: 6    desloratadine (CLARINEX) 5 MG tablet, Take 5 mg by mouth daily at bedtime  , Disp: , Rfl:     glucosamine-chondroitin 500-400 MG tablet, Take 1 tablet by mouth daily, Disp: , Rfl:     losartan (COZAAR) 50 mg tablet, Take 50 mg by mouth daily, Disp: , Rfl: 0    Multiple Vitamin (MULTIVITAMIN) capsule, Take 1 capsule by mouth daily, Disp: , Rfl:     nitroglycerin (NITROSTAT) 0 3 mg SL tablet, Place 0 3 mg under the tongue every 5 (five) minutes as needed for chest pain, Disp: , Rfl:     Omega-3 Fatty Acids (FISH OIL) 1,000 mg, Take by mouth, Disp: , Rfl:     ranitidine (ZANTAC) 150 MG capsule, Take 150 mg by mouth every evening, Disp: , Rfl:     torsemide (DEMADEX) 10 mg tablet, Take 1 tablet (10 mg total) by mouth daily, Disp: 90 tablet, Rfl: 3    Triamcinolone Acetonide 55 MCG/ACT AERO, into each nostril as needed, Disp: , Rfl:     atorvastatin (LIPITOR) 40 mg tablet, Take 1 tablet by mouth daily with dinner (Patient not taking: Reported on 11/9/2018 ), Disp: 30 tablet, Rfl: 4    buPROPion (WELLBUTRIN SR) 100 mg 12 hr tablet, Take 75 mg by mouth every other day  , Disp: , Rfl: 0    buPROPion (WELLBUTRIN SR) 150 mg 12 hr tablet, , Disp: , Rfl: 0    buPROPion (WELLBUTRIN XL) 150 mg 24 hr tablet, Take 1 tablet by mouth daily (Patient not taking: Reported on 11/9/2018 ), Disp: 60 tablet, Rfl: 0    buPROPion (WELLBUTRIN) 75 mg tablet, , Disp: , Rfl: 0    nicotine (NICODERM CQ) 14 mg/24hr TD 24 hr patch, Place 1 patch on the skin daily Apply a new patch every 24 hours to a clean, dry, hairless site on the upper arm or hip  (Patient not taking: Reported on 11/9/2018 ), Disp: 28 patch, Rfl: 0    Allergies   Allergen Reactions    Levaquin [Levofloxacin In D5w] Swelling     Knee swelling    Sulfa Antibiotics GI Intolerance       Objective:  Vitals:    11/09/18 0927   BP: 138/66   Pulse: (!) 49       Body mass index is 39 08 kg/m²  Right Knee Exam     Tenderness   The patient is experiencing no tenderness  Range of Motion   Extension:  0 normal   Flexion:  120 normal     Muscle Strength     The patient has normal right knee strength  Tests   Juana:  Medial - negative Lateral - negative  Lachman:  Anterior - negative      Drawer:       Anterior - negative      Varus: negative  Valgus: negative  Patellar Apprehension: negative    Other   Erythema: absent  Scars: absent  Sensation: normal  Pulse: present  Swelling: none  Other tests: no effusion present    Comments:  Bilateral crepitus with PROM  Ambulates with symmetrical gait  Thigh and calf soft and nontender      Left Knee Exam     Tenderness   The patient is experiencing no tenderness  Range of Motion   Extension:  0 normal   Flexion:  120 normal     Muscle Strength     The patient has normal left knee strength  Tests   Juana:  Medial - negative Lateral - negative  Lachman:  Anterior - negative      Drawer:       Anterior - negative       Varus: negative  Valgus: negative  Patellar Apprehension: negative    Other   Erythema: absent  Scars: absent  Sensation: normal  Pulse: present  Swelling: none  Effusion: no effusion present          Observations   Left Knee   Negative for effusion  Right Knee   Negative for effusion  Physical Exam   Constitutional: He is oriented to person, place, and time  He appears well-developed and well-nourished  Body mass index is 39 08 kg/m²  HENT:   Head: Normocephalic and atraumatic  Eyes: EOM are normal    Neck: Normal range of motion  Cardiovascular: Intact distal pulses  Pulmonary/Chest: Effort normal    Musculoskeletal:        Right knee: He exhibits no effusion  Left knee: He exhibits no effusion  See ortho exam   Neurological: He is alert and oriented to person, place, and time  Skin: Skin is warm and dry  Psychiatric: He has a normal mood and affect   His behavior is normal  Judgment and thought content normal

## 2018-11-12 DIAGNOSIS — I50.33 ACUTE ON CHRONIC DIASTOLIC HEART FAILURE (HCC): ICD-10-CM

## 2018-11-12 DIAGNOSIS — R06.02 EXERTIONAL SHORTNESS OF BREATH: ICD-10-CM

## 2018-11-13 RX ORDER — TORSEMIDE 10 MG/1
10 TABLET ORAL DAILY
Qty: 30 TABLET | Refills: 5 | Status: SHIPPED | OUTPATIENT
Start: 2018-11-13 | End: 2020-01-10

## 2018-11-23 LAB — HBA1C MFR BLD HPLC: 6.2 %

## 2019-01-09 DIAGNOSIS — I25.10 CORONARY ARTERY DISEASE INVOLVING NATIVE CORONARY ARTERY OF NATIVE HEART WITHOUT ANGINA PECTORIS: ICD-10-CM

## 2019-01-09 RX ORDER — CLOPIDOGREL BISULFATE 75 MG/1
TABLET ORAL
Qty: 30 TABLET | Refills: 6 | Status: SHIPPED | OUTPATIENT
Start: 2019-01-09 | End: 2019-08-06 | Stop reason: SDUPTHER

## 2019-04-15 ENCOUNTER — OFFICE VISIT (OUTPATIENT)
Dept: URGENT CARE | Facility: CLINIC | Age: 59
End: 2019-04-15
Payer: COMMERCIAL

## 2019-04-15 VITALS
SYSTOLIC BLOOD PRESSURE: 146 MMHG | HEIGHT: 71 IN | DIASTOLIC BLOOD PRESSURE: 80 MMHG | WEIGHT: 279 LBS | HEART RATE: 88 BPM | TEMPERATURE: 100.4 F | OXYGEN SATURATION: 99 % | RESPIRATION RATE: 20 BRPM | BODY MASS INDEX: 39.06 KG/M2

## 2019-04-15 DIAGNOSIS — J06.9 ACUTE URI: Primary | ICD-10-CM

## 2019-04-15 PROCEDURE — 99213 OFFICE O/P EST LOW 20 MIN: CPT | Performed by: PHYSICIAN ASSISTANT

## 2019-04-15 RX ORDER — GUAIFENESIN 600 MG
1200 TABLET, EXTENDED RELEASE 12 HR ORAL EVERY 12 HOURS SCHEDULED
Qty: 20 TABLET | Refills: 0 | Status: SHIPPED | COMMUNITY
Start: 2019-04-15 | End: 2019-09-27

## 2019-04-16 ENCOUNTER — OFFICE VISIT (OUTPATIENT)
Dept: URGENT CARE | Facility: CLINIC | Age: 59
End: 2019-04-16
Payer: COMMERCIAL

## 2019-04-16 VITALS
OXYGEN SATURATION: 100 % | BODY MASS INDEX: 39.06 KG/M2 | HEIGHT: 71 IN | HEART RATE: 80 BPM | TEMPERATURE: 98.6 F | DIASTOLIC BLOOD PRESSURE: 80 MMHG | WEIGHT: 279 LBS | SYSTOLIC BLOOD PRESSURE: 140 MMHG | RESPIRATION RATE: 20 BRPM

## 2019-04-16 DIAGNOSIS — J06.9 UPPER RESPIRATORY INFECTION, VIRAL: ICD-10-CM

## 2019-04-16 DIAGNOSIS — J20.9 ACUTE BRONCHITIS, UNSPECIFIED ORGANISM: Primary | ICD-10-CM

## 2019-04-16 PROCEDURE — 99213 OFFICE O/P EST LOW 20 MIN: CPT | Performed by: PHYSICIAN ASSISTANT

## 2019-04-16 PROCEDURE — 3725F SCREEN DEPRESSION PERFORMED: CPT | Performed by: PHYSICIAN ASSISTANT

## 2019-04-16 RX ORDER — ALBUTEROL SULFATE 90 UG/1
2 AEROSOL, METERED RESPIRATORY (INHALATION) EVERY 4 HOURS PRN
Qty: 1 INHALER | Refills: 0 | Status: SHIPPED | OUTPATIENT
Start: 2019-04-16 | End: 2021-06-04 | Stop reason: SDUPTHER

## 2019-04-16 RX ORDER — BROMPHENIRAMINE MALEATE, PSEUDOEPHEDRINE HYDROCHLORIDE, AND DEXTROMETHORPHAN HYDROBROMIDE 2; 30; 10 MG/5ML; MG/5ML; MG/5ML
5 SYRUP ORAL 4 TIMES DAILY PRN
Qty: 118 ML | Refills: 0 | Status: SHIPPED | OUTPATIENT
Start: 2019-04-16 | End: 2019-09-27

## 2019-04-16 RX ORDER — PREDNISONE 50 MG/1
50 TABLET ORAL DAILY
Qty: 5 TABLET | Refills: 0 | Status: SHIPPED | OUTPATIENT
Start: 2019-04-16 | End: 2019-04-21

## 2019-06-14 LAB — HBA1C MFR BLD HPLC: 6.4 %

## 2019-06-28 ENCOUNTER — TELEPHONE (OUTPATIENT)
Dept: CARDIOLOGY CLINIC | Facility: CLINIC | Age: 59
End: 2019-06-28

## 2019-06-28 ENCOUNTER — OFFICE VISIT (OUTPATIENT)
Dept: PULMONOLOGY | Facility: MEDICAL CENTER | Age: 59
End: 2019-06-28
Payer: COMMERCIAL

## 2019-06-28 VITALS
HEIGHT: 72 IN | DIASTOLIC BLOOD PRESSURE: 64 MMHG | OXYGEN SATURATION: 96 % | HEART RATE: 47 BPM | TEMPERATURE: 97.4 F | SYSTOLIC BLOOD PRESSURE: 122 MMHG | WEIGHT: 284 LBS | BODY MASS INDEX: 38.47 KG/M2 | RESPIRATION RATE: 12 BRPM

## 2019-06-28 DIAGNOSIS — J30.1 SEASONAL ALLERGIC RHINITIS DUE TO POLLEN: ICD-10-CM

## 2019-06-28 DIAGNOSIS — G47.33 OBSTRUCTIVE SLEEP APNEA: Primary | ICD-10-CM

## 2019-06-28 DIAGNOSIS — I25.10 CORONARY ARTERY DISEASE INVOLVING NATIVE CORONARY ARTERY OF NATIVE HEART WITHOUT ANGINA PECTORIS: ICD-10-CM

## 2019-06-28 PROBLEM — R31.0 GROSS HEMATURIA: Status: RESOLVED | Noted: 2018-07-25 | Resolved: 2019-06-28

## 2019-06-28 PROCEDURE — 99214 OFFICE O/P EST MOD 30 MIN: CPT | Performed by: INTERNAL MEDICINE

## 2019-06-28 RX ORDER — MOMETASONE FUROATE 50 UG/1
2 SPRAY, METERED NASAL DAILY PRN
Qty: 1 ACT | Refills: 5 | Status: SHIPPED | OUTPATIENT
Start: 2019-06-28 | End: 2021-01-19

## 2019-07-11 ENCOUNTER — OFFICE VISIT (OUTPATIENT)
Dept: UROLOGY | Facility: CLINIC | Age: 59
End: 2019-07-11
Payer: COMMERCIAL

## 2019-07-11 VITALS
BODY MASS INDEX: 37.48 KG/M2 | HEIGHT: 73 IN | HEART RATE: 68 BPM | SYSTOLIC BLOOD PRESSURE: 128 MMHG | WEIGHT: 282.8 LBS | DIASTOLIC BLOOD PRESSURE: 80 MMHG

## 2019-07-11 DIAGNOSIS — R31.0 GROSS HEMATURIA: Primary | ICD-10-CM

## 2019-07-11 LAB
SL AMB  POCT GLUCOSE, UA: NORMAL
SL AMB LEUKOCYTE ESTERASE,UA: NORMAL
SL AMB POCT BILIRUBIN,UA: NORMAL
SL AMB POCT BLOOD,UA: NORMAL
SL AMB POCT CLARITY,UA: CLEAR
SL AMB POCT COLOR,UA: YELLOW
SL AMB POCT KETONES,UA: NORMAL
SL AMB POCT NITRITE,UA: NORMAL
SL AMB POCT PH,UA: 6
SL AMB POCT SPECIFIC GRAVITY,UA: 1.01
SL AMB POCT URINE PROTEIN: NORMAL
SL AMB POCT UROBILINOGEN: NORMAL

## 2019-07-11 PROCEDURE — 99213 OFFICE O/P EST LOW 20 MIN: CPT | Performed by: PHYSICIAN ASSISTANT

## 2019-07-11 PROCEDURE — 81002 URINALYSIS NONAUTO W/O SCOPE: CPT | Performed by: PHYSICIAN ASSISTANT

## 2019-07-11 RX ORDER — FEXOFENADINE HCL 180 MG/1
90 TABLET ORAL DAILY
COMMUNITY
End: 2019-09-27 | Stop reason: SDUPTHER

## 2019-07-11 NOTE — PROGRESS NOTES
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    2  History of hydronephrosis  - resolved on last ultrasound of the kidney and bladder (2018)  - renal Lasix scan 2017 with even split renal function and negative for obstruction    We will try to obtain patient's PSA to ensure that it is within normal limits  He will be contacted should his PSA be elevated and he needs to return  Otherwise patient can continue with routine prostate cancer screening with his primary care provider  He will follow up in with Urology on an as-needed basis  Encouraged him to contact us with any urologic concern  All questions answered  Joshua Hay PA-C      Chief Complaint     F/u gross hematuria and hydronephrosis    History of Present Illness     Renetta Cabrera is a 66 y o  male patient Dr Patricia Gooden with a history of gross hematuria and hydronephrosis presenting for follow-up  Patient previously had an episode of gross hematuria  He is status post a complete workup including CT urogram and cystoscopy (01/23/2018) which was negative for genitourinary malignancy  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  Patient has a history hydronephrosis  Previous renal scan revealed even split renal function no evidence of obstruction of his left renal unit  Patient does have a history of severe diverticulitis necessity knee surgery and extensive dissection around the ureter  On his last ultrasound of the kidney and bladder 07/06/2018 his hydronephrosis was not present  Patient states that he had a PSA obtained recently, however I do not have access this value  Patient is overall comfortable with his urination at this time    Denies any dysuria, gross hematuria, suprapubic pressure, flank pain, fevers, or chills  Feels like he is emptying his bladder well after urination  Laboratory     Lab Results   Component Value Date    CREATININE 1 16 06/01/2018     Review of Systems     Review of Systems   Constitutional: Negative for activity change, appetite change, chills, diaphoresis, fatigue, fever and unexpected weight change  Respiratory: Negative for chest tightness and shortness of breath  Cardiovascular: Negative for chest pain, palpitations and leg swelling  Gastrointestinal: Negative for abdominal distention, abdominal pain, constipation, diarrhea, nausea and vomiting  Genitourinary: Negative for decreased urine volume, difficulty urinating, dysuria, enuresis, flank pain, frequency, genital sores, hematuria and urgency  Musculoskeletal: Negative for back pain, gait problem and myalgias  Skin: Negative for color change, pallor, rash and wound  Psychiatric/Behavioral: Negative for behavioral problems  The patient is not nervous/anxious  Allergies     Allergies   Allergen Reactions    Levaquin [Levofloxacin In D5w] Swelling     Knee swelling    Sulfa Antibiotics GI Intolerance     Abdominal pain         Physical Exam     Physical Exam   Constitutional: He is oriented to person, place, and time  He appears well-developed and well-nourished  No distress  HENT:   Head: Normocephalic and atraumatic  Eyes: Conjunctivae are normal    Neck: Normal range of motion  No tracheal deviation present  Pulmonary/Chest: Effort normal    Musculoskeletal: Normal range of motion  He exhibits no edema or deformity  Neurological: He is alert and oriented to person, place, and time  Skin: Skin is warm and dry  No rash noted  He is not diaphoretic  No erythema  Psychiatric: He has a normal mood and affect   His behavior is normal          Vital Signs     Vitals:    07/11/19 1255   BP: 128/80   Pulse: 68   Weight: 128 kg (282 lb 12 8 oz)   Height: 6' 1" (1 854 m)         Current Medications       Current Outpatient Medications:     albuterol (PROVENTIL HFA,VENTOLIN HFA) 90 mcg/act inhaler, Inhale 2 puffs every 4 (four) hours as needed for wheezing or shortness of breath, Disp: 1 Inhaler, Rfl: 0    amLODIPine (NORVASC) 5 mg tablet, Take 5 mg by mouth daily, Disp: , Rfl:     Ascorbic Acid (VITAMIN C) 100 MG tablet, Take 500 mg by mouth , Disp: , Rfl:     aspirin 81 MG tablet, Take by mouth, Disp: , Rfl:     atorvastatin (LIPITOR) 40 mg tablet, Take 1 tablet by mouth daily with dinner, Disp: 30 tablet, Rfl: 4    Cholecalciferol (VITAMIN D3) 1000 units CAPS, Take by mouth, Disp: , Rfl:     clopidogrel (PLAVIX) 75 mg tablet, TAKE 1 TABLET BY MOUTH ONCE DAILY, Disp: 30 tablet, Rfl: 6    desloratadine (CLARINEX) 5 MG tablet, Take 5 mg by mouth daily at bedtime  , Disp: , Rfl:     fexofenadine (ALLEGRA) 180 MG tablet, Take 90 mg by mouth daily, Disp: , Rfl:     glucosamine-chondroitin 500-400 MG tablet, Take 1 tablet by mouth daily, Disp: , Rfl:     losartan (COZAAR) 50 mg tablet, Take 50 mg by mouth daily, Disp: , Rfl: 0    mometasone (NASONEX) 50 mcg/act nasal spray, 2 sprays into each nostril daily as needed (nasal congestion), Disp: 1 Act, Rfl: 5    Multiple Vitamin (MULTIVITAMIN) capsule, Take 1 capsule by mouth daily, Disp: , Rfl:     nitroglycerin (NITROSTAT) 0 3 mg SL tablet, Place 0 3 mg under the tongue every 5 (five) minutes as needed for chest pain, Disp: , Rfl:     Omega-3 Fatty Acids (FISH OIL) 1,000 mg, Take by mouth, Disp: , Rfl:     ranitidine (ZANTAC) 150 MG capsule, Take 150 mg by mouth every evening, Disp: , Rfl:     torsemide (DEMADEX) 10 mg tablet, Take 1 tablet (10 mg total) by mouth daily, Disp: 30 tablet, Rfl: 5    brompheniramine-pseudoephedrine-DM 30-2-10 MG/5ML syrup, Take 5 mL by mouth 4 (four) times a day as needed for cough (Patient not taking: Reported on 6/28/2019), Disp: 118 mL, Rfl: 0    guaiFENesin (MUCINEX) 600 mg 12 hr tablet, Take 2 tablets (1,200 mg total) by mouth every 12 (twelve) hours (Patient not taking: Reported on 6/28/2019), Disp: 20 tablet, Rfl: 0      Active Problems     Patient Active Problem List   Diagnosis    Diabetes mellitus ([facility] )    Hyperlipidemia    Essential hypertension    Sepsis ([facility] )    Sigmoid diverticulitis    Fatigue    Elevated liver enzymes    Polyarthralgia    Vesicocolonic fistula    Hydronephrosis    NATALYA (acute kidney injury) ([facility] )    Anemia    Diverticulitis of large intestine with abscess without bleeding    ACS (acute coronary syndrome) ([facility])    Obesity    Nicotine abuse    Chronic pain of both knees    Primary osteoarthritis of knees, bilateral    CAD (coronary artery disease)    Dyslipidemia    Exertional shortness of breath    Dizziness    Asymptomatic PVCs    Obstructive sleep apnea    BMI 39 0-39 9,adult    Seasonal allergic rhinitis due to pollen         Past Medical History     Past Medical History:   Diagnosis Date    Allergic rhinitis     Anemia     Arthritis     Bundle branch block, right     CAD (coronary artery disease)     CPAP (continuous positive airway pressure) dependence     Diabetes mellitus (HCC)     borderline, controlled with diet and activity    Diverticulitis     Diverticulitis of large intestine with abscess without bleeding 12/7/2016    GERD (gastroesophageal reflux disease)     Hyperlipidemia     Hypertension     Nasal septal deformity     Peptic ulceration     gastric    Pneumonia     Renal disorder     Seasonal allergies     Sleep apnea     wears c-pap    Urinary tract infection     Vitamin D deficiency          Surgical History     Past Surgical History:   Procedure Laterality Date    COLON SIGMOID RESECTION N/A 12/16/2016    Procedure: RESECTION COLON SIGMOID;  Surgeon: Shahriar Prakash MD;  [facility];  Service:     COLON SIGMOID RESECTION LAPAROSCOPIC N/A 12/16/2016    Procedure: RESECTION COLON SIGMOID LAPAROSCOPIC:CONVERTED TO [email or web address];  Surgeon: Christin Portillo MD;  Location:  MAIN OR;  Service:     COLON SURGERY      COLONOSCOPY N/A 10/6/2016    Procedure: COLONOSCOPY;  Surgeon: Louis Santana MD;  Location: [place] [address] GI LAB; Service:    Edvin Juliaetta CYSTOSCOPY W/ RETROGRADES Left 2/3/2017    Procedure: CYSTOSCOPY WITH BILATERAL RETROGRADES, LEFT STENT REMOVAL;  Surgeon: Mustapha Mcdaniel MD;  Location: [address] [place] [address];  Service:     ESOPHAGOGASTRODUODENOSCOPY N/A 10/6/2016    Procedure: ESOPHAGOGASTRODUODENOSCOPY (EGD); Surgeon: Louis Santana MD;  Location: [place] [address] GI LAB;   Service:     HERNIA REPAIR      KNEE ARTHROSCOPY W/ MENISCECTOMY      LA CYSTOURETHROSCOPY,URETER CATHETER Left 12/4/2016    Procedure: CYSTOSCOPY RETROGRADE PYELOGRAM WITH INSERTION STENT URETERAL;  Surgeon: Mustapha Mcdaniel MD;  Location: [facility];  Service: Urology    VARICOSE VEIN SURGERY           Family History     Family History   Problem Relation Age of Onset    Diabetes Brother     Thyroid cancer Brother     Osteoarthritis Mother     Atrial fibrillation Mother     Aortic aneurysm Father     Colon cancer Brother          Social History     Social History       Radiology

## 2019-07-14 ENCOUNTER — TELEPHONE (OUTPATIENT)
Dept: UROLOGY | Facility: CLINIC | Age: 59
End: 2019-07-14

## 2019-07-15 NOTE — TELEPHONE ENCOUNTER
Patient reports that he had a PSA obtained at [facility]   Please contact them for this result  Thank you

## 2019-07-15 NOTE — TELEPHONE ENCOUNTER
Called [facility]   Received fax result PSA from 4/12/19 = 0 5    Copy Emailed to Bre Sanders and placed to be scanned to chart

## 2019-07-15 NOTE — TELEPHONE ENCOUNTER
Please let the patient know that his PSA is 0 5, within normal range  Continue routine prostate cancer screening annually with PCP as discussed at his appointment  Thank you

## 2019-07-16 ENCOUNTER — OFFICE VISIT (OUTPATIENT)
Dept: CARDIOLOGY CLINIC | Facility: CLINIC | Age: 59
End: 2019-07-16
Payer: COMMERCIAL

## 2019-07-16 VITALS
DIASTOLIC BLOOD PRESSURE: 82 MMHG | WEIGHT: 284 LBS | HEART RATE: 77 BPM | BODY MASS INDEX: 37.64 KG/M2 | SYSTOLIC BLOOD PRESSURE: 136 MMHG | HEIGHT: 73 IN | OXYGEN SATURATION: 97 %

## 2019-07-16 DIAGNOSIS — I25.10 CORONARY ARTERY DISEASE, ANGINA PRESENCE UNSPECIFIED, UNSPECIFIED VESSEL OR LESION TYPE, UNSPECIFIED WHETHER NATIVE OR TRANSPLANTED HEART: Primary | ICD-10-CM

## 2019-07-16 DIAGNOSIS — I50.33 ACUTE ON CHRONIC DIASTOLIC CONGESTIVE HEART FAILURE (HCC): ICD-10-CM

## 2019-07-16 DIAGNOSIS — I10 ESSENTIAL HYPERTENSION: ICD-10-CM

## 2019-07-16 DIAGNOSIS — I49.3 ASYMPTOMATIC PVCS: ICD-10-CM

## 2019-07-16 DIAGNOSIS — E78.2 MIXED HYPERLIPIDEMIA: ICD-10-CM

## 2019-07-16 PROBLEM — I24.9 ACS (ACUTE CORONARY SYNDROME) (HCC): Status: RESOLVED | Noted: 2017-11-22 | Resolved: 2019-07-16

## 2019-07-16 PROCEDURE — 99214 OFFICE O/P EST MOD 30 MIN: CPT | Performed by: INTERNAL MEDICINE

## 2019-07-16 PROCEDURE — 3075F SYST BP GE 130 - 139MM HG: CPT | Performed by: INTERNAL MEDICINE

## 2019-07-16 RX ORDER — HYDROCHLOROTHIAZIDE 25 MG/1
25 TABLET ORAL DAILY
Qty: 30 TABLET | Refills: 5 | Status: SHIPPED | OUTPATIENT
Start: 2019-07-16 | End: 2020-01-10 | Stop reason: SDUPTHER

## 2019-07-16 NOTE — PROGRESS NOTES
Juice Quinn  1960  [id number]  [employer] PROFESSIONAL [employer]  [facility] [address] [address]    Interval History:  Juice Quinn is a 66 y o  male who presents for routine coronary artery disease follow-up  He was walking on the treadmill and felt a sudden chest pain that lasted for a few seconds  Denied any shortness of breath  He feels a discomfort in his chest at other times as well  He felt it after mowing the lawn  Has exercised since then and has not had any similar symptoms  He denies any chest pressure/discomfort and shortness of breath  Previously was having dyspnea  that improved with torsemide  He denies any LE edema, orthopnea or PND  He continues to refrain from smoking  In November 2017, he underwent drug-eluting stent placement to left circumflex and OM 2 lesions after presenting to hospital with chest and arm pain, elevated BP and ST depressions         Past Medical History:   Diagnosis Date    Allergic rhinitis     Anemia     Arthritis     Bundle branch block, right     CAD (coronary artery disease)     CPAP (continuous positive airway pressure) dependence     Diabetes mellitus (HCC)     borderline, controlled with diet and activity    Diverticulitis     Diverticulitis of large intestine with abscess without bleeding 12/7/2016    GERD (gastroesophageal reflux disease)     Hyperlipidemia     Hypertension     Nasal septal deformity     Peptic ulceration     gastric    Pneumonia     Renal disorder     Seasonal allergies     Sleep apnea     wears c-pap    Urinary tract infection     Vitamin D deficiency      Past Surgical History:   Procedure Laterality Date    COLON SIGMOID RESECTION N/A 12/16/2016    Procedure: RESECTION COLON SIGMOID;  Surgeon: Jorge Luis Colmenares MD;  Location: [facility];  Service:     COLON SIGMOID RESECTION LAPAROSCOPIC N/A 12/16/2016    Procedure: RESECTION COLON SIGMOID LAPAROSCOPIC:CONVERTED TO [email or web address][email or web address];  Surgeon: Vic Galeano MD;  Location: [facility] OR;  Service:     COLON SURGERY      COLONOSCOPY N/A 10/6/2016    Procedure: COLONOSCOPY;  Surgeon: Dorinda Kevin MD;  Location: [facility] GI LAB; Service:    Octavio Linsey CYSTOSCOPY W/ RETROGRADES Left 2/3/2017    Procedure: CYSTOSCOPY WITH BILATERAL RETROGRADES, LEFT STENT REMOVAL;  Surgeon: Jose Rafael Sinclair MD;  Location: [address] [place] [address];  Service:     ESOPHAGOGASTRODUODENOSCOPY N/A 10/6/2016    Procedure: ESOPHAGOGASTRODUODENOSCOPY (EGD); Surgeon: Dorinda Kevin MD;  Location: [facility] GI LAB; Service:     HERNIA REPAIR      KNEE ARTHROSCOPY W/ MENISCECTOMY      [place] CYSTOURETHROSCOPY,URETER CATHETER Left 2016    Procedure: CYSTOSCOPY RETROGRADE PYELOGRAM WITH INSERTION STENT URETERAL;  Surgeon: Jose Rafael Sinclair MD;  Location: [facility];  Service: Urology    VARICOSE VEIN SURGERY       Social History     Socioeconomic History    Marital status: Single     Spouse name: Not on file    Number of children: Not on file    Years of education: Not on file    Highest education level: Not on file   Occupational History    Not on file   Social Needs    Financial resource strain: Not on file    Food insecurity:     Worry: Not on file     Inability: Not on file    Transportation needs:     Medical: Not on file     Non-medical: Not on file   Tobacco Use    Smoking status: Former Smoker     Packs/day: 0 50     Years: 37 00     Pack years: 18 50     Types: Cigarettes     Last attempt to quit: 3/30/2018     Years since quittin 2    Smokeless tobacco: Never Used    Tobacco comment:  requit 3/30/18   Substance and Sexual Activity    Alcohol use:  Yes     Alcohol/week: 6 0 standard drinks     Types: 6 Cans of beer per week     Comment: occ 0-6    Drug use: No    Sexual activity: Never   Lifestyle    Physical activity:     Days per week: Not on file     Minutes per session: Not on file    Stress: Not on file   Relationships    Social connections:     Talks on phone: Not on file     Gets together: Not on file     Attends [employer] service: Not on file     Active member of club or organization: Not on file     Attends meetings of clubs or organizations: Not on file     Relationship status: Not on file    Intimate partner violence:     Fear of current or ex partner: Not on file     Emotionally abused: Not on file     Physically abused: Not on file     Forced sexual activity: Not on file   Other Topics Concern    Not on file   Social History Narrative    Lives alone       Family History   Problem Relation Age of Onset    Diabetes Brother     Thyroid cancer Brother     Osteoarthritis Mother     Atrial fibrillation Mother     Aortic aneurysm Father     Colon cancer Brother        Current Outpatient Medications:     amLODIPine (NORVASC) 5 mg tablet, Take 5 mg by mouth daily, Disp: , Rfl:     Ascorbic Acid (VITAMIN C) 100 MG tablet, Take 500 mg by mouth , Disp: , Rfl:     aspirin 81 MG tablet, Take by mouth, Disp: , Rfl:     atorvastatin (LIPITOR) 40 mg tablet, Take 1 tablet by mouth daily with dinner, Disp: 30 tablet, Rfl: 4    Cholecalciferol (VITAMIN D3) 1000 units CAPS, Take by mouth, Disp: , Rfl:     clopidogrel (PLAVIX) 75 mg tablet, TAKE 1 TABLET BY MOUTH ONCE DAILY, Disp: 30 tablet, Rfl: 6    desloratadine (CLARINEX) 5 MG tablet, Take 5 mg by mouth daily at bedtime  , Disp: , Rfl:     fexofenadine (ALLEGRA) 180 MG tablet, Take 90 mg by mouth daily, Disp: , Rfl:     glucosamine-chondroitin 500-400 MG tablet, Take 1 tablet by mouth daily, Disp: , Rfl:     losartan (COZAAR) 50 mg tablet, Take 50 mg by mouth daily, Disp: , Rfl: 0    mometasone (NASONEX) 50 mcg/act nasal spray, 2 sprays into each nostril daily as needed (nasal congestion), Disp: 1 Act, Rfl: 5    Multiple Vitamin (MULTIVITAMIN) capsule, Take 1 capsule by mouth daily, Disp: , Rfl:     nitroglycerin (NITROSTAT) 0 3 mg SL tablet, Place 0 3 mg under the tongue every 5 (five) minutes as needed for chest pain, Disp: , Rfl:     Omega-3 Fatty Acids (FISH OIL) 1,000 mg, Take by mouth, Disp: , Rfl:     ranitidine (ZANTAC) 150 MG capsule, Take 150 mg by mouth every evening, Disp: , Rfl:     torsemide (DEMADEX) 10 mg tablet, Take 1 tablet (10 mg total) by mouth daily, Disp: 30 tablet, Rfl: 5    albuterol (PROVENTIL HFA,VENTOLIN HFA) 90 mcg/act inhaler, Inhale 2 puffs every 4 (four) hours as needed for wheezing or shortness of breath (Patient not taking: Reported on 7/16/2019), Disp: 1 Inhaler, Rfl: 0    brompheniramine-pseudoephedrine-DM 30-2-10 MG/5ML syrup, Take 5 mL by mouth 4 (four) times a day as needed for cough (Patient not taking: Reported on 6/28/2019), Disp: 118 mL, Rfl: 0    guaiFENesin (MUCINEX) 600 mg 12 hr tablet, Take 2 tablets (1,200 mg total) by mouth every 12 (twelve) hours (Patient not taking: Reported on 6/28/2019), Disp: 20 tablet, Rfl: 0  The following portions of the patient's history were reviewed and updated as appropriate: allergies, current medications, past family history, past medical history, past social history, past surgical history and problem list     Review of Systems:  Review of Systems   Constitutional: Negative for chills, fatigue and fever  HENT: Negative for congestion, nosebleeds and postnasal drip  Respiratory: Positive for shortness of breath  Negative for cough and chest tightness  Cardiovascular: Positive for chest pain  Negative for palpitations and leg swelling  Gastrointestinal: Negative for abdominal distention, abdominal pain, diarrhea, nausea and vomiting  Endocrine: Negative for polydipsia, polyphagia and polyuria  Musculoskeletal: Negative for gait problem and myalgias  Skin: Negative for color change, pallor and rash  Allergic/Immunologic: Negative for environmental allergies, food allergies and immunocompromised state  Neurological: Negative for dizziness, seizures, syncope and light-headedness  Hematological: Negative for adenopathy  Does not bruise/bleed easily  Psychiatric/Behavioral: Negative for dysphoric mood  The patient is not nervous/anxious  Physical Exam:  /82 (BP Location: Left arm, Patient Position: Sitting, Cuff Size: Standard)   Pulse 77   Ht 6' 1" (1 854 m)   Wt 129 kg (284 lb)   SpO2 97%   BMI 37 47 kg/m²     Physical Exam   Constitutional: He is oriented to person, place, and time  He appears well-developed  No distress  HENT:   Head: Normocephalic and atraumatic  Eyes: Pupils are equal, round, and reactive to light  Conjunctivae and EOM are normal    Neck: Neck supple  No JVD present  No thyromegaly present  Cardiovascular: Normal rate, regular rhythm and normal heart sounds  Exam reveals no gallop and no friction rub  No murmur heard  Pulmonary/Chest: Effort normal and breath sounds normal    Abdominal: Soft  He exhibits no distension  There is no tenderness  Musculoskeletal: He exhibits edema  Neurological: He is alert and oriented to person, place, and time  No cranial nerve deficit  Skin: Skin is warm and dry  No rash noted  He is not diaphoretic  No erythema  Psychiatric: He has a normal mood and affect  His behavior is normal  Judgment and thought content normal        Cardiographics  ECG: sinus bradycardia with Q waves inferiorly  LV Ejection Fraction: LV ejection fraction >= 40%  Lab Review  Lab Results   Component Value Date    TRIG 244 (H) 11/22/2017    TRIG 106 12/05/2016    HDL 33 (L) 11/22/2017    HDL 13 (L) 12/05/2016     Assessment/Plan     1  Coronary artery disease, angina presence unspecified, unspecified vessel or lesion type, unspecified whether native or transplanted heart    2  Essential hypertension    3  Asymptomatic PVCs    4  Mixed hyperlipidemia      - Chest pain is atypical and inconsistent with prior anginal episodes - will obtain treadmill stress test to recreate symptoms   He has exercised since pain without any reoccurrence  -  Continue current medication regimen  Discussed discontinuation of plavix as his stent was placed over a year ago  He is hesitant about stopping  Discussed risks and benefits of plavix    Will await results of stress test

## 2019-07-16 NOTE — TELEPHONE ENCOUNTER
Called and spoke to patient  Advised patient PSA result from 4/12/19 = 0 5 and was reviewed by provider, within normal range, and recommend continuing routine prostate cancer screening annually with PCP as discussed at his appointment  Verbal understanding given

## 2019-07-21 PROCEDURE — 93000 ELECTROCARDIOGRAM COMPLETE: CPT | Performed by: INTERNAL MEDICINE

## 2019-07-22 ENCOUNTER — TELEPHONE (OUTPATIENT)
Dept: CARDIOLOGY CLINIC | Facility: CLINIC | Age: 59
End: 2019-07-22

## 2019-07-23 NOTE — TELEPHONE ENCOUNTER
Dr Joe Medina,   Can you please clarify if patient is supposed to be taking both hctz and torsemide? He is confused

## 2019-07-23 NOTE — TELEPHONE ENCOUNTER
SW patient made aware of recommendations  He states he takes other meds in the morning,   Advised to do opposite

## 2019-08-02 ENCOUNTER — HOSPITAL ENCOUNTER (OUTPATIENT)
Dept: NON INVASIVE DIAGNOSTICS | Facility: HOSPITAL | Age: 59
Discharge: HOME | End: 2019-08-02
Attending: INTERNAL MEDICINE
Payer: COMMERCIAL

## 2019-08-02 DIAGNOSIS — I25.10 CORONARY ARTERY DISEASE, ANGINA PRESENCE UNSPECIFIED, UNSPECIFIED VESSEL OR LESION TYPE, UNSPECIFIED WHETHER NATIVE OR TRANSPLANTED HEART: ICD-10-CM

## 2019-08-02 PROCEDURE — 93017 CV STRESS TEST TRACING ONLY: CPT

## 2019-08-02 PROCEDURE — 93016 CV STRESS TEST SUPVJ ONLY: CPT | Performed by: INTERNAL MEDICINE

## 2019-08-02 PROCEDURE — 93018 CV STRESS TEST I&R ONLY: CPT | Performed by: INTERNAL MEDICINE

## 2019-08-05 LAB
CHEST PAIN STATEMENT: NORMAL
MAX DIASTOLIC BP: 80 MMHG
MAX HEART RATE: 141 BPM
MAX PREDICTED HEART RATE: 164 BPM
MAX. SYSTOLIC BP: 180 MMHG
PROTOCOL NAME: NORMAL
REASON FOR TERMINATION: NORMAL
TARGET HR FORMULA: NORMAL
TEST INDICATION: NORMAL
TIME IN EXERCISE PHASE: NORMAL

## 2019-08-06 DIAGNOSIS — I25.10 CORONARY ARTERY DISEASE INVOLVING NATIVE CORONARY ARTERY OF NATIVE HEART WITHOUT ANGINA PECTORIS: ICD-10-CM

## 2019-08-06 RX ORDER — CLOPIDOGREL BISULFATE 75 MG/1
TABLET ORAL
Qty: 30 TABLET | Refills: 6 | Status: SHIPPED | OUTPATIENT
Start: 2019-08-06 | End: 2019-10-11 | Stop reason: ALTCHOICE

## 2019-09-26 PROBLEM — Z99.89 CPAP (CONTINUOUS POSITIVE AIRWAY PRESSURE) DEPENDENCE: Status: ACTIVE | Noted: 2019-09-26

## 2019-09-26 PROBLEM — I45.10 BUNDLE BRANCH BLOCK, RIGHT: Status: ACTIVE | Noted: 2019-09-26

## 2019-09-26 PROBLEM — Z87.81 HISTORY OF VERTEBRAL COMPRESSION FRACTURE: Status: ACTIVE | Noted: 2019-09-26

## 2019-09-26 PROBLEM — J30.9 ALLERGIC RHINITIS: Status: ACTIVE | Noted: 2019-09-26

## 2019-09-26 PROBLEM — Z98.62 HISTORY OF ANGIOPLASTY: Status: ACTIVE | Noted: 2019-09-26

## 2019-09-26 PROBLEM — E55.9 VITAMIN D DEFICIENCY: Status: ACTIVE | Noted: 2019-09-26

## 2019-09-26 PROBLEM — F17.210 DEPENDENCE ON NICOTINE FROM CIGARETTES: Status: ACTIVE | Noted: 2019-09-26

## 2019-09-26 PROBLEM — N39.0 URINARY TRACT INFECTION: Status: RESOLVED | Noted: 2019-09-26 | Resolved: 2019-09-26

## 2019-09-26 PROBLEM — K27.9 PEPTIC ULCERATION: Status: ACTIVE | Noted: 2019-09-26

## 2019-09-26 PROBLEM — N40.0 BPH (BENIGN PROSTATIC HYPERPLASIA): Status: ACTIVE | Noted: 2019-09-26

## 2019-09-26 PROBLEM — K40.90 INGUINAL HERNIA: Status: ACTIVE | Noted: 2019-09-26

## 2019-09-26 PROBLEM — R00.1 BRADYCARDIA: Status: ACTIVE | Noted: 2019-09-26

## 2019-09-26 PROBLEM — K57.92 DIVERTICULITIS: Status: RESOLVED | Noted: 2019-09-26 | Resolved: 2019-09-26

## 2019-09-26 PROBLEM — G43.109 OCULAR MIGRAINE: Status: ACTIVE | Noted: 2019-09-26

## 2019-09-26 PROBLEM — J18.9 PNEUMONIA: Status: RESOLVED | Noted: 2019-09-26 | Resolved: 2019-09-26

## 2019-09-26 PROBLEM — M17.12 PRIMARY OSTEOARTHRITIS OF LEFT KNEE: Status: ACTIVE | Noted: 2019-09-26

## 2019-09-26 PROBLEM — J34.2 NASAL SEPTAL DEFORMITY: Status: ACTIVE | Noted: 2019-09-26

## 2019-09-26 PROBLEM — K63.5 COLON POLYP: Status: ACTIVE | Noted: 2019-09-26

## 2019-09-26 PROBLEM — J30.2 SEASONAL ALLERGIES: Status: ACTIVE | Noted: 2019-09-26

## 2019-09-26 PROBLEM — K21.9 GERD (GASTROESOPHAGEAL REFLUX DISEASE): Status: ACTIVE | Noted: 2019-09-26

## 2019-09-26 PROBLEM — K42.9 UMBILICAL HERNIA: Status: ACTIVE | Noted: 2019-09-26

## 2019-09-26 PROBLEM — I10 HYPERTENSION: Status: ACTIVE | Noted: 2019-09-26

## 2019-09-26 PROBLEM — I83.90 VARICOSE VEIN OF LEG: Status: ACTIVE | Noted: 2019-09-26

## 2019-10-11 ENCOUNTER — OFFICE VISIT (OUTPATIENT)
Dept: CARDIOLOGY CLINIC | Facility: CLINIC | Age: 59
End: 2019-10-11
Payer: COMMERCIAL

## 2019-10-11 VITALS
OXYGEN SATURATION: 98 % | DIASTOLIC BLOOD PRESSURE: 80 MMHG | HEART RATE: 54 BPM | HEIGHT: 73 IN | WEIGHT: 281 LBS | BODY MASS INDEX: 37.24 KG/M2 | SYSTOLIC BLOOD PRESSURE: 122 MMHG

## 2019-10-11 DIAGNOSIS — E78.2 MIXED HYPERLIPIDEMIA: ICD-10-CM

## 2019-10-11 DIAGNOSIS — I45.10 BUNDLE BRANCH BLOCK, RIGHT: Primary | ICD-10-CM

## 2019-10-11 DIAGNOSIS — I10 ESSENTIAL HYPERTENSION: ICD-10-CM

## 2019-10-11 DIAGNOSIS — I49.3 ASYMPTOMATIC PVCS: ICD-10-CM

## 2019-10-11 DIAGNOSIS — I25.10 CORONARY ARTERY DISEASE INVOLVING NATIVE CORONARY ARTERY OF NATIVE HEART WITHOUT ANGINA PECTORIS: ICD-10-CM

## 2019-10-11 PROBLEM — R06.02 EXERTIONAL SHORTNESS OF BREATH: Status: RESOLVED | Noted: 2017-12-01 | Resolved: 2019-10-11

## 2019-10-11 PROBLEM — K27.9 PEPTIC ULCERATION: Status: RESOLVED | Noted: 2019-09-26 | Resolved: 2019-10-11

## 2019-10-11 PROBLEM — F17.210 DEPENDENCE ON NICOTINE FROM CIGARETTES: Status: RESOLVED | Noted: 2019-09-26 | Resolved: 2019-10-11

## 2019-10-11 PROBLEM — Z72.0 NICOTINE ABUSE: Status: RESOLVED | Noted: 2017-11-22 | Resolved: 2019-10-11

## 2019-10-11 PROCEDURE — 99213 OFFICE O/P EST LOW 20 MIN: CPT | Performed by: INTERNAL MEDICINE

## 2019-10-11 PROCEDURE — 3079F DIAST BP 80-89 MM HG: CPT | Performed by: INTERNAL MEDICINE

## 2019-10-11 PROCEDURE — 3074F SYST BP LT 130 MM HG: CPT | Performed by: INTERNAL MEDICINE

## 2019-10-11 NOTE — PROGRESS NOTES
Rajani Major  1960  [id number]  [employer] [facility]  [facility] [address] [address]    Interval History:  Rajani Major is a 66 y o  male who presents for routine coronary artery disease follow-up  He has been feeling well without any chest pain or shortness of breath  He has occasional chest wall pain which he describes as a pinch  He denies any LE edema, orthopnea or PND  Previously, he was walking on the treadmill and felt a sudden chest pain that lasted for a few seconds which has not reoccurred  Denied any shortness of breath  Treadmill stress test was normal without any changes consistent with ischemia  Has exercised since then and has not had any similar symptoms  He denies any chest pressure/discomfort and shortness of breath  Previously was having dyspnea  that improved with torsemide  He denies any LE edema, orthopnea or PND  He continues to refrain from smoking  In November 2017, he underwent drug-eluting stent placement to left circumflex and OM 2 lesions after presenting to hospital with chest and arm pain, elevated BP and ST depressions         Past Medical History:   Diagnosis Date    Allergic rhinitis     Anemia     Arthritis     Bundle branch block, right     CAD (coronary artery disease)     CPAP (continuous positive airway pressure) dependence     Diabetes mellitus (HCC)     borderline, controlled with diet and activity    Diverticulitis     Diverticulitis of large intestine with abscess without bleeding 12/7/2016    GERD (gastroesophageal reflux disease)     Hyperlipidemia     Hypertension     Nasal septal deformity     Peptic ulceration     gastric    Peptic ulceration 9/26/2019    gastric    Pneumonia     Renal disorder     Seasonal allergies     Sleep apnea     wears c-pap    Urinary tract infection     Vitamin D deficiency      Past Surgical History:   Procedure Laterality Date  COLON SIGMOID RESECTION N/A 2016    Procedure: RESECTION COLON SIGMOID;  Surgeon: Fidelia Castañeda MD;  Location:  MAIN OR;  Service:     COLON SIGMOID RESECTION LAPAROSCOPIC N/A 2016    Procedure: RESECTION COLON SIGMOID LAPAROSCOPIC:CONVERTED TO [email or web address];  Surgeon: Fidelia Castañeda MD;  Location:  MAIN OR;  Service:     COLON SURGERY      COLONOSCOPY N/A 10/6/2016    Procedure: COLONOSCOPY;  Surgeon: Zarina Hinojosa MD;  Location: [place] [address] GI LAB; Service:    [place] CYSTOSCOPY W/ RETROGRADES Left 2/3/2017    Procedure: CYSTOSCOPY WITH BILATERAL RETROGRADES, LEFT STENT REMOVAL;  Surgeon: Vesta Graves MD;  Location: [address] [place] [address];  Service:     ESOPHAGOGASTRODUODENOSCOPY N/A 10/6/2016    Procedure: ESOPHAGOGASTRODUODENOSCOPY (EGD); Surgeon: Zarina Hinojosa MD;  Location: [place] [address] GI LAB; Service:     HERNIA REPAIR      KNEE ARTHROSCOPY W/ MENISCECTOMY      [place] CYSTOURETHROSCOPY,URETER CATHETER Left 2016    Procedure: CYSTOSCOPY RETROGRADE PYELOGRAM WITH INSERTION STENT URETERAL;  Surgeon: Vesta Graves MD;  Location: [facility];  Service: Urology    VARICOSE VEIN SURGERY       Social History     Socioeconomic History    Marital status: Single     Spouse name: Not on file    Number of children: Not on file    Years of education: Not on file    Highest education level: Not on file   Occupational History    Not on file   Social Needs    Financial resource strain: Not on file    Food insecurity:     Worry: Not on file     Inability: Not on file    Transportation needs:     Medical: Not on file     Non-medical: Not on file   Tobacco Use    Smoking status: Former Smoker     Packs/day: 0 50     Years: 37 00     Pack years: 18 50     Types: Cigarettes     Last attempt to quit: 3/30/2018     Years since quittin 5    Smokeless tobacco: Never Used    Tobacco comment:  requit 3/30/18   Substance and Sexual Activity    Alcohol use:  Yes     Alcohol/week: 6 0 standard drinks Types: 6 Cans of beer per week     Comment: occ 0-6    Drug use: No    Sexual activity: Never   Lifestyle    Physical activity:     Days per week: Not on file     Minutes per session: Not on file    Stress: Not on file   Relationships    Social connections:     Talks on phone: Not on file     Gets together: Not on file     Attends [employer] service: Not on file     Active member of club or organization: Not on file     Attends meetings of clubs or organizations: Not on file     Relationship status: Not on file    Intimate partner violence:     Fear of current or ex partner: Not on file     Emotionally abused: Not on file     Physically abused: Not on file     Forced sexual activity: Not on file   Other Topics Concern    Not on file   Social History Narrative    Lives alone       Family History   Problem Relation Age of Onset    Diabetes Brother     Thyroid cancer Brother     Osteoarthritis Mother     Atrial fibrillation Mother     Aortic aneurysm Father     Colon cancer Brother        Current Outpatient Medications:     Ascorbic Acid (VITAMIN C) 100 MG tablet, Take 500 mg by mouth , Disp: , Rfl:     aspirin 81 MG tablet, Take by mouth, Disp: , Rfl:     atorvastatin (LIPITOR) 40 mg tablet, Take 1 tablet by mouth daily with dinner, Disp: 30 tablet, Rfl: 4    Cholecalciferol (VITAMIN D3) 1000 units CAPS, Take by mouth, Disp: , Rfl:     clopidogrel (PLAVIX) 75 mg tablet, TAKE 1 TABLET BY MOUTH ONCE DAILY, Disp: 30 tablet, Rfl: 6    desloratadine (CLARINEX) 5 MG tablet, Take 1 tablet (5 mg total) by mouth daily at bedtime, Disp: 90 tablet, Rfl: 1    glucosamine-chondroitin 500-400 MG tablet, Take 1 tablet by mouth daily, Disp: , Rfl:     hydrochlorothiazide (HYDRODIURIL) 25 mg tablet, Take 1 tablet (25 mg total) by mouth daily, Disp: 30 tablet, Rfl: 5    losartan (COZAAR) 50 mg tablet, Take 50 mg by mouth daily, Disp: , Rfl: 0    mometasone (NASONEX) 50 mcg/act nasal spray, 2 sprays into each nostril daily as needed (nasal congestion), Disp: 1 Act, Rfl: 5    Multiple Vitamin (MULTIVITAMIN) capsule, Take 1 capsule by mouth daily, Disp: , Rfl:     nitroglycerin (NITROSTAT) 0 3 mg SL tablet, Place 0 3 mg under the tongue every 5 (five) minutes as needed for chest pain, Disp: , Rfl:     Omega-3 Fatty Acids (FISH OIL) 1,000 mg, Take by mouth, Disp: , Rfl:     ranitidine (ZANTAC) 150 MG capsule, Take 150 mg by mouth every evening, Disp: , Rfl:     torsemide (DEMADEX) 10 mg tablet, Take 1 tablet (10 mg total) by mouth daily, Disp: 30 tablet, Rfl: 5    albuterol (PROVENTIL HFA,VENTOLIN HFA) 90 mcg/act inhaler, Inhale 2 puffs every 4 (four) hours as needed for wheezing or shortness of breath (Patient not taking: Reported on 10/11/2019), Disp: 1 Inhaler, Rfl: 0  The following portions of the patient's history were reviewed and updated as appropriate: allergies, current medications, past family history, past medical history, past social history, past surgical history and problem list     Review of Systems:  Review of Systems   Constitutional: Negative for chills, fatigue and fever  HENT: Negative for congestion, nosebleeds and postnasal drip  Respiratory: Negative for cough, chest tightness and shortness of breath  Cardiovascular: Positive for chest pain  Negative for palpitations and leg swelling  Gastrointestinal: Negative for abdominal distention, abdominal pain, diarrhea, nausea and vomiting  Endocrine: Negative for polydipsia, polyphagia and polyuria  Musculoskeletal: Negative for gait problem and myalgias  Skin: Negative for color change, pallor and rash  Allergic/Immunologic: Negative for environmental allergies, food allergies and immunocompromised state  Neurological: Negative for dizziness, seizures, syncope and light-headedness  Hematological: Negative for adenopathy  Does not bruise/bleed easily  Psychiatric/Behavioral: Negative for dysphoric mood   The patient is not nervous/anxious  Physical Exam:  /80 (BP Location: Left arm, Patient Position: Sitting, Cuff Size: Standard)   Pulse (!) 54   Ht 6' 1" (1 854 m)   Wt 127 kg (281 lb)   SpO2 98%   BMI 37 07 kg/m²     Physical Exam   Constitutional: He is oriented to person, place, and time  He appears well-developed  No distress  HENT:   Head: Normocephalic and atraumatic  Eyes: Pupils are equal, round, and reactive to light  Conjunctivae and EOM are normal    Neck: Neck supple  No JVD present  No thyromegaly present  Cardiovascular: Normal rate, regular rhythm and normal heart sounds  Exam reveals no gallop and no friction rub  No murmur heard  Pulmonary/Chest: Effort normal and breath sounds normal    Abdominal: Soft  He exhibits no distension  There is no tenderness  Musculoskeletal: He exhibits no edema  Neurological: He is alert and oriented to person, place, and time  No cranial nerve deficit  Skin: Skin is warm and dry  No rash noted  He is not diaphoretic  No erythema  Psychiatric: He has a normal mood and affect  His behavior is normal  Judgment and thought content normal        Cardiographics  ECG: sinus bradycardia with Q waves inferiorly  LV Ejection Fraction: LV ejection fraction >= 40%  Lab Review  Lab Results   Component Value Date    TRIG 244 (H) 11/22/2017    TRIG 106 12/05/2016    HDL 33 (L) 11/22/2017    HDL 13 (L) 12/05/2016     Assessment/Plan     1  Bundle branch block, right    2  Essential hypertension    3  Coronary artery disease involving native coronary artery of native heart without angina pectoris    4  Asymptomatic PVCs    5  Mixed hyperlipidemia      - Chest pain is atypical and inconsistent with prior anginal episodes - stress test was overall normal with ST changes not consistent with ischemia  He has exercised since pain without any reoccurrence  -  Continue current medication regimen    Discussed discontinuation of plavix as his stent was placed over a year ago   Will discontinue plavix today

## 2020-01-03 PROBLEM — G62.9 NEUROPATHY: Status: ACTIVE | Noted: 2020-01-03

## 2020-01-10 DIAGNOSIS — I10 ESSENTIAL HYPERTENSION: ICD-10-CM

## 2020-01-10 DIAGNOSIS — I50.33 ACUTE ON CHRONIC DIASTOLIC HEART FAILURE (HCC): ICD-10-CM

## 2020-01-10 DIAGNOSIS — R06.02 EXERTIONAL SHORTNESS OF BREATH: ICD-10-CM

## 2020-01-10 RX ORDER — TORSEMIDE 10 MG/1
TABLET ORAL
Qty: 90 TABLET | Refills: 3 | Status: SHIPPED | OUTPATIENT
Start: 2020-01-10

## 2020-01-10 RX ORDER — HYDROCHLOROTHIAZIDE 25 MG/1
25 TABLET ORAL DAILY
Qty: 90 TABLET | Refills: 3 | Status: SHIPPED | OUTPATIENT
Start: 2020-01-10 | End: 2021-01-27

## 2020-01-23 ENCOUNTER — CONSULT (OUTPATIENT)
Dept: NEUROLOGY | Facility: CLINIC | Age: 60
End: 2020-01-23
Payer: COMMERCIAL

## 2020-01-23 VITALS
DIASTOLIC BLOOD PRESSURE: 80 MMHG | BODY MASS INDEX: 36.18 KG/M2 | HEART RATE: 54 BPM | SYSTOLIC BLOOD PRESSURE: 130 MMHG | HEIGHT: 73 IN | WEIGHT: 273 LBS

## 2020-01-23 DIAGNOSIS — G62.9 NEUROPATHY: Primary | ICD-10-CM

## 2020-01-23 PROBLEM — E11.40 NEUROPATHY DUE TO TYPE 2 DIABETES MELLITUS (HCC): Status: ACTIVE | Noted: 2020-01-23

## 2020-01-23 PROCEDURE — 99244 OFF/OP CNSLTJ NEW/EST MOD 40: CPT | Performed by: PSYCHIATRY & NEUROLOGY

## 2020-01-23 NOTE — ASSESSMENT & PLAN NOTE
I agree this patient has neuropathy, which would be supported by the history of acral sensory loss, without any pain or paresthesias  His exam showed minimal length dependent sensory loss without any muscle weakness, and decreased reflexes  We discussed the various etiologies of neuropathies, and I told the patient the most common cause of neuropathy worldwide is diabetes, which is the likely cause in this patient's case  We also talked about other metabolic etiologies, he already has had blood work for thyroid and B12 which have been normal   I recommended some additional testing including sed rate, ROSALIA and serum protein electrophoresis  Also ordered an EMG to get a baseline for him  Thus far this does not appear to be an autoimmune process  We also talked about role of alcohol in worsening neuropathy symptoms, also talked about the role of lifestyle modification, and controlling diabetes in slowing down the progression of neuropathy  He was started on neurontin, which has been working well  I would not increase the dose considering the lack of paresthesias right now  I will contact the patient once the results of the above mentioned tests become available  He will return for follow-up in 6 months  Patient has my contact information for any questions or concerns

## 2020-01-23 NOTE — PROGRESS NOTES
Patient ID: Olga Parker is a 64 y o  male  Assessment/Plan:    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 2 diabetes mellitus ([facility] )  I agree this patient has neuropathy, which would be supported by the history of acral sensory loss, without any pain or paresthesias  His exam showed minimal length dependent sensory loss without any muscle weakness, and decreased reflexes  We discussed the various etiologies of neuropathies, and I told the patient the most common cause of neuropathy worldwide is diabetes, which is the likely cause in this patient's case  We also talked about other metabolic etiologies, he already has had blood work for thyroid and B12 which have been normal   I recommended some additional testing including sed rate, ROSALIA and serum protein electrophoresis  Also ordered an EMG to get a baseline for him  Thus far this does not appear to be an autoimmune process  We also talked about role of alcohol in worsening neuropathy symptoms, also talked about the role of lifestyle modification, and controlling diabetes in slowing down the progression of neuropathy  He was started on neurontin, which has been working well  I would not increase the dose considering the lack of paresthesias right now  I will contact the patient once the results of the above mentioned tests become available  He will return for follow-up in 6 months  Patient has my contact information for any questions or concerns  Diagnoses and all orders for this visit:    Neuropathy  -     EMG 1 Upper/1 Lower Neuropathy; Future  -     Sedimentation rate, automated; Future  -     ROSALIA Screen w/ Reflex to Titer/Pattern; Future  -     Protein electrophoresis, serum; Future  -     Sedimentation rate, automated  -     Protein electrophoresis, serum         Subjective:    HPI    I had the pleasure of seeing your patient in [facility]rology Clinic for neuromuscular consultation    As you know, he is a 68-year-old man who was referred for evaluation for neuropathy  Please allow me to summarize his history for the record  Complains he has been having numbness in the feet for sometime  It was mainly in the toes after long days of work, might be noticeable only in the evenings  Now, it is getting more prominent over the last few months  Notices it more now even during the day  Still mainly in the toes, first two toes and the ball of feet  On his bad days, numbness may extend to all the toes  It is not going up the ankle  Left foot is worse than the right  He has been getting intermittently lower back pain, does not radiate down his legs  Back pain is not bad, does not bother him enough to take any medications  Does get muscle cramps in his calves, shin, thighs  Comes and goes, mainly in the mornings, needs to get up and try to walk on the legs to relieve the cramps  Occasional twitching in fingers  Does tend to drop things out of his hands  Some numbness in hands (mainly in thenar eminence)  Balance is great, occasional stumble due to arthritis in knees  Has been told needs replacement  No change in speech/swallowing  No droopy eyes, no blurry/double vision  Has numbness in the left thigh since his IR procedure for abscess draingae in 2016  Has left lower leg numbness (patch on the lower leg)  He does have diabetes, most recent hemoglobin A1c was 7 7  Has been borderline DM for years, a1c was 6 4 last year  H/o sigmoidectomy due to abscess in 2016  CAD, s/p angioplasty in 2017  HTN  Osteoarthritis, BPH, sleep apnea, on CPAP, varicose veins  Family hx of DM (brothers), prediabetes in mother, passed away from PD/Dementia (80), father had aortic aneurysm  One of his brothers was thought to have ALS, had weakness in the left arm, lived for more than 10 years with the weakness in left arm  Had diabetes, with neuropathy, had non Hodgkin''s lymphoma, colorectal ca, eventually passed away from a heart attack at age 64       Past smoker, quit in 2018, smoked since he was in college  Does drink beers a few times a week  Not a heavy drinker  He is a   TSH recently done was 1 79, b12 was 470  The following portions of the patient's history were reviewed and updated as appropriate: allergies, current medications, past family history, past medical history, past social history, past surgical history and problem list          Objective:    Blood pressure 130/80, pulse (!) 54, height 6' 1" (1 854 m), weight 124 kg (273 lb)  Physical Exam  General exam: Pt was awake, alert and oriented  HEENT: atraumatic, normocephalic  Normal oral mucosa, neck was supple, no lymphadenopathy  Normal peripheral pulses  Extremities did not show any edema or cyanosis, does have varicose veins (right > leg)  Neurological Exam  Neurologically, pt was awake and alert  Speech was normal, no dysarthria or aphasia  Cranial nerve exam showed normal extraocular movements, no nystagmus or diplopia  There was no ptosis at baseline or with sustained upward gaze  Strength of eye closure muscles was normal   Facial sensations were normal bilaterally  No facial weakness, able to blow out the cheeks and push the tongue in the cheeks well  No tongue atrophy or fasciculations  Motor exam revealed normal tone and muscle bulk  There was no atrophy, scapular winging, high arches, hammertoes, shortening of achilles tendons or any other features of neuromuscular disease  Muscle strength was normal in neck flexors and extensors, and all muscle groups in both upper and lower extremities, including toe extensors/ flexors  Reflexes were graded as 1 in the upper extremities, trace at the knees and absent at the ankles  Toes were downgoing  There was no exaggerated jaw jerk or valentine's sign  No ankle clonus  Sensory exam revealed length dependent, minimally decreased sensation to pin and temp up to base of the toes,  Vibration was severely reduced at toes  Proprioception was normal    Rhomberg was neg, Gait was normal and casual        ROS:  I reviewed the below ROS and what is mentioned in HPI, the remainder of ROS was negative  Review of Systems   Constitutional: Negative  Negative for appetite change and fever  HENT: Negative  Negative for hearing loss, tinnitus, trouble swallowing and voice change  Eyes: Negative  Negative for photophobia and pain  Respiratory: Negative  Negative for shortness of breath  Cardiovascular: Negative  Negative for palpitations  Gastrointestinal: Negative  Negative for nausea and vomiting  Endocrine: Negative  Negative for cold intolerance and heat intolerance  Genitourinary: Negative  Negative for dysuria, frequency and urgency  Musculoskeletal: Positive for back pain and myalgias  Negative for neck pain  Pain while walking   Skin: Negative  Negative for rash  Neurological: Positive for numbness  Negative for dizziness, tremors, seizures, syncope, facial asymmetry, speech difficulty, weakness, light-headedness and headaches  Memory problems  Twitching  Changes in taste  Tingling  Snoring   Hematological: Bruises/bleeds easily (Bruises easily)  Psychiatric/Behavioral: Positive for sleep disturbance (Waking up at night)  Negative for confusion and hallucinations          Depression

## 2020-01-31 ENCOUNTER — TELEPHONE (OUTPATIENT)
Dept: NEUROLOGY | Facility: CLINIC | Age: 60
End: 2020-01-31

## 2020-01-31 NOTE — TELEPHONE ENCOUNTER
Please let the patient know it is okay to use the treadmill as well as he feels his balance is okay  The only worry I have with patients with neuropathy and treadmill is if they lose balance and fall  If he feels he does well and holds on, absolutely okay to use it    Thank you

## 2020-01-31 NOTE — TELEPHONE ENCOUNTER
Dr Hannah Ward,     Patient stopped in [place] office, he is asking if it is okay to continue using treadmill with his Neuropathy or if he should use a different machine  Cell is best number to reach patient, IT IS OKAY to leave detailed message on cell per patient

## 2020-02-04 LAB
ALBUMIN SERPL ELPH-MCNC: 3.4 G/DL (ref 2.9–4.4)
ALBUMIN/GLOB SERPL: 1.1 {RATIO} (ref 0.7–1.7)
ALPHA1 GLOB SERPL ELPH-MCNC: 0.2 G/DL (ref 0–0.4)
ALPHA2 GLOB SERPL ELPH-MCNC: 1.1 G/DL (ref 0.4–1)
ANA TITR SER IF: NEGATIVE {TITER}
B-GLOBULIN SERPL ELPH-MCNC: 1.1 G/DL (ref 0.7–1.3)
ERYTHROCYTE [SEDIMENTATION RATE] IN BLOOD BY WESTERGREN METHOD: 9 MM/HR (ref 0–30)
GAMMA GLOB SERPL ELPH-MCNC: 0.9 G/DL (ref 0.4–1.8)
GLOBULIN SER CALC-MCNC: 3.2 G/DL (ref 2.2–3.9)
LABORATORY COMMENT REPORT: ABNORMAL
M PROTEIN SERPL ELPH-MCNC: ABNORMAL G/DL
PROT SERPL-MCNC: 6.6 G/DL (ref 6–8.5)

## 2020-02-07 ENCOUNTER — HOSPITAL ENCOUNTER (OUTPATIENT)
Dept: NEUROLOGY | Facility: CLINIC | Age: 60
Discharge: HOME | End: 2020-02-07
Payer: COMMERCIAL

## 2020-02-07 DIAGNOSIS — G62.9 NEUROPATHY: ICD-10-CM

## 2020-02-07 PROCEDURE — 95911 NRV CNDJ TEST 9-10 STUDIES: CPT | Performed by: PSYCHIATRY & NEUROLOGY

## 2020-02-07 PROCEDURE — 95886 MUSC TEST DONE W/N TEST COMP: CPT | Performed by: PSYCHIATRY & NEUROLOGY

## 2020-03-06 ENCOUNTER — OFFICE VISIT (OUTPATIENT)
Dept: CARDIOLOGY CLINIC | Facility: CLINIC | Age: 60
End: 2020-03-06
Payer: COMMERCIAL

## 2020-03-06 VITALS
OXYGEN SATURATION: 97 % | WEIGHT: 274 LBS | DIASTOLIC BLOOD PRESSURE: 70 MMHG | HEART RATE: 51 BPM | SYSTOLIC BLOOD PRESSURE: 120 MMHG | BODY MASS INDEX: 36.31 KG/M2 | HEIGHT: 73 IN

## 2020-03-06 DIAGNOSIS — I45.10 BUNDLE BRANCH BLOCK, RIGHT: ICD-10-CM

## 2020-03-06 DIAGNOSIS — R00.1 BRADYCARDIA: ICD-10-CM

## 2020-03-06 DIAGNOSIS — I10 ESSENTIAL HYPERTENSION: ICD-10-CM

## 2020-03-06 DIAGNOSIS — E11.9 TYPE 2 DIABETES MELLITUS WITHOUT COMPLICATION, WITHOUT LONG-TERM CURRENT USE OF INSULIN (HCC): ICD-10-CM

## 2020-03-06 DIAGNOSIS — I25.10 CORONARY ARTERY DISEASE INVOLVING NATIVE CORONARY ARTERY OF NATIVE HEART WITHOUT ANGINA PECTORIS: Primary | ICD-10-CM

## 2020-03-06 DIAGNOSIS — E78.2 MIXED HYPERLIPIDEMIA: ICD-10-CM

## 2020-03-06 DIAGNOSIS — G47.33 OBSTRUCTIVE SLEEP APNEA: ICD-10-CM

## 2020-03-06 PROCEDURE — 99214 OFFICE O/P EST MOD 30 MIN: CPT | Performed by: INTERNAL MEDICINE

## 2020-03-06 PROCEDURE — 3008F BODY MASS INDEX DOCD: CPT | Performed by: INTERNAL MEDICINE

## 2020-03-06 PROCEDURE — 93000 ELECTROCARDIOGRAM COMPLETE: CPT | Performed by: INTERNAL MEDICINE

## 2020-03-06 PROCEDURE — 3078F DIAST BP <80 MM HG: CPT | Performed by: INTERNAL MEDICINE

## 2020-03-06 PROCEDURE — 3074F SYST BP LT 130 MM HG: CPT | Performed by: INTERNAL MEDICINE

## 2020-03-06 PROCEDURE — 1036F TOBACCO NON-USER: CPT | Performed by: INTERNAL MEDICINE

## 2020-03-06 NOTE — PROGRESS NOTES
Earline Hernández  1960  [id number]  [place] [facility] [address] [address]    Interval History:  Earline Hernández is a 64 y o  male who presents for routine coronary artery disease follow-up  Since his last visit, he has been feeling well without any chest pain or shortness of breath  He denies any lower extremity edema, orthopnea or paroxysmal nocturnal dyspnea  He is concerned about recent diagnosis of bilateral peripheral neuropathy  He is exercising regularly at the fitness center  Previously, he was walking on the treadmill and felt a sudden chest pain that lasted for a few seconds which has not reoccurred  Denied any shortness of breath  Treadmill stress test was normal without any changes consistent with ischemia  Previously was having dyspnea  that improved with torsemide  He denies any LE edema, orthopnea or PND  He continues to refrain from smoking  In November 2017, he underwent drug-eluting stent placement to left circumflex and OM 2 lesions after presenting to hospital with chest and arm pain, elevated BP and ST depressions         Past Medical History:   Diagnosis Date    Allergic rhinitis     Anemia     Arthritis     Bundle branch block, right     CAD (coronary artery disease)     CPAP (continuous positive airway pressure) dependence     Diabetes mellitus (HCC)     borderline, controlled with diet and activity    Diverticulitis     Diverticulitis of large intestine with abscess without bleeding 12/7/2016    GERD (gastroesophageal reflux disease)     Hyperlipidemia     Hypertension     Nasal septal deformity     Peptic ulceration     gastric    Peptic ulceration 9/26/2019    gastric    Pneumonia     Renal disorder     Seasonal allergies     Sleep apnea     wears c-pap    Urinary tract infection     Vitamin D deficiency      Past Surgical History:   Procedure Laterality Date    COLON SIGMOID RESECTION N/A 2016    Procedure: RESECTION COLON SIGMOID;  Surgeon: Fredy Dunne MD;  Location:  MAIN OR;  Service:     COLON SIGMOID RESECTION LAPAROSCOPIC N/A 2016    Procedure: RESECTION COLON SIGMOID LAPAROSCOPIC:CONVERTED TO [email or web address];  Surgeon: Fredy Dunne MD;  Location:  MAIN OR;  Service:     COLON SURGERY      COLONOSCOPY N/A 10/6/2016    Procedure: COLONOSCOPY;  Surgeon: Lisa Simmons MD;  Location: [place] [address] GI LAB; Service:    [facility] CYSTOSCOPY W/ RETROGRADES Left 2/3/2017    Procedure: CYSTOSCOPY WITH BILATERAL RETROGRADES, LEFT STENT REMOVAL;  Surgeon: Irma Vasquez MD;  Location: [address] [place] [address];  Service:     ESOPHAGOGASTRODUODENOSCOPY N/A 10/6/2016    Procedure: ESOPHAGOGASTRODUODENOSCOPY (EGD); Surgeon: Lisa Simmons MD;  Location: [place] [address] GI LAB; Service:     HERNIA REPAIR      KNEE ARTHROSCOPY W/ MENISCECTOMY      NH CYSTOURETHROSCOPY,URETER CATHETER Left 2016    Procedure: CYSTOSCOPY RETROGRADE PYELOGRAM WITH INSERTION STENT URETERAL;  Surgeon: Irma Vasquez MD;  Location: [facility];  Service: Urology    VARICOSE VEIN SURGERY       Social History     Socioeconomic History    Marital status: Single     Spouse name: Not on file    Number of children: Not on file    Years of education: Not on file    Highest education level: Not on file   Occupational History    Not on file   Social Needs    Financial resource strain: Not on file    Food insecurity:     Worry: Not on file     Inability: Not on file    Transportation needs:     Medical: Not on file     Non-medical: Not on file   Tobacco Use    Smoking status: Former Smoker     Packs/day: 0 50     Years: 37 00     Pack years: 18 50     Types: Cigarettes     Last attempt to quit: 3/30/2018     Years since quittin 9    Smokeless tobacco: Never Used    Tobacco comment:  requit 3/30/18   Substance and Sexual Activity    Alcohol use:  Yes     Alcohol/week: 3 0 standard drinks     Types: 3 Cans of beer per week    Drug use: No    Sexual activity: Never   Lifestyle    Physical activity:     Days per week: Not on file     Minutes per session: Not on file    Stress: Not on file   Relationships    Social connections:     Talks on phone: Not on file     Gets together: Not on file     Attends Jehov[employer]'s witness service: Not on file     Active member of club or organization: Not on file     Attends meetings of clubs or organizations: Not on file     Relationship status: Not on file    Intimate partner violence:     Fear of current or ex partner: Not on file     Emotionally abused: Not on file     Physically abused: Not on file     Forced sexual activity: Not on file   Other Topics Concern    Not on file   Social History Narrative    Lives alone       Family History   Problem Relation Age of Onset    Diabetes Brother     Thyroid cancer Brother     Osteoarthritis Mother     Atrial fibrillation Mother     Aortic aneurysm Father     Colon cancer Brother        Current Outpatient Medications:     Ascorbic Acid (VITAMIN C) 100 MG tablet, Take 500 mg by mouth , Disp: , Rfl:     aspirin 81 MG tablet, Take 162 mg by mouth, Disp: , Rfl:     atorvastatin (LIPITOR) 40 mg tablet, Take 1 tablet (40 mg total) by mouth daily with dinner, Disp: 90 tablet, Rfl: 1    Cholecalciferol (VITAMIN D3) 1000 units CAPS, Take by mouth, Disp: , Rfl:     desloratadine (CLARINEX) 5 MG tablet, Take 1 tablet (5 mg total) by mouth daily at bedtime, Disp: 90 tablet, Rfl: 1    gabapentin (NEURONTIN) 100 mg capsule, Take 1 capsule (100 mg total) by mouth 2 (two) times a day One tablet daily at HS for first two weeks then one tablet daily AM and one at bed time, Disp: 60 capsule, Rfl: 1    glucosamine-chondroitin 500-400 MG tablet, Take 1 tablet by mouth daily, Disp: , Rfl:     glucose blood test strip, 1 each by Other route daily Use as instructed, Disp: 100 each, Rfl: 6    hydrochlorothiazide (HYDRODIURIL) 25 mg tablet, Take 1 tablet (25 mg total) by mouth daily, Disp: 90 tablet, Rfl: 3    losartan (COZAAR) 50 mg tablet, TAKE 1 TABLET BY MOUTH ONCE DAILY, Disp: 90 tablet, Rfl: 1    metFORMIN (GLUCOPHAGE) 500 mg tablet, Take 1 tablet (500 mg total) by mouth daily, Disp: 90 tablet, Rfl: 1    mometasone (NASONEX) 50 mcg/act nasal spray, 2 sprays into each nostril daily as needed (nasal congestion), Disp: 1 Act, Rfl: 5    Multiple Vitamin (MULTIVITAMIN) capsule, Take 1 capsule by mouth daily, Disp: , Rfl:     Omega-3 Fatty Acids (FISH OIL) 1,000 mg, Take by mouth, Disp: , Rfl:     omeprazole (PriLOSEC) 20 mg delayed release capsule, Take 20 mg by mouth daily, Disp: , Rfl:     ONETOUCH DELICA LANCETS 64N MISC, 1 Units by Does not apply route daily, Disp: 100 each, Rfl: 6    torsemide (DEMADEX) 10 mg tablet, TAKE 1 TABLET BY MOUTH ONCE DAILY, Disp: 90 tablet, Rfl: 3    albuterol (PROVENTIL HFA,VENTOLIN HFA) 90 mcg/act inhaler, Inhale 2 puffs every 4 (four) hours as needed for wheezing or shortness of breath (Patient not taking: Reported on 3/6/2020), Disp: 1 Inhaler, Rfl: 0    losartan (COZAAR) 50 mg tablet, Take 1 tablet (50 mg total) by mouth daily (Patient not taking: Reported on 3/6/2020), Disp: 90 tablet, Rfl: 1    nitroglycerin (NITROSTAT) 0 3 mg SL tablet, Place 0 3 mg under the tongue every 5 (five) minutes as needed for chest pain, Disp: , Rfl:   The following portions of the patient's history were reviewed and updated as appropriate: allergies, current medications, past family history, past medical history, past social history, past surgical history and problem list     Review of Systems:  Review of Systems   Constitutional: Negative for chills, fatigue and fever  HENT: Negative for congestion, nosebleeds and postnasal drip  Respiratory: Negative for cough, chest tightness and shortness of breath  Cardiovascular: Negative for chest pain, palpitations and leg swelling     Gastrointestinal: Negative for abdominal distention, abdominal pain, diarrhea, nausea and vomiting  Endocrine: Negative for polydipsia, polyphagia and polyuria  Musculoskeletal: Negative for gait problem and myalgias  Skin: Negative for color change, pallor and rash  Allergic/Immunologic: Negative for environmental allergies, food allergies and immunocompromised state  Neurological: Positive for numbness  Negative for dizziness, seizures, syncope and light-headedness  Hematological: Negative for adenopathy  Does not bruise/bleed easily  Psychiatric/Behavioral: Negative for dysphoric mood  The patient is not nervous/anxious  Physical Exam:  /70 (BP Location: Right arm, Patient Position: Sitting, Cuff Size: Large)   Pulse (!) 51   Ht 6' 1" (1 854 m)   Wt 124 kg (274 lb)   SpO2 97% Comment: RA  BMI 36 15 kg/m²     Physical Exam   Constitutional: He is oriented to person, place, and time  He appears well-developed  No distress  HENT:   Head: Normocephalic and atraumatic  Eyes: Pupils are equal, round, and reactive to light  Conjunctivae and EOM are normal    Neck: Neck supple  No JVD present  No thyromegaly present  Cardiovascular: Normal rate and regular rhythm  Exam reveals no gallop and no friction rub  Murmur heard  Pulmonary/Chest: Effort normal and breath sounds normal    Abdominal: Soft  He exhibits no distension  There is no tenderness  Musculoskeletal: He exhibits no edema  Neurological: He is alert and oriented to person, place, and time  No cranial nerve deficit  Skin: Skin is warm and dry  No rash noted  He is not diaphoretic  No erythema  Psychiatric: He has a normal mood and affect  His behavior is normal  Judgment and thought content normal        Cardiographics  ECG: sinus bradycardia with Q waves inferiorly rate 51 beats per minute  LV Ejection Fraction: LV ejection fraction >= 40%         Lab Review  Lab Results   Component Value Date    TRIG 260 (H) 12/27/2019    TRIG 244 (H) 11/22/2017    TRIG 106 12/05/2016    HDL 38 (L) 12/27/2019    HDL 33 (L) 11/22/2017    HDL 13 (L) 12/05/2016     Assessment/Plan     1  Coronary artery disease involving native coronary artery of native heart without angina pectoris    2  Bundle branch block, right    3  Type 2 diabetes mellitus without complication, without long-term current use of insulin ([facility] )    4  Obstructive sleep apnea    5  Essential hypertension    6  Bradycardia    7  Mixed hyperlipidemia      - Dr Varun Paiz is doing well without any significant episodes of chest pain or shortness of breath  Blood pressure is stable  Continue current medication regimen including losartan and hydrochlorothiazide   - diabetes management per Dr Juni Mayo  - Continue atorvastatin - last LDL was at goal but TG were elevated  Will repeat lipid panel (ordered by Dr Juni Mayo already)    - Continue current Rx

## 2020-06-04 ENCOUNTER — TELEPHONE (OUTPATIENT)
Dept: PULMONOLOGY | Facility: MEDICAL CENTER | Age: 60
End: 2020-06-04

## 2020-06-05 ENCOUNTER — OFFICE VISIT (OUTPATIENT)
Dept: PULMONOLOGY | Facility: MEDICAL CENTER | Age: 60
End: 2020-06-05
Payer: COMMERCIAL

## 2020-06-05 VITALS
RESPIRATION RATE: 12 BRPM | SYSTOLIC BLOOD PRESSURE: 132 MMHG | DIASTOLIC BLOOD PRESSURE: 74 MMHG | HEIGHT: 73 IN | WEIGHT: 281 LBS | HEART RATE: 62 BPM | BODY MASS INDEX: 37.24 KG/M2 | TEMPERATURE: 98.1 F | OXYGEN SATURATION: 97 %

## 2020-06-05 DIAGNOSIS — E66.09 CLASS 2 OBESITY DUE TO EXCESS CALORIES WITHOUT SERIOUS COMORBIDITY WITH BODY MASS INDEX (BMI) OF 37.0 TO 37.9 IN ADULT: ICD-10-CM

## 2020-06-05 DIAGNOSIS — G47.33 OBSTRUCTIVE SLEEP APNEA: Primary | ICD-10-CM

## 2020-06-05 PROCEDURE — 3075F SYST BP GE 130 - 139MM HG: CPT | Performed by: INTERNAL MEDICINE

## 2020-06-05 PROCEDURE — 3078F DIAST BP <80 MM HG: CPT | Performed by: INTERNAL MEDICINE

## 2020-06-05 PROCEDURE — 99213 OFFICE O/P EST LOW 20 MIN: CPT | Performed by: INTERNAL MEDICINE

## 2020-06-05 PROCEDURE — 1036F TOBACCO NON-USER: CPT | Performed by: INTERNAL MEDICINE

## 2020-06-05 NOTE — PROGRESS NOTES
Assessment/Plan        Problem List Items Addressed This Visit        Respiratory    Obstructive sleep apnea - Primary     Severe obstructive sleep apnea with good compliance to CPAP therapy  Missy Candelario is set on auto CPAP with pressure range of 9-12 cm water  Have average CPAP pressure is 11  He average 8 5 hours of usage per night and has been compliant reviewing data from last month  This resulted in an AHI 1 8 which is satisfactory  Continue auto CPAP with pressure range of  9 to 12 cm water   Relevant Orders    PAP DME Resupply/Reorder       Other    Class 2 obesity due to excess calories without serious comorbidity with body mass index (BMI) of 37 0 to 37 9 in adult     Try to lose weight  This would help his sleep apnea  Sleep Apnea (pt states that machine has been fine ); Shortness of Breath (pt states that he has some issues with humidity/); and Cough ( seasonal allergies  no wheeze)      HPI:   Last CPAP machine was May 2017 through [facility]    Not having any problem with his present CPAP machine  No excessive daytime somnolence  No nocturnal dyspnea  Does use full face mask interface  He was diagnosed with severe BING back in March 2015  Diagnostic sleep study showed severe BING with overall AHI of 101 4 and oxygen jodi of 80%  He has felt benefit from a CPAP and is not have any nocturnal dyspnea  Does have mild seasonal allergies for which he takes Clarinex 5 mg when needed steroid nasal spray  Also has history of coronary disease and in November 2017 had 2 drug-eluting stents placed  He is not having any chest pain with activity  Does have history of hypertension  Overall is doing well  No shortness of breath  He has a   He works in [employer]      Past Medical History:   Diagnosis Date    Allergic rhinitis     Anemia     Arthritis     Bundle branch block, right     CAD (coronary artery disease)     CPAP (continuous positive airway pressure) dependence     Diabetes mellitus (HCC)     borderline, controlled with diet and activity    Diverticulitis     Diverticulitis of large intestine with abscess without bleeding 12/7/2016    GERD (gastroesophageal reflux disease)     Hyperlipidemia     Hypertension     Nasal septal deformity     Neuropathy     Peptic ulceration     gastric    Peptic ulceration 9/26/2019    gastric    Pneumonia     Renal disorder     Seasonal allergies     Sleep apnea     wears c-pap    Urinary tract infection     Vitamin D deficiency        Past Surgical History:   Procedure Laterality Date    COLON SIGMOID RESECTION N/A 12/16/2016    Procedure: RESECTION COLON SIGMOID;  Surgeon: Tori Waite MD;  [facility];  Service:     COLON SIGMOID RESECTION LAPAROSCOPIC N/A 12/16/2016    Procedure: RESECTION COLON SIGMOID LAPAROSCOPIC:CONVERTED TO [email or web address];  Surgeon: Tori Waite MD;  [facility];  Service:     COLON SURGERY      COLONOSCOPY N/A 10/6/2016    Procedure: COLONOSCOPY;  Surgeon: Nazanin Guzman MD;  Location: [place] [address] [facility] LAB; Service:    Syliva Sunil CYSTOSCOPY W/ RETROGRADES Left 2/3/2017    Procedure: CYSTOSCOPY WITH BILATERAL RETROGRADES, LEFT STENT REMOVAL;  Surgeon: Bret Bower MD;  Location: [address] [place] [address];  Service:     ESOPHAGOGASTRODUODENOSCOPY N/A 10/6/2016    Procedure: ESOPHAGOGASTRODUODENOSCOPY (EGD); Surgeon: Nazanin Guzman MD;  Location: [place] [address] [facility] LAB;   Service:     HERNIA REPAIR      KNEE ARTHROSCOPY W/ MENISCECTOMY      MT CYSTOURETHROSCOPY,URETER CATHETER Left 12/4/2016    Procedure: CYSTOSCOPY RETROGRADE PYELOGRAM WITH INSERTION STENT URETERAL;  Surgeon: Bret Bower MD;  Location: [address] [place] [address];  Service: Urology    VARICOSE VEIN SURGERY           Current Outpatient Medications:     albuterol (PROVENTIL HFA,VENTOLIN HFA) 90 mcg/act inhaler, Inhale 2 puffs every 4 (four) hours as needed for wheezing or shortness of breath, Disp: 1 Inhaler, Rfl: 0   Ascorbic Acid (VITAMIN C) 100 MG tablet, Take 500 mg by mouth , Disp: , Rfl:     aspirin 81 MG tablet, Take 162 mg by mouth, Disp: , Rfl:     atorvastatin (LIPITOR) 40 mg tablet, Take 1 tablet (40 mg total) by mouth daily with dinner, Disp: 90 tablet, Rfl: 1    Cholecalciferol (VITAMIN D3) 1000 units CAPS, Take by mouth, Disp: , Rfl:     gabapentin (NEURONTIN) 100 mg capsule, Take 1 capsule (100 mg total) by mouth 2 (two) times a day, Disp: 60 capsule, Rfl: 5    glucosamine-chondroitin 500-400 MG tablet, Take 1 tablet by mouth daily, Disp: , Rfl:     glucose blood test strip, 1 each by Other route daily Use as instructed, Disp: 100 each, Rfl: 6    hydrochlorothiazide (HYDRODIURIL) 25 mg tablet, Take 1 tablet (25 mg total) by mouth daily, Disp: 90 tablet, Rfl: 3    losartan (COZAAR) 50 mg tablet, TAKE 1 TABLET BY MOUTH ONCE DAILY, Disp: 90 tablet, Rfl: 1    losartan (COZAAR) 50 mg tablet, Take 1 tablet (50 mg total) by mouth daily, Disp: 90 tablet, Rfl: 1    Multiple Vitamin (MULTIVITAMIN) capsule, Take 1 capsule by mouth daily, Disp: , Rfl:     nitroglycerin (NITROSTAT) 0 3 mg SL tablet, Place 1 tablet (0 3 mg total) under the tongue every 5 (five) minutes as needed for chest pain, Disp: 25 tablet, Rfl: 1    omeprazole (PriLOSEC) 20 mg delayed release capsule, Take 20 mg by mouth daily, Disp: , Rfl:     ONETOUCH DELICA LANCETS 58M MISC, 1 Units by Does not apply route daily, Disp: 100 each, Rfl: 6    torsemide (DEMADEX) 10 mg tablet, TAKE 1 TABLET BY MOUTH ONCE DAILY, Disp: 90 tablet, Rfl: 3    desloratadine (CLARINEX) 5 MG tablet, Take 1 tablet (5 mg total) by mouth daily at bedtime, Disp: 90 tablet, Rfl: 1    metFORMIN (GLUCOPHAGE) 500 mg tablet, Take 1 tablet (500 mg total) by mouth daily, Disp: 90 tablet, Rfl: 1    mometasone (NASONEX) 50 mcg/act nasal spray, 2 sprays into each nostril daily as needed (nasal congestion) (Patient not taking: Reported on 4/17/2020), Disp: 1 Act, Rfl: 5    Omega-3 Fatty Acids (FISH OIL) 1,000 mg, Take by mouth, Disp: , Rfl:     Allergies   Allergen Reactions    Levaquin [Levofloxacin In D5w] Swelling     Knee swelling    Sulfa Antibiotics GI Intolerance     Abdominal pain         Social History     Tobacco Use    Smoking status: Former Smoker     Packs/day: 0 50     Years: 37 00     Pack years: 18 50     Types: Cigarettes     Last attempt to quit: 3/30/2018     Years since quittin 2    Smokeless tobacco: Never Used    Tobacco comment:  requit 3/30/18   Substance Use Topics    Alcohol use: Yes     Alcohol/week: 3 0 standard drinks     Types: 3 Cans of beer per week         Family History   Problem Relation Age of Onset    Diabetes Brother     Thyroid cancer Brother     Osteoarthritis Mother     Atrial fibrillation Mother     Aortic aneurysm Father     Colon cancer Brother        Review of Systems   Constitutional: Negative for chills, fever and unexpected weight change  HENT: Negative for congestion, rhinorrhea and sore throat  Eyes: Negative for discharge and redness  Respiratory: Negative for shortness of breath  Cardiovascular: Negative for chest pain, palpitations and leg swelling  Gastrointestinal: Negative for abdominal distention, abdominal pain and nausea  Endocrine: Negative for polydipsia and polyphagia  Genitourinary: Negative for dysuria  Musculoskeletal: Negative for joint swelling and myalgias  Skin: Negative for rash  Neurological: Negative for light-headedness  Psychiatric/Behavioral: Negative for decreased concentration  Vitals:    20 0909   BP: 132/74   Pulse: 62   Resp: 12   Temp: 98 1 °F (36 7 °C)   SpO2: 97%           Physical Exam   Constitutional: He is oriented to person, place, and time  He appears well-developed and well-nourished  No distress  HENT:   Head: Normocephalic  Nose: Nose normal    Mouth/Throat: Oropharynx is clear and moist  No oropharyngeal exudate     Mallampati score is 3 Eyes: Pupils are equal, round, and reactive to light  Conjunctivae are normal    Neck: Neck supple  No hepatojugular reflux and no JVD present  Cardiovascular: Normal rate, regular rhythm and normal heart sounds  Pulmonary/Chest: Effort normal    Lung sounds are clear  No wheezes crackles or rhonchi   Abdominal: Soft  He exhibits no distension  There is no tenderness  Musculoskeletal:   No edema, cyanosis or clubbing   Lymphadenopathy:     He has no cervical adenopathy  Neurological: He is alert and oriented to person, place, and time  Skin: Skin is warm and dry  Psychiatric: He has a normal mood and affect              97% on RA resting

## 2020-07-05 PROBLEM — E66.812 CLASS 2 OBESITY DUE TO EXCESS CALORIES WITHOUT SERIOUS COMORBIDITY WITH BODY MASS INDEX (BMI) OF 37.0 TO 37.9 IN ADULT: Status: ACTIVE | Noted: 2017-11-22

## 2020-07-05 PROBLEM — E66.09 CLASS 2 OBESITY DUE TO EXCESS CALORIES WITHOUT SERIOUS COMORBIDITY WITH BODY MASS INDEX (BMI) OF 37.0 TO 37.9 IN ADULT: Status: ACTIVE | Noted: 2017-11-22

## 2020-07-06 NOTE — ASSESSMENT & PLAN NOTE
Severe obstructive sleep apnea with good compliance to CPAP therapy  Marian Sands is set on auto CPAP with pressure range of 9-12 cm water  Have average CPAP pressure is 11  He average 8 5 hours of usage per night and has been compliant reviewing data from last month  This resulted in an AHI 1 8 which is satisfactory  Continue auto CPAP with pressure range of  9 to 12 cm water

## 2020-07-31 PROBLEM — I83.93 ASYMPTOMATIC VARICOSE VEINS OF BOTH LOWER EXTREMITIES: Status: ACTIVE | Noted: 2019-09-26

## 2020-07-31 PROBLEM — B35.1 ONYCHOMYCOSIS OF TOENAIL: Status: ACTIVE | Noted: 2020-07-31

## 2020-08-07 PROBLEM — H61.22 IMPACTED CERUMEN OF LEFT EAR: Status: ACTIVE | Noted: 2020-08-07

## 2020-08-21 ENCOUNTER — OFFICE VISIT (OUTPATIENT)
Dept: NEUROLOGY | Facility: CLINIC | Age: 60
End: 2020-08-21
Payer: COMMERCIAL

## 2020-08-21 VITALS
TEMPERATURE: 97.6 F | WEIGHT: 276 LBS | HEART RATE: 57 BPM | DIASTOLIC BLOOD PRESSURE: 73 MMHG | SYSTOLIC BLOOD PRESSURE: 111 MMHG | BODY MASS INDEX: 38.49 KG/M2

## 2020-08-21 DIAGNOSIS — E11.9 TYPE 2 DIABETES MELLITUS WITHOUT COMPLICATION, WITHOUT LONG-TERM CURRENT USE OF INSULIN (HCC): ICD-10-CM

## 2020-08-21 DIAGNOSIS — G62.9 NEUROPATHY: Primary | ICD-10-CM

## 2020-08-21 DIAGNOSIS — S80.01XS CONTUSION OF RIGHT KNEE, SEQUELA: ICD-10-CM

## 2020-08-21 PROCEDURE — 1036F TOBACCO NON-USER: CPT | Performed by: PHYSICIAN ASSISTANT

## 2020-08-21 PROCEDURE — 3074F SYST BP LT 130 MM HG: CPT | Performed by: PHYSICIAN ASSISTANT

## 2020-08-21 PROCEDURE — 3078F DIAST BP <80 MM HG: CPT | Performed by: PHYSICIAN ASSISTANT

## 2020-08-21 PROCEDURE — 99214 OFFICE O/P EST MOD 30 MIN: CPT | Performed by: PHYSICIAN ASSISTANT

## 2020-08-21 NOTE — PATIENT INSTRUCTIONS
Alpha lipoic acid- daily for diabetic neuropathy prevention  B12- 1000 mcg daily (could also consider the injectable)    Cymbalta- SNRI

## 2020-08-21 NOTE — PROGRESS NOTES
Patient ID: Juan Munoz is a 64 y o  male  Assessment/Plan:    No problem-specific Assessment & Plan notes found for this encounter  {Assess/PlanSmartLinks:90264}       Subjective:    HPI    {[facility] HPI texts:84003}    {Common ambulatory SmartLinks:30213}         Objective: There were no vitals taken for this visit      Physical Exam    Neurological Exam      ROS:    Review of Systems

## 2020-08-21 NOTE — PROGRESS NOTES
Patient ID: Lee Munoz is a 64 y o  male  Assessment/Plan:     Diagnoses and all orders for this visit:    Neuropathy  -     Ambulatory referral to Neurology  -     Ambulatory referral to Endocrinology; Future  -     Sjogren's Antibodies; Future  -     Vitamin D 25 hydroxy; Future  -     Sedimentation rate, automated; Future  -     C-reactive protein; Future  -     Sjogren's Antibodies  -     Vitamin D 25 hydroxy  -     Sedimentation rate, automated  -     C-reactive protein    Type 2 diabetes mellitus without complication, without long-term current use of insulin ([facility] [address] )  -     Ambulatory referral to Endocrinology; Future    Contusion of right knee, sequela         His neuropathy seems to be worse in his opinion  As noted below his A1c is trending up from April to July, from 6 9-7 5% respectively  I explained that neuropathy can get worse with worsening diabetes, and dietary modifications were encouraged  Exercise is encouraged; unfortunately the patient does not feel comfortable walking outside when he gets home from work at a late hour, and his gym is closed  States he does not feel comfortable going to the gym anyway due to covid  We discussed other ways that he can get his exercise as this is so important to his overall health  Simply limiting excessive sugar will also help  We did not discuss this but we can also get him to see weight management or dietitian if needed for extra resources  I referred him to endocrinology today for further advice  Appreciate recommendations  Additional labs were ordered at his request     I recommended trying a low dose of B12 orally:  1000 mcg daily  We also discussed the benefits of alpha lipoic acid supplementation over-the-counter  I highly encouraged starting Cymbalta since his pain is not well controlled  He will consider  Of note, the patient has a fairly large contusion in the inner aspect of the right knee    He does not know how this happened  We discussed it may have happened overnight if he kicked his leg somehow  He stands for long periods of time in surgery which could contribute to decreased healing I suppose  Symptoms of DVT/PE were discussed in detail such as shortness of breath or calf pain  He was urged to seek emergent care with his PCP, urgent care or at the ED if calf pain, swelling or shortness of breath starts  No signs of this today  He does have mild discomfort to palpation of the bruised area  The patient should not hesitate to call me prior to his follow up with any questions or concerns  The patient was instructed to urgently call 911 or present to the nearest emergency room with any new or worsening neurological deficits  Subjective:    HPI    The patient feels that his neuropathy is worse since last seen in January  He describes a burning sensation in his feet, sometimes it is stabbing sensation which radiates up the legs, 1 or the other  Both legs have an equal amount of discomfort  He states he is frustrated at times, and very depressed with the symptoms  He thinks that there is something underlying other than what was described at the last visit, which was that he likely has neuropathy secondary to diabetes and alcohol abuse  The patient was recommended to avoid or limit alcohol use and to modify his diet  His last hemoglobin A1c is 7 5% in July, and 6 9% in April, so it is trending up  He does have a very mildly elevated AST and ALT  The patient had several additional blood tests which were negative:  Serum SPEP, antinuclear antibodies, IFA, sed rate, ROSALIA, TSH  His B12 was on the low limit of normal:  470  Prior documentation:  Complains he has been having numbness in the feet for sometime  It was mainly in the toes after long days of work, might be noticeable only in the evenings  Now, it is getting more prominent over the last few months  Notices it more now even during the day   Still mainly in the toes, first two toes and the ball of feet  On his bad days, numbness may extend to all the toes  It is not going up the ankle  Left foot is worse than the right  He has been getting intermittently lower back pain, does not radiate down his legs  Back pain is not bad, does not bother him enough to take any medications  Does get muscle cramps in his calves, shin, thighs  Comes and goes, mainly in the mornings, needs to get up and try to walk on the legs to relieve the cramps  Occasional twitching in fingers  Does tend to drop things out of his hands  Some numbness in hands (mainly in thenar eminence)  Balance is great, occasional stumble due to arthritis in knees  Has been told needs replacement  No change in speech/swallowing  No droopy eyes, no blurry/double vision       Has numbness in the left thigh since his IR procedure for abscess draingae in 2016  Has left lower leg numbness (patch on the lower leg)        He does have diabetes, most recent hemoglobin A1c was 7 7  Has been borderline DM for years, a1c was 6 4 last year  H/o sigmoidectomy due to abscess in 2016  CAD, s/p angioplasty in 2017  HTN  Osteoarthritis, BPH, sleep apnea, on CPAP, varicose veins      Family hx of DM (brothers), prediabetes in mother, passed away from PD/Dementia (80), father had aortic aneurysm  One of his brothers was thought to have ALS, had weakness in the left arm, lived for more than 10 years with the weakness in left arm  Had diabetes, with neuropathy, had non Hodgkin''s lymphoma, colorectal ca, eventually passed away from a heart attack at age 64       Past smoker, quit in 2018, smoked since he was in college  Does drink beers a few times a week  Not a heavy drinker     He is a        TSH recently done was 1 79, b12 was 470       The following portions of the patient's history were reviewed and updated as appropriate:   He  has a past medical history of Allergic rhinitis, Anemia, Arthritis, Bundle branch block, right, CAD (coronary artery disease), CPAP (continuous positive airway pressure) dependence, Diabetes mellitus ([facility] [address] ), Diverticulitis, Diverticulitis of large intestine with abscess without bleeding (12/7/2016), GERD (gastroesophageal reflux disease), Hyperlipidemia, Hypertension, Nasal septal deformity, Neuropathy, Peptic ulceration, Peptic ulceration (9/26/2019), Pneumonia, Renal disorder, Seasonal allergies, Sleep apnea, Urinary tract infection, and Vitamin D deficiency    He   Patient Active Problem List    Diagnosis Date Noted    Contusion of right knee 08/25/2020    Impacted cerumen of left ear 08/07/2020    Onychomycosis of toenail 07/31/2020    Carpal tunnel syndrome of left wrist     Ulnar neuropathy at elbow of left upper extremity     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 2 diabetes mellitus ([facility] [address] ) 01/23/2020    Neuropathy 01/03/2020    GERD (gastroesophageal reflux disease) 09/26/2019    Nasal septal deformity 09/26/2019    Bundle branch block, right 09/26/2019    CPAP (continuous positive airway pressure) dependence 09/26/2019    Vitamin D deficiency 09/26/2019    BPH (benign prostatic hyperplasia) 09/26/2019    History of vertebral compression fracture 09/26/2019    Colon polyp 09/26/2019    History of angioplasty 09/26/2019    Ocular migraine 09/26/2019    Inguinal hernia 09/26/2019    Primary osteoarthritis of left knee 38/59/2723    Umbilical hernia 56/27/3847    Bradycardia 09/26/2019    Asymptomatic varicose veins of both lower extremities 09/26/2019    Seasonal allergic rhinitis due to pollen 06/28/2019    Obstructive sleep apnea syndrome 07/06/2018    BMI 37 0-37 9, adult 07/06/2018    Dizziness 05/01/2018    Asymptomatic PVCs 05/01/2018    Chronic pain of both knees 03/06/2018    Primary osteoarthritis of knees, bilateral 03/06/2018    CAD (coronary artery disease) 11/29/2017    Class 2 obesity due to excess calories without serious comorbidity with body mass index (BMI) of 37 0 to 37 9 in adult 11/22/2017    Sigmoid diverticulitis 12/04/2016    Fatigue 12/04/2016    Elevated liver enzymes 12/04/2016    Polyarthralgia 12/04/2016    Hydronephrosis 12/04/2016    Diabetes mellitus ([facility] [address] )     Hyperlipidemia     Essential hypertension      He  has a past surgical history that includes Hernia repair; Varicose vein surgery; Knee arthroscopy w/ meniscectomy; Colon surgery; Cystoscopy (Left, 2/3/2017); COLON SIGMOID RESECTION LAPAROSCOPIC (N/A, 12/16/2016); COLON SIGMOID RESECTION (N/A, 12/16/2016); pr cystourethroscopy,ureter catheter (Left, 12/4/2016); Esophagogastroduodenoscopy (N/A, 10/6/2016); and Colonoscopy (N/A, 10/6/2016)  His family history includes Aortic aneurysm in his father; Atrial fibrillation in his mother; Colon cancer in his brother; Diabetes in his brother; Osteoarthritis in his mother; Thyroid cancer in his brother  He  reports that he quit smoking about 2 years ago  His smoking use included cigarettes  He has a 18 50 pack-year smoking history  He has never used smokeless tobacco  He reports current alcohol use of about 3 0 standard drinks of alcohol per week  He reports that he does not use drugs    Current Outpatient Medications   Medication Sig Dispense Refill    albuterol (PROVENTIL HFA,VENTOLIN HFA) 90 mcg/act inhaler Inhale 2 puffs every 4 (four) hours as needed for wheezing or shortness of breath 1 Inhaler 0    Ascorbic Acid (VITAMIN C) 100 MG tablet Take 500 mg by mouth       aspirin 81 MG tablet Take 162 mg by mouth      atorvastatin (LIPITOR) 40 mg tablet Take 1 tablet (40 mg total) by mouth daily with dinner 90 tablet 1    Cholecalciferol (VITAMIN D3) 1000 units CAPS Take by mouth      desloratadine (CLARINEX) 5 MG tablet Take 1 tablet (5 mg total) by mouth daily at bedtime 90 tablet 1    Efinaconazole 10 % SOLN Apply to toe nail daily as directed 8 mL 2    gabapentin (NEURONTIN) 100 mg capsule One tablet daily AM and two tablet daily  capsule 1    glucosamine-chondroitin 500-400 MG tablet Take 1 tablet by mouth daily      glucose blood test strip 1 each by Other route daily Use as instructed 100 each 6    hydrochlorothiazide (HYDRODIURIL) 25 mg tablet Take 1 tablet (25 mg total) by mouth daily 90 tablet 3    losartan (COZAAR) 50 mg tablet TAKE 1 TABLET BY MOUTH ONCE DAILY 90 tablet 1    losartan (COZAAR) 50 mg tablet Take 1 tablet (50 mg total) by mouth daily 90 tablet 1    metFORMIN (GLUCOPHAGE) 500 mg tablet Take 1 tablet (500 mg total) by mouth 2 (two) times a day with meals 180 tablet 1    mometasone (NASONEX) 50 mcg/act nasal spray 2 sprays into each nostril daily as needed (nasal congestion) 1 Act 5    Multiple Vitamin (MULTIVITAMIN) capsule Take 1 capsule by mouth daily      nitroglycerin (NITROSTAT) 0 3 mg SL tablet Place 1 tablet (0 3 mg total) under the tongue every 5 (five) minutes as needed for chest pain 25 tablet 1    Omega-3 Fatty Acids (FISH OIL) 1,000 mg Take by mouth      omeprazole (PriLOSEC) 20 mg delayed release capsule Take 20 mg by mouth daily      ONETOUCH DELICA LANCETS 39H MISC 1 Units by Does not apply route daily 100 each 6    torsemide (DEMADEX) 10 mg tablet TAKE 1 TABLET BY MOUTH ONCE DAILY 90 tablet 3     No current facility-administered medications for this visit  He is allergic to levaquin [levofloxacin in d5w] and sulfa antibiotics            Objective:    Blood pressure 111/73, pulse 57, temperature 97 6 °F (36 4 °C), weight 125 kg (276 lb)  Physical Exam    Neurological Exam  Vital signs reviewed  Well developed, well nourished  Head: Normocephalic, atraumatic  Neck: Neck flexors 5/5, No TTP  CN 2-12: intact and symmetric, including EOMs which are normal b/l and PERRL  Fundi b/l are normal to crude ophthalmological examination  MSK: 5/5 t/o  ROM normal x all 4 extr  There is a contusion in the inner aspect of the right knee    Sensation:  Decreased sensation to temperature and pin below the knee in a length-dependent fashion in both legs  Decreased vibration at the toes and ankles bilaterally  Reflexes:  Trace in the lower extremities  Coordination: Nml x4 extr  Gait: Steady normal gait  ROS:    Review of Systems   Constitutional: Negative  Negative for appetite change and fever  HENT: Negative  Negative for hearing loss, tinnitus, trouble swallowing and voice change  Eyes: Negative  Negative for photophobia and pain  Respiratory: Negative  Negative for shortness of breath  Cardiovascular: Negative  Negative for palpitations  Gastrointestinal: Negative  Negative for nausea and vomiting  Endocrine: Negative  Negative for cold intolerance  Genitourinary: Negative  Negative for dysuria, frequency and urgency  Musculoskeletal: Negative  Negative for myalgias and neck pain  Skin: Negative  Negative for rash  Neurological: Positive for numbness (numbness getting worse, especially in the feet )  Negative for dizziness, tremors, seizures, syncope, facial asymmetry, speech difficulty, weakness, light-headedness and headaches  Hematological: Negative  Does not bruise/bleed easily  Psychiatric/Behavioral: Negative  Negative for confusion, hallucinations and sleep disturbance  The following portions of the patient's history were reviewed and updated as appropriate: allergies, current medications/ medication history, past family history, past medical history, past social history, past surgical history and problem list     Review of systems was reviewed and otherwise unremarkable from a neurological perspective

## 2020-08-25 PROBLEM — S80.01XA CONTUSION OF RIGHT KNEE: Status: ACTIVE | Noted: 2020-08-25

## 2020-09-03 ENCOUNTER — TELEPHONE (OUTPATIENT)
Dept: NEUROLOGY | Facility: CLINIC | Age: 60
End: 2020-09-03

## 2020-09-03 NOTE — TELEPHONE ENCOUNTER
I left Marian Sands a message to call me back regarding his labs  Please for call to me if patient calls back to [phone]  Thank you

## 2020-09-03 NOTE — TELEPHONE ENCOUNTER
----- Message from Tabitha James PA-C sent at 8/25/2020 12:32 PM EDT -----  Can you please let him know that his SPEP result came back with NO monoclonal bands, as we discovered in winnie, and that means a negative result  He can proceed with the additional labs I ordered for him on Friday  Thanks!

## 2020-09-04 NOTE — TELEPHONE ENCOUNTER
Patient called and was blind transferred to the [facility] office  He was not happy and was yelling that "there's no direct line to you people?! I have to call that number and go through this awful never-ending loop"  Apologized and explained that [facility] is only in the [place] office every 3rd Friday so it would actually be more beneficial to talk to the clinical team, etc  that can answer his questions and reach out to her if needed  Relayed message regarding labs to him  He wanted clarification as to whether he should still get labs done that she had previously ordered as he has an appt scheduled for 11am to get them completed  Told her per her note, he should proceed with getting the labs done

## 2020-09-06 LAB
25(OH)D3+25(OH)D2 SERPL-MCNC: 41.2 NG/ML (ref 30–100)
CRP SERPL-MCNC: 2 MG/L (ref 0–10)
ENA SS-A AB SER-ACNC: <0.2 AI (ref 0–0.9)
ENA SS-B AB SER-ACNC: <0.2 AI (ref 0–0.9)
ERYTHROCYTE [SEDIMENTATION RATE] IN BLOOD BY WESTERGREN METHOD: 9 MM/HR (ref 0–30)

## 2020-09-25 ENCOUNTER — CONSULT (OUTPATIENT)
Dept: ENDOCRINOLOGY | Facility: CLINIC | Age: 60
End: 2020-09-25
Payer: COMMERCIAL

## 2020-09-25 ENCOUNTER — OFFICE VISIT (OUTPATIENT)
Dept: CARDIOLOGY CLINIC | Facility: CLINIC | Age: 60
End: 2020-09-25
Payer: COMMERCIAL

## 2020-09-25 VITALS
HEIGHT: 71 IN | DIASTOLIC BLOOD PRESSURE: 70 MMHG | SYSTOLIC BLOOD PRESSURE: 128 MMHG | WEIGHT: 275.4 LBS | BODY MASS INDEX: 38.56 KG/M2 | HEART RATE: 57 BPM | TEMPERATURE: 98.4 F

## 2020-09-25 VITALS
DIASTOLIC BLOOD PRESSURE: 64 MMHG | HEIGHT: 71 IN | WEIGHT: 276 LBS | TEMPERATURE: 98 F | BODY MASS INDEX: 38.64 KG/M2 | SYSTOLIC BLOOD PRESSURE: 130 MMHG | HEART RATE: 58 BPM | OXYGEN SATURATION: 97 %

## 2020-09-25 DIAGNOSIS — I10 ESSENTIAL HYPERTENSION: ICD-10-CM

## 2020-09-25 DIAGNOSIS — E11.65 TYPE 2 DIABETES MELLITUS WITH HYPERGLYCEMIA, WITHOUT LONG-TERM CURRENT USE OF INSULIN (HCC): Primary | ICD-10-CM

## 2020-09-25 DIAGNOSIS — E78.2 MIXED HYPERLIPIDEMIA: ICD-10-CM

## 2020-09-25 DIAGNOSIS — I10 HYPERTENSION GOAL BP (BLOOD PRESSURE) < 140/90: ICD-10-CM

## 2020-09-25 DIAGNOSIS — I25.10 CORONARY ARTERY DISEASE INVOLVING NATIVE CORONARY ARTERY OF NATIVE HEART WITHOUT ANGINA PECTORIS: Primary | ICD-10-CM

## 2020-09-25 DIAGNOSIS — I45.10 BUNDLE BRANCH BLOCK, RIGHT: ICD-10-CM

## 2020-09-25 PROCEDURE — 99244 OFF/OP CNSLTJ NEW/EST MOD 40: CPT | Performed by: INTERNAL MEDICINE

## 2020-09-25 PROCEDURE — 93000 ELECTROCARDIOGRAM COMPLETE: CPT | Performed by: INTERNAL MEDICINE

## 2020-09-25 PROCEDURE — 99214 OFFICE O/P EST MOD 30 MIN: CPT | Performed by: INTERNAL MEDICINE

## 2020-09-25 NOTE — LETTER
September 25, 2020     Jayleen Hardy, [address] [employer] [facility] [address]    Patient: Jennifer Mcfarlane   YOB: 1960   Date of Visit: 9/25/2020       Dear Dr Dawood Lees: Thank you for referring Suha Sawant to me for evaluation  Below are my notes for this consultation  If you have questions, please do not hesitate to call me  I look forward to following your patient along with you  Sincerely,        Wayna Hodgkins, MD        CC: No Recipients  Wayna Hodgkins, MD  9/25/2020  3:08 PM  Incomplete  ENDOCRINOLOGY  NEW PATIENT H&P     ? Reason for Endocrine Consult/Chief Complaint: DM management     Referring Provider: Lauren HATHAWAY  Consults       Medical Decision Making:     Impression  1  Type 2 Diabetes  2  HTN  3  HLD  4  CAD s/p stents      Recommendations:    I discussed the pathophysiology of diabetes and reviewed therapy options based on ADA 2020 guidelines  He is having mild fasting hyperglycemia that improves throughout the day  For now continue metformin 500mg BID AC  Instructed to send me BG log in 3-4 weeks for review to see if metformin has to be adjusted or if needs a 2nd medication class (SGLT-2 inhibitors or GLP-1 agonists given hx of cardiac stents)    Referred to Jorge Sanabria for dietary counseling     Referred to podiatry for ingrown nail and neuropathy evaluation  ? HTN-controlled continue HCTZ, diuretic, ARB     HLD-LDL 62, triglycerides 186 July 2020, continue statin, will consider Vascepa if triglycerides remain elevated for residual cardiovascular risk reduction    RTC 3 months    Nahomy OGDEN  Endocrinology        History of Present Illness:  Mr August Sebastian is a 64year old male who presents for DM evaluation     ?  PMH-DM2, HTN, HLD, CAD s/p stents   PSH-varicose vein surgery, knee surgery, hernia repair, colon surgery   FHx-mother with possible diabetes, brothers with diabetes, nieces with diabetes  SHx-quit smoking, social ETOH use,  Type of DM: 2  Age of onset: 2 years ago   Most recent A1C: 7 5% July 2020  Present home regimen: metformin 500mg BID AC (increased Aug 7th 2020)  SMBG at home: 125-186 reported ---,186---premeal mid 100s  Hypoglycemic events:none reported  Microvascular complications: neuropathy --- started Dec 2019   Macrovascular complications:CAD  Nutrition:breakfast kind bar or protein shake, lunch sandwich, dinner fish with rice and brussels sprouts, 1 snapple diet bottle a day  Exercise:limited      ? Review of Systems:     Review of Systems   Constitutional: Negative for appetite change, chills, diaphoresis, fatigue, fever and unexpected weight change  HENT: Negative for congestion, ear pain, hearing loss, rhinorrhea, sinus pressure, sinus pain, sore throat, trouble swallowing and voice change  Eyes: Negative for photophobia, redness and visual disturbance  Respiratory: Negative for apnea, cough, chest tightness, shortness of breath, wheezing and stridor  Cardiovascular: Negative for chest pain, palpitations and leg swelling  Gastrointestinal: Negative for abdominal distention, abdominal pain, constipation, diarrhea, nausea and vomiting  Endocrine: Negative for cold intolerance, heat intolerance, polydipsia, polyphagia and polyuria  Genitourinary: Negative for difficulty urinating, dysuria, flank pain, frequency, hematuria and urgency  Musculoskeletal: Negative for arthralgias, back pain, gait problem, joint swelling and myalgias  Skin: Negative for color change, pallor, rash and wound  Allergic/Immunologic: Negative for immunocompromised state  Neurological: Negative for dizziness, tremors, syncope, weakness, light-headedness and headaches  Hematological: Negative for adenopathy  Does not bruise/bleed easily  Psychiatric/Behavioral: Negative for confusion and sleep disturbance  The patient is not nervous/anxious  ?   Patient History:     Past Medical History:   Diagnosis Date    Allergic rhinitis     Anemia     Arthritis     Bundle branch block, right     CAD (coronary artery disease)     CPAP (continuous positive airway pressure) dependence     Diabetes mellitus (HCC)     borderline, controlled with diet and activity    Diverticulitis     Diverticulitis of large intestine with abscess without bleeding 12/7/2016    GERD (gastroesophageal reflux disease)     Hyperlipidemia     Hypertension     Nasal septal deformity     Neuropathy     Peptic ulceration     gastric    Peptic ulceration 9/26/2019    gastric    Pneumonia     Renal disorder     Seasonal allergies     Sleep apnea     wears c-pap    Urinary tract infection     Vitamin D deficiency      Past Surgical History:   Procedure Laterality Date    COLON SIGMOID RESECTION N/A 12/16/2016    Procedure: RESECTION COLON SIGMOID;  Surgeon: Feliciano Smith MD;  [facility];  Service:     COLON SIGMOID RESECTION LAPAROSCOPIC N/A 12/16/2016    Procedure: RESECTION COLON SIGMOID LAPAROSCOPIC:CONVERTED TO [email or web address];  Surgeon: Feliciano Smith MD;  [facility];  Service:     COLON SURGERY      COLONOSCOPY N/A 10/6/2016    Procedure: COLONOSCOPY;  Surgeon: Alvarez Schwartz MD;  Location: [place] [address] GI LAB; Service:    Wolf Malave CYSTOSCOPY W/ RETROGRADES Left 2/3/2017    Procedure: CYSTOSCOPY WITH BILATERAL RETROGRADES, LEFT STENT REMOVAL;  Surgeon: Esther Donis MD;  Location: [address] [place] [address];  Service:     ESOPHAGOGASTRODUODENOSCOPY N/A 10/6/2016    Procedure: ESOPHAGOGASTRODUODENOSCOPY (EGD); Surgeon: Alvarez Schwartz MD;  Location: [place] [address] GI LAB;   Service:     HERNIA REPAIR      KNEE ARTHROSCOPY W/ MENISCECTOMY      MS CYSTOURETHROSCOPY,URETER CATHETER Left 12/4/2016    Procedure: CYSTOSCOPY RETROGRADE PYELOGRAM WITH INSERTION STENT URETERAL;  Surgeon: Esther Donis MD;  Location: [address] [place] [address];  Service: Urology    VARICOSE VEIN SURGERY       Social History     Socioeconomic History    Marital status: Single Spouse name: Not on file    Number of children: Not on file    Years of education: Not on file    Highest education level: Not on file   Occupational History    Not on file   Social Needs    Financial resource strain: Not on file    Food insecurity     Worry: Not on file     Inability: Not on file    Transportation needs     Medical: Not on file     Non-medical: Not on file   Tobacco Use    Smoking status: Former Smoker     Packs/day: 0 50     Years: 37 00     Pack years: 18 50     Types: Cigarettes     Last attempt to quit: 3/30/2018     Years since quittin 4    Smokeless tobacco: Never Used    Tobacco comment:  requarmando 3/30/18   Substance and Sexual Activity    Alcohol use: Yes     Alcohol/week: 3 0 standard drinks     Types: 3 Cans of beer per week    Drug use: No    Sexual activity: Never   Lifestyle    Physical activity     Days per week: Not on file     Minutes per session: Not on file    Stress: Not on file   Relationships    Social connections     Talks on phone: Not on file     Gets together: Not on file     Attends Jehovah's witness service: Not on file     Active member of club or organization: Not on file     Attends meetings of clubs or organizations: Not on file     Relationship status: Not on file    Intimate partner violence     Fear of current or ex partner: Not on file     Emotionally abused: Not on file     Physically abused: Not on file     Forced sexual activity: Not on file   Other Topics Concern    Not on file   Social History Narrative    Lives alone  Family History   Problem Relation Age of Onset    Diabetes Brother     Thyroid cancer Brother     Osteoarthritis Mother     Atrial fibrillation Mother     Aortic aneurysm Father     Colon cancer Brother        Current Medications: At the time this note was written these were the medications the patient was on    Current Outpatient Medications   Medication Sig Dispense Refill    albuterol (PROVENTIL HFA,VENTOLIN HFA) 90 mcg/act inhaler Inhale 2 puffs every 4 (four) hours as needed for wheezing or shortness of breath 1 Inhaler 0    Ascorbic Acid (VITAMIN C) 100 MG tablet Take 500 mg by mouth       aspirin 81 MG tablet Take 162 mg by mouth      atorvastatin (LIPITOR) 40 mg tablet Take 1 tablet (40 mg total) by mouth daily with dinner 90 tablet 1    Cholecalciferol (VITAMIN D3) 1000 units CAPS Take by mouth daily       desloratadine (CLARINEX) 5 MG tablet Take 1 tablet (5 mg total) by mouth daily at bedtime 90 tablet 1    gabapentin (NEURONTIN) 100 mg capsule One tablet daily AM and two tablet daily  capsule 1    glucosamine-chondroitin 500-400 MG tablet Take 1 tablet by mouth daily      glucose blood test strip 1 each by Other route daily Use as instructed 100 each 6    hydrochlorothiazide (HYDRODIURIL) 25 mg tablet Take 1 tablet (25 mg total) by mouth daily 90 tablet 3    losartan (COZAAR) 50 mg tablet TAKE 1 TABLET BY MOUTH ONCE DAILY 90 tablet 1    metFORMIN (GLUCOPHAGE) 500 mg tablet Take 1 tablet (500 mg total) by mouth 2 (two) times a day with meals 180 tablet 1    mometasone (NASONEX) 50 mcg/act nasal spray 2 sprays into each nostril daily as needed (nasal congestion) 1 Act 5    Multiple Vitamin (MULTIVITAMIN) capsule Take 1 capsule by mouth daily      nitroglycerin (NITROSTAT) 0 3 mg SL tablet Place 1 tablet (0 3 mg total) under the tongue every 5 (five) minutes as needed for chest pain 25 tablet 1    Omega-3 Fatty Acids (FISH OIL) 1,000 mg Take by mouth 2 (two) times a day       ONETOUCH DELICA LANCETS 51J MISC 1 Units by Does not apply route daily 100 each 6    torsemide (DEMADEX) 10 mg tablet TAKE 1 TABLET BY MOUTH ONCE DAILY (Patient taking differently: daily as needed ) 90 tablet 3    omeprazole (PriLOSEC) 20 mg delayed release capsule Take 20 mg by mouth daily       No current facility-administered medications for this visit          Allergies: Levaquin [levofloxacin in d5w] and Sulfa antibiotics    Physical Exam:   Vital Signs:   /70   Pulse 57   Temp 98 4 °F (36 9 °C)   Ht 5' 11" (1 803 m)   Wt 125 kg (275 lb 6 4 oz)   BMI 38 41 kg/m²     Physical Exam  Vitals signs reviewed  Constitutional:       General: He is not in acute distress  Appearance: Normal appearance  He is not ill-appearing, toxic-appearing or diaphoretic  HENT:      Head: Normocephalic and atraumatic  Right Ear: External ear normal       Left Ear: External ear normal       Nose: Nose normal    Eyes:      General: No scleral icterus  Extraocular Movements: Extraocular movements intact  Conjunctiva/sclera: Conjunctivae normal    Neck:      Musculoskeletal: Normal range of motion and neck supple  No muscular tenderness  Comments: No thyromegaly   Cardiovascular:      Rate and Rhythm: Normal rate and regular rhythm  Heart sounds: Normal heart sounds  No murmur  No friction rub  No gallop  Pulmonary:      Effort: Pulmonary effort is normal  No respiratory distress  Breath sounds: Normal breath sounds  No stridor  No wheezing, rhonchi or rales  Abdominal:      General: Bowel sounds are normal  There is no distension  Palpations: Abdomen is soft  There is no mass  Tenderness: There is no abdominal tenderness  There is no guarding or rebound  Hernia: No hernia is present  Musculoskeletal: Normal range of motion  General: Swelling present  Comments: Trace edema b/l LE    Lymphadenopathy:      Cervical: No cervical adenopathy  Skin:     General: Skin is warm and dry  Coloration: Skin is not pale  Findings: No erythema or rash  Comments: Left big toe ingrown nail    Neurological:      General: No focal deficit present  Mental Status: He is alert and oriented to person, place, and time  Psychiatric:         Mood and Affect: Mood normal          Behavior: Behavior normal          Thought Content:  Thought content normal  Judgment: Judgment normal           10g Monofilament test (large nerve fibers)- minimally diminished b/l feet    Labs and Imaging:      Component      Latest Ref Rng & Units 7/10/2020   Glucose, Random      65 - 99 mg/dL 159 (H)   BUN      6 - 24 mg/dL 14   Creatinine      0 76 - 1 27 mg/dL 1 05   eGFR Non       >59 mL/min/1 73 78   eGFR       >59 mL/min/1 73 91   SL AMB BUN/CREATININE RATIO      9 - 20 13   Sodium      134 - 144 mmol/L 140   Potassium      3 5 - 5 2 mmol/L 4 3   Chloride      96 - 106 mmol/L 102   CO2      20 - 29 mmol/L 22   CALCIUM      8 7 - 10 2 mg/dL 9 3   Total Protein      6 0 - 8 5 g/dL 6 7   Albumin      3 8 - 4 9 g/dL 4 3   Globulin, Total      1 5 - 4 5 g/dL 2 4   Albumin/Globulin Ratio      1 2 - 2 2 1 8   TOTAL BILIRUBIN      0 0 - 1 2 mg/dL 0 5   ALKALINE PHOSPHATASE ISOENZYMES      39 - 117 IU/L 58   AST      0 - 40 IU/L 49 (H)   ALT      0 - 44 IU/L 64 (H)   Cholesterol      100 - 199 mg/dL 135   Triglycerides      0 - 149 mg/dL 186 (H)   HDL      >39 mg/dL 36 (L)   VLDL Cholesterol Huber      5 - 40 mg/dL 37   LDL Calculated      0 - 99 mg/dL 62   Hemoglobin A1C      4 8 - 5 6 % 7 5 (H)     ? Kevin Cuevas MD  9/25/2020 12:43 PM  Incomplete  ENDOCRINOLOGY  NEW PATIENT H&P     ? Reason for Endocrine Consult/Chief Complaint: DM management     Referring Provider: Mami Motta DO  Consults       Medical Decision Making:     Impression  1  Type 2 Diabetes  2  HTN  3  HLD      Recommendations:  ? HTN-controlled continue HCTZ, diuretic, ARB     HLD-LDL 62, triglycerides 186 July 2020, continue statin    RTC 3 months    Shireen Duverney M D  Endocrinology        History of Present Illness:  Mr Yris Peguero is a 64year old male who presents for DM evaluation     ?  PMH-DM2, HTN, HLD, CAD s/p stents   PSH-varicose vein surgery, knee surgery, hernia repair, colon surgery   FHx-mother with possible diabetes, brothers with diabetes, nieces with diabetes  SHx-quit smoking, social ETOH use,       Type of DM: 2  Age of onset: 2 years ago   Most recent A1C: 7 5% July 2020  Present home regimen: metformin 500mg BID AC (increased Aug 7th 2020)  SMBG at home: 125-186 reported ---,186---premeal mid 100s  Hypoglycemic events:none reported  Microvascular complications: neuropathy --- started Dec 2019   Macrovascular complications:CAD  Nutrition:breakfast kind bar or protein shake, lunch sandwich, dinner fish with rice and brussels sprouts, 1 snapple diet bottle a day  Exercise:limited      ? Review of Systems:     Review of Systems   Constitutional: Negative for appetite change, chills, diaphoresis, fatigue, fever and unexpected weight change  HENT: Negative for congestion, ear pain, hearing loss, rhinorrhea, sinus pressure, sinus pain, sore throat, trouble swallowing and voice change  Eyes: Negative for photophobia, redness and visual disturbance  Respiratory: Negative for apnea, cough, chest tightness, shortness of breath, wheezing and stridor  Cardiovascular: Negative for chest pain, palpitations and leg swelling  Gastrointestinal: Negative for abdominal distention, abdominal pain, constipation, diarrhea, nausea and vomiting  Endocrine: Negative for cold intolerance, heat intolerance, polydipsia, polyphagia and polyuria  Genitourinary: Negative for difficulty urinating, dysuria, flank pain, frequency, hematuria and urgency  Musculoskeletal: Negative for arthralgias, back pain, gait problem, joint swelling and myalgias  Skin: Negative for color change, pallor, rash and wound  Allergic/Immunologic: Negative for immunocompromised state  Neurological: Negative for dizziness, tremors, syncope, weakness, light-headedness and headaches  Hematological: Negative for adenopathy  Does not bruise/bleed easily  Psychiatric/Behavioral: Negative for confusion and sleep disturbance  The patient is not nervous/anxious  ?  Patient History:     Past Medical History:   Diagnosis Date    Allergic rhinitis     Anemia     Arthritis     Bundle branch block, right     CAD (coronary artery disease)     CPAP (continuous positive airway pressure) dependence     Diabetes mellitus (HCC)     borderline, controlled with diet and activity    Diverticulitis     Diverticulitis of large intestine with abscess without bleeding 12/7/2016    GERD (gastroesophageal reflux disease)     Hyperlipidemia     Hypertension     Nasal septal deformity     Neuropathy     Peptic ulceration     gastric    Peptic ulceration 9/26/2019    gastric    Pneumonia     Renal disorder     Seasonal allergies     Sleep apnea     wears c-pap    Urinary tract infection     Vitamin D deficiency      Past Surgical History:   Procedure Laterality Date    COLON SIGMOID RESECTION N/A 12/16/2016    Procedure: RESECTION COLON SIGMOID;  Surgeon: Mikey Garza MD;  [facility];  Service:     COLON SIGMOID RESECTION LAPAROSCOPIC N/A 12/16/2016    Procedure: RESECTION COLON SIGMOID LAPAROSCOPIC:CONVERTED TO [email or web address];  Surgeon: Mikey Garza MD;  [facility];  Service:     COLON SURGERY      COLONOSCOPY N/A 10/6/2016    Procedure: COLONOSCOPY;  Surgeon: Bridgette Boss MD;  Location: [facility]; Service:    Suzy Hays CYSTOSCOPY W/ RETROGRADES Left 2/3/2017    Procedure: CYSTOSCOPY WITH BILATERAL RETROGRADES, LEFT STENT REMOVAL;  Surgeon: Khalif Story MD;  Location: [address] [place] [address];  Service:     ESOPHAGOGASTRODUODENOSCOPY N/A 10/6/2016    Procedure: ESOPHAGOGASTRODUODENOSCOPY (EGD); Surgeon: Bridgette Boss MD;  Location: [facility];   Service:     HERNIA REPAIR      KNEE ARTHROSCOPY W/ MENISCECTOMY      MD CYSTOURETHROSCOPY,URETER CATHETER Left 12/4/2016    Procedure: CYSTOSCOPY RETROGRADE PYELOGRAM WITH INSERTION STENT URETERAL;  Surgeon: Khalif Story MD;  Location: [address] [place] [address];  Service: Urology   [address] [place] [address] Social History     Socioeconomic History    Marital status: Single     Spouse name: Not on file    Number of children: Not on file    Years of education: Not on file    Highest education level: Not on file   Occupational History    Not on file   Social Needs    Financial resource strain: Not on file    Food insecurity     Worry: Not on file     Inability: Not on file    Transportation needs     Medical: Not on file     Non-medical: Not on file   Tobacco Use    Smoking status: Former Smoker     Packs/day: 0 50     Years: 37 00     Pack years: 18 50     Types: Cigarettes     Last attempt to quit: 3/30/2018     Years since quittin 4    Smokeless tobacco: Never Used    Tobacco comment:  requit 3/30/18   Substance and Sexual Activity    Alcohol use: Yes     Alcohol/week: 3 0 standard drinks     Types: 3 Cans of beer per week    Drug use: No    Sexual activity: Never   Lifestyle    Physical activity     Days per week: Not on file     Minutes per session: Not on file    Stress: Not on file   Relationships    Social connections     Talks on phone: Not on file     Gets together: Not on file     Attends Hindu service: Not on file     Active member of club or organization: Not on file     Attends meetings of clubs or organizations: Not on file     Relationship status: Not on file    Intimate partner violence     Fear of current or ex partner: Not on file     Emotionally abused: Not on file     Physically abused: Not on file     Forced sexual activity: Not on file   Other Topics Concern    Not on file   Social History Narrative    Lives alone  Family History   Problem Relation Age of Onset    Diabetes Brother     Thyroid cancer Brother     Osteoarthritis Mother     Atrial fibrillation Mother     Aortic aneurysm Father     Colon cancer Brother        Current Medications: At the time this note was written these were the medications the patient was on    Current Outpatient Medications Medication Sig Dispense Refill    albuterol (PROVENTIL HFA,VENTOLIN HFA) 90 mcg/act inhaler Inhale 2 puffs every 4 (four) hours as needed for wheezing or shortness of breath 1 Inhaler 0    Ascorbic Acid (VITAMIN C) 100 MG tablet Take 500 mg by mouth       aspirin 81 MG tablet Take 162 mg by mouth      atorvastatin (LIPITOR) 40 mg tablet Take 1 tablet (40 mg total) by mouth daily with dinner 90 tablet 1    Cholecalciferol (VITAMIN D3) 1000 units CAPS Take by mouth daily       desloratadine (CLARINEX) 5 MG tablet Take 1 tablet (5 mg total) by mouth daily at bedtime 90 tablet 1    gabapentin (NEURONTIN) 100 mg capsule One tablet daily AM and two tablet daily  capsule 1    glucosamine-chondroitin 500-400 MG tablet Take 1 tablet by mouth daily      glucose blood test strip 1 each by Other route daily Use as instructed 100 each 6    hydrochlorothiazide (HYDRODIURIL) 25 mg tablet Take 1 tablet (25 mg total) by mouth daily 90 tablet 3    losartan (COZAAR) 50 mg tablet TAKE 1 TABLET BY MOUTH ONCE DAILY 90 tablet 1    metFORMIN (GLUCOPHAGE) 500 mg tablet Take 1 tablet (500 mg total) by mouth 2 (two) times a day with meals 180 tablet 1    mometasone (NASONEX) 50 mcg/act nasal spray 2 sprays into each nostril daily as needed (nasal congestion) 1 Act 5    Multiple Vitamin (MULTIVITAMIN) capsule Take 1 capsule by mouth daily      nitroglycerin (NITROSTAT) 0 3 mg SL tablet Place 1 tablet (0 3 mg total) under the tongue every 5 (five) minutes as needed for chest pain 25 tablet 1    Omega-3 Fatty Acids (FISH OIL) 1,000 mg Take by mouth 2 (two) times a day       ONETOUCH DELICA LANCETS 27P MISC 1 Units by Does not apply route daily 100 each 6    torsemide (DEMADEX) 10 mg tablet TAKE 1 TABLET BY MOUTH ONCE DAILY (Patient taking differently: daily as needed ) 90 tablet 3    omeprazole (PriLOSEC) 20 mg delayed release capsule Take 20 mg by mouth daily       No current facility-administered medications for this visit  Allergies: Levaquin [levofloxacin in d5w] and Sulfa antibiotics    Physical Exam:   Vital Signs:   /70   Pulse 57   Temp 98 4 °F (36 9 °C)   Ht 5' 11" (1 803 m)   Wt 125 kg (275 lb 6 4 oz)   BMI 38 41 kg/m²     Physical Exam     10g Monofilament test (large nerve fibers)-  128 hz tuning fork (large nerve fibers)-  Pinprick sensation (small nerve fibers)-   Labs and Imaging:      ?

## 2020-09-25 NOTE — PATIENT INSTRUCTIONS
Send me blood sugar log in 3-4 weeks    See Dr Willa Powell or Dr Jackson Franco podiatry     Follow up in 3 months

## 2020-09-25 NOTE — PROGRESS NOTES
ENDOCRINOLOGY  NEW PATIENT H&P     ? Reason for Endocrine Consult/Chief Complaint: DM management     Referring Provider: Mattie HATHAWAY  Consults       Medical Decision Making:     Impression  1  Type 2 Diabetes  2  HTN  3  HLD  4  CAD s/p stents      Recommendations:    I discussed the pathophysiology of diabetes and reviewed therapy options based on ADA 2020 guidelines  He is having mild fasting hyperglycemia that improves throughout the day  For now continue metformin 500mg BID AC  Instructed to send me BG log in 3-4 weeks for review to see if metformin has to be adjusted or if needs a 2nd medication class (SGLT-2 inhibitors or GLP-1 agonists given hx of cardiac stents)    Referred to Mary GALLARDO for dietary counseling     Referred to podiatry for ingrown nail and neuropathy evaluation  ? HTN-controlled continue HCTZ, diuretic, ARB     HLD-LDL 62, triglycerides 186 July 2020, continue statin, will consider Vascepa if triglycerides remain elevated for residual cardiovascular risk reduction    RTC 3 months    Can OGDEN  Endocrinology        History of Present Illness:  Mr Justin Verduzco is a 64year old male who presents for DM evaluation  ?  PMH-DM2, HTN, HLD, CAD s/p stents   PSH-varicose vein surgery, knee surgery, hernia repair, colon surgery   FHx-mother with possible diabetes, brothers with diabetes, nieces with diabetes  SHx-quit smoking, social ETOH use,       Type of DM: 2  Age of onset: 2 years ago   Most recent A1C: 7 5% July 2020  Present home regimen: metformin 500mg BID AC (increased Aug 7th 2020)  SMBG at home: 125-186 reported ---,186---premeal mid 100s  Hypoglycemic events:none reported  Microvascular complications: neuropathy --- started Dec 2019   Macrovascular complications:CAD  Nutrition:breakfast kind bar or protein shake, lunch sandwich, dinner fish with rice and brussels sprouts, 1 snapple diet bottle a day  Exercise:limited      ?   Review of Systems: Review of Systems   Constitutional: Negative for appetite change, chills, diaphoresis, fatigue, fever and unexpected weight change  HENT: Negative for congestion, ear pain, hearing loss, rhinorrhea, sinus pressure, sinus pain, sore throat, trouble swallowing and voice change  Eyes: Negative for photophobia, redness and visual disturbance  Respiratory: Negative for apnea, cough, chest tightness, shortness of breath, wheezing and stridor  Cardiovascular: Negative for chest pain, palpitations and leg swelling  Gastrointestinal: Negative for abdominal distention, abdominal pain, constipation, diarrhea, nausea and vomiting  Endocrine: Negative for cold intolerance, heat intolerance, polydipsia, polyphagia and polyuria  Genitourinary: Negative for difficulty urinating, dysuria, flank pain, frequency, hematuria and urgency  Musculoskeletal: Negative for arthralgias, back pain, gait problem, joint swelling and myalgias  Skin: Negative for color change, pallor, rash and wound  Allergic/Immunologic: Negative for immunocompromised state  Neurological: Negative for dizziness, tremors, syncope, weakness, light-headedness and headaches  Hematological: Negative for adenopathy  Does not bruise/bleed easily  Psychiatric/Behavioral: Negative for confusion and sleep disturbance  The patient is not nervous/anxious  ?   Patient History:     Past Medical History:   Diagnosis Date    Allergic rhinitis     Anemia     Arthritis     Bundle branch block, right     CAD (coronary artery disease)     CPAP (continuous positive airway pressure) dependence     Diabetes mellitus (HCC)     borderline, controlled with diet and activity    Diverticulitis     Diverticulitis of large intestine with abscess without bleeding 12/7/2016    GERD (gastroesophageal reflux disease)     Hyperlipidemia     Hypertension     Nasal septal deformity     Neuropathy     Peptic ulceration     gastric    Peptic ulceration 9/26/2019    gastric    Pneumonia     Renal disorder     Seasonal allergies     Sleep apnea     wears c-pap    Urinary tract infection     Vitamin D deficiency      Past Surgical History:   Procedure Laterality Date    COLON SIGMOID RESECTION N/A 12/16/2016    Procedure: RESECTION COLON SIGMOID;  Surgeon: Miriam Burger MD;  Location:  MAIN OR;  Service:     COLON SIGMOID RESECTION LAPAROSCOPIC N/A 12/16/2016    Procedure: RESECTION COLON SIGMOID LAPAROSCOPIC:CONVERTED TO [email or web address];  Surgeon: Miriam Burger MD;  Location:  MAIN OR;  Service:     COLON SURGERY      COLONOSCOPY N/A 10/6/2016    Procedure: COLONOSCOPY;  Surgeon: Eva Hankins MD;  Location: [facility] GI LAB; Service:    [facility] CYSTOSCOPY W/ RETROGRADES Left 2/3/2017    Procedure: CYSTOSCOPY WITH BILATERAL RETROGRADES, LEFT STENT REMOVAL;  Surgeon: Sharad Klein MD;  Location: [address] [place] [address];  Service:     ESOPHAGOGASTRODUODENOSCOPY N/A 10/6/2016    Procedure: ESOPHAGOGASTRODUODENOSCOPY (EGD); Surgeon: Eva Hankins MD;  Location: [facility] GI LAB;   Service:     HERNIA REPAIR      KNEE ARTHROSCOPY W/ MENISCECTOMY      SC CYSTOURETHROSCOPY,URETER CATHETER Left 12/4/2016    Procedure: CYSTOSCOPY RETROGRADE PYELOGRAM WITH INSERTION STENT URETERAL;  Surgeon: Sharad Klein MD;  Location: [facility];  Service: Urology    VARICOSE VEIN SURGERY       Social History     Socioeconomic History    Marital status: Single     Spouse name: Not on file    Number of children: Not on file    Years of education: Not on file    Highest education level: Not on file   Occupational History    Not on file   Social Needs    Financial resource strain: Not on file    Food insecurity     Worry: Not on file     Inability: Not on file    Transportation needs     Medical: Not on file     Non-medical: Not on file   Tobacco Use    Smoking status: Former Smoker     Packs/day: 0 50     Years: 37 00     Pack years: 18 50     Types: Cigarettes Last attempt to quit: 3/30/2018     Years since quittin 4    Smokeless tobacco: Never Used    Tobacco comment:  requarmando 3/30/18   Substance and Sexual Activity    Alcohol use: Yes     Alcohol/week: 3 0 standard drinks     Types: 3 Cans of beer per week    Drug use: No    Sexual activity: Never   Lifestyle    Physical activity     Days per week: Not on file     Minutes per session: Not on file    Stress: Not on file   Relationships    Social connections     Talks on phone: Not on file     Gets together: Not on file     Attends Mandaeism service: Not on file     Active member of club or organization: Not on file     Attends meetings of clubs or organizations: Not on file     Relationship status: Not on file    Intimate partner violence     Fear of current or ex partner: Not on file     Emotionally abused: Not on file     Physically abused: Not on file     Forced sexual activity: Not on file   Other Topics Concern    Not on file   Social History Narrative    Lives alone  Family History   Problem Relation Age of Onset    Diabetes Brother     Thyroid cancer Brother     Osteoarthritis Mother     Atrial fibrillation Mother     Aortic aneurysm Father     Colon cancer Brother        Current Medications: At the time this note was written these were the medications the patient was on    Current Outpatient Medications   Medication Sig Dispense Refill    albuterol (PROVENTIL HFA,VENTOLIN HFA) 90 mcg/act inhaler Inhale 2 puffs every 4 (four) hours as needed for wheezing or shortness of breath 1 Inhaler 0    Ascorbic Acid (VITAMIN C) 100 MG tablet Take 500 mg by mouth       aspirin 81 MG tablet Take 162 mg by mouth      atorvastatin (LIPITOR) 40 mg tablet Take 1 tablet (40 mg total) by mouth daily with dinner 90 tablet 1    Cholecalciferol (VITAMIN D3) 1000 units CAPS Take by mouth daily       desloratadine (CLARINEX) 5 MG tablet Take 1 tablet (5 mg total) by mouth daily at bedtime 90 tablet 1    gabapentin (NEURONTIN) 100 mg capsule One tablet daily AM and two tablet daily  capsule 1    glucosamine-chondroitin 500-400 MG tablet Take 1 tablet by mouth daily      glucose blood test strip 1 each by Other route daily Use as instructed 100 each 6    hydrochlorothiazide (HYDRODIURIL) 25 mg tablet Take 1 tablet (25 mg total) by mouth daily 90 tablet 3    losartan (COZAAR) 50 mg tablet TAKE 1 TABLET BY MOUTH ONCE DAILY 90 tablet 1    metFORMIN (GLUCOPHAGE) 500 mg tablet Take 1 tablet (500 mg total) by mouth 2 (two) times a day with meals 180 tablet 1    mometasone (NASONEX) 50 mcg/act nasal spray 2 sprays into each nostril daily as needed (nasal congestion) 1 Act 5    Multiple Vitamin (MULTIVITAMIN) capsule Take 1 capsule by mouth daily      nitroglycerin (NITROSTAT) 0 3 mg SL tablet Place 1 tablet (0 3 mg total) under the tongue every 5 (five) minutes as needed for chest pain 25 tablet 1    Omega-3 Fatty Acids (FISH OIL) 1,000 mg Take by mouth 2 (two) times a day       ONETOUCH DELICA LANCETS 09A MISC 1 Units by Does not apply route daily 100 each 6    torsemide (DEMADEX) 10 mg tablet TAKE 1 TABLET BY MOUTH ONCE DAILY (Patient taking differently: daily as needed ) 90 tablet 3    omeprazole (PriLOSEC) 20 mg delayed release capsule Take 20 mg by mouth daily       No current facility-administered medications for this visit  Allergies: Levaquin [levofloxacin in d5w] and Sulfa antibiotics    Physical Exam:   Vital Signs:   /70   Pulse 57   Temp 98 4 °F (36 9 °C)   Ht 5' 11" (1 803 m)   Wt 125 kg (275 lb 6 4 oz)   BMI 38 41 kg/m²     Physical Exam  Vitals signs reviewed  Constitutional:       General: He is not in acute distress  Appearance: Normal appearance  He is not ill-appearing, toxic-appearing or diaphoretic  HENT:      Head: Normocephalic and atraumatic        Right Ear: External ear normal       Left Ear: External ear normal       Nose: Nose normal    Eyes: General: No scleral icterus  Extraocular Movements: Extraocular movements intact  Conjunctiva/sclera: Conjunctivae normal    Neck:      Musculoskeletal: Normal range of motion and neck supple  No muscular tenderness  Comments: No thyromegaly   Cardiovascular:      Rate and Rhythm: Normal rate and regular rhythm  Heart sounds: Normal heart sounds  No murmur  No friction rub  No gallop  Pulmonary:      Effort: Pulmonary effort is normal  No respiratory distress  Breath sounds: Normal breath sounds  No stridor  No wheezing, rhonchi or rales  Abdominal:      General: Bowel sounds are normal  There is no distension  Palpations: Abdomen is soft  There is no mass  Tenderness: There is no abdominal tenderness  There is no guarding or rebound  Hernia: No hernia is present  Musculoskeletal: Normal range of motion  General: Swelling present  Comments: Trace edema b/l LE    Lymphadenopathy:      Cervical: No cervical adenopathy  Skin:     General: Skin is warm and dry  Coloration: Skin is not pale  Findings: No erythema or rash  Comments: Left big toe ingrown nail    Neurological:      General: No focal deficit present  Mental Status: He is alert and oriented to person, place, and time  Psychiatric:         Mood and Affect: Mood normal          Behavior: Behavior normal          Thought Content:  Thought content normal          Judgment: Judgment normal           10g Monofilament test (large nerve fibers)- minimally diminished b/l feet    Labs and Imaging:      Component      Latest Ref Rng & Units 7/10/2020   Glucose, Random      65 - 99 mg/dL 159 (H)   BUN      6 - 24 mg/dL 14   Creatinine      0 76 - 1 27 mg/dL 1 05   eGFR Non       >59 mL/min/1 73 78   eGFR       >59 mL/min/1 73 91   SL AMB BUN/CREATININE RATIO      9 - 20 13   Sodium      134 - 144 mmol/L 140   Potassium      3 5 - 5 2 mmol/L 4 3   Chloride 96 - 106 mmol/L 102   CO2      20 - 29 mmol/L 22   CALCIUM      8 7 - 10 2 mg/dL 9 3   Total Protein      6 0 - 8 5 g/dL 6 7   Albumin      3 8 - 4 9 g/dL 4 3   Globulin, Total      1 5 - 4 5 g/dL 2 4   Albumin/Globulin Ratio      1 2 - 2 2 1 8   TOTAL BILIRUBIN      0 0 - 1 2 mg/dL 0 5   ALKALINE PHOSPHATASE ISOENZYMES      39 - 117 IU/L 58   AST      0 - 40 IU/L 49 (H)   ALT      0 - 44 IU/L 64 (H)   Cholesterol      100 - 199 mg/dL 135   Triglycerides      0 - 149 mg/dL 186 (H)   HDL      >39 mg/dL 36 (L)   VLDL Cholesterol Huber      5 - 40 mg/dL 37   LDL Calculated      0 - 99 mg/dL 62   Hemoglobin A1C      4 8 - 5 6 % 7 5 (H)     ?

## 2020-09-26 NOTE — PROGRESS NOTES
Pawan Pickens  1960  [id number]  [employer] [facility] [address]  [facility] [address] [address]    Interval History:  Pawan Pickens is a 64 y o  male who presents for routine coronary artery disease follow-up  Since his last visit, he has been feeling well without any chest pain or shortness of breath  He has pain from neuropathy  Has been eating poorly and not been exercising because of increase work  Previously, he was walking on the treadmill and felt a sudden chest pain that lasted for a few seconds which has not reoccurred  Denied any shortness of breath  Treadmill stress test was normal without any changes consistent with ischemia  Previously was having dyspnea  that improved with torsemide  He denies any LE edema, orthopnea or PND  He continues to refrain from smoking  In November 2017, he underwent drug-eluting stent placement to left circumflex and OM 2 lesions after presenting to hospital with chest and arm pain, elevated BP and ST depressions         Past Medical History:   Diagnosis Date    Allergic rhinitis     Anemia     Arthritis     Bundle branch block, right     CAD (coronary artery disease)     CPAP (continuous positive airway pressure) dependence     Diabetes mellitus (HCC)     borderline, controlled with diet and activity    Diverticulitis     Diverticulitis of large intestine with abscess without bleeding 12/7/2016    GERD (gastroesophageal reflux disease)     Hyperlipidemia     Hypertension     Nasal septal deformity     Neuropathy     Peptic ulceration     gastric    Peptic ulceration 9/26/2019    gastric    Pneumonia     Renal disorder     Seasonal allergies     Sleep apnea     wears c-pap    Urinary tract infection     Vitamin D deficiency      Past Surgical History:   Procedure Laterality Date    COLON SIGMOID RESECTION N/A 12/16/2016    Procedure: RESECTION COLON SIGMOID;  Surgeon: Sadiq Mehta MD;  Location:  MAIN OR;  Service:     COLON SIGMOID RESECTION LAPAROSCOPIC N/A 2016    Procedure: RESECTION COLON SIGMOID LAPAROSCOPIC:CONVERTED TO [email or web address];  Surgeon: Sadiq Mehta MD;  Location:  MAIN OR;  Service:     COLON SURGERY      COLONOSCOPY N/A 10/6/2016    Procedure: COLONOSCOPY;  Surgeon: Maggy Chase MD;  Location: [place] [address] GI LAB; Service:    [facility] CYSTOSCOPY W/ RETROGRADES Left 2/3/2017    Procedure: CYSTOSCOPY WITH BILATERAL RETROGRADES, LEFT STENT REMOVAL;  Surgeon: Adia Partida MD;  Location: [address] [place] [address];  Service:     ESOPHAGOGASTRODUODENOSCOPY N/A 10/6/2016    Procedure: ESOPHAGOGASTRODUODENOSCOPY (EGD); Surgeon: Maggy Chase MD;  Location: [place] [address] GI LAB; Service:     HERNIA REPAIR      KNEE ARTHROSCOPY W/ MENISCECTOMY      [place] CYSTOURETHROSCOPY,URETER CATHETER Left 2016    Procedure: CYSTOSCOPY RETROGRADE PYELOGRAM WITH INSERTION STENT URETERAL;  Surgeon: Adia Partida MD;  Location: [facility];  Service: Urology    VARICOSE VEIN SURGERY       Social History     Socioeconomic History    Marital status: Single     Spouse name: Not on file    Number of children: Not on file    Years of education: Not on file    Highest education level: Not on file   Occupational History    Not on file   Social Needs    Financial resource strain: Not on file    Food insecurity     Worry: Not on file     Inability: Not on file    Transportation needs     Medical: Not on file     Non-medical: Not on file   Tobacco Use    Smoking status: Former Smoker     Packs/day: 0 50     Years: 37 00     Pack years: 18 50     Types: Cigarettes     Last attempt to quit: 3/30/2018     Years since quittin 4    Smokeless tobacco: Never Used    Tobacco comment:  requit 3/30/18   Substance and Sexual Activity    Alcohol use:  Yes     Alcohol/week: 3 0 standard drinks     Types: 3 Cans of beer per week    Drug use: No    Sexual activity: Never   Lifestyle    Physical activity     Days per week: Not on file     Minutes per session: Not on file    Stress: Not on file   Relationships    Social connections     Talks on phone: Not on file     Gets together: Not on file     Attends Jew service: Not on file     Active member of club or organization: Not on file     Attends meetings of clubs or organizations: Not on file     Relationship status: Not on file    Intimate partner violence     Fear of current or ex partner: Not on file     Emotionally abused: Not on file     Physically abused: Not on file     Forced sexual activity: Not on file   Other Topics Concern    Not on file   Social History Narrative    Lives alone       Family History   Problem Relation Age of Onset    Diabetes Brother     Thyroid cancer Brother     Osteoarthritis Mother     Atrial fibrillation Mother     Aortic aneurysm Father     Colon cancer Brother        Current Outpatient Medications:     albuterol (PROVENTIL HFA,VENTOLIN HFA) 90 mcg/act inhaler, Inhale 2 puffs every 4 (four) hours as needed for wheezing or shortness of breath, Disp: 1 Inhaler, Rfl: 0    Ascorbic Acid (VITAMIN C) 100 MG tablet, Take 500 mg by mouth , Disp: , Rfl:     aspirin 81 MG tablet, Take 162 mg by mouth, Disp: , Rfl:     atorvastatin (LIPITOR) 40 mg tablet, Take 1 tablet (40 mg total) by mouth daily with dinner, Disp: 90 tablet, Rfl: 1    Cholecalciferol (VITAMIN D3) 1000 units CAPS, Take by mouth daily , Disp: , Rfl:     desloratadine (CLARINEX) 5 MG tablet, Take 1 tablet (5 mg total) by mouth daily at bedtime, Disp: 90 tablet, Rfl: 1    gabapentin (NEURONTIN) 100 mg capsule, One tablet daily AM and two tablet daily HS, Disp: 270 capsule, Rfl: 1    glucosamine-chondroitin 500-400 MG tablet, Take 1 tablet by mouth daily, Disp: , Rfl:     glucose blood test strip, 1 each by Other route daily Use as instructed, Disp: 100 each, Rfl: 6    hydrochlorothiazide (HYDRODIURIL) 25 mg tablet, Take 1 tablet (25 mg total) by mouth daily, Disp: 90 tablet, Rfl: 3    losartan (COZAAR) 50 mg tablet, TAKE 1 TABLET BY MOUTH ONCE DAILY, Disp: 90 tablet, Rfl: 1    metFORMIN (GLUCOPHAGE) 500 mg tablet, Take 1 tablet (500 mg total) by mouth 2 (two) times a day with meals, Disp: 180 tablet, Rfl: 1    mometasone (NASONEX) 50 mcg/act nasal spray, 2 sprays into each nostril daily as needed (nasal congestion), Disp: 1 Act, Rfl: 5    Multiple Vitamin (MULTIVITAMIN) capsule, Take 1 capsule by mouth daily, Disp: , Rfl:     nitroglycerin (NITROSTAT) 0 3 mg SL tablet, Place 1 tablet (0 3 mg total) under the tongue every 5 (five) minutes as needed for chest pain, Disp: 25 tablet, Rfl: 1    Omega-3 Fatty Acids (FISH OIL) 1,000 mg, Take by mouth 2 (two) times a day , Disp: , Rfl:     ONETOUCH DELICA LANCETS 11H MISC, 1 Units by Does not apply route daily, Disp: 100 each, Rfl: 6    torsemide (DEMADEX) 10 mg tablet, TAKE 1 TABLET BY MOUTH ONCE DAILY (Patient taking differently: daily as needed ), Disp: 90 tablet, Rfl: 3    omeprazole (PriLOSEC) 20 mg delayed release capsule, Take 20 mg by mouth daily, Disp: , Rfl:   The following portions of the patient's history were reviewed and updated as appropriate: allergies, current medications, past family history, past medical history, past social history, past surgical history and problem list     Review of Systems:  Review of Systems   Constitutional: Negative for chills, fatigue and fever  HENT: Negative for congestion, nosebleeds and postnasal drip  Respiratory: Negative for cough, chest tightness and shortness of breath  Cardiovascular: Negative for chest pain, palpitations and leg swelling  Gastrointestinal: Negative for abdominal distention, abdominal pain, diarrhea, nausea and vomiting  Endocrine: Negative for polydipsia, polyphagia and polyuria  Musculoskeletal: Negative for gait problem and myalgias  Skin: Negative for color change, pallor and rash     Allergic/Immunologic: Negative for environmental allergies, food allergies and immunocompromised state  Neurological: Negative for dizziness, seizures, syncope and light-headedness  Hematological: Negative for adenopathy  Does not bruise/bleed easily  Psychiatric/Behavioral: Negative for dysphoric mood  The patient is not nervous/anxious  Physical Exam:  /64 (BP Location: Left arm, Patient Position: Sitting, Cuff Size: Large)   Pulse 58   Temp 98 °F (36 7 °C) (Temporal)   Ht 5' 11" (1 803 m)   Wt 125 kg (276 lb)   SpO2 97%   BMI 38 49 kg/m²     Physical Exam   Constitutional: He is oriented to person, place, and time  He appears well-developed  No distress  HENT:   Head: Normocephalic and atraumatic  Eyes: Conjunctivae are normal    Neck: Neck supple  No JVD present  No thyromegaly present  Cardiovascular: Normal rate and regular rhythm  Exam reveals no gallop and no friction rub  Murmur heard  Pulmonary/Chest: Effort normal and breath sounds normal    Musculoskeletal:         General: No edema  Neurological: He is alert and oriented to person, place, and time  No cranial nerve deficit  Skin: Skin is warm and dry  No rash noted  He is not diaphoretic  No erythema  Psychiatric: He has a normal mood and affect  His behavior is normal  Judgment and thought content normal        Cardiographics  ECG: sinus bradycardia with Q waves inferiorly rate 51 beats per minute  LV Ejection Fraction: LV ejection fraction >= 40%  Lab Review  Lab Results   Component Value Date    TRIG 186 (H) 07/10/2020    TRIG 251 (H) 04/10/2020    TRIG 260 (H) 12/27/2019    HDL 36 (L) 07/10/2020    HDL 42 04/10/2020    HDL 38 (L) 12/27/2019    LDLDIRECT 79 04/10/2020    LDLDIRECT 83 12/27/2019     Assessment/Plan     1  Coronary artery disease involving native coronary artery of native heart without angina pectoris    2  Essential hypertension    3   Bundle branch block, right      - Discussed diet and weight loss in detail   -  Blood pressure is stable    Continue current medication regimen including losartan and hydrochlorothiazide   - diabetes management per Dr Roger Crawley  - Continue atorvastatin - last LDL was 62     - Continue current Rx

## 2020-10-02 ENCOUNTER — TELEPHONE (OUTPATIENT)
Dept: NEUROLOGY | Facility: CLINIC | Age: 60
End: 2020-10-02

## 2020-10-10 LAB
ALBUMIN SERPL-MCNC: 4.4 G/DL (ref 3.8–4.9)
ALBUMIN/GLOB SERPL: 1.4 {RATIO} (ref 1.2–2.2)
ALP SERPL-CCNC: 62 IU/L (ref 39–117)
ALT SERPL-CCNC: 67 IU/L (ref 0–44)
AST SERPL-CCNC: 51 IU/L (ref 0–40)
BILIRUB SERPL-MCNC: 0.4 MG/DL (ref 0–1.2)
BUN SERPL-MCNC: 12 MG/DL (ref 6–24)
BUN/CREAT SERPL: 13 (ref 9–20)
CALCIUM SERPL-MCNC: 9.9 MG/DL (ref 8.7–10.2)
CHLORIDE SERPL-SCNC: 102 MMOL/L (ref 96–106)
CHOLEST SERPL-MCNC: 129 MG/DL (ref 100–199)
CHOLEST/HDLC SERPL: 3.6 RATIO (ref 0–5)
CO2 SERPL-SCNC: 19 MMOL/L (ref 20–29)
CREAT SERPL-MCNC: 0.96 MG/DL (ref 0.76–1.27)
EST. AVERAGE GLUCOSE BLD GHB EST-MCNC: 160 MG/DL
GLOBULIN SER-MCNC: 3.2 G/DL (ref 1.5–4.5)
GLUCOSE SERPL-MCNC: 147 MG/DL (ref 65–99)
HBA1C MFR BLD: 7.2 % (ref 4.8–5.6)
HDLC SERPL-MCNC: 36 MG/DL
LDLC SERPL CALC-MCNC: 60 MG/DL (ref 0–99)
POTASSIUM SERPL-SCNC: 3.8 MMOL/L (ref 3.5–5.2)
PROT SERPL-MCNC: 7.6 G/DL (ref 6–8.5)
SL AMB EGFR AFRICAN AMERICAN: 101 ML/MIN/1.73
SL AMB EGFR NON AFRICAN AMERICAN: 87 ML/MIN/1.73
SL AMB VLDL CHOLESTEROL CALC: 33 MG/DL (ref 5–40)
SODIUM SERPL-SCNC: 139 MMOL/L (ref 134–144)
TRIGL SERPL-MCNC: 203 MG/DL (ref 0–149)

## 2020-10-13 ENCOUNTER — TELEPHONE (OUTPATIENT)
Dept: ENDOCRINOLOGY | Facility: CLINIC | Age: 60
End: 2020-10-13

## 2020-10-20 DIAGNOSIS — E78.2 MIXED HYPERLIPIDEMIA: Primary | ICD-10-CM

## 2020-10-20 RX ORDER — ICOSAPENT ETHYL 1000 MG/1
2 CAPSULE ORAL 2 TIMES DAILY
Qty: 360 CAPSULE | Refills: 1 | Status: SHIPPED | OUTPATIENT
Start: 2020-10-20 | End: 2021-01-15 | Stop reason: CLARIF

## 2020-10-23 ENCOUNTER — OFFICE VISIT (OUTPATIENT)
Dept: PODIATRY | Facility: CLINIC | Age: 60
End: 2020-10-23
Payer: COMMERCIAL

## 2020-10-23 VITALS
SYSTOLIC BLOOD PRESSURE: 135 MMHG | DIASTOLIC BLOOD PRESSURE: 64 MMHG | HEART RATE: 58 BPM | WEIGHT: 275 LBS | HEIGHT: 71 IN | BODY MASS INDEX: 38.5 KG/M2 | RESPIRATION RATE: 16 BRPM

## 2020-10-23 DIAGNOSIS — E11.40 NEUROPATHY DUE TO TYPE 2 DIABETES MELLITUS (HCC): Primary | ICD-10-CM

## 2020-10-23 DIAGNOSIS — B35.1 ONYCHOMYCOSIS: ICD-10-CM

## 2020-10-23 DIAGNOSIS — M79.672 PAIN IN BOTH FEET: ICD-10-CM

## 2020-10-23 DIAGNOSIS — M79.671 PAIN IN BOTH FEET: ICD-10-CM

## 2020-10-23 PROCEDURE — 99203 OFFICE O/P NEW LOW 30 MIN: CPT | Performed by: PODIATRIST

## 2020-10-23 PROCEDURE — 2028F FOOT EXAM PERFORMED: CPT | Performed by: PODIATRIST

## 2020-10-23 RX ORDER — CAPSAICIN 0.025 %
1 CREAM (GRAM) TOPICAL 2 TIMES DAILY
Qty: 60 G | Refills: 0 | Status: SHIPPED | OUTPATIENT
Start: 2020-10-23 | End: 2021-09-10

## 2020-10-30 ENCOUNTER — TELEPHONE (OUTPATIENT)
Dept: ENDOCRINOLOGY | Facility: CLINIC | Age: 60
End: 2020-10-30

## 2020-10-30 ENCOUNTER — OFFICE VISIT (OUTPATIENT)
Dept: DIABETES SERVICES | Facility: CLINIC | Age: 60
End: 2020-10-30
Payer: COMMERCIAL

## 2020-10-30 VITALS — TEMPERATURE: 97.8 F | WEIGHT: 277 LBS | HEIGHT: 71 IN | BODY MASS INDEX: 38.78 KG/M2

## 2020-10-30 DIAGNOSIS — E11.65 TYPE 2 DIABETES MELLITUS WITH HYPERGLYCEMIA, WITHOUT LONG-TERM CURRENT USE OF INSULIN (HCC): Primary | ICD-10-CM

## 2020-10-30 PROCEDURE — 97802 MEDICAL NUTRITION INDIV IN: CPT | Performed by: DIETITIAN, REGISTERED

## 2020-11-02 ENCOUNTER — TELEPHONE (OUTPATIENT)
Dept: ENDOCRINOLOGY | Facility: CLINIC | Age: 60
End: 2020-11-02

## 2020-11-03 DIAGNOSIS — E78.2 MIXED HYPERLIPIDEMIA: ICD-10-CM

## 2020-11-03 DIAGNOSIS — E11.65 TYPE 2 DIABETES MELLITUS WITH HYPERGLYCEMIA, WITHOUT LONG-TERM CURRENT USE OF INSULIN (HCC): Primary | ICD-10-CM

## 2020-11-03 DIAGNOSIS — I10 HYPERTENSION GOAL BP (BLOOD PRESSURE) < 140/90: ICD-10-CM

## 2020-11-03 RX ORDER — OMEGA-3-ACID ETHYL ESTERS 1 G/1
CAPSULE, LIQUID FILLED ORAL
Qty: 60 CAPSULE | Refills: 3 | Status: SHIPPED | OUTPATIENT
Start: 2020-11-03 | End: 2021-01-15 | Stop reason: CLARIF

## 2020-11-10 DIAGNOSIS — E11.9 TYPE 2 DIABETES MELLITUS WITHOUT COMPLICATION, WITHOUT LONG-TERM CURRENT USE OF INSULIN (HCC): ICD-10-CM

## 2020-11-11 ENCOUNTER — TELEPHONE (OUTPATIENT)
Dept: ENDOCRINOLOGY | Facility: CLINIC | Age: 60
End: 2020-11-11

## 2020-11-13 PROBLEM — R42 DIZZINESS: Status: RESOLVED | Noted: 2018-05-01 | Resolved: 2020-11-13

## 2020-12-03 ENCOUNTER — TELEPHONE (OUTPATIENT)
Dept: NEUROLOGY | Facility: CLINIC | Age: 60
End: 2020-12-03

## 2020-12-04 ENCOUNTER — OFFICE VISIT (OUTPATIENT)
Dept: NEUROLOGY | Facility: CLINIC | Age: 60
End: 2020-12-04
Payer: COMMERCIAL

## 2020-12-04 VITALS
SYSTOLIC BLOOD PRESSURE: 120 MMHG | HEIGHT: 71 IN | TEMPERATURE: 97.5 F | DIASTOLIC BLOOD PRESSURE: 64 MMHG | BODY MASS INDEX: 38.67 KG/M2 | HEART RATE: 85 BPM | WEIGHT: 276.2 LBS

## 2020-12-04 DIAGNOSIS — G62.9 NEUROPATHY: ICD-10-CM

## 2020-12-04 DIAGNOSIS — E11.9 TYPE 2 DIABETES MELLITUS WITHOUT COMPLICATION, WITHOUT LONG-TERM CURRENT USE OF INSULIN (HCC): Primary | ICD-10-CM

## 2020-12-04 PROCEDURE — 99214 OFFICE O/P EST MOD 30 MIN: CPT | Performed by: PHYSICIAN ASSISTANT

## 2020-12-04 NOTE — PROGRESS NOTES
Patient ID: Sherie Ganser is a 64 y o  male  Assessment/Plan:       Diagnoses and all orders for this visit:    Type 2 diabetes mellitus without complication, without long-term current use of insulin (HCC)    Neuropathy       To improve neuropathy we discussed conservative methods/ tactics such as regular aerobic exercise and glucose control or lifestyle modifications  He will continue to see endocrinology for recommendations for diabetic control, and continue metformin  Gabapentin should be continued for neuropathic pain prevention  Side effects reviewed  Continue capsaicin cream p r n , provided by podiatrist   Alessandro Landers may also be therapeutic  We were unable to identify any other reversible cause for neuropathy during our previous workup  He can continue B12 daily for a slightly low level of 470, however this is not the cause for neuropathy  The patient should not hesitate to call me prior to his follow up with any questions or concerns  Subjective:    HPI    Since last seen the patient got a capsaicin cream from his podiatrist for p r n  Use for neuropathy  He had a podiatrist appointment 10/23/2020, next appointment in January  He continues to have similar neuropathy compared to last seen  He feels a pinched sensation on the left first toe which occurs several times per day without warning  It is fairly sharp and alarming  Only lasts a second or 2 and goes away  His feet on both sides constantly feel cold, but then when he touches them they feel warm  He also has a sensation that there is something on the bottom of his feet when he is walking  Prior documentation:  The patient feels that his neuropathy is worse since last seen in January  He describes a burning sensation in his feet, sometimes it is stabbing sensation which radiates up the legs, 1 or the other  Both legs have an equal amount of discomfort    He states he is frustrated at times, and very depressed with the symptoms  He thinks that there is something underlying other than what was described at the last visit, which was that he likely has neuropathy secondary to diabetes and alcohol abuse  The patient was recommended to avoid or limit alcohol use and to modify his diet  His last hemoglobin A1c is 7 5% in July, and 6 9% in April, so it is trending up  He does have a very mildly elevated AST and ALT      The patient had several additional blood tests which were negative:  Serum SPEP, antinuclear antibodies, IFA, sed rate, ROSALIA, TSH  His B12 was on the low limit of normal:  470       Complains he has been having numbness in the feet for sometime  It was mainly in the toes after long days of work, might be noticeable only in the evenings  Now, it is getting more prominent over the last few months  Notices it more now even during the day  Still mainly in the toes, first two toes and the ball of feet  On his bad days, numbness may extend to all the toes  It is not going up the ankle  Left foot is worse than the right  He has been getting intermittently lower back pain, does not radiate down his legs  Back pain is not bad, does not bother him enough to take any medications  Does get muscle cramps in his calves, shin, thighs  Comes and goes, mainly in the mornings, needs to get up and try to walk on the legs to relieve the cramps  Occasional twitching in fingers  Does tend to drop things out of his hands   Some numbness in hands (mainly in thenar eminence)  Balance is great, occasional stumble due to arthritis in knees  Has been told needs replacement  No change in speech/swallowing  No droopy eyes, no blurry/double vision       Has numbness in the left thigh since his IR procedure for abscess draingae in 2016  Has left lower leg numbness (patch on the lower leg)         He does have diabetes, most recent hemoglobin A1c was 7 7  Has been borderline DM for years, a1c was 6 4 last year    H/o sigmoidectomy due to abscess in 2016  CAD, s/p angioplasty in 2017  HTN  Osteoarthritis, BPH, sleep apnea, on CPAP, varicose veins      Family hx of DM (brothers), prediabetes in mother, passed away from PD/Dementia (80), father had aortic aneurysm    One of his brothers was thought to have ALS, had weakness in the left arm, lived for more than 10 years with the weakness in left arm  Had diabetes, with neuropathy, had non Hodgkin''s lymphoma, colorectal ca, eventually passed away from a heart attack at age 65       Past smoker, quit in 2018, smoked since he was in college  Does drink beers a few times a week  Not a heavy drinker  He is a   TSH recently done was 1 79, b12 was 470      The following portions of the patient's history were reviewed and updated as appropriate:   He  has a past medical history of Allergic rhinitis, Anemia, Arthritis, Bundle branch block, right, CAD (coronary artery disease), CPAP (continuous positive airway pressure) dependence, Diabetes mellitus ([facility] [address] ), Diverticulitis, Diverticulitis of large intestine with abscess without bleeding (12/7/2016), GERD (gastroesophageal reflux disease), Hyperlipidemia, Hypertension, Nasal septal deformity, Neuropathy, Peptic ulceration, Peptic ulceration (9/26/2019), Pneumonia, Renal disorder, Seasonal allergies, Sleep apnea, Urinary tract infection, and Vitamin D deficiency    He   Patient Active Problem List    Diagnosis Date Noted    Contusion of right knee 08/25/2020    Impacted cerumen of left ear 08/07/2020    Onychomycosis of toenail 07/31/2020    Carpal tunnel syndrome of left wrist     Ulnar neuropathy at elbow of left upper extremity     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 2 diabetes mellitus ([facility] [address] ) 01/23/2020    Neuropathy 01/03/2020    GERD (gastroesophageal reflux disease) 09/26/2019    Nasal septal deformity 09/26/2019    Bundle branch block, right 09/26/2019    CPAP (continuous positive airway pressure) dependence 09/26/2019    Vitamin D deficiency 09/26/2019    BPH (benign prostatic hyperplasia) 09/26/2019    History of vertebral compression fracture 09/26/2019    Colon polyp 09/26/2019    History of angioplasty 09/26/2019    Ocular migraine 09/26/2019    Inguinal hernia 09/26/2019    Primary osteoarthritis of left knee 96/41/4340    Umbilical hernia 65/63/2428    Bradycardia 09/26/2019    Asymptomatic varicose veins of both lower extremities 09/26/2019    Seasonal allergic rhinitis due to pollen 06/28/2019    Obstructive sleep apnea syndrome 07/06/2018    BMI 37 0-37 9, adult 07/06/2018    Asymptomatic PVCs 05/01/2018    Chronic pain of both knees 03/06/2018    Primary osteoarthritis of knees, bilateral 03/06/2018    CAD (coronary artery disease) 11/29/2017    Class 2 obesity due to excess calories without serious comorbidity with body mass index (BMI) of 37 0 to 37 9 in adult 11/22/2017    Elevated liver enzymes 12/04/2016    Polyarthralgia 12/04/2016    Hydronephrosis 12/04/2016    Diabetes mellitus ([address] )     Hyperlipidemia     Essential hypertension      He  has a past surgical history that includes Hernia repair; Varicose vein surgery; Knee arthroscopy w/ meniscectomy; Colon surgery; Cystoscopy (Left, 2/3/2017); COLON SIGMOID RESECTION LAPAROSCOPIC (N/A, 12/16/2016); COLON SIGMOID RESECTION (N/A, 12/16/2016); pr cystourethroscopy,ureter catheter (Left, 12/4/2016); Esophagogastroduodenoscopy (N/A, 10/6/2016); and Colonoscopy (N/A, 10/6/2016)  His family history includes Aortic aneurysm in his father; Atrial fibrillation in his mother; Colon cancer in his brother; Diabetes in his brother; Osteoarthritis in his mother; Thyroid cancer in his brother  He  reports that he quit smoking about 2 years ago  His smoking use included cigarettes  He has a 18 50 pack-year smoking history  His smokeless tobacco use includes chew  He reports current alcohol use of about 3 0 standard drinks of alcohol per week   He reports that he does not use drugs    Current Outpatient Medications   Medication Sig Dispense Refill    albuterol (PROVENTIL HFA,VENTOLIN HFA) 90 mcg/act inhaler Inhale 2 puffs every 4 (four) hours as needed for wheezing or shortness of breath 1 Inhaler 0    Ascorbic Acid (VITAMIN C) 100 MG tablet Take 500 mg by mouth       aspirin 81 MG tablet Take 162 mg by mouth      atorvastatin (LIPITOR) 40 mg tablet Take 1 tablet (40 mg total) by mouth daily with dinner 90 tablet 1    capsaicin (ZOSTRIX) 0 025 % cream Apply 1 application topically 2 (two) times a day 60 g 0    Cholecalciferol (VITAMIN D3) 1000 units CAPS Take by mouth daily       desloratadine (CLARINEX) 5 MG tablet Take 1 tablet (5 mg total) by mouth daily at bedtime 90 tablet 1    famotidine (PEPCID) 20 mg tablet Take 20 mg by mouth daily      gabapentin (NEURONTIN) 100 mg capsule One tablet daily AM and two tablet daily  capsule 1    glucosamine-chondroitin 500-400 MG tablet Take 1 tablet by mouth daily      glucose blood test strip 1 each by Other route daily Use as instructed 100 each 6    hydrochlorothiazide (HYDRODIURIL) 25 mg tablet Take 1 tablet (25 mg total) by mouth daily 90 tablet 3    losartan (COZAAR) 50 mg tablet Take 1 tablet (50 mg total) by mouth daily 90 tablet 1    metFORMIN (GLUCOPHAGE) 500 mg tablet Take 2 tablets (1000mg) twice a day with meals 360 tablet 2    mometasone (NASONEX) 50 mcg/act nasal spray 2 sprays into each nostril daily as needed (nasal congestion) 1 Act 5    Multiple Vitamin (MULTIVITAMIN) capsule Take 1 capsule by mouth daily      nitroglycerin (NITROSTAT) 0 3 mg SL tablet Place 1 tablet (0 3 mg total) under the tongue every 5 (five) minutes as needed for chest pain 25 tablet 1    omega-3-acid ethyl esters (LOVAZA) 1 g capsule Take one capsule (2g) twice a day 60 capsule 3    ONETOUCH DELICA LANCETS 30O MISC 1 Units by Does not apply route daily 100 each 6    torsemide (DEMADEX) 10 mg tablet TAKE 1 TABLET BY MOUTH ONCE DAILY (Patient taking differently: daily as needed ) 90 tablet 3    Icosapent Ethyl (Vascepa) 1 g CAPS Take 2 capsules (2 g total) by mouth 2 (two) times a day (Patient not taking: Reported on 12/4/2020) 360 capsule 1     No current facility-administered medications for this visit  He is allergic to levaquin [levofloxacin in d5w] and sulfa antibiotics            Objective:    Blood pressure 120/64, pulse 85, temperature 97 5 °F (36 4 °C), height 5' 11" (1 803 m), weight 125 kg (276 lb 3 2 oz)  Body mass index is 38 52 kg/m²  Physical Exam    Neurological Exam  Prior exam/unchanged:  Vital signs reviewed  Well developed, well nourished  Head: Normocephalic, atraumatic  CN 9-56: intact and symmetric, including EOMs which are normal b/l and PERRL  MSK: 5/5 t/o  ROM normal x all 4 extr  Right knee contusion is resolved  Sensation: Decreased sensation to temperature and pin below the knee in a length-dependent fashion in both legs  Decreased vibration at the toes and ankles bilaterally  Reflexes:  Trace in the lower extremities  Coordination: Nml x4 extr  Gait: Steady normal gait  ROS:    Review of Systems   Constitutional: Negative  Negative for appetite change and fever  HENT: Negative  Negative for hearing loss, tinnitus, trouble swallowing and voice change  Eyes: Negative  Negative for photophobia and pain  Respiratory: Negative  Negative for shortness of breath  Cardiovascular: Positive for palpitations  Gastrointestinal: Negative  Negative for nausea and vomiting  Endocrine: Negative  Negative for cold intolerance  Genitourinary: Negative  Negative for dysuria, frequency and urgency  Musculoskeletal: Positive for myalgias  Negative for neck pain  Skin: Negative  Negative for rash  Neurological: Positive for tremors (sometimes in hands), light-headedness (sometimes) and numbness (bottom of feet and toes, sometimes hands, depending on their position )   Negative for dizziness, seizures, syncope, facial asymmetry, speech difficulty, weakness and headaches  Hematological: Bruises/bleeds easily  Psychiatric/Behavioral: Positive for confusion (occassionally)  Negative for hallucinations and sleep disturbance  The following portions of the patient's history were reviewed and updated as appropriate: allergies, current medications/ medication history, past family history, past medical history, past social history, past surgical history and problem list     Review of systems was reviewed and otherwise unremarkable from a neurological perspective

## 2020-12-04 NOTE — PATIENT INSTRUCTIONS
Foot massager- will find brand  Exercise- 5-10 minutes daily or BID  Last resort- increase gabapentin to 200 mg BID/ 300 mg BID

## 2020-12-11 ENCOUNTER — OFFICE VISIT (OUTPATIENT)
Dept: DIABETES SERVICES | Facility: CLINIC | Age: 60
End: 2020-12-11
Payer: COMMERCIAL

## 2020-12-11 VITALS — BODY MASS INDEX: 38.56 KG/M2 | HEIGHT: 71 IN | TEMPERATURE: 97.3 F | WEIGHT: 275.4 LBS

## 2020-12-11 DIAGNOSIS — E11.65 TYPE 2 DIABETES MELLITUS WITH HYPERGLYCEMIA, WITHOUT LONG-TERM CURRENT USE OF INSULIN (HCC): Primary | ICD-10-CM

## 2020-12-11 PROCEDURE — 97803 MED NUTRITION INDIV SUBSEQ: CPT | Performed by: DIETITIAN, REGISTERED

## 2021-01-03 ENCOUNTER — PATIENT MESSAGE (OUTPATIENT)
Dept: NEUROLOGY | Facility: CLINIC | Age: 61
End: 2021-01-03

## 2021-01-08 ENCOUNTER — OFFICE VISIT (OUTPATIENT)
Dept: PODIATRY | Facility: CLINIC | Age: 61
End: 2021-01-08
Payer: COMMERCIAL

## 2021-01-08 DIAGNOSIS — M79.672 PAIN IN BOTH FEET: ICD-10-CM

## 2021-01-08 DIAGNOSIS — B35.1 ONYCHOMYCOSIS: ICD-10-CM

## 2021-01-08 DIAGNOSIS — M79.671 PAIN IN BOTH FEET: ICD-10-CM

## 2021-01-08 DIAGNOSIS — E11.40 NEUROPATHY DUE TO TYPE 2 DIABETES MELLITUS (HCC): Primary | ICD-10-CM

## 2021-01-08 PROCEDURE — 99212 OFFICE O/P EST SF 10 MIN: CPT | Performed by: PODIATRIST

## 2021-01-09 ENCOUNTER — TELEPHONE (OUTPATIENT)
Dept: GASTROENTEROLOGY | Facility: CLINIC | Age: 61
End: 2021-01-09

## 2021-01-09 NOTE — TELEPHONE ENCOUNTER
LVM to inform appt on 1/19 with Dr Madalyn Tinajero at 8 had to be moved to 1pm  Told pt to call in to confirm or reschedule if needed

## 2021-01-15 ENCOUNTER — OFFICE VISIT (OUTPATIENT)
Dept: CARDIOLOGY CLINIC | Facility: CLINIC | Age: 61
End: 2021-01-15
Payer: COMMERCIAL

## 2021-01-15 VITALS
DIASTOLIC BLOOD PRESSURE: 90 MMHG | BODY MASS INDEX: 39.28 KG/M2 | SYSTOLIC BLOOD PRESSURE: 142 MMHG | OXYGEN SATURATION: 98 % | WEIGHT: 280.6 LBS | HEART RATE: 80 BPM | TEMPERATURE: 98.6 F | HEIGHT: 71 IN

## 2021-01-15 DIAGNOSIS — I25.10 CORONARY ARTERY DISEASE INVOLVING NATIVE CORONARY ARTERY OF NATIVE HEART WITHOUT ANGINA PECTORIS: Primary | ICD-10-CM

## 2021-01-15 DIAGNOSIS — I45.10 BUNDLE BRANCH BLOCK, RIGHT: ICD-10-CM

## 2021-01-15 DIAGNOSIS — E11.9 TYPE 2 DIABETES MELLITUS WITHOUT COMPLICATION, WITHOUT LONG-TERM CURRENT USE OF INSULIN (HCC): ICD-10-CM

## 2021-01-15 DIAGNOSIS — E78.2 MIXED HYPERLIPIDEMIA: ICD-10-CM

## 2021-01-15 DIAGNOSIS — R00.1 BRADYCARDIA: ICD-10-CM

## 2021-01-15 DIAGNOSIS — I50.32 CHRONIC DIASTOLIC CONGESTIVE HEART FAILURE (HCC): ICD-10-CM

## 2021-01-15 DIAGNOSIS — I10 ESSENTIAL HYPERTENSION: ICD-10-CM

## 2021-01-15 PROCEDURE — 93000 ELECTROCARDIOGRAM COMPLETE: CPT | Performed by: INTERNAL MEDICINE

## 2021-01-15 PROCEDURE — 1036F TOBACCO NON-USER: CPT | Performed by: INTERNAL MEDICINE

## 2021-01-15 PROCEDURE — 99214 OFFICE O/P EST MOD 30 MIN: CPT | Performed by: INTERNAL MEDICINE

## 2021-01-15 NOTE — PROGRESS NOTES
Tulio Owens  1960  [id number]  [employer]  [facility] [address] [address]    Interval History:  Tulio Owens is a 64 y o  male who presents for routine coronary artery disease follow-up  Since his last visit, he feels intermittent episodes of chest pain that last only for a few seconds  Does not feel pain with exertion  Denies any shortness of breath, orthopnea or PND  He has mild bilateral lower extremity edema  Has been eating extra salt recently because his refrigerator has broken  He has pain in both feet because of neuropathy which is unchanged from previous  He has not been exercising regularly because he is busy with work and is hesitant to return to the gym  Previously was having dyspnea  that improved with torsemide  He denies any LE edema, orthopnea or PND  He continues to refrain from smoking  In November 2017, he underwent drug-eluting stent placement to left circumflex and OM 2 lesions after presenting to hospital with chest and arm pain, elevated BP and ST depressions  He was having chest pain in 2019   Treadmill stress test was done which was normal       Past Medical History:   Diagnosis Date    Allergic rhinitis     Anemia     Arthritis     Bundle branch block, right     CAD (coronary artery disease)     CPAP (continuous positive airway pressure) dependence     Diabetes mellitus (HCC)     borderline, controlled with diet and activity    Diverticulitis     Diverticulitis of large intestine with abscess without bleeding 12/7/2016    GERD (gastroesophageal reflux disease)     Hyperlipidemia     Hypertension     Nasal septal deformity     Neuropathy     Peptic ulceration     gastric    Peptic ulceration 9/26/2019    gastric    Pneumonia     Renal disorder     Seasonal allergies     Sleep apnea     wears c-pap    Urinary tract infection     Vitamin D deficiency      Past Surgical History:   Procedure Laterality Date    COLON SIGMOID RESECTION N/A 2016    Procedure: RESECTION COLON SIGMOID;  Surgeon: Stephani Salazar MD;  Location:  MAIN OR;  Service:     COLON SIGMOID RESECTION LAPAROSCOPIC N/A 2016    Procedure: RESECTION COLON SIGMOID LAPAROSCOPIC:CONVERTED TO [email or web address];  Surgeon: Stephani Salazar MD;  Location:  MAIN OR;  Service:     COLON SURGERY      COLONOSCOPY N/A 10/6/2016    Procedure: COLONOSCOPY;  Surgeon: Maribel Smith MD;  Location: [facility] GI [facility]; Service:    [facility] CYSTOSCOPY W/ RETROGRADES Left 2/3/2017    Procedure: CYSTOSCOPY WITH BILATERAL RETROGRADES, LEFT STENT REMOVAL;  Surgeon: Kwadwo Ruano MD;  Location: [address] [place] [address];  Service:     ESOPHAGOGASTRODUODENOSCOPY N/A 10/6/2016    Procedure: ESOPHAGOGASTRODUODENOSCOPY (EGD); Surgeon: Maribel Smith MD;  Location: [facility] GI [facility];   Service:     HERNIA REPAIR      KNEE ARTHROSCOPY W/ MENISCECTOMY      NM CYSTOURETHROSCOPY,URETER CATHETER Left 2016    Procedure: CYSTOSCOPY RETROGRADE PYELOGRAM WITH INSERTION STENT URETERAL;  Surgeon: Kwadwo Ruano MD;  Location: [facility];  Service: Urology    VARICOSE VEIN SURGERY       Social History     Socioeconomic History    Marital status: Single     Spouse name: Not on file    Number of children: Not on file    Years of education: Not on file    Highest education level: Not on file   Occupational History    Not on file   Social Needs    Financial resource strain: Not on file    Food insecurity     Worry: Not on file     Inability: Not on file    Transportation needs     Medical: Not on file     Non-medical: Not on file   Tobacco Use    Smoking status: Former Smoker     Packs/day: 0 50     Years: 37 00     Pack years: 18 50     Types: Cigarettes     Quit date: 3/30/2018     Years since quittin 8    Smokeless tobacco: Current User     Types: Chew    Tobacco comment:  requit 3/30/18   Substance and Sexual Activity    Alcohol use: Yes     Alcohol/week: 3 0 standard drinks     Types: 3 Cans of beer per week     Frequency: 2-3 times a week    Drug use: No    Sexual activity: Never   Lifestyle    Physical activity     Days per week: Not on file     Minutes per session: Not on file    Stress: Not on file   Relationships    Social connections     Talks on phone: Not on file     Gets together: Not on file     Attends Baptism service: Not on file     Active member of club or organization: Not on file     Attends meetings of clubs or organizations: Not on file     Relationship status: Not on file    Intimate partner violence     Fear of current or ex partner: Not on file     Emotionally abused: Not on file     Physically abused: Not on file     Forced sexual activity: Not on file   Other Topics Concern    Not on file   Social History Narrative    Lives alone       Family History   Problem Relation Age of Onset    Diabetes Brother     Thyroid cancer Brother     Osteoarthritis Mother     Atrial fibrillation Mother     Aortic aneurysm Father     Colon cancer Brother        Current Outpatient Medications:     albuterol (PROVENTIL HFA,VENTOLIN HFA) 90 mcg/act inhaler, Inhale 2 puffs every 4 (four) hours as needed for wheezing or shortness of breath, Disp: 1 Inhaler, Rfl: 0    Ascorbic Acid (VITAMIN C) 100 MG tablet, Take 500 mg by mouth , Disp: , Rfl:     aspirin 81 MG tablet, Take 162 mg by mouth, Disp: , Rfl:     atorvastatin (LIPITOR) 40 mg tablet, Take 1 tablet (40 mg total) by mouth daily with dinner, Disp: 90 tablet, Rfl: 1    Cholecalciferol (VITAMIN D3) 1000 units CAPS, Take by mouth daily , Disp: , Rfl:     desloratadine (CLARINEX) 5 MG tablet, Take 1 tablet (5 mg total) by mouth daily at bedtime, Disp: 90 tablet, Rfl: 1    famotidine (PEPCID) 20 mg tablet, Take 20 mg by mouth daily, Disp: , Rfl:     gabapentin (NEURONTIN) 100 mg capsule, One tablet daily AM and two tablet daily HS, Disp: 270 capsule, Rfl: 1   glucosamine-chondroitin 500-400 MG tablet, Take 1 tablet by mouth daily, Disp: , Rfl:     glucose blood test strip, 1 each by Other route daily Use as instructed, Disp: 100 each, Rfl: 6    hydrochlorothiazide (HYDRODIURIL) 25 mg tablet, Take 1 tablet (25 mg total) by mouth daily, Disp: 90 tablet, Rfl: 3    losartan (COZAAR) 50 mg tablet, Take 1 tablet (50 mg total) by mouth daily, Disp: 90 tablet, Rfl: 1    metFORMIN (GLUCOPHAGE) 500 mg tablet, Take 2 tablets (1000mg) twice a day with meals, Disp: 360 tablet, Rfl: 2    mometasone (NASONEX) 50 mcg/act nasal spray, 2 sprays into each nostril daily as needed (nasal congestion), Disp: 1 Act, Rfl: 5    Multiple Vitamin (MULTIVITAMIN) capsule, Take 1 capsule by mouth daily, Disp: , Rfl:     nitroglycerin (NITROSTAT) 0 3 mg SL tablet, Place 1 tablet (0 3 mg total) under the tongue every 5 (five) minutes as needed for chest pain, Disp: 25 tablet, Rfl: 1    ONETOUCH DELICA LANCETS 81N MISC, 1 Units by Does not apply route daily, Disp: 100 each, Rfl: 6    torsemide (DEMADEX) 10 mg tablet, TAKE 1 TABLET BY MOUTH ONCE DAILY (Patient taking differently: daily as needed ), Disp: 90 tablet, Rfl: 3    capsaicin (ZOSTRIX) 0 025 % cream, Apply 1 application topically 2 (two) times a day (Patient not taking: Reported on 1/15/2021), Disp: 60 g, Rfl: 0  The following portions of the patient's history were reviewed and updated as appropriate: allergies, current medications, past family history, past medical history, past social history, past surgical history and problem list     Review of Systems:  Review of Systems   Constitutional: Negative for chills and fever  HENT: Negative for ear pain and sore throat  Eyes: Negative for pain and visual disturbance  Respiratory: Negative for cough and shortness of breath  Cardiovascular: Positive for chest pain  Negative for palpitations  Gastrointestinal: Negative for abdominal pain and vomiting     Genitourinary: Negative for dysuria and hematuria  Musculoskeletal: Negative for arthralgias and back pain  Skin: Negative for color change and rash  Neurological: Negative for seizures and syncope  All other systems reviewed and are negative  Physical Exam:  /90 (BP Location: Right arm, Patient Position: Sitting, Cuff Size: Large)   Pulse 80   Temp 98 6 °F (37 °C) (Temporal)   Ht 5' 11" (1 803 m)   Wt 127 kg (280 lb 9 6 oz)   SpO2 98%   BMI 39 14 kg/m²     Physical Exam   Constitutional: He is oriented to person, place, and time  He appears well-developed  No distress  HENT:   Head: Normocephalic and atraumatic  Eyes: Pupils are equal, round, and reactive to light  Conjunctivae and EOM are normal    Neck: Neck supple  No JVD present  No thyromegaly present  Cardiovascular: Normal rate and regular rhythm  Exam reveals no gallop and no friction rub  Murmur heard  Pulmonary/Chest: Effort normal and breath sounds normal    Musculoskeletal:         General: Edema present  No tenderness or deformity  Neurological: He is alert and oriented to person, place, and time  No cranial nerve deficit  Skin: Skin is warm and dry  No rash noted  He is not diaphoretic  No erythema  Chronic venostasis changes   Psychiatric: He has a normal mood and affect  His behavior is normal  Judgment and thought content normal        Cardiographics  ECG: normal sinus rhythm  LV Ejection Fraction: LV ejection fraction >= 40%  Lab Review  Lab Results   Component Value Date    TRIG 203 (H) 10/09/2020    TRIG 186 (H) 07/10/2020    TRIG 251 (H) 04/10/2020    HDL 36 (L) 10/09/2020    HDL 36 (L) 07/10/2020    HDL 42 04/10/2020    LDLDIRECT 79 04/10/2020    LDLDIRECT 83 12/27/2019     Assessment/Plan     1  Coronary artery disease involving native coronary artery of native heart without angina pectoris    2  Essential hypertension    3  Bundle branch block, right    4   Type 2 diabetes mellitus without complication, without long-term current use of insulin ([facility] )    5  Mixed hyperlipidemia    6  Bradycardia    7  Chronic diastolic congestive heart failure ([facility] )      - Discussed diet and weight loss in detail   -  Blood pressure is stable  Continue current medication regimen including losartan and hydrochlorothiazide  Blood pressure recheck today shows blood pressure 135/85   - diabetes management per Dr Delroy Valderrama  - Continue atorvastatin - last LDL was 60     - Continue current Rx  - recommend using torsemide when edema is present  He previously developed congestive heart failure  Encouraged to reduce salt in diet if possible

## 2021-01-19 ENCOUNTER — OFFICE VISIT (OUTPATIENT)
Dept: GASTROENTEROLOGY | Facility: CLINIC | Age: 61
End: 2021-01-19
Payer: COMMERCIAL

## 2021-01-19 VITALS
DIASTOLIC BLOOD PRESSURE: 89 MMHG | SYSTOLIC BLOOD PRESSURE: 136 MMHG | WEIGHT: 270 LBS | HEART RATE: 65 BPM | BODY MASS INDEX: 37.8 KG/M2 | HEIGHT: 71 IN

## 2021-01-19 DIAGNOSIS — K63.5 POLYP OF COLON, UNSPECIFIED PART OF COLON, UNSPECIFIED TYPE: Primary | ICD-10-CM

## 2021-01-19 DIAGNOSIS — K21.9 GASTROESOPHAGEAL REFLUX DISEASE WITHOUT ESOPHAGITIS: ICD-10-CM

## 2021-01-19 PROCEDURE — 3008F BODY MASS INDEX DOCD: CPT | Performed by: INTERNAL MEDICINE

## 2021-01-19 PROCEDURE — 99214 OFFICE O/P EST MOD 30 MIN: CPT | Performed by: NURSE PRACTITIONER

## 2021-01-19 NOTE — PROGRESS NOTES
[facility] - Outpatient Follow-up Note  Robert Cheng 64 y o  male MRN: [id number]  Encounter: [id number]          ASSESSMENT AND PLAN:      1  Polyp of colon, unspecified part of colon, unspecified type/ Diverticulosis  Patient currently denies any bowel issues  Patient moves his bowels daily  Denies diarrhea or constipation  Denies abdominal pain  Will schedule for colonoscopy  Prep and procedure explained to patient in detail  Further recommendations pending results of colonoscopy  Patient will need COVID-19 screening prior to colonoscopy  2  Gastroesophageal reflux disease without esophagitis  Patient reports symptoms GERD are controlled with famotidine 20 mg daily  Denies acid reflux or heartburn  No complain of abdomen pain  Will continue current medication regiment of Famotidine 20 mg daily  ______________________________________________________________________    SUBJECTIVE/HPI:    Neo Arriaga is a 42-year-old male with multiple medical problems including GERD and colon polyps who presents to office for colonoscopy  Last colonoscopy on 10/06/2016 suggest diverticulitis  Patient reports that he had sigmoid colon resection secondary to abscess and diverticulosis in 2016  Patient also reports history of GERD with symptoms well controlled on famotidine 20 mg daily  Patient stated he used to take Zantac daily until was removed the marked  Patient also has a history of adenomatous polyps  He currently denies nausea, vomiting, acid reflux, heartburn, epigastric or abdominal pain  Patient denies blood in stool, blood from rectal area, or black tarry stool  No fever or chills  Denies rashes or joint pain  He does have a history of CAD with to his cardiac stents placed for which he is on aspirin  Patient is on no anticoagulation or antiplatelets  Positive family history of colon cancer brother  Patient is a former smoker he quit in 2018    Patient was current care occasionally 2-3 beers every 2 weeks  REVIEW OF SYSTEMS IS OTHERWISE NEGATIVE  Historical Information   Past Medical History:   Diagnosis Date    Allergic rhinitis     Anemia     Arthritis     Bundle branch block, right     CAD (coronary artery disease)     CPAP (continuous positive airway pressure) dependence     Diabetes mellitus (HCC)     borderline, controlled with diet and activity    Diverticulitis     Diverticulitis of large intestine with abscess without bleeding 12/7/2016    GERD (gastroesophageal reflux disease)     Hyperlipidemia     Hypertension     Nasal septal deformity     Neuropathy     Peptic ulceration     gastric    Peptic ulceration 9/26/2019    gastric    Pneumonia     Renal disorder     Seasonal allergies     Sleep apnea     wears c-pap    Urinary tract infection     Vitamin D deficiency      Past Surgical History:   Procedure Laterality Date    COLON SIGMOID RESECTION N/A 12/16/2016    Procedure: RESECTION COLON SIGMOID;  Surgeon: Marcus Diop MD;  [facility];  Service:     COLON SIGMOID RESECTION LAPAROSCOPIC N/A 12/16/2016    Procedure: RESECTION COLON SIGMOID LAPAROSCOPIC:CONVERTED TO [email or web address];  Surgeon: Marcus Diop MD;  [facility];  Service:     COLON SURGERY      COLONOSCOPY N/A 10/6/2016    Procedure: COLONOSCOPY;  Surgeon: Katie Solano MD;  Location: [facility]; Service:    Ruthton Earle CYSTOSCOPY W/ RETROGRADES Left 2/3/2017    Procedure: CYSTOSCOPY WITH BILATERAL RETROGRADES, LEFT STENT REMOVAL;  Surgeon: Nato Kitchen MD;  Location: [address] [place] [address];  Service:     ESOPHAGOGASTRODUODENOSCOPY N/A 10/6/2016    Procedure: ESOPHAGOGASTRODUODENOSCOPY (EGD); Surgeon: Katie Solano MD;  Location: [facility];   Service:     HERNIA REPAIR      KNEE ARTHROSCOPY W/ MENISCECTOMY      VA CYSTOURETHROSCOPY,URETER CATHETER Left 12/4/2016    Procedure: CYSTOSCOPY RETROGRADE PYELOGRAM WITH INSERTION STENT URETERAL; Surgeon: Miley Pimentel MD;  Location: [facility];  Service: Urology    VARICOSE VEIN SURGERY       Social History   Social History     Substance and Sexual Activity   Alcohol Use Yes    Alcohol/week: 3 0 standard drinks    Types: 3 Cans of beer per week    Frequency: 2-3 times a week     Social History     Substance and Sexual Activity   Drug Use No     Social History     Tobacco Use   Smoking Status Former Smoker    Packs/day: 0 50    Years: 37 00    Pack years: 18 50    Types: Cigarettes    Quit date: 3/30/2018    Years since quittin 8   Smokeless Tobacco Current User    Types: Chew   Tobacco Comment     requit 3/30/18     Family History   Problem Relation Age of Onset    Diabetes Brother     Thyroid cancer Brother     Osteoarthritis Mother     Atrial fibrillation Mother     Aortic aneurysm Father     Colon cancer Brother        Meds/Allergies       Current Outpatient Medications:     albuterol (PROVENTIL HFA,VENTOLIN HFA) 90 mcg/act inhaler    Ascorbic Acid (VITAMIN C) 100 MG tablet    aspirin 81 MG tablet    atorvastatin (LIPITOR) 40 mg tablet    Cholecalciferol (VITAMIN D3) 1000 units CAPS    desloratadine (CLARINEX) 5 MG tablet    famotidine (PEPCID) 20 mg tablet    gabapentin (NEURONTIN) 100 mg capsule    glucosamine-chondroitin 500-400 MG tablet    glucose blood test strip    hydrochlorothiazide (HYDRODIURIL) 25 mg tablet    losartan (COZAAR) 50 mg tablet    metFORMIN (GLUCOPHAGE) 500 mg tablet    Multiple Vitamin (MULTIVITAMIN) capsule    ONETOUCH DELICA LANCETS 94V MISC    torsemide (DEMADEX) 10 mg tablet    capsaicin (ZOSTRIX) 0 025 % cream    nitroglycerin (NITROSTAT) 0 3 mg SL tablet    Allergies   Allergen Reactions    Levaquin [Levofloxacin In D5w] Swelling     Knee swelling    Sulfa Antibiotics GI Intolerance     Abdominal pain             Objective     Blood pressure 136/89, pulse 65, height 5' 11" (1 803 m), weight 122 kg (270 lb)   Body mass index is 37 66 kg/m²  PHYSICAL EXAM:      General Appearance:   Alert, cooperative, no distress   HEENT:   Normocephalic, atraumatic, anicteric      Neck:  Supple, symmetrical, trachea midline   Lungs:   Clear to auscultation bilaterally; no rales, rhonchi or wheezing; respirations unlabored    Heart[de-identified]   Regular rate and rhythm; no murmur, rub, or gallop  Abdomen:   Soft, non-tender, non-distended; normal bowel sounds; no masses, no organomegaly    Genitalia:   Deferred    Rectal:   Deferred    Extremities:  No cyanosis, clubbing or edema    Pulses:  2+ and symmetric    Skin:  No jaundice, rashes, or lesions    Lymph nodes:  No palpable cervical lymphadenopathy        Lab Results:   No visits with results within 1 Day(s) from this visit  Latest known visit with results is:   Orders Only on 11/06/2020   Component Date Value    Prostate Specific Antige* 11/06/2020 0 4          Radiology Results:   No results found  (2) chairfast

## 2021-01-24 DIAGNOSIS — Z23 ENCOUNTER FOR IMMUNIZATION: ICD-10-CM

## 2021-01-26 DIAGNOSIS — I10 ESSENTIAL HYPERTENSION: ICD-10-CM

## 2021-01-27 RX ORDER — HYDROCHLOROTHIAZIDE 25 MG/1
TABLET ORAL
Qty: 90 TABLET | Refills: 0 | Status: SHIPPED | OUTPATIENT
Start: 2021-01-27 | End: 2021-05-06

## 2021-01-29 ENCOUNTER — IMMUNIZATIONS (OUTPATIENT)
Dept: FAMILY MEDICINE CLINIC | Facility: HOSPITAL | Age: 61
End: 2021-01-29

## 2021-01-29 DIAGNOSIS — Z23 ENCOUNTER FOR IMMUNIZATION: Primary | ICD-10-CM

## 2021-01-29 PROCEDURE — 91301 SARS-COV-2 / COVID-19 MRNA VACCINE (MODERNA) 100 MCG: CPT

## 2021-01-29 PROCEDURE — 0011A SARS-COV-2 / COVID-19 MRNA VACCINE (MODERNA) 100 MCG: CPT

## 2021-02-02 NOTE — PROGRESS NOTES
Assessment/Plan:  Patient educated on proper supportive shoe gear and use of diabetic insoles, to accommodate his deformity and alleviate pressure prevent ulcerations  Patient advised and maintaining PCP appointment for the management of diabetes, and neuropathy  Patient educated and daily foot inspection and the signs of infection  Patient advised and reporting to the clinic or emergency room if any noticed  Patient opted out of oral and topical anti fungal medications at this time, all onychomycotic dystrophic nails debrided using sterile Nipper and electrical samantha to patient tolerance, Betadine applied, patient educated on daily foot hygiene for the management of fungal infection  Diagnoses and all orders for this visit:    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 2 diabetes mellitus ([facility] [address] )    Pain in both feet    Onychomycosis          Subjective:      Patient ID: Vernell Daniel is a 64 y o  male  A 53-year-old male with past medical history significant diabetes, referred to Podiatry for routine diabetic foot evaluation, patient denies constitutional symptoms, patient denies trauma to the foot, patient states that his fasting blood sugar is under control he does not recall the lumbar, patient last PCP visit was less than 6 month, patient report occasional tingling and numbness to bilateral foot, patient also report painful elongated thickened hallucal toenails, that causes pain upon pressure and in shoe gear      The following portions of the patient's history were reviewed and updated as appropriate: allergies, current medications, past family history, past medical history, past social history, past surgical history and problem list     Review of Systems   Constitutional: Negative  Respiratory: Negative  Cardiovascular: Negative  Musculoskeletal: Negative  Skin: Positive for color change                 Historical Information   Past Medical History:   Diagnosis Date    Allergic rhinitis     Anemia  Arthritis     Bundle branch block, right     CAD (coronary artery disease)     CPAP (continuous positive airway pressure) dependence     Diabetes mellitus (HCC)     borderline, controlled with diet and activity    Diverticulitis     Diverticulitis of large intestine with abscess without bleeding 12/7/2016    GERD (gastroesophageal reflux disease)     Hyperlipidemia     Hypertension     Nasal septal deformity     Neuropathy     Peptic ulceration     gastric    Peptic ulceration 9/26/2019    gastric    Pneumonia     Renal disorder     Seasonal allergies     Sleep apnea     wears c-pap    Urinary tract infection     Vitamin D deficiency      Past Surgical History:   Procedure Laterality Date    COLON SIGMOID RESECTION N/A 12/16/2016    Procedure: RESECTION COLON SIGMOID;  Surgeon: Pérez Radford MD;  [facility];  Service:     COLON SIGMOID RESECTION LAPAROSCOPIC N/A 12/16/2016    Procedure: RESECTION COLON SIGMOID LAPAROSCOPIC:CONVERTED TO [email or web address];  Surgeon: Pérez Radford MD;  [facility];  Service:     COLON SURGERY      COLONOSCOPY N/A 10/6/2016    Procedure: COLONOSCOPY;  Surgeon: Negar Hoffman MD;  Location: [place] [address] GI LAB; Service:    Noel Apodaca CYSTOSCOPY W/ RETROGRADES Left 2/3/2017    Procedure: CYSTOSCOPY WITH BILATERAL RETROGRADES, LEFT STENT REMOVAL;  Surgeon: Blade Liao MD;  Location: [address] [place] [address];  Service:     ESOPHAGOGASTRODUODENOSCOPY N/A 10/6/2016    Procedure: ESOPHAGOGASTRODUODENOSCOPY (EGD); Surgeon: Negar Hoffman MD;  Location: [place] [address] [facility] LAB;   Service:     HERNIA REPAIR      KNEE ARTHROSCOPY W/ MENISCECTOMY      CA CYSTOURETHROSCOPY,URETER CATHETER Left 12/4/2016    Procedure: CYSTOSCOPY RETROGRADE PYELOGRAM WITH INSERTION STENT URETERAL;  Surgeon: Blade Liao MD;  Location: [address] [place] [address];  Service: Urology    VARICOSE VEIN SURGERY       Social History   Social History     Substance and Sexual Activity   Alcohol Use Yes    Alcohol/week: 3 0 standard drinks    Types: 3 Cans of beer per week    Frequency: 2-3 times a week     Social History     Substance and Sexual Activity   Drug Use No     Social History     Tobacco Use   Smoking Status Former Smoker    Packs/day: 0 50    Years: 37 00    Pack years: 18 50    Types: Cigarettes    Quit date: 3/30/2018    Years since quittin 8   Smokeless Tobacco Current User    Types: Chew   Tobacco Comment     requit 3/30/18     Family History:   Family History   Problem Relation Age of Onset    Diabetes Brother     Thyroid cancer Brother     Osteoarthritis Mother     Atrial fibrillation Mother     Aortic aneurysm Father     Colon cancer Brother        Meds/Allergies   all medications and allergies reviewed  Allergies   Allergen Reactions    Levaquin [Levofloxacin In D5w] Swelling     Knee swelling    Sulfa Antibiotics GI Intolerance     Abdominal pain         Objective: There were no vitals taken for this visit  Diabetic Foot Exam    Patient's shoes and socks removed  Right Foot/Ankle   Right Foot Inspection  Skin Exam: no blister, no erythema, no maceration and no ulcer                          Toe Exam: tenderness  Sensory   Vibration: intact  Proprioception: intact   Monofilament testing: diminished  Vascular  Capillary refills: < 3 seconds  The right DP pulse is 2+  The right PT pulse is 2+  Left Foot/Ankle  Left Foot Inspection  Skin Exam: no erythema, no maceration and no ulcer                         Toe Exam: tenderness                   Sensory   Vibration: intact  Proprioception: intact  Monofilament: diminished  Vascular  Capillary refills: < 3 seconds  The left DP pulse is 2+  The left PT pulse is 2+  Assign Risk Category:  Deformity present; Loss of protective sensation; No weak pulses       Risk: 1       Physical Exam  Constitutional:       General: He is not in acute distress  Appearance: He is well-developed   He is not ill-appearing, toxic-appearing or diaphoretic  HENT:      Head: Normocephalic and atraumatic  Cardiovascular:      Pulses: Normal pulses  no weak pulses          Dorsalis pedis pulses are 2+ on the right side and 2+ on the left side  Posterior tibial pulses are 2+ on the right side and 2+ on the left side  Comments: Palpable pedal pulse, CFT is less than 3 seconds, temperature gradient within normal limits, pedal hair present, no trophic skin changes  Pulmonary:      Effort: No respiratory distress  Musculoskeletal: Normal range of motion  Comments: No pain on palpation on range of motion of ankle joint subtalar joint metatarsal joint tarsal joints bilateral foot   Feet:      Right foot:      Protective Sensation: 10 sites tested  10 sites sensed  Skin integrity: No ulcer, blister, skin breakdown or erythema  Left foot:      Protective Sensation: 10 sites tested  10 sites sensed  Skin integrity: No ulcer, blister, skin breakdown or erythema  Skin:     Capillary Refill: Capillary refill takes 2 to 3 seconds  Coloration: Skin is not pale  Comments:  thickened elongated dystrophic toenails, with painful on palpation, subungual debris, no clinical signs of infection, dystrophy is localized to the hallucal toenails   Neurological:      Mental Status: He is alert and oriented to person, place, and time  Comments:  muscle power 5/5, protective sensations intact, no focal motor deficit        Foot Exam    Right Foot/Ankle     Inspection and Palpation  Skin Exam: no blister, no maceration, no ulcer and no erythema     Neurovascular  Dorsalis pedis: 2+  Posterior tibial: 2+      Left Foot/Ankle      Inspection and Palpation  Skin Exam: no blister, no maceration, no ulcer and no erythema     Neurovascular  Dorsalis pedis: 2+  Posterior tibial: 2+

## 2021-02-06 PROCEDURE — 3061F NEG MICROALBUMINURIA REV: CPT | Performed by: INTERNAL MEDICINE

## 2021-02-12 ENCOUNTER — OFFICE VISIT (OUTPATIENT)
Dept: INTERNAL MEDICINE CLINIC | Facility: CLINIC | Age: 61
End: 2021-02-12
Payer: COMMERCIAL

## 2021-02-12 ENCOUNTER — TELEPHONE (OUTPATIENT)
Dept: ADMINISTRATIVE | Facility: OTHER | Age: 61
End: 2021-02-12

## 2021-02-12 VITALS
TEMPERATURE: 95.3 F | HEIGHT: 71 IN | BODY MASS INDEX: 37.94 KG/M2 | DIASTOLIC BLOOD PRESSURE: 70 MMHG | OXYGEN SATURATION: 99 % | HEART RATE: 55 BPM | SYSTOLIC BLOOD PRESSURE: 124 MMHG | WEIGHT: 271 LBS

## 2021-02-12 DIAGNOSIS — E55.9 VITAMIN D DEFICIENCY: ICD-10-CM

## 2021-02-12 DIAGNOSIS — K63.5 POLYP OF COLON, UNSPECIFIED PART OF COLON, UNSPECIFIED TYPE: ICD-10-CM

## 2021-02-12 DIAGNOSIS — U07.1 COVID-19: Primary | ICD-10-CM

## 2021-02-12 DIAGNOSIS — Z99.89 CPAP (CONTINUOUS POSITIVE AIRWAY PRESSURE) DEPENDENCE: ICD-10-CM

## 2021-02-12 DIAGNOSIS — E66.09 CLASS 2 OBESITY DUE TO EXCESS CALORIES WITHOUT SERIOUS COMORBIDITY WITH BODY MASS INDEX (BMI) OF 37.0 TO 37.9 IN ADULT: ICD-10-CM

## 2021-02-12 DIAGNOSIS — I10 ESSENTIAL HYPERTENSION: ICD-10-CM

## 2021-02-12 DIAGNOSIS — G62.9 NEUROPATHY: ICD-10-CM

## 2021-02-12 DIAGNOSIS — J30.1 SEASONAL ALLERGIC RHINITIS DUE TO POLLEN: ICD-10-CM

## 2021-02-12 DIAGNOSIS — N40.0 BENIGN PROSTATIC HYPERPLASIA WITHOUT LOWER URINARY TRACT SYMPTOMS: ICD-10-CM

## 2021-02-12 DIAGNOSIS — I49.3 ASYMPTOMATIC PVCS: ICD-10-CM

## 2021-02-12 DIAGNOSIS — I25.10 CORONARY ARTERY DISEASE INVOLVING NATIVE CORONARY ARTERY OF NATIVE HEART WITHOUT ANGINA PECTORIS: ICD-10-CM

## 2021-02-12 DIAGNOSIS — E78.2 MIXED HYPERLIPIDEMIA: Primary | ICD-10-CM

## 2021-02-12 DIAGNOSIS — E11.9 TYPE 2 DIABETES MELLITUS WITHOUT COMPLICATION, WITHOUT LONG-TERM CURRENT USE OF INSULIN (HCC): ICD-10-CM

## 2021-02-12 DIAGNOSIS — R74.8 ELEVATED LIVER ENZYMES: ICD-10-CM

## 2021-02-12 DIAGNOSIS — K21.9 GASTROESOPHAGEAL REFLUX DISEASE WITHOUT ESOPHAGITIS: ICD-10-CM

## 2021-02-12 PROCEDURE — 4010F ACE/ARB THERAPY RXD/TAKEN: CPT | Performed by: INTERNAL MEDICINE

## 2021-02-12 PROCEDURE — U0005 INFEC AGEN DETEC AMPLI PROBE: HCPCS

## 2021-02-12 PROCEDURE — 99214 OFFICE O/P EST MOD 30 MIN: CPT | Performed by: INTERNAL MEDICINE

## 2021-02-12 PROCEDURE — U0003 INFECTIOUS AGENT DETECTION BY NUCLEIC ACID (DNA OR RNA); SEVERE ACUTE RESPIRATORY SYNDROME CORONAVIRUS 2 (SARS-COV-2) (CORONAVIRUS DISEASE [COVID-19]), AMPLIFIED PROBE TECHNIQUE, MAKING USE OF HIGH THROUGHPUT TECHNOLOGIES AS DESCRIBED BY CMS-2020-01-R: HCPCS

## 2021-02-12 RX ORDER — ATORVASTATIN CALCIUM 40 MG/1
40 TABLET, FILM COATED ORAL
Qty: 90 TABLET | Refills: 1 | Status: SHIPPED | OUTPATIENT
Start: 2021-02-12 | End: 2021-05-14 | Stop reason: SDUPTHER

## 2021-02-12 RX ORDER — LOSARTAN POTASSIUM 50 MG/1
50 TABLET ORAL DAILY
Qty: 90 TABLET | Refills: 1 | Status: SHIPPED | OUTPATIENT
Start: 2021-02-12 | End: 2021-05-14 | Stop reason: SDUPTHER

## 2021-02-12 RX ORDER — DESLORATADINE 5 MG/1
5 TABLET ORAL
Qty: 90 TABLET | Refills: 1 | Status: SHIPPED | OUTPATIENT
Start: 2021-02-12 | End: 2021-03-25 | Stop reason: SDUPTHER

## 2021-02-12 NOTE — H&P (VIEW-ONLY)
[facility] Office Visit Note  21     Adele Cheng 64 y o  male MRN: [id number]  : 1960    Assessment:     Diabetes mellitus ([address] )  Diabetes    Check your fingerstick Accu-Chek once a day  Please check your fingerstick Accu-Chek different time of the day either at 7:00 a m  or 11:00 a m  for for p m  4 9:00 p m  Herrera Candelario Follow Diabetic diet for diabetes as advised  If you would sugar less than 80 or more than 300 to please call us on next visit is day  If the sugar is less than 80 follow-up hypoglycemia instructions as advised  Take your diabetic medicine as advised  Lab Results   Component Value Date    HGBA1C 7 2 (H) 2021       Essential hypertension  Hypertension( High Blood Pressure ):    Continue Home BP check daily and bring log, if you are not checking consider checking daily    Take your Blood Pressure medicine as advised    Do not take your Blood pressure medicine if systolic Blood Pressure (top number) is less than 100 or heart rate less than 60  Notify you physician  CAD (coronary artery disease)  Pt is symptom free with from CAD stand point    Will continue medicine as advised, if any question or side effect, discuss with your physician/cardiologist    Pt advised risk factor control including Blood Pressure, weight, Sugar and cholesterol control for secondary prevention  Pt is advised to continue to follow with cardiologist for close monitoring, periodic evaluation and management of CAD    Go to emergency room/call 911 if you have any chest pain or concerning symptoms as it relates to heart        Neuropathy  fair    BPH (benign prostatic hyperplasia)  Fair symptoms    rec to continue to follow with Urologist    Vitamin D deficiency  Continue surveillance periodically and follow up labs    Continue suplement    Hyperlipidemia  Cholesterol    Eat low cholesterol diet    Continue taking your cholesterol medicine as advised    Call if any side effects    Lipid Profile and LFT prior to next visit or as advised  ( You should Get periodically to monitor liver side effects)    KNOW YOUR DIABETIC GOAL( HBA1C AND SUGAR), BLOOD PRESSURE TARGET NUMBER AND CHOLESTEROL( LDL, HDL AND TRIGLYCERIDE)   Elevated liver enzymes  Steatohepatitis  Continue LFT surveillance periodically    CPAP (continuous positive airway pressure) dependence  Continue CPAP  Recommend to continue to follow with pulmonologist periodically per their guidance    Class 2 obesity due to excess calories without serious comorbidity with body mass index (BMI) of 37 0 to 37 9 in adult  BMI Counseling: The BMI is above normal  Know Body weight goal    Weigh yourself daily or weekly as per your doctor    Nutrition recommendations include decreasing portion sizes, encouraging healthy choices of fruits and vegetables, decreasing     fast food intake, consuming healthier snacks, limiting drinks that contain sugar, moderation in carbohydrate intake, increasing     intake of lean protein, reducing intake of saturated and trans fat and reducing intake of cholesterol      Asymptomatic PVCs  Rec recommend to continue to follow with cardiologist       Diagnoses and all orders for this visit:    Gastroesophageal reflux disease without esophagitis    Mixed hyperlipidemia  -     atorvastatin (LIPITOR) 40 mg tablet; Take 1 tablet (40 mg total) by mouth daily with dinner    Seasonal allergic rhinitis due to pollen  -     desloratadine (CLARINEX) 5 MG tablet; Take 1 tablet (5 mg total) by mouth daily at bedtime    Essential hypertension  -     losartan (COZAAR) 50 mg tablet;  Take 1 tablet (50 mg total) by mouth daily    Type 2 diabetes mellitus without complication, without long-term current use of insulin (HCC)    Coronary artery disease involving native coronary artery of native heart without angina pectoris    Neuropathy    Benign prostatic hyperplasia without lower urinary tract symptoms    Vitamin D deficiency    Elevated liver enzymes    CPAP (continuous positive airway pressure) dependence    Class 2 obesity due to excess calories without serious comorbidity with body mass index (BMI) of 37 0 to 37 9 in adult    Asymptomatic PVCs          Discussion Summary and Plan: Today's care plan and medications were reviewed with patient in detail and all their questions answered to their satisfaction  In summary diabetes improving control he will continue to follow with endocrinologist the his should start doing increasing his activity  No hypoglycemic symptoms overdue to see eye examination  CAD stable for risk factor management  Hypertension excellent control  GERD fair  Neuropathy unchanged  BPH reasonable  H sees urologist he once a year  Vitamin-D deficiency remains on supplement  Elevated LFT baseline secondary to steatohepatitis  Remains on CPAP  BMI 37 8 recommend that diet exercise weight loss  Asymptomatic PVC unchanged  Recommend to follow with the appropriate specialist   PSA up-to-date  Quit for colonoscopy this week  Chief Complaint   Patient presents with    Hypertension    Hyperlipidemia    Diabetes    Coronary Artery Disease    Obesity      Subjective:  Chester Mayo is here for follow-up of chronic disease management  No new sx    Diabetes:  Diagnosed: 5-10 years  Current Medicine for Diabetes: Per Med List  Finger stick: Once a day  Glucose Log: home sugar reivewed  Hypoglycemia event and intervention: None  Diet: reviewed, not watching diet  Exercise: None  Comorbidity: see HPI and Assessment/Plan  Last Eye Exam: 2 year  Last Foot exam: today  HbA1c 7 2      Allergic rhinitis : doing well, no sx, He used to see allergist specialist his to get allergy in the injection insurance was not paying local doctor he does not want to travel to other doctors      Hypertension symptom-free remains on losartan 50 mg daily and amlodipine 5 mg daily    Coronary artery disease symptom-free the remains on aspirin losartan amlodipine and Lipitor, sxfree    Hyperlipidemia symptom-free,lipid profile reviewed, continue statin      Diverticulosis not a problem  Vitamin-D deficiency remains on 1000 unit    Obesity BMI 38 22, extensive discussion, Pt is seeing nutrionist    BPH fair  Sees urologist once a year    Edema of legs fair, remains HCTZ for BP and Torsemide for edema by cardiologist    Colonic polyp colonoscopy done 2016 per GI MD patient is going this week  [facility] History of compression fracture not a problem  GERD fair  PSA 11-6-2020: 0 4    Neuropathy fair; tolerating gabapentin     He does have a varicose vein and chronic stasis dermatitis  No visits with results within 2 Week(s) from this visit  Latest known visit with results is:  Office Visit on 11/13/2020  Creatinine, Urine         Value: 149  3(mg/dL)       Dt: 02/05/2021  Microalbum  ,U,Random      Value: 6 9(ug/mL)         Dt: 02/05/2021  Microalb/Creat Ratio      Value: 5(mg/g creat)      Dt: 02/05/2021  Glucose, Random           Value: 140(mg/dL)*        Dt: 02/05/2021  BUN                       Value: 17(mg/dL)          Dt: 02/05/2021  Creatinine                Value: 1 15(mg/dL)        Dt: 02/05/2021  eGFR Non African American Value: 70(mL/min/1 73)    Dt: 02/05/2021  eGFR      Value: 81(mL/min/1 73)    Dt: 02/05/2021  SL AMB BUN/CREATININE RA* Value: 15                 Dt: 02/05/2021  Sodium                    Value: 138(mmol/L)        Dt: 02/05/2021  Potassium                 Value: 3 9(mmol/L)        Dt: 02/05/2021  Chloride                  Value: 96(mmol/L)         Dt: 02/05/2021  CO2                       Value: 26(mmol/L)         Dt: 02/05/2021  CALCIUM                   Value: 10  1(mg/dL)        Dt: 02/05/2021  Protein, Total            Value: 7 3(g/dL)          Dt: 02/05/2021  Albumin                   Value: 4 6(g/dL)          Dt: 02/05/2021  Globulin, Total           Value: 2 7(g/dL)          Dt: 02/05/2021  Albumin/Globulin Ratio    Value: 1 7                Dt: 02/05/2021  TOTAL BILIRUBIN           Value: 0 5(mg/dL)         Dt: 02/05/2021  Alk Phos Isoenzymes       Value: 51(IU/L)           Dt: 02/05/2021  AST                       Value: 64(IU/L)*          Dt: 02/05/2021  ALT                       Value: 70(IU/L)*          Dt: 02/05/2021  Hemoglobin A1C            Value: 7 2(%)*            Dt: 02/05/2021  Estimated Average Glucose Value: 160(mg/dL)         Dt: 02/05/2021  Cholesterol, Total        Value: 133(mg/dL)         Dt: 02/05/2021  Triglycerides             Value: 145(mg/dL)         Dt: 02/05/2021  HDL                       Value: 40(mg/dL)          Dt: 02/05/2021  VLDL Cholesterol Calcula* Value: 25(mg/dL)          Dt: 02/05/2021  LDL Calculated            Value: 68(mg/dL)          Dt: 02/05/2021  T  Chol/HDL Ratio         Value: 3  3(ratio)         Dt: 02/05/2021  ------------ - 2 weeks      Orders Only on 11/06/2020  Prostate Specific Antige* Value: 0 4(ng/mL)         Dt: 11/06/2020  ------------ - 2 weeks          The following portions of the patient's history were reviewed and updated as appropriate: allergies, current medications, past family history, past medical history, past social history, past surgical history and problem list     Review of Systems   Constitutional: Negative for chills, fatigue, fever and unexpected weight change  HENT: Positive for hearing loss  Negative for ear discharge, ear pain, facial swelling, mouth sores, nosebleeds, postnasal drip, rhinorrhea, sinus pressure and sinus pain  Eyes: Negative for pain and discharge  Respiratory: Negative for cough and shortness of breath  Cardiovascular: Negative for chest pain, palpitations and leg swelling  Gastrointestinal: Negative for abdominal pain, diarrhea and nausea  Endocrine: Negative for cold intolerance, heat intolerance, polydipsia, polyphagia and polyuria     Genitourinary: Negative for dysuria, frequency and urgency  Musculoskeletal: Negative for arthralgias, gait problem and myalgias  Allergic/Immunologic: Negative  Neurological: Positive for numbness  Negative for dizziness, seizures, speech difficulty and headaches  Hematological: Negative  Psychiatric/Behavioral: Negative  All other systems reviewed and are negative          Historical Information   Patient Active Problem List   Diagnosis    Diabetes mellitus ([facility] [address] )    Hyperlipidemia    Essential hypertension    Elevated liver enzymes    Polyarthralgia    Hydronephrosis    Class 2 obesity due to excess calories without serious comorbidity with body mass index (BMI) of 37 0 to 37 9 in adult    Chronic pain of both knees    Primary osteoarthritis of knees, bilateral    CAD (coronary artery disease)    Asymptomatic PVCs    Obstructive sleep apnea syndrome    BMI 37 0-37 9, adult    Seasonal allergic rhinitis due to pollen    GERD (gastroesophageal reflux disease)    Nasal septal deformity    Bundle branch block, right    CPAP (continuous positive airway pressure) dependence    Vitamin D deficiency    BPH (benign prostatic hyperplasia)    History of vertebral compression fracture    Colon polyp    History of angioplasty    Ocular migraine    Inguinal hernia    Primary osteoarthritis of left knee    Umbilical hernia    Bradycardia    Asymptomatic varicose veins of both lower extremities    Neuropathy    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)    Carpal tunnel syndrome of left wrist    Ulnar neuropathy at elbow of left upper extremity    Onychomycosis of toenail    Impacted cerumen of left ear    Contusion of right knee     Past Medical History:   Diagnosis Date    Allergic rhinitis     Anemia     Arthritis     Bundle branch block, right     CAD (coronary artery disease)     CPAP (continuous positive airway pressure) dependence     Diabetes mellitus ([address] )     borderline, controlled with diet and activity  Diverticulitis     Diverticulitis of large intestine with abscess without bleeding 12/7/2016    GERD (gastroesophageal reflux disease)     Hyperlipidemia     Hypertension     Nasal septal deformity     Neuropathy     Peptic ulceration     gastric    Peptic ulceration 9/26/2019    gastric    Pneumonia     Renal disorder     Seasonal allergies     Sleep apnea     wears c-pap    Urinary tract infection     Vitamin D deficiency      Past Surgical History:   Procedure Laterality Date    COLON SIGMOID RESECTION N/A 12/16/2016    Procedure: RESECTION COLON SIGMOID;  Surgeon: Aparna Yee MD;  [facility]: [facility];  Service:     COLON SIGMOID RESECTION LAPAROSCOPIC N/A 12/16/2016    Procedure: RESECTION COLON SIGMOID LAPAROSCOPIC:CONVERTED TO [email or web address];  Surgeon: Aparna Yee MD;  Location[facility];  Service:     COLON SURGERY      COLONOSCOPY N/A 10/6/2016    Procedure: COLONOSCOPY;  Surgeon: Devin March MD;  Location: [facility]; Service:    Field CYSTOSCOPY W/ RETROGRADES Left 2/3/2017    Procedure: CYSTOSCOPY WITH BILATERAL RETROGRADES, LEFT STENT REMOVAL;  Surgeon: Eliecer Oliveros MD;  Location: [address] [place] [address];  Service:     ESOPHAGOGASTRODUODENOSCOPY N/A 10/6/2016    Procedure: ESOPHAGOGASTRODUODENOSCOPY (EGD); Surgeon: Devin March MD;  Location: [facility];   Service:     HERNIA REPAIR      KNEE ARTHROSCOPY W/ MENISCECTOMY      VT CYSTOURETHROSCOPY,URETER CATHETER Left 12/4/2016    Procedure: CYSTOSCOPY RETROGRADE PYELOGRAM WITH INSERTION STENT URETERAL;  Surgeon: Eliecer Oliveros MD;  Location: [address] [place] [address];  Service: Urology    VARICOSE VEIN SURGERY       Social History     Substance and Sexual Activity   Alcohol Use Yes    Alcohol/week: 3 0 standard drinks    Types: 3 Cans of beer per week    Frequency: 2-3 times a week     Social History     Substance and Sexual Activity   Drug Use No     Social History     Tobacco Use   Smoking Status Former Smoker    Packs/day: 0 50    Years: 37 00    Pack years: 18 50    Types: Cigarettes    Quit date: 3/30/2018    Years since quittin 8   Smokeless Tobacco Current User    Types: Chew   Tobacco Comment     requit 3/30/18     Family History   Problem Relation Age of Onset    Diabetes Brother     Thyroid cancer Brother     Osteoarthritis Mother     Atrial fibrillation Mother     Aortic aneurysm Father     Colon cancer Brother      Health Maintenance Due   Topic    Hepatitis C Screening     DM Eye Exam     HIV Screening     BMI: Followup Plan     Annual Physical     Colonoscopy Surveillance       Meds/Allergies       Current Outpatient Medications:     albuterol (PROVENTIL HFA,VENTOLIN HFA) 90 mcg/act inhaler, Inhale 2 puffs every 4 (four) hours as needed for wheezing or shortness of breath, Disp: 1 Inhaler, Rfl: 0    Ascorbic Acid (VITAMIN C) 100 MG tablet, Take 500 mg by mouth , Disp: , Rfl:     aspirin 81 MG tablet, Take 162 mg by mouth, Disp: , Rfl:     atorvastatin (LIPITOR) 40 mg tablet, Take 1 tablet (40 mg total) by mouth daily with dinner, Disp: 90 tablet, Rfl: 1    capsaicin (ZOSTRIX) 0 025 % cream, Apply 1 application topically 2 (two) times a day, Disp: 60 g, Rfl: 0    Cholecalciferol (VITAMIN D3) 1000 units CAPS, Take by mouth daily , Disp: , Rfl:     desloratadine (CLARINEX) 5 MG tablet, Take 1 tablet (5 mg total) by mouth daily at bedtime, Disp: 90 tablet, Rfl: 1    famotidine (PEPCID) 20 mg tablet, Take 20 mg by mouth daily, Disp: , Rfl:     gabapentin (NEURONTIN) 100 mg capsule, One tablet daily AM and two tablet daily HS, Disp: 270 capsule, Rfl: 1    glucosamine-chondroitin 500-400 MG tablet, Take 1 tablet by mouth daily, Disp: , Rfl:     glucose blood test strip, 1 each by Other route daily Use as instructed, Disp: 100 each, Rfl: 6    hydrochlorothiazide (HYDRODIURIL) 25 mg tablet, Take 1 tablet by mouth once daily, Disp: 90 tablet, Rfl: 0    losartan (COZAAR) 50 mg tablet, Take 1 tablet (50 mg total) by mouth daily, Disp: 90 tablet, Rfl: 1    metFORMIN (GLUCOPHAGE) 500 mg tablet, Take 2 tablets (1000mg) twice a day with meals, Disp: 360 tablet, Rfl: 2    Multiple Vitamin (MULTIVITAMIN) capsule, Take 1 capsule by mouth daily, Disp: , Rfl:     nitroglycerin (NITROSTAT) 0 3 mg SL tablet, Place 1 tablet (0 3 mg total) under the tongue every 5 (five) minutes as needed for chest pain, Disp: 25 tablet, Rfl: 1    ONETOUCH DELICA LANCETS 22P MISC, 1 Units by Does not apply route daily, Disp: 100 each, Rfl: 6    torsemide (DEMADEX) 10 mg tablet, TAKE 1 TABLET BY MOUTH ONCE DAILY (Patient taking differently: daily as needed ), Disp: 90 tablet, Rfl: 3      Objective:    Vitals:   Pulse 55   Temp (!) 95 3 °F (35 2 °C)   Ht 5' 11" (1 803 m)   Wt 123 kg (271 lb)   SpO2 99%   BMI 37 80 kg/m²   Body mass index is 37 8 kg/m²  Vitals:    02/12/21 1112   Weight: 123 kg (271 lb)       Physical Exam  Vitals signs and nursing note reviewed  Constitutional:       Appearance: He is well-developed  Comments: Male pattern baldness   HENT:      Head: Normocephalic  Salivary Glands: Right salivary gland is not diffusely enlarged  Left salivary gland is not diffusely enlarged  Right Ear: Decreased hearing noted  Left Ear: Decreased hearing noted  Eyes:      Conjunctiva/sclera: Conjunctivae normal    Neck:      Thyroid: No thyromegaly  Vascular: No JVD  Cardiovascular:      Rate and Rhythm: Regular rhythm  Pulses: Pulses are weak  Dorsalis pedis pulses are 1+ on the right side and 1+ on the left side  Posterior tibial pulses are 1+ on the right side and 1+ on the left side  Heart sounds: Normal heart sounds  No murmur  No systolic murmur  No diastolic murmur  Pulmonary:      Effort: No respiratory distress  Breath sounds: Normal breath sounds  No wheezing or rales  Abdominal:      General: Bowel sounds are normal  There is no distension  Palpations: There is no mass  Tenderness: There is no abdominal tenderness  There is no rebound  Musculoskeletal:      Right lower leg: No edema  Left lower leg: No edema  Feet:      Right foot:      Skin integrity: No ulcer, skin breakdown, erythema, warmth, callus or dry skin  Left foot:      Skin integrity: No ulcer, skin breakdown, erythema, warmth, callus or dry skin  Comments: He does have a bilateral flat feet  He does have a onychomycosis of the toenail  He he does does have a chronic stasis discoloration of the distal extremities there is no open skin area area no thickened how thickening or calluses  Of he is of toenail was taken of little bit more right  Lymphadenopathy:      Cervical: No cervical adenopathy  Skin:     General: Skin is warm  Findings: No rash  Comments: Stasis dermatitis and discoloration present     Diabetic Foot Exam    Patient's shoes and socks removed  Right Foot/Ankle   Right Foot Inspection  Skin Exam: skin normal and skin intact no dry skin, no warmth, no callus, no erythema, no maceration, no abnormal color, no pre-ulcer, no ulcer and no callus                          Toe Exam:  no right toe deformity  Sensory   Vibration: intact  Proprioception: intact     Vascular    The right DP pulse is 1+  The right PT pulse is 1+  Left Foot/Ankle  Left Foot Inspection  Skin Exam: skin normal and skin intactno dry skin, no warmth, no erythema, no maceration, normal color, no pre-ulcer, no ulcer and no callus                         Toe Exam: no left toe deformity                   Sensory   Vibration: intact  Proprioception: intact  Monofilament: diminished  Vascular  Capillary refills: < 3 seconds  The left DP pulse is 1+  The left PT pulse is 1+  Assign Risk Category:  No deformity present; No loss of protective sensation; Weak pulses       Risk: 0   BMI Counseling: Body mass index is 37 8 kg/m²   The BMI is above normal  Nutrition recommendations include decreasing portion sizes, encouraging healthy choices of fruits and vegetables, decreasing fast food intake, consuming healthier snacks, limiting drinks that contain sugar, moderation in carbohydrate intake and increasing intake of lean protein  Exercise recommendations include exercising 3-5 times per week  Patient referred to nutritionist due to patient being overweight  Lab Review   No visits with results within 2 Month(s) from this visit  Latest known visit with results is:   Office Visit on 11/13/2020   Component Date Value Ref Range Status    Creatinine, Urine 02/05/2021 149 3  Not Estab  mg/dL Final    Microalbum  ,U,Random 02/05/2021 6 9  Not Estab  ug/mL Final    Microalb/Creat Ratio 02/05/2021 5  0 - 29 mg/g creat Final    Comment:                        Normal:                0 -  29                         Moderately increased: 30 - 300                         Severely increased:       >300      Glucose, Random 02/05/2021 140* 65 - 99 mg/dL Final    BUN 02/05/2021 17  6 - 24 mg/dL Final    Creatinine 02/05/2021 1 15  0 76 - 1 27 mg/dL Final    eGFR Non  02/05/2021 70  >59 mL/min/1 73 Final    eGFR  02/05/2021 81  >59 mL/min/1 73 Final    SL AMB BUN/CREATININE RATIO 02/05/2021 15  9 - 20 Final    Sodium 02/05/2021 138  134 - 144 mmol/L Final    Potassium 02/05/2021 3 9  3 5 - 5 2 mmol/L Final    Chloride 02/05/2021 96  96 - 106 mmol/L Final    CO2 02/05/2021 26  20 - 29 mmol/L Final    CALCIUM 02/05/2021 10 1  8 7 - 10 2 mg/dL Final    Protein, Total 02/05/2021 7 3  6 0 - 8 5 g/dL Final    Albumin 02/05/2021 4 6  3 8 - 4 9 g/dL Final    Globulin, Total 02/05/2021 2 7  1 5 - 4 5 g/dL Final    Albumin/Globulin Ratio 02/05/2021 1 7  1 2 - 2 2 Final    TOTAL BILIRUBIN 02/05/2021 0 5  0 0 - 1 2 mg/dL Final    Alk Phos Isoenzymes 02/05/2021 51  39 - 117 IU/L Final    AST 02/05/2021 64* 0 - 40 IU/L Final    ALT 02/05/2021 70* 0 - 44 IU/L Final    Hemoglobin A1C 02/05/2021 7 2* 4 8 - 5 6 % Final    Comment:          Prediabetes: 5 7 - 6 4           Diabetes: >6 4           Glycemic control for adults with diabetes: <7 0      Estimated Average Glucose 02/05/2021 160  mg/dL Final    Cholesterol, Total 02/05/2021 133  100 - 199 mg/dL Final    Triglycerides 02/05/2021 145  0 - 149 mg/dL Final    HDL 02/05/2021 40  >39 mg/dL Final    VLDL Cholesterol Calculated 02/05/2021 25  5 - 40 mg/dL Final    LDL Calculated 02/05/2021 68  0 - 99 mg/dL Final    T  Chol/HDL Ratio 02/05/2021 3 3  0 0 - 5 0 ratio Final    Comment:                                   T  Chol/HDL Ratio                                              Men  Women                                1/2 Avg  Risk  3 4    3 3                                    Avg Risk  5 0    4 4                                 2X Avg  Risk  9 6    7 1                                 3X Avg  Risk 23 4   11 0           Patient Instructions   Hypertension( High Blood Pressure ):    Continue Home BP check daily and bring log, if you are not checking consider checking daily    Take your Blood Pressure medicine as advised    Do not take your Blood pressure medicine if systolic Blood Pressure (top number) is less than 100 or heart rate less than 60  Notify you physician  Diabetes    Check your fingerstick Accu-Chek once a day  Please check your fingerstick Accu-Chek different time of the day either at 7:00 a m  or 11:00 a m  for for p m  4 9:00 p m  Yesica Hall Follow Diabetic 1800 calorie diet for diabetes as advised  If you would sugar less than 80 or more than 300 to please call us on next visit is day  If the sugar is less than 80 follow-up hypoglycemia instructions as advised  Take your diabetic medicine as advised  Cholesterol    Eat low cholesterol diet    Continue taking your cholesterol medicine as advised    Call if any side effects    Lipid Profile and LFT prior to next visit or as advised  ( You should Get periodically to monitor liver side effects)    KNOW YOUR DIABETIC GOAL( HBA1C AND SUGAR), BLOOD PRESSURE TARGET NUMBER AND CHOLESTEROL( LDL, HDL AND TRIGLYCERIDE)   BMI Counseling: The BMI is above normal  Know Body weight goal    Weigh yourself daily or weekly as per your doctor    Nutrition recommendations include decreasing portion sizes, encouraging healthy choices of fruits and vegetables, decreasing     fast food intake, consuming healthier snacks, limiting drinks that contain sugar, moderation in carbohydrate intake, increasing     intake of lean protein, reducing intake of saturated and trans fat and reducing intake of cholesterol    Pt is symptom free with from CAD stand point    Will continue medicine as advised, if any question or side effect, discuss with your physician/cardiologist    Pt advised risk factor control including Blood Pressure, weight, Sugar and cholesterol control for secondary prevention  Pt is advised to continue to follow with cardiologist for close monitoring, periodic evaluation and management of CAD    Go to emergency room/call 911 if you have any chest pain or concerning symptoms as it relates to heart  Follow with Consultants as per their and our suggestion    Follow up in 12 week(s) or as needed earlier    Follow all instructions as advised and discussed  Take your medications as prescribed  Call the office immediately if you experience any side effects  Ask questions if you do not understand  Keep your scheduled appointment as advised or come sooner if necessary or in doubt  Best time to call for non-urgent matter or questions on weekdays is between 9am and 12 noon  See physician for any new symptoms or worsening of current symptoms  Urgent or emergent situations call 911 and report to nearest emergency room      I spent  25 minutes taking care of this patient including clinical care, conseling, collaboration, chart, lab and consultaion review as appropriate    Patient is to get labs 1 week(s) prior to next visit if advised               Dr Sanjuana Flanagan MD  [facility] [place]       "This note has been constructed using a voice recognition system  Therefore there may be syntax, spelling, and/or grammatical errors   Please call if you have any questions  "

## 2021-02-12 NOTE — ASSESSMENT & PLAN NOTE
BMI Counseling:       The BMI is above normal  Know Body weight goal    Weigh yourself daily or weekly as per your doctor    Nutrition recommendations include decreasing portion sizes, encouraging healthy choices of fruits and vegetables, decreasing     fast food intake, consuming healthier snacks, limiting drinks that contain sugar, moderation in carbohydrate intake, increasing     intake of lean protein, reducing intake of saturated and trans fat and reducing intake of cholesterol

## 2021-02-12 NOTE — ASSESSMENT & PLAN NOTE
Pt is symptom free with from CAD stand point    Will continue medicine as advised, if any question or side effect, discuss with your physician/cardiologist    Pt advised risk factor control including Blood Pressure, weight, Sugar and cholesterol control for secondary prevention  Pt is advised to continue to follow with cardiologist for close monitoring, periodic evaluation and management of CAD    Go to emergency room/call 911 if you have any chest pain or concerning symptoms as it relates to heart

## 2021-02-12 NOTE — PROGRESS NOTES
[facility] Office Visit Note  21     Samuel Cheng 64 y o  male MRN: [id number]  : 1960    Assessment:     Diabetes mellitus ([address] )  Diabetes    Check your fingerstick Accu-Chek once a day  Please check your fingerstick Accu-Chek different time of the day either at 7:00 a m  or 11:00 a m  for for p m  4 9:00 p m  Krishna Hines Follow Diabetic diet for diabetes as advised  If you would sugar less than 80 or more than 300 to please call us on next visit is day  If the sugar is less than 80 follow-up hypoglycemia instructions as advised  Take your diabetic medicine as advised  Lab Results   Component Value Date    HGBA1C 7 2 (H) 2021       Essential hypertension  Hypertension( High Blood Pressure ):    Continue Home BP check daily and bring log, if you are not checking consider checking daily    Take your Blood Pressure medicine as advised    Do not take your Blood pressure medicine if systolic Blood Pressure (top number) is less than 100 or heart rate less than 60  Notify you physician  CAD (coronary artery disease)  Pt is symptom free with from CAD stand point    Will continue medicine as advised, if any question or side effect, discuss with your physician/cardiologist    Pt advised risk factor control including Blood Pressure, weight, Sugar and cholesterol control for secondary prevention  Pt is advised to continue to follow with cardiologist for close monitoring, periodic evaluation and management of CAD    Go to emergency room/call 911 if you have any chest pain or concerning symptoms as it relates to heart        Neuropathy  fair    BPH (benign prostatic hyperplasia)  Fair symptoms    rec to continue to follow with Urologist    Vitamin D deficiency  Continue surveillance periodically and follow up labs    Continue suplement    Hyperlipidemia  Cholesterol    Eat low cholesterol diet    Continue taking your cholesterol medicine as advised    Call if any side effects    Lipid Profile and LFT prior to next visit or as advised  ( You should Get periodically to monitor liver side effects)    KNOW YOUR DIABETIC GOAL( HBA1C AND SUGAR), BLOOD PRESSURE TARGET NUMBER AND CHOLESTEROL( LDL, HDL AND TRIGLYCERIDE)   Elevated liver enzymes  Steatohepatitis  Continue LFT surveillance periodically    CPAP (continuous positive airway pressure) dependence  Continue CPAP  Recommend to continue to follow with pulmonologist periodically per their guidance    Class 2 obesity due to excess calories without serious comorbidity with body mass index (BMI) of 37 0 to 37 9 in adult  BMI Counseling: The BMI is above normal  Know Body weight goal    Weigh yourself daily or weekly as per your doctor    Nutrition recommendations include decreasing portion sizes, encouraging healthy choices of fruits and vegetables, decreasing     fast food intake, consuming healthier snacks, limiting drinks that contain sugar, moderation in carbohydrate intake, increasing     intake of lean protein, reducing intake of saturated and trans fat and reducing intake of cholesterol      Asymptomatic PVCs  Rec recommend to continue to follow with cardiologist       Diagnoses and all orders for this visit:    Gastroesophageal reflux disease without esophagitis    Mixed hyperlipidemia  -     atorvastatin (LIPITOR) 40 mg tablet; Take 1 tablet (40 mg total) by mouth daily with dinner    Seasonal allergic rhinitis due to pollen  -     desloratadine (CLARINEX) 5 MG tablet; Take 1 tablet (5 mg total) by mouth daily at bedtime    Essential hypertension  -     losartan (COZAAR) 50 mg tablet;  Take 1 tablet (50 mg total) by mouth daily    Type 2 diabetes mellitus without complication, without long-term current use of insulin (HCC)    Coronary artery disease involving native coronary artery of native heart without angina pectoris    Neuropathy    Benign prostatic hyperplasia without lower urinary tract symptoms    Vitamin D deficiency    Elevated liver enzymes    CPAP (continuous positive airway pressure) dependence    Class 2 obesity due to excess calories without serious comorbidity with body mass index (BMI) of 37 0 to 37 9 in adult    Asymptomatic PVCs          Discussion Summary and Plan: Today's care plan and medications were reviewed with patient in detail and all their questions answered to their satisfaction  In summary diabetes improving control he will continue to follow with endocrinologist the his should start doing increasing his activity  No hypoglycemic symptoms overdue to see eye examination  CAD stable for risk factor management  Hypertension excellent control  GERD fair  Neuropathy unchanged  BPH reasonable  H sees urologist he once a year  Vitamin-D deficiency remains on supplement  Elevated LFT baseline secondary to steatohepatitis  Remains on CPAP  BMI 37 8 recommend that diet exercise weight loss  Asymptomatic PVC unchanged  Recommend to follow with the appropriate specialist   PSA up-to-date  Quit for colonoscopy this week  Chief Complaint   Patient presents with    Hypertension    Hyperlipidemia    Diabetes    Coronary Artery Disease    Obesity      Subjective:  Chester Mayo is here for follow-up of chronic disease management  No new sx    Diabetes:  Diagnosed: 5-10 years  Current Medicine for Diabetes: Per Med List  Finger stick: Once a day  Glucose Log: home sugar reivewed  Hypoglycemia event and intervention: None  Diet: reviewed, not watching diet  Exercise: None  Comorbidity: see HPI and Assessment/Plan  Last Eye Exam: 2 year  Last Foot exam: today  HbA1c 7 2      Allergic rhinitis : doing well, no sx, He used to see allergist specialist his to get allergy in the injection insurance was not paying local doctor he does not want to travel to other doctors      Hypertension symptom-free remains on losartan 50 mg daily and amlodipine 5 mg daily    Coronary artery disease symptom-free the remains on aspirin losartan amlodipine and Lipitor, sxfree    Hyperlipidemia symptom-free,lipid profile reviewed, continue statin      Diverticulosis not a problem  Vitamin-D deficiency remains on 1000 unit    Obesity BMI 38 22, extensive discussion, Pt is seeing nutrionist    BPH fair  Sees urologist once a year    Edema of legs fair, remains HCTZ for BP and Torsemide for edema by cardiologist    Colonic polyp colonoscopy done 2016 per GI MD patient is going this week  Darletta Pac History of compression fracture not a problem  GERD fair  PSA 11-6-2020: 0 4    Neuropathy fair; tolerating gabapentin     He does have a varicose vein and chronic stasis dermatitis  No visits with results within 2 Week(s) from this visit  Latest known visit with results is:  Office Visit on 11/13/2020  Creatinine, Urine         Value: 149  3(mg/dL)       Dt: 02/05/2021  Microalbum  ,U,Random      Value: 6 9(ug/mL)         Dt: 02/05/2021  Microalb/Creat Ratio      Value: 5(mg/g creat)      Dt: 02/05/2021  Glucose, Random           Value: 140(mg/dL)*        Dt: 02/05/2021  BUN                       Value: 17(mg/dL)          Dt: 02/05/2021  Creatinine                Value: 1 15(mg/dL)        Dt: 02/05/2021  eGFR Non African American Value: 70(mL/min/1 73)    Dt: 02/05/2021  eGFR      Value: 81(mL/min/1 73)    Dt: 02/05/2021  SL AMB BUN/CREATININE RA* Value: 15                 Dt: 02/05/2021  Sodium                    Value: 138(mmol/L)        Dt: 02/05/2021  Potassium                 Value: 3 9(mmol/L)        Dt: 02/05/2021  Chloride                  Value: 96(mmol/L)         Dt: 02/05/2021  CO2                       Value: 26(mmol/L)         Dt: 02/05/2021  CALCIUM                   Value: 10  1(mg/dL)        Dt: 02/05/2021  Protein, Total            Value: 7 3(g/dL)          Dt: 02/05/2021  Albumin                   Value: 4 6(g/dL)          Dt: 02/05/2021  Globulin, Total           Value: 2 7(g/dL)          Dt: 02/05/2021  Albumin/Globulin Ratio    Value: 1 7                Dt: 02/05/2021  TOTAL BILIRUBIN           Value: 0 5(mg/dL)         Dt: 02/05/2021  Alk Phos Isoenzymes       Value: 51(IU/L)           Dt: 02/05/2021  AST                       Value: 64(IU/L)*          Dt: 02/05/2021  ALT                       Value: 70(IU/L)*          Dt: 02/05/2021  Hemoglobin A1C            Value: 7 2(%)*            Dt: 02/05/2021  Estimated Average Glucose Value: 160(mg/dL)         Dt: 02/05/2021  Cholesterol, Total        Value: 133(mg/dL)         Dt: 02/05/2021  Triglycerides             Value: 145(mg/dL)         Dt: 02/05/2021  HDL                       Value: 40(mg/dL)          Dt: 02/05/2021  VLDL Cholesterol Calcula* Value: 25(mg/dL)          Dt: 02/05/2021  LDL Calculated            Value: 68(mg/dL)          Dt: 02/05/2021  T  Chol/HDL Ratio         Value: 3  3(ratio)         Dt: 02/05/2021  ------------ - 2 weeks      Orders Only on 11/06/2020  Prostate Specific Antige* Value: 0 4(ng/mL)         Dt: 11/06/2020  ------------ - 2 weeks          The following portions of the patient's history were reviewed and updated as appropriate: allergies, current medications, past family history, past medical history, past social history, past surgical history and problem list     Review of Systems   Constitutional: Negative for chills, fatigue, fever and unexpected weight change  HENT: Positive for hearing loss  Negative for ear discharge, ear pain, facial swelling, mouth sores, nosebleeds, postnasal drip, rhinorrhea, sinus pressure and sinus pain  Eyes: Negative for pain and discharge  Respiratory: Negative for cough and shortness of breath  Cardiovascular: Negative for chest pain, palpitations and leg swelling  Gastrointestinal: Negative for abdominal pain, diarrhea and nausea  Endocrine: Negative for cold intolerance, heat intolerance, polydipsia, polyphagia and polyuria     Genitourinary: Negative for dysuria, frequency and urgency  Musculoskeletal: Negative for arthralgias, gait problem and myalgias  Allergic/Immunologic: Negative  Neurological: Positive for numbness  Negative for dizziness, seizures, speech difficulty and headaches  Hematological: Negative  Psychiatric/Behavioral: Negative  All other systems reviewed and are negative          Historical Information   Patient Active Problem List   Diagnosis    Diabetes mellitus ([facility] [address] [facility] )    Hyperlipidemia    Essential hypertension    Elevated liver enzymes    Polyarthralgia    Hydronephrosis    Class 2 obesity due to excess calories without serious comorbidity with body mass index (BMI) of 37 0 to 37 9 in adult    Chronic pain of both knees    Primary osteoarthritis of knees, bilateral    CAD (coronary artery disease)    Asymptomatic PVCs    Obstructive sleep apnea syndrome    BMI 37 0-37 9, adult    Seasonal allergic rhinitis due to pollen    GERD (gastroesophageal reflux disease)    Nasal septal deformity    Bundle branch block, right    CPAP (continuous positive airway pressure) dependence    Vitamin D deficiency    BPH (benign prostatic hyperplasia)    History of vertebral compression fracture    Colon polyp    History of angioplasty    Ocular migraine    Inguinal hernia    Primary osteoarthritis of left knee    Umbilical hernia    Bradycardia    Asymptomatic varicose veins of both lower extremities    Neuropathy    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)    Carpal tunnel syndrome of left wrist    Ulnar neuropathy at elbow of left upper extremity    Onychomycosis of toenail    Impacted cerumen of left ear    Contusion of right knee     Past Medical History:   Diagnosis Date    Allergic rhinitis     Anemia     Arthritis     Bundle branch block, right     CAD (coronary artery disease)     CPAP (continuous positive airway pressure) dependence     Diabetes mellitus ([address] )     borderline, controlled with diet and activity  Diverticulitis     Diverticulitis of large intestine with abscess without bleeding 12/7/2016    GERD (gastroesophageal reflux disease)     Hyperlipidemia     Hypertension     Nasal septal deformity     Neuropathy     Peptic ulceration     gastric    Peptic ulceration 9/26/2019    gastric    Pneumonia     Renal disorder     Seasonal allergies     Sleep apnea     wears c-pap    Urinary tract infection     Vitamin D deficiency      Past Surgical History:   Procedure Laterality Date    COLON SIGMOID RESECTION N/A 12/16/2016    Procedure: RESECTION COLON SIGMOID;  Surgeon: Kavya Treviño MD;  Location: [facility];  Service:     COLON SIGMOID RESECTION LAPAROSCOPIC N/A 12/16/2016    Procedure: RESECTION COLON SIGMOID LAPAROSCOPIC:CONVERTED TO [email or web address];  Surgeon: Kavya Treviño MD;  Location: [facility];  Service:     COLON SURGERY      COLONOSCOPY N/A 10/6/2016    Procedure: COLONOSCOPY;  Surgeon: Kendra Vale MD;  Location: [facility]; Service:    [place] CYSTOSCOPY W/ RETROGRADES Left 2/3/2017    Procedure: CYSTOSCOPY WITH BILATERAL RETROGRADES, LEFT STENT REMOVAL;  Surgeon: Bg Aragon MD;  Location: [address] [place] [address];  Service:     ESOPHAGOGASTRODUODENOSCOPY N/A 10/6/2016    Procedure: ESOPHAGOGASTRODUODENOSCOPY (EGD); Surgeon: Kendra Vale MD;  Location: [facility] GI LAB;   Service:     HERNIA REPAIR      KNEE ARTHROSCOPY W/ MENISCECTOMY      [place] CYSTOURETHROSCOPY,URETER CATHETER Left 12/4/2016    Procedure: CYSTOSCOPY RETROGRADE PYELOGRAM WITH INSERTION STENT URETERAL;  Surgeon: Bg Aragon MD;  Location: [address] [place] [address];  Service: Urology    VARICOSE VEIN SURGERY       Social History     Substance and Sexual Activity   Alcohol Use Yes    Alcohol/week: 3 0 standard drinks    Types: 3 Cans of beer per week    Frequency: 2-3 times a week     Social History     Substance and Sexual Activity   Drug Use No     Social History     Tobacco Use   Smoking Status Former Smoker    Packs/day: 0 50    Years: 37 00    Pack years: 18 50    Types: Cigarettes    Quit date: 3/30/2018    Years since quittin 8   Smokeless Tobacco Current User    Types: Chew   Tobacco Comment     requit 3/30/18     Family History   Problem Relation Age of Onset    Diabetes Brother     Thyroid cancer Brother     Osteoarthritis Mother     Atrial fibrillation Mother     Aortic aneurysm Father     Colon cancer Brother      Health Maintenance Due   Topic    Hepatitis C Screening     DM Eye Exam     HIV Screening     BMI: Followup Plan     Annual Physical     Colonoscopy Surveillance       Meds/Allergies       Current Outpatient Medications:     albuterol (PROVENTIL HFA,VENTOLIN HFA) 90 mcg/act inhaler, Inhale 2 puffs every 4 (four) hours as needed for wheezing or shortness of breath, Disp: 1 Inhaler, Rfl: 0    Ascorbic Acid (VITAMIN C) 100 MG tablet, Take 500 mg by mouth , Disp: , Rfl:     aspirin 81 MG tablet, Take 162 mg by mouth, Disp: , Rfl:     atorvastatin (LIPITOR) 40 mg tablet, Take 1 tablet (40 mg total) by mouth daily with dinner, Disp: 90 tablet, Rfl: 1    capsaicin (ZOSTRIX) 0 025 % cream, Apply 1 application topically 2 (two) times a day, Disp: 60 g, Rfl: 0    Cholecalciferol (VITAMIN D3) 1000 units CAPS, Take by mouth daily , Disp: , Rfl:     desloratadine (CLARINEX) 5 MG tablet, Take 1 tablet (5 mg total) by mouth daily at bedtime, Disp: 90 tablet, Rfl: 1    famotidine (PEPCID) 20 mg tablet, Take 20 mg by mouth daily, Disp: , Rfl:     gabapentin (NEURONTIN) 100 mg capsule, One tablet daily AM and two tablet daily HS, Disp: 270 capsule, Rfl: 1    glucosamine-chondroitin 500-400 MG tablet, Take 1 tablet by mouth daily, Disp: , Rfl:     glucose blood test strip, 1 each by Other route daily Use as instructed, Disp: 100 each, Rfl: 6    hydrochlorothiazide (HYDRODIURIL) 25 mg tablet, Take 1 tablet by mouth once daily, Disp: 90 tablet, Rfl: 0    losartan (COZAAR) 50 mg tablet, Take 1 tablet (50 mg total) by mouth daily, Disp: 90 tablet, Rfl: 1    metFORMIN (GLUCOPHAGE) 500 mg tablet, Take 2 tablets (1000mg) twice a day with meals, Disp: 360 tablet, Rfl: 2    Multiple Vitamin (MULTIVITAMIN) capsule, Take 1 capsule by mouth daily, Disp: , Rfl:     nitroglycerin (NITROSTAT) 0 3 mg SL tablet, Place 1 tablet (0 3 mg total) under the tongue every 5 (five) minutes as needed for chest pain, Disp: 25 tablet, Rfl: 1    ONETOUCH DELICA LANCETS 37K MISC, 1 Units by Does not apply route daily, Disp: 100 each, Rfl: 6    torsemide (DEMADEX) 10 mg tablet, TAKE 1 TABLET BY MOUTH ONCE DAILY (Patient taking differently: daily as needed ), Disp: 90 tablet, Rfl: 3      Objective:    Vitals:   Pulse 55   Temp (!) 95 3 °F (35 2 °C)   Ht 5' 11" (1 803 m)   Wt 123 kg (271 lb)   SpO2 99%   BMI 37 80 kg/m²   Body mass index is 37 8 kg/m²  Vitals:    02/12/21 1112   Weight: 123 kg (271 lb)       Physical Exam  Vitals signs and nursing note reviewed  Constitutional:       Appearance: He is well-developed  Comments: Male pattern baldness   HENT:      Head: Normocephalic  Salivary Glands: Right salivary gland is not diffusely enlarged  Left salivary gland is not diffusely enlarged  Right Ear: Decreased hearing noted  Left Ear: Decreased hearing noted  Eyes:      Conjunctiva/sclera: Conjunctivae normal    Neck:      Thyroid: No thyromegaly  Vascular: No JVD  Cardiovascular:      Rate and Rhythm: Regular rhythm  Pulses: Pulses are weak  Dorsalis pedis pulses are 1+ on the right side and 1+ on the left side  Posterior tibial pulses are 1+ on the right side and 1+ on the left side  Heart sounds: Normal heart sounds  No murmur  No systolic murmur  No diastolic murmur  Pulmonary:      Effort: No respiratory distress  Breath sounds: Normal breath sounds  No wheezing or rales  Abdominal:      General: Bowel sounds are normal  There is no distension  Palpations: There is no mass  Tenderness: There is no abdominal tenderness  There is no rebound  Musculoskeletal:      Right lower leg: No edema  Left lower leg: No edema  Feet:      Right foot:      Skin integrity: No ulcer, skin breakdown, erythema, warmth, callus or dry skin  Left foot:      Skin integrity: No ulcer, skin breakdown, erythema, warmth, callus or dry skin  Comments: He does have a bilateral flat feet  He does have a onychomycosis of the toenail  He he does does have a chronic stasis discoloration of the distal extremities there is no open skin area area no thickened how thickening or calluses  Of he is of toenail was taken of little bit more right  Lymphadenopathy:      Cervical: No cervical adenopathy  Skin:     General: Skin is warm  Findings: No rash  Comments: Stasis dermatitis and discoloration present     Diabetic Foot Exam    Patient's shoes and socks removed  Right Foot/Ankle   Right Foot Inspection  Skin Exam: skin normal and skin intact no dry skin, no warmth, no callus, no erythema, no maceration, no abnormal color, no pre-ulcer, no ulcer and no callus                          Toe Exam:  no right toe deformity  Sensory   Vibration: intact  Proprioception: intact     Vascular    The right DP pulse is 1+  The right PT pulse is 1+  Left Foot/Ankle  Left Foot Inspection  Skin Exam: skin normal and skin intactno dry skin, no warmth, no erythema, no maceration, normal color, no pre-ulcer, no ulcer and no callus                         Toe Exam: no left toe deformity                   Sensory   Vibration: intact  Proprioception: intact  Monofilament: diminished  Vascular  Capillary refills: < 3 seconds  The left DP pulse is 1+  The left PT pulse is 1+  Assign Risk Category:  No deformity present; No loss of protective sensation; Weak pulses       Risk: 0   BMI Counseling: Body mass index is 37 8 kg/m²   The BMI is above normal  Nutrition recommendations include decreasing portion sizes, encouraging healthy choices of fruits and vegetables, decreasing fast food intake, consuming healthier snacks, limiting drinks that contain sugar, moderation in carbohydrate intake and increasing intake of lean protein  Exercise recommendations include exercising 3-5 times per week  Patient referred to nutritionist due to patient being overweight  Lab Review   No visits with results within 2 Month(s) from this visit  Latest known visit with results is:   Office Visit on 11/13/2020   Component Date Value Ref Range Status    Creatinine, Urine 02/05/2021 149 3  Not Estab  mg/dL Final    Microalbum  ,U,Random 02/05/2021 6 9  Not Estab  ug/mL Final    Microalb/Creat Ratio 02/05/2021 5  0 - 29 mg/g creat Final    Comment:                        Normal:                0 -  29                         Moderately increased: 30 - 300                         Severely increased:       >300      Glucose, Random 02/05/2021 140* 65 - 99 mg/dL Final    BUN 02/05/2021 17  6 - 24 mg/dL Final    Creatinine 02/05/2021 1 15  0 76 - 1 27 mg/dL Final    eGFR Non  02/05/2021 70  >59 mL/min/1 73 Final    eGFR  02/05/2021 81  >59 mL/min/1 73 Final    SL AMB BUN/CREATININE RATIO 02/05/2021 15  9 - 20 Final    Sodium 02/05/2021 138  134 - 144 mmol/L Final    Potassium 02/05/2021 3 9  3 5 - 5 2 mmol/L Final    Chloride 02/05/2021 96  96 - 106 mmol/L Final    CO2 02/05/2021 26  20 - 29 mmol/L Final    CALCIUM 02/05/2021 10 1  8 7 - 10 2 mg/dL Final    Protein, Total 02/05/2021 7 3  6 0 - 8 5 g/dL Final    Albumin 02/05/2021 4 6  3 8 - 4 9 g/dL Final    Globulin, Total 02/05/2021 2 7  1 5 - 4 5 g/dL Final    Albumin/Globulin Ratio 02/05/2021 1 7  1 2 - 2 2 Final    TOTAL BILIRUBIN 02/05/2021 0 5  0 0 - 1 2 mg/dL Final    Alk Phos Isoenzymes 02/05/2021 51  39 - 117 IU/L Final    AST 02/05/2021 64* 0 - 40 IU/L Final    ALT 02/05/2021 70* 0 - 44 IU/L Final    Hemoglobin A1C 02/05/2021 7 2* 4 8 - 5 6 % Final    Comment:          Prediabetes: 5 7 - 6 4           Diabetes: >6 4           Glycemic control for adults with diabetes: <7 0      Estimated Average Glucose 02/05/2021 160  mg/dL Final    Cholesterol, Total 02/05/2021 133  100 - 199 mg/dL Final    Triglycerides 02/05/2021 145  0 - 149 mg/dL Final    HDL 02/05/2021 40  >39 mg/dL Final    VLDL Cholesterol Calculated 02/05/2021 25  5 - 40 mg/dL Final    LDL Calculated 02/05/2021 68  0 - 99 mg/dL Final    T  Chol/HDL Ratio 02/05/2021 3 3  0 0 - 5 0 ratio Final    Comment:                                   T  Chol/HDL Ratio                                              Men  Women                                1/2 Avg  Risk  3 4    3 3                                    Avg Risk  5 0    4 4                                 2X Avg  Risk  9 6    7 1                                 3X Avg  Risk 23 4   11 0           Patient Instructions   Hypertension( High Blood Pressure ):    Continue Home BP check daily and bring log, if you are not checking consider checking daily    Take your Blood Pressure medicine as advised    Do not take your Blood pressure medicine if systolic Blood Pressure (top number) is less than 100 or heart rate less than 60  Notify you physician  Diabetes    Check your fingerstick Accu-Chek once a day  Please check your fingerstick Accu-Chek different time of the day either at 7:00 a m  or 11:00 a m  for for p m  4 9:00 p m  Fam Alonzo Follow Diabetic 1800 calorie diet for diabetes as advised  If you would sugar less than 80 or more than 300 to please call us on next visit is day  If the sugar is less than 80 follow-up hypoglycemia instructions as advised  Take your diabetic medicine as advised  Cholesterol    Eat low cholesterol diet    Continue taking your cholesterol medicine as advised    Call if any side effects    Lipid Profile and LFT prior to next visit or as advised  ( You should Get periodically to monitor liver side effects)    KNOW YOUR DIABETIC GOAL( HBA1C AND SUGAR), BLOOD PRESSURE TARGET NUMBER AND CHOLESTEROL( LDL, HDL AND TRIGLYCERIDE)   BMI Counseling: The BMI is above normal  Know Body weight goal    Weigh yourself daily or weekly as per your doctor    Nutrition recommendations include decreasing portion sizes, encouraging healthy choices of fruits and vegetables, decreasing     fast food intake, consuming healthier snacks, limiting drinks that contain sugar, moderation in carbohydrate intake, increasing     intake of lean protein, reducing intake of saturated and trans fat and reducing intake of cholesterol    Pt is symptom free with from CAD stand point    Will continue medicine as advised, if any question or side effect, discuss with your physician/cardiologist    Pt advised risk factor control including Blood Pressure, weight, Sugar and cholesterol control for secondary prevention  Pt is advised to continue to follow with cardiologist for close monitoring, periodic evaluation and management of CAD    Go to emergency room/call 911 if you have any chest pain or concerning symptoms as it relates to heart  Follow with Consultants as per their and our suggestion    Follow up in 12 week(s) or as needed earlier    Follow all instructions as advised and discussed  Take your medications as prescribed  Call the office immediately if you experience any side effects  Ask questions if you do not understand  Keep your scheduled appointment as advised or come sooner if necessary or in doubt  Best time to call for non-urgent matter or questions on weekdays is between 9am and 12 noon  See physician for any new symptoms or worsening of current symptoms  Urgent or emergent situations call 911 and report to nearest emergency room      I spent  25 minutes taking care of this patient including clinical care, conseling, collaboration, chart, lab and consultaion review as appropriate    Patient is to get labs 1 week(s) prior to next visit if advised               Dr Cheri Santacruz MD  [facility] [place]       "This note has been constructed using a voice recognition system  Therefore there may be syntax, spelling, and/or grammatical errors   Please call if you have any questions  "

## 2021-02-12 NOTE — ASSESSMENT & PLAN NOTE
Continue CPAP      Recommend to continue to follow with pulmonologist periodically per their guidance

## 2021-02-12 NOTE — ASSESSMENT & PLAN NOTE
Diabetes    Check your fingerstick Accu-Chek once a day  Please check your fingerstick Accu-Chek different time of the day either at 7:00 a m  or 11:00 a m  for for p m  4 9:00 p m  Moy Ma Follow Diabetic diet for diabetes as advised  If you would sugar less than 80 or more than 300 to please call us on next visit is day  If the sugar is less than 80 follow-up hypoglycemia instructions as advised  Take your diabetic medicine as advised              Lab Results   Component Value Date    HGBA1C 7 2 (H) 02/05/2021

## 2021-02-12 NOTE — PATIENT INSTRUCTIONS
Hypertension( High Blood Pressure ):    Continue Home BP check daily and bring log, if you are not checking consider checking daily    Take your Blood Pressure medicine as advised    Do not take your Blood pressure medicine if systolic Blood Pressure (top number) is less than 100 or heart rate less than 60  Notify you physician  Diabetes    Check your fingerstick Accu-Chek once a day  Please check your fingerstick Accu-Chek different time of the day either at 7:00 a m  or 11:00 a m  for for p m  4 9:00 p m  Timo Petty Follow Diabetic 1800 calorie diet for diabetes as advised  If you would sugar less than 80 or more than 300 to please call us on next visit is day  If the sugar is less than 80 follow-up hypoglycemia instructions as advised  Take your diabetic medicine as advised  Cholesterol    Eat low cholesterol diet    Continue taking your cholesterol medicine as advised    Call if any side effects    Lipid Profile and LFT prior to next visit or as advised  ( You should Get periodically to monitor liver side effects)    KNOW YOUR DIABETIC GOAL( HBA1C AND SUGAR), BLOOD PRESSURE TARGET NUMBER AND CHOLESTEROL( LDL, HDL AND TRIGLYCERIDE)   BMI Counseling: The BMI is above normal  Know Body weight goal    Weigh yourself daily or weekly as per your doctor    Nutrition recommendations include decreasing portion sizes, encouraging healthy choices of fruits and vegetables, decreasing     fast food intake, consuming healthier snacks, limiting drinks that contain sugar, moderation in carbohydrate intake, increasing     intake of lean protein, reducing intake of saturated and trans fat and reducing intake of cholesterol    Pt is symptom free with from CAD stand point    Will continue medicine as advised, if any question or side effect, discuss with your physician/cardiologist    Pt advised risk factor control including Blood Pressure, weight, Sugar and cholesterol control for secondary prevention      Pt is advised to continue to follow with cardiologist for close monitoring, periodic evaluation and management of CAD    Go to emergency room/call 911 if you have any chest pain or concerning symptoms as it relates to heart  Follow with Consultants as per their and our suggestion    Follow up in 12 week(s) or as needed earlier    Follow all instructions as advised and discussed  Take your medications as prescribed  Call the office immediately if you experience any side effects  Ask questions if you do not understand  Keep your scheduled appointment as advised or come sooner if necessary or in doubt  Best time to call for non-urgent matter or questions on weekdays is between 9am and 12 noon  See physician for any new symptoms or worsening of current symptoms  Urgent or emergent situations call 911 and report to nearest emergency room      I spent  25 minutes taking care of this patient including clinical care, conseling, collaboration, chart, lab and consultaion review as appropriate    Patient is to get labs 1 week(s) prior to next visit if advised

## 2021-02-13 LAB — SARS-COV-2 RNA RESP QL NAA+PROBE: NEGATIVE

## 2021-02-15 ENCOUNTER — TELEPHONE (OUTPATIENT)
Dept: ADMINISTRATIVE | Facility: OTHER | Age: 61
End: 2021-02-15

## 2021-02-15 RX ORDER — UBIDECARENONE 75 MG
CAPSULE ORAL DAILY
COMMUNITY

## 2021-02-15 NOTE — PRE-PROCEDURE INSTRUCTIONS
Pre-Surgery Instructions:   Medication Instructions    albuterol (PROVENTIL HFA,VENTOLIN HFA) 90 mcg/act inhaler Patient was instructed by Physician and understands   Alpha-Lipoic Acid 100 MG CAPS Patient was instructed by Physician and understands   Ascorbic Acid (VITAMIN C) 100 MG tablet Patient was instructed by Physician and understands   aspirin 81 MG tablet Patient was instructed by Physician and understands   atorvastatin (LIPITOR) 40 mg tablet Patient was instructed by Physician and understands   capsaicin (ZOSTRIX) 0 025 % cream Patient was instructed by Physician and understands   Cholecalciferol (VITAMIN D3) 1000 units CAPS Patient was instructed by Physician and understands   cyanocobalamin (VITAMIN B-12) 100 mcg tablet Patient was instructed by Physician and understands   desloratadine (CLARINEX) 5 MG tablet Patient was instructed by Physician and understands   famotidine (PEPCID) 20 mg tablet Patient was instructed by Physician and understands   gabapentin (NEURONTIN) 100 mg capsule Patient was instructed by Physician and understands   glucosamine-chondroitin 500-400 MG tablet Patient was instructed by Physician and understands   glucose blood test strip Patient was instructed by Physician and understands   hydrochlorothiazide (HYDRODIURIL) 25 mg tablet Patient was instructed by Physician and understands   losartan (COZAAR) 50 mg tablet Patient was instructed by Physician and understands   metFORMIN (GLUCOPHAGE) 500 mg tablet Patient was instructed by Physician and understands   Multiple Vitamin (MULTIVITAMIN) capsule Patient was instructed by Physician and understands   nitroglycerin (NITROSTAT) 0 3 mg SL tablet Patient was instructed by Physician and understands   Merlyn 27 Patient was instructed by Physician and understands   torsemide (DEMADEX) 10 mg tablet Patient was instructed by Physician and understands

## 2021-02-15 NOTE — TELEPHONE ENCOUNTER
----- Message from Rayray Rodríguez MD sent at 2/12/2021 11:35 AM EST -----  Regarding: Of diabetic eye examination  02/12/21 11:36 AM    Hello, our patient Jonatan Pierce has had Diabetic Eye Exam completed/performed  Please assist in updating the patient chart by making an External outreach to Dr [facility] [address] [place] [address]  The date of service is 2 years ago      Thank you,  Rayray Rodríguez MD  [facility] INTERNAL MED

## 2021-02-18 ENCOUNTER — ANESTHESIA (OUTPATIENT)
Dept: GASTROENTEROLOGY | Facility: AMBULARY SURGERY CENTER | Age: 61
End: 2021-02-18

## 2021-02-18 ENCOUNTER — HOSPITAL ENCOUNTER (OUTPATIENT)
Dept: GASTROENTEROLOGY | Facility: AMBULARY SURGERY CENTER | Age: 61
Setting detail: OUTPATIENT SURGERY
Discharge: HOME | End: 2021-02-18
Admitting: NURSE PRACTITIONER
Payer: COMMERCIAL

## 2021-02-18 ENCOUNTER — ANESTHESIA EVENT (OUTPATIENT)
Dept: GASTROENTEROLOGY | Facility: AMBULARY SURGERY CENTER | Age: 61
End: 2021-02-18

## 2021-02-18 VITALS
HEIGHT: 71 IN | DIASTOLIC BLOOD PRESSURE: 54 MMHG | HEART RATE: 54 BPM | SYSTOLIC BLOOD PRESSURE: 113 MMHG | TEMPERATURE: 96.3 F | RESPIRATION RATE: 16 BRPM | BODY MASS INDEX: 37.94 KG/M2 | WEIGHT: 271 LBS | OXYGEN SATURATION: 97 %

## 2021-02-18 VITALS — HEART RATE: 62 BPM

## 2021-02-18 DIAGNOSIS — K63.5 POLYP OF COLON, UNSPECIFIED PART OF COLON, UNSPECIFIED TYPE: ICD-10-CM

## 2021-02-18 LAB — GLUCOSE SERPL-MCNC: 144 MG/DL (ref 65–140)

## 2021-02-18 PROCEDURE — 45385 COLONOSCOPY W/LESION REMOVAL: CPT | Performed by: INTERNAL MEDICINE

## 2021-02-18 PROCEDURE — 82948 REAGENT STRIP/BLOOD GLUCOSE: CPT

## 2021-02-18 PROCEDURE — 88305 TISSUE EXAM BY PATHOLOGIST: CPT | Performed by: PATHOLOGY

## 2021-02-18 RX ORDER — PROPOFOL 10 MG/ML
INJECTION, EMULSION INTRAVENOUS CONTINUOUS PRN
Status: DISCONTINUED | OUTPATIENT
Start: 2021-02-18 | End: 2021-02-18

## 2021-02-18 RX ORDER — PROPOFOL 10 MG/ML
INJECTION, EMULSION INTRAVENOUS AS NEEDED
Status: DISCONTINUED | OUTPATIENT
Start: 2021-02-18 | End: 2021-02-18

## 2021-02-18 RX ORDER — SODIUM CHLORIDE, SODIUM LACTATE, POTASSIUM CHLORIDE, CALCIUM CHLORIDE 600; 310; 30; 20 MG/100ML; MG/100ML; MG/100ML; MG/100ML
INJECTION, SOLUTION INTRAVENOUS CONTINUOUS PRN
Status: DISCONTINUED | OUTPATIENT
Start: 2021-02-18 | End: 2021-02-18

## 2021-02-18 RX ADMIN — PROPOFOL 120 MCG/KG/MIN: 10 INJECTION, EMULSION INTRAVENOUS at 09:54

## 2021-02-18 RX ADMIN — SODIUM CHLORIDE, SODIUM LACTATE, POTASSIUM CHLORIDE, AND CALCIUM CHLORIDE: .6; .31; .03; .02 INJECTION, SOLUTION INTRAVENOUS at 09:45

## 2021-02-18 RX ADMIN — PROPOFOL 100 MG: 10 INJECTION, EMULSION INTRAVENOUS at 09:54

## 2021-02-18 NOTE — INTERVAL H&P NOTE
H&P reviewed  After examining the patient I find no changes in the patients condition since the H&P had been written      Vitals:    02/18/21 0936   BP: 140/80   Pulse: 56   Resp: 16   Temp: (!) 96 3 °F (35 7 °C)   SpO2: 97%

## 2021-02-18 NOTE — ANESTHESIA PREPROCEDURE EVALUATION
Procedure:  COLONOSCOPY    Relevant Problems   CARDIO   (+) Asymptomatic PVCs   (+) Bundle branch block, right   (+) CAD (coronary artery disease)   (+) Essential hypertension   (+) Hyperlipidemia      GI/HEPATIC   (+) GERD (gastroesophageal reflux disease)      /RENAL   (+) BPH (benign prostatic hyperplasia)   (+) Hydronephrosis      MUSCULOSKELETAL   (+) Primary osteoarthritis of left knee      NEURO/PSYCH   (+) History of vertebral compression fracture   (+) 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)      PULMONARY   (+) Obstructive sleep apnea syndrome        Physical Exam    Airway    Mallampati score: II  TM Distance: >3 FB  Neck ROM: full     Dental   No notable dental hx     Cardiovascular  Cardiovascular exam normal    Pulmonary  Pulmonary exam normal     Other Findings        Anesthesia Plan  ASA Score- 3     Anesthesia Type- IV sedation with anesthesia with ASA Monitors  Additional Monitors:   Airway Plan:           Plan Factors-Exercise tolerance (METS): >4 METS  Chart reviewed  Imaging results reviewed  Existing labs reviewed  Patient summary reviewed  Patient is not a current smoker  Obstructive sleep apnea risk education given perioperatively  Induction-     Postoperative Plan-     Informed Consent- Anesthetic plan and risks discussed with patient  I personally reviewed this patient with the CRNA  Discussed and agreed on the Anesthesia Plan with the CRNA  Mira Jerry

## 2021-02-18 NOTE — ANESTHESIA POSTPROCEDURE EVALUATION
Post-Op Assessment Note    CV Status:  Stable  Pain Score: 0    Pain management: adequate     Mental Status:  Alert and awake   Hydration Status:  Euvolemic and stable   PONV Controlled:  Controlled   Airway Patency:  Patent      Post Op Vitals Reviewed: Yes      Staff: Anesthesiologist, CRNA   Comments: Report given to recovering RN, VSS< Pt states he is comfortable        No complications documented      /50 (02/18/21 1015)    Temp     Pulse 66 (02/18/21 1015)   Resp 16 (02/18/21 1015)    SpO2 95 % (02/18/21 1015)

## 2021-02-23 NOTE — TELEPHONE ENCOUNTER
Upon review of the In Basket request we PCP office did not provide enough information  Office was notify  Any additional questions or concerns should be emailed to the Practice Liaisons via [email or web address] email, please do not reply via In Basket      Thank you  Chen Arboldea MA

## 2021-02-26 ENCOUNTER — IMMUNIZATIONS (OUTPATIENT)
Dept: FAMILY MEDICINE CLINIC | Facility: HOSPITAL | Age: 61
End: 2021-02-26

## 2021-02-26 DIAGNOSIS — Z23 ENCOUNTER FOR IMMUNIZATION: Primary | ICD-10-CM

## 2021-02-26 PROCEDURE — 91301 SARS-COV-2 / COVID-19 MRNA VACCINE (MODERNA) 100 MCG: CPT

## 2021-02-26 PROCEDURE — 0012A SARS-COV-2 / COVID-19 MRNA VACCINE (MODERNA) 100 MCG: CPT

## 2021-03-12 ENCOUNTER — OFFICE VISIT (OUTPATIENT)
Dept: DIABETES SERVICES | Facility: CLINIC | Age: 61
End: 2021-03-12
Payer: COMMERCIAL

## 2021-03-12 VITALS — BODY MASS INDEX: 38.42 KG/M2 | HEIGHT: 71 IN | WEIGHT: 274.4 LBS

## 2021-03-12 DIAGNOSIS — E11.65 TYPE 2 DIABETES MELLITUS WITH HYPERGLYCEMIA, WITHOUT LONG-TERM CURRENT USE OF INSULIN (HCC): Primary | ICD-10-CM

## 2021-03-12 PROCEDURE — 97803 MED NUTRITION INDIV SUBSEQ: CPT | Performed by: DIETITIAN, REGISTERED

## 2021-03-12 NOTE — PROGRESS NOTES
Medical Nutrition Therapy      Assessment    Chief complaint T2DM    Visit Type: Follow-up visit    HPI: Sharan Narayanan returned for follow-up today  HbA1c has remained stable at 7 2%  Bobo provided BG log today, scanned into media  He is checking preprandial BG, rotating meals, and is also occasionally checking q h s  Preprandial readings range 118- 156 mg/dL, before bed readings usually more elevated ranging 139-186 mg/dL  Maryam food record reveals inconsistent carbohydrate intake and excess intake of saturated fat  Overall, Maryam meals reported in his food record range  15-75 grams of carbohydrate per meal   Sharan Narayanan states that he is often not very hungry at breakfast or when eating his dinner around 8:30 p m  most nights  He states that he is most often hungry around his usual lunch time which is 12:45 p m  and also hungry around 4-5 p m  Reviewed recommendation for meals to be about 4-5 hours apart  Sharan Narayanan reports that he has 5-6 p m  blocked off for dinner time at work, 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, 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 that time  He states that he rarely actually gets to eat anything more than a snack around this time and that his actual dinner meal ends up usually being around 8:30 p m  when he gets home from work  Together, we discussed many different troubleshooting possibilities to help improve timing of meals so that he can eat his main evening meal around 5:00 p m  This would allow him to use the 8:30 time slot when he gets home to have a smaller snack before going to bed around 9 pm, which would likely help improve his appetite at his normal 7:45 a m  breakfast time  This would in turn help with improving consistency of carbohydrate intake  Sharan Narayanan feels that there are barriers to this ideal meal plan in relation to the dynamics of work    However, the plan that he is most confident in that we discussed today is having his more traditional dinner style meal during his lunch time which she usually has time to sit down for, and then having something such as a sandwich on whole grain bread and apple around 5:00 p m  which may be more realistic at that time  Abdirashid Lemus is also  ambivalent about returning to the gym and reports having been given different recommendations by different doctors  He enjoys lifting weights but does also realize the benefits of aerobic activity  He states that he does not do well with trying to exercise at home and prefers the setting of a gym  He believes having a follow-up conversation with his PCP regarding recommendations about going to the gym is needed  Abdirashid Lemus verbalized good understanding of topics and recommendations discussed today and will call with any questions prior to next follow-up appointment in 6 months  Ht Readings from Last 1 Encounters:   02/18/21 5' 11" (1 803 m)     Wt Readings from Last 2 Encounters:   02/18/21 123 kg (271 lb)   02/12/21 123 kg (271 lb)     Weight Change: No    Medical Diagnosis/reason for visit   E11 65 (ICD-10-CM) - Type 2 diabetes mellitus with hyperglycemia, without long-term current use of insulin (HCC)   I10 (ICD-10-CM) - Hypertension goal BP (blood pressure) < 140/90   E78 2 (ICD-10-CM) - Mixed hyperlipidemia       Food Log:    Please see scanned 3 day food diary    Beverages: tea, diet snapple, water, beer  Eating out/Take out:often when refrigerator stopped working for 6 weeks  Exercise shoveling in February, no other currently       Calorie needs 2,000 kcals/day Carbs: 45-60 g/meal, 15 g/snack     Fat: 5 servings/day    Protein:10 oz/day    Nutrition Diagnosis:  Inconsistent carbohydrate intake  intake related to Food and nutrition compliance limitations (i e  lack of willingness or failure to modify carbohydrate intake in response to recommendations from a dietitian, physician, or caregiver) as evidenced by  Estimated carbohydrate intake that is different from recommended types or ingested on an irregular basis    Intervention: reduced fat intake, carbohydrate counting and meal timing     Treatment Goals: Patient understands education and recommendations and Patient will count carbohydrates    Monitoring and evaluation:    Term code indicator  FH 1 6 3 Carbohydrate Intake Criteria: 45-60 g of carbohydrate per meal, 15 g of carbohydrate per snack  Term code indicator  FH 4 4 Mealtime Behavior Criteria: Three meals per day, 4-5 hours apart  Up to 3 snacks per day, at least 2 hours apart for meals  Patients Response to Instruction:  Caleen Bloch  Expected Compliancegood    Thank you for coming to the [facility] for education today  Please feel free to call with any questions or concerns  Start:  9:30 a m  Stop:   10:22 a m    Referred by: MD Angie Mejía, [address] [place] [address] Robert David  [address] [place] [phone]

## 2021-03-12 NOTE — PATIENT INSTRUCTIONS
Try to have meals about 4-5 hours apart (45-60 g carb per meal) and then after getting home from work have 15-30 g carb snack  - meals don't have to look a certain way as long as staying in parameters of meal plan and trying to include nutrient dense foods (I e  dinner for lunch and dinner could be sandwich on whole grain bread a serving of high fiber fruit)    Try to reduce intake of saturated fat

## 2021-03-15 ENCOUNTER — TELEPHONE (OUTPATIENT)
Dept: NEUROLOGY | Facility: CLINIC | Age: 61
End: 2021-03-15

## 2021-03-15 NOTE — TELEPHONE ENCOUNTER
THE [facility] for patient to CB and confirm appt with [address] on 3/19/21  Gave my direct number in [place]

## 2021-03-19 ENCOUNTER — OFFICE VISIT (OUTPATIENT)
Dept: NEUROLOGY | Facility: CLINIC | Age: 61
End: 2021-03-19
Payer: COMMERCIAL

## 2021-03-19 VITALS
HEART RATE: 73 BPM | DIASTOLIC BLOOD PRESSURE: 74 MMHG | SYSTOLIC BLOOD PRESSURE: 119 MMHG | TEMPERATURE: 97.2 F | HEIGHT: 71 IN | BODY MASS INDEX: 38.08 KG/M2 | WEIGHT: 272 LBS

## 2021-03-19 DIAGNOSIS — G89.29 CHRONIC LEFT-SIDED LOW BACK PAIN WITH LEFT-SIDED SCIATICA: Primary | ICD-10-CM

## 2021-03-19 DIAGNOSIS — I83.893 VARICOSE VEINS OF LEG WITH SWELLING, BILATERAL: ICD-10-CM

## 2021-03-19 DIAGNOSIS — M54.42 CHRONIC LEFT-SIDED LOW BACK PAIN WITH LEFT-SIDED SCIATICA: Primary | ICD-10-CM

## 2021-03-19 DIAGNOSIS — G62.9 NEUROPATHY: ICD-10-CM

## 2021-03-19 PROCEDURE — 99215 OFFICE O/P EST HI 40 MIN: CPT | Performed by: PHYSICIAN ASSISTANT

## 2021-03-19 NOTE — PROGRESS NOTES
Patient ID: Tulio Owens is a 64 y o  male  Assessment/Plan:     Diagnoses and all orders for this visit:    Chronic left-sided low back pain with left-sided sciatica  -     MRI lumbar spine without contrast; Future    Varicose veins of leg with swelling, bilateral    Neuropathy         The patient has left greater than right lower extremity pain and worsening numbness in the left distal lower extremity with poor reflexes  Prior x-ray suggest L5-S1 degenerative changes, and I want to look closer with an MRI at this time  He occasionally has weakness in the left leg  I will look in to the pressure which would be appropriate for compression stockings for him  I will let him know  He should continue gabapentin at the current dose 100/200 mg  Doses any higher would cause drowsiness for him  At the last visit we discussed increasing activity levels and inserting aerobic exercise were possible  The patient should not hesitate to call me prior to his follow up with any questions or concerns  Subjective:    HPI    Mr Tulio Owens is a 63 yo male patient here for neurological f/u for neuropathy  Most recent A1c 7 2% on 2/5/21  Pinching sensation in the toes have not been as prominent as described at the last visit  He is now having more back pain and sharp shooting pain in the left hamstring region, and more numbness in the left lower extremity versus the right  Denies injury  He has a capsaicin cream from his podiatrist for p r n  Use for neuropathy  He had a podiatrist appointment 10/23/2020, next appointment in January  His feet on both sides constantly feel cold, but then when he touches them they feel warm    He also has a sensation that there is something on the bottom of his feet when he is walking      Prior documentation:  The patient feels that his neuropathy is worse since last seen in January  Candida Preciado describes a burning sensation in his feet, sometimes it is stabbing sensation which radiates up the legs, 1 or the other   Both legs have an equal amount of discomfort   He states he is frustrated at times, and very depressed with the symptoms  Candida Preciado thinks that there is something underlying other than what was described at the last visit, which was that he likely has neuropathy secondary to diabetes and alcohol abuse   The patient was recommended to avoid or limit alcohol use and to modify his diet   His last hemoglobin A1c is 7 5% in July, and 6 9% in April, so it is trending up  Candida Preciado does have a very mildly elevated AST and ALT      The patient had several additional blood tests which were negative:  Serum SPEP, antinuclear antibodies, IFA, sed rate, ROSALIA, TSH   His B12 was on the low limit of normal:  470       Complains he has been having numbness in the feet for sometime  It was mainly in the toes after long days of work, might be noticeable only in the evenings  Now, it is getting more prominent over the last few months  Notices it more now even during the day  Still mainly in the toes, first two toes and the ball of feet  On his bad days, numbness may extend to all the toes  It is not going up the ankle  Left foot is worse than the right  He has been getting intermittently lower back pain, does not radiate down his legs  Back pain is not bad, does not bother him enough to take any medications  Does get muscle cramps in his calves, shin, thighs  Comes and goes, mainly in the mornings, needs to get up and try to walk on the legs to relieve the cramps  Occasional twitching in fingers  Does tend to drop things out of his hands   Some numbness in hands (mainly in thenar eminence)  Balance is great, occasional stumble due to arthritis in knees  Has been told needs replacement  No change in speech/swallowing  No droopy eyes, no blurry/double vision       Has numbness in the left thigh since his IR procedure for abscess draingae in 2016   Has left lower leg numbness (patch on the lower leg)         He does have diabetes, most recent hemoglobin A1c was 7 7  Has been borderline DM for years, a1c was 6 4 last year    H/o sigmoidectomy due to abscess in 2016  CAD, s/p angioplasty in 2017  HTN  Osteoarthritis, BPH, sleep apnea, on CPAP, varicose veins      Family hx of DM (brothers), prediabetes in mother, passed away from PD/Dementia (80), father had aortic aneurysm    One of his brothers was thought to have ALS, had weakness in the left arm, lived for more than 10 years with the weakness in left arm  Had diabetes, with neuropathy, had non Hodgkin''s lymphoma, colorectal ca, eventually passed away from a heart attack at age 65       Past smoker, quit in 2018, smoked since he was in college  Does drink beers a few times a week  Not a heavy drinker  He is a   TSH recently done was 1 79, b12 was 470      The following portions of the patient's history were reviewed and updated as appropriate:   He  has a past medical history of Allergic rhinitis, Anemia, Arthritis, Bundle branch block, right, CAD (coronary artery disease), CPAP (continuous positive airway pressure) dependence, Diabetes mellitus ([facility] [address] ), Diverticulitis, Diverticulitis of large intestine with abscess without bleeding (12/7/2016), GERD (gastroesophageal reflux disease), Hyperlipidemia, Hypertension, Nasal septal deformity, Neuropathy, Peptic ulceration, Peptic ulceration (9/26/2019), Pneumonia, Renal disorder, Seasonal allergies, Sleep apnea, Urinary tract infection, and Vitamin D deficiency    He   Patient Active Problem List    Diagnosis Date Noted    Varicose veins of leg with swelling, bilateral 03/21/2021    Chronic left-sided low back pain with left-sided sciatica 03/19/2021    Contusion of right knee 08/25/2020    Impacted cerumen of left ear 08/07/2020    Onychomycosis of toenail 07/31/2020    Carpal tunnel syndrome of left wrist     Ulnar neuropathy at elbow of left upper extremity     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 2 diabetes mellitus ([facility] ) 01/23/2020    Neuropathy 01/03/2020    GERD (gastroesophageal reflux disease) 09/26/2019    Nasal septal deformity 09/26/2019    Bundle branch block, right 09/26/2019    CPAP (continuous positive airway pressure) dependence 09/26/2019    Vitamin D deficiency 09/26/2019    BPH (benign prostatic hyperplasia) 09/26/2019    History of vertebral compression fracture 09/26/2019    Colon polyp 09/26/2019    History of angioplasty 09/26/2019    Ocular migraine 09/26/2019    Inguinal hernia 09/26/2019    Primary osteoarthritis of left knee 47/08/4252    Umbilical hernia 92/09/9428    Bradycardia 09/26/2019    Asymptomatic varicose veins of both lower extremities 09/26/2019    Seasonal allergic rhinitis due to pollen 06/28/2019    Obstructive sleep apnea syndrome 07/06/2018    BMI 37 0-37 9, adult 07/06/2018    Asymptomatic PVCs 05/01/2018    Chronic pain of both knees 03/06/2018    Primary osteoarthritis of knees, bilateral 03/06/2018    CAD (coronary artery disease) 11/29/2017    Class 2 obesity due to excess calories without serious comorbidity with body mass index (BMI) of 37 0 to 37 9 in adult 11/22/2017    Elevated liver enzymes 12/04/2016    Polyarthralgia 12/04/2016    Hydronephrosis 12/04/2016    Diabetes mellitus ([facility] )     Hyperlipidemia     Essential hypertension      He  has a past surgical history that includes Hernia repair; Varicose vein surgery; Knee arthroscopy w/ meniscectomy; Cystoscopy (Left, 2/3/2017); COLON SIGMOID RESECTION LAPAROSCOPIC (N/A, 12/16/2016); COLON SIGMOID RESECTION (N/A, 12/16/2016); pr cystourethroscopy,ureter catheter (Left, 12/4/2016); Esophagogastroduodenoscopy (N/A, 10/6/2016); Colonoscopy (N/A, 10/6/2016); and Colon surgery    His family history includes Aortic aneurysm in his father; Atrial fibrillation in his mother; Colon cancer in his brother; Diabetes in his brother; Osteoarthritis in his mother; Thyroid cancer in his brother  He  reports that he quit smoking about 2 years ago  His smoking use included cigarettes  He has a 18 50 pack-year smoking history  His smokeless tobacco use includes chew  He reports current alcohol use of about 3 0 standard drinks of alcohol per week  He reports that he does not use drugs    Current Outpatient Medications   Medication Sig Dispense Refill    albuterol (PROVENTIL HFA,VENTOLIN HFA) 90 mcg/act inhaler Inhale 2 puffs every 4 (four) hours as needed for wheezing or shortness of breath 1 Inhaler 0    Alpha-Lipoic Acid 100 MG CAPS Take by mouth      Ascorbic Acid (VITAMIN C) 100 MG tablet Take 500 mg by mouth       aspirin 81 MG tablet Take 162 mg by mouth      atorvastatin (LIPITOR) 40 mg tablet Take 1 tablet (40 mg total) by mouth daily with dinner 90 tablet 1    capsaicin (ZOSTRIX) 0 025 % cream Apply 1 application topically 2 (two) times a day 60 g 0    Cholecalciferol (VITAMIN D3) 1000 units CAPS Take by mouth daily       cyanocobalamin (VITAMIN B-12) 100 mcg tablet Take by mouth daily      desloratadine (CLARINEX) 5 MG tablet Take 1 tablet (5 mg total) by mouth daily at bedtime 90 tablet 1    famotidine (PEPCID) 20 mg tablet Take 20 mg by mouth daily      gabapentin (NEURONTIN) 100 mg capsule TAKE 1 CAPSULE BY MOUTH IN THE MORNING AND 2 AT BEDTIME 270 capsule 0    glucosamine-chondroitin 500-400 MG tablet Take 1 tablet by mouth daily      glucose blood test strip 1 each by Other route daily Use as instructed 100 each 6    hydrochlorothiazide (HYDRODIURIL) 25 mg tablet Take 1 tablet by mouth once daily 90 tablet 0    losartan (COZAAR) 50 mg tablet Take 1 tablet (50 mg total) by mouth daily 90 tablet 1    metFORMIN (GLUCOPHAGE) 500 mg tablet Take 2 tablets (1000mg) twice a day with meals 360 tablet 2    Multiple Vitamin (MULTIVITAMIN) capsule Take 1 capsule by mouth daily      nitroglycerin (NITROSTAT) 0 3 mg SL tablet Place 1 tablet (0 3 mg total) under the tongue every 5 (five) minutes as needed for chest pain 25 tablet 1    ONETOUCH DELICA LANCETS 11L MISC 1 Units by Does not apply route daily 100 each 6    torsemide (DEMADEX) 10 mg tablet TAKE 1 TABLET BY MOUTH ONCE DAILY (Patient taking differently: daily as needed ) 90 tablet 3     No current facility-administered medications for this visit  He is allergic to levaquin [levofloxacin in d5w] and sulfa antibiotics  Objective:    Blood pressure 119/74, pulse 73, temperature (!) 97 2 °F (36 2 °C), temperature source Tympanic, height 5' 11" (1 803 m), weight 123 kg (272 lb)  Body mass index is 37 94 kg/m²  Physical Exam    Neurological Exam  Vital signs reviewed  Well developed, well nourished  Head: Normocephalic, atraumatic  CN 6-34: intact and symmetric, including EOMs which are normal b/l and PERRL  MSK: 5/5 t/o  ROM normal x all 4 extr  Sensation: Inact to LT in both LEs below the knee, however reduced to pin in a length dependent fashion in both LEs more so in the left  Vibration also reduced, at the ankles b/l  Reflexes: 2+ and symmetric in all 4 extr  Coordination: Nml x4 extr  Gait: Steady normal gait  ROS:    Review of Systems   Constitutional: Negative  Negative for appetite change and fever  HENT: Negative  Negative for hearing loss, tinnitus, trouble swallowing and voice change  Eyes: Negative  Negative for photophobia and pain  Respiratory: Negative  Negative for shortness of breath  Cardiovascular: Negative for palpitations  Gastrointestinal: Negative  Negative for nausea and vomiting  Endocrine: Negative  Negative for cold intolerance  Genitourinary: Negative  Negative for dysuria, frequency and urgency  Musculoskeletal: Negative for myalgias and neck pain  Skin: Negative  Negative for rash  Neurological: Positive for numbness (bottom of feet and toes, sometimes hands, depending on their position )   Negative for dizziness, tremors (sometimes in hands), seizures, syncope, facial asymmetry, speech difficulty, weakness, light-headedness (sometimes) and headaches  Patient stated that he has bilateral feet numbness  Hematological: Does not bruise/bleed easily  Psychiatric/Behavioral: Negative for confusion (occassionally), hallucinations and sleep disturbance  The following portions of the patient's history were reviewed and updated as appropriate: allergies, current medications/ medication history, past family history, past medical history, past social history, past surgical history and problem list     Review of systems was reviewed and otherwise unremarkable from a neurological perspective  I have spent 40 minutes with the patient today in which greater than 50% of this time was spent in counseling/coordination of care regarding diagnoses, test results, impression, plan and potential medication side effects

## 2021-03-19 NOTE — PATIENT INSTRUCTIONS
Compression stockings- I will research pressure needed for you, can also ask PCP this  Will consider vascular surgery evaluation    Lumbar MRI- r/o reason for radicular symptoms in the LLE

## 2021-03-21 PROBLEM — I83.893 VARICOSE VEINS OF LEG WITH SWELLING, BILATERAL: Status: ACTIVE | Noted: 2021-03-21

## 2021-03-25 DIAGNOSIS — J30.1 SEASONAL ALLERGIC RHINITIS DUE TO POLLEN: ICD-10-CM

## 2021-03-25 RX ORDER — DESLORATADINE 5 MG/1
5 TABLET ORAL
Qty: 90 TABLET | Refills: 1 | Status: SHIPPED | OUTPATIENT
Start: 2021-03-25 | End: 2022-05-12

## 2021-03-26 ENCOUNTER — OFFICE VISIT (OUTPATIENT)
Dept: ENDOCRINOLOGY | Facility: CLINIC | Age: 61
End: 2021-03-26
Payer: COMMERCIAL

## 2021-03-26 VITALS
HEART RATE: 52 BPM | DIASTOLIC BLOOD PRESSURE: 72 MMHG | TEMPERATURE: 97 F | WEIGHT: 274 LBS | SYSTOLIC BLOOD PRESSURE: 112 MMHG | HEIGHT: 71 IN | BODY MASS INDEX: 38.36 KG/M2

## 2021-03-26 DIAGNOSIS — E04.1 THYROID NODULE: ICD-10-CM

## 2021-03-26 DIAGNOSIS — I10 HYPERTENSION GOAL BP (BLOOD PRESSURE) < 140/90: ICD-10-CM

## 2021-03-26 DIAGNOSIS — E11.9 TYPE 2 DIABETES MELLITUS WITHOUT COMPLICATION, WITHOUT LONG-TERM CURRENT USE OF INSULIN (HCC): Primary | ICD-10-CM

## 2021-03-26 DIAGNOSIS — E78.2 MIXED HYPERLIPIDEMIA: ICD-10-CM

## 2021-03-26 PROCEDURE — 3008F BODY MASS INDEX DOCD: CPT | Performed by: INTERNAL MEDICINE

## 2021-03-26 PROCEDURE — 3078F DIAST BP <80 MM HG: CPT | Performed by: INTERNAL MEDICINE

## 2021-03-26 PROCEDURE — 99214 OFFICE O/P EST MOD 30 MIN: CPT | Performed by: INTERNAL MEDICINE

## 2021-03-26 PROCEDURE — 3074F SYST BP LT 130 MM HG: CPT | Performed by: INTERNAL MEDICINE

## 2021-03-26 PROCEDURE — 1036F TOBACCO NON-USER: CPT | Performed by: INTERNAL MEDICINE

## 2021-03-26 NOTE — PROGRESS NOTES
ENDOCRINOLOGY  FOLLOW UP VISIT      Reason for Endocrine Consult/Chief Complaint: DM follow up    ? Medical Decision Making:     Impression  1  Type 2 Diabetes  2  HTN  3  HLD  4  CAD s/p stents  5  Right sided thyroid nodularity     Recommendations:     Having mild fasting and post-prandial hyperglycemia    Continue metformin 1000mg BID AC, added DPP-4 inhibitor januvia 100mg qday  Counseled on adverse side effects of januvia including risk of pancreatitis  He would like avoid SGLT-2 inhibitor and GLP-1 agonist for now due to potential adverse side effects           HTN-controlled continue HCTZ, diuretic, ARB      HLD-LDL 68, triglycerides 145 Feb 2021, continue statin    Right sided thyroid nodularity- will check US thyroid for evaluation      RTC 3 months  Mary OGDEN  Endocrinology           History of Present Illness:  Mr Sindy Galicia is a 63 year old male who presents for DM evaluation-follow up    ?  PMH-DM2, HTN, HLD, CAD s/p stents   PSH-varicose vein surgery, knee surgery, hernia repair, colon surgery   FHx-mother with possible diabetes, brothers with diabetes, nieces with diabetes  SHx-quit smoking, social ETOH use,       Type of DM: 2  Age of onset: 2 years ago   Microvascular complications: neuropathy --- started Dec 2019   Macrovascular complications:CAD    Events since last visit:   remains on metformin 1000mg BID AC, rare nausea 2hrs after morning metformin     FBG-low mid 100s  Premeal/qHS BG- low to high 100s  No hypoglycemia     Brother had thyroid CA    ? Review of Systems:     Review of Systems   Constitutional: Negative for appetite change, chills, diaphoresis, fatigue, fever and unexpected weight change  HENT: Negative for congestion, ear pain, hearing loss, rhinorrhea, sinus pressure, sinus pain, sore throat, trouble swallowing and voice change  Eyes: Negative for photophobia, redness and visual disturbance     Respiratory: Negative for apnea, cough, chest tightness, shortness of breath, wheezing and stridor  Cardiovascular: Negative for chest pain, palpitations and leg swelling  Gastrointestinal: Negative for abdominal distention, abdominal pain, constipation, diarrhea, vomiting   +rare nausea  Endocrine: Negative for cold intolerance, heat intolerance, polydipsia, polyphagia and polyuria  Genitourinary: Negative for difficulty urinating, dysuria, flank pain, frequency, hematuria and urgency  Musculoskeletal: Negative for arthralgias, back pain, gait problem, joint swelling and myalgias  Skin: Negative for color change, pallor, rash and wound  Allergic/Immunologic: Negative for immunocompromised state  Neurological: Negative for dizziness, tremors, syncope, weakness, light-headedness and headaches  Hematological: Negative for adenopathy  Does not bruise/bleed easily  Psychiatric/Behavioral: Negative for confusion and sleep disturbance  The patient is not nervous/anxious  ?   Patient History:     Past Medical History:   Diagnosis Date    Allergic rhinitis     Anemia     Arthritis     Bundle branch block, right     CAD (coronary artery disease)     CPAP (continuous positive airway pressure) dependence     Diabetes mellitus (HCC)     borderline, controlled with diet and activity    Diverticulitis     Diverticulitis of large intestine with abscess without bleeding 12/7/2016    GERD (gastroesophageal reflux disease)     Hyperlipidemia     Hypertension     Nasal septal deformity     Neuropathy     Peptic ulceration     gastric    Peptic ulceration 9/26/2019    gastric    Pneumonia     Renal disorder     Seasonal allergies     Sleep apnea     wears c-pap    Urinary tract infection     Vitamin D deficiency      Past Surgical History:   Procedure Laterality Date    COLON SIGMOID RESECTION N/A 12/16/2016    Procedure: RESECTION COLON SIGMOID;  Surgeon: Maggy Hernandez MD;  Location: [facility];  Service:    [facility] COLON SIGMOID RESECTION LAPAROSCOPIC N/A 2016    Procedure: RESECTION COLON SIGMOID LAPAROSCOPIC:CONVERTED TO [email or web address];  Surgeon: Surekha Wilks MD;  Location:  MAIN OR;  Service:     COLON SURGERY      Sigmoidectomy    COLONOSCOPY N/A 10/6/2016    Procedure: COLONOSCOPY;  Surgeon: Dominick Ford MD;  Location: [place] [address] [facility]; Service:    [employer] CYSTOSCOPY W/ RETROGRADES Left 2/3/2017    Procedure: CYSTOSCOPY WITH BILATERAL RETROGRADES, LEFT STENT REMOVAL;  Surgeon: Kit Harris MD;  Location: [address] [place] [address];  Service:     ESOPHAGOGASTRODUODENOSCOPY N/A 10/6/2016    Procedure: ESOPHAGOGASTRODUODENOSCOPY (EGD); Surgeon: Dominick Ford MD;  Location: [place] [address] [facility]; Service:     HERNIA REPAIR      KNEE ARTHROSCOPY W/ MENISCECTOMY      TX CYSTOURETHROSCOPY,URETER CATHETER Left 2016    Procedure: CYSTOSCOPY RETROGRADE PYELOGRAM WITH INSERTION STENT URETERAL;  Surgeon: Kit Harris MD;  Location[facility];  Service: Urology    VARICOSE VEIN SURGERY       Social History     Socioeconomic History    Marital status: Single     Spouse name: Not on file    Number of children: Not on file    Years of education: Not on file    Highest education level: Not on file   Occupational History    Not on file   Social Needs    Financial resource strain: Not on file    Food insecurity     Worry: Not on file     Inability: Not on file    Transportation needs     Medical: Not on file     Non-medical: Not on file   Tobacco Use    Smoking status: Former Smoker     Packs/day: 0 50     Years: 37 00     Pack years: 18 50     Types: Cigarettes     Quit date: 3/30/2018     Years since quittin 9    Smokeless tobacco: Current User     Types: Chew    Tobacco comment:  requit 3/30/18   Substance and Sexual Activity    Alcohol use:  Yes     Alcohol/week: 3 0 standard drinks     Types: 3 Cans of beer per week     Frequency: 2-3 times a week    Drug use: No    Sexual activity: Never   Lifestyle    Physical activity Days per week: Not on file     Minutes per session: Not on file    Stress: Not on file   Relationships    Social connections     Talks on phone: Not on file     Gets together: Not on file     Attends Zoroastrian service: Not on file     Active member of club or organization: Not on file     Attends meetings of clubs or organizations: Not on file     Relationship status: Not on file    Intimate partner violence     Fear of current or ex partner: Not on file     Emotionally abused: Not on file     Physically abused: Not on file     Forced sexual activity: Not on file   Other Topics Concern    Not on file   Social History Narrative    Lives alone  Family History   Problem Relation Age of Onset    Diabetes Brother     Thyroid cancer Brother     Osteoarthritis Mother     Atrial fibrillation Mother     Aortic aneurysm Father     Colon cancer Brother        Current Medications: At the time this note was written these were the medications the patient was on    Current Outpatient Medications   Medication Sig Dispense Refill    albuterol (PROVENTIL HFA,VENTOLIN HFA) 90 mcg/act inhaler Inhale 2 puffs every 4 (four) hours as needed for wheezing or shortness of breath 1 Inhaler 0    Alpha-Lipoic Acid 100 MG CAPS Take by mouth      Ascorbic Acid (VITAMIN C) 100 MG tablet Take 500 mg by mouth       aspirin 81 MG tablet Take 162 mg by mouth      atorvastatin (LIPITOR) 40 mg tablet Take 1 tablet (40 mg total) by mouth daily with dinner 90 tablet 1    capsaicin (ZOSTRIX) 0 025 % cream Apply 1 application topically 2 (two) times a day 60 g 0    Cholecalciferol (VITAMIN D3) 1000 units CAPS Take by mouth daily       cyanocobalamin (VITAMIN B-12) 100 mcg tablet Take by mouth daily      desloratadine (CLARINEX) 5 MG tablet Take 1 tablet (5 mg total) by mouth daily at bedtime 90 tablet 1    famotidine (PEPCID) 20 mg tablet Take 20 mg by mouth daily      gabapentin (NEURONTIN) 100 mg capsule TAKE 1 CAPSULE BY MOUTH IN THE MORNING AND 2 AT BEDTIME 270 capsule 0    glucosamine-chondroitin 500-400 MG tablet Take 1 tablet by mouth daily      glucose blood test strip 1 each by Other route daily Use as instructed 100 each 6    hydrochlorothiazide (HYDRODIURIL) 25 mg tablet Take 1 tablet by mouth once daily 90 tablet 0    losartan (COZAAR) 50 mg tablet Take 1 tablet (50 mg total) by mouth daily 90 tablet 1    metFORMIN (GLUCOPHAGE) 500 mg tablet Take 2 tablets (1000mg) twice a day with meals 360 tablet 2    Multiple Vitamin (MULTIVITAMIN) capsule Take 1 capsule by mouth daily      nitroglycerin (NITROSTAT) 0 3 mg SL tablet Place 1 tablet (0 3 mg total) under the tongue every 5 (five) minutes as needed for chest pain 25 tablet 1    ONETOUCH DELICA LANCETS 31S MISC 1 Units by Does not apply route daily 100 each 6    torsemide (DEMADEX) 10 mg tablet TAKE 1 TABLET BY MOUTH ONCE DAILY (Patient taking differently: daily as needed ) 90 tablet 3     No current facility-administered medications for this visit  Allergies: Levaquin [levofloxacin in d5w] and Sulfa antibiotics    Physical Exam:   Vital Signs:   /72   Pulse (!) 52   Temp (!) 97 °F (36 1 °C)   Ht 5' 11" (1 803 m)   Wt 124 kg (274 lb)   BMI 38 22 kg/m²     Physical Exam  Vitals signs reviewed  Constitutional:       General: He is not in acute distress  Appearance: Normal appearance  He is not ill-appearing, toxic-appearing or diaphoretic  HENT:      Head: Normocephalic and atraumatic  Right Ear: External ear normal       Left Ear: External ear normal       Nose: Nose normal    Eyes:      General: No scleral icterus  Extraocular Movements: Extraocular movements intact  Conjunctiva/sclera: Conjunctivae normal    Neck:      Musculoskeletal: Normal range of motion and neck supple  No muscular tenderness  Comments: +right sided thyroid nodularity   Cardiovascular:      Rate and Rhythm: Normal rate and regular rhythm        Heart sounds: Normal heart sounds  No murmur  No friction rub  No gallop  Pulmonary:      Effort: Pulmonary effort is normal  No respiratory distress  Breath sounds: Normal breath sounds  No stridor  No wheezing, rhonchi or rales  Abdominal:      General: Bowel sounds are normal  There is no distension  Palpations: Abdomen is soft  There is no mass  Tenderness: There is no abdominal tenderness  There is no guarding or rebound  Hernia: No hernia is present  Musculoskeletal: Normal range of motion  General: No swelling  Lymphadenopathy:      Cervical: No cervical adenopathy  Skin:     General: Skin is warm and dry  Coloration: Skin is not pale  Findings: No erythema or rash  Neurological:      General: No focal deficit present  Mental Status: He is alert and oriented to person, place, and time  Psychiatric:         Mood and Affect: Mood normal          Behavior: Behavior normal          Thought Content:  Thought content normal          Judgment: Judgment normal              Labs and Imaging:      Component      Latest Ref Rng & Units 2/5/2021   Glucose, Random      65 - 99 mg/dL 140 (H)   BUN      6 - 24 mg/dL 17   Creatinine      0 76 - 1 27 mg/dL 1 15   eGFR Non       >59 mL/min/1 73 70   eGFR       >59 mL/min/1 73 81   SL AMB BUN/CREATININE RATIO      9 - 20 15   Sodium      134 - 144 mmol/L 138   Potassium      3 5 - 5 2 mmol/L 3 9   Chloride      96 - 106 mmol/L 96   CO2      20 - 29 mmol/L 26   CALCIUM      8 7 - 10 2 mg/dL 10 1   Total Protein      6 0 - 8 5 g/dL 7 3   Albumin      3 8 - 4 9 g/dL 4 6   Globulin, Total      1 5 - 4 5 g/dL 2 7   Albumin/Globulin Ratio      1 2 - 2 2 1 7   TOTAL BILIRUBIN      0 0 - 1 2 mg/dL 0 5   ALKALINE PHOSPHATASE ISOENZYMES      39 - 117 IU/L 51   AST      0 - 40 IU/L 64 (H)   ALT      0 - 44 IU/L 70 (H)   Cholesterol      100 - 199 mg/dL 133   Triglycerides      0 - 149 mg/dL 145   HDL >39 mg/dL 40   VLDL Cholesterol Huber      5 - 40 mg/dL 25   LDL Calculated      0 - 99 mg/dL 68   T   Chol/HDL Ratio      0 0 - 5 0 ratio 3 3   EXT Creatinine Urine      Not Estab  mg/dL 149 3   MICROALBUM ,U,RANDOM      Not Estab  ug/mL 6 9   MICROALBUMIN/CREATININE RATIO      0 - 29 mg/g creat 5   Hemoglobin A1C      4 8 - 5 6 % 7 2 (H)   eAG, EST AVG Glucose      mg/dL 160

## 2021-04-07 ENCOUNTER — TELEPHONE (OUTPATIENT)
Dept: NEUROLOGY | Facility: CLINIC | Age: 61
End: 2021-04-07

## 2021-04-07 NOTE — TELEPHONE ENCOUNTER
Ward Matthews- can you please call the pt and let him know #pressure stockings /recommendations below, and apologize for my late reply to him  Thanks

## 2021-04-07 NOTE — TELEPHONE ENCOUNTER
----- Message from Esther Rainey MD sent at 4/5/2021  4:05 PM EDT -----  Regarding: RE: compression stockings  Recommend 20-30 range Stockings  ----- Message -----  From: Hetal Peter PA-C  Sent: 3/21/2021   8:55 PM EDT  To: Esther Rainey MD, Hetal Peter PA-C, #  Subject: compression stockings                            Hi Dr Severa Bos,  The patient and I discussed the option of using compression stockings for his swelling/ varicosities  Would you recommend any specific mm hg? When I place the order the suggestions vary from 20-35 mmhg      Thank you for your assistance,  Pancho Ulrich

## 2021-04-16 ENCOUNTER — OFFICE VISIT (OUTPATIENT)
Dept: PODIATRY | Facility: CLINIC | Age: 61
End: 2021-04-16
Payer: COMMERCIAL

## 2021-04-16 ENCOUNTER — HOSPITAL ENCOUNTER (OUTPATIENT)
Dept: RADIOLOGY | Facility: HOSPITAL | Age: 61
Discharge: HOME | End: 2021-04-16
Payer: COMMERCIAL

## 2021-04-16 VITALS
WEIGHT: 274 LBS | DIASTOLIC BLOOD PRESSURE: 72 MMHG | BODY MASS INDEX: 38.36 KG/M2 | HEIGHT: 71 IN | RESPIRATION RATE: 17 BRPM | SYSTOLIC BLOOD PRESSURE: 112 MMHG

## 2021-04-16 DIAGNOSIS — M54.42 CHRONIC LEFT-SIDED LOW BACK PAIN WITH LEFT-SIDED SCIATICA: ICD-10-CM

## 2021-04-16 DIAGNOSIS — G89.29 CHRONIC LEFT-SIDED LOW BACK PAIN WITH LEFT-SIDED SCIATICA: ICD-10-CM

## 2021-04-16 DIAGNOSIS — M79.672 PAIN IN BOTH FEET: ICD-10-CM

## 2021-04-16 DIAGNOSIS — M79.671 PAIN IN BOTH FEET: ICD-10-CM

## 2021-04-16 DIAGNOSIS — E11.40 NEUROPATHY DUE TO TYPE 2 DIABETES MELLITUS (HCC): Primary | ICD-10-CM

## 2021-04-16 DIAGNOSIS — B35.1 ONYCHOMYCOSIS: ICD-10-CM

## 2021-04-16 PROCEDURE — 99212 OFFICE O/P EST SF 10 MIN: CPT | Performed by: PODIATRIST

## 2021-04-16 PROCEDURE — 72148 MRI LUMBAR SPINE W/O DYE: CPT

## 2021-04-16 PROCEDURE — G1004 CDSM NDSC: HCPCS

## 2021-04-22 ENCOUNTER — TELEPHONE (OUTPATIENT)
Dept: NEUROLOGY | Facility: CLINIC | Age: 61
End: 2021-04-22

## 2021-04-22 DIAGNOSIS — M51.37 DISC DISEASE, DEGENERATIVE, LUMBAR OR LUMBOSACRAL: Primary | ICD-10-CM

## 2021-04-22 NOTE — TELEPHONE ENCOUNTER
Left patient detailed message regarding MRI results and recommendations   Instructed patient to return call to office

## 2021-04-22 NOTE — TELEPHONE ENCOUNTER
----- Message from Kirt Witt PA-C sent at 4/22/2021 10:40 AM EDT -----  With lumbar MRI changes as noted, I would recommend he see pain management  If pt agreeable please let me know and I will place order  Could also see Nsx to address chronic T11 compression fx- but being it is chronic it likely cannot be fixed  He should contact his pcp to let them know- potentially have a DEXA scan

## 2021-04-26 NOTE — TELEPHONE ENCOUNTER
Second attempt to reach patient regarding MRI results/recommendations  Left message for return call to office       Letter mailed to address on file

## 2021-04-30 NOTE — TELEPHONE ENCOUNTER
Patient called and left vm requesting additional information regarding his MRI results    Attempted to call patient to further discuss   Left message for return call to office

## 2021-05-04 NOTE — TELEPHONE ENCOUNTER
Attempted to reach patient again regarding his vm that was left wanting to review MRI results   Left message for return call to office

## 2021-05-06 DIAGNOSIS — I10 ESSENTIAL HYPERTENSION: ICD-10-CM

## 2021-05-06 RX ORDER — HYDROCHLOROTHIAZIDE 25 MG/1
TABLET ORAL
Qty: 90 TABLET | Refills: 0 | Status: SHIPPED | OUTPATIENT
Start: 2021-05-06 | End: 2021-07-30

## 2021-05-07 ENCOUNTER — OFFICE VISIT (OUTPATIENT)
Dept: CARDIOLOGY CLINIC | Facility: CLINIC | Age: 61
End: 2021-05-07
Payer: COMMERCIAL

## 2021-05-07 VITALS
HEIGHT: 71 IN | OXYGEN SATURATION: 98 % | SYSTOLIC BLOOD PRESSURE: 132 MMHG | WEIGHT: 279.9 LBS | TEMPERATURE: 98 F | HEART RATE: 46 BPM | DIASTOLIC BLOOD PRESSURE: 84 MMHG | BODY MASS INDEX: 39.19 KG/M2

## 2021-05-07 DIAGNOSIS — I10 ESSENTIAL HYPERTENSION: ICD-10-CM

## 2021-05-07 DIAGNOSIS — E78.2 MIXED HYPERLIPIDEMIA: ICD-10-CM

## 2021-05-07 DIAGNOSIS — E11.9 TYPE 2 DIABETES MELLITUS WITHOUT COMPLICATION, WITHOUT LONG-TERM CURRENT USE OF INSULIN (HCC): ICD-10-CM

## 2021-05-07 DIAGNOSIS — I25.10 CORONARY ARTERY DISEASE INVOLVING NATIVE CORONARY ARTERY OF NATIVE HEART WITHOUT ANGINA PECTORIS: Primary | ICD-10-CM

## 2021-05-07 DIAGNOSIS — R00.1 BRADYCARDIA: ICD-10-CM

## 2021-05-07 PROCEDURE — 93000 ELECTROCARDIOGRAM COMPLETE: CPT | Performed by: INTERNAL MEDICINE

## 2021-05-07 PROCEDURE — 99214 OFFICE O/P EST MOD 30 MIN: CPT | Performed by: INTERNAL MEDICINE

## 2021-05-07 NOTE — TELEPHONE ENCOUNTER
Patient calling in to further discuss MRI results  He would like to know how these results are contributing to the neuropathy he is experiencing  Patient is interested in the referrals to pain and neurosurgery but would like to hold off at this time   He will let us know when he is ready for them or will discuss further at his follow up    Please advise    Varinder#: [phone], okay to leave detailed message

## 2021-05-07 NOTE — PROGRESS NOTES
Cody Pineda  1960  [id number]  [employer] PROFESSIONAL [facility]  [facility] [address] [address]    Interval History:  Cody Pineda is a 64 y o  male who presents for routine coronary artery disease follow-up  Since his last visit, he had episodes of shortness of breath that occurred with exertion, while he was mowing the lawn  He denies any chest pain  Denies any orthopnea or PND  He has pain in both feet because of neuropathy which is unchanged from previous  He has not been exercising regularly because he is busy with work and is hesitant to return to the gym  Previously was having dyspnea  that improved with torsemide  He denies any LE edema, orthopnea or PND  He continues to refrain from smoking  In November 2017, he underwent drug-eluting stent placement to left circumflex and OM 2 lesions after presenting to hospital with chest and arm pain, elevated BP and ST depressions  He was having chest pain in 2019   Treadmill stress test was done which was normal       Past Medical History:   Diagnosis Date    Allergic rhinitis     Anemia     Arthritis     Bundle branch block, right     CAD (coronary artery disease)     CPAP (continuous positive airway pressure) dependence     Diabetes mellitus (HCC)     borderline, controlled with diet and activity    Diverticulitis     Diverticulitis of large intestine with abscess without bleeding 12/7/2016    GERD (gastroesophageal reflux disease)     Hyperlipidemia     Hypertension     Nasal septal deformity     Neuropathy     Peptic ulceration     gastric    Peptic ulceration 9/26/2019    gastric    Pneumonia     Renal disorder     Seasonal allergies     Sleep apnea     wears c-pap    Urinary tract infection     Vitamin D deficiency      Past Surgical History:   Procedure Laterality Date    COLON SIGMOID RESECTION N/A 12/16/2016    Procedure: RESECTION COLON SIGMOID; Surgeon: Mickey Benitez MD;  Location:  MAIN OR;  Service:     COLON SIGMOID RESECTION LAPAROSCOPIC N/A 12/16/2016    Procedure: RESECTION COLON SIGMOID LAPAROSCOPIC:CONVERTED TO [email or web address];  Surgeon: Mickey Benitez MD;  Location:  MAIN OR;  Service:     COLON SURGERY      Sigmoidectomy    COLONOSCOPY N/A 10/6/2016    Procedure: COLONOSCOPY;  Surgeon: Roro Mihcael MD;  Location: [facility]; Service:    [facility] CYSTOSCOPY W/ RETROGRADES Left 2/3/2017    Procedure: CYSTOSCOPY WITH BILATERAL RETROGRADES, LEFT STENT REMOVAL;  Surgeon: Eulalio Rivera MD;  Location: [address] [place] [address];  Service:     ESOPHAGOGASTRODUODENOSCOPY N/A 10/6/2016    Procedure: ESOPHAGOGASTRODUODENOSCOPY (EGD); Surgeon: Roro Michael MD;  Location: [facility]; Service:     HERNIA REPAIR      KNEE ARTHROSCOPY W/ MENISCECTOMY      DE CYSTOURETHROSCOPY,URETER CATHETER Left 12/4/2016    Procedure: CYSTOSCOPY RETROGRADE PYELOGRAM WITH INSERTION STENT URETERAL;  Surgeon: Eulalio Rivera MD;  Location: [facility];  Service: Urology    VARICOSE VEIN SURGERY       Social History     Socioeconomic History    Marital status: Single     Spouse name: Not on file    Number of children: Not on file    Years of education: Not on file    Highest education level: Not on file   Occupational History    Not on file   Social Needs    Financial resource strain: Not on file    Food insecurity     Worry: Not on file     Inability: Not on file    Transportation needs     Medical: Not on file     Non-medical: Not on file   Tobacco Use    Smoking status: Former Smoker     Packs/day: 0 50     Years: 37 00     Pack years: 18 50     Types: Cigarettes     Quit date: 3/30/2018     Years since quitting: 3 1    Smokeless tobacco: Current User     Types: Chew    Tobacco comment:  requit 3/30/18   Substance and Sexual Activity    Alcohol use:  Yes     Alcohol/week: 3 0 standard drinks     Types: 3 Cans of beer per week     Frequency: 2-3 times a week  Drug use: No    Sexual activity: Never   Lifestyle    Physical activity     Days per week: Not on file     Minutes per session: Not on file    Stress: Not on file   Relationships    Social connections     Talks on phone: Not on file     Gets together: Not on file     Attends Restoration[employer] service: Not on file     Active member of club or organization: Not on file     Attends meetings of clubs or organizations: Not on file     Relationship status: Not on file    Intimate partner violence     Fear of current or ex partner: Not on file     Emotionally abused: Not on file     Physically abused: Not on file     Forced sexual activity: Not on file   Other Topics Concern    Not on file   Social History Narrative    Lives alone       Family History   Problem Relation Age of Onset    Diabetes Brother     Thyroid cancer Brother     Osteoarthritis Mother     Atrial fibrillation Mother     Aortic aneurysm Father     Colon cancer Brother        Current Outpatient Medications:     albuterol (PROVENTIL HFA,VENTOLIN HFA) 90 mcg/act inhaler, Inhale 2 puffs every 4 (four) hours as needed for wheezing or shortness of breath, Disp: 1 Inhaler, Rfl: 0    Alpha-Lipoic Acid 100 MG CAPS, Take by mouth, Disp: , Rfl:     Ascorbic Acid (VITAMIN C) 100 MG tablet, Take 500 mg by mouth , Disp: , Rfl:     aspirin 81 MG tablet, Take 162 mg by mouth, Disp: , Rfl:     atorvastatin (LIPITOR) 40 mg tablet, Take 1 tablet (40 mg total) by mouth daily with dinner, Disp: 90 tablet, Rfl: 1    capsaicin (ZOSTRIX) 0 025 % cream, Apply 1 application topically 2 (two) times a day, Disp: 60 g, Rfl: 0    Cholecalciferol (VITAMIN D3) 1000 units CAPS, Take by mouth daily , Disp: , Rfl:     cyanocobalamin (VITAMIN B-12) 100 mcg tablet, Take by mouth daily, Disp: , Rfl:     desloratadine (CLARINEX) 5 MG tablet, Take 1 tablet (5 mg total) by mouth daily at bedtime, Disp: 90 tablet, Rfl: 1    famotidine (PEPCID) 20 mg tablet, Take 20 mg by mouth daily, Disp: , Rfl:     gabapentin (NEURONTIN) 100 mg capsule, TAKE 1 CAPSULE BY MOUTH IN THE MORNING AND 2 AT BEDTIME, Disp: 270 capsule, Rfl: 0    glucosamine-chondroitin 500-400 MG tablet, Take 1 tablet by mouth daily, Disp: , Rfl:     glucose blood test strip, 1 each by Other route daily Use as instructed, Disp: 100 each, Rfl: 6    hydrochlorothiazide (HYDRODIURIL) 25 mg tablet, Take 1 tablet by mouth once daily, Disp: 90 tablet, Rfl: 0    losartan (COZAAR) 50 mg tablet, Take 1 tablet (50 mg total) by mouth daily, Disp: 90 tablet, Rfl: 1    metFORMIN (GLUCOPHAGE) 500 mg tablet, Take 2 tablets (1000mg) twice a day with meals, Disp: 360 tablet, Rfl: 2    Multiple Vitamin (MULTIVITAMIN) capsule, Take 1 capsule by mouth daily, Disp: , Rfl:     nitroglycerin (NITROSTAT) 0 3 mg SL tablet, Place 1 tablet (0 3 mg total) under the tongue every 5 (five) minutes as needed for chest pain, Disp: 25 tablet, Rfl: 1    ONETOUCH DELICA LANCETS 67S MISC, 1 Units by Does not apply route daily, Disp: 100 each, Rfl: 6    sitaGLIPtin (JANUVIA) 100 mg tablet, Take 1 tablet (100 mg total) by mouth daily, Disp: 90 tablet, Rfl: 1    torsemide (DEMADEX) 10 mg tablet, TAKE 1 TABLET BY MOUTH ONCE DAILY (Patient taking differently: daily as needed ), Disp: 90 tablet, Rfl: 3  The following portions of the patient's history were reviewed and updated as appropriate: allergies, current medications, past family history, past medical history, past social history, past surgical history and problem list     Review of Systems:  Review of Systems   Constitutional: Negative for chills and fever  HENT: Negative for ear pain and sore throat  Eyes: Negative for pain and visual disturbance  Respiratory: Positive for shortness of breath  Negative for cough  Cardiovascular: Negative for chest pain and palpitations  Gastrointestinal: Negative for abdominal pain and vomiting  Genitourinary: Negative for dysuria and hematuria  Musculoskeletal: Positive for arthralgias  Negative for back pain  Skin: Negative for color change and rash  Neurological: Negative for seizures and syncope  All other systems reviewed and are negative  Physical Exam:  /84 (BP Location: Left arm, Patient Position: Sitting, Cuff Size: Large)   Pulse (!) 46   Temp 98 °F (36 7 °C) (Temporal)   Ht 5' 11" (1 803 m)   Wt 127 kg (279 lb 14 4 oz)   SpO2 98%   BMI 39 04 kg/m²     Physical Exam   Constitutional: He is oriented to person, place, and time  He appears well-developed  No distress  HENT:   Head: Normocephalic and atraumatic  Eyes: Pupils are equal, round, and reactive to light  Conjunctivae and EOM are normal    Neck: Neck supple  No JVD present  No thyromegaly present  Cardiovascular: Regular rhythm  Bradycardia present  Exam reveals no gallop and no friction rub  No murmur heard  Pulmonary/Chest: Effort normal and breath sounds normal    Musculoskeletal:         General: No edema  Neurological: He is alert and oriented to person, place, and time  No cranial nerve deficit  Skin: Skin is warm and dry  No rash noted  He is not diaphoretic  No erythema  Psychiatric: He has a normal mood and affect  His behavior is normal  Judgment and thought content normal        Cardiographics  ECG: sinus bradycardia at 45 bpm  LV Ejection Fraction: LV ejection fraction >= 40%  Lab Review  Lab Results   Component Value Date    TRIG 145 02/05/2021    TRIG 203 (H) 10/09/2020    TRIG 186 (H) 07/10/2020    HDL 40 02/05/2021    HDL 36 (L) 10/09/2020    HDL 36 (L) 07/10/2020    LDLDIRECT 79 04/10/2020    LDLDIRECT 83 12/27/2019     Assessment/Plan     1  Coronary artery disease involving native coronary artery of native heart without angina pectoris    2  Essential hypertension    3  Bradycardia    4  Mixed hyperlipidemia    5   Type 2 diabetes mellitus without complication, without long-term current use of insulin ([address] )      - Discussed diet and weight loss in detail   -  Blood pressure is stable  Continue current medication regimen including losartan and hydrochlorothiazide  Target BP is < 130/80 mmHg  - diabetes management per Dr Melyssa Eng  - Continue atorvastatin - last LDL was 60     - Continue current Rx  - recommend using torsemide when edema is present  He previously developed congestive heart failure  Encouraged to reduce salt in diet if possible  - Will obtain Holter monitor for evaluation of bradycardia seen on ECG today

## 2021-05-08 LAB
ALBUMIN SERPL-MCNC: 4.3 G/DL (ref 3.8–4.9)
ALBUMIN/CREAT UR: 5 MG/G CREAT (ref 0–29)
ALBUMIN/GLOB SERPL: 1.7 {RATIO} (ref 1.2–2.2)
ALP SERPL-CCNC: 48 IU/L (ref 39–117)
ALT SERPL-CCNC: 55 IU/L (ref 0–44)
AST SERPL-CCNC: 35 IU/L (ref 0–40)
BILIRUB SERPL-MCNC: 0.4 MG/DL (ref 0–1.2)
BUN SERPL-MCNC: 16 MG/DL (ref 6–24)
BUN/CREAT SERPL: 15 (ref 9–20)
CALCIUM SERPL-MCNC: 9.2 MG/DL (ref 8.7–10.2)
CHLORIDE SERPL-SCNC: 104 MMOL/L (ref 96–106)
CHOLEST SERPL-MCNC: 123 MG/DL (ref 100–199)
CHOLEST/HDLC SERPL: 3.1 RATIO (ref 0–5)
CO2 SERPL-SCNC: 25 MMOL/L (ref 20–29)
CREAT SERPL-MCNC: 1.07 MG/DL (ref 0.76–1.27)
CREAT UR-MCNC: 196.4 MG/DL
EST. AVERAGE GLUCOSE BLD GHB EST-MCNC: 160 MG/DL
GLOBULIN SER-MCNC: 2.6 G/DL (ref 1.5–4.5)
GLUCOSE SERPL-MCNC: 160 MG/DL (ref 65–99)
HBA1C MFR BLD: 7.2 % (ref 4.8–5.6)
HDLC SERPL-MCNC: 40 MG/DL
LDLC SERPL CALC-MCNC: 61 MG/DL (ref 0–99)
MICROALBUMIN UR-MCNC: 10.8 UG/ML
POTASSIUM SERPL-SCNC: 4.5 MMOL/L (ref 3.5–5.2)
PROT SERPL-MCNC: 6.9 G/DL (ref 6–8.5)
SL AMB EGFR AFRICAN AMERICAN: 88 ML/MIN/1.73
SL AMB EGFR NON AFRICAN AMERICAN: 76 ML/MIN/1.73
SL AMB VLDL CHOLESTEROL CALC: 22 MG/DL (ref 5–40)
SODIUM SERPL-SCNC: 141 MMOL/L (ref 134–144)
TRIGL SERPL-MCNC: 123 MG/DL (ref 0–149)

## 2021-05-08 PROCEDURE — 3051F HG A1C>EQUAL 7.0%<8.0%: CPT | Performed by: INTERNAL MEDICINE

## 2021-05-08 PROCEDURE — 3061F NEG MICROALBUMINURIA REV: CPT | Performed by: INTERNAL MEDICINE

## 2021-05-10 ENCOUNTER — RA CDI HCC (OUTPATIENT)
Dept: OTHER | Facility: HOSPITAL | Age: 61
End: 2021-05-10

## 2021-05-10 NOTE — PROGRESS NOTES
[place] [id number]  coding opportunities             Chart reviewed, (number of) suggestions sent to provider: 1     Problem listed updated  Provider Accepted, (number of) suggestions accepted: 1     Provider Rejected Suggestions for: suggestion not used     Patients insurance company: [employer] (Medicare and Commercial for [employer] and [employer])     Visit status: Patient arrived for their scheduled appointment        [place] [address]  coding opportunities             Chart reviewed, (number of) suggestions sent to provider: 1     Problem listed updated   Provider Accepted, (number of) suggestions accepted: 1        Patients insurance company: [employer] (Medicare and Commercial for [employer] and [employer])           [place] [address]  coding opportunities             Chart reviewed, (number of) suggestions sent to provider: 1  E66 01  Morbid (severe) obesity due to excess calories-BMI of 40 and higher or BMI of 35-39 9 with comorbid condition         Patients insurance company: [address] (Medicare and Commercial for [employer] and [employer])

## 2021-05-14 ENCOUNTER — OFFICE VISIT (OUTPATIENT)
Dept: INTERNAL MEDICINE CLINIC | Facility: CLINIC | Age: 61
End: 2021-05-14
Payer: COMMERCIAL

## 2021-05-14 VITALS
OXYGEN SATURATION: 96 % | BODY MASS INDEX: 36.71 KG/M2 | HEIGHT: 73 IN | HEART RATE: 76 BPM | TEMPERATURE: 96.9 F | WEIGHT: 277 LBS | DIASTOLIC BLOOD PRESSURE: 80 MMHG | SYSTOLIC BLOOD PRESSURE: 134 MMHG

## 2021-05-14 DIAGNOSIS — M54.42 CHRONIC LEFT-SIDED LOW BACK PAIN WITH LEFT-SIDED SCIATICA: ICD-10-CM

## 2021-05-14 DIAGNOSIS — E66.09 CLASS 2 OBESITY DUE TO EXCESS CALORIES WITHOUT SERIOUS COMORBIDITY WITH BODY MASS INDEX (BMI) OF 37.0 TO 37.9 IN ADULT: ICD-10-CM

## 2021-05-14 DIAGNOSIS — J30.1 SEASONAL ALLERGIC RHINITIS DUE TO POLLEN: ICD-10-CM

## 2021-05-14 DIAGNOSIS — E11.40 NEUROPATHY DUE TO TYPE 2 DIABETES MELLITUS (HCC): ICD-10-CM

## 2021-05-14 DIAGNOSIS — S22.000A COMPRESSION FRACTURE OF BODY OF THORACIC VERTEBRA (HCC): ICD-10-CM

## 2021-05-14 DIAGNOSIS — E55.9 VITAMIN D DEFICIENCY: ICD-10-CM

## 2021-05-14 DIAGNOSIS — Z98.62 HISTORY OF ANGIOPLASTY: ICD-10-CM

## 2021-05-14 DIAGNOSIS — G56.22 ULNAR NEUROPATHY AT ELBOW OF LEFT UPPER EXTREMITY: ICD-10-CM

## 2021-05-14 DIAGNOSIS — G62.9 NEUROPATHY: ICD-10-CM

## 2021-05-14 DIAGNOSIS — I10 ESSENTIAL HYPERTENSION: ICD-10-CM

## 2021-05-14 DIAGNOSIS — I25.10 CORONARY ARTERY DISEASE INVOLVING NATIVE CORONARY ARTERY OF NATIVE HEART WITHOUT ANGINA PECTORIS: ICD-10-CM

## 2021-05-14 DIAGNOSIS — E78.2 MIXED HYPERLIPIDEMIA: ICD-10-CM

## 2021-05-14 DIAGNOSIS — G56.02 CARPAL TUNNEL SYNDROME OF LEFT WRIST: ICD-10-CM

## 2021-05-14 DIAGNOSIS — E11.9 TYPE 2 DIABETES MELLITUS WITHOUT COMPLICATION, WITHOUT LONG-TERM CURRENT USE OF INSULIN (HCC): Primary | ICD-10-CM

## 2021-05-14 DIAGNOSIS — R74.8 ELEVATED LIVER ENZYMES: ICD-10-CM

## 2021-05-14 DIAGNOSIS — N40.0 BENIGN PROSTATIC HYPERPLASIA WITHOUT LOWER URINARY TRACT SYMPTOMS: ICD-10-CM

## 2021-05-14 DIAGNOSIS — R00.1 BRADYCARDIA: ICD-10-CM

## 2021-05-14 DIAGNOSIS — K21.9 GASTROESOPHAGEAL REFLUX DISEASE WITHOUT ESOPHAGITIS: ICD-10-CM

## 2021-05-14 DIAGNOSIS — K63.5 POLYP OF COLON, UNSPECIFIED PART OF COLON, UNSPECIFIED TYPE: ICD-10-CM

## 2021-05-14 DIAGNOSIS — G89.29 CHRONIC LEFT-SIDED LOW BACK PAIN WITH LEFT-SIDED SCIATICA: ICD-10-CM

## 2021-05-14 PROBLEM — E04.1 THYROID NODULE: Status: ACTIVE | Noted: 2021-05-14

## 2021-05-14 PROCEDURE — 3725F SCREEN DEPRESSION PERFORMED: CPT | Performed by: INTERNAL MEDICINE

## 2021-05-14 PROCEDURE — 99215 OFFICE O/P EST HI 40 MIN: CPT | Performed by: INTERNAL MEDICINE

## 2021-05-14 PROCEDURE — 1036F TOBACCO NON-USER: CPT | Performed by: INTERNAL MEDICINE

## 2021-05-14 RX ORDER — ATORVASTATIN CALCIUM 40 MG/1
40 TABLET, FILM COATED ORAL
Qty: 90 TABLET | Refills: 1 | Status: SHIPPED | OUTPATIENT
Start: 2021-05-14 | End: 2022-03-25

## 2021-05-14 RX ORDER — LOSARTAN POTASSIUM 50 MG/1
50 TABLET ORAL DAILY
Qty: 90 TABLET | Refills: 1 | Status: SHIPPED | OUTPATIENT
Start: 2021-05-14 | End: 2021-05-20

## 2021-05-14 NOTE — ASSESSMENT & PLAN NOTE
Remote history of thyroid nodule, seen by endocrinologist   Madie Garrett date 05/14/2021:  Patient is scheduled for thyroid ultrasound by them

## 2021-05-14 NOTE — PATIENT INSTRUCTIONS
Recommend lifestyle modifications  Recommend 5 healthy male  Recommend to proceed with the Holter monitor due to bradycardia  Recommend strongly recommend to see eye doctor  Follow with Consultants as per their and our suggestion    Follow up in 3  month(s) or as needed earlier    Follow all instructions as advised and discussed  Take your medications as prescribed  Call the office immediately if you experience any side effects  Ask questions if you do not understand  Keep your scheduled appointment as advised or come sooner if necessary or in doubt  Best time to call for non-urgent matter or questions on weekdays is between 9am and 12 noon  See physician for any new symptoms or worsening of current symptoms  Urgent or emergent situations call 911 and report to nearest emergency room  I spent  45 minutes taking care of this patient including clinical care, conseling, collaboration, chart, lab and consultaion review      Patient is to get labs 1 week(s) prior to next visit if advised

## 2021-05-14 NOTE — PROGRESS NOTES
[facility] Office Visit Note  21     Karena Cheng 64 y o  male MRN: [id number]  : 1960    Assessment:     CAD (coronary artery disease)  In 2017, he underwent drug-eluting stent placement to left circumflex and OM 2 lesions after presenting to hospital with chest and arm pain, elevated BP and ST depressions  He was having chest pain in   Treadmill stress test was done which was normal        Pt is symptom free with from CAD stand point    Will continue medicine as advised, if any question or side effect, discuss with your physician/cardiologist    Pt advised risk factor control including Blood Pressure, weight, Sugar and cholesterol control for secondary prevention  Pt is advised to continue to follow with cardiologist for close monitoring, periodic evaluation and management of CAD    Go to emergency room/call 911 if you have any chest pain or concerning symptoms as it relates to heart  Colon polyp  2021:  Colonoscopy  1  Small polyp removed  2  Few small diverticuli seen and normal appearing anastomosis in the rectosigmoid area    Thyroid nodule  Remote history of thyroid nodule, seen by endocrinologist   Frida Bucio date 2021:  Patient is scheduled for thyroid ultrasound by them    History of vertebral compression fracture  2021:  MRI of the lumbar spine:     Multilevel degenerative changes as described above         Focal disc protrusion  at L3-4 possibly impinging on the L4 descending nerve root      Chronic T11 compression fracture with mild kyphosis    Recommend DEXA scan will order  Patient will be seeing pain management and or neurosurgeon per Orthopedic and patient's choice    Neuropathy  EMG nerve conduction study done in 2020: Ronnie Boswell Abnormal study  These electrodiagnostic findings are most consistent with:   1  A very mild, predominantly axonal sensorimotor neuropathy      2  A moderate median mononeuropathy across the left wrist (carpal tunnel syndrome)  3  A chronic, mild to moderate, left ulnar neuropathy across the elbow  04/16/2021 MRI of lumbar spine  Focal disc protrusion  at L3-4 possibly impinging on the L4 descending nerve root      Chronic T11 compression fracture with mild kyphosis    Recommend DEXA scan will order  Patient will be seeing pain management and or neurosurgeon per Orthopedic and patient's choice    Home mild neuropathy sensory motor most likely related to diabetes  There is no obvious radiculopathy based upon the EMG nerve conduction study  Moderate carpal tunnel on the left as well as mild to moderate left ulnar neuropathy  Carpal tunnel syndrome of left wrist  EMG nerve conduction study done in February 2020: Andre Andres Abnormal study  These electrodiagnostic findings are most consistent with:   1  A very mild, predominantly axonal sensorimotor neuropathy   2  A moderate median mononeuropathy across the left wrist (carpal tunnel syndrome)  3  A chronic, mild to moderate, left ulnar neuropathy across the elbow  Refer to hand surgeon    Ulnar neuropathy at elbow of left upper extremity  EMG nerve conduction study done in February 2020: Andre Andres Abnormal study  These electrodiagnostic findings are most consistent with:   1  A very mild, predominantly axonal sensorimotor neuropathy   2  A moderate median mononeuropathy across the left wrist (carpal tunnel syndrome)  3  A chronic, mild to moderate, left ulnar neuropathy across the elbow  refer to enhance her surgeon    Essential hypertension  Hypertension( High Blood Pressure ):    Continue Home BP check daily and bring log, if you are not checking consider checking daily    Take your Blood Pressure medicine as advised    Do not take your Blood pressure medicine if systolic Blood Pressure (top number) is less than 100 or heart rate less than 60  Notify you physician               Diagnoses and all orders for this visit:    Gastroesophageal reflux disease without esophagitis    Essential hypertension  -     losartan (COZAAR) 50 mg tablet; Take 1 tablet (50 mg total) by mouth daily  -     Comprehensive metabolic panel; Future  -     Lipid panel; Future  -     Comprehensive metabolic panel  -     Lipid panel    Type 2 diabetes mellitus without complication, without long-term current use of insulin (HCC)  -     metFORMIN (GLUCOPHAGE) 1000 MG tablet; Take 1 tablet (1,000 mg total) by mouth 2 (two) times a day with meals Take 2 tablets (1000mg) twice a day with meals  -     Hemoglobin A1C; Future  -     Microalbumin / creatinine urine ratio  -     Hemoglobin A1C    Mixed hyperlipidemia  -     atorvastatin (LIPITOR) 40 mg tablet; Take 1 tablet (40 mg total) by mouth daily with dinner  -     Comprehensive metabolic panel; Future  -     Lipid panel; Future  -     Comprehensive metabolic panel  -     Lipid panel    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)    Seasonal allergic rhinitis due to pollen    Coronary artery disease involving native coronary artery of native heart without angina pectoris  -     Comprehensive metabolic panel; Future  -     Hemoglobin A1C; Future  -     Microalbumin / creatinine urine ratio  -     Lipid panel; Future  -     CBC; Future  -     Comprehensive metabolic panel  -     Hemoglobin A1C  -     Lipid panel  -     CBC    Neuropathy    Benign prostatic hyperplasia without lower urinary tract symptoms    Polyp of colon, unspecified part of colon, unspecified type    Chronic left-sided low back pain with left-sided sciatica    Elevated liver enzymes    Class 2 obesity due to excess calories without serious comorbidity with body mass index (BMI) of 37 0 to 37 9 in adult  -     Ambulatory referral to Weight Management; Future    Vitamin D deficiency    History of angioplasty    Bradycardia  -     TSH, 3rd generation;  Future  -     TSH, 3rd generation    Ulnar neuropathy at elbow of left upper extremity  -     Ambulatory referral to Hand Surgery; Future    Carpal tunnel syndrome of left wrist  -     Ambulatory referral to Hand Surgery; Future    Compression fracture of body of thoracic vertebra (HCC)  -     DXA bone density spine hip and pelvis; Future          Discussion Summary and Plan: Today's care plan and medications were reviewed with patient in detail and all their questions answered to their satisfaction  In summary hypertension well controlled  Diabetes chest borderline control he is going to try diet and exercise  Hyperlipidemia excellent control tolerating statin  GERD fair  Allergic rhinitis somewhat worse as expected in this season he will continue over-the-counter medications including saline nasal spray, Flonase and Claritin as needed  CAD symptom-free seen by cardiologist for the secondary prevention for the risk management  Neuropathy most likely secondary to diabetes mild sensory motor in both lower extremity  Compressive carpal tunnel will refer to the hand surgeon on the left moderate the also has a mild to moderate left ulnar of the await none hand surgeon's opinion  Colonic polyp up-to-date with colonoscopy now in January of 2023 for surveillance a per them  Mildly elevated ALT baseline secondary to steatohepatitis  Low back pain at times with activity but no major radiculopathy  Vitamin-D remains on supplement  Recently diagnosed bradycardia going for Holter monitor  Obesity extensive discussions  He will follow through  His renal functions are fair  Of    Strongly recommend to see eye doctor at this point  Newly reported thyroid nodule felt by endocrinologist patient reports that he had in the past   I was not aware of this    Going for thyroid ultrasound no compressive symptoms will check TSH    Chief Complaint   Patient presents with    Hypertension    Hyperlipidemia    Diabetes    Coronary Artery Disease    GERD    Obesity    Allergic Rhinitis    Follow-up     Neuropathy, carpal tunnel, thyroid nodule, colonic polyp, obesity    Back Pain      Subjective:  Bret Mathur is here for follow-up of chronic disease management  No new sx    Diabetes:  Diagnosed: 5-10 years  Current Medicine for Diabetes: Per Med List  Finger stick: Once a day  Glucose Log: home sugar reivewed  Hypoglycemia event and intervention: None  Diet: reviewed, not watching diet  Exercise: None  Comorbidity: see HPI and Assessment/Plan  Last Eye Exam: 2 year  Last Foot exam: today  HbA1c 7 2  He could not afford Januvia due to 1700 dollar cost   We talked that he if he commits himself to the diet exercise a think he will be able to bring down hemoglobin A1c less than 7  He will try    Allergic rhinitis : doing well, + sx, He used to see allergist specialist his to get allergy in the injection insurance was not paying local doctor he does not want to travel to other doctors  , rec saline,    Hypertension symptom-free remains on losartan 50 mg daily and amlodipine 5 mg daily    Coronary artery disease symptom-free the remains on aspirin losartan amlodipine and Lipitor, sxfree, seen by cardiologist the notes were reviewed  Hyperlipidemia symptom-free,lipid profile reviewed, continue statin      Diverticulosis not a problem  Vitamin-D deficiency remains on 1000 unit    ity BMI 38 22, extensive discussion, Pt is seeing nutrionist    BPH fair  Sees urologist once a year    Edema of legs fair, remains HCTZ for BP and Torsemide for edema by cardiologist    Colonic polyp colonoscopy January 2021  Colonoscopic findings were reviewed  History of compression fracture not a problem  Will do formal DEXA scan    GERD fair  PSA 11-6-2020: 0 4    Neuropathy fair; tolerating gabapentin  He had a EMG nerve conduction study done in month of February    He does have a moderate carpal tunnel on the left hand, mild to moderate ulnar compression, he he does have a 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 likely related to diabetes  Will refer to the hand surgeon for carpal tunnel  MRI of the lumbar spine did not reveal any hand major sciatica  Please refer to the full finding  He does have a varicose vein and chronic stasis dermatitis  Thyroid nodule:  Patient seen by endocrinologist going for thyroid ultrasound for surveillance no compressive symptoms    Obesity:  Extensive discussions regarding patient  He is seeing nutrition specialist   We talked about lifestyle modifications 3 healthy meal of he needs to commit himself for the diet, lifestyle, exercise  Will refer to the medically supervised weight management he will benefit given his multiple comorbidities  05/07/2021:  Hemoglobin A1c 7 2, cholesterol 123 LDL 61 HDL 40 blood sugar 123 rest of the CMP normal except ALT 55 and urine for microalbumin negative  02/05/2021:  Urine for microalbumin negative, blood sugar 140, creatinine 1 15, GFR 70, a ALT 70 AST 64 hemoglobin A1c 7 2 LDL 68 HDL 40 rest of the CMP and lipid profile normal    Orders Only on 11/06/2020  Prostate Specific Antige* Value: 0 4(ng/mL)         Dt: 11/06/2020  ------------ - 2 weeks          The following portions of the patient's history were reviewed and updated as appropriate: allergies, current medications, past family history, past medical history, past social history, past surgical history and problem list     Review of Systems   Constitutional: Negative for chills, fatigue, fever and unexpected weight change  HENT: Positive for hearing loss  Negative for ear discharge, ear pain, facial swelling, mouth sores, nosebleeds, postnasal drip, rhinorrhea, sinus pressure and sinus pain  Eyes: Negative for pain and discharge  Respiratory: Negative for cough and shortness of breath  Cardiovascular: Negative for chest pain, palpitations and leg swelling  Gastrointestinal: Negative for abdominal pain, diarrhea and nausea     Endocrine: Negative for cold intolerance, heat intolerance, polydipsia, polyphagia and polyuria  Genitourinary: Negative for dysuria, frequency and urgency  Musculoskeletal: Negative for arthralgias, gait problem and myalgias  Allergic/Immunologic: Negative  Neurological: Positive for numbness  Negative for dizziness, seizures, speech difficulty and headaches  Hematological: Negative  Psychiatric/Behavioral: Negative  All other systems reviewed and are negative          Historical Information   Patient Active Problem List   Diagnosis    Diabetes mellitus ([facility] [address] )    Hyperlipidemia    Essential hypertension    Elevated liver enzymes    Polyarthralgia    Hydronephrosis    Class 2 obesity due to excess calories without serious comorbidity with body mass index (BMI) of 37 0 to 37 9 in adult    Chronic pain of both knees    Primary osteoarthritis of knees, bilateral    CAD (coronary artery disease)    Asymptomatic PVCs    Obstructive sleep apnea syndrome    BMI 37 0-37 9, adult    Seasonal allergic rhinitis due to pollen    GERD (gastroesophageal reflux disease)    Nasal septal deformity    Bundle branch block, right    CPAP (continuous positive airway pressure) dependence    Vitamin D deficiency    BPH (benign prostatic hyperplasia)    History of vertebral compression fracture    Colon polyp    History of angioplasty    Ocular migraine    Inguinal hernia    Primary osteoarthritis of left knee    Umbilical hernia    Bradycardia    Asymptomatic varicose veins of both lower extremities    Neuropathy    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)    Carpal tunnel syndrome of left wrist    Ulnar neuropathy at elbow of left upper extremity    Onychomycosis of toenail    Impacted cerumen of left ear    Contusion of right knee    Chronic left-sided low back pain with left-sided sciatica    Varicose veins of leg with swelling, bilateral    Thyroid nodule     Past Medical History:   Diagnosis Date    Allergic rhinitis     Anemia     Arthritis     Bundle branch block, right     CAD (coronary artery disease)     CPAP (continuous positive airway pressure) dependence     Diabetes mellitus (HCC)     borderline, controlled with diet and activity    Diverticulitis     Diverticulitis of large intestine with abscess without bleeding 12/7/2016    GERD (gastroesophageal reflux disease)     Hyperlipidemia     Hypertension     Nasal septal deformity     Neuropathy     Peptic ulceration     gastric    Peptic ulceration 9/26/2019    gastric    Pneumonia     Renal disorder     Seasonal allergies     Sleep apnea     wears c-pap    Urinary tract infection     Vitamin D deficiency      Past Surgical History:   Procedure Laterality Date    COLON SIGMOID RESECTION N/A 12/16/2016    Procedure: RESECTION COLON SIGMOID;  Surgeon: Mikey Garza MD;  [facility]: [facility];  Service:     COLON SIGMOID RESECTION LAPAROSCOPIC N/A 12/16/2016    Procedure: RESECTION COLON SIGMOID LAPAROSCOPIC:CONVERTED TO [email or web address];  Surgeon: Mikey Garza MD;  Location[facility];  Service:     COLON SURGERY      Sigmoidectomy    COLONOSCOPY N/A 10/6/2016    Procedure: COLONOSCOPY;  Surgeon: Bridgette Boss MD;  Location: [facility]; Service:    Suzy Hays CYSTOSCOPY W/ RETROGRADES Left 2/3/2017    Procedure: CYSTOSCOPY WITH BILATERAL RETROGRADES, LEFT STENT REMOVAL;  Surgeon: Khalif Story MD;  Location: [address] [place] [address];  Service:     ESOPHAGOGASTRODUODENOSCOPY N/A 10/6/2016    Procedure: ESOPHAGOGASTRODUODENOSCOPY (EGD); Surgeon: Bridgette Boss MD;  Location: [facility];   Service:     HERNIA REPAIR      KNEE ARTHROSCOPY W/ MENISCECTOMY      IA CYSTOURETHROSCOPY,URETER CATHETER Left 12/4/2016    Procedure: CYSTOSCOPY RETROGRADE PYELOGRAM WITH INSERTION STENT URETERAL;  Surgeon: Khalif Story MD;  Location: [address] [place] [address];  Service: Urology    VARICOSE VEIN SURGERY       Social History     Substance and Sexual Activity Alcohol Use Yes    Alcohol/week: 3 0 standard drinks    Types: 3 Cans of beer per week    Frequency: 2-3 times a week     Social History     Substance and Sexual Activity   Drug Use No     Social History     Tobacco Use   Smoking Status Former Smoker    Packs/day: 0 50    Years: 37 00    Pack years: 18 50    Types: Cigarettes    Quit date: 3/30/2018    Years since quitting: 3 1   Smokeless Tobacco Current User    Types: Chew   Tobacco Comment     requit 3/30/18     Family History   Problem Relation Age of Onset    Diabetes Brother     Thyroid cancer Brother     Osteoarthritis Mother     Atrial fibrillation Mother     Aortic aneurysm Father     Colon cancer Brother      Health Maintenance Due   Topic    DM Eye Exam     Annual Physical     Depression Screening PHQ       Meds/Allergies       Current Outpatient Medications:     albuterol (PROVENTIL HFA,VENTOLIN HFA) 90 mcg/act inhaler, Inhale 2 puffs every 4 (four) hours as needed for wheezing or shortness of breath, Disp: 1 Inhaler, Rfl: 0    Alpha-Lipoic Acid 100 MG CAPS, Take by mouth, Disp: , Rfl:     Ascorbic Acid (VITAMIN C) 100 MG tablet, Take 500 mg by mouth , Disp: , Rfl:     aspirin 81 MG tablet, Take 162 mg by mouth, Disp: , Rfl:     atorvastatin (LIPITOR) 40 mg tablet, Take 1 tablet (40 mg total) by mouth daily with dinner, Disp: 90 tablet, Rfl: 1    capsaicin (ZOSTRIX) 0 025 % cream, Apply 1 application topically 2 (two) times a day, Disp: 60 g, Rfl: 0    Cholecalciferol (VITAMIN D3) 1000 units CAPS, Take by mouth daily , Disp: , Rfl:     cyanocobalamin (VITAMIN B-12) 100 mcg tablet, Take by mouth daily, Disp: , Rfl:     desloratadine (CLARINEX) 5 MG tablet, Take 1 tablet (5 mg total) by mouth daily at bedtime, Disp: 90 tablet, Rfl: 1    famotidine (PEPCID) 20 mg tablet, Take 20 mg by mouth daily, Disp: , Rfl:     gabapentin (NEURONTIN) 100 mg capsule, TAKE 1 CAPSULE BY MOUTH IN THE MORNING AND 2 AT BEDTIME, Disp: 270 capsule, Rfl: 0    glucosamine-chondroitin 500-400 MG tablet, Take 1 tablet by mouth daily, Disp: , Rfl:     glucose blood test strip, 1 each by Other route daily Use as instructed, Disp: 100 each, Rfl: 6    hydrochlorothiazide (HYDRODIURIL) 25 mg tablet, Take 1 tablet by mouth once daily, Disp: 90 tablet, Rfl: 0    losartan (COZAAR) 50 mg tablet, Take 1 tablet (50 mg total) by mouth daily, Disp: 90 tablet, Rfl: 1    metFORMIN (GLUCOPHAGE) 1000 MG tablet, Take 1 tablet (1,000 mg total) by mouth 2 (two) times a day with meals Take 2 tablets (1000mg) twice a day with meals, Disp: 180 tablet, Rfl: 1    Multiple Vitamin (MULTIVITAMIN) capsule, Take 1 capsule by mouth daily, Disp: , Rfl:     nitroglycerin (NITROSTAT) 0 3 mg SL tablet, Place 1 tablet (0 3 mg total) under the tongue every 5 (five) minutes as needed for chest pain, Disp: 25 tablet, Rfl: 1    ONETOUCH DELICA LANCETS 31S MISC, 1 Units by Does not apply route daily, Disp: 100 each, Rfl: 6    torsemide (DEMADEX) 10 mg tablet, TAKE 1 TABLET BY MOUTH ONCE DAILY (Patient taking differently: daily as needed ), Disp: 90 tablet, Rfl: 3    sitaGLIPtin (JANUVIA) 100 mg tablet, Take 1 tablet (100 mg total) by mouth daily (Patient not taking: Reported on 5/14/2021), Disp: 90 tablet, Rfl: 1      Objective:    Vitals:   /80   Pulse 76   Temp (!) 96 9 °F (36 1 °C)   Ht 6' 1" (1 854 m)   Wt 126 kg (277 lb)   SpO2 96%   BMI 36 55 kg/m²   Body mass index is 36 55 kg/m²  Vitals:    05/14/21 0954   Weight: 126 kg (277 lb)       Physical Exam  Vitals signs and nursing note reviewed  Constitutional:       Appearance: He is well-developed  He is obese  He is not ill-appearing or diaphoretic  Comments: Male pattern baldness   HENT:      Head: Normocephalic  Salivary Glands: Right salivary gland is not diffusely enlarged  Left salivary gland is not diffusely enlarged  Right Ear: Decreased hearing noted  Left Ear: Decreased hearing noted  Eyes:      General:         Right eye: No discharge  Left eye: No discharge  Conjunctiva/sclera: Conjunctivae normal    Neck:      Musculoskeletal: No neck rigidity or muscular tenderness  Thyroid: No thyromegaly  Vascular: No carotid bruit or JVD  Cardiovascular:      Rate and Rhythm: Regular rhythm  Pulses:           Dorsalis pedis pulses are 1+ on the right side and 1+ on the left side  Posterior tibial pulses are 1+ on the right side and 1+ on the left side  Heart sounds: Normal heart sounds  No murmur  No systolic murmur  No diastolic murmur  Pulmonary:      Effort: No respiratory distress  Breath sounds: Normal breath sounds  No wheezing or rales  Abdominal:      General: Bowel sounds are normal  There is no distension  Palpations: There is no mass  Tenderness: There is no abdominal tenderness  There is no rebound  Musculoskeletal:      Right lower leg: No edema  Left lower leg: No edema  Feet:      Right foot:      Skin integrity: No ulcer, skin breakdown, erythema, warmth, callus or dry skin  Left foot:      Skin integrity: No ulcer, skin breakdown, erythema, warmth, callus or dry skin  Comments: He does have a bilateral flat feet  He does have a onychomycosis of the toenail  He he does does have a chronic stasis discoloration of the distal extremities there is no open skin area area no thickened how thickening or calluses  Of he is of toenail was taken of little bit more right  Lymphadenopathy:      Cervical: No cervical adenopathy  Skin:     General: Skin is warm  Findings: No rash  Comments: Stasis dermatitis and discoloration present   Psychiatric:         Mood and Affect: Mood normal          Behavior: Behavior normal          Thought Content: Thought content normal          Judgment: Judgment normal          Lab Review   No visits with results within 2 Month(s) from this visit     Latest known visit with results is:   Hospital Outpatient Visit on 02/18/2021   Component Date Value Ref Range Status    POC Glucose 02/18/2021 144* 65 - 140 mg/dl Final    Case Report 02/18/2021    Final                    Value:Surgical Pathology Report                         Case: [id number]                                   Authorizing Provider:  Morris Watson MD           Collected:           02/18/2021 1003              Ordering Location:     [facility]   Received:            02/18/2021 [address]                                                                       Pathologist:           David Martínez MD                                                   Specimen:    Large Intestine, Transverse Colon, Transverse Colon Polyp  Cold Snare   Final Diagnosis 02/18/2021    Final                    Value: This result contains rich text formatting which cannot be displayed here   Additional Information 02/18/2021    Final                    Value: This result contains rich text formatting which cannot be displayed here   Synoptic Checklist 02/18/2021    Final                    Value:  (COLON/RECTUM POLYP FORM - GI - All Specimens)                                                                                                                 : Adenoma(s)     Mariana Carranzaing Description 02/18/2021    Final                    Value: This result contains rich text formatting which cannot be displayed here  Patient Instructions   Recommend lifestyle modifications  Recommend 3 healthy male  Recommend to proceed with the Holter monitor due to bradycardia  Recommend strongly recommend to see eye doctor  Follow with Consultants as per their and our suggestion    Follow up in 3  month(s) or as needed earlier    Follow all instructions as advised and discussed  Take your medications as prescribed      Call the office immediately if you experience any side effects  Ask questions if you do not understand  Keep your scheduled appointment as advised or come sooner if necessary or in doubt  Best time to call for non-urgent matter or questions on weekdays is between 9am and 12 noon  See physician for any new symptoms or worsening of current symptoms  Urgent or emergent situations call 911 and report to nearest emergency room  I spent  45 minutes taking care of this patient including clinical care, conseling, collaboration, chart, lab and consultaion review  Patient is to get labs 1 week(s) prior to next visit if advised         Dr Skyla Bryant MD  [facility] [place], [place]       "This note has been constructed using a voice recognition system  Therefore there may be syntax, spelling, and/or grammatical errors   Please call if you have any questions  "

## 2021-05-14 NOTE — ASSESSMENT & PLAN NOTE
EMG nerve conduction study done in February 2020: Genesis Guerra Abnormal study  These electrodiagnostic findings are most consistent with:   1  A very mild, predominantly axonal sensorimotor neuropathy   2  A moderate median mononeuropathy across the left wrist (carpal tunnel syndrome)  3  A chronic, mild to moderate, left ulnar neuropathy across the elbow  04/16/2021 MRI of lumbar spine  Focal disc protrusion  at L3-4 possibly impinging on the L4 descending nerve root      Chronic T11 compression fracture with mild kyphosis    Recommend DEXA scan will order  Patient will be seeing pain management and or neurosurgeon per Orthopedic and patient's choice    Home mild neuropathy sensory motor most likely related to diabetes  There is no obvious radiculopathy based upon the EMG nerve conduction study  Moderate carpal tunnel on the left as well as mild to moderate left ulnar neuropathy

## 2021-05-14 NOTE — ASSESSMENT & PLAN NOTE
EMG nerve conduction study done in February 2020: Lance Reddy Abnormal study  These electrodiagnostic findings are most consistent with:   1  A very mild, predominantly axonal sensorimotor neuropathy   2  A moderate median mononeuropathy across the left wrist (carpal tunnel syndrome)  3  A chronic, mild to moderate, left ulnar neuropathy across the elbow       Refer to hand surgeon

## 2021-05-14 NOTE — ASSESSMENT & PLAN NOTE
In November 2017, he underwent drug-eluting stent placement to left circumflex and OM 2 lesions after presenting to hospital with chest and arm pain, elevated BP and ST depressions  He was having chest pain in 2019  Treadmill stress test was done which was normal        Pt is symptom free with from CAD stand point    Will continue medicine as advised, if any question or side effect, discuss with your physician/cardiologist    Pt advised risk factor control including Blood Pressure, weight, Sugar and cholesterol control for secondary prevention  Pt is advised to continue to follow with cardiologist for close monitoring, periodic evaluation and management of CAD    Go to emergency room/call 911 if you have any chest pain or concerning symptoms as it relates to heart

## 2021-05-14 NOTE — ASSESSMENT & PLAN NOTE
04/16/2021:  MRI of the lumbar spine:     Multilevel degenerative changes as described above         Focal disc protrusion  at L3-4 possibly impinging on the L4 descending nerve root      Chronic T11 compression fracture with mild kyphosis    Recommend DEXA scan will order      Patient will be seeing pain management and or neurosurgeon per Orthopedic and patient's choice

## 2021-05-14 NOTE — ASSESSMENT & PLAN NOTE
01/19/2021:  Colonoscopy  1  Small polyp removed    2  Few small diverticuli seen and normal appearing anastomosis in the rectosigmoid area

## 2021-05-14 NOTE — ASSESSMENT & PLAN NOTE
EMG nerve conduction study done in February 2020: Mirian Eden Abnormal study  These electrodiagnostic findings are most consistent with:   1  A very mild, predominantly axonal sensorimotor neuropathy   2  A moderate median mononeuropathy across the left wrist (carpal tunnel syndrome)  3  A chronic, mild to moderate, left ulnar neuropathy across the elbow     refer to enhance her surgeon

## 2021-05-18 NOTE — TELEPHONE ENCOUNTER
At the last visit the patient described left greater than right lower extremity pain and worsening numbness in the left distal lower extremity with poor reflexes, per my last note  I think the lumbar MRI suggesting focal disc protrusion impinging L4 nerve root may contribute  If the patient is noticing more weakness in the left lower extremity, urinary or bowel incontinence, he should let me know, otherwise this can be dealt with on an ambulatory basis

## 2021-05-20 PROCEDURE — 4010F ACE/ARB THERAPY RXD/TAKEN: CPT | Performed by: INTERNAL MEDICINE

## 2021-05-21 ENCOUNTER — HOSPITAL ENCOUNTER (OUTPATIENT)
Dept: RADIOLOGY | Facility: HOSPITAL | Age: 61
Discharge: HOME | End: 2021-05-21
Attending: INTERNAL MEDICINE
Payer: COMMERCIAL

## 2021-05-21 DIAGNOSIS — E04.1 THYROID NODULE: ICD-10-CM

## 2021-05-21 PROCEDURE — 76536 US EXAM OF HEAD AND NECK: CPT

## 2021-05-28 ENCOUNTER — HOSPITAL ENCOUNTER (OUTPATIENT)
Dept: NON INVASIVE DIAGNOSTICS | Facility: HOSPITAL | Age: 61
Discharge: HOME | End: 2021-05-28
Attending: INTERNAL MEDICINE
Payer: COMMERCIAL

## 2021-05-28 DIAGNOSIS — R00.1 BRADYCARDIA: ICD-10-CM

## 2021-05-28 PROCEDURE — 93226 XTRNL ECG REC<48 HR SCAN A/R: CPT

## 2021-05-28 PROCEDURE — 93225 XTRNL ECG REC<48 HRS REC: CPT

## 2021-05-30 ENCOUNTER — TELEPHONE (OUTPATIENT)
Dept: ENDOCRINOLOGY | Facility: CLINIC | Age: 61
End: 2021-05-30

## 2021-05-30 NOTE — TELEPHONE ENCOUNTER
Please let pt know his ultrasound thyroid shows a 1 3cm left sided thyroid nodule less dense on imaging than normal thyroid tissue    I'd recommend FNA of the nodule in order to make sure not thyroid cancer  If agreeable please let me know and I will place order

## 2021-06-04 ENCOUNTER — OFFICE VISIT (OUTPATIENT)
Dept: PULMONOLOGY | Facility: MEDICAL CENTER | Age: 61
End: 2021-06-04
Payer: COMMERCIAL

## 2021-06-04 VITALS
WEIGHT: 278 LBS | DIASTOLIC BLOOD PRESSURE: 70 MMHG | HEIGHT: 73 IN | BODY MASS INDEX: 36.84 KG/M2 | RESPIRATION RATE: 12 BRPM | HEART RATE: 58 BPM | TEMPERATURE: 97.7 F | OXYGEN SATURATION: 97 % | SYSTOLIC BLOOD PRESSURE: 118 MMHG

## 2021-06-04 DIAGNOSIS — E04.1 THYROID NODULE: Primary | ICD-10-CM

## 2021-06-04 DIAGNOSIS — G47.33 OBSTRUCTIVE SLEEP APNEA: Primary | ICD-10-CM

## 2021-06-04 DIAGNOSIS — R06.02 SOB (SHORTNESS OF BREATH): ICD-10-CM

## 2021-06-04 DIAGNOSIS — R06.2 WHEEZING: ICD-10-CM

## 2021-06-04 DIAGNOSIS — E66.9 OBESITY (BMI 30-39.9): ICD-10-CM

## 2021-06-04 PROCEDURE — 99214 OFFICE O/P EST MOD 30 MIN: CPT | Performed by: INTERNAL MEDICINE

## 2021-06-04 RX ORDER — ALBUTEROL SULFATE 90 UG/1
2 AEROSOL, METERED RESPIRATORY (INHALATION) EVERY 4 HOURS PRN
Qty: 18 G | Refills: 6 | Status: SHIPPED | OUTPATIENT
Start: 2021-06-04

## 2021-06-04 NOTE — PATIENT INSTRUCTIONS
Peak flow rate normal 590    Peak flow range normal: 470-590    Best today 580    Can use albuterol inhaler 5-10 min prior to mowing the lawn    Eligible for new machine 5/5/2022

## 2021-06-04 NOTE — TELEPHONE ENCOUNTER
Patient returned call and advised him of your recommendations  Patient is agreeable to a pain management referral regarding his MRI results  I have placed this for you   TY

## 2021-06-04 NOTE — PROGRESS NOTES
Assessment/Plan        Problem List Items Addressed This Visit        Respiratory    Obstructive sleep apnea - Primary      He does have severe obstructive sleep apnea and has been compliant and benefiting from using auto CPAP set at pressure range of 9-12 cm water  He uses an air fit F10 mask  His initial CPAP machine was  Issued in  May of 2017  Renee Read continue same settings as he is doing well on it  Medical supply company is [address]   He will be eligible for new machine on 05/05/2022         Relevant Orders    PAP DME Resupply/Reorder       Other    SOB (shortness of breath)      He does have shortness of breath with activity  He quit smoking 2018 and smoked half pack cigarettes per day for 37 years  I did order complete PFT to assess his lung function  peak flow rate measured today was normal at 580 liters/minute  Predicted flow rate 470-590 liters/minute  He does get periodic wheeze  I did time tried using albuterol 2 puffs prior to doing any exertional activity to see if this helps his breathing  Relevant Orders    Complete PFT with post bronchodilator    Obesity (BMI 30-39  9)      I did encourage him to lose weight  He is overweight  This would help with his BING and shortness of breath with activity         Wheezing    Relevant Medications    albuterol (PROVENTIL HFA,VENTOLIN HFA) 90 mcg/act inhaler    Other Relevant Orders    Complete PFT with post bronchodilator            Cc; Has occasional wheeze  Is doing satisfactory with his CPAP machine      GRAY Lyons presents for follow-up of his severe obstructive sleep apnea  Initial diagnostic sleep study done March 2015 showed overall AHI of 101 4 with oxygen jodi of 80%  Does use medium size AirFit F10 full face mask  He now gets his supplies from [employer]   He is on auto CPAP pressure 9-12 cm H20    He does have occasional wheeze  has rescue inhaler at home  occasional palpitations, no CP  cpap machine is from 2015  with high humidity has a little more difficulty with SOBOE  air fit 10 mask  Feels well rested     he does have history of hypertension      He quit smoking in 2018 and smoked half pack of cigarettes per day for 37 years      Past Medical History:   Diagnosis Date    Allergic rhinitis     Anemia     Arthritis     Bundle branch block, right     CAD (coronary artery disease)     CPAP (continuous positive airway pressure) dependence     Diabetes mellitus (HCC)     borderline, controlled with diet and activity    Diverticulitis     Diverticulitis of large intestine with abscess without bleeding 12/7/2016    GERD (gastroesophageal reflux disease)     Hyperlipidemia     Hypertension     Nasal septal deformity     Neuropathy     Obesity (BMI 30-39  9)     Peptic ulceration     gastric    Peptic ulceration 9/26/2019    gastric    Pneumonia     Renal disorder     Seasonal allergies     Sleep apnea     wears c-pap    Urinary tract infection     Vitamin D deficiency        Past Surgical History:   Procedure Laterality Date    COLON SIGMOID RESECTION N/A 12/16/2016    Procedure: RESECTION COLON SIGMOID;  Surgeon: Tahmina Avila MD;  Location: [facility];  Service:     COLON SIGMOID RESECTION LAPAROSCOPIC N/A 12/16/2016    Procedure: RESECTION COLON SIGMOID LAPAROSCOPIC:CONVERTED TO [email or web address];  Surgeon: Tahmina Avila MD;  Location[facility];  Service:     COLON SURGERY      Sigmoidectomy    COLONOSCOPY N/A 10/6/2016    Procedure: COLONOSCOPY;  Surgeon: Baltazar Guerra MD;  Location: [facility]; Service:    Perry Polio CYSTOSCOPY W/ RETROGRADES Left 2/3/2017    Procedure: CYSTOSCOPY WITH BILATERAL RETROGRADES, LEFT STENT REMOVAL;  Surgeon: Leandra King MD;  Location: [address] [place] [address];  Service:     ESOPHAGOGASTRODUODENOSCOPY N/A 10/6/2016    Procedure: ESOPHAGOGASTRODUODENOSCOPY (EGD); Surgeon: Baltazar Guerra MD;  Location: [facility];   Service:     HERNIA REPAIR      KNEE ARTHROSCOPY W/ MENISCECTOMY      OR CYSTOURETHROSCOPY,URETER CATHETER Left 12/4/2016    Procedure: CYSTOSCOPY RETROGRADE PYELOGRAM WITH INSERTION STENT URETERAL;  Surgeon: Matilde Escamilla MD;  Location: [address] [place] [address];  Service: Urology    VARICOSE VEIN SURGERY           Current Outpatient Medications:     albuterol (PROVENTIL HFA,VENTOLIN HFA) 90 mcg/act inhaler, Inhale 2 puffs every 4 (four) hours as needed for wheezing or shortness of breath, Disp: 18 g, Rfl: 6    Alpha-Lipoic Acid 100 MG CAPS, Take by mouth, Disp: , Rfl:     Ascorbic Acid (VITAMIN C) 100 MG tablet, Take 500 mg by mouth , Disp: , Rfl:     aspirin 81 MG tablet, Take 162 mg by mouth, Disp: , Rfl:     atorvastatin (LIPITOR) 40 mg tablet, Take 1 tablet (40 mg total) by mouth daily with dinner, Disp: 90 tablet, Rfl: 1    Cholecalciferol (VITAMIN D3) 1000 units CAPS, Take by mouth daily , Disp: , Rfl:     cyanocobalamin (VITAMIN B-12) 100 mcg tablet, Take by mouth daily, Disp: , Rfl:     desloratadine (CLARINEX) 5 MG tablet, Take 1 tablet (5 mg total) by mouth daily at bedtime, Disp: 90 tablet, Rfl: 1    famotidine (PEPCID) 20 mg tablet, Take 20 mg by mouth daily, Disp: , Rfl:     gabapentin (NEURONTIN) 100 mg capsule, TAKE 1 CAPSULE BY MOUTH IN THE MORNING AND 2 CAPSULES AT BEDTIME, Disp: 270 capsule, Rfl: 1    glucosamine-chondroitin 500-400 MG tablet, Take 1 tablet by mouth daily, Disp: , Rfl:     glucose blood test strip, 1 each by Other route daily Use as instructed, Disp: 100 each, Rfl: 6    hydrochlorothiazide (HYDRODIURIL) 25 mg tablet, Take 1 tablet by mouth once daily, Disp: 90 tablet, Rfl: 0    losartan (COZAAR) 50 mg tablet, Take 1 tablet by mouth once daily, Disp: 90 tablet, Rfl: 1    metFORMIN (GLUCOPHAGE) 1000 MG tablet, Take 1 tablet (1,000 mg total) by mouth 2 (two) times a day with meals Take 2 tablets (1000mg) twice a day with meals, Disp: 180 tablet, Rfl: 1    Multiple Vitamin (MULTIVITAMIN) capsule, Take 1 capsule by mouth daily, Disp: , Rfl:     nitroglycerin (NITROSTAT) 0 3 mg SL tablet, Place 1 tablet (0 3 mg total) under the tongue every 5 (five) minutes as needed for chest pain, Disp: 25 tablet, Rfl: 1    ONETOUCH DELICA LANCETS 15W MISC, 1 Units by Does not apply route daily, Disp: 100 each, Rfl: 6    torsemide (DEMADEX) 10 mg tablet, TAKE 1 TABLET BY MOUTH ONCE DAILY (Patient taking differently: daily as needed ), Disp: 90 tablet, Rfl: 3    capsaicin (ZOSTRIX) 0 025 % cream, Apply 1 application topically 2 (two) times a day (Patient not taking: Reported on 6/4/2021), Disp: 60 g, Rfl: 0    Allergies   Allergen Reactions    Levaquin [Levofloxacin In D5w] Swelling     Knee swelling    Sulfa Antibiotics GI Intolerance     Abdominal pain         Social History     Tobacco Use    Smoking status: Former Smoker     Packs/day: 0 50     Years: 37 00     Pack years: 18 50     Types: Cigarettes     Quit date: 3/30/2018     Years since quitting: 3 2    Smokeless tobacco: Current User     Types: Chew    Tobacco comment:  requit 3/30/18   Substance Use Topics    Alcohol use: Yes     Alcohol/week: 3 0 standard drinks     Types: 3 Cans of beer per week         Family History   Problem Relation Age of Onset    Diabetes Brother     Thyroid cancer Brother     Osteoarthritis Mother     Atrial fibrillation Mother     Aortic aneurysm Father     Colon cancer Brother        Review of Systems   Constitutional: Negative for chills, fever and unexpected weight change  HENT: Negative for congestion, rhinorrhea and sore throat  Eyes: Negative for discharge and redness  Respiratory:        Has some mild shortness of breath at times  Periodic wheeze   Cardiovascular: Negative for chest pain, palpitations and leg swelling  Gastrointestinal: Negative for abdominal distention, abdominal pain and nausea  Endocrine: Negative for polydipsia and polyphagia  Genitourinary: Negative for dysuria     Musculoskeletal: Negative for joint swelling and myalgias  Skin: Negative for rash  Neurological: Negative for light-headedness  Psychiatric/Behavioral: Negative for decreased concentration  Vitals:    06/04/21 1007   BP: 118/70   Pulse: 58   Resp: 12   Temp: 97 7 °F (36 5 °C)   SpO2: 97%       SPO2 96% after 1 flight of stairs    Physical Exam  Vitals signs reviewed  Constitutional:       General: He is not in acute distress  Appearance: He is well-developed  He is obese  HENT:      Head: Normocephalic  Nose: Nose normal       Mouth/Throat:      Mouth: Mucous membranes are moist       Pharynx: Oropharynx is clear  No oropharyngeal exudate  Comments: Moderate narrowing of oropharyngeal airway  Eyes:      Conjunctiva/sclera: Conjunctivae normal       Pupils: Pupils are equal, round, and reactive to light  Neck:      Musculoskeletal: Neck supple  No neck rigidity  Cardiovascular:      Rate and Rhythm: Normal rate and regular rhythm  Heart sounds: Normal heart sounds  No murmur  Pulmonary:      Effort: Pulmonary effort is normal  No respiratory distress  Breath sounds: Normal breath sounds  No stridor  No wheezing, rhonchi or rales  Abdominal:      General: There is no distension  Palpations: Abdomen is soft  Tenderness: There is no abdominal tenderness  Musculoskeletal:      Right lower leg: No edema  Left lower leg: No edema  Comments: No edema,  Has  reddish brown discoloration of  Scan of lower tibial surfaces   Skin:     General: Skin is warm and dry  Neurological:      Mental Status: He is alert and oriented to person, place, and time  Psychiatric:         Mood and Affect: Mood normal          Behavior: Behavior normal          Thought Content: Thought content normal          Peak flow  Measurement:    Measure peak flow rate today is 580 liters/minute    Predicted ranges 470 to 590 liters/minute

## 2021-06-11 PROCEDURE — 93227 XTRNL ECG REC<48 HR R&I: CPT | Performed by: INTERNAL MEDICINE

## 2021-06-15 ENCOUNTER — TELEPHONE (OUTPATIENT)
Dept: CARDIOLOGY CLINIC | Facility: CLINIC | Age: 61
End: 2021-06-15

## 2021-06-15 NOTE — TELEPHONE ENCOUNTER
----- Message from Marjan Segovia DO sent at 6/12/2021 10:20 AM EDT -----  Can you please let the patient know holter monitor was normal - Heart rate average was 75

## 2021-06-18 ENCOUNTER — OFFICE VISIT (OUTPATIENT)
Dept: BARIATRICS | Facility: CLINIC | Age: 61
End: 2021-06-18
Payer: COMMERCIAL

## 2021-06-18 VITALS
DIASTOLIC BLOOD PRESSURE: 78 MMHG | TEMPERATURE: 97.1 F | HEART RATE: 98 BPM | BODY MASS INDEX: 37.57 KG/M2 | WEIGHT: 277.4 LBS | SYSTOLIC BLOOD PRESSURE: 130 MMHG | HEIGHT: 72 IN

## 2021-06-18 DIAGNOSIS — I25.10 CORONARY ARTERY DISEASE INVOLVING NATIVE CORONARY ARTERY OF NATIVE HEART WITHOUT ANGINA PECTORIS: ICD-10-CM

## 2021-06-18 DIAGNOSIS — E11.40 NEUROPATHY DUE TO TYPE 2 DIABETES MELLITUS (HCC): ICD-10-CM

## 2021-06-18 DIAGNOSIS — M17.0 PRIMARY OSTEOARTHRITIS OF KNEES, BILATERAL: ICD-10-CM

## 2021-06-18 DIAGNOSIS — E66.09 CLASS 2 OBESITY DUE TO EXCESS CALORIES WITHOUT SERIOUS COMORBIDITY WITH BODY MASS INDEX (BMI) OF 37.0 TO 37.9 IN ADULT: Primary | ICD-10-CM

## 2021-06-18 DIAGNOSIS — G47.33 OBSTRUCTIVE SLEEP APNEA SYNDROME: ICD-10-CM

## 2021-06-18 PROCEDURE — 3008F BODY MASS INDEX DOCD: CPT | Performed by: INTERNAL MEDICINE

## 2021-06-18 PROCEDURE — 99244 OFF/OP CNSLTJ NEW/EST MOD 40: CPT | Performed by: INTERNAL MEDICINE

## 2021-06-18 NOTE — PATIENT INSTRUCTIONS
Goals:  Do not skip any meals! Food log (ie ) www myfitnesspal com,sparkpeople  com,loseit com,calorieking  com,etc  baritastic (use skinnytaste  com or smartphone [employer] for recipes)  No sugary beverages  At least 64oz of water daily  Increase physical activity by 10 minutes daily   Gradually increase physical activity to a goal of 5 days per week for 30 minutes of MODERATE intensity PLUS 2 days per week of FULL BODY resistance training (use smartphone apps UBIKOD, Home Workout, etc )  Goal protein 100-120g per day  Goal carbohydrates 120g per day   [id number] calories

## 2021-06-18 NOTE — PROGRESS NOTES
Assessment/Plan:  Klarissa Fischer was seen today for consult  Diagnoses and all orders for this visit:      Obstructive sleep apnea syndrome  Continue to use CPAP daily  Can improve with significant weight loss    Coronary artery disease involving native coronary artery of native heart without angina pectoris  Avoid phentermine  Weight loss plan    Primary osteoarthritis of knees, bilateral  Weight loss can improve symptoms    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)  Currently on gabapentin, and pt is not sure if is helping or not  Possibly causing increased fatigue and cognitive dysfunction he is experiencing in the morning  Also discussed weight gain from gabapentin   Advised the pt to discuss with Neurology regarding weaning off the medication  Consider topiramate as a weight loss medication- Explained topamax can further increase cognitive dysfunction he may be already experiencing with gabapentin, thus recommended weaning off and see if that helps before starting topiramate  Class 2 obesity due to excess calories without serious comorbidity with body mass index (BMI) of 37 0 to 37 9 in adult  -     Ambulatory referral to Weight Management  - Discussed options of Healthy[facility]-Intensive Lifestyle Intervention Program, Very Low Calorie Diet-VLCD, Conservative Program, Aubree-En-Y Gastric Bypass and Vertical Sleeve Gastrectomy and the role of weight loss medications  - Explained the importance of making lifestyle changes first before starting any anti-obesity medications  Patient should demonstrate lifestyle changes first before anti-obesity medication can be initiated  - Patient is interested in pursuing Conservative Program   Had a lengthy discussion between medical vs surgical route  Patient is possibly interested in sleeve gastrectomy but given hx of sigmoid resection (from abscess) and adhesions, if the surgery would be a good option for him   Wants to avoid bypass but gastric sleeve he wants to consider in the future  Offered he can meet with the surgeon at any point to discuss the options  Pt first wants to try conservative with medication ---   He is to find out from his niece what kind of thyroid cancer his brother had and pt wishes to discuss with endocrinologist regarding starting on GLP 1 if his brother exam didn't have [address]  If GLP-1 is not an option, consider topiramate as he is not a candidate for sympathomimetics and he did not like the SE from Wellbutrin in the past  Explained topamax can further increase cognitive dysfunction he may be already experiencing with gabapentin, thus recommended weaning off gabapentin and see if that helps before starting topiramate  - Initial weight loss goal of 5-10% weight loss for improved health  - Screening labs reviewed    Goals:  Do not skip any meals! Food log (ie ) www myfitnesspal com,sparkpeople  com,loseit com,calorieking  com,etc  baritastic (use skinnytaste  com or smartphone esthela [employer] for recipes)  No sugary beverages  At least 64oz of water daily  Increase physical activity by 10 minutes daily  Gradually increase physical activity to a goal of 5 days per week for 30 minutes of MODERATE intensity PLUS 2 days per week of FULL BODY resistance training (use smartphone apps FitON, Home Workout, etc )  Goal protein 100-120g per day  Goal carbohydrates 120g per day   7585-1667 calories    45 minute visit, >50% face-to-face time spent counseling patient on surgical and nonsurgical interventions for the treatment of excess weight  Discussed the advantages and long-term outcomes with regards to bariatric surgery  Discussed in detail nonsurgical options including intensive lifestyle intervention program, very low-calorie diet program and conservative program   Discussed the role of weight loss medications  Counseled patient on diet behavior and exercise modification for weight loss        Follow up in approximately 6 weeks with Non-Surgical Physician/Advanced Practitioner  Subjective:   Chief Complaint   Patient presents with    Consult     Patient is here for initial MWM consult with Dr Binh Nunes  BMI 37 62  Patient has BING and uses CPAP  Patient ID: Vicki Phipps  is a 64 y o  male with excess weight/obesity here to pursue weight management  Past Medical History:   Diagnosis Date    Allergic rhinitis     Anemia     Arthritis     Bundle branch block, right     CAD (coronary artery disease)     CPAP (continuous positive airway pressure) dependence     Diabetes mellitus (HCC)     borderline, controlled with diet and activity    Diverticulitis     Diverticulitis of large intestine with abscess without bleeding 12/7/2016    GERD (gastroesophageal reflux disease)     Hyperlipidemia     Hypertension     Nasal septal deformity     Neuropathy     Obesity (BMI 30-39  9)     Peptic ulceration     gastric    Peptic ulceration 9/26/2019    gastric    Pneumonia     Renal disorder     Seasonal allergies     Sleep apnea     wears c-pap    Urinary tract infection     Vitamin D deficiency      Past Surgical History:   Procedure Laterality Date    COLON SIGMOID RESECTION N/A 12/16/2016    Procedure: RESECTION COLON SIGMOID;  Surgeon: Sekou Cabrera MD;  [facility];  Service:     COLON SIGMOID RESECTION LAPAROSCOPIC N/A 12/16/2016    Procedure: RESECTION COLON SIGMOID LAPAROSCOPIC:CONVERTED TO [email or web address];  Surgeon: Sekou Cabrera MD;  [facility];  Service:     COLON SURGERY      Sigmoidectomy    COLONOSCOPY N/A 10/6/2016    Procedure: COLONOSCOPY;  Surgeon: Carlo Eid MD;  Location: [place] [address] [facility] LAB; Service:    Arlen Narayanan CYSTOSCOPY W/ RETROGRADES Left 2/3/2017    Procedure: CYSTOSCOPY WITH BILATERAL RETROGRADES, LEFT STENT REMOVAL;  Surgeon: Jesusita Monroe MD;  Location: [address] [place] [address];  Service:     ESOPHAGOGASTRODUODENOSCOPY N/A 10/6/2016    Procedure: ESOPHAGOGASTRODUODENOSCOPY (EGD);   Surgeon: Carlo Eid MD;  [facility];   Service:     HERNIA REPAIR      KNEE ARTHROSCOPY W/ MENISCECTOMY      IN CYSTOURETHROSCOPY,URETER CATHETER Left 12/4/2016    Procedure: CYSTOSCOPY RETROGRADE PYELOGRAM WITH INSERTION STENT URETERAL;  Surgeon: Anaya Rodas MD;  Location: [address] [place] [address];  Service: Urology    VARICOSE VEIN SURGERY         HPI:  Wt Readings from Last 20 Encounters:   06/18/21 126 kg (277 lb 6 4 oz)   06/04/21 126 kg (278 lb)   05/14/21 126 kg (277 lb)   05/07/21 127 kg (279 lb 14 4 oz)   04/16/21 124 kg (274 lb)   03/26/21 124 kg (274 lb)   03/19/21 123 kg (272 lb)   03/12/21 124 kg (274 lb 6 4 oz)   02/18/21 123 kg (271 lb)   02/12/21 123 kg (271 lb)   01/19/21 122 kg (270 lb)   01/15/21 127 kg (280 lb 9 6 oz)   12/11/20 125 kg (275 lb 6 4 oz)   12/04/20 125 kg (276 lb 3 2 oz)   11/13/20 124 kg (274 lb)   10/30/20 126 kg (277 lb)   10/23/20 125 kg (275 lb)   09/25/20 125 kg (275 lb 6 4 oz)   09/25/20 125 kg (276 lb)   08/21/20 125 kg (276 lb)     Severity: Severe  Onset: since as a child   Modifiers: Diet and Exercise  Contributing factors: Poor Food Choices and Insufficient Physical Activity  Associated symptoms: comorbid conditions  Goal weight: 230  Works as a vet  Used to [employer] before  Feels weak or can't think straight in the morning; glucose in 110's - thinks he has been feeling this way since going up on metformin  B- protein shake premier or a Kind bar or oatmeal  S- figs or dates   L- sandwich with fruit or ligia slaw or 3 bean salad, diet snapple   S-   D- 8pm; fish 3 days with rice/veggies, chicken or pork, rarely red meat   S- once in awhile 1 scoop regular ice cream     Hydration: not sure  Alcohol: a few times a week 1 beer   Smoking: no  Exercise: stopped during COVID   Sleep: 8 hours  STOP bang: +BING on CPAP    The following portions of the patient's history were reviewed and updated as appropriate: allergies, current medications, past family history, past medical history, past social history, past surgical history, and problem list     Review of Systems   Constitutional: Negative for appetite change, chills and fever  HENT: Negative for rhinorrhea and sore throat  Respiratory: Positive for shortness of breath  Negative for cough and chest tightness  Cardiovascular: Negative for chest pain and leg swelling  Gastrointestinal: Negative for abdominal pain, constipation, diarrhea, nausea and vomiting  Endocrine: Negative for cold intolerance and heat intolerance  Genitourinary: Negative for difficulty urinating  Musculoskeletal: Positive for arthralgias  Skin: Negative for color change  Neurological: Positive for numbness  Negative for dizziness and headaches  +neuropathy   Psychiatric/Behavioral: Negative for sleep disturbance  The patient is nervous/anxious  +depression   All other systems reviewed and are negative  Objective:  /78 (BP Location: Right arm, Patient Position: Sitting, Cuff Size: Large)   Pulse 98   Temp (!) 97 1 °F (36 2 °C) (Temporal)   Ht 6' (1 829 m)   Wt 126 kg (277 lb 6 4 oz)   BMI 37 62 kg/m²   Constitutional: Well-developed, well-nourished and obese Body mass index is 37 62 kg/m²  Yoselyn Sarkar HEENT: No conjunctival pallor or jaundice  Pulmonary: No increased work of breathing or signs of respiratory distress  CV:  well-perfused  GI: Obese  Non-distended  MSK: No edema   Neuro: Oriented to person, place and time  Normal Speech  Normal gait  Psych: Normal affect and mood       Labs and Imaging  Lab Results   Component Value Date    WBC 9 0 11/30/2017    HGB 14 7 11/30/2017    HCT 43 1 11/30/2017     (H) 11/30/2017     11/30/2017     Lab Results   Component Value Date     11/30/2017    SODIUM 141 05/07/2021    K 4 5 05/07/2021     05/07/2021    CO2 25 05/07/2021    AGAP 5 11/25/2017    BUN 16 05/07/2021    CREATININE 1 07 05/07/2021    GLUC 160 (H) 05/07/2021    CALCIUM 9 4 11/30/2017    AST 35 05/07/2021 ALT 55 (H) 05/07/2021    ALKPHOS 67 11/23/2017    TP 6 9 05/07/2021    TBILI 0 4 05/07/2021    EGFR 73 11/25/2017     Lab Results   Component Value Date    HGBA1C 7 2 (H) 05/07/2021     Lab Results   Component Value Date    ZDU7RBQQUJIJ 2 809 11/22/2017    TSH 1 790 01/03/2020     Lab Results   Component Value Date    CHOLESTEROL 123 05/07/2021     Lab Results   Component Value Date    HDL 40 05/07/2021     Lab Results   Component Value Date    TRIG 123 05/07/2021     Lab Results   Component Value Date    LDLDIRECT 79 04/10/2020

## 2021-06-20 PROBLEM — R06.2 WHEEZING: Status: ACTIVE | Noted: 2021-06-20

## 2021-06-20 PROBLEM — E66.9 OBESITY (BMI 30-39.9): Status: ACTIVE | Noted: 2018-07-06

## 2021-06-20 NOTE — ASSESSMENT & PLAN NOTE
He does have severe obstructive sleep apnea and has been compliant and benefiting from using auto CPAP set at pressure range of 9-12 cm water  He uses an air fit F10 mask  His initial CPAP machine was  Issued in  May of 2017     I did review compliance data from past 1 month  His CPAP machine was issued on 05/05/2017  He used to CPAP every night and average 8 5 hours per night  His average CPAP pressure was 10 cm water    Will continue same settings as he is doing well on it    [employer] is [place]   He will be eligible for new machine on 05/05/2022

## 2021-06-20 NOTE — ASSESSMENT & PLAN NOTE
I did encourage him to lose weight  He is overweight    This would help with his BING and shortness of breath with activity

## 2021-06-20 NOTE — ASSESSMENT & PLAN NOTE
He does have shortness of breath with activity  He quit smoking 2018 and smoked half pack cigarettes per day for 37 years  I did order complete PFT to assess his lung function  peak flow rate measured today was normal at 580 liters/minute  Predicted flow rate 470-590 liters/minute  He does get periodic wheeze  I did time tried using albuterol 2 puffs prior to doing any exertional activity to see if this helps his breathing

## 2021-06-21 ENCOUNTER — TELEPHONE (OUTPATIENT)
Dept: BARIATRICS | Facility: CLINIC | Age: 61
End: 2021-06-21

## 2021-06-21 NOTE — TELEPHONE ENCOUNTER
Post initial phone call made  Pt stated during the visit it was discuss that his brother had thyriod cancer and he found out it was benign      Just GUILLAUME

## 2021-07-01 ENCOUNTER — HOSPITAL ENCOUNTER (OUTPATIENT)
Dept: PULMONOLOGY | Facility: HOSPITAL | Age: 61
Discharge: HOME | End: 2021-07-01
Attending: INTERNAL MEDICINE
Payer: COMMERCIAL

## 2021-07-01 DIAGNOSIS — R06.2 WHEEZING: ICD-10-CM

## 2021-07-01 DIAGNOSIS — R06.02 SOB (SHORTNESS OF BREATH): ICD-10-CM

## 2021-07-01 PROCEDURE — 94726 PLETHYSMOGRAPHY LUNG VOLUMES: CPT | Performed by: INTERNAL MEDICINE

## 2021-07-01 PROCEDURE — 94760 N-INVAS EAR/PLS OXIMETRY 1: CPT

## 2021-07-01 PROCEDURE — 94060 EVALUATION OF WHEEZING: CPT | Performed by: INTERNAL MEDICINE

## 2021-07-01 PROCEDURE — 94729 DIFFUSING CAPACITY: CPT | Performed by: INTERNAL MEDICINE

## 2021-07-01 PROCEDURE — 94729 DIFFUSING CAPACITY: CPT

## 2021-07-01 PROCEDURE — 94726 PLETHYSMOGRAPHY LUNG VOLUMES: CPT

## 2021-07-01 PROCEDURE — 94060 EVALUATION OF WHEEZING: CPT

## 2021-07-01 RX ORDER — ALBUTEROL SULFATE 2.5 MG/3ML
2.5 SOLUTION RESPIRATORY (INHALATION) ONCE
Status: COMPLETED | OUTPATIENT
Start: 2021-07-01 | End: 2021-07-01

## 2021-07-01 RX ADMIN — ALBUTEROL SULFATE 2.5 MG: 2.5 SOLUTION RESPIRATORY (INHALATION) at 16:12

## 2021-07-02 ENCOUNTER — OFFICE VISIT (OUTPATIENT)
Dept: OBGYN CLINIC | Facility: CLINIC | Age: 61
End: 2021-07-02
Payer: COMMERCIAL

## 2021-07-02 VITALS
WEIGHT: 277 LBS | DIASTOLIC BLOOD PRESSURE: 78 MMHG | HEIGHT: 72 IN | SYSTOLIC BLOOD PRESSURE: 132 MMHG | HEART RATE: 65 BPM | BODY MASS INDEX: 37.52 KG/M2

## 2021-07-02 DIAGNOSIS — G56.03 CARPAL TUNNEL SYNDROME, BILATERAL: Primary | ICD-10-CM

## 2021-07-02 DIAGNOSIS — G56.23 CUBITAL TUNNEL SYNDROME, BILATERAL: ICD-10-CM

## 2021-07-02 PROCEDURE — 99213 OFFICE O/P EST LOW 20 MIN: CPT | Performed by: ORTHOPAEDIC SURGERY

## 2021-07-02 PROCEDURE — 3078F DIAST BP <80 MM HG: CPT | Performed by: ORTHOPAEDIC SURGERY

## 2021-07-02 PROCEDURE — 20526 THER INJECTION CARP TUNNEL: CPT | Performed by: ORTHOPAEDIC SURGERY

## 2021-07-02 PROCEDURE — 3075F SYST BP GE 130 - 139MM HG: CPT | Performed by: ORTHOPAEDIC SURGERY

## 2021-07-02 RX ORDER — LIDOCAINE HYDROCHLORIDE 10 MG/ML
0.5 INJECTION, SOLUTION INFILTRATION; PERINEURAL
Status: COMPLETED | OUTPATIENT
Start: 2021-07-02 | End: 2021-07-02

## 2021-07-02 RX ORDER — TRIAMCINOLONE ACETONIDE 40 MG/ML
20 INJECTION, SUSPENSION INTRA-ARTICULAR; INTRAMUSCULAR
Status: COMPLETED | OUTPATIENT
Start: 2021-07-02 | End: 2021-07-02

## 2021-07-02 RX ADMIN — LIDOCAINE HYDROCHLORIDE 0.5 ML: 10 INJECTION, SOLUTION INFILTRATION; PERINEURAL at 11:57

## 2021-07-02 RX ADMIN — TRIAMCINOLONE ACETONIDE 20 MG: 40 INJECTION, SUSPENSION INTRA-ARTICULAR; INTRAMUSCULAR at 11:57

## 2021-07-02 NOTE — PROGRESS NOTES
Assessment/Plan:  1  Carpal tunnel syndrome, bilateral  Hand/upper extremity injection: bilateral carpal tunnel    Cock Up Wrist Splint   2  Cubital tunnel syndrome, bilateral         Scribe Attestation    I,:  Sharyn Gibbs MA am acting as a scribe while in the presence of the attending physician :       I,:  Yadira Bello DO personally performed the services described in this documentation    as scribed in my presence :             [facility] that his sins and symptoms are consistent with bilateral carpal tunnel syndrome and bilateral cubital tunnel syndrome  The EMG LUE was reviewed which did demonstrate mild to moderate  Non operative versus surgical intervention was discussed  Patient elected to proceed with non operative treatment in the form of bracing and steroid injections  He consented and underwent bilateral carpal tunnel injections in the office today without any complications  He was fitted and provided with bilateral cock-up wrist braces  Patient is aware I do not do injections for cubital tunnel due to the proximity of the nerve  He was advised to bulk up a towel at night to help with this  He is aware he does need to have an EMG for the right before undergoing any surgical intervention for this die  He does have an upcoming appointment with neurology  He will follow up in 4 weeks for repeat evaluation  Subjective:   Lisa Gaffney is a 64 y o  male who presents to the office today for evaluation of bilateral hand numbness and tingling  Patient states this has been ongoing for a while and has been gradually getting worse  He notes numbness and tingling to all digits  He notes increased numbness after operating, driving, and a night  he states this does not wake him from sleep at night  He has not tried any treatment yet  He has not tried bracing  He did have an EMG performed in the left  He does take Aspirin OTC  Patient is a          Review of Systems Constitutional: Negative for chills and fever  HENT: Negative for drooling and sneezing  Eyes: Negative for redness  Respiratory: Negative for cough and wheezing  Gastrointestinal: Negative for nausea and vomiting  Musculoskeletal: Negative for arthralgias, joint swelling and myalgias  Neurological: Negative for weakness and numbness  Psychiatric/Behavioral: Negative for behavioral problems  The patient is not nervous/anxious  Past Medical History:   Diagnosis Date    Allergic rhinitis     Anemia     Arthritis     Bundle branch block, right     CAD (coronary artery disease)     CPAP (continuous positive airway pressure) dependence     Diabetes mellitus (HCC)     borderline, controlled with diet and activity    Diverticulitis     Diverticulitis of large intestine with abscess without bleeding 12/7/2016    GERD (gastroesophageal reflux disease)     Hyperlipidemia     Hypertension     Nasal septal deformity     Neuropathy     Obesity (BMI 30-39  9)     Peptic ulceration     gastric    Peptic ulceration 9/26/2019    gastric    Pneumonia     Renal disorder     Seasonal allergies     Sleep apnea     wears c-pap    Urinary tract infection     Vitamin D deficiency        Past Surgical History:   Procedure Laterality Date    COLON SIGMOID RESECTION N/A 12/16/2016    Procedure: RESECTION COLON SIGMOID;  Surgeon: Johnna Quezada MD;  [facility];  Service:     COLON SIGMOID RESECTION LAPAROSCOPIC N/A 12/16/2016    Procedure: RESECTION COLON SIGMOID LAPAROSCOPIC:CONVERTED TO [email or web address];  Surgeon: Johnna Quezada MD;  [facility];  Service:     COLON SURGERY      Sigmoidectomy    COLONOSCOPY N/A 10/6/2016    Procedure: COLONOSCOPY;  Surgeon: Daksha Ramires MD;  Location: [place] [address] [facility] LAB;   Service:     CYSTOSCOPY W/ RETROGRADES Left 2/3/2017    Procedure: CYSTOSCOPY WITH BILATERAL RETROGRADES, LEFT STENT REMOVAL;  Surgeon: Chata Tolliver MD;  Location: [facility] Disp: , Rfl:     desloratadine (CLARINEX) 5 MG tablet, Take 1 tablet (5 mg total) by mouth daily at bedtime, Disp: 90 tablet, Rfl: 1    famotidine (PEPCID) 20 mg tablet, Take 20 mg by mouth daily, Disp: , Rfl:     gabapentin (NEURONTIN) 100 mg capsule, TAKE 1 CAPSULE BY MOUTH IN THE MORNING AND 2 CAPSULES AT BEDTIME, Disp: 270 capsule, Rfl: 1    glucosamine-chondroitin 500-400 MG tablet, Take 1 tablet by mouth daily, Disp: , Rfl:     glucose blood test strip, 1 each by Other route daily Use as instructed, Disp: 100 each, Rfl: 6    hydrochlorothiazide (HYDRODIURIL) 25 mg tablet, Take 1 tablet by mouth once daily, Disp: 90 tablet, Rfl: 0    losartan (COZAAR) 50 mg tablet, Take 1 tablet by mouth once daily, Disp: 90 tablet, Rfl: 1    metFORMIN (GLUCOPHAGE) 1000 MG tablet, Take 1 tablet (1,000 mg total) by mouth 2 (two) times a day with meals Take 2 tablets (1000mg) twice a day with meals, Disp: 180 tablet, Rfl: 1    Multiple Vitamin (MULTIVITAMIN) capsule, Take 1 capsule by mouth daily, Disp: , Rfl:     nitroglycerin (NITROSTAT) 0 3 mg SL tablet, Place 1 tablet (0 3 mg total) under the tongue every 5 (five) minutes as needed for chest pain, Disp: 25 tablet, Rfl: 1    ONETOUCH DELICA LANCETS 81U MISC, 1 Units by Does not apply route daily, Disp: 100 each, Rfl: 6    torsemide (DEMADEX) 10 mg tablet, TAKE 1 TABLET BY MOUTH ONCE DAILY (Patient taking differently: daily as needed ), Disp: 90 tablet, Rfl: 3    capsaicin (ZOSTRIX) 0 025 % cream, Apply 1 application topically 2 (two) times a day (Patient not taking: Reported on 6/4/2021), Disp: 60 g, Rfl: 0  No current facility-administered medications for this visit      Allergies   Allergen Reactions    Levaquin [Levofloxacin In D5w] Swelling     Knee swelling    Sulfa Antibiotics GI Intolerance     Abdominal pain         Objective:  Vitals:    07/02/21 1109   BP: 132/78   Pulse: 65       Ortho Exam     Right hand      - tinel's  - atrophy   - durkan's  + tinel's cubital tunnel  Compartments soft  Brisk capillary refill  S/m intact median, radial, and ulnar nerve     Left hand    + tinel's at wrist  + tinel's at the elbow  - atrophy  - durkan's  Compartments soft   Brisk capillary refill  S/m intact median, radial, and ulnar nerve     Physical Exam  Constitutional:       Appearance: He is well-developed  HENT:      Head: Normocephalic and atraumatic  Eyes:      General:         Right eye: No discharge  Left eye: No discharge  Conjunctiva/sclera: Conjunctivae normal    Cardiovascular:      Rate and Rhythm: Normal rate  Pulmonary:      Effort: Pulmonary effort is normal  No respiratory distress  Musculoskeletal:      Cervical back: Normal range of motion and neck supple  Comments: As noted in HPI   Skin:     General: Skin is warm and dry  Neurological:      Mental Status: He is alert and oriented to person, place, and time  Psychiatric:         Behavior: Behavior normal          Thought Content: Thought content normal          Judgment: Judgment normal      Hand/upper extremity injection: bilateral carpal tunnel  [place] Protocol:  Consent: Verbal consent obtained  Consent given by: patient  Patient identity confirmed: verbally with patient    Supporting Documentation  Indications: pain   Procedure Details  Condition:carpal tunnel syndrome Site: bilateral carpal tunnel   Preparation: Patient was prepped and draped in the usual sterile fashion  Needle size: 27 G  Ultrasound guidance: no  Medications (Right): 0 5 mL lidocaine 1 %; 20 mg triamcinolone acetonide 40 mg/mLMedications (Left): 0 5 mL lidocaine 1 %; 20 mg triamcinolone acetonide 40 mg/mL   Patient tolerance: patient tolerated the procedure well with no immediate complications  Dressing:  Sterile dressing applied             I have personally reviewed pertinent films in PACS and my interpretation is as follows:EMG LUE demonstrates left carpal tunnel and left cubital tunnel

## 2021-07-08 NOTE — PROGRESS NOTES
Assessment/Plan:     patient obtain an MRI the lower back to rule out radiculopathy at the possible etiology of his neuropathy  Patient educated on proper supportive shoe gear and use of diabetic insoles, to accommodate his deformity and alleviate pressure prevent ulcerations  Patient advised and maintaining PCP appointment for the management of diabetes, and neuropathy  Patient educated and daily foot inspection and the signs of infection  Patient advised and reporting to the clinic or emergency room if any noticed  Patient opted out of oral and topical anti fungal medications at this time, all onychomycotic dystrophic nails debrided using sterile Nipper and electrical samantha to patient tolerance, Betadine applied, patient educated on daily foot hygiene for the management of fungal infection  Diagnoses and all orders for this visit:    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 2 diabetes mellitus ([address] )    Pain in both feet    Onychomycosis          Subjective:      Patient ID: Juice Quinn is a 64 y o  male  A 65-year-old male with past medical history significant diabetes, referred to Podiatry for routine diabetic foot evaluation, patient denies constitutional symptoms, patient denies trauma to the foot, patient states that his fasting blood sugar is under control he does not recall the lumbar, patient last PCP visit was less than 6 month, patient report occasional tingling and numbness to bilateral foot, patient also report painful elongated thickened hallucal toenails, that causes pain upon pressure and in shoe gear      The following portions of the patient's history were reviewed and updated as appropriate: allergies, current medications, past family history, past medical history, past social history, past surgical history and problem list     Review of Systems   Constitutional: Negative  Respiratory: Negative  Cardiovascular: Negative  Musculoskeletal: Negative  Skin: Positive for color change  Historical Information   Past Medical History:   Diagnosis Date    Allergic rhinitis     Anemia     Arthritis     Bundle branch block, right     CAD (coronary artery disease)     CPAP (continuous positive airway pressure) dependence     Diabetes mellitus (HCC)     borderline, controlled with diet and activity    Diverticulitis     Diverticulitis of large intestine with abscess without bleeding 12/7/2016    GERD (gastroesophageal reflux disease)     Hyperlipidemia     Hypertension     Nasal septal deformity     Neuropathy     Obesity (BMI 30-39  9)     Peptic ulceration     gastric    Peptic ulceration 9/26/2019    gastric    Pneumonia     Renal disorder     Seasonal allergies     Sleep apnea     wears c-pap    Urinary tract infection     Vitamin D deficiency      Past Surgical History:   Procedure Laterality Date    COLON SIGMOID RESECTION N/A 12/16/2016    Procedure: RESECTION COLON SIGMOID;  Surgeon: Marlon Ritchie MD;  [facility];  Service:     COLON SIGMOID RESECTION LAPAROSCOPIC N/A 12/16/2016    Procedure: RESECTION COLON SIGMOID LAPAROSCOPIC:CONVERTED TO [email or web address];  Surgeon: Marlon Ritchie MD;  [facility];  Service:     COLON SURGERY      Sigmoidectomy    COLONOSCOPY N/A 10/6/2016    Procedure: COLONOSCOPY;  Surgeon: Kapil Colunga MD;  Location: [place] [address] [facility]; Service:    Deadandra Durand CYSTOSCOPY W/ RETROGRADES Left 2/3/2017    Procedure: CYSTOSCOPY WITH BILATERAL RETROGRADES, LEFT STENT REMOVAL;  Surgeon: Luis F Ramirez MD;  Location: [address] [place] [address];  Service:     ESOPHAGOGASTRODUODENOSCOPY N/A 10/6/2016    Procedure: ESOPHAGOGASTRODUODENOSCOPY (EGD); Surgeon: Kapil Colunga MD;  Location: [place] [address] [facility];   Service:     HERNIA REPAIR      KNEE ARTHROSCOPY W/ MENISCECTOMY      [place] CYSTOURETHROSCOPY,URETER CATHETER Left 12/4/2016    Procedure: CYSTOSCOPY RETROGRADE PYELOGRAM WITH INSERTION STENT URETERAL;  Surgeon: Luis F Ramirez MD;  [place] [facility];  Service: Urology    VARICOSE VEIN SURGERY       Social History   Social History     Substance and Sexual Activity   Alcohol Use Yes    Alcohol/week: 3 0 standard drinks    Types: 3 Cans of beer per week     Social History     Substance and Sexual Activity   Drug Use No     Social History     Tobacco Use   Smoking Status Former Smoker    Packs/day: 0 50    Years: 37 00    Pack years: 18 50    Types: Cigarettes    Quit date: 3/30/2018    Years since quitting: 3 2   Smokeless Tobacco Current User    Types: Chew   Tobacco Comment     requit 3/30/18     Family History:   Family History   Problem Relation Age of Onset    Diabetes Brother     Thyroid cancer Brother     Osteoarthritis Mother     Atrial fibrillation Mother     Aortic aneurysm Father     Colon cancer Brother        Meds/Allergies   all medications and allergies reviewed  Allergies   Allergen Reactions    Levaquin [Levofloxacin In D5w] Swelling     Knee swelling    Sulfa Antibiotics GI Intolerance     Abdominal pain         Objective:      /72   Resp 17   Ht 5' 11" (1 803 m)   Wt 124 kg (274 lb)   BMI 38 22 kg/m²     Diabetic Foot Exam    Patient's shoes and socks removed  Right Foot/Ankle   Right Foot Inspection  Skin Exam: no blister, no erythema, no maceration and no ulcer                          Toe Exam: tenderness  Sensory   Vibration: intact  Proprioception: intact   Monofilament testing: diminished  Vascular  Capillary refills: < 3 seconds  The right DP pulse is 2+  The right PT pulse is 2+  Left Foot/Ankle  Left Foot Inspection  Skin Exam: no erythema, no maceration and no ulcer                         Toe Exam: tenderness                   Sensory   Vibration: intact  Proprioception: intact  Monofilament: diminished  Vascular  Capillary refills: < 3 seconds  The left DP pulse is 2+  The left PT pulse is 2+  Assign Risk Category:  Deformity present; Loss of protective sensation;  No weak pulses       Risk: 1 Physical Exam  Constitutional:       General: He is not in acute distress  Appearance: He is well-developed  He is not ill-appearing, toxic-appearing or diaphoretic  HENT:      Head: Normocephalic and atraumatic  Cardiovascular:      Pulses: Normal pulses  no weak pulses          Dorsalis pedis pulses are 2+ on the right side and 2+ on the left side  Posterior tibial pulses are 2+ on the right side and 2+ on the left side  Comments: Palpable pedal pulse, CFT is less than 3 seconds, temperature gradient within normal limits, pedal hair present, no trophic skin changes  Pulmonary:      Effort: No respiratory distress  Musculoskeletal:         General: Normal range of motion  Comments: No pain on palpation on range of motion of ankle joint subtalar joint metatarsal joint tarsal joints bilateral foot   Feet:      Right foot:      Protective Sensation: 10 sites tested  10 sites sensed  Skin integrity: No ulcer, blister, skin breakdown or erythema  Left foot:      Protective Sensation: 10 sites tested  10 sites sensed  Skin integrity: No ulcer, blister, skin breakdown or erythema  Skin:     Capillary Refill: Capillary refill takes 2 to 3 seconds  Coloration: Skin is not pale  Comments:  thickened elongated dystrophic toenails, with painful on palpation, subungual debris, no clinical signs of infection, dystrophy is localized to the hallucal toenails   Neurological:      Mental Status: He is alert and oriented to person, place, and time  Comments:  muscle power 5/5, protective sensations intact, no focal motor deficit        Foot Exam    Right Foot/Ankle     Inspection and Palpation  Skin Exam: no blister, no maceration, no ulcer and no erythema     Neurovascular  Dorsalis pedis: 2+  Posterior tibial: 2+      Left Foot/Ankle      Inspection and Palpation  Skin Exam: no blister, no maceration, no ulcer and no erythema     Neurovascular  Dorsalis pedis: 2+  Posterior tibial: 2+

## 2021-07-09 ENCOUNTER — OFFICE VISIT (OUTPATIENT)
Dept: ENDOCRINOLOGY | Facility: CLINIC | Age: 61
End: 2021-07-09
Payer: COMMERCIAL

## 2021-07-09 VITALS
SYSTOLIC BLOOD PRESSURE: 130 MMHG | DIASTOLIC BLOOD PRESSURE: 80 MMHG | WEIGHT: 274 LBS | BODY MASS INDEX: 37.16 KG/M2 | TEMPERATURE: 97 F | HEART RATE: 44 BPM

## 2021-07-09 DIAGNOSIS — E11.9 TYPE 2 DIABETES MELLITUS WITHOUT COMPLICATION, WITHOUT LONG-TERM CURRENT USE OF INSULIN (HCC): ICD-10-CM

## 2021-07-09 DIAGNOSIS — E78.2 MIXED HYPERLIPIDEMIA: ICD-10-CM

## 2021-07-09 DIAGNOSIS — E04.1 THYROID NODULE: Primary | ICD-10-CM

## 2021-07-09 DIAGNOSIS — I10 HYPERTENSION GOAL BP (BLOOD PRESSURE) < 140/90: ICD-10-CM

## 2021-07-09 PROCEDURE — 1036F TOBACCO NON-USER: CPT | Performed by: INTERNAL MEDICINE

## 2021-07-09 PROCEDURE — 99214 OFFICE O/P EST MOD 30 MIN: CPT | Performed by: INTERNAL MEDICINE

## 2021-07-09 RX ORDER — ORAL SEMAGLUTIDE 3 MG/1
3 TABLET ORAL
Qty: 60 TABLET | Refills: 0 | Status: SHIPPED | OUTPATIENT
Start: 2021-07-09 | End: 2021-09-17

## 2021-07-09 NOTE — PATIENT INSTRUCTIONS
Have thyroid biopsy done    Start Rybelsus 3mg once a day    Have labs done    Follow up in 3 months

## 2021-07-09 NOTE — PROGRESS NOTES
ENDOCRINOLOGY  FOLLOW UP VISIT      Reason for Endocrine Consult/Chief Complaint: DM follow up    Medical Decision Making:     Impression  1  Type 2 Diabetes  2  HTN  3  HLD  4  CAD s/p stents  5  Thyroid nodule left  6  Fatty liver, elevated LFTs     Recommendations:     BGs having improved sometimes having fasting hyperglycemia, continue metformin 1000mg BID AC, desires weight loss, would like to try GLP-1 agonist which will help with weight and also decrease progression of TORRES if present, he is agreeable to trying Rybelsus 3mg qday    Counseled on adverse side effects of rybelsus including nausea, vomiting, risk of pancreatitis, medullary thyroid cancer, cholecystitis, gallstones  No FHx of MEN2  He understood these risks and wished to start therapy         He would like avoid SGLT-2 inhibitor and GLP-1 agonist for now due to potential adverse side effects           HTN-controlled continue HCTZ, diuretic, ARB      HLD-LDL 61, triglycerides 123 may 2021, continue statin     Left sided 1 3cm nodule- will FNA with Mayela GOMESC 3 months  Jg OGDEN  Endocrinology           History of Present Illness:  Mr Adan Parks is a 63 year old male who presents for DM evaluation-follow up    ? PMH-DM2, HTN, HLD, CAD s/p stents   PSH-varicose vein surgery, knee surgery, hernia repair, colon surgery   FHx-mother with possible diabetes, brothers with diabetes, nieces with diabetes  SHx-quit smoking, social ETOH use,       Type of DM: 2  Age of onset: 2 years ago   Microvascular complications: neuropathy --- started Dec 2019   Macrovascular complications:CAD     Events since last visit:   remains on metformin 1000mg BID AC, januvia was too expensive    Met with weight management- desires weight loss      FBG-low mid 100s  Premeal/qHS BG- low to high 100s  No hypoglycemia      Brother had thyroid CA but now he received more information and likely benign after further investigation      ?   Review of Systems: Review of Systems   Constitutional: Negative for appetite change, chills, diaphoresis, fatigue, fever and unexpected weight change  HENT: Negative for congestion, ear pain, hearing loss, rhinorrhea, sinus pressure, sinus pain, sore throat, trouble swallowing and voice change  Eyes: Negative for photophobia, redness and visual disturbance  Respiratory: Negative for apnea, cough, chest tightness, shortness of breath, wheezing and stridor  Cardiovascular: Negative for chest pain, palpitations and leg swelling  Gastrointestinal: Negative for abdominal distention, abdominal pain, constipation, diarrhea, nausea and vomiting  Endocrine: Negative for cold intolerance, heat intolerance, polydipsia, polyphagia and polyuria  Genitourinary: Negative for difficulty urinating, dysuria, flank pain, frequency, hematuria and urgency  Musculoskeletal: Negative for arthralgias, back pain, gait problem, joint swelling and myalgias  Skin: Negative for color change, pallor, rash and wound  Allergic/Immunologic: Negative for immunocompromised state  Neurological: Negative for dizziness, tremors, syncope, weakness, light-headedness and headaches  Hematological: Negative for adenopathy  Does not bruise/bleed easily  Psychiatric/Behavioral: Negative for confusion and sleep disturbance  The patient is not nervous/anxious  ? Patient History:     Past Medical History:   Diagnosis Date    Allergic rhinitis     Anemia     Arthritis     Bundle branch block, right     CAD (coronary artery disease)     CPAP (continuous positive airway pressure) dependence     Diabetes mellitus (HCC)     borderline, controlled with diet and activity    Diverticulitis     Diverticulitis of large intestine with abscess without bleeding 12/7/2016    GERD (gastroesophageal reflux disease)     Hyperlipidemia     Hypertension     Nasal septal deformity     Neuropathy     Obesity (BMI 30-39  9)     Peptic ulceration gastric    Peptic ulceration 9/26/2019    gastric    Pneumonia     Renal disorder     Seasonal allergies     Sleep apnea     wears c-pap    Urinary tract infection     Vitamin D deficiency      Past Surgical History:   Procedure Laterality Date    COLON SIGMOID RESECTION N/A 12/16/2016    Procedure: RESECTION COLON SIGMOID;  Surgeon: Fariha Reyes MD;  [facility];  Service:     COLON SIGMOID RESECTION LAPAROSCOPIC N/A 12/16/2016    Procedure: RESECTION COLON SIGMOID LAPAROSCOPIC:CONVERTED TO [email or web address];  Surgeon: Fariha Reyes MD;  Location:  MAIN OR;  Service:     COLON SURGERY      Sigmoidectomy    COLONOSCOPY N/A 10/6/2016    Procedure: COLONOSCOPY;  Surgeon: Leopoldo Salazar MD;  Location: [facility]; Service:    Mahala Saleem CYSTOSCOPY W/ RETROGRADES Left 2/3/2017    Procedure: CYSTOSCOPY WITH BILATERAL RETROGRADES, LEFT STENT REMOVAL;  Surgeon: Alisha Corbin MD;  Location: [address] [place] [address];  Service:     ESOPHAGOGASTRODUODENOSCOPY N/A 10/6/2016    Procedure: ESOPHAGOGASTRODUODENOSCOPY (EGD); Surgeon: Leopoldo Salazar MD;  Location: [facility];   Service:     HERNIA REPAIR      KNEE ARTHROSCOPY W/ MENISCECTOMY      [place] CYSTOURETHROSCOPY,URETER CATHETER Left 12/4/2016    Procedure: CYSTOSCOPY RETROGRADE PYELOGRAM WITH INSERTION STENT URETERAL;  Surgeon: Alisha Corbin MD;  Location: [facility];  Service: Urology    VARICOSE VEIN SURGERY       Social History     Socioeconomic History    Marital status: Single     Spouse name: Not on file    Number of children: Not on file    Years of education: Not on file    Highest education level: Not on file   Occupational History    Not on file   Tobacco Use    Smoking status: Former Smoker     Packs/day: 0 50     Years: 37 00     Pack years: 18 50     Types: Cigarettes     Quit date: 3/30/2018     Years since quitting: 3 2    Smokeless tobacco: Current User     Types: Chew    Tobacco comment:  requit 3/30/18   Vaping Use    Vaping Use: Some days    Substances: Nicotine   Substance and Sexual Activity    Alcohol use: Yes     Alcohol/week: 3 0 standard drinks     Types: 3 Cans of beer per week    Drug use: No    Sexual activity: Never   Other Topics Concern    Not on file   Social History Narrative    Lives alone  Social Determinants of Health     Financial Resource Strain:     Difficulty of Paying Living Expenses:    Food Insecurity:     Worried About Running Out of Food in the Last Year:     [address] in the Last Year:    Transportation Needs:     Lack of Transportation (Medical):  Lack of Transportation (Non-Medical):    Physical Activity:     Days of Exercise per Week:     Minutes of Exercise per Session:    Stress:     Feeling of Stress :    Social Connections:     Frequency of Communication with Friends and Family:     Frequency of Social Gatherings with Friends and Family:     Attends [employer] Services:     Active Member of Clubs or Organizations:     Attends Club or Organization Meetings:     Marital Status:    Intimate Partner Violence:     Fear of Current or Ex-Partner:     Emotionally Abused:     Physically Abused:     Sexually Abused:      Family History   Problem Relation Age of Onset    Diabetes Brother     Thyroid cancer Brother     Osteoarthritis Mother     Atrial fibrillation Mother     Aortic aneurysm Father     Colon cancer Brother        Current Medications: At the time this note was written these were the medications the patient was on    Current Outpatient Medications   Medication Sig Dispense Refill    albuterol (PROVENTIL HFA,VENTOLIN HFA) 90 mcg/act inhaler Inhale 2 puffs every 4 (four) hours as needed for wheezing or shortness of breath 18 g 6    Alpha-Lipoic Acid 100 MG CAPS Take by mouth      Ascorbic Acid (VITAMIN C) 100 MG tablet Take 500 mg by mouth       aspirin 81 MG tablet Take 162 mg by mouth      atorvastatin (LIPITOR) 40 mg tablet Take 1 tablet (40 mg total) by mouth daily with dinner 90 tablet 1    capsaicin (ZOSTRIX) 0 025 % cream Apply 1 application topically 2 (two) times a day (Patient not taking: Reported on 6/4/2021) 60 g 0    Cholecalciferol (VITAMIN D3) 1000 units CAPS Take by mouth daily       cyanocobalamin (VITAMIN B-12) 100 mcg tablet Take by mouth daily      desloratadine (CLARINEX) 5 MG tablet Take 1 tablet (5 mg total) by mouth daily at bedtime 90 tablet 1    famotidine (PEPCID) 20 mg tablet Take 20 mg by mouth daily      gabapentin (NEURONTIN) 100 mg capsule TAKE 1 CAPSULE BY MOUTH IN THE MORNING AND 2 CAPSULES AT BEDTIME 270 capsule 1    glucosamine-chondroitin 500-400 MG tablet Take 1 tablet by mouth daily      glucose blood test strip 1 each by Other route daily Use as instructed 100 each 6    hydrochlorothiazide (HYDRODIURIL) 25 mg tablet Take 1 tablet by mouth once daily 90 tablet 0    losartan (COZAAR) 50 mg tablet Take 1 tablet by mouth once daily 90 tablet 1    metFORMIN (GLUCOPHAGE) 1000 MG tablet Take 1 tablet (1,000 mg total) by mouth 2 (two) times a day with meals Take 2 tablets (1000mg) twice a day with meals 180 tablet 1    Multiple Vitamin (MULTIVITAMIN) capsule Take 1 capsule by mouth daily      nitroglycerin (NITROSTAT) 0 3 mg SL tablet Place 1 tablet (0 3 mg total) under the tongue every 5 (five) minutes as needed for chest pain 25 tablet 1    ONETOUCH DELICA LANCETS 80S MISC 1 Units by Does not apply route daily 100 each 6    torsemide (DEMADEX) 10 mg tablet TAKE 1 TABLET BY MOUTH ONCE DAILY (Patient taking differently: daily as needed ) 90 tablet 3     No current facility-administered medications for this visit  Allergies: Levaquin [levofloxacin in d5w] and Sulfa antibiotics    Physical Exam:   Vital Signs:   /80   Pulse (!) 44   Temp (!) 97 °F (36 1 °C)   Wt 124 kg (274 lb)   BMI 37 16 kg/m²     Physical Exam  Vitals reviewed  Constitutional:       General: He is not in acute distress       Appearance: Normal appearance  He is not ill-appearing, toxic-appearing or diaphoretic  HENT:      Head: Normocephalic and atraumatic  Right Ear: External ear normal       Left Ear: External ear normal       Nose: Nose normal    Eyes:      General: No scleral icterus  Extraocular Movements: Extraocular movements intact  Conjunctiva/sclera: Conjunctivae normal    Neck:      Comments: No thyromegaly or nodules  Cardiovascular:      Rate and Rhythm: Normal rate and regular rhythm  Heart sounds: Normal heart sounds  No murmur heard  No friction rub  No gallop  Pulmonary:      Effort: Pulmonary effort is normal  No respiratory distress  Breath sounds: Normal breath sounds  No stridor  No wheezing, rhonchi or rales  Abdominal:      General: Bowel sounds are normal  There is no distension  Palpations: Abdomen is soft  There is no mass  Tenderness: There is no abdominal tenderness  There is no guarding or rebound  Hernia: No hernia is present  Musculoskeletal:         General: No swelling  Normal range of motion  Cervical back: Normal range of motion and neck supple  No tenderness  Lymphadenopathy:      Cervical: No cervical adenopathy  Skin:     General: Skin is warm and dry  Coloration: Skin is not pale  Findings: No erythema or rash  Neurological:      General: No focal deficit present  Mental Status: He is alert and oriented to person, place, and time  Psychiatric:         Mood and Affect: Mood normal          Behavior: Behavior normal          Thought Content: Thought content normal          Judgment: Judgment normal              Labs and Imaging:    THYROID ULTRASOUND     INDICATION:    E04 1: Nontoxic single thyroid nodule      Palpable abnormality        COMPARISON:  None     TECHNIQUE:   Ultrasound of the thyroid was performed with a high frequency linear transducer in transverse and sagittal planes including volumetric imaging sweeps as well as traditional still imaging technique      FINDINGS:  Normal homogeneous smooth echotexture      Right lobe:  4 7 x 1 8 x 2 0 cm  Left lobe:  5 3 x 1 8 x 1 9 cm  Isthmus:  0 2 cm      Nodule #1  Images 40 and 41  LEFT midgland nodule measuring 1 0 x 0 9 x 1 3 cm  COMPOSITION:  2 points, solid or almost completely solid   ECHOGENICITY:  2 points, hypoechoic  SHAPE:  0 points, wider-than-tall  MARGIN: 0 points, smooth  ECHOGENIC FOCI:  0 points, none or large comet-tail artifacts  TI-RADS Classification: TR 4 (4-6 points), FNA if > 1 5 cm  Follow if > 1cm        There are additional nodules of lesser size and/or TI-RADS score  These do not necessitate additional evaluation based on ACR criteria             IMPRESSION:     Several small left lobe nodules  No nodule meets current ACR criteria for requiring biopsy but followup ultrasound is recommended in 1 year             Reference: ACR Thyroid Imaging, Reporting and Data System (TI-RADS): White Paper of the [employer]   J AM Essence Radiol 3927;25:979-599  (additional recommendations based on American Thyroid Association 2015 guidelines )        Workstation performed: [id number]

## 2021-07-13 ENCOUNTER — HOSPITAL ENCOUNTER (OUTPATIENT)
Dept: RADIOLOGY | Facility: HOSPITAL | Age: 61
Discharge: HOME | End: 2021-07-13
Attending: INTERNAL MEDICINE
Payer: COMMERCIAL

## 2021-07-13 DIAGNOSIS — S22.000A COMPRESSION FRACTURE OF BODY OF THORACIC VERTEBRA (HCC): ICD-10-CM

## 2021-07-13 PROCEDURE — 77080 DXA BONE DENSITY AXIAL: CPT

## 2021-07-14 ENCOUNTER — TELEPHONE (OUTPATIENT)
Dept: NEUROLOGY | Facility: CLINIC | Age: 61
End: 2021-07-14

## 2021-07-14 NOTE — TELEPHONE ENCOUNTER
THE [facility] for patient to [facility] and confirm appointment with ROSALINA Guerrero  Gave direct numbers in [place]

## 2021-07-16 ENCOUNTER — OFFICE VISIT (OUTPATIENT)
Dept: NEUROLOGY | Facility: CLINIC | Age: 61
End: 2021-07-16
Payer: COMMERCIAL

## 2021-07-16 VITALS
SYSTOLIC BLOOD PRESSURE: 120 MMHG | WEIGHT: 274 LBS | DIASTOLIC BLOOD PRESSURE: 80 MMHG | BODY MASS INDEX: 38.36 KG/M2 | HEART RATE: 50 BPM | HEIGHT: 71 IN

## 2021-07-16 DIAGNOSIS — M51.37 DISC DISEASE, DEGENERATIVE, LUMBAR OR LUMBOSACRAL: ICD-10-CM

## 2021-07-16 DIAGNOSIS — G62.9 NEUROPATHY: Primary | ICD-10-CM

## 2021-07-16 DIAGNOSIS — S22.000A COMPRESSION FRACTURE OF BODY OF THORACIC VERTEBRA (HCC): ICD-10-CM

## 2021-07-16 DIAGNOSIS — G89.29 CHRONIC LEFT-SIDED LOW BACK PAIN WITH LEFT-SIDED SCIATICA: ICD-10-CM

## 2021-07-16 DIAGNOSIS — M54.42 CHRONIC LEFT-SIDED LOW BACK PAIN WITH LEFT-SIDED SCIATICA: ICD-10-CM

## 2021-07-16 DIAGNOSIS — E53.8 B12 DEFICIENCY: ICD-10-CM

## 2021-07-16 DIAGNOSIS — E11.9 TYPE 2 DIABETES MELLITUS WITHOUT COMPLICATION, WITHOUT LONG-TERM CURRENT USE OF INSULIN (HCC): ICD-10-CM

## 2021-07-16 PROCEDURE — 3008F BODY MASS INDEX DOCD: CPT | Performed by: INTERNAL MEDICINE

## 2021-07-16 PROCEDURE — 96372 THER/PROPH/DIAG INJ SC/IM: CPT | Performed by: PHYSICIAN ASSISTANT

## 2021-07-16 PROCEDURE — 99214 OFFICE O/P EST MOD 30 MIN: CPT | Performed by: PHYSICIAN ASSISTANT

## 2021-07-16 RX ORDER — CYANOCOBALAMIN 1000 UG/ML
1000 INJECTION INTRAMUSCULAR; SUBCUTANEOUS ONCE
Status: COMPLETED | OUTPATIENT
Start: 2021-07-16 | End: 2021-07-16

## 2021-07-16 RX ADMIN — CYANOCOBALAMIN 1000 MCG: 1000 INJECTION INTRAMUSCULAR; SUBCUTANEOUS at 09:56

## 2021-07-16 NOTE — PROGRESS NOTES
Patient ID: Nelda Niño is a 64 y o  male  Assessment/Plan:     Diagnoses and all orders for this visit:    Neuropathy  -     Heavy metals screen; Future  -     Heavy metals screen    B12 deficiency  -     cyanocobalamin injection 1,000 mcg    Disc disease, degenerative, lumbar or lumbosacral    Chronic left-sided low back pain with left-sided sciatica    Type 2 diabetes mellitus without complication, without long-term current use of insulin (HCC)    Compression fracture of body of thoracic vertebra (HCC)         We discussed that the probable cause of neuropathy is diabetes which has thankfully become increasingly better controlled  In May last A1c 7 2%  He will continue to follow with endocrinology, and considering taking the medication semaglutide for weight reduction  He was asked to consider Topamax for weight loss per weight management group, however I do not know if this would be the best option for him since he already has numbness and tingling, and this is a potential side effect of Topamax  Let's see how semaglutide works for him  He will also continue to work on exercise when possible, however his schedule does not allow for frequent aerobic exercise at this time  A large component of back pain and possible left greater than right lower extremity pain and numbness, could correlate with lumbar MRI findings of chronic T11 compression fracture ( DEXA scan normal, and he is unsure where he got this or when he got this), focal disc protrusion at L3-4 and multilevel degenerative changes as described in the report  He was instructed to proceed with either physical therapy or pain management opinion for these findings  Continue B12 injections weekly for now  Eventually we will transition to monthly B12 injections  The patient should not hesitate to call me prior to his follow up with any questions or concerns  Subjective:    HPI    He continues to follow with endocrinology    It was noted that blood glucose levels have improved  He was prescribed semaglutide, in particular for weight loss  He did not try it yet for fear of potential side effects  He is considering when the best time is to try it  He saw weight management in June  They recommended to consider Topamax as a weight loss medication, however the patient is hesitant to try this because it can worsen numbness and tingling which he already experiences mostly in the lower extremities  Continues to follow with pulmonology for severe obstructive sleep apnea, on auto CPAP        lumbar MRI was performed to assess worsening lower extremity numbness and pain left greater than right, done on 4/16/2021:  "Multilevel degenerative changes as described above  Focal disc protrusion  at L3-4 possibly impinging on the L4 descending nerve root  Chronic T11 compression fracture with mild kyphosis "    The patient does feel that his numbness is worsening slightly and moving up the feet  He is wondering if there is any other cause other than blood glucose levels, history of alcohol abuse  Will also refer to last HPI on 3/19/2021  The following portions of the patient's history were reviewed and updated as appropriate:   He  has a past medical history of Allergic rhinitis, Anemia, Arthritis, Bundle branch block, right, CAD (coronary artery disease), CPAP (continuous positive airway pressure) dependence, Diabetes mellitus ([address] ), Diverticulitis, Diverticulitis of large intestine with abscess without bleeding (12/7/2016), GERD (gastroesophageal reflux disease), Hyperlipidemia, Hypertension, Nasal septal deformity, Neuropathy, Obesity (BMI 30-39 9), Peptic ulceration, Peptic ulceration (9/26/2019), Pneumonia, Renal disorder, Seasonal allergies, Sleep apnea, Urinary tract infection, and Vitamin D deficiency    He   Patient Active Problem List    Diagnosis Date Noted    Compression fracture of body of thoracic vertebra ([address] ) 08/20/2021    Disc disease, degenerative, lumbar or lumbosacral 08/20/2021    B12 deficiency 07/16/2021    Wheezing 06/20/2021    Thyroid nodule 05/14/2021    Varicose veins of leg with swelling, bilateral 03/21/2021    Chronic left-sided low back pain with left-sided sciatica 03/19/2021    Contusion of right knee 08/25/2020    Impacted cerumen of left ear 08/07/2020    Onychomycosis of toenail 07/31/2020    Carpal tunnel syndrome of left wrist     Ulnar neuropathy at elbow of left upper extremity     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 2 diabetes mellitus ([facility] [address] ) 01/23/2020    Neuropathy 01/03/2020    GERD (gastroesophageal reflux disease) 09/26/2019    Nasal septal deformity 09/26/2019    Bundle branch block, right 09/26/2019    CPAP (continuous positive airway pressure) dependence 09/26/2019    Vitamin D deficiency 09/26/2019    BPH (benign prostatic hyperplasia) 09/26/2019    History of vertebral compression fracture 09/26/2019    Colon polyp 09/26/2019    History of angioplasty 09/26/2019    Ocular migraine 09/26/2019    Inguinal hernia 09/26/2019    Primary osteoarthritis of left knee 33/45/3367    Umbilical hernia 61/05/1210    Bradycardia 09/26/2019    Asymptomatic varicose veins of both lower extremities 09/26/2019    Seasonal allergic rhinitis due to pollen 06/28/2019    Obstructive sleep apnea 07/06/2018    Obesity (BMI 30-39 9) 07/06/2018    Asymptomatic PVCs 05/01/2018    Chronic pain of both knees 03/06/2018    Primary osteoarthritis of knees, bilateral 03/06/2018    SOB (shortness of breath) 12/01/2017    CAD (coronary artery disease) 11/29/2017    Class 2 obesity due to excess calories without serious comorbidity with body mass index (BMI) of 37 0 to 37 9 in adult 11/22/2017    Elevated liver enzymes 12/04/2016    Polyarthralgia 12/04/2016    Hydronephrosis 12/04/2016    Diabetes mellitus ([facility] [address] )     Hyperlipidemia     Essential hypertension     Sleep apnea      He  has a past surgical history that includes Hernia repair; Varicose vein surgery; Knee arthroscopy w/ meniscectomy; Cystoscopy (Left, 2/3/2017); COLON SIGMOID RESECTION LAPAROSCOPIC (N/A, 12/16/2016); COLON SIGMOID RESECTION (N/A, 12/16/2016); pr cystourethroscopy,ureter catheter (Left, 12/4/2016); Esophagogastroduodenoscopy (N/A, 10/6/2016); Colonoscopy (N/A, 10/6/2016); and Colon surgery  His family history includes Aortic aneurysm in his father; Atrial fibrillation in his mother; Colon cancer in his brother; Diabetes in his brother; Osteoarthritis in his mother  He  reports that he quit smoking about 3 years ago  His smoking use included cigarettes  He has a 18 50 pack-year smoking history  His smokeless tobacco use includes chew  He reports current alcohol use of about 3 0 standard drinks of alcohol per week  He reports that he does not use drugs    Current Outpatient Medications   Medication Sig Dispense Refill    albuterol (PROVENTIL HFA,VENTOLIN HFA) 90 mcg/act inhaler Inhale 2 puffs every 4 (four) hours as needed for wheezing or shortness of breath 18 g 6    Alpha-Lipoic Acid 100 MG CAPS Take by mouth      Ascorbic Acid (VITAMIN C) 100 MG tablet Take 500 mg by mouth       aspirin 81 MG tablet Take 162 mg by mouth      atorvastatin (LIPITOR) 40 mg tablet Take 1 tablet (40 mg total) by mouth daily with dinner 90 tablet 1    Cholecalciferol (VITAMIN D3) 1000 units CAPS Take by mouth daily       cyanocobalamin (VITAMIN B-12) 100 mcg tablet Take by mouth daily      desloratadine (CLARINEX) 5 MG tablet Take 1 tablet (5 mg total) by mouth daily at bedtime 90 tablet 1    famotidine (PEPCID) 20 mg tablet Take 20 mg by mouth daily      gabapentin (NEURONTIN) 100 mg capsule TAKE 1 CAPSULE BY MOUTH IN THE MORNING AND 2 CAPSULES AT BEDTIME 270 capsule 1    glucosamine-chondroitin 500-400 MG tablet Take 1 tablet by mouth daily      glucose blood test strip 1 each by Other route daily Use as instructed 100 each 6    losartan (COZAAR) 50 mg tablet Take 1 tablet by mouth once daily 90 tablet 1    metFORMIN (GLUCOPHAGE) 1000 MG tablet Take 1 tablet (1,000 mg total) by mouth 2 (two) times a day with meals Take 2 tablets (1000mg) twice a day with meals 180 tablet 1    Multiple Vitamin (MULTIVITAMIN) capsule Take 1 capsule by mouth daily      nitroglycerin (NITROSTAT) 0 3 mg SL tablet Place 1 tablet (0 3 mg total) under the tongue every 5 (five) minutes as needed for chest pain 25 tablet 1    ONETOUCH DELICA LANCETS 86B MISC 1 Units by Does not apply route daily 100 each 6    torsemide (DEMADEX) 10 mg tablet TAKE 1 TABLET BY MOUTH ONCE DAILY (Patient taking differently: daily as needed ) 90 tablet 3    capsaicin (ZOSTRIX) 0 025 % cream Apply 1 application topically 2 (two) times a day (Patient not taking: Reported on 6/4/2021) 60 g 0    hydrochlorothiazide (HYDRODIURIL) 25 mg tablet Take 1 tablet by mouth once daily 90 tablet 0    Semaglutide (Rybelsus) 3 MG TABS Take 3 mg by mouth daily with breakfast 60 tablet 0     Current Facility-Administered Medications   Medication Dose Route Frequency Provider Last Rate Last Admin    cyanocobalamin injection 1,000 mcg  1,000 mcg Intramuscular Weekly Nataliia Sarabia PA-C   1,000 mcg at 08/20/21 1200     He is allergic to levaquin [levofloxacin in d5w] and sulfa antibiotics            Objective:    Blood pressure 120/80, pulse (!) 50, height 5' 11" (1 803 m), weight 124 kg (274 lb)  Body mass index is 38 22 kg/m²  Physical Exam    Neurological Exam  Vital signs reviewed  Well developed, well nourished  Head: Normocephalic, atraumatic  CN 4-09: intact and symmetric, including EOMs which are normal b/l and PERRL  MSK: 5/5 t/o  ROM normal x all 4 extr  Sensation: Inact to LT in both LEs below the knee, however reduced to pin in a length dependent fashion in both LEs more so in the left  Vibration also reduced, at the ankles b/l  Reflexes: 2+ and symmetric in all 4 extr  , 1+ ankles b/l   Nonfocal t/o   Coordination: Nml x4 extr  Gait: Steady normal gait  ROS:    Review of Systems   Constitutional: Negative  Negative for appetite change and fever  HENT: Positive for hearing loss and tinnitus  Negative for trouble swallowing and voice change  Eyes: Negative  Negative for photophobia and pain  Respiratory: Positive for shortness of breath  Cardiovascular: Positive for palpitations  Gastrointestinal: Negative  Negative for nausea and vomiting  Endocrine: Negative  Negative for cold intolerance  Genitourinary: Negative  Negative for dysuria, frequency and urgency  Musculoskeletal: Negative  Negative for myalgias and neck pain  Skin: Negative  Negative for rash  Neurological: Positive for numbness  Negative for dizziness, tremors, seizures, syncope, facial asymmetry, speech difficulty, weakness, light-headedness and headaches  Patient stated that he has bilateral numbness and tingling in feet  Hematological: Negative  Does not bruise/bleed easily  Psychiatric/Behavioral: Positive for confusion  Negative for hallucinations and sleep disturbance  The following portions of the patient's history were reviewed and updated as appropriate: allergies, current medications/ medication history, past family history, past medical history, past social history, past surgical history and problem list     Review of systems was reviewed and otherwise unremarkable from a neurological perspective

## 2021-07-23 ENCOUNTER — TELEPHONE (OUTPATIENT)
Dept: NEUROLOGY | Facility: CLINIC | Age: 61
End: 2021-07-23

## 2021-07-23 DIAGNOSIS — G62.9 NEUROPATHY: Primary | ICD-10-CM

## 2021-07-23 NOTE — TELEPHONE ENCOUNTER
Pt  Not happy with Dr Dana Albarado office  Would like to be referred to different podiatrist   Please advise

## 2021-07-29 DIAGNOSIS — I10 ESSENTIAL HYPERTENSION: ICD-10-CM

## 2021-07-30 DIAGNOSIS — I10 ESSENTIAL HYPERTENSION: ICD-10-CM

## 2021-07-30 RX ORDER — HYDROCHLOROTHIAZIDE 25 MG/1
TABLET ORAL
Qty: 90 TABLET | Refills: 0 | Status: SHIPPED | OUTPATIENT
Start: 2021-07-30 | End: 2022-02-03

## 2021-07-30 RX ORDER — HYDROCHLOROTHIAZIDE 25 MG/1
TABLET ORAL
Qty: 90 TABLET | Refills: 0 | Status: SHIPPED | OUTPATIENT
Start: 2021-07-30 | End: 2021-07-30

## 2021-08-04 ENCOUNTER — TELEPHONE (OUTPATIENT)
Dept: BARIATRICS | Facility: CLINIC | Age: 61
End: 2021-08-04

## 2021-08-04 ENCOUNTER — TELEPHONE (OUTPATIENT)
Dept: NEUROLOGY | Facility: CLINIC | Age: 61
End: 2021-08-04

## 2021-08-04 NOTE — TELEPHONE ENCOUNTER
THE [facility] for patient to [facility] and confirm appointment with Deirdre Pillai  Gave direct numbers in [place]

## 2021-08-05 ENCOUNTER — TELEPHONE (OUTPATIENT)
Dept: BARIATRICS | Facility: CLINIC | Age: 61
End: 2021-08-05

## 2021-08-05 NOTE — TELEPHONE ENCOUNTER
Left several msgs to give the office a call to reschedule appt  I also sent a email to [email or web address] confirming cancellation of appt

## 2021-08-06 ENCOUNTER — CLINICAL SUPPORT (OUTPATIENT)
Dept: NEUROLOGY | Facility: CLINIC | Age: 61
End: 2021-08-06
Payer: COMMERCIAL

## 2021-08-06 ENCOUNTER — OFFICE VISIT (OUTPATIENT)
Dept: PODIATRY | Facility: CLINIC | Age: 61
End: 2021-08-06
Payer: COMMERCIAL

## 2021-08-06 ENCOUNTER — HOSPITAL ENCOUNTER (EMERGENCY)
Facility: HOSPITAL | Age: 61
Discharge: HOME | End: 2021-08-06
Attending: EMERGENCY MEDICINE
Payer: OTHER MISCELLANEOUS

## 2021-08-06 VITALS
BODY MASS INDEX: 38.36 KG/M2 | TEMPERATURE: 97 F | WEIGHT: 274 LBS | OXYGEN SATURATION: 98 % | DIASTOLIC BLOOD PRESSURE: 88 MMHG | SYSTOLIC BLOOD PRESSURE: 184 MMHG | RESPIRATION RATE: 16 BRPM | HEIGHT: 71 IN | HEART RATE: 57 BPM

## 2021-08-06 VITALS
HEIGHT: 71 IN | SYSTOLIC BLOOD PRESSURE: 120 MMHG | WEIGHT: 274 LBS | BODY MASS INDEX: 38.36 KG/M2 | DIASTOLIC BLOOD PRESSURE: 80 MMHG | RESPIRATION RATE: 17 BRPM

## 2021-08-06 DIAGNOSIS — E11.40 NEUROPATHY DUE TO TYPE 2 DIABETES MELLITUS (HCC): Primary | ICD-10-CM

## 2021-08-06 DIAGNOSIS — E53.8 B12 DEFICIENCY: Primary | ICD-10-CM

## 2021-08-06 DIAGNOSIS — B35.1 ONYCHOMYCOSIS: ICD-10-CM

## 2021-08-06 DIAGNOSIS — M79.671 PAIN IN BOTH FEET: ICD-10-CM

## 2021-08-06 DIAGNOSIS — Z20.3 NEED FOR POST EXPOSURE PROPHYLAXIS FOR RABIES: Primary | ICD-10-CM

## 2021-08-06 DIAGNOSIS — M79.672 PAIN IN BOTH FEET: ICD-10-CM

## 2021-08-06 PROCEDURE — 99281 EMR DPT VST MAYX REQ PHY/QHP: CPT | Performed by: PHYSICIAN ASSISTANT

## 2021-08-06 PROCEDURE — 86382 NEUTRALIZATION TEST VIRAL: CPT | Performed by: PHYSICIAN ASSISTANT

## 2021-08-06 PROCEDURE — 36415 COLL VENOUS BLD VENIPUNCTURE: CPT | Performed by: PHYSICIAN ASSISTANT

## 2021-08-06 PROCEDURE — 90471 IMMUNIZATION ADMIN: CPT

## 2021-08-06 PROCEDURE — 99282 EMERGENCY DEPT VISIT SF MDM: CPT

## 2021-08-06 PROCEDURE — 96372 THER/PROPH/DIAG INJ SC/IM: CPT | Performed by: NURSE PRACTITIONER

## 2021-08-06 PROCEDURE — 99212 OFFICE O/P EST SF 10 MIN: CPT | Performed by: PODIATRIST

## 2021-08-06 PROCEDURE — 11721 DEBRIDE NAIL 6 OR MORE: CPT | Performed by: PODIATRIST

## 2021-08-06 PROCEDURE — 90675 RABIES VACCINE IM: CPT | Performed by: PHYSICIAN ASSISTANT

## 2021-08-06 RX ORDER — CYANOCOBALAMIN 1000 UG/ML
1000 INJECTION INTRAMUSCULAR; SUBCUTANEOUS
Status: CANCELLED | OUTPATIENT
Start: 2021-08-06

## 2021-08-06 RX ADMIN — Medication 1 ML: at 13:15

## 2021-08-06 RX ADMIN — CYANOCOBALAMIN 1000 MCG: 1000 INJECTION INTRAMUSCULAR; SUBCUTANEOUS at 16:15

## 2021-08-06 NOTE — PROGRESS NOTES
Patient came into [address] [place] [address] office today for B 12 injection given into left deltoid, Patient done well

## 2021-08-06 NOTE — ED PROVIDER NOTES
History  Chief Complaint   Patient presents with    Follow Up Rabies     patient decapitated feral cat on Monday, states he found out that the cat came back positive for rabies  Was wearing all necessary PPE  64year old male presents with rabies exposure  He states he was handling a dead feral cat a few days ago while wearing appropriate PPE  He states he is already immunized against rabies however has needed rabies boosters several times in the past due to this  Requesting to have his rabies titers checked  He sustained no injuries  He offers no complaints  Prior to Admission Medications   Prescriptions Last Dose Informant Patient Reported? Taking?    Alpha-Lipoic Acid 100 MG CAPS   Yes No   Sig: Take by mouth   Ascorbic Acid (VITAMIN C) 100 MG tablet  Self Yes No   Sig: Take 500 mg by mouth    Cholecalciferol (VITAMIN D3) 1000 units CAPS  Self Yes No   Sig: Take by mouth daily    Multiple Vitamin (MULTIVITAMIN) capsule  Self Yes No   Sig: Take 1 capsule by mouth daily   ONETOUCH DELICA LANCETS 90N MISC  Self No No   Si Units by Does not apply route daily   Semaglutide (Rybelsus) 3 MG TABS   No No   Sig: Take 3 mg by mouth daily with breakfast   Patient not taking: Reported on 2021   albuterol (PROVENTIL HFA,VENTOLIN HFA) 90 mcg/act inhaler   No No   Sig: Inhale 2 puffs every 4 (four) hours as needed for wheezing or shortness of breath   aspirin 81 MG tablet  Self Yes No   Sig: Take 162 mg by mouth   atorvastatin (LIPITOR) 40 mg tablet   No No   Sig: Take 1 tablet (40 mg total) by mouth daily with dinner   capsaicin (ZOSTRIX) 0 025 % cream   No No   Sig: Apply 1 application topically 2 (two) times a day   Patient not taking: Reported on 2021   cyanocobalamin (VITAMIN B-12) 100 mcg tablet   Yes No   Sig: Take by mouth daily   desloratadine (CLARINEX) 5 MG tablet   No No   Sig: Take 1 tablet (5 mg total) by mouth daily at bedtime   famotidine (PEPCID) 20 mg tablet   Yes No   Sig: Take 20 mg by mouth daily   gabapentin (NEURONTIN) 100 mg capsule   No No   Sig: TAKE 1 CAPSULE BY MOUTH IN THE MORNING AND 2 CAPSULES AT BEDTIME   glucosamine-chondroitin 500-400 MG tablet  Self Yes No   Sig: Take 1 tablet by mouth daily   glucose blood test strip  Self No No   Si each by Other route daily Use as instructed   hydrochlorothiazide (HYDRODIURIL) 25 mg tablet   No No   Sig: Take 1 tablet by mouth once daily   losartan (COZAAR) 50 mg tablet   No No   Sig: Take 1 tablet by mouth once daily   metFORMIN (GLUCOPHAGE) 1000 MG tablet   No No   Sig: Take 1 tablet (1,000 mg total) by mouth 2 (two) times a day with meals Take 2 tablets (1000mg) twice a day with meals   nitroglycerin (NITROSTAT) 0 3 mg SL tablet  Self No No   Sig: Place 1 tablet (0 3 mg total) under the tongue every 5 (five) minutes as needed for chest pain   torsemide (DEMADEX) 10 mg tablet  Self No No   Sig: TAKE 1 TABLET BY MOUTH ONCE DAILY   Patient taking differently: daily as needed       Facility-Administered Medications: None       Past Medical History:   Diagnosis Date    Allergic rhinitis     Anemia     Arthritis     Bundle branch block, right     CAD (coronary artery disease)     CPAP (continuous positive airway pressure) dependence     Diabetes mellitus (HCC)     borderline, controlled with diet and activity    Diverticulitis     Diverticulitis of large intestine with abscess without bleeding 2016    GERD (gastroesophageal reflux disease)     Hyperlipidemia     Hypertension     Nasal septal deformity     Neuropathy     Obesity (BMI 30-39  9)     Peptic ulceration     gastric    Peptic ulceration 2019    gastric    Pneumonia     Renal disorder     Seasonal allergies     Sleep apnea     wears c-pap    Urinary tract infection     Vitamin D deficiency        Past Surgical History:   Procedure Laterality Date    COLON SIGMOID RESECTION N/A 2016    Procedure: RESECTION COLON SIGMOID;  Surgeon: Li Barkley Sam Holcomb MD;  Location:  MAIN OR;  Service:     COLON SIGMOID RESECTION LAPAROSCOPIC N/A 12/16/2016    Procedure: RESECTION COLON SIGMOID LAPAROSCOPIC:CONVERTED TO [email or web address];  Surgeon: Vanessa Osman MD;  Location:  MAIN OR;  Service:     COLON SURGERY      Sigmoidectomy    COLONOSCOPY N/A 10/6/2016    Procedure: COLONOSCOPY;  Surgeon: Alfred Garcia MD;  Location: [facility] GI LAB; Service:    Lacy Yenifer CYSTOSCOPY W/ RETROGRADES Left 2/3/2017    Procedure: CYSTOSCOPY WITH BILATERAL RETROGRADES, LEFT STENT REMOVAL;  Surgeon: Rafael Barajas MD;  Location: [address] [place] [address];  Service:     ESOPHAGOGASTRODUODENOSCOPY N/A 10/6/2016    Procedure: ESOPHAGOGASTRODUODENOSCOPY (EGD); Surgeon: Alfred Garcia MD;  Location: [facility] GI LAB; Service:     HERNIA REPAIR      KNEE ARTHROSCOPY W/ MENISCECTOMY      [place] CYSTOURETHROSCOPY,URETER CATHETER Left 12/4/2016    Procedure: CYSTOSCOPY RETROGRADE PYELOGRAM WITH INSERTION STENT URETERAL;  Surgeon: Rafael Barajas MD;  Location: [facility];  Service: Urology    VARICOSE VEIN SURGERY         Family History   Problem Relation Age of Onset    Diabetes Brother     Osteoarthritis Mother     Atrial fibrillation Mother     Aortic aneurysm Father     Colon cancer Brother      I have reviewed and agree with the history as documented  E-Cigarette/Vaping    E-Cigarette Use Current Some Day User      E-Cigarette/Vaping Substances    Nicotine Yes     THC No     CBD No     Flavoring No     Other No     Unknown No      Social History     Tobacco Use    Smoking status: Former Smoker     Packs/day: 0 50     Years: 37 00     Pack years: 18 50     Types: Cigarettes     Quit date: 3/30/2018     Years since quitting: 3 3    Smokeless tobacco: Current User     Types: Chew    Tobacco comment:  requit 3/30/18   Vaping Use    Vaping Use: Some days    Substances: Nicotine   Substance Use Topics    Alcohol use:  Yes     Alcohol/week: 3 0 standard drinks     Types: 3 Cans of beer per week    Drug use: No       Review of Systems   Constitutional: Negative for chills and fever  HENT: Negative for sneezing and sore throat  Respiratory: Negative for cough and shortness of breath  Cardiovascular: Negative for chest pain, palpitations and leg swelling  Gastrointestinal: Negative for abdominal pain, constipation, diarrhea, nausea and vomiting  Musculoskeletal: Negative for back pain, gait problem, joint swelling and myalgias  Skin: Negative for color change, pallor, rash and wound  Neurological: Negative for dizziness, syncope, weakness, light-headedness, numbness and headaches  All other systems reviewed and are negative  Physical Exam  Physical Exam  Vitals and nursing note reviewed  Constitutional:       General: He is not in acute distress  Appearance: Normal appearance  He is well-developed  He is obese  He is not diaphoretic  HENT:      Head: Normocephalic and atraumatic  Nose: Nose normal    Eyes:      Extraocular Movements: Extraocular movements intact  Conjunctiva/sclera: Conjunctivae normal    Cardiovascular:      Rate and Rhythm: Normal rate and regular rhythm  Pulses: Normal pulses  Heart sounds: Normal heart sounds  No murmur heard  No friction rub  No gallop  Pulmonary:      Effort: Pulmonary effort is normal  No respiratory distress  Breath sounds: Normal breath sounds  No stridor  No wheezing or rales  Comments: Sp02 is 98% indicating adequate oxygenation on room air  Musculoskeletal:         General: Normal range of motion  Cervical back: Normal range of motion and neck supple  Skin:     General: Skin is warm and dry  Capillary Refill: Capillary refill takes less than 2 seconds  Coloration: Skin is not pale  Findings: No erythema or rash  Neurological:      Mental Status: He is alert  Mental status is at baseline           Vital Signs  ED Triage Vitals [08/06/21 1133]   Temperature Pulse Respirations Blood Pressure SpO2   (!) 97 °F (36 1 °C) 57 16 (!) 184/88 98 %      Temp src Heart Rate Source Patient Position - Orthostatic VS BP Location FiO2 (%)   -- -- Sitting Right arm --      Pain Score       --           Vitals:    08/06/21 1133   BP: (!) 184/88   Pulse: 57   Patient Position - Orthostatic VS: Sitting         Visual Acuity      ED Medications  Medications   rabies vaccine, human diploid (IMOVAX RABIES) IM injection 1 mL (1 mL Intramuscular Given 8/6/21 1315)       Diagnostic Studies  Results Reviewed     Procedure Component Value Units Date/Time    Rabies antibody titer [[id number]] Collected: 08/06/21 1316    Lab Status: In process Specimen: Blood from Arm, Left Updated: 08/06/21 1319                 No orders to display              Procedures  Procedures         ED Course                                           MDM  Number of Diagnoses or Management Options  Need for post exposure prophylaxis for rabies  Diagnosis management comments: Return in 3 days for repeat rabies booster  Sent for rabies titer  Gave patient proper education regarding diagnosis  Answered all questions  Return to ED for any worsening of symptoms otherwise follow up with primary care physician for re-evaluation  Discussed plan with patient who verbalized understanding and agreed to plan         Amount and/or Complexity of Data Reviewed  Discussion of test results with the performing providers: yes  Review and summarize past medical records: yes  Discuss the patient with other providers: yes  Independent visualization of images, tracings, or specimens: yes        Disposition  Final diagnoses:   Need for post exposure prophylaxis for rabies     Time reflects when diagnosis was documented in both MDM as applicable and the Disposition within this note     Time User Action Codes Description Comment    8/6/2021 12:07 PM Sona Greco Add [Z20 3] Need for post exposure prophylaxis for rabies       ED Disposition     ED Disposition Condition Date/Time Comment    Discharge Stable Fri Aug 6, 2021 12:07 PM Isabell Pallas [facility] discharge to home/self care              Follow-up Information     Follow up With Specialties Details Why Contact Info Additional [address] Emergency Department Emergency Medicine Go in 3 days repeat rabies booster [address] [address] [address] [address] Emergency Department, [place], [place], [address]          Discharge Medication List as of 8/6/2021 12:08 PM      CONTINUE these medications which have NOT CHANGED    Details   albuterol (PROVENTIL HFA,VENTOLIN HFA) 90 mcg/act inhaler Inhale 2 puffs every 4 (four) hours as needed for wheezing or shortness of breath, Starting Fri 6/4/2021, Normal      Alpha-Lipoic Acid 100 MG CAPS Take by mouth, Historical Med      Ascorbic Acid (VITAMIN C) 100 MG tablet Take 500 mg by mouth , Historical Med      aspirin 81 MG tablet Take 162 mg by mouth, Historical Med      atorvastatin (LIPITOR) 40 mg tablet Take 1 tablet (40 mg total) by mouth daily with dinner, Starting Fri 5/14/2021, Normal      capsaicin (ZOSTRIX) 0 025 % cream Apply 1 application topically 2 (two) times a day, Starting Fri 10/23/2020, Normal      Cholecalciferol (VITAMIN D3) 1000 units CAPS Take by mouth daily , Starting Sat 3/21/2015, Historical Med      cyanocobalamin (VITAMIN B-12) 100 mcg tablet Take by mouth daily, Historical Med      desloratadine (CLARINEX) 5 MG tablet Take 1 tablet (5 mg total) by mouth daily at bedtime, Starting Thu 3/25/2021, Normal      famotidine (PEPCID) 20 mg tablet Take 20 mg by mouth daily, Historical Med      gabapentin (NEURONTIN) 100 mg capsule TAKE 1 CAPSULE BY MOUTH IN THE MORNING AND 2 CAPSULES AT BEDTIME, Normal      glucosamine-chondroitin 500-400 MG tablet Take 1 tablet by mouth daily, Historical Med      glucose blood test strip 1 each by Other route daily Use as instructed, Starting Fri 2/7/2020, Normal      hydrochlorothiazide (HYDRODIURIL) 25 mg tablet Take 1 tablet by mouth once daily, Normal      losartan (COZAAR) 50 mg tablet Take 1 tablet by mouth once daily, Normal      metFORMIN (GLUCOPHAGE) 1000 MG tablet Take 1 tablet (1,000 mg total) by mouth 2 (two) times a day with meals Take 2 tablets (1000mg) twice a day with meals, Starting Fri 5/14/2021, Normal      Multiple Vitamin (MULTIVITAMIN) capsule Take 1 capsule by mouth daily, Historical Med      nitroglycerin (NITROSTAT) 0 3 mg SL tablet Place 1 tablet (0 3 mg total) under the tongue every 5 (five) minutes as needed for chest pain, Starting Fri 4/17/2020, Normal      ONETOUCH DELICA LANCETS 89S MISC 1 Units by Does not apply route daily, Starting Fri 2/7/2020, Normal      Semaglutide (Rybelsus) 3 MG TABS Take 3 mg by mouth daily with breakfast, Starting Fri 7/9/2021, Normal      torsemide (DEMADEX) 10 mg tablet TAKE 1 TABLET BY MOUTH ONCE DAILY, Normal           No discharge procedures on file      PDMP Review     None          ED Provider  Electronically Signed by           Dez Reyes PA-C  08/06/21 9215

## 2021-08-07 LAB
ALBUMIN SERPL-MCNC: 4.4 G/DL (ref 3.8–4.9)
ALBUMIN/CREAT UR: 4 MG/G CREAT (ref 0–29)
ALBUMIN/GLOB SERPL: 1.7 {RATIO} (ref 1.2–2.2)
ALP SERPL-CCNC: 46 IU/L (ref 48–121)
ALT SERPL-CCNC: 49 IU/L (ref 0–44)
AST SERPL-CCNC: 34 IU/L (ref 0–40)
BILIRUB SERPL-MCNC: 0.5 MG/DL (ref 0–1.2)
BUN SERPL-MCNC: 15 MG/DL (ref 6–24)
BUN/CREAT SERPL: 13 (ref 9–20)
CALCIUM SERPL-MCNC: 9.4 MG/DL (ref 8.7–10.2)
CHLORIDE SERPL-SCNC: 102 MMOL/L (ref 96–106)
CHOLEST SERPL-MCNC: 143 MG/DL (ref 100–199)
CHOLEST/HDLC SERPL: 3 RATIO (ref 0–5)
CO2 SERPL-SCNC: 24 MMOL/L (ref 20–29)
CREAT SERPL-MCNC: 1.12 MG/DL (ref 0.76–1.27)
CREAT UR-MCNC: 126.9 MG/DL
ERYTHROCYTE [DISTWIDTH] IN BLOOD BY AUTOMATED COUNT: 13.1 % (ref 11.6–15.4)
EST. AVERAGE GLUCOSE BLD GHB EST-MCNC: 140 MG/DL
GLOBULIN SER-MCNC: 2.6 G/DL (ref 1.5–4.5)
GLUCOSE SERPL-MCNC: 126 MG/DL (ref 65–99)
HBA1C MFR BLD: 6.5 % (ref 4.8–5.6)
HCT VFR BLD AUTO: 42.1 % (ref 37.5–51)
HDLC SERPL-MCNC: 48 MG/DL
HGB BLD-MCNC: 14.7 G/DL (ref 13–17.7)
LDLC SERPL CALC-MCNC: 70 MG/DL (ref 0–99)
MCH RBC QN AUTO: 34.9 PG (ref 26.6–33)
MCHC RBC AUTO-ENTMCNC: 34.9 G/DL (ref 31.5–35.7)
MCV RBC AUTO: 100 FL (ref 79–97)
MICROALBUMIN UR-MCNC: 5.1 UG/ML
PLATELET # BLD AUTO: 258 X10E3/UL (ref 150–450)
POTASSIUM SERPL-SCNC: 4.4 MMOL/L (ref 3.5–5.2)
PROT SERPL-MCNC: 7 G/DL (ref 6–8.5)
RBC # BLD AUTO: 4.21 X10E6/UL (ref 4.14–5.8)
SL AMB EGFR AFRICAN AMERICAN: 83 ML/MIN/1.73
SL AMB EGFR NON AFRICAN AMERICAN: 72 ML/MIN/1.73
SL AMB VLDL CHOLESTEROL CALC: 25 MG/DL (ref 5–40)
SODIUM SERPL-SCNC: 140 MMOL/L (ref 134–144)
TRIGL SERPL-MCNC: 142 MG/DL (ref 0–149)
TSH SERPL DL<=0.005 MIU/L-ACNC: 1.09 UIU/ML (ref 0.45–4.5)
WBC # BLD AUTO: 6.8 X10E3/UL (ref 3.4–10.8)

## 2021-08-07 PROCEDURE — 3061F NEG MICROALBUMINURIA REV: CPT | Performed by: INTERNAL MEDICINE

## 2021-08-07 PROCEDURE — 3044F HG A1C LEVEL LT 7.0%: CPT | Performed by: INTERNAL MEDICINE

## 2021-08-10 ENCOUNTER — HOSPITAL ENCOUNTER (EMERGENCY)
Facility: HOSPITAL | Age: 61
Discharge: HOME | End: 2021-08-10
Attending: EMERGENCY MEDICINE | Admitting: EMERGENCY MEDICINE
Payer: OTHER MISCELLANEOUS

## 2021-08-10 ENCOUNTER — OFFICE VISIT (OUTPATIENT)
Dept: OBGYN CLINIC | Facility: CLINIC | Age: 61
End: 2021-08-10
Payer: COMMERCIAL

## 2021-08-10 VITALS
HEIGHT: 71 IN | HEART RATE: 58 BPM | SYSTOLIC BLOOD PRESSURE: 133 MMHG | WEIGHT: 274 LBS | DIASTOLIC BLOOD PRESSURE: 62 MMHG | BODY MASS INDEX: 38.36 KG/M2

## 2021-08-10 VITALS
RESPIRATION RATE: 20 BRPM | DIASTOLIC BLOOD PRESSURE: 74 MMHG | HEART RATE: 58 BPM | OXYGEN SATURATION: 98 % | SYSTOLIC BLOOD PRESSURE: 134 MMHG | TEMPERATURE: 98.1 F

## 2021-08-10 DIAGNOSIS — G56.03 BILATERAL CARPAL TUNNEL SYNDROME: Primary | ICD-10-CM

## 2021-08-10 DIAGNOSIS — G56.23 CUBITAL TUNNEL SYNDROME, BILATERAL: ICD-10-CM

## 2021-08-10 DIAGNOSIS — Z23 ENCOUNTER FOR REPEAT ADMINISTRATION OF RABIES VACCINATION: Primary | ICD-10-CM

## 2021-08-10 LAB
ARSENIC BLD-MCNC: 8 UG/L (ref 2–23)
CADMIUM BLD-MCNC: <0.5 UG/L (ref 0–1.2)
LEAD BLD-MCNC: <1 UG/DL (ref 0–4)
MERCURY BLD-MCNC: 2.6 UG/L (ref 0–14.9)

## 2021-08-10 PROCEDURE — 99281 EMR DPT VST MAYX REQ PHY/QHP: CPT | Performed by: EMERGENCY MEDICINE

## 2021-08-10 PROCEDURE — 90675 RABIES VACCINE IM: CPT | Performed by: EMERGENCY MEDICINE

## 2021-08-10 PROCEDURE — 90471 IMMUNIZATION ADMIN: CPT

## 2021-08-10 PROCEDURE — 3008F BODY MASS INDEX DOCD: CPT | Performed by: INTERNAL MEDICINE

## 2021-08-10 PROCEDURE — 1036F TOBACCO NON-USER: CPT | Performed by: ORTHOPAEDIC SURGERY

## 2021-08-10 PROCEDURE — 99213 OFFICE O/P EST LOW 20 MIN: CPT | Performed by: ORTHOPAEDIC SURGERY

## 2021-08-10 RX ADMIN — Medication 1 ML: at 17:56

## 2021-08-10 NOTE — PROGRESS NOTES
Assessment/Plan:  1  Bilateral carpal tunnel syndrome  Ambulatory referral to Hand Surgery   2  Cubital tunnel syndrome, bilateral       Scribe Attestation    I,:  Juan Arellano am acting as a scribe while in the presence of the attending physician :       I,:  Hernandez Sawant, DO personally performed the services described in this documentation    as scribed in my presence :         Joi Chakraborty is a pleasant 64year old who presents to the office today for a follow-up evaluation of his bilateral carpal tunnel syndrome and bilateral cubital tunnel syndrome  He underwent a bilateral carpal tunnel injection on 7/2/2021  He is still experiencing relief from the bilateral corticosteroid injections  I discussed with the patient that we will continue to monitor the numbness and tingling into his hands  I discussed with the patient that he should try bulking up a towel at night for his bilateral cubital tunnel syndrome  I discussed with him that he should continue to wear the bilateral cock-up wrist braces at night when he sleeps  I will follow-up with him in 2 months for a clinical re-evaluation  He understood and had no further questions  Subjective:   Patient ID: Chris Fields is a 64 y o  male who presents to the office today for a follow-up evaluation of his bilateral carpal tunnel syndrome and bilateral cubital tunnel syndrome  He underwent a bilateral carpal tunnel injection on 7/2/2021  He states that he received relief from the bilateral corticosteroid injections  He states that he has been complaint with the use of the bilateral cock-up wrist braces at night while he sleeps  He states that the numbness and tingling he experiences is not as constant and notes mild numbness into his middle, ring and pinky fingers bilaterally  He states that he has not tired using the towel at night for his bilateral cubital tunnel syndrome       Review of Systems   Constitutional: Negative for appetite change and unexpected weight change  HENT: Negative for congestion and trouble swallowing  Eyes: Negative for visual disturbance  Respiratory: Negative for cough and shortness of breath  Cardiovascular: Negative for chest pain and palpitations  Gastrointestinal: Negative for nausea and vomiting  Endocrine: Negative for cold intolerance and heat intolerance  Genitourinary: Negative  Musculoskeletal: Negative for gait problem and myalgias  Skin: Negative for rash  Allergic/Immunologic: Negative  Neurological: Negative for weakness and numbness  Hematological: Negative  Psychiatric/Behavioral: Negative for decreased concentration  The patient is not nervous/anxious  Past Medical History:   Diagnosis Date    Allergic rhinitis     Anemia     Arthritis     Bundle branch block, right     CAD (coronary artery disease)     CPAP (continuous positive airway pressure) dependence     Diabetes mellitus (HCC)     borderline, controlled with diet and activity    Diverticulitis     Diverticulitis of large intestine with abscess without bleeding 12/7/2016    GERD (gastroesophageal reflux disease)     Hyperlipidemia     Hypertension     Nasal septal deformity     Neuropathy     Obesity (BMI 30-39  9)     Peptic ulceration     gastric    Peptic ulceration 9/26/2019    gastric    Pneumonia     Renal disorder     Seasonal allergies     Sleep apnea     wears c-pap    Urinary tract infection     Vitamin D deficiency        Past Surgical History:   Procedure Laterality Date    COLON SIGMOID RESECTION N/A 12/16/2016    Procedure: RESECTION COLON SIGMOID;  Surgeon: Andrea Powell MD;  [facility];  Service:     COLON SIGMOID RESECTION LAPAROSCOPIC N/A 12/16/2016    Procedure: RESECTION COLON SIGMOID LAPAROSCOPIC:CONVERTED TO [email or web address];  Surgeon: Andrea Powell MD;  [facility];  Service:     COLON SURGERY      Sigmoidectomy    COLONOSCOPY N/A 10/6/2016    Procedure: COLONOSCOPY; Surgeon: Louis Santana MD;  Location: [facility] GI LAB; Service:    Edvin Hunter CYSTOSCOPY W/ RETROGRADES Left 2/3/2017    Procedure: CYSTOSCOPY WITH BILATERAL RETROGRADES, LEFT STENT REMOVAL;  Surgeon: Mustapha Mcdaniel MD;  Location: [address] [place] [address];  Service:     ESOPHAGOGASTRODUODENOSCOPY N/A 10/6/2016    Procedure: ESOPHAGOGASTRODUODENOSCOPY (EGD); Surgeon: Louis Santana MD;  Location: [facility] GI LAB; Service:     HERNIA REPAIR      KNEE ARTHROSCOPY W/ MENISCECTOMY      [place] CYSTOURETHROSCOPY,URETER CATHETER Left 12/4/2016    Procedure: CYSTOSCOPY RETROGRADE PYELOGRAM WITH INSERTION STENT URETERAL;  Surgeon: Mustapha Mcdaniel MD;  Location: [facility];  Service: Urology    VARICOSE VEIN SURGERY         Family History   Problem Relation Age of Onset    Diabetes Brother     Osteoarthritis Mother     Atrial fibrillation Mother     Aortic aneurysm Father     Colon cancer Brother        Social History     Occupational History    Not on file   Tobacco Use    Smoking status: Former Smoker     Packs/day: 0 50     Years: 37 00     Pack years: 18 50     Types: Cigarettes     Quit date: 3/30/2018     Years since quitting: 3 3    Smokeless tobacco: Current User     Types: Chew    Tobacco comment:  requit 3/30/18   Vaping Use    Vaping Use: Some days    Substances: Nicotine   Substance and Sexual Activity    Alcohol use:  Yes     Alcohol/week: 3 0 standard drinks     Types: 3 Cans of beer per week    Drug use: No    Sexual activity: Never         Current Outpatient Medications:     albuterol (PROVENTIL HFA,VENTOLIN HFA) 90 mcg/act inhaler, Inhale 2 puffs every 4 (four) hours as needed for wheezing or shortness of breath, Disp: 18 g, Rfl: 6    Alpha-Lipoic Acid 100 MG CAPS, Take by mouth, Disp: , Rfl:     Ascorbic Acid (VITAMIN C) 100 MG tablet, Take 500 mg by mouth , Disp: , Rfl:     aspirin 81 MG tablet, Take 162 mg by mouth, Disp: , Rfl:     atorvastatin (LIPITOR) 40 mg tablet, Take 1 tablet (40 mg total) by mouth daily with dinner, Disp: 90 tablet, Rfl: 1    Cholecalciferol (VITAMIN D3) 1000 units CAPS, Take by mouth daily , Disp: , Rfl:     cyanocobalamin (VITAMIN B-12) 100 mcg tablet, Take by mouth daily, Disp: , Rfl:     desloratadine (CLARINEX) 5 MG tablet, Take 1 tablet (5 mg total) by mouth daily at bedtime, Disp: 90 tablet, Rfl: 1    famotidine (PEPCID) 20 mg tablet, Take 20 mg by mouth daily, Disp: , Rfl:     gabapentin (NEURONTIN) 100 mg capsule, TAKE 1 CAPSULE BY MOUTH IN THE MORNING AND 2 CAPSULES AT BEDTIME, Disp: 270 capsule, Rfl: 1    glucosamine-chondroitin 500-400 MG tablet, Take 1 tablet by mouth daily, Disp: , Rfl:     glucose blood test strip, 1 each by Other route daily Use as instructed, Disp: 100 each, Rfl: 6    hydrochlorothiazide (HYDRODIURIL) 25 mg tablet, Take 1 tablet by mouth once daily, Disp: 90 tablet, Rfl: 0    losartan (COZAAR) 50 mg tablet, Take 1 tablet by mouth once daily, Disp: 90 tablet, Rfl: 1    metFORMIN (GLUCOPHAGE) 1000 MG tablet, Take 1 tablet (1,000 mg total) by mouth 2 (two) times a day with meals Take 2 tablets (1000mg) twice a day with meals, Disp: 180 tablet, Rfl: 1    Multiple Vitamin (MULTIVITAMIN) capsule, Take 1 capsule by mouth daily, Disp: , Rfl:     nitroglycerin (NITROSTAT) 0 3 mg SL tablet, Place 1 tablet (0 3 mg total) under the tongue every 5 (five) minutes as needed for chest pain, Disp: 25 tablet, Rfl: 1    ONETOUCH DELICA LANCETS 90O MISC, 1 Units by Does not apply route daily, Disp: 100 each, Rfl: 6    Semaglutide (Rybelsus) 3 MG TABS, Take 3 mg by mouth daily with breakfast, Disp: 60 tablet, Rfl: 0    torsemide (DEMADEX) 10 mg tablet, TAKE 1 TABLET BY MOUTH ONCE DAILY (Patient taking differently: daily as needed ), Disp: 90 tablet, Rfl: 3    capsaicin (ZOSTRIX) 0 025 % cream, Apply 1 application topically 2 (two) times a day (Patient not taking: Reported on 6/4/2021), Disp: 60 g, Rfl: 0    Allergies   Allergen Reactions    Levaquin [Levofloxacin In D5w] Swelling     Knee swelling    Sulfa Antibiotics GI Intolerance     Abdominal pain         Objective:  Vitals:    08/10/21 1537   BP: 133/62   Pulse: 58       Ortho Exam   Left Upper Extremity  + Tinel's at Guyon's canal  - Durkan's at Guyon's canal  Compartments soft  Brisk capillary refill  S/m intact median, radial, and ulnar nerve     Right Upper Extremity  - Tinel's at Guyon's canal  - Durkan's at Guyon's canal  Compartments soft  Brisk capillary refill  S/m intact median, radial, and ulnar nerve     Physical Exam  Vitals reviewed  Constitutional:       Appearance: He is well-developed  HENT:      Head: Normocephalic and atraumatic  Eyes:      Conjunctiva/sclera: Conjunctivae normal       Pupils: Pupils are equal, round, and reactive to light  Cardiovascular:      Rate and Rhythm: Normal rate  Pulmonary:      Effort: Pulmonary effort is normal  No respiratory distress  Musculoskeletal:      Cervical back: Normal range of motion and neck supple  Comments: As noted in HPI   Skin:     General: Skin is warm and dry  Neurological:      Mental Status: He is alert and oriented to person, place, and time  Psychiatric:         Behavior: Behavior normal          I have personally reviewed pertinent films in PACS and my interpretation is as follows: no new images reviewed today

## 2021-08-11 NOTE — ED PROVIDER NOTES
History  Chief Complaint   Patient presents with    Follow Up Rabies     here for rabies booster      61-year-old male presents here for his 2nd rabies booster  1st 1 1 fine no other complaints      History provided by:  Patient   used: No        Prior to Admission Medications   Prescriptions Last Dose Informant Patient Reported? Taking?    Alpha-Lipoic Acid 100 MG CAPS   Yes No   Sig: Take by mouth   Ascorbic Acid (VITAMIN C) 100 MG tablet  Self Yes No   Sig: Take 500 mg by mouth    Cholecalciferol (VITAMIN D3) 1000 units CAPS  Self Yes No   Sig: Take by mouth daily    Multiple Vitamin (MULTIVITAMIN) capsule  Self Yes No   Sig: Take 1 capsule by mouth daily   ONETOUCH DELICA LANCETS 35Z MISC  Self No No   Si Units by Does not apply route daily   Semaglutide (Rybelsus) 3 MG TABS   No No   Sig: Take 3 mg by mouth daily with breakfast   albuterol (PROVENTIL HFA,VENTOLIN HFA) 90 mcg/act inhaler   No No   Sig: Inhale 2 puffs every 4 (four) hours as needed for wheezing or shortness of breath   aspirin 81 MG tablet  Self Yes No   Sig: Take 162 mg by mouth   atorvastatin (LIPITOR) 40 mg tablet   No No   Sig: Take 1 tablet (40 mg total) by mouth daily with dinner   capsaicin (ZOSTRIX) 0 025 % cream   No No   Sig: Apply 1 application topically 2 (two) times a day   Patient not taking: Reported on 2021   cyanocobalamin (VITAMIN B-12) 100 mcg tablet   Yes No   Sig: Take by mouth daily   desloratadine (CLARINEX) 5 MG tablet   No No   Sig: Take 1 tablet (5 mg total) by mouth daily at bedtime   famotidine (PEPCID) 20 mg tablet   Yes No   Sig: Take 20 mg by mouth daily   gabapentin (NEURONTIN) 100 mg capsule   No No   Sig: TAKE 1 CAPSULE BY MOUTH IN THE MORNING AND 2 CAPSULES AT BEDTIME   glucosamine-chondroitin 500-400 MG tablet  Self Yes No   Sig: Take 1 tablet by mouth daily   glucose blood test strip  Self No No   Si each by Other route daily Use as instructed   hydrochlorothiazide (HYDRODIURIL) 25 mg tablet   No No   Sig: Take 1 tablet by mouth once daily   losartan (COZAAR) 50 mg tablet   No No   Sig: Take 1 tablet by mouth once daily   metFORMIN (GLUCOPHAGE) 1000 MG tablet   No No   Sig: Take 1 tablet (1,000 mg total) by mouth 2 (two) times a day with meals Take 2 tablets (1000mg) twice a day with meals   nitroglycerin (NITROSTAT) 0 3 mg SL tablet  Self No No   Sig: Place 1 tablet (0 3 mg total) under the tongue every 5 (five) minutes as needed for chest pain   torsemide (DEMADEX) 10 mg tablet  Self No No   Sig: TAKE 1 TABLET BY MOUTH ONCE DAILY   Patient taking differently: daily as needed       Facility-Administered Medications: None       Past Medical History:   Diagnosis Date    Allergic rhinitis     Anemia     Arthritis     Bundle branch block, right     CAD (coronary artery disease)     CPAP (continuous positive airway pressure) dependence     Diabetes mellitus (HCC)     borderline, controlled with diet and activity    Diverticulitis     Diverticulitis of large intestine with abscess without bleeding 12/7/2016    GERD (gastroesophageal reflux disease)     Hyperlipidemia     Hypertension     Nasal septal deformity     Neuropathy     Obesity (BMI 30-39  9)     Peptic ulceration     gastric    Peptic ulceration 9/26/2019    gastric    Pneumonia     Renal disorder     Seasonal allergies     Sleep apnea     wears c-pap    Urinary tract infection     Vitamin D deficiency        Past Surgical History:   Procedure Laterality Date    COLON SIGMOID RESECTION N/A 12/16/2016    Procedure: RESECTION COLON SIGMOID;  Surgeon: Loly Iyer MD;  [facility];  Service:     COLON SIGMOID RESECTION LAPAROSCOPIC N/A 12/16/2016    Procedure: RESECTION COLON SIGMOID LAPAROSCOPIC:CONVERTED TO [email or web address];  Surgeon: Loly Iyer MD;  [facility];  Service:     COLON SURGERY      Sigmoidectomy    COLONOSCOPY N/A 10/6/2016    Procedure: COLONOSCOPY;  Surgeon: Roe Enamorado MD;  Location: [place] [address] [facility]; Service:    [facility] CYSTOSCOPY W/ RETROGRADES Left 2/3/2017    Procedure: CYSTOSCOPY WITH BILATERAL RETROGRADES, LEFT STENT REMOVAL;  Surgeon: Olga Nunes MD;  Location: [address] [place] [address];  Service:     ESOPHAGOGASTRODUODENOSCOPY N/A 10/6/2016    Procedure: ESOPHAGOGASTRODUODENOSCOPY (EGD); Surgeon: Roe Enamorado MD;  Location: [place] [address] [facility]; Service:     HERNIA REPAIR      KNEE ARTHROSCOPY W/ MENISCECTOMY      AR CYSTOURETHROSCOPY,URETER CATHETER Left 12/4/2016    Procedure: CYSTOSCOPY RETROGRADE PYELOGRAM WITH INSERTION STENT URETERAL;  Surgeon: Olga Nunes MD;  Location: [facility];  Service: Urology    VARICOSE VEIN SURGERY         Family History   Problem Relation Age of Onset    Diabetes Brother     Osteoarthritis Mother     Atrial fibrillation Mother     Aortic aneurysm Father     Colon cancer Brother      I have reviewed and agree with the history as documented  E-Cigarette/Vaping    E-Cigarette Use Current Some Day User      E-Cigarette/Vaping Substances    Nicotine Yes     THC No     CBD No     Flavoring No     Other No     Unknown No      Social History     Tobacco Use    Smoking status: Former Smoker     Packs/day: 0 50     Years: 37 00     Pack years: 18 50     Types: Cigarettes     Quit date: 3/30/2018     Years since quitting: 3 3    Smokeless tobacco: Current User     Types: Chew    Tobacco comment:  requit 3/30/18   Vaping Use    Vaping Use: Some days    Substances: Nicotine   Substance Use Topics    Alcohol use: Yes     Alcohol/week: 3 0 standard drinks     Types: 3 Cans of beer per week    Drug use: No       Review of Systems   Constitutional: Negative for activity change, chills, diaphoresis and fever  HENT: Negative for congestion, ear pain, nosebleeds, sore throat, trouble swallowing and voice change  Eyes: Negative for pain, discharge and redness     Respiratory: Negative for apnea, cough, choking, shortness of breath, wheezing and stridor  Cardiovascular: Negative for chest pain and palpitations  Gastrointestinal: Negative for abdominal distention, abdominal pain, constipation, diarrhea, nausea and vomiting  Endocrine: Negative for polydipsia  Genitourinary: Negative for difficulty urinating, dysuria, flank pain, frequency, hematuria and urgency  Musculoskeletal: Negative for back pain, gait problem, joint swelling, myalgias, neck pain and neck stiffness  Skin: Negative for pallor and rash  Neurological: Negative for dizziness, tremors, syncope, speech difficulty, weakness, numbness and headaches  Hematological: Negative for adenopathy  Psychiatric/Behavioral: Negative for confusion, hallucinations, self-injury and suicidal ideas  The patient is not nervous/anxious  Physical Exam  Physical Exam  Vitals and nursing note reviewed  Constitutional:       General: He is not in acute distress  Appearance: He is well-developed  He is not diaphoretic  HENT:      Head: Normocephalic and atraumatic  Right Ear: External ear normal       Left Ear: External ear normal       Nose: Nose normal    Eyes:      Conjunctiva/sclera: Conjunctivae normal       Pupils: Pupils are equal, round, and reactive to light  Cardiovascular:      Rate and Rhythm: Normal rate and regular rhythm  Heart sounds: Normal heart sounds  Pulmonary:      Effort: Pulmonary effort is normal       Breath sounds: Normal breath sounds  Abdominal:      General: Bowel sounds are normal       Palpations: Abdomen is soft  Musculoskeletal:         General: Normal range of motion  Cervical back: Normal range of motion and neck supple  Skin:     General: Skin is warm and dry  Neurological:      Mental Status: He is alert and oriented to person, place, and time  Deep Tendon Reflexes: Reflexes are normal and symmetric           Vital Signs  ED Triage Vitals [08/10/21 1759]   Temperature Pulse Respirations Blood Pressure SpO2   98 1 °F (36 7 °C) 58 20 134/74 98 %      Temp Source Heart Rate Source Patient Position - Orthostatic VS BP Location FiO2 (%)   Tympanic Monitor -- -- --      Pain Score       --           Vitals:    08/10/21 1759   BP: 134/74   Pulse: 58         Visual Acuity      ED Medications  Medications   rabies vaccine, human diploid (IMOVAX RABIES) IM injection 1 mL (1 mL Intramuscular Given 8/10/21 1756)       Diagnostic Studies  Results Reviewed     None                 No orders to display              Procedures  Procedures         ED Course                                           MDM    Disposition  Final diagnoses:   Encounter for repeat administration of rabies vaccination     Time reflects when diagnosis was documented in both MDM as applicable and the Disposition within this note     Time User Action Codes Description Comment    8/10/2021  5:44 PM Ap Michael Add [Z23] Encounter for repeat administration of rabies vaccination       ED Disposition     ED Disposition Condition Date/Time Comment    Discharge Stable Tue Aug 10, 2021  5:44 PM Benito MATHEWS [facility] discharge to home/self care              Follow-up Information     Follow up With Specialties Details Why Contact Info    Naya Mancia MD Family Medicine  As needed [facility] [employer] [facility]  [address] [place] [id number]            Discharge Medication List as of 8/10/2021  5:45 PM      CONTINUE these medications which have NOT CHANGED    Details   albuterol (PROVENTIL HFA,VENTOLIN HFA) 90 mcg/act inhaler Inhale 2 puffs every 4 (four) hours as needed for wheezing or shortness of breath, Starting Fri 6/4/2021, Normal      Alpha-Lipoic Acid 100 MG CAPS Take by mouth, Historical Med      Ascorbic Acid (VITAMIN C) 100 MG tablet Take 500 mg by mouth , Historical Med      aspirin 81 MG tablet Take 162 mg by mouth, Historical Med      atorvastatin (LIPITOR) 40 mg tablet Take 1 tablet (40 mg total) by mouth daily with dinner, Starting Fri 5/14/2021, Normal      capsaicin (ZOSTRIX) 0 025 % cream Apply 1 application topically 2 (two) times a day, Starting Fri 10/23/2020, Normal      Cholecalciferol (VITAMIN D3) 1000 units CAPS Take by mouth daily , Starting Sat 3/21/2015, Historical Med      cyanocobalamin (VITAMIN B-12) 100 mcg tablet Take by mouth daily, Historical Med      desloratadine (CLARINEX) 5 MG tablet Take 1 tablet (5 mg total) by mouth daily at bedtime, Starting Thu 3/25/2021, Normal      famotidine (PEPCID) 20 mg tablet Take 20 mg by mouth daily, Historical Med      gabapentin (NEURONTIN) 100 mg capsule TAKE 1 CAPSULE BY MOUTH IN THE MORNING AND 2 CAPSULES AT BEDTIME, Normal      glucosamine-chondroitin 500-400 MG tablet Take 1 tablet by mouth daily, Historical Med      glucose blood test strip 1 each by Other route daily Use as instructed, Starting Fri 2/7/2020, Normal      hydrochlorothiazide (HYDRODIURIL) 25 mg tablet Take 1 tablet by mouth once daily, Normal      losartan (COZAAR) 50 mg tablet Take 1 tablet by mouth once daily, Normal      metFORMIN (GLUCOPHAGE) 1000 MG tablet Take 1 tablet (1,000 mg total) by mouth 2 (two) times a day with meals Take 2 tablets (1000mg) twice a day with meals, Starting Fri 5/14/2021, Normal      Multiple Vitamin (MULTIVITAMIN) capsule Take 1 capsule by mouth daily, Historical Med      nitroglycerin (NITROSTAT) 0 3 mg SL tablet Place 1 tablet (0 3 mg total) under the tongue every 5 (five) minutes as needed for chest pain, Starting Fri 4/17/2020, Normal      ONETOUCH DELICA LANCETS 58A MISC 1 Units by Does not apply route daily, Starting Fri 2/7/2020, Normal      Semaglutide (Rybelsus) 3 MG TABS Take 3 mg by mouth daily with breakfast, Starting Fri 7/9/2021, Normal      torsemide (DEMADEX) 10 mg tablet TAKE 1 TABLET BY MOUTH ONCE DAILY, Normal           No discharge procedures on file      PDMP Review     None          ED Provider  Electronically Signed by           Burt Morris DO  08/11/21 0019

## 2021-08-13 ENCOUNTER — CLINICAL SUPPORT (OUTPATIENT)
Dept: NEUROLOGY | Facility: CLINIC | Age: 61
End: 2021-08-13
Payer: COMMERCIAL

## 2021-08-13 DIAGNOSIS — E53.8 B12 DEFICIENCY: Primary | ICD-10-CM

## 2021-08-13 PROCEDURE — 96372 THER/PROPH/DIAG INJ SC/IM: CPT | Performed by: NURSE PRACTITIONER

## 2021-08-13 RX ORDER — CYANOCOBALAMIN 1000 UG/ML
1000 INJECTION INTRAMUSCULAR; SUBCUTANEOUS
Status: CANCELLED | OUTPATIENT
Start: 2021-08-13

## 2021-08-13 RX ADMIN — CYANOCOBALAMIN 1000 MCG: 1000 INJECTION INTRAMUSCULAR; SUBCUTANEOUS at 14:52

## 2021-08-20 ENCOUNTER — CLINICAL SUPPORT (OUTPATIENT)
Dept: NEUROLOGY | Facility: CLINIC | Age: 61
End: 2021-08-20
Payer: COMMERCIAL

## 2021-08-20 VITALS — TEMPERATURE: 96.1 F

## 2021-08-20 DIAGNOSIS — E53.8 B12 DEFICIENCY: Primary | ICD-10-CM

## 2021-08-20 PROBLEM — S22.000A COMPRESSION FRACTURE OF BODY OF THORACIC VERTEBRA (HCC): Status: ACTIVE | Noted: 2021-08-20

## 2021-08-20 PROBLEM — M51.37 DISC DISEASE, DEGENERATIVE, LUMBAR OR LUMBOSACRAL: Status: ACTIVE | Noted: 2021-08-20

## 2021-08-20 PROBLEM — M51.379 DISC DISEASE, DEGENERATIVE, LUMBAR OR LUMBOSACRAL: Status: ACTIVE | Noted: 2021-08-20

## 2021-08-20 PROCEDURE — 96372 THER/PROPH/DIAG INJ SC/IM: CPT | Performed by: PHYSICIAN ASSISTANT

## 2021-08-20 RX ADMIN — CYANOCOBALAMIN 1000 MCG: 1000 INJECTION INTRAMUSCULAR; SUBCUTANEOUS at 12:00

## 2021-08-24 NOTE — PROGRESS NOTES
Patient presented for a Vitamin B12 injection  This was administered IM in his left deltoid   He tolerated the injection well and there were no immediate complications

## 2021-08-26 LAB — RABBIT EPITH IGE QN: NORMAL IU/ML

## 2021-08-27 ENCOUNTER — CLINICAL SUPPORT (OUTPATIENT)
Dept: NEUROLOGY | Facility: CLINIC | Age: 61
End: 2021-08-27
Payer: COMMERCIAL

## 2021-08-27 DIAGNOSIS — E53.8 B12 DEFICIENCY: Primary | ICD-10-CM

## 2021-08-27 PROCEDURE — 96372 THER/PROPH/DIAG INJ SC/IM: CPT | Performed by: PHYSICIAN ASSISTANT

## 2021-08-27 RX ADMIN — CYANOCOBALAMIN 1000 MCG: 1000 INJECTION INTRAMUSCULAR; SUBCUTANEOUS at 11:26

## 2021-08-27 NOTE — PROGRESS NOTES
Patient presented today for a Vitamin B12 injection  This was administered in his right deltoid  He tolerated the injection well and there were no immediate complications

## 2021-08-31 ENCOUNTER — RA CDI HCC (OUTPATIENT)
Dept: OTHER | Facility: HOSPITAL | Age: 61
End: 2021-08-31

## 2021-08-31 NOTE — PROGRESS NOTES
[facility] 75  coding opportunities          Number of diagnosis code(s) already on the problem list added to FYI fla                     Patients insurance company: [employer] (Medicare and Commercial for [employer] and [employer])           Based on clinical documentation indicated in your record, it appears that the patient may have the following conditions:    E66 01 Morbid Obesity     If this is correct, please document and assess at your next visit, September 10    [facility] [employer] 75  coding opportunities          Number of diagnosis code(s) already on the problem list added to FY fla                  Number of suggestions NOT actually used: 1     Patients insurance company: [employer] (Medicare and Commercial for [employer] and [employer])     Visit status: Patient arrived for their scheduled appointment

## 2021-09-03 ENCOUNTER — CLINICAL SUPPORT (OUTPATIENT)
Dept: NEUROLOGY | Facility: CLINIC | Age: 61
End: 2021-09-03
Payer: COMMERCIAL

## 2021-09-03 ENCOUNTER — TELEPHONE (OUTPATIENT)
Dept: NEUROLOGY | Facility: CLINIC | Age: 61
End: 2021-09-03

## 2021-09-03 DIAGNOSIS — E53.8 B12 DEFICIENCY: ICD-10-CM

## 2021-09-03 PROCEDURE — 96372 THER/PROPH/DIAG INJ SC/IM: CPT | Performed by: PHYSICIAN ASSISTANT

## 2021-09-03 RX ADMIN — CYANOCOBALAMIN 1000 MCG: 1000 INJECTION INTRAMUSCULAR; SUBCUTANEOUS at 16:07

## 2021-09-03 NOTE — PROGRESS NOTES
The patient presented in the [facility] office today for a Vitamin B12 injection  Patient received the injection in the left deltoid and tolerated this injection well  No questions or concerns at this time

## 2021-09-03 NOTE — TELEPHONE ENCOUNTER
Patient called and left   He states he forgot his appointment today and would like to reschedule for later     Rescheduled appointment for today 9/3/21 @ 3:45 at [facility]  Spoke with Moshe Morales, she will make staff aware  Patient made aware of time change

## 2021-09-03 NOTE — TELEPHONE ENCOUNTER
You can give him the 9/10 injection  The schedule did get a little messed up  After 9/10, can schedule monthly  Thanks

## 2021-09-03 NOTE — TELEPHONE ENCOUNTER
Hi [address],    Patient came into the office today which was listed as his 6/6 final B12 injection in the appt notes  Patient states he was supposed to have another injection on 9/10 because the injection that he received during the office visit with you did not count  He also states that the original injection schedule got messed up  I saw in your last note that the pt will begin to get monthly B12 injections once the weekly injections were finished so we wanted to clarify that the 10/8/21 b12 injection was the start of this? Pt was not sure why this was scheduled  Please advise if the 9/10/21 b12 is needed so we can r/s it because it was cancelled and if the 10/8/21 b12 is correct  Thank you!

## 2021-09-03 NOTE — TELEPHONE ENCOUNTER
Called patient to make him aware that I am the only one in the office today that can give injections as the other MA's have left for the day and that I am scheduled until 4:00 pm and cannot give injections past this time  Asked pt to please make sure he arrives before then to ensure he gets his injection  I let pt know if today does not work out we can schedule him next week if he cannot make it in this afternoon  LMOM for pt with this info

## 2021-09-09 ENCOUNTER — TELEPHONE (OUTPATIENT)
Dept: ENDOCRINOLOGY | Facility: CLINIC | Age: 61
End: 2021-09-09

## 2021-09-10 ENCOUNTER — OFFICE VISIT (OUTPATIENT)
Dept: CARDIOLOGY CLINIC | Facility: CLINIC | Age: 61
End: 2021-09-10
Payer: COMMERCIAL

## 2021-09-10 ENCOUNTER — CLINICAL SUPPORT (OUTPATIENT)
Dept: NEUROLOGY | Facility: CLINIC | Age: 61
End: 2021-09-10
Payer: COMMERCIAL

## 2021-09-10 ENCOUNTER — OFFICE VISIT (OUTPATIENT)
Dept: INTERNAL MEDICINE CLINIC | Facility: CLINIC | Age: 61
End: 2021-09-10
Payer: COMMERCIAL

## 2021-09-10 ENCOUNTER — TELEPHONE (OUTPATIENT)
Dept: CARDIOLOGY CLINIC | Facility: CLINIC | Age: 61
End: 2021-09-10

## 2021-09-10 ENCOUNTER — TELEPHONE (OUTPATIENT)
Dept: NEUROLOGY | Facility: CLINIC | Age: 61
End: 2021-09-10

## 2021-09-10 VITALS
OXYGEN SATURATION: 97 % | TEMPERATURE: 98.9 F | DIASTOLIC BLOOD PRESSURE: 70 MMHG | HEIGHT: 71 IN | HEART RATE: 52 BPM | SYSTOLIC BLOOD PRESSURE: 128 MMHG | WEIGHT: 279 LBS | BODY MASS INDEX: 39.06 KG/M2

## 2021-09-10 VITALS
HEART RATE: 56 BPM | WEIGHT: 276 LBS | HEIGHT: 71 IN | TEMPERATURE: 97 F | SYSTOLIC BLOOD PRESSURE: 118 MMHG | BODY MASS INDEX: 38.64 KG/M2 | DIASTOLIC BLOOD PRESSURE: 81 MMHG

## 2021-09-10 VITALS
OXYGEN SATURATION: 97 % | BODY MASS INDEX: 38.36 KG/M2 | DIASTOLIC BLOOD PRESSURE: 74 MMHG | HEART RATE: 92 BPM | HEIGHT: 71 IN | SYSTOLIC BLOOD PRESSURE: 124 MMHG | WEIGHT: 274 LBS

## 2021-09-10 DIAGNOSIS — G56.22 ULNAR NEUROPATHY AT ELBOW OF LEFT UPPER EXTREMITY: ICD-10-CM

## 2021-09-10 DIAGNOSIS — E11.40 NEUROPATHY DUE TO TYPE 2 DIABETES MELLITUS (HCC): ICD-10-CM

## 2021-09-10 DIAGNOSIS — Z12.5 SCREENING PSA (PROSTATE SPECIFIC ANTIGEN): ICD-10-CM

## 2021-09-10 DIAGNOSIS — I10 ESSENTIAL HYPERTENSION: ICD-10-CM

## 2021-09-10 DIAGNOSIS — I50.32 CHRONIC DIASTOLIC CONGESTIVE HEART FAILURE (HCC): ICD-10-CM

## 2021-09-10 DIAGNOSIS — E04.1 THYROID NODULE: ICD-10-CM

## 2021-09-10 DIAGNOSIS — E53.8 B12 DEFICIENCY: Primary | ICD-10-CM

## 2021-09-10 DIAGNOSIS — E78.2 MIXED HYPERLIPIDEMIA: ICD-10-CM

## 2021-09-10 DIAGNOSIS — E55.9 VITAMIN D DEFICIENCY: ICD-10-CM

## 2021-09-10 DIAGNOSIS — E66.09 CLASS 2 OBESITY DUE TO EXCESS CALORIES WITHOUT SERIOUS COMORBIDITY WITH BODY MASS INDEX (BMI) OF 37.0 TO 37.9 IN ADULT: ICD-10-CM

## 2021-09-10 DIAGNOSIS — E11.65 TYPE 2 DIABETES MELLITUS WITH HYPERGLYCEMIA, WITHOUT LONG-TERM CURRENT USE OF INSULIN (HCC): Primary | ICD-10-CM

## 2021-09-10 DIAGNOSIS — I25.10 CORONARY ARTERY DISEASE INVOLVING NATIVE CORONARY ARTERY OF NATIVE HEART WITHOUT ANGINA PECTORIS: ICD-10-CM

## 2021-09-10 DIAGNOSIS — I49.3 ASYMPTOMATIC PVCS: ICD-10-CM

## 2021-09-10 DIAGNOSIS — Z72.0 NICOTINE ABUSE: ICD-10-CM

## 2021-09-10 DIAGNOSIS — N40.0 BENIGN PROSTATIC HYPERPLASIA WITHOUT LOWER URINARY TRACT SYMPTOMS: ICD-10-CM

## 2021-09-10 DIAGNOSIS — G56.02 CARPAL TUNNEL SYNDROME OF LEFT WRIST: ICD-10-CM

## 2021-09-10 DIAGNOSIS — E53.8 B12 DEFICIENCY: ICD-10-CM

## 2021-09-10 DIAGNOSIS — R06.02 EXERTIONAL SHORTNESS OF BREATH: ICD-10-CM

## 2021-09-10 DIAGNOSIS — E11.9 TYPE 2 DIABETES MELLITUS WITHOUT COMPLICATION, WITHOUT LONG-TERM CURRENT USE OF INSULIN (HCC): ICD-10-CM

## 2021-09-10 DIAGNOSIS — R00.1 BRADYCARDIA: ICD-10-CM

## 2021-09-10 DIAGNOSIS — I25.10 CORONARY ARTERY DISEASE INVOLVING NATIVE CORONARY ARTERY OF NATIVE HEART WITHOUT ANGINA PECTORIS: Primary | ICD-10-CM

## 2021-09-10 PROCEDURE — 3074F SYST BP LT 130 MM HG: CPT | Performed by: INTERNAL MEDICINE

## 2021-09-10 PROCEDURE — 96372 THER/PROPH/DIAG INJ SC/IM: CPT | Performed by: NURSE PRACTITIONER

## 2021-09-10 PROCEDURE — 99214 OFFICE O/P EST MOD 30 MIN: CPT | Performed by: INTERNAL MEDICINE

## 2021-09-10 PROCEDURE — 1036F TOBACCO NON-USER: CPT | Performed by: INTERNAL MEDICINE

## 2021-09-10 PROCEDURE — 3078F DIAST BP <80 MM HG: CPT | Performed by: INTERNAL MEDICINE

## 2021-09-10 RX ORDER — CYANOCOBALAMIN 1000 UG/ML
1000 INJECTION INTRAMUSCULAR; SUBCUTANEOUS
Status: SHIPPED | OUTPATIENT
Start: 2021-09-10

## 2021-09-10 RX ADMIN — CYANOCOBALAMIN 1000 MCG: 1000 INJECTION INTRAMUSCULAR; SUBCUTANEOUS at 11:41

## 2021-09-10 NOTE — TELEPHONE ENCOUNTER
Hi [address],    Patient was in the office today for his final weekly B12 injection  Pt's next injections will be his monthly rounds  Can you please place an order in the system for monthly b12 injections so they can be scheduled? Pt would like to know if he should be supplementing in addition to the injections? He had stopped due to the injections  Pt would also like to know if he needs his B12 levels checked- states he does not believe he has had one done before  Thank you!

## 2021-09-10 NOTE — ASSESSMENT & PLAN NOTE
Seen by cardiologist   Clinically asymptomatic    For risk factor management including hypertension hyperlipidemia diabetes obesity hand continue statin as well as anti-platelet

## 2021-09-10 NOTE — ASSESSMENT & PLAN NOTE
Started smoking at age 25 has smoking 3/4 pack a day had transiently quit in between total 30 pack year    Facet CT scan of chest for screening lung cancer rec treat treatment option for the nicotine abuse known to him

## 2021-09-10 NOTE — PROGRESS NOTES
Priscilla He[facility] Office Visit Note  09/10/21     Rosalia Cheng 64 y o  male MRN: [id number]  : 1960    Assessment:     1  Type 2 diabetes mellitus with hyperglycemia, without long-term current use of insulin ([facility] [address] )    2  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 2 diabetes mellitus ([facility] [address] )  Assessment & Plan:  Neuropathy multifactorial getting vitamin B12 injections  Also component of diabetic neuropathy suspected  Symptoms are fair control  Lab Results   Component Value Date    HGBA1C 6 5 (H) 2021         3  Thyroid nodule  Assessment & Plan:  May 2021:  Ultrasound of the thyroid:Several small left lobe nodules  No nodule meets current ACR criteria for requiring biopsy but followup ultrasound is recommended in 1 year         Seen by endocrinologist   Patient is scheduled for thyroid biopsy      4  Coronary artery disease involving native coronary artery of native heart without angina pectoris  Assessment & Plan:  Seen by cardiologist   Clinically asymptomatic  For risk factor management including hypertension hyperlipidemia diabetes obesity hand continue statin as well as anti-platelet      5  Essential hypertension    6  Carpal tunnel syndrome of left wrist  Assessment & Plan:  EMG nerve conduction study done in 2020  Seen by hand surgeon  CTS surgery recommended  He will arrange according to his work schedule  Continue to recommend to use hand splint for CTS to prevent flexion of the wrist at nighttime      7  Ulnar neuropathy at elbow of left upper extremity    8  Benign prostatic hyperplasia without lower urinary tract symptoms  Assessment & Plan:   BPH reasonable  9  Mixed hyperlipidemia  Assessment & Plan:  Hyperlipidemia to the target  Continue statin  ALT normal       10  B12 deficiency  Assessment & Plan:  The recommend a B12 supplement      11  Vitamin D deficiency    12   Class 2 obesity due to excess calories without serious comorbidity with body mass index (BMI) of 37 0 to 37 9 in adult  Assessment & Plan:   BMI 38 units      13  Asymptomatic PVCs  Assessment & Plan:  Symptom-free  14  Nicotine abuse  Assessment & Plan:  Started smoking at age 25 has smoking 3/4 pack a day had transiently quit in between total 30 pack year  Facet CT scan of chest for screening lung cancer rec treat treatment option for the nicotine abuse known to him    Orders:  -     CT lung screening program; Future; Expected date: 09/10/2021    15  Screening PSA (prostate specific antigen)  -     PSA, Total Screen; Future; Expected date: 12/10/2021        Discussion Summary and Plan: Today's care plan and medications were reviewed with patient in detail and all their questions answered to their satisfaction  In summary CAD stable hypertension well controlled thyroid nodule , going for biopsy, hyperlipidemia to the target diabetes to the target BPH reasonable home B12 getting supplement vitamin-D review the getting supplement BMI 38 of overdue to see eye doctor recommend PVCs symptom-free  Neuropathy fair carpal tunnel considering surgery sometime in the future ulnar neuropathy will monitor  Diabetic neuropathy fair control continue gabapentin  Home    The left upper extremity incidental swelling noted last day couple of days no subjective finding  Will proceed with CT scan of the chest for lung cancer screening due for PSA next time  No blood test next time  Otherwise he will let us know if the a left upper extremity swelling gets worse  Will follow back in 3 months the or 1-2 weeks per his judgment  Chief Complaint   Patient presents with    Hypertension    Hyperlipidemia    Diabetes    Coronary Artery Disease    Allergic Rhinitis    Follow-up     Multiple problem as outlined in HPI, carpal tunnel,    Obesity    Vitamin D Deficiency      Subjective:  Mart Peterson is here for follow-up of chronic disease management    No new sx    Diabetes:  Diagnosed: 5-10 years  Current Medicine for Diabetes: Per Med List  Finger stick: Once a day  Glucose Log: home sugar reivewed  Hypoglycemia event and intervention: None  Diet: reviewed, not watching diet  Exercise: None  Comorbidity: see HPI and Assessment/Plan  Last Eye Exam: 2 year  Last Foot exam:  Done previously  HbA1c 7 2 last time this time 6 5 much better  Patient remains on metformin and rybelsus    Allergic rhinitis : doing well, + sx, He used to see allergist specialist his to get allergy in the injection insurance was not paying local doctor he does not want to travel to other doctors  , rec saline,    Hypertension symptom-free remains on losartan 50 mg daily and amlodipine 5 mg daily    Coronary artery disease symptom-free the remains on aspirin losartan amlodipine and Lipitor, sxfree, seen by cardiologist the notes were reviewed  Their notes were reviewed  Patient is a new incidental finding of left upper extremity swelling  Patient denies any cough chest congestion so lump in the axilla lump in the supraclavicular region no weight loss no other infectious issues  No cellulitis no lymphadenopathy no obvious etiology of left upper extremity patient does notice for few days ago we talked about doing venous Doppler or or MRI of upper extremity however is totally asymptomatic 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  Hyperlipidemia symptom-free,lipid profile reviewed, continue statin LDL is 70 to the target ALT normal      Diverticulosis not a problem  Vitamin-D deficiency remains on 1000 unit    ity BMI 38 22, extensive discussion, Pt is seeing nutrionist    BPH fair  Sees urologist once a year    Edema of legs fair, remains HCTZ for BP and Torsemide for edema by cardiologist    Colonic polyp colonoscopy January 2021  Colonoscopic findings were reviewed  History of compression fracture not a problem  Will do formal DEXA scan    GERD fair  PSA 11-6-2020: 0 4    Neuropathy fair; tolerating gabapentin    He had a EMG nerve conduction study done in month of February  He does have a moderate carpal tunnel on the left hand, mild to moderate ulnar compression, he he does have a 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 likely related to diabetes  Patient was recently seen by hand surgeon carpal tunnel surgery recommended he will think about it he is to make adjustment in his office        MRI of the lumbar spine did not reveal any hand major sciatica  Please refer to the full finding  He does have a varicose vein and chronic stasis dermatitis  Thyroid nodule:  Patient seen by endocrinologist going for thyroid ultrasound for surveillance no compressive symptoms    Obesity:  A hand surgery or a continue  Diet exercise weight loss management    08/06/2021:  Urine for microalbumin/creatinine ratio normal   Hemoglobin A1c 6 5  The lipid profile reveals LDL of 70  CMP unremarkable except blood sugar 126  GFR normal 72     05/07/2021:  Hemoglobin A1c 7 2, cholesterol 123 LDL 61 HDL 40 blood sugar 123 rest of the CMP normal except ALT 55 and urine for microalbumin negative  02/05/2021:  Urine for microalbumin negative, blood sugar 140, creatinine 1 15, GFR 70, a ALT 70 AST 64 hemoglobin A1c 7 2 LDL 68 HDL 40 rest of the CMP and lipid profile normal    Orders Only on 11/06/2020  Prostate Specific Antige* Value: 0 4(ng/mL)         Dt: 11/06/2020  ------------ - 2 weeks          The following portions of the patient's history were reviewed and updated as appropriate: allergies, current medications, past family history, past medical history, past social history, past surgical history and problem list     Review of Systems   All other systems reviewed and are negative          Historical Information   Patient Active Problem List   Diagnosis    Diabetes mellitus ([facility] [address] )    Hyperlipidemia    Essential hypertension    Sleep apnea    Elevated liver enzymes    Polyarthralgia    Hydronephrosis    Class 2 obesity due to excess calories without serious comorbidity with body mass index (BMI) of 37 0 to 37 9 in adult    Nicotine abuse    Chronic pain of both knees    Primary osteoarthritis of knees, bilateral    CAD (coronary artery disease)    SOB (shortness of breath)    Asymptomatic PVCs    Obstructive sleep apnea    Obesity (BMI 30-39  9)    Seasonal allergic rhinitis due to pollen    GERD (gastroesophageal reflux disease)    Nasal septal deformity    Bundle branch block, right    CPAP (continuous positive airway pressure) dependence    Vitamin D deficiency    BPH (benign prostatic hyperplasia)    History of vertebral compression fracture    Colon polyp    History of angioplasty    Ocular migraine    Inguinal hernia    Primary osteoarthritis of left knee    Umbilical hernia    Bradycardia    Asymptomatic varicose veins of both lower extremities    Neuropathy    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)    Carpal tunnel syndrome of left wrist    Ulnar neuropathy at elbow of left upper extremity    Onychomycosis of toenail    Impacted cerumen of left ear    Contusion of right knee    Chronic left-sided low back pain with left-sided sciatica    Varicose veins of leg with swelling, bilateral    Thyroid nodule    Wheezing    B12 deficiency    Compression fracture of body of thoracic vertebra (HCC)    Disc disease, degenerative, lumbar or lumbosacral     Past Medical History:   Diagnosis Date    Allergic rhinitis     Anemia     Arthritis     Bundle branch block, right     CAD (coronary artery disease)     CPAP (continuous positive airway pressure) dependence     Diabetes mellitus (HCC)     borderline, controlled with diet and activity    Diverticulitis     Diverticulitis of large intestine with abscess without bleeding 12/7/2016    GERD (gastroesophageal reflux disease)     Hyperlipidemia     Hypertension     Nasal septal deformity     Neuropathy     Obesity (BMI 30-39  9)     Peptic ulceration     gastric    Peptic ulceration 9/26/2019    gastric    Pneumonia     Renal disorder     Seasonal allergies     Sleep apnea     wears c-pap    Urinary tract infection     Vitamin D deficiency      Past Surgical History:   Procedure Laterality Date    COLON SIGMOID RESECTION N/A 12/16/2016    Procedure: RESECTION COLON SIGMOID;  Surgeon: Neela Farrell MD;  [facility];  Service:     COLON SIGMOID RESECTION LAPAROSCOPIC N/A 12/16/2016    Procedure: RESECTION COLON SIGMOID LAPAROSCOPIC:CONVERTED TO [email or web address];  Surgeon: Neela Farrell MD;  [facility];  Service:     COLON SURGERY      Sigmoidectomy    COLONOSCOPY N/A 10/6/2016    Procedure: COLONOSCOPY;  Surgeon: Harpreet Quan MD;  Location: [place] [address] [facility]; Service:    Antonietta Ravel CYSTOSCOPY W/ RETROGRADES Left 2/3/2017    Procedure: CYSTOSCOPY WITH BILATERAL RETROGRADES, LEFT STENT REMOVAL;  Surgeon: Yung Mcdonnell MD;  Location: [address] [place] [address];  Service:     ESOPHAGOGASTRODUODENOSCOPY N/A 10/6/2016    Procedure: ESOPHAGOGASTRODUODENOSCOPY (EGD); Surgeon: Harpreet Quan MD;  Location: [place] [address] [facility];   Service:     HERNIA REPAIR      KNEE ARTHROSCOPY W/ MENISCECTOMY      LA CYSTOURETHROSCOPY,URETER CATHETER Left 12/4/2016    Procedure: CYSTOSCOPY RETROGRADE PYELOGRAM WITH INSERTION STENT URETERAL;  Surgeon: Yung Mcdonnell MD;  Location: [address] [place] [address];  Service: Urology    VARICOSE VEIN SURGERY       Social History     Substance and Sexual Activity   Alcohol Use Yes    Alcohol/week: 3 0 standard drinks    Types: 3 Cans of beer per week     Social History     Substance and Sexual Activity   Drug Use No     Social History     Tobacco Use   Smoking Status Former Smoker    Packs/day: 0 50    Years: 37 00    Pack years: 18 50    Types: Cigarettes    Quit date: 3/30/2018    Years since quitting: 3 4   Smokeless Tobacco Current User    Types: Chew   Tobacco Comment     requit 3/30/18     Family History   Problem Relation Age of Onset    Diabetes Brother     Osteoarthritis Mother     Atrial fibrillation Mother     Aortic aneurysm Father     Colon cancer Brother      Health Maintenance Due   Topic    DM Eye Exam     HIV Screening     Annual Physical     Influenza Vaccine (1)      Meds/Allergies       Current Outpatient Medications:     albuterol (PROVENTIL HFA,VENTOLIN HFA) 90 mcg/act inhaler, Inhale 2 puffs every 4 (four) hours as needed for wheezing or shortness of breath, Disp: 18 g, Rfl: 6    Alpha-Lipoic Acid 100 MG CAPS, Take by mouth, Disp: , Rfl:     Ascorbic Acid (VITAMIN C) 100 MG tablet, Take 500 mg by mouth , Disp: , Rfl:     aspirin 81 MG tablet, Take 162 mg by mouth, Disp: , Rfl:     atorvastatin (LIPITOR) 40 mg tablet, Take 1 tablet (40 mg total) by mouth daily with dinner, Disp: 90 tablet, Rfl: 1    Cholecalciferol (VITAMIN D3) 1000 units CAPS, Take by mouth daily , Disp: , Rfl:     desloratadine (CLARINEX) 5 MG tablet, Take 1 tablet (5 mg total) by mouth daily at bedtime, Disp: 90 tablet, Rfl: 1    gabapentin (NEURONTIN) 100 mg capsule, TAKE 1 CAPSULE BY MOUTH IN THE MORNING AND 2 CAPSULES AT BEDTIME, Disp: 270 capsule, Rfl: 1    hydrochlorothiazide (HYDRODIURIL) 25 mg tablet, Take 1 tablet by mouth once daily, Disp: 90 tablet, Rfl: 0    losartan (COZAAR) 50 mg tablet, Take 1 tablet by mouth once daily, Disp: 90 tablet, Rfl: 1    metFORMIN (GLUCOPHAGE) 1000 MG tablet, Take 1 tablet (1,000 mg total) by mouth 2 (two) times a day with meals Take 2 tablets (1000mg) twice a day with meals, Disp: 180 tablet, Rfl: 1    Multiple Vitamin (MULTIVITAMIN) capsule, Take 1 capsule by mouth daily, Disp: , Rfl:     nitroglycerin (NITROSTAT) 0 3 mg SL tablet, Place 1 tablet (0 3 mg total) under the tongue every 5 (five) minutes as needed for chest pain, Disp: 25 tablet, Rfl: 1    ONETOUCH DELICA LANCETS 26X MISC, 1 Units by Does not apply route daily, Disp: 100 each, Rfl: 6    Semaglutide (Rybelsus) 3 MG TABS, Take 3 mg by mouth daily with breakfast, Disp: 60 tablet, Rfl: 0    torsemide (DEMADEX) 10 mg tablet, TAKE 1 TABLET BY MOUTH ONCE DAILY, Disp: 90 tablet, Rfl: 3    cyanocobalamin (VITAMIN B-12) 100 mcg tablet, Take by mouth daily, Disp: , Rfl:     famotidine (PEPCID) 20 mg tablet, Take 20 mg by mouth daily, Disp: , Rfl:     FREESTYLE LITE test strip, USE 1 STRIP TO CHECK GLUCOSE ONCE DAILY AS DIRECTED, Disp: 100 each, Rfl: 0    glucosamine-chondroitin 500-400 MG tablet, Take 1 tablet by mouth daily, Disp: , Rfl:     Current Facility-Administered Medications:     cyanocobalamin injection 1,000 mcg, 1,000 mcg, Intramuscular, Q30 Days, [employer], CRNP, 1,000 mcg at 09/10/21 1141      Objective:    Vitals:   /74   Pulse 92   Ht 5' 11" (1 803 m)   Wt 124 kg (274 lb)   SpO2 97%   BMI 38 22 kg/m²   Body mass index is 38 22 kg/m²  Vitals:    09/10/21 1350   Weight: 124 kg (274 lb)       Physical Exam  Vitals and nursing note reviewed  Constitutional:       Appearance: He is well-developed  He is obese  He is not ill-appearing or diaphoretic  Comments: Male pattern baldness   HENT:      Head: Normocephalic  Salivary Glands: Right salivary gland is not diffusely enlarged  Left salivary gland is not diffusely enlarged  Right Ear: External ear normal  Decreased hearing noted  Left Ear: External ear normal  Decreased hearing noted  Eyes:      General:         Right eye: No discharge  Left eye: No discharge  Conjunctiva/sclera: Conjunctivae normal    Neck:      Thyroid: No thyroid mass, thyromegaly or thyroid tenderness  Vascular: Normal carotid pulses  No carotid bruit, hepatojugular reflux or JVD  Trachea: Trachea normal       Comments: No axillary mass, nose suppressed live clavicular mass, a bone right or left  Cardiovascular:      Rate and Rhythm: Regular rhythm        Pulses:           Dorsalis pedis pulses are 1+ on the right side and 1+ on the left side  Posterior tibial pulses are 1+ on the right side and 1+ on the left side  Heart sounds: Normal heart sounds  No murmur heard  No systolic murmur is present  No diastolic murmur is present  No gallop  Pulmonary:      Effort: No respiratory distress  Breath sounds: Normal breath sounds  No wheezing or rales  Abdominal:      General: Bowel sounds are normal  There is no distension  Palpations: There is no mass  Tenderness: There is no abdominal tenderness  There is no rebound  Musculoskeletal:      Cervical back: Normal range of motion  No rigidity  No muscular tenderness  Right lower leg: No edema  Left lower leg: No edema  Comments: Mild left upper extremity swelling noticed very subtle  No palpable mass anywhere no cellulitis no infection no joint swelling  No axillary lymphadenopathy  Patient will monitor   Feet:      Right foot:      Skin integrity: No ulcer, skin breakdown, erythema, warmth, callus or dry skin  Left foot:      Skin integrity: No ulcer, skin breakdown, erythema, warmth, callus or dry skin  Comments: He does have a bilateral flat feet  He does have a onychomycosis of the toenail  He he does does have a chronic stasis discoloration of the distal extremities there is no open skin area area no thickened how thickening or calluses  Of he is of toenail was taken of little bit more right  Lymphadenopathy:      Cervical: No cervical adenopathy  Right cervical: No superficial, deep or posterior cervical adenopathy  Skin:     General: Skin is warm  Coloration: Skin is not jaundiced or pale  Findings: No rash  Comments: Stasis dermatitis and discoloration present   Neurological:      Motor: No weakness  Gait: Gait normal    Psychiatric:         Mood and Affect: Mood normal          Behavior: Behavior normal          Thought Content:  Thought content normal          Judgment: Judgment normal          Lab Review   Admission on 08/06/2021, Discharged on 08/06/2021   Component Date Value Ref Range Status    Rabies Titer - Response 08/06/2021 >/=0 5  IU/mL Final    Less than 0 1 IU/mL: Below detection limit  >/= 0 1 IU/mL:Above detection limit but below 0 5 IU/mL  >/= 0 5 IU/mL: Equal to or above 0 5 IU/mL  In humans, a result of 0 5 IU/mL or higher is  considered an acceptable response to rabies vaccination  according to the [address])  guidelines; see WHO and Advisory committee on  [employer] documents for additional  guidance  Duwaine Agent is more information at  www vet [facility]/rabies  (Note: the symbol ">" means  "greater than" and ">/=" means "greater than or  equal to")   Office Visit on 07/16/2021   Component Date Value Ref Range Status    Lead Blood 08/06/2021 <1  0 - 4 ug/dL Final    Comment: Testing performed by Inductively coupled plasma/Mass Spectrometry  Environmental Exposure:                             WHO Recommendation    <20                            Occupational Exposure:                             [employer] Lead Std          40                             [employer]                    30                                  Detection Limit =  1  This test was developed and its performance  characteristics determined by [facility]  It has not been  cleared or approved by the Food and Drug Administration        Arsenic 08/06/2021 8  2 - 23 ug/L Final                                    Detection Limit = 1    Mercury 08/06/2021 2 6  0 0 - 14 9 ug/L Final    Comment:                         Environmental Exposure:  <15 0                          Occupational Exposure:                           [employer] - Inorganic Mercury: 15 0                                  Detection Limit =  1 0      Cadmium 08/06/2021 <0 5  0 0 - 1 2 ug/L Final    Comment:                            Environmental Exposure:                              Nonsmokers      0 3 - 1 2 Smokers         0 6 - 3 9                             Occupational Exposure:                              [employer] Cadmium Std      5 0                              [employer]                   5 0                                  Detection Limit = 0 5           Patient Instructions   Continue to go to weight management Center a gain  The go for the CT scan of the lung for how lung cancer screen  You will be due for PSA next visit  A in December  If your swelling of the left upper extremity gets was you will require further evaluation immediately please let me know  Follow with Consultants as per their and our suggestion    Follow up in 12 week(s) or as needed earlier    Follow all instructions as advised and discussed  Take your medications as prescribed  Call the office immediately if you experience any side effects  Ask questions if you do not understand  Keep your scheduled appointment as advised or come sooner if necessary or in doubt  Best time to call for non-urgent matter or questions on weekdays is between 9am and 12 noon  See physician for any new symptoms or worsening of current symptoms  Urgent or emergent situations call 911 and report to nearest emergency room  I spent  30 -40 minutes taking care of this patient including clinical care, conseling, collaboration, chart, lab and consultaion review as appropriate    Patient is to get labs 1 week(s) prior to next visit if advised               Dr Michelle Fraire MD  [facility]       "This note has been constructed using a voice recognition system  Therefore there may be syntax, spelling, and/or grammatical errors   Please call if you have any questions  "

## 2021-09-10 NOTE — PATIENT INSTRUCTIONS
Continue to go to weight management Center a gain  The go for the CT scan of the lung for how lung cancer screen  You will be due for PSA next visit  A in December  If your swelling of the left upper extremity gets was you will require further evaluation immediately please let me know  Follow with Consultants as per their and our suggestion    Follow up in 12 week(s) or as needed earlier    Follow all instructions as advised and discussed  Take your medications as prescribed  Call the office immediately if you experience any side effects  Ask questions if you do not understand  Keep your scheduled appointment as advised or come sooner if necessary or in doubt  Best time to call for non-urgent matter or questions on weekdays is between 9am and 12 noon  See physician for any new symptoms or worsening of current symptoms  Urgent or emergent situations call 911 and report to nearest emergency room      I spent  30 -40 minutes taking care of this patient including clinical care, conseling, collaboration, chart, lab and consultaion review as appropriate    Patient is to get labs 1 week(s) prior to next visit if advised

## 2021-09-10 NOTE — ASSESSMENT & PLAN NOTE
Neuropathy multifactorial getting vitamin B12 injections  Also component of diabetic neuropathy suspected  Symptoms are fair control    Lab Results   Component Value Date    HGBA1C 6 5 (H) 08/06/2021

## 2021-09-10 NOTE — ASSESSMENT & PLAN NOTE
May 2021:  Ultrasound of the thyroid:Several small left lobe nodules    No nodule meets current ACR criteria for requiring biopsy but followup ultrasound is recommended in 1 year         Seen by endocrinologist   Patient is scheduled for thyroid biopsy

## 2021-09-10 NOTE — ASSESSMENT & PLAN NOTE
EMG nerve conduction study done in February 2020  Seen by hand surgeon  CTS surgery recommended  He will arrange according to his work schedule    Continue to recommend to use hand splint for CTS to prevent flexion of the wrist at nighttime

## 2021-09-10 NOTE — PROGRESS NOTES
Documenting for Neil Stuart CMA  She is unable to document as she is a new hire and her system is not working in epic  Epic has been notified and working on the issue  Shante Alba is working along side of me  The patient presented in the [place] office today for a Vitamin B12 injection  Patient received the injection in the right deltoid and tolerated this injection well  No questions or concerns at this time

## 2021-09-10 NOTE — PROGRESS NOTES
Amauri Self  1960  [id number]  [facility]  [facility] [address] [id number]    Interval History:  Amauri Self is a 64 y o  male who presents for routine coronary artery disease follow-up  Since his last visit, he continues to have episodes of shortness of breath  Does not have any chest pain  Denies any LE edema, orthopnea, or PND  He had a PFT study in June which was normal     Treadmill stress test in 2019 was normal   He wore a holter monitor because of resting bradycardia  Monitor showed an average HR of 75 bpm and no heart blocks  He has not been exercising regularly because he is busy with work and is hesitant to return to the gym  Previously was having dyspnea  that improved with torsemide  Now with some left upper extremity swelling that he believes started after a picnic  He continues to refrain from smoking  In November 2017, he underwent drug-eluting stent placement to left circumflex and OM 2 lesions after presenting to hospital with chest and arm pain, elevated BP and ST depressions  Past Medical History:   Diagnosis Date    Allergic rhinitis     Anemia     Arthritis     Bundle branch block, right     CAD (coronary artery disease)     CPAP (continuous positive airway pressure) dependence     Diabetes mellitus (HCC)     borderline, controlled with diet and activity    Diverticulitis     Diverticulitis of large intestine with abscess without bleeding 12/7/2016    GERD (gastroesophageal reflux disease)     Hyperlipidemia     Hypertension     Nasal septal deformity     Neuropathy     Obesity (BMI 30-39  9)     Peptic ulceration     gastric    Peptic ulceration 9/26/2019    gastric    Pneumonia     Renal disorder     Seasonal allergies     Sleep apnea     wears c-pap    Urinary tract infection     Vitamin D deficiency      Past Surgical History:   Procedure Laterality Date    COLON SIGMOID RESECTION N/A 12/16/2016    Procedure: RESECTION COLON SIGMOID;  Surgeon: Kala Altman MD;  Location:  MAIN OR;  Service:     COLON SIGMOID RESECTION LAPAROSCOPIC N/A 12/16/2016    Procedure: RESECTION COLON SIGMOID LAPAROSCOPIC:CONVERTED TO [email or web address];  Surgeon: Kala Altman MD;  Location:  MAIN OR;  Service:     COLON SURGERY      Sigmoidectomy    COLONOSCOPY N/A 10/6/2016    Procedure: COLONOSCOPY;  Surgeon: Miriam Medina MD;  Location: [place] [address] GI LAB; Service:    [facility] CYSTOSCOPY W/ RETROGRADES Left 2/3/2017    Procedure: CYSTOSCOPY WITH BILATERAL RETROGRADES, LEFT STENT REMOVAL;  Surgeon: Igor Conde MD;  Location: [address] [place] [address];  Service:     ESOPHAGOGASTRODUODENOSCOPY N/A 10/6/2016    Procedure: ESOPHAGOGASTRODUODENOSCOPY (EGD); Surgeon: Miriam Medina MD;  Location: [place] [address] GI LAB; Service:     HERNIA REPAIR      KNEE ARTHROSCOPY W/ MENISCECTOMY      NV CYSTOURETHROSCOPY,URETER CATHETER Left 12/4/2016    Procedure: CYSTOSCOPY RETROGRADE PYELOGRAM WITH INSERTION STENT URETERAL;  Surgeon: Igor Conde MD;  Location: [place] [facility]IN OR;  Service: Urology    VARICOSE VEIN SURGERY       Social History     Socioeconomic History    Marital status: Single     Spouse name: Not on file    Number of children: Not on file    Years of education: Not on file    Highest education level: Not on file   Occupational History    Not on file   Tobacco Use    Smoking status: Former Smoker     Packs/day: 0 50     Years: 37 00     Pack years: 18 50     Types: Cigarettes     Quit date: 3/30/2018     Years since quitting: 3 4    Smokeless tobacco: Current User     Types: Chew    Tobacco comment:  requit 3/30/18   Vaping Use    Vaping Use: Some days    Substances: Nicotine   Substance and Sexual Activity    Alcohol use:  Yes     Alcohol/week: 3 0 standard drinks     Types: 3 Cans of beer per week    Drug use: No    Sexual activity: Never   Other Topics Concern    Not on file   Social History Narrative    Lives alone  Social Determinants of Health     Financial Resource Strain:     Difficulty of Paying Living Expenses:    Food Insecurity:     Worried About Running Out of Food in the Last Year:     [address] in the Last Year:    Transportation Needs:     Lack of Transportation (Medical):      Lack of Transportation (Non-Medical):    Physical Activity:     Days of Exercise per Week:     Minutes of Exercise per Session:    Stress:     Feeling of Stress :    Social Connections:     Frequency of Communication with Friends and Family:     Frequency of Social Gatherings with Friends and Family:     Attends Zoroastrian Services:     Active Member of Clubs or Organizations:     Attends Club or Organization Meetings:     Marital Status:    Intimate Partner Violence:     Fear of Current or Ex-Partner:     Emotionally Abused:     Physically Abused:     Sexually Abused:      Family History   Problem Relation Age of Onset    Diabetes Brother     Osteoarthritis Mother     Atrial fibrillation Mother     Aortic aneurysm Father     Colon cancer Brother        Current Outpatient Medications:     albuterol (PROVENTIL HFA,VENTOLIN HFA) 90 mcg/act inhaler, Inhale 2 puffs every 4 (four) hours as needed for wheezing or shortness of breath, Disp: 18 g, Rfl: 6    Alpha-Lipoic Acid 100 MG CAPS, Take by mouth, Disp: , Rfl:     Ascorbic Acid (VITAMIN C) 100 MG tablet, Take 500 mg by mouth , Disp: , Rfl:     aspirin 81 MG tablet, Take 162 mg by mouth, Disp: , Rfl:     atorvastatin (LIPITOR) 40 mg tablet, Take 1 tablet (40 mg total) by mouth daily with dinner, Disp: 90 tablet, Rfl: 1    Cholecalciferol (VITAMIN D3) 1000 units CAPS, Take by mouth daily , Disp: , Rfl:     cyanocobalamin (VITAMIN B-12) 100 mcg tablet, Take by mouth daily, Disp: , Rfl:     desloratadine (CLARINEX) 5 MG tablet, Take 1 tablet (5 mg total) by mouth daily at bedtime, Disp: 90 tablet, Rfl: 1   famotidine (PEPCID) 20 mg tablet, Take 20 mg by mouth daily, Disp: , Rfl:     gabapentin (NEURONTIN) 100 mg capsule, TAKE 1 CAPSULE BY MOUTH IN THE MORNING AND 2 CAPSULES AT BEDTIME, Disp: 270 capsule, Rfl: 1    glucosamine-chondroitin 500-400 MG tablet, Take 1 tablet by mouth daily, Disp: , Rfl:     hydrochlorothiazide (HYDRODIURIL) 25 mg tablet, Take 1 tablet by mouth once daily, Disp: 90 tablet, Rfl: 0    losartan (COZAAR) 50 mg tablet, Take 1 tablet by mouth once daily, Disp: 90 tablet, Rfl: 1    metFORMIN (GLUCOPHAGE) 1000 MG tablet, Take 1 tablet (1,000 mg total) by mouth 2 (two) times a day with meals Take 2 tablets (1000mg) twice a day with meals, Disp: 180 tablet, Rfl: 1    Multiple Vitamin (MULTIVITAMIN) capsule, Take 1 capsule by mouth daily, Disp: , Rfl:     nitroglycerin (NITROSTAT) 0 3 mg SL tablet, Place 1 tablet (0 3 mg total) under the tongue every 5 (five) minutes as needed for chest pain, Disp: 25 tablet, Rfl: 1    ONETOUCH DELICA LANCETS 76U MISC, 1 Units by Does not apply route daily, Disp: 100 each, Rfl: 6    Semaglutide (Rybelsus) 3 MG TABS, Take 3 mg by mouth daily with breakfast, Disp: 60 tablet, Rfl: 0    FREESTYLE LITE test strip, USE 1 STRIP TO CHECK GLUCOSE ONCE DAILY AS DIRECTED, Disp: 100 each, Rfl: 0    torsemide (DEMADEX) 10 mg tablet, TAKE 1 TABLET BY MOUTH ONCE DAILY, Disp: 90 tablet, Rfl: 3    Current Facility-Administered Medications:     cyanocobalamin injection 1,000 mcg, 1,000 mcg, Intramuscular, Q30 Days, [employer], CRNP, 1,000 mcg at 09/10/21 1141  The following portions of the patient's history were reviewed and updated as appropriate: allergies, current medications, past family history, past medical history, past social history, past surgical history and problem list     Review of Systems:  Review of Systems   Respiratory: Positive for shortness of breath  Cardiovascular: Negative for chest pain, palpitations and leg swelling  Musculoskeletal: Positive for arthralgias and myalgias  Neurological: Positive for numbness  All other systems reviewed and are negative  Physical Exam:  /70 (BP Location: Right arm, Patient Position: Sitting, Cuff Size: Large)   Pulse (!) 52   Temp 98 9 °F (37 2 °C)   Ht 5' 11" (1 803 m)   Wt 127 kg (279 lb)   SpO2 97%   BMI 38 91 kg/m²     Physical Exam  Constitutional:       General: He is not in acute distress  Appearance: He is well-developed  He is not diaphoretic  HENT:      Head: Normocephalic and atraumatic  Eyes:      Conjunctiva/sclera: Conjunctivae normal       Pupils: Pupils are equal, round, and reactive to light  Neck:      Thyroid: No thyromegaly  Vascular: No JVD  Cardiovascular:      Rate and Rhythm: Regular rhythm  Bradycardia present  Heart sounds: Normal heart sounds  No murmur heard  No friction rub  No gallop  Pulmonary:      Effort: Pulmonary effort is normal       Breath sounds: Normal breath sounds  Musculoskeletal:      Cervical back: Neck supple  Skin:     General: Skin is warm and dry  Findings: No erythema or rash  Neurological:      Mental Status: He is alert and oriented to person, place, and time  Cranial Nerves: No cranial nerve deficit  Psychiatric:         Mood and Affect: Mood normal          Behavior: Behavior normal          Thought Content: Thought content normal          Judgment: Judgment normal          Cardiographics  ECG: sinus bradycardia   LV Ejection Fraction: LV ejection fraction >= 40%  Lab Review  Lab Results   Component Value Date    TRIG 142 08/06/2021    TRIG 123 05/07/2021    TRIG 145 02/05/2021    HDL 48 08/06/2021    HDL 40 05/07/2021    HDL 40 02/05/2021    LDLDIRECT 79 04/10/2020    LDLDIRECT 83 12/27/2019     Assessment/Plan     1  Coronary artery disease involving native coronary artery of native heart without angina pectoris    2   Exertional shortness of breath    3  Bradycardia 4  Mixed hyperlipidemia    5  Essential hypertension    6  Type 2 diabetes mellitus without complication, without long-term current use of insulin ([facility])    7  Chronic diastolic congestive heart failure ([address] )      - He continues to have exertional dyspnea  PFT was normal   Will obtain stress echocardiogram to evaluate HR response to exercise and to rule out any ischemia  He does not have any anginal pain  - Discussed diet and weight loss in detail   -  Blood pressure is stable  Continue current medication regimen including losartan and hydrochlorothiazide  Target BP is < 130/80 mmHg  - diabetes management per Dr Anastacio Kruger  Last A1C was at goal     - Continue atorvastatin - last LDL was 70     - Continue current Rx  - recommend using torsemide when edema is present  He previously developed congestive heart failure  Encouraged to reduce salt in diet if possible

## 2021-09-16 DIAGNOSIS — E04.1 THYROID NODULE: ICD-10-CM

## 2021-09-16 DIAGNOSIS — E11.9 TYPE 2 DIABETES MELLITUS WITHOUT COMPLICATION, WITHOUT LONG-TERM CURRENT USE OF INSULIN (HCC): ICD-10-CM

## 2021-09-16 DIAGNOSIS — E78.2 MIXED HYPERLIPIDEMIA: ICD-10-CM

## 2021-09-16 DIAGNOSIS — I10 HYPERTENSION GOAL BP (BLOOD PRESSURE) < 140/90: ICD-10-CM

## 2021-09-17 ENCOUNTER — OFFICE VISIT (OUTPATIENT)
Dept: DIABETES SERVICES | Facility: CLINIC | Age: 61
End: 2021-09-17
Payer: COMMERCIAL

## 2021-09-17 VITALS — HEIGHT: 71 IN | BODY MASS INDEX: 38.64 KG/M2 | WEIGHT: 276 LBS

## 2021-09-17 DIAGNOSIS — E11.65 TYPE 2 DIABETES MELLITUS WITH HYPERGLYCEMIA, WITHOUT LONG-TERM CURRENT USE OF INSULIN (HCC): Primary | ICD-10-CM

## 2021-09-17 PROCEDURE — 97803 MED NUTRITION INDIV SUBSEQ: CPT | Performed by: DIETITIAN, REGISTERED

## 2021-09-17 PROCEDURE — 3008F BODY MASS INDEX DOCD: CPT | Performed by: INTERNAL MEDICINE

## 2021-09-17 RX ORDER — CYANOCOBALAMIN 1000 UG/ML
1000 INJECTION INTRAMUSCULAR; SUBCUTANEOUS
Status: DISCONTINUED | OUTPATIENT
Start: 2021-10-08 | End: 2022-03-11

## 2021-09-17 RX ORDER — ORAL SEMAGLUTIDE 3 MG/1
TABLET ORAL
Qty: 60 TABLET | Refills: 0 | Status: SHIPPED | OUTPATIENT
Start: 2021-09-17 | End: 2021-12-03

## 2021-09-17 NOTE — PATIENT INSTRUCTIONS
Be consistent with carbohydrate intake of 45-60 grams of carbohydrate pre meal and 15 grams per snack  Increase overall intake of non-starchy vegetables  Increase exercise with goal of at least 150 minutes per week  Please complete another 3 day food record prior to follow-up appointment in 3 months

## 2021-09-17 NOTE — TELEPHONE ENCOUNTER
Patient aware of recommendation  I mailed him script for labwork as he uses [facility]   All questions/concerns addressed at this time

## 2021-09-17 NOTE — PROGRESS NOTES
Medical Nutrition Therapy      Assessment    Chief complaint T2DM    Visit Type: Follow-up visit    HPI: Satish Sosa returned for follow-up today  Most recent HbA1c improved to 6 5% from 7 2%  No SMBG log or meter to review today  Satish Sosa states that he is frustrated with his weight, specifically that he has been unable to lose weight  He reports that since his last visit he has seen a weight loss specialist and also had an appointment with a [employer] dietitian in [place], [place]  He states being very disappointed with the weight loss specialist due to them giving him options of surgery, reducing caloric intake to 800 kcal/day, or buying their food  He does not seem as disappointed with the grocery store dietitian, however, states that he was told by her that the only artificial sweetener that she was okay with him consuming was the artifical sweetener in his diet green tea, likely sucralose  This has resulted in Satish Sosa starting to use actual sucrose in his diet such as in his cereal at breakfast  Provided Satish Sosa with Position Statement for the Academy of Nutrition and Dietetics on Use of Nutritive and Nonnutritive Sweeteners as well as the article "Sugar Substitutes: How Much is too Much?" from the Academy of Nutrition and Dietetics published in 2018  Maryam food record reveals inconsistent carbohydrate intake  Source of excess caloric intake that is preventing weight loss is not evident in his 3 day food record based on portions of foods listed  Discussed with Satish Sosa the possibility of excess calories coming from fat such as if he is using large portions of cream based sauces, condiments, nuts, buttery spreads, etc  He states that this is not the case and that he follows portion sizes  He does not typically incorporate much meat into his diet based on food record and only has 2-3 oz when he does   Overall, Maryam meals listed in his food record range 4-70 grams of carbohydrate per meal  Based on meal plan review, discussion with Ulysses Kelp, and food record provided education about keeping carbohydrate consistently to 45-60 g CHO per meal and 15 grams or less per snack  He has some inaccuracies with carbohydrate counting due to counting carbs in fats such as nuts and also some of the non-starchy vegetables  Reviewed foods to count and not count carbs in as well as some suggestions on how to use the portion book to make counting carbs easier for foods such as combination foods  Advised to be more consistent with intake of non-starchy vegetables as some days he has a very high intake and other days his intake is minimal  Reviewed benefits of having a high intake of non-starchy vegetables such as high nutrient density with minimal calories, fiber, and not elevating BG  Discussed increasing exercise intake, Ulysses Kelp states he is still hesitant to return to the gym and that he is also still not keen to workout out at home either  Reviewed benefits of exercise and Ulysses Kelp mentioned some ideas such as getting a [place] that may interest him  Ulysses Kelp verbalized good understanding of topics and recommendations discussed today and will call with any questions prior to follow-up appointment in 3 months      Ht Readings from Last 1 Encounters:   09/10/21 5' 11" (1 803 m)     Wt Readings from Last 2 Encounters:   09/10/21 124 kg (274 lb)   09/10/21 125 kg (276 lb)     Weight Change: No    Medical Diagnosis/reason for visit   E11 65 (ICD-10-CM) - Type 2 diabetes mellitus with hyperglycemia, without long-term current use of insulin ([facility])   I10 (ICD-10-CM) - Hypertension goal BP (blood pressure) < 140/90   E78 2 (ICD-10-CM) - Mixed hyperlipidemia         Food Log:      Please see scanned 3 day food diary    Exercise none    Calorie needs 2,000 kcals/day Carbs: 45-60 g/meal, 15 g/snack     Fat: 5 servings/day    Protein:10 oz/day    Nutrition Diagnosis:  Inconsistent carbohydrate intake  intake related to Food and nutrition compliance limitations (i e  lack of willingness or failure to modify carbohydrate intake in response to recommendations from a dietitian, physician, or caregiver) as evidenced by  Estimated carbohydrate intake that is different from recommended types or ingested on an irregular basis    Intervention: plate method, increased fiber intake, carbohydrate counting, increased plant based foods, meal planning, exercise guidelines and food diary     Treatment Goals: Patient understands education and recommendations, Patient will monitor portion control, Patient will consume 25-35 grams of fiber a day, Patient will increase their intake of plant based foods, Patient will count carbohydrates and Patient will exercise    Monitoring and evaluation:    Term code indicator  FH 1 6 3 Carbohydrate Intake Criteria: 45-60 g CHO per meal, 15 g CHO per snack  Term code indicator  FH 4 4 Mealtime Behavior Criteria: 3 meals per day, 4-5 hours apart  Up to 3 snacks per day, at least 2 hours apart from meals  Patients Response to Instruction:  Ivania Ortega  Expected Compliancegood    Thank you for coming to the [facility] for education today  Please feel free to call with any questions or concerns      Start: 9:30 am  Stop: 10:30 am  Referred by: MD Loida Mejía, [address] [place] [phone]

## 2021-09-17 NOTE — TELEPHONE ENCOUNTER
I ordered B12 in office injections once monthyl start 10/8- but he can come whenever he wants- on a monthly basis  Please let him know that I ordered B12 lab to get when possible  After that, will decide if he needs PO as well  Best to get the labs approx 2 weeks after last B12 injection

## 2021-09-24 ENCOUNTER — VBI (OUTPATIENT)
Dept: ADMINISTRATIVE | Facility: OTHER | Age: 61
End: 2021-09-24

## 2021-09-27 ENCOUNTER — HOSPITAL ENCOUNTER (OUTPATIENT)
Dept: RADIOLOGY | Facility: HOSPITAL | Age: 61
Discharge: HOME | End: 2021-09-27
Attending: INTERNAL MEDICINE | Admitting: RADIOLOGY
Payer: COMMERCIAL

## 2021-09-27 DIAGNOSIS — E04.1 THYROID NODULE: ICD-10-CM

## 2021-09-27 PROCEDURE — 88173 CYTOPATH EVAL FNA REPORT: CPT | Performed by: PATHOLOGY

## 2021-09-27 PROCEDURE — 10005 FNA BX W/US GDN 1ST LES: CPT

## 2021-09-27 RX ORDER — LIDOCAINE HYDROCHLORIDE 10 MG/ML
5 INJECTION, SOLUTION EPIDURAL; INFILTRATION; INTRACAUDAL; PERINEURAL ONCE
Status: COMPLETED | OUTPATIENT
Start: 2021-09-27 | End: 2021-09-27

## 2021-09-27 RX ADMIN — LIDOCAINE HYDROCHLORIDE 2 ML: 10 INJECTION, SOLUTION EPIDURAL; INFILTRATION; INTRACAUDAL; PERINEURAL at 09:24

## 2021-10-01 ENCOUNTER — TELEPHONE (OUTPATIENT)
Dept: ENDOCRINOLOGY | Facility: CLINIC | Age: 61
End: 2021-10-01

## 2021-10-08 ENCOUNTER — OFFICE VISIT (OUTPATIENT)
Dept: OBGYN CLINIC | Facility: CLINIC | Age: 61
End: 2021-10-08
Payer: COMMERCIAL

## 2021-10-08 ENCOUNTER — CLINICAL SUPPORT (OUTPATIENT)
Dept: NEUROLOGY | Facility: CLINIC | Age: 61
End: 2021-10-08
Payer: COMMERCIAL

## 2021-10-08 VITALS
HEART RATE: 46 BPM | BODY MASS INDEX: 38.64 KG/M2 | HEIGHT: 71 IN | SYSTOLIC BLOOD PRESSURE: 122 MMHG | DIASTOLIC BLOOD PRESSURE: 79 MMHG | WEIGHT: 276 LBS

## 2021-10-08 DIAGNOSIS — G56.03 CARPAL TUNNEL SYNDROME, BILATERAL: ICD-10-CM

## 2021-10-08 DIAGNOSIS — R20.2 NUMBNESS AND TINGLING IN RIGHT HAND: ICD-10-CM

## 2021-10-08 DIAGNOSIS — G56.23 CUBITAL TUNNEL SYNDROME, BILATERAL: ICD-10-CM

## 2021-10-08 DIAGNOSIS — R20.0 NUMBNESS AND TINGLING IN RIGHT HAND: ICD-10-CM

## 2021-10-08 DIAGNOSIS — G56.03 BILATERAL CARPAL TUNNEL SYNDROME: Primary | ICD-10-CM

## 2021-10-08 DIAGNOSIS — E53.8 B12 DEFICIENCY: Primary | ICD-10-CM

## 2021-10-08 PROCEDURE — 3008F BODY MASS INDEX DOCD: CPT | Performed by: ORTHOPAEDIC SURGERY

## 2021-10-08 PROCEDURE — 99214 OFFICE O/P EST MOD 30 MIN: CPT | Performed by: ORTHOPAEDIC SURGERY

## 2021-10-08 PROCEDURE — 96372 THER/PROPH/DIAG INJ SC/IM: CPT | Performed by: PHYSICIAN ASSISTANT

## 2021-10-08 PROCEDURE — 3078F DIAST BP <80 MM HG: CPT | Performed by: ORTHOPAEDIC SURGERY

## 2021-10-08 PROCEDURE — 20526 THER INJECTION CARP TUNNEL: CPT | Performed by: ORTHOPAEDIC SURGERY

## 2021-10-08 PROCEDURE — 1036F TOBACCO NON-USER: CPT | Performed by: ORTHOPAEDIC SURGERY

## 2021-10-08 PROCEDURE — 3074F SYST BP LT 130 MM HG: CPT | Performed by: ORTHOPAEDIC SURGERY

## 2021-10-08 RX ORDER — TRIAMCINOLONE ACETONIDE 40 MG/ML
20 INJECTION, SUSPENSION INTRA-ARTICULAR; INTRAMUSCULAR
Status: COMPLETED | OUTPATIENT
Start: 2021-10-08 | End: 2021-10-08

## 2021-10-08 RX ORDER — LIDOCAINE HYDROCHLORIDE 10 MG/ML
0.5 INJECTION, SOLUTION INFILTRATION; PERINEURAL
Status: COMPLETED | OUTPATIENT
Start: 2021-10-08 | End: 2021-10-08

## 2021-10-08 RX ADMIN — TRIAMCINOLONE ACETONIDE 20 MG: 40 INJECTION, SUSPENSION INTRA-ARTICULAR; INTRAMUSCULAR at 09:02

## 2021-10-08 RX ADMIN — CYANOCOBALAMIN 1000 MCG: 1000 INJECTION INTRAMUSCULAR; SUBCUTANEOUS at 10:14

## 2021-10-08 RX ADMIN — LIDOCAINE HYDROCHLORIDE 0.5 ML: 10 INJECTION, SOLUTION INFILTRATION; PERINEURAL at 09:02

## 2021-10-09 LAB — VIT B12 SERPL-MCNC: >2000 PG/ML (ref 232–1245)

## 2021-10-11 ENCOUNTER — TELEPHONE (OUTPATIENT)
Dept: NEUROLOGY | Facility: CLINIC | Age: 61
End: 2021-10-11

## 2021-10-13 PROBLEM — Z95.5 HISTORY OF CORONARY ARTERY STENT PLACEMENT: Status: ACTIVE | Noted: 2017-11-24

## 2021-10-15 ENCOUNTER — TELEPHONE (OUTPATIENT)
Dept: CARDIOLOGY CLINIC | Facility: CLINIC | Age: 61
End: 2021-10-15

## 2021-10-19 ENCOUNTER — TELEPHONE (OUTPATIENT)
Dept: ENDOCRINOLOGY | Facility: CLINIC | Age: 61
End: 2021-10-19

## 2021-10-22 ENCOUNTER — HOSPITAL ENCOUNTER (OUTPATIENT)
Dept: NON INVASIVE DIAGNOSTICS | Facility: HOSPITAL | Age: 61
Discharge: HOME | End: 2021-10-22
Attending: INTERNAL MEDICINE
Payer: COMMERCIAL

## 2021-10-22 ENCOUNTER — HOSPITAL ENCOUNTER (OUTPATIENT)
Dept: RADIOLOGY | Facility: HOSPITAL | Age: 61
Discharge: HOME | End: 2021-10-22
Attending: INTERNAL MEDICINE
Payer: COMMERCIAL

## 2021-10-22 VITALS
OXYGEN SATURATION: 99 % | DIASTOLIC BLOOD PRESSURE: 68 MMHG | HEIGHT: 71 IN | BODY MASS INDEX: 38.64 KG/M2 | SYSTOLIC BLOOD PRESSURE: 158 MMHG | HEART RATE: 66 BPM | WEIGHT: 276 LBS

## 2021-10-22 DIAGNOSIS — Z72.0 NICOTINE ABUSE: ICD-10-CM

## 2021-10-22 DIAGNOSIS — I25.10 CORONARY ARTERY DISEASE INVOLVING NATIVE CORONARY ARTERY OF NATIVE HEART WITHOUT ANGINA PECTORIS: ICD-10-CM

## 2021-10-22 LAB
BASELINE ST DEPRESSION: 0 MM
MAX HR PERCENT: 88 %
MAX HR: 138 BPM
RATE PRESSURE PRODUCT: NORMAL
SL CV STRESS RECOVERY BP: NORMAL MMHG
SL CV STRESS RECOVERY HR: 109 BPM
SL CV STRESS STAGE REACHED: 3
STRESS ANGINA INDEX: 0
STRESS BASELINE BP: NORMAL MMHG
STRESS BASELINE HR: 66 BPM
STRESS DUKE TREADMILL SCORE: 9
STRESS O2 SAT REST: 99 %
STRESS PEAK HR: 150 BPM
STRESS POST ESTIMATED WORKLOAD: 10.1 METS
STRESS POST EXERCISE DUR MIN: 8 MIN
STRESS POST EXERCISE DUR SEC: 36 SEC
STRESS POST O2 SAT PEAK: 98 %
STRESS POST PEAK BP: NORMAL MMHG
STRESS ST DEPRESSION: 0 MM
STRESS TARGET HR: 150 BPM

## 2021-10-22 PROCEDURE — 93351 STRESS TTE COMPLETE: CPT | Performed by: INTERNAL MEDICINE

## 2021-10-22 PROCEDURE — 93350 STRESS TTE ONLY: CPT

## 2021-10-22 PROCEDURE — 71271 CT THORAX LUNG CANCER SCR C-: CPT

## 2021-10-25 ENCOUNTER — TELEPHONE (OUTPATIENT)
Dept: CARDIOLOGY CLINIC | Facility: CLINIC | Age: 61
End: 2021-10-25

## 2021-10-25 LAB
MAX DIASTOLIC BP: 86 MMHG
MAX HEART RATE: 162 BPM
MAX PREDICTED HEART RATE: 162 BPM
MAX. SYSTOLIC BP: 204 MMHG
PROTOCOL NAME: NORMAL
REASON FOR TERMINATION: NORMAL
TARGET HR FORMULA: NORMAL
TEST INDICATION: NORMAL
TIME IN EXERCISE PHASE: NORMAL

## 2021-10-29 ENCOUNTER — IMMUNIZATIONS (OUTPATIENT)
Dept: FAMILY MEDICINE CLINIC | Facility: HOSPITAL | Age: 61
End: 2021-10-29

## 2021-10-29 DIAGNOSIS — Z23 ENCOUNTER FOR IMMUNIZATION: Primary | ICD-10-CM

## 2021-11-23 DIAGNOSIS — I10 ESSENTIAL HYPERTENSION: ICD-10-CM

## 2021-11-24 PROCEDURE — 4010F ACE/ARB THERAPY RXD/TAKEN: CPT | Performed by: ORTHOPAEDIC SURGERY

## 2021-11-24 RX ORDER — LOSARTAN POTASSIUM 50 MG/1
TABLET ORAL
Qty: 90 TABLET | Refills: 0 | Status: SHIPPED | OUTPATIENT
Start: 2021-11-24 | End: 2022-06-06

## 2021-12-03 ENCOUNTER — OFFICE VISIT (OUTPATIENT)
Dept: ENDOCRINOLOGY | Facility: CLINIC | Age: 61
End: 2021-12-03
Payer: COMMERCIAL

## 2021-12-03 ENCOUNTER — OFFICE VISIT (OUTPATIENT)
Dept: DIABETES SERVICES | Facility: CLINIC | Age: 61
End: 2021-12-03
Payer: COMMERCIAL

## 2021-12-03 VITALS
HEIGHT: 71 IN | SYSTOLIC BLOOD PRESSURE: 140 MMHG | DIASTOLIC BLOOD PRESSURE: 76 MMHG | WEIGHT: 279 LBS | HEART RATE: 79 BPM | BODY MASS INDEX: 39.06 KG/M2

## 2021-12-03 VITALS — HEIGHT: 71 IN | BODY MASS INDEX: 39.14 KG/M2 | WEIGHT: 279.6 LBS

## 2021-12-03 DIAGNOSIS — E78.2 MIXED HYPERLIPIDEMIA: ICD-10-CM

## 2021-12-03 DIAGNOSIS — E11.9 TYPE 2 DIABETES MELLITUS WITHOUT COMPLICATION, WITHOUT LONG-TERM CURRENT USE OF INSULIN (HCC): ICD-10-CM

## 2021-12-03 DIAGNOSIS — E11.65 TYPE 2 DIABETES MELLITUS WITH HYPERGLYCEMIA, WITHOUT LONG-TERM CURRENT USE OF INSULIN (HCC): Primary | ICD-10-CM

## 2021-12-03 DIAGNOSIS — E04.1 THYROID NODULE: ICD-10-CM

## 2021-12-03 DIAGNOSIS — I25.10 CORONARY ARTERY DISEASE INVOLVING NATIVE CORONARY ARTERY OF NATIVE HEART WITHOUT ANGINA PECTORIS: ICD-10-CM

## 2021-12-03 DIAGNOSIS — I10 HYPERTENSION GOAL BP (BLOOD PRESSURE) < 140/90: ICD-10-CM

## 2021-12-03 DIAGNOSIS — R74.8 ELEVATED LIVER ENZYMES: ICD-10-CM

## 2021-12-03 DIAGNOSIS — I10 ESSENTIAL HYPERTENSION: ICD-10-CM

## 2021-12-03 DIAGNOSIS — E55.9 VITAMIN D DEFICIENCY: ICD-10-CM

## 2021-12-03 PROCEDURE — 97803 MED NUTRITION INDIV SUBSEQ: CPT | Performed by: DIETITIAN, REGISTERED

## 2021-12-03 PROCEDURE — 99214 OFFICE O/P EST MOD 30 MIN: CPT | Performed by: INTERNAL MEDICINE

## 2021-12-03 PROCEDURE — 3008F BODY MASS INDEX DOCD: CPT | Performed by: INTERNAL MEDICINE

## 2021-12-03 RX ORDER — ORAL SEMAGLUTIDE 7 MG/1
7 TABLET ORAL DAILY
Qty: 90 TABLET | Refills: 3 | Status: SHIPPED | OUTPATIENT
Start: 2021-12-03 | End: 2022-06-03

## 2021-12-03 RX ORDER — ORAL SEMAGLUTIDE 3 MG/1
TABLET ORAL
Qty: 60 TABLET | Refills: 0 | Status: SHIPPED | OUTPATIENT
Start: 2021-12-03 | End: 2021-12-03

## 2021-12-10 ENCOUNTER — OFFICE VISIT (OUTPATIENT)
Dept: INTERNAL MEDICINE CLINIC | Facility: CLINIC | Age: 61
End: 2021-12-10
Payer: COMMERCIAL

## 2021-12-10 VITALS
DIASTOLIC BLOOD PRESSURE: 70 MMHG | SYSTOLIC BLOOD PRESSURE: 122 MMHG | HEART RATE: 63 BPM | BODY MASS INDEX: 38.77 KG/M2 | OXYGEN SATURATION: 99 % | WEIGHT: 278 LBS

## 2021-12-10 DIAGNOSIS — I49.3 ASYMPTOMATIC PVCS: ICD-10-CM

## 2021-12-10 DIAGNOSIS — Z12.5 SCREENING PSA (PROSTATE SPECIFIC ANTIGEN): ICD-10-CM

## 2021-12-10 DIAGNOSIS — E11.40 NEUROPATHY DUE TO TYPE 2 DIABETES MELLITUS (HCC): ICD-10-CM

## 2021-12-10 DIAGNOSIS — I25.10 CORONARY ARTERY DISEASE INVOLVING NATIVE CORONARY ARTERY OF NATIVE HEART WITHOUT ANGINA PECTORIS: ICD-10-CM

## 2021-12-10 DIAGNOSIS — R74.8 ELEVATED LIVER ENZYMES: ICD-10-CM

## 2021-12-10 DIAGNOSIS — E66.09 CLASS 2 OBESITY DUE TO EXCESS CALORIES WITHOUT SERIOUS COMORBIDITY WITH BODY MASS INDEX (BMI) OF 37.0 TO 37.9 IN ADULT: ICD-10-CM

## 2021-12-10 DIAGNOSIS — E78.2 MIXED HYPERLIPIDEMIA: ICD-10-CM

## 2021-12-10 DIAGNOSIS — I10 ESSENTIAL HYPERTENSION: ICD-10-CM

## 2021-12-10 DIAGNOSIS — N40.0 BENIGN PROSTATIC HYPERPLASIA WITHOUT LOWER URINARY TRACT SYMPTOMS: ICD-10-CM

## 2021-12-10 DIAGNOSIS — E11.65 TYPE 2 DIABETES MELLITUS WITH HYPERGLYCEMIA, WITHOUT LONG-TERM CURRENT USE OF INSULIN (HCC): Primary | ICD-10-CM

## 2021-12-10 DIAGNOSIS — K21.9 GASTROESOPHAGEAL REFLUX DISEASE WITHOUT ESOPHAGITIS: ICD-10-CM

## 2021-12-10 DIAGNOSIS — E55.9 VITAMIN D DEFICIENCY: ICD-10-CM

## 2021-12-10 DIAGNOSIS — G47.30 SLEEP APNEA, UNSPECIFIED TYPE: ICD-10-CM

## 2021-12-10 DIAGNOSIS — I83.893 VARICOSE VEINS OF LEG WITH SWELLING, BILATERAL: ICD-10-CM

## 2021-12-10 DIAGNOSIS — J30.1 SEASONAL ALLERGIC RHINITIS DUE TO POLLEN: ICD-10-CM

## 2021-12-10 LAB
EST. AVERAGE GLUCOSE BLD GHB EST-MCNC: 160 MG/DL
HBA1C MFR BLD: 7.2 % (ref 4.8–5.6)

## 2021-12-10 PROCEDURE — 3051F HG A1C>EQUAL 7.0%<8.0%: CPT | Performed by: INTERNAL MEDICINE

## 2021-12-10 PROCEDURE — 3074F SYST BP LT 130 MM HG: CPT | Performed by: INTERNAL MEDICINE

## 2021-12-10 PROCEDURE — 3078F DIAST BP <80 MM HG: CPT | Performed by: INTERNAL MEDICINE

## 2021-12-10 PROCEDURE — 99214 OFFICE O/P EST MOD 30 MIN: CPT | Performed by: INTERNAL MEDICINE

## 2021-12-11 LAB
25(OH)D3+25(OH)D2 SERPL-MCNC: 53.4 NG/ML (ref 30–100)
ALBUMIN SERPL-MCNC: 4.2 G/DL (ref 3.8–4.9)
ALBUMIN/GLOB SERPL: 1.6 {RATIO} (ref 1.2–2.2)
ALP SERPL-CCNC: 47 IU/L (ref 44–121)
ALT SERPL-CCNC: 57 IU/L (ref 0–44)
AST SERPL-CCNC: 40 IU/L (ref 0–40)
BILIRUB SERPL-MCNC: 0.5 MG/DL (ref 0–1.2)
BUN SERPL-MCNC: 12 MG/DL (ref 6–24)
BUN/CREAT SERPL: 11 (ref 9–20)
CALCIUM SERPL-MCNC: 9.5 MG/DL (ref 8.7–10.2)
CHLORIDE SERPL-SCNC: 99 MMOL/L (ref 96–106)
CHOLEST SERPL-MCNC: 137 MG/DL (ref 100–199)
CHOLEST/HDLC SERPL: 3.3 RATIO (ref 0–5)
CO2 SERPL-SCNC: 24 MMOL/L (ref 20–29)
CREAT SERPL-MCNC: 1.1 MG/DL (ref 0.76–1.27)
GLOBULIN SER-MCNC: 2.6 G/DL (ref 1.5–4.5)
GLUCOSE SERPL-MCNC: 155 MG/DL (ref 65–99)
HDLC SERPL-MCNC: 42 MG/DL
LDLC SERPL CALC-MCNC: 64 MG/DL (ref 0–99)
MAGNESIUM SERPL-MCNC: 1.5 MG/DL (ref 1.6–2.3)
POTASSIUM SERPL-SCNC: 4.4 MMOL/L (ref 3.5–5.2)
PROT SERPL-MCNC: 6.8 G/DL (ref 6–8.5)
SL AMB EGFR AFRICAN AMERICAN: 84 ML/MIN/1.73
SL AMB EGFR NON AFRICAN AMERICAN: 73 ML/MIN/1.73
SL AMB VLDL CHOLESTEROL CALC: 31 MG/DL (ref 5–40)
SODIUM SERPL-SCNC: 139 MMOL/L (ref 134–144)
T4 FREE SERPL-MCNC: 0.96 NG/DL (ref 0.82–1.77)
TRIGL SERPL-MCNC: 183 MG/DL (ref 0–149)
TSH SERPL DL<=0.005 MIU/L-ACNC: 1.41 UIU/ML (ref 0.45–4.5)

## 2021-12-12 DIAGNOSIS — E11.9 TYPE 2 DIABETES MELLITUS WITHOUT COMPLICATION, WITHOUT LONG-TERM CURRENT USE OF INSULIN (HCC): ICD-10-CM

## 2021-12-15 ENCOUNTER — TELEPHONE (OUTPATIENT)
Dept: ENDOCRINOLOGY | Facility: CLINIC | Age: 61
End: 2021-12-15

## 2021-12-17 DIAGNOSIS — Z20.822 EXPOSURE TO COVID-19 VIRUS: Primary | ICD-10-CM

## 2021-12-17 PROCEDURE — U0005 INFEC AGEN DETEC AMPLI PROBE: HCPCS | Performed by: INTERNAL MEDICINE

## 2021-12-17 PROCEDURE — U0003 INFECTIOUS AGENT DETECTION BY NUCLEIC ACID (DNA OR RNA); SEVERE ACUTE RESPIRATORY SYNDROME CORONAVIRUS 2 (SARS-COV-2) (CORONAVIRUS DISEASE [COVID-19]), AMPLIFIED PROBE TECHNIQUE, MAKING USE OF HIGH THROUGHPUT TECHNOLOGIES AS DESCRIBED BY CMS-2020-01-R: HCPCS | Performed by: INTERNAL MEDICINE

## 2021-12-21 ENCOUNTER — TELEMEDICINE (OUTPATIENT)
Dept: INTERNAL MEDICINE CLINIC | Facility: CLINIC | Age: 61
End: 2021-12-21
Payer: COMMERCIAL

## 2021-12-21 ENCOUNTER — HOSPITAL ENCOUNTER (OUTPATIENT)
Dept: NEUROLOGY | Facility: CLINIC | Age: 61
Discharge: HOME | End: 2021-12-21
Payer: COMMERCIAL

## 2021-12-21 DIAGNOSIS — R20.2 NUMBNESS AND TINGLING IN RIGHT HAND: ICD-10-CM

## 2021-12-21 DIAGNOSIS — Z20.822 CLOSE EXPOSURE TO COVID-19 VIRUS: Primary | ICD-10-CM

## 2021-12-21 DIAGNOSIS — R20.0 NUMBNESS AND TINGLING IN RIGHT HAND: ICD-10-CM

## 2021-12-21 PROCEDURE — 99212 OFFICE O/P EST SF 10 MIN: CPT | Performed by: INTERNAL MEDICINE

## 2021-12-21 PROCEDURE — 95886 MUSC TEST DONE W/N TEST COMP: CPT | Performed by: PSYCHIATRY & NEUROLOGY

## 2021-12-21 PROCEDURE — 95909 NRV CNDJ TST 5-6 STUDIES: CPT | Performed by: PSYCHIATRY & NEUROLOGY

## 2021-12-21 PROCEDURE — 1036F TOBACCO NON-USER: CPT | Performed by: INTERNAL MEDICINE

## 2022-01-10 NOTE — TELEPHONE ENCOUNTER
01/10/22 3:44 PM     See documentation in the [facility] SmartForm       Marylin Layer, [place]

## 2022-01-12 ENCOUNTER — TELEPHONE (OUTPATIENT)
Dept: NEUROLOGY | Facility: CLINIC | Age: 62
End: 2022-01-12

## 2022-01-14 ENCOUNTER — OFFICE VISIT (OUTPATIENT)
Dept: OBGYN CLINIC | Facility: CLINIC | Age: 62
End: 2022-01-14
Payer: COMMERCIAL

## 2022-01-14 ENCOUNTER — OFFICE VISIT (OUTPATIENT)
Dept: NEUROLOGY | Facility: CLINIC | Age: 62
End: 2022-01-14
Payer: COMMERCIAL

## 2022-01-14 VITALS
TEMPERATURE: 96.6 F | WEIGHT: 276 LBS | DIASTOLIC BLOOD PRESSURE: 79 MMHG | BODY MASS INDEX: 38.64 KG/M2 | HEART RATE: 97 BPM | HEIGHT: 71 IN | SYSTOLIC BLOOD PRESSURE: 148 MMHG

## 2022-01-14 VITALS
HEIGHT: 71 IN | WEIGHT: 278 LBS | SYSTOLIC BLOOD PRESSURE: 132 MMHG | HEART RATE: 78 BPM | DIASTOLIC BLOOD PRESSURE: 80 MMHG | BODY MASS INDEX: 38.92 KG/M2

## 2022-01-14 DIAGNOSIS — G25.81 RESTLESS LEG SYNDROME: Primary | ICD-10-CM

## 2022-01-14 DIAGNOSIS — G56.22 CUBITAL TUNNEL SYNDROME ON LEFT: ICD-10-CM

## 2022-01-14 DIAGNOSIS — G56.03 BILATERAL CARPAL TUNNEL SYNDROME: Primary | ICD-10-CM

## 2022-01-14 DIAGNOSIS — E11.9 TYPE 2 DIABETES MELLITUS WITHOUT COMPLICATION, WITHOUT LONG-TERM CURRENT USE OF INSULIN (HCC): ICD-10-CM

## 2022-01-14 DIAGNOSIS — G62.9 NEUROPATHY: ICD-10-CM

## 2022-01-14 DIAGNOSIS — E53.8 B12 DEFICIENCY: ICD-10-CM

## 2022-01-14 PROCEDURE — 3079F DIAST BP 80-89 MM HG: CPT | Performed by: ORTHOPAEDIC SURGERY

## 2022-01-14 PROCEDURE — 20526 THER INJECTION CARP TUNNEL: CPT | Performed by: ORTHOPAEDIC SURGERY

## 2022-01-14 PROCEDURE — 1036F TOBACCO NON-USER: CPT | Performed by: ORTHOPAEDIC SURGERY

## 2022-01-14 PROCEDURE — 99214 OFFICE O/P EST MOD 30 MIN: CPT | Performed by: NURSE PRACTITIONER

## 2022-01-14 PROCEDURE — 99213 OFFICE O/P EST LOW 20 MIN: CPT | Performed by: ORTHOPAEDIC SURGERY

## 2022-01-14 PROCEDURE — 3008F BODY MASS INDEX DOCD: CPT | Performed by: ORTHOPAEDIC SURGERY

## 2022-01-14 PROCEDURE — 3075F SYST BP GE 130 - 139MM HG: CPT | Performed by: ORTHOPAEDIC SURGERY

## 2022-01-14 RX ORDER — TRIAMCINOLONE ACETONIDE 40 MG/ML
20 INJECTION, SUSPENSION INTRA-ARTICULAR; INTRAMUSCULAR
Status: COMPLETED | OUTPATIENT
Start: 2022-01-14 | End: 2022-01-14

## 2022-01-14 RX ORDER — PRAMIPEXOLE DIHYDROCHLORIDE 0.12 MG/1
0.12 TABLET ORAL
Qty: 30 TABLET | Refills: 2 | Status: SHIPPED | OUTPATIENT
Start: 2022-01-14 | End: 2022-05-13 | Stop reason: ALTCHOICE

## 2022-01-14 RX ORDER — LIDOCAINE HYDROCHLORIDE 10 MG/ML
0.5 INJECTION, SOLUTION INFILTRATION; PERINEURAL
Status: COMPLETED | OUTPATIENT
Start: 2022-01-14 | End: 2022-01-14

## 2022-01-14 RX ADMIN — LIDOCAINE HYDROCHLORIDE 0.5 ML: 10 INJECTION, SOLUTION INFILTRATION; PERINEURAL at 09:58

## 2022-01-14 RX ADMIN — TRIAMCINOLONE ACETONIDE 20 MG: 40 INJECTION, SUSPENSION INTRA-ARTICULAR; INTRAMUSCULAR at 09:58

## 2022-01-14 NOTE — PROGRESS NOTES
Assessment/Plan:  1  Bilateral carpal tunnel syndrome     2  Cubital tunnel syndrome on left       63year old male with bilateral carpal tunnel syndrome and left cubital tunnel syndrome  I discussed patient's EMG results with him today  At this time, patient would like to continue with conservative measures as he states he would not be able to get any sort of surgery until October  He agreed to proceed with bilateral carpal tunnel injections today  These were performed and patient tolerated the procedures well  He will continue use of his nighttime braces as well  Follow up PRN  Subjective:   William Martínez is a 63 y o  male who presents to the office today for follow up of bilateral hand numbness  Patient states that overall his symptoms are improved  He is a  and states he's noticed his hands don't fall asleep as often during surgery  Patient does occasionally drop items  He denies nocturnal symptoms  He has been using his braces at night  He states he is unsure if it was the injections versus the braces which provided more relief  He describes his symptoms as a 2-3/10  Review of Systems   All other systems reviewed and are negative  Past Medical History:   Diagnosis Date    Allergic rhinitis     Anemia     Arthritis     Bundle branch block, right     CAD (coronary artery disease)     CPAP (continuous positive airway pressure) dependence     Diabetes mellitus (HCC)     borderline, controlled with diet and activity    Diverticulitis     Diverticulitis of large intestine with abscess without bleeding 12/7/2016    GERD (gastroesophageal reflux disease)     History of coronary artery stent placement- pCx and OM2 11/24/2017    Hyperlipidemia     Hypertension     Nasal septal deformity     Neuropathy     Obesity (BMI 30-39  9)     Peptic ulceration     gastric    Peptic ulceration 9/26/2019    gastric    Pneumonia     Renal disorder     Seasonal allergies     Sleep apnea     wears c-pap    Urinary tract infection     Vitamin D deficiency        Past Surgical History:   Procedure Laterality Date    COLON SIGMOID RESECTION N/A 12/16/2016    Procedure: RESECTION COLON SIGMOID;  Surgeon: Destin Recio MD;  Location: [facility];  Service:     COLON SIGMOID RESECTION LAPAROSCOPIC N/A 12/16/2016    Procedure: RESECTION COLON SIGMOID LAPAROSCOPIC:CONVERTED TO [facility];  Surgeon: Destin Recio MD;  Location: [facility];  Service:     COLON SURGERY      Sigmoidectomy    COLONOSCOPY N/A 10/6/2016    Procedure: COLONOSCOPY;  Surgeon: Yolanda Portillo MD;  Location: [place] [address] [facility]; Service:    [facility] CYSTOSCOPY W/ RETROGRADES Left 2/3/2017    Procedure: CYSTOSCOPY WITH BILATERAL RETROGRADES, LEFT STENT REMOVAL;  Surgeon: Viola Bourne MD;  Location: [address] [place] [address];  Service:     ESOPHAGOGASTRODUODENOSCOPY N/A 10/6/2016    Procedure: ESOPHAGOGASTRODUODENOSCOPY (EGD); Surgeon: Yolanda Portillo MD;  Location: [place] [address] [facility];   Service:     HERNIA REPAIR      KNEE ARTHROSCOPY W/ MENISCECTOMY      WI CYSTOURETHROSCOPY,URETER CATHETER Left 12/4/2016    Procedure: CYSTOSCOPY RETROGRADE PYELOGRAM WITH INSERTION STENT URETERAL;  Surgeon: Viola Bourne MD;  Location: [address] [place] [address];  Service: Urology    US GUIDED THYROID BIOPSY  9/27/2021    VARICOSE VEIN SURGERY         Family History   Problem Relation Age of Onset    Diabetes Brother     Osteoarthritis Mother     Atrial fibrillation Mother     Aortic aneurysm Father     Colon cancer Brother        Social History     Occupational History    Not on file   Tobacco Use    Smoking status: Former Smoker     Packs/day: 0 50     Years: 37 00     Pack years: 18 50     Types: Cigarettes     Quit date: 3/30/2018     Years since quitting: 3 7    Smokeless tobacco: Former User     Types: Chew    Tobacco comment:  requit 3/30/18   Vaping Use    Vaping Use: Some days    Substances: Nicotine   Substance and Sexual Activity    Alcohol use: Yes     Alcohol/week: 3 0 standard drinks     Types: 3 Cans of beer per week    Drug use: No    Sexual activity: Never         Current Outpatient Medications:     albuterol (PROVENTIL HFA,VENTOLIN HFA) 90 mcg/act inhaler, Inhale 2 puffs every 4 (four) hours as needed for wheezing or shortness of breath, Disp: 18 g, Rfl: 6    Alpha-Lipoic Acid 100 MG CAPS, Take by mouth, Disp: , Rfl:     Ascorbic Acid (VITAMIN C) 100 MG tablet, Take 500 mg by mouth , Disp: , Rfl:     aspirin 81 MG tablet, Take 162 mg by mouth daily  , Disp: , Rfl:     atorvastatin (LIPITOR) 40 mg tablet, Take 1 tablet (40 mg total) by mouth daily with dinner, Disp: 90 tablet, Rfl: 1    Cholecalciferol (VITAMIN D3) 1000 units CAPS, Take by mouth daily , Disp: , Rfl:     cyanocobalamin (VITAMIN B-12) 100 mcg tablet, Take by mouth daily, Disp: , Rfl:     desloratadine (CLARINEX) 5 MG tablet, Take 1 tablet (5 mg total) by mouth daily at bedtime, Disp: 90 tablet, Rfl: 1    famotidine (PEPCID) 20 mg tablet, Take 20 mg by mouth daily, Disp: , Rfl:     FREESTYLE LITE test strip, USE 1 STRIP TO CHECK GLUCOSE ONCE DAILY AS DIRECTED, Disp: 100 each, Rfl: 0    gabapentin (NEURONTIN) 100 mg capsule, TAKE 1 CAPSULE BY MOUTH ONCE DAILY IN THE MORNING AND 2 EVERY DAY AT BEDTIME, Disp: 270 capsule, Rfl: 0    glucosamine-chondroitin 500-400 MG tablet, Take 1 tablet by mouth daily, Disp: , Rfl:     hydrochlorothiazide (HYDRODIURIL) 25 mg tablet, Take 1 tablet by mouth once daily, Disp: 90 tablet, Rfl: 0    losartan (COZAAR) 50 mg tablet, Take 1 tablet by mouth once daily, Disp: 90 tablet, Rfl: 0    metFORMIN (GLUCOPHAGE) 1000 MG tablet, TAKE 1 TABLET BY MOUTH TWICE DAILY WITH MEALS, Disp: 180 tablet, Rfl: 0    Multiple Vitamin (MULTIVITAMIN) capsule, Take 1 capsule by mouth daily, Disp: , Rfl:     nitroglycerin (NITROSTAT) 0 3 mg SL tablet, Place 1 tablet (0 3 mg total) under the tongue every 5 (five) minutes as needed for chest pain, Disp: 25 tablet, Rfl: 1    ONETOUCH DELICA LANCETS 56R MISC, 1 Units by Does not apply route daily, Disp: 100 each, Rfl: 6    Semaglutide (Rybelsus) 7 MG TABS, Take 7 mg by mouth daily, Disp: 90 tablet, Rfl: 3    torsemide (DEMADEX) 10 mg tablet, TAKE 1 TABLET BY MOUTH ONCE DAILY (Patient taking differently: Take by mouth as needed  ), Disp: 90 tablet, Rfl: 3    Current Facility-Administered Medications:     cyanocobalamin injection 1,000 mcg, 1,000 mcg, Intramuscular, Q30 Days, LUCERO Deluca, 1,000 mcg at 09/10/21 1141    cyanocobalamin injection 1,000 mcg, 1,000 mcg, Intramuscular, Q30 Days, Marcos Quan PA-C, 1,000 mcg at 10/08/21 1014    Allergies   Allergen Reactions    Levaquin [Levofloxacin In D5w] Swelling     Knee swelling    Sulfa Antibiotics GI Intolerance     Abdominal pain         Objective:  Vitals:    01/14/22 0922   BP: 132/80   Pulse: 78       Ortho Exam   Left Upper Extremity:  No atrophy  - Tinel's at carpal tunnel  - Durkan's compression carpal tunnel  5/5 opposition strength  No ulnar nerve subluxation  + Tinel's cubital tunnel  5/5 interosseous      Right Upper Extremity:  No atrophy  - Tinel's carpal tunnel  - Durkan's compression carpal tunnel  No ulnar nerve subluxation  - Tinel's cubital tunnel  5/5 interosseous       Physical Exam  Vitals reviewed  Constitutional:       General: He is not in acute distress  Appearance: Normal appearance  HENT:      Head: Normocephalic and atraumatic  Eyes:      Extraocular Movements: Extraocular movements intact  Conjunctiva/sclera: Conjunctivae normal    Cardiovascular:      Pulses: Normal pulses  Pulmonary:      Effort: Pulmonary effort is normal  No respiratory distress  Abdominal:      Tenderness: There is no guarding  Musculoskeletal:      Cervical back: No rigidity  Skin:     General: Skin is warm and dry  Neurological:      Mental Status: He is alert and oriented to person, place, and time     Psychiatric: Mood and Affect: Mood normal          Behavior: Behavior normal          Thought Content: Thought content normal          Studies Reviewed:  I have personally reviewed the patients RUE EMG from 12/21/21 which demonstrates evidence of carpal tunnel syndrome of moderate degree, no evidence of cubital tunnel  EMG of the LUE from 01/23/20 demonstrates moderate carpal tunnel, mild-moderate cubital tunnel, very mild predominantly axonal sensorimotor neuropathy   Procedures Performed:  Hand/upper extremity injection: R carpal tunnel  Leopold Protocol:  Consent: Verbal consent obtained  Risks and benefits: risks, benefits and alternatives were discussed  Consent given by: patient  Time out: Immediately prior to procedure a "time out" was called to verify the correct patient, procedure, equipment, support staff and site/side marked as required  Patient understanding: patient states understanding of the procedure being performed  Patient identity confirmed: verbally with patient    Supporting Documentation  Indications: therapeutic   Procedure Details  Condition:carpal tunnel syndrome Site: R carpal tunnel   Needle size: 27 G  Ultrasound guidance: no  Approach: volar  Medications administered: 0 5 mL lidocaine 1 %; 20 mg triamcinolone acetonide 40 mg/mL    Patient tolerance: patient tolerated the procedure well with no immediate complications  Dressing:  Sterile dressing applied     Hand/upper extremity injection: L carpal tunnel  Leopold Protocol:  Consent: Verbal consent obtained  Risks and benefits: risks, benefits and alternatives were discussed  Consent given by: patient  Time out: Immediately prior to procedure a "time out" was called to verify the correct patient, procedure, equipment, support staff and site/side marked as required    Patient understanding: patient states understanding of the procedure being performed  Patient identity confirmed: verbally with patient    Supporting Documentation  Indications: therapeutic   Procedure Details  Condition:carpal tunnel syndrome Site: L carpal tunnel   Needle size: 27 G  Ultrasound guidance: no  Approach: volar  Medications administered: 0 5 mL lidocaine 1 %; 20 mg triamcinolone acetonide 40 mg/mL    Patient tolerance: patient tolerated the procedure well with no immediate complications  Dressing:  Sterile dressing applied

## 2022-01-14 NOTE — PROGRESS NOTES
Patient ID: Valeria Sidhu is a 63 y o  male  Assessment/Plan:     Diagnoses and all orders for this visit:    Restless leg syndrome  -     pramipexole (MIRAPEX) 0 125 mg tablet; Take 1 tablet (0 125 mg total) by mouth daily at bedtime    Neuropathy    Type 2 diabetes mellitus without complication, without long-term current use of insulin (HCC)    B12 deficiency    Other orders  -     Magnesium Hydroxide (MAGNESIA PO); Take by mouth       Can start on Magnesium 400mg daily  Can continue with Gabapentin 100mg and 200mg at bedtime   Start on Mirapex 0 125mg tab at bedtime  You with vitamin B12 injections monthly  Follow up with Neurology office in 4 months or sooner if needed    Subjective/HPI:  Valeria Sidhu is a 66yr old male who has been followed by outpatient Neurology office for medical management of neuropathies  Patient has a medical history of diabetes for which he has been working with his endocrinologist to maintain better control  His A1cs have been 6 5-7 2 most recently  He has been working to maintain control with diet and exercise, is also on Rybelsus  Patient has been taking vitamin-D repletion and has had evidence of vitamin B12 deficiencies for which he takes IM injections monthly at this point in time  Patient stated that he did just recently have some lab works, he has adjusted his medications per his endocrinologist   Patient did note having some leg cramping for which she has discussed with his endocrinologist   They did indicate the possibility this may be related to his statin however patient's magnesium came back at 1 5 for which he independently started on 250 mg of magnesium daily  When addressed with his endocrinologist they did defer him to his PCP  Patient has a component of lumbar disc disease as well as carpal/ulnar tunnel disease in the mix of his neuropathies  MRI of his lumbar spine with multilevel degenerative disc changes    Focal disc protrusion at L3-4 with possible impingement on L4 descending nerve root  Patient has chronic T11 compression fracture with mild kyphosis  Bone density was normal   EMG of upper extremity with evidence of median nerve entrapment consistent with carpal tunnel syndrome  Lleft ulnar neuropathy across the elbow   Patient has been following with orthopedics and had bilateral injections today  Patient has obstructive sleep apnea, he sees Pulmonary and Sleep Medicine for this  Recommended and uses CPAP for this  Patient reports during his studies he was also advised he has restless legs syndrome however he has not ever been treated for this  Patient does have PMH of CAD, BING, anemia, diabetes, heart disease, HTN, neuropathy, obesity, ulcer disease, renal disease and vitamin deficiencies  Patient's PCP has 1 gabapentin 100 mg during the day and 200 mg at night  Patient does not wish to make any changes to this at this time  The patient is seen in the Neurology office today, he reports that he no longer gets the sharp shooting pains in his lower extremities however does note continued to have odd over min like sensation to his feet and lower legs  States it feels like a creepy crawly like sensation  He believes that this does happen during the day however he is usually busy and it does not bother him quite as much however as he rest it does bother him more  States mostly in the evening  Patient has not taken much notice as to determine if it does improve after he is up walking around however can be quite bothersome at night when he is trying to rest   States that this sensation is mostly in the bottom of his feet now extended to the top of his feet in up into his legs  Patient does feel as though the sensations have worsened recently and are bothersome  Encourage patient to continue to work towards more euglycemic A1c    Although his endocrinologist is satisfied with his current A1c, steady state will help to stabilize his neuropathy  Encourage patient to increase his magnesium to 400 mg daily  Advised this could soften his stools in which case he could go to every other day or returned back to 250 mg daily  Patient continues on vitamin B12 injections monthly  Patient does not wish to make any changes to his gabapentin  He does do surgeries during the day, 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  Patient denies having any issues with sleeping at night, he does on occasion get up once to urinate  Sometimes he is able to return to sleep sometimes not  Patient denies issues with his legs keeping him from sleeping  Will maintain the same gabapentin dosing at this time  Patient has previously had indications of restless legs syndrome, his symptoms described today lead to the possibility of his neuropathies also being complicated by the possibility of RLS  Will start with low dose of pramipexole at 0 125 mg nightly  Patient can take this at bedtime or start in the evening after he sits to rest for the evening  Patient will contact the neurology office if this does help a little bit wants to increase his dosing however will follow-up in 4 months to continue to review  If patient has no effect from the pramipexole, may consider increasing his nighttime dosing of gabapentin instead         The following portions of the patient's history were reviewed and updated as appropriate: allergies, current medications, past family history, past medical history, past social history, past surgical history and problem list         Past Medical History:   Diagnosis Date    Allergic rhinitis     Anemia     Arthritis     Bundle branch block, right     CAD (coronary artery disease)     CPAP (continuous positive airway pressure) dependence     Diabetes mellitus ([facility] [address] )     borderline, controlled with diet and activity    Diverticulitis     Diverticulitis of large intestine with abscess without bleeding 12/7/2016    GERD (gastroesophageal reflux disease)     History of coronary artery stent placement- pCx and OM2 11/24/2017    Hyperlipidemia     Hypertension     Nasal septal deformity     Neuropathy     Obesity (BMI 30-39  9)     Peptic ulceration     gastric    Peptic ulceration 9/26/2019    gastric    Pneumonia     Renal disorder     Seasonal allergies     Sleep apnea     wears c-pap    Urinary tract infection     Vitamin D deficiency        Past Surgical History:   Procedure Laterality Date    COLON SIGMOID RESECTION N/A 12/16/2016    Procedure: RESECTION COLON SIGMOID;  Surgeon: Destin Recio MD;  [facility];  Service:     COLON SIGMOID RESECTION LAPAROSCOPIC N/A 12/16/2016    Procedure: RESECTION COLON SIGMOID LAPAROSCOPIC:CONVERTED TO [employer];  Surgeon: Destin Recio MD;  [facility];  Service:     COLON SURGERY      Sigmoidectomy    COLONOSCOPY N/A 10/6/2016    Procedure: COLONOSCOPY;  Surgeon: Yolanda Portillo MD;  Location: [place] [address] GI LAB; Service:    [facility] CYSTOSCOPY W/ RETROGRADES Left 2/3/2017    Procedure: CYSTOSCOPY WITH BILATERAL RETROGRADES, LEFT STENT REMOVAL;  Surgeon: Viola Bourne MD;  Location: [address] [place] [address];  Service:     ESOPHAGOGASTRODUODENOSCOPY N/A 10/6/2016    Procedure: ESOPHAGOGASTRODUODENOSCOPY (EGD); Surgeon: Yolanda Portillo MD;  Location: [place] [address] GI LAB;   Service:     HERNIA REPAIR      KNEE ARTHROSCOPY W/ MENISCECTOMY      KS CYSTOURETHROSCOPY,URETER CATHETER Left 12/4/2016    Procedure: CYSTOSCOPY RETROGRADE PYELOGRAM WITH INSERTION STENT URETERAL;  Surgeon: Viola Bourne MD;  Location: [address] [place] [address];  Service: Urology   [place] [address] THYROID BIOPSY  9/27/2021    VARICOSE VEIN SURGERY         Social History     Socioeconomic History    Marital status: Single     Spouse name: None    Number of children: None    Years of education: None    Highest education level: None   Occupational History    None   Tobacco Use    Smoking status: Former Smoker Packs/day: 0 50     Years: 37 00     Pack years: 18 50     Types: Cigarettes     Quit date: 3/30/2018     Years since quitting: 3 7    Smokeless tobacco: Former User     Types: Chew    Tobacco comment:  requarmando 3/30/18   Vaping Use    Vaping Use: Some days    Substances: Nicotine   Substance and Sexual Activity    Alcohol use: Yes     Alcohol/week: 3 0 standard drinks     Types: 3 Cans of beer per week    Drug use: No    Sexual activity: Never   Other Topics Concern    None   Social History Narrative    Lives alone       Social Determinants of Health     Financial Resource Strain: Not on file   Food Insecurity: Not on file   Transportation Needs: Not on file   Physical Activity: Not on file   Stress: Not on file   Social Connections: Not on file   Intimate Partner Violence: Not on file   Housing Stability: Not on file       Family History   Problem Relation Age of Onset    Diabetes Brother     Osteoarthritis Mother     Atrial fibrillation Mother     Aortic aneurysm Father     Colon cancer Brother          Current Outpatient Medications:     albuterol (PROVENTIL HFA,VENTOLIN HFA) 90 mcg/act inhaler, Inhale 2 puffs every 4 (four) hours as needed for wheezing or shortness of breath, Disp: 18 g, Rfl: 6    Alpha-Lipoic Acid 100 MG CAPS, Take by mouth, Disp: , Rfl:     Ascorbic Acid (VITAMIN C) 100 MG tablet, Take 500 mg by mouth , Disp: , Rfl:     aspirin 81 MG tablet, Take 162 mg by mouth daily  , Disp: , Rfl:     atorvastatin (LIPITOR) 40 mg tablet, Take 1 tablet (40 mg total) by mouth daily with dinner, Disp: 90 tablet, Rfl: 1    Cholecalciferol (VITAMIN D3) 1000 units CAPS, Take by mouth daily , Disp: , Rfl:     cyanocobalamin (VITAMIN B-12) 100 mcg tablet, Take by mouth daily, Disp: , Rfl:     desloratadine (CLARINEX) 5 MG tablet, Take 1 tablet (5 mg total) by mouth daily at bedtime, Disp: 90 tablet, Rfl: 1    famotidine (PEPCID) 20 mg tablet, Take 20 mg by mouth daily, Disp: , Rfl:    FREESTYLE LITE test strip, USE 1 STRIP TO CHECK GLUCOSE ONCE DAILY AS DIRECTED, Disp: 100 each, Rfl: 0    gabapentin (NEURONTIN) 100 mg capsule, TAKE 1 CAPSULE BY MOUTH ONCE DAILY IN THE MORNING AND 2 EVERY DAY AT BEDTIME, Disp: 270 capsule, Rfl: 0    glucosamine-chondroitin 500-400 MG tablet, Take 1 tablet by mouth daily, Disp: , Rfl:     hydrochlorothiazide (HYDRODIURIL) 25 mg tablet, Take 1 tablet by mouth once daily, Disp: 90 tablet, Rfl: 0    losartan (COZAAR) 50 mg tablet, Take 1 tablet by mouth once daily, Disp: 90 tablet, Rfl: 0    Magnesium Hydroxide (MAGNESIA PO), Take by mouth, Disp: , Rfl:     metFORMIN (GLUCOPHAGE) 1000 MG tablet, TAKE 1 TABLET BY MOUTH TWICE DAILY WITH MEALS, Disp: 180 tablet, Rfl: 0    Multiple Vitamin (MULTIVITAMIN) capsule, Take 1 capsule by mouth daily, Disp: , Rfl:     nitroglycerin (NITROSTAT) 0 3 mg SL tablet, Place 1 tablet (0 3 mg total) under the tongue every 5 (five) minutes as needed for chest pain, Disp: 25 tablet, Rfl: 1    ONETOUCH DELICA LANCETS 39J MISC, 1 Units by Does not apply route daily, Disp: 100 each, Rfl: 6    Semaglutide (Rybelsus) 7 MG TABS, Take 7 mg by mouth daily, Disp: 90 tablet, Rfl: 3    torsemide (DEMADEX) 10 mg tablet, TAKE 1 TABLET BY MOUTH ONCE DAILY (Patient taking differently: Take by mouth as needed  ), Disp: 90 tablet, Rfl: 3    pramipexole (MIRAPEX) 0 125 mg tablet, Take 1 tablet (0 125 mg total) by mouth daily at bedtime, Disp: 30 tablet, Rfl: 2    Current Facility-Administered Medications:     cyanocobalamin injection 1,000 mcg, 1,000 mcg, Intramuscular, Q30 Days, [address] [facility], LUCERO, 1,000 mcg at 09/10/21 1141    cyanocobalamin injection 1,000 mcg, 1,000 mcg, Intramuscular, Q30 Days, Valerie Beltran PA-C, 1,000 mcg at 10/08/21 1014    Allergies   Allergen Reactions    Levaquin [Levofloxacin In D5w] Swelling     Knee swelling    Sulfa Antibiotics GI Intolerance     Abdominal pain          Blood pressure 148/79, pulse 97, temperature (!) 96 6 °F (35 9 °C), temperature source Tympanic, height 5' 11" (1 803 m), weight 125 kg (276 lb)  Objective:    Blood pressure 148/79, pulse 97, temperature (!) 96 6 °F (35 9 °C), temperature source Tympanic, height 5' 11" (1 803 m), weight 125 kg (276 lb)  Physical Exam  Vitals reviewed  Constitutional:       Appearance: Normal appearance  He is well-developed  HENT:      Head: Normocephalic  Right Ear: Hearing normal       Left Ear: Hearing normal       Nose: Nose normal       Mouth/Throat:      Mouth: Mucous membranes are moist    Eyes:      General: Lids are normal       Extraocular Movements: Extraocular movements intact  Conjunctiva/sclera: Conjunctivae normal       Pupils: Pupils are equal, round, and reactive to light  Cardiovascular:      Rate and Rhythm: Normal rate  Pulmonary:      Effort: Pulmonary effort is normal  No respiratory distress  Abdominal:      Tenderness: There is no abdominal tenderness  Musculoskeletal:         General: Normal range of motion  Cervical back: Normal range of motion  Skin:     General: Skin is warm and dry  Neurological:      Mental Status: He is alert  Coordination: Coordination is intact  Romberg sign negative  Deep Tendon Reflexes: Strength normal    Psychiatric:         Attention and Perception: Attention and perception normal          Mood and Affect: Mood and affect normal          Speech: Speech normal          Behavior: Behavior normal  Behavior is cooperative  Thought Content: Thought content normal          Cognition and Memory: Cognition and memory normal          Judgment: Judgment normal          Neurological Exam  Mental Status  Alert  Oriented to person, place, time and situation  Recent and remote memory are intact  Speech is normal  Language is fluent with no aphasia  Attention and concentration are normal  Fund of knowledge is appropriate for level of education      Cranial Nerves  CN II: Visual acuity is normal  Visual fields full to confrontation  CN III, IV, VI: Extraocular movements intact bilaterally  Normal lids and orbits bilaterally  Pupils equal round and reactive to light bilaterally  CN V: Facial sensation is normal   CN VII: Full and symmetric facial movement  CN VIII: Hearing is normal   Right: Hearing is normal   Left: Hearing is normal   CN IX, X: Palate elevates symmetrically  Normal gag reflex  CN XI: Shoulder shrug strength is normal   CN XII: Tongue midline without atrophy or fasciculations  Motor  Normal muscle bulk throughout  Normal muscle tone  No abnormal involuntary movements  Strength is 5/5 throughout all four extremities  Sensory  Light touch is normal in upper and lower extremities  Temperature is normal in upper and lower extremities  Proprioception is normal in upper and lower extremities  Sensation: Altered vibratory sense to the lower extremities greater to left thigh verses right eye  Greater to right knee verses left knee  Patient able to sense touch to the ankles bilaterally unable to since vibratory touch        Reflexes  Deep tendon reflexes are 2+ and symmetric except as noted  Right                      Left  Patellar                                Tr                         Tr    Right pathological reflexes: Azul's absent  Left pathological reflexes: Azul's absent  Coordination  Finger-to-nose, rapid alternating movements and heel-to-shin normal bilaterally without dysmetria  Gait  Normal casual, toe, heel and tandem gait  Romberg is absent  Able to rise from chair without using arms  ROS:    Review of Systems   Constitutional: Positive for appetite change  Negative for fever  HENT: Positive for tinnitus  Negative for hearing loss, trouble swallowing and voice change  Eyes: Negative  Negative for photophobia and pain  Respiratory: Positive for shortness of breath  Cardiovascular: Positive for palpitations  Gastrointestinal: Negative  Negative for nausea and vomiting  Endocrine: Negative  Negative for cold intolerance  Genitourinary: Negative  Negative for dysuria, frequency and urgency  Musculoskeletal: Negative  Negative for myalgias and neck pain  Skin: Negative  Negative for rash  Neurological: Positive for tremors, light-headedness and numbness  Negative for dizziness, seizures, syncope, facial asymmetry, speech difficulty, weakness and headaches  Hematological: Negative  Negative for adenopathy  Does not bruise/bleed easily  Psychiatric/Behavioral: Negative  Negative for confusion, hallucinations and sleep disturbance  ROS reviewed and discussed with patient

## 2022-01-14 NOTE — PATIENT INSTRUCTIONS
Can start on Magnesium 400mg daily  Can continue with Gabapentin 100mg and 200mg at bedtime   Start on Mirapex 0 125mg tab at bedtime  You with vitamin B12 injections monthly  Follow up with Neurology office in 4 months or sooner if needed

## 2022-01-15 LAB — PSA SERPL-MCNC: 0.5 NG/ML (ref 0–4)

## 2022-02-02 DIAGNOSIS — I10 ESSENTIAL HYPERTENSION: ICD-10-CM

## 2022-02-03 RX ORDER — HYDROCHLOROTHIAZIDE 25 MG/1
TABLET ORAL
Qty: 90 TABLET | Refills: 0 | Status: SHIPPED | OUTPATIENT
Start: 2022-02-03 | End: 2022-05-23

## 2022-02-10 ENCOUNTER — TELEPHONE (OUTPATIENT)
Dept: ENDOCRINOLOGY | Facility: CLINIC | Age: 62
End: 2022-02-10

## 2022-02-10 NOTE — TELEPHONE ENCOUNTER
I with recommend meeting with ophthalmology sooner     If patient is having a lot of high/low blood sugars, he should send blood sugar log for review as that can also cause blurriness in vision

## 2022-02-10 NOTE — TELEPHONE ENCOUNTER
He said blurry vision- if he looks at a lamppost he sees a fog around it  On cars halogen lights he sees starburst   He sees the eye doctor on March 3rd

## 2022-02-10 NOTE — TELEPHONE ENCOUNTER
Please asked patient what kind of vision problems he is having  When he was seen in clinic 12/2021-he had not met with an ophthalmologist for over 2 years-has he followed up with them?

## 2022-02-10 NOTE — TELEPHONE ENCOUNTER
Patient called he is having vision problems with his eyes  He is currently taking Rybelsus which can cause eye problems    He is not sure if he should stop taking it and what should he take instead

## 2022-02-10 NOTE — TELEPHONE ENCOUNTER
Left a detailed message that he should see eye doctor sooner  Advised him to bring in a blood sugar log for review

## 2022-02-11 ENCOUNTER — OFFICE VISIT (OUTPATIENT)
Dept: CARDIOLOGY CLINIC | Facility: CLINIC | Age: 62
End: 2022-02-11
Payer: COMMERCIAL

## 2022-02-11 VITALS
BODY MASS INDEX: 38.22 KG/M2 | DIASTOLIC BLOOD PRESSURE: 74 MMHG | HEIGHT: 71 IN | SYSTOLIC BLOOD PRESSURE: 132 MMHG | OXYGEN SATURATION: 99 % | HEART RATE: 74 BPM | TEMPERATURE: 98.3 F | WEIGHT: 273 LBS

## 2022-02-11 DIAGNOSIS — I25.10 CORONARY ARTERY DISEASE INVOLVING NATIVE CORONARY ARTERY OF NATIVE HEART WITHOUT ANGINA PECTORIS: Primary | ICD-10-CM

## 2022-02-11 DIAGNOSIS — E78.2 MIXED HYPERLIPIDEMIA: ICD-10-CM

## 2022-02-11 DIAGNOSIS — I10 ESSENTIAL HYPERTENSION: ICD-10-CM

## 2022-02-11 DIAGNOSIS — I50.32 CHRONIC DIASTOLIC CONGESTIVE HEART FAILURE (HCC): ICD-10-CM

## 2022-02-11 PROCEDURE — 1036F TOBACCO NON-USER: CPT | Performed by: INTERNAL MEDICINE

## 2022-02-11 PROCEDURE — 99214 OFFICE O/P EST MOD 30 MIN: CPT | Performed by: INTERNAL MEDICINE

## 2022-02-11 PROCEDURE — 3008F BODY MASS INDEX DOCD: CPT | Performed by: INTERNAL MEDICINE

## 2022-02-11 PROCEDURE — 3078F DIAST BP <80 MM HG: CPT | Performed by: INTERNAL MEDICINE

## 2022-02-11 PROCEDURE — 3075F SYST BP GE 130 - 139MM HG: CPT | Performed by: INTERNAL MEDICINE

## 2022-02-11 NOTE — PROGRESS NOTES
Arlet Hollins  1960  [id number]  [employer]  [facility] [address] [address]    Interval History:  Arlet Hollins is a 63 y o  male who presents for routine coronary artery disease follow-up  Since his last visit, he has been feeling well   he denies any palpitations, chest pain, shortness of breath, LE edema, orthopnea or PND  Diet is overall unchanged  There has not been a significant change in weight  He had a PFT study in June which was normal     Treadmill stress test in 2019 was normal   He wore a holter monitor because of resting bradycardia  Monitor showed an average HR of 75 bpm and no heart blocks  He has not been exercising regularly because he is busy with work and is hesitant to return to the gym  Previously was having dyspnea  that improved with torsemide  Now with some left upper extremity swelling that he believes started after a picnic  He continues to refrain from smoking  In November 2017, he underwent drug-eluting stent placement to left circumflex and OM 2 lesions after presenting to hospital with chest and arm pain, elevated BP and ST depressions  Past Medical History:   Diagnosis Date    Allergic rhinitis     Anemia     Arthritis     Bundle branch block, right     CAD (coronary artery disease)     CPAP (continuous positive airway pressure) dependence     Diabetes mellitus (HCC)     borderline, controlled with diet and activity    Diverticulitis     Diverticulitis of large intestine with abscess without bleeding 12/7/2016    GERD (gastroesophageal reflux disease)     History of coronary artery stent placement- pCx and OM2 11/24/2017    Hyperlipidemia     Hypertension     Nasal septal deformity     Neuropathy     Obesity (BMI 30-39  9)     Peptic ulceration     gastric    Peptic ulceration 9/26/2019    gastric    Pneumonia     Renal disorder     Seasonal allergies     Sleep apnea     wears c-pap    Urinary tract infection     Vitamin D deficiency      Past Surgical History:   Procedure Laterality Date    COLON SIGMOID RESECTION N/A 12/16/2016    Procedure: RESECTION COLON SIGMOID;  Surgeon: Silvina Ruffin MD;  [facility];  Service:     COLON SIGMOID RESECTION LAPAROSCOPIC N/A 12/16/2016    Procedure: RESECTION COLON SIGMOID LAPAROSCOPIC:CONVERTED TO [email or web address];  Surgeon: Silvina Ruffin MD;  [facility];  Service:     COLON SURGERY      Sigmoidectomy    COLONOSCOPY N/A 10/6/2016    Procedure: COLONOSCOPY;  Surgeon: Carolann Ovalle MD;  Location: [facility]; Service:    Josette Lazaro CYSTOSCOPY W/ RETROGRADES Left 2/3/2017    Procedure: CYSTOSCOPY WITH BILATERAL RETROGRADES, LEFT STENT REMOVAL;  Surgeon: Anaya Rodas MD;  Location: [address] [place] [address];  Service:     ESOPHAGOGASTRODUODENOSCOPY N/A 10/6/2016    Procedure: ESOPHAGOGASTRODUODENOSCOPY (EGD); Surgeon: Carolann Ovalle MD;  Location: [facility];   Service:     HERNIA REPAIR      KNEE ARTHROSCOPY W/ MENISCECTOMY      MI CYSTOURETHROSCOPY,URETER CATHETER Left 12/4/2016    Procedure: CYSTOSCOPY RETROGRADE PYELOGRAM WITH INSERTION STENT URETERAL;  Surgeon: Anaya Rodas MD;  Location: [address] [place] [address];  Service: Urology    US GUIDED THYROID BIOPSY  9/27/2021    VARICOSE VEIN SURGERY       Social History     Socioeconomic History    Marital status: Single     Spouse name: Not on file    Number of children: Not on file    Years of education: Not on file    Highest education level: Not on file   Occupational History    Not on file   Tobacco Use    Smoking status: Former Smoker     Packs/day: 0 50     Years: 37 00     Pack years: 18 50     Types: Cigarettes     Quit date: 3/30/2018     Years since quitting: 3 8    Smokeless tobacco: Former User     Types: Chew    Tobacco comment:  requit 3/30/18   Vaping Use    Vaping Use: Some days    Substances: Nicotine   Substance and Sexual Activity    Alcohol use: Yes     Alcohol/week: 3 0 standard drinks     Types: 3 Cans of beer per week    Drug use: No    Sexual activity: Never   Other Topics Concern    Not on file   Social History Narrative    Lives alone       Social Determinants of Health     Financial Resource Strain: Not on file   Food Insecurity: Not on file   Transportation Needs: Not on file   Physical Activity: Not on file   Stress: Not on file   Social Connections: Not on file   Intimate Partner Violence: Not on file   Housing Stability: Not on file     Family History   Problem Relation Age of Onset    Diabetes Brother     Osteoarthritis Mother     Atrial fibrillation Mother     Aortic aneurysm Father     Colon cancer Brother        Current Outpatient Medications:     albuterol (PROVENTIL HFA,VENTOLIN HFA) 90 mcg/act inhaler, Inhale 2 puffs every 4 (four) hours as needed for wheezing or shortness of breath, Disp: 18 g, Rfl: 6    Alpha-Lipoic Acid 100 MG CAPS, Take by mouth, Disp: , Rfl:     Ascorbic Acid (VITAMIN C) 100 MG tablet, Take 500 mg by mouth , Disp: , Rfl:     aspirin 81 MG tablet, Take 162 mg by mouth daily  , Disp: , Rfl:     atorvastatin (LIPITOR) 40 mg tablet, Take 1 tablet (40 mg total) by mouth daily with dinner, Disp: 90 tablet, Rfl: 1    Cholecalciferol (VITAMIN D3) 1000 units CAPS, Take by mouth daily , Disp: , Rfl:     cyanocobalamin (VITAMIN B-12) 100 mcg tablet, Take by mouth daily, Disp: , Rfl:     desloratadine (CLARINEX) 5 MG tablet, Take 1 tablet (5 mg total) by mouth daily at bedtime, Disp: 90 tablet, Rfl: 1    famotidine (PEPCID) 20 mg tablet, Take 20 mg by mouth daily, Disp: , Rfl:     FREESTYLE LITE test strip, USE 1 STRIP TO CHECK GLUCOSE ONCE DAILY AS DIRECTED, Disp: 100 each, Rfl: 0    gabapentin (NEURONTIN) 100 mg capsule, TAKE 1 CAPSULE BY MOUTH ONCE DAILY IN THE MORNING AND 2 EVERY DAY AT BEDTIME, Disp: 270 capsule, Rfl: 0    glucosamine-chondroitin 500-400 MG tablet, Take 1 tablet by mouth daily, Disp: , Rfl:     hydrochlorothiazide (HYDRODIURIL) 25 mg tablet, Take 1 tablet by mouth once daily, Disp: 90 tablet, Rfl: 0    losartan (COZAAR) 50 mg tablet, Take 1 tablet by mouth once daily, Disp: 90 tablet, Rfl: 0    Magnesium Hydroxide (MAGNESIA PO), Take by mouth, Disp: , Rfl:     metFORMIN (GLUCOPHAGE) 1000 MG tablet, TAKE 1 TABLET BY MOUTH TWICE DAILY WITH MEALS, Disp: 180 tablet, Rfl: 0    Multiple Vitamin (MULTIVITAMIN) capsule, Take 1 capsule by mouth daily, Disp: , Rfl:     nitroglycerin (NITROSTAT) 0 3 mg SL tablet, Place 1 tablet (0 3 mg total) under the tongue every 5 (five) minutes as needed for chest pain, Disp: 25 tablet, Rfl: 1    ONETOUCH DELICA LANCETS 89S MISC, 1 Units by Does not apply route daily, Disp: 100 each, Rfl: 6    Semaglutide (Rybelsus) 7 MG TABS, Take 7 mg by mouth daily, Disp: 90 tablet, Rfl: 3    torsemide (DEMADEX) 10 mg tablet, TAKE 1 TABLET BY MOUTH ONCE DAILY (Patient taking differently: Take by mouth as needed  ), Disp: 90 tablet, Rfl: 3    pramipexole (MIRAPEX) 0 125 mg tablet, Take 1 tablet (0 125 mg total) by mouth daily at bedtime (Patient not taking: Reported on 2/11/2022 ), Disp: 30 tablet, Rfl: 2    Current Facility-Administered Medications:     cyanocobalamin injection 1,000 mcg, 1,000 mcg, Intramuscular, Q30 Days, [address], 1,000 mcg at 09/10/21 1141    cyanocobalamin injection 1,000 mcg, 1,000 mcg, Intramuscular, Q30 Days, Javid Stone PA-C, 1,000 mcg at 10/08/21 1014  The following portions of the patient's history were reviewed and updated as appropriate: allergies, current medications, past family history, past medical history, past social history, past surgical history and problem list     Review of Systems:  Review of Systems   Respiratory: Negative for shortness of breath  Cardiovascular: Negative for chest pain, palpitations and leg swelling  Musculoskeletal: Positive for arthralgias     All other systems reviewed and are negative  Physical Exam:  /74 (BP Location: Left arm, Patient Position: Sitting, Cuff Size: Large)   Pulse 74   Temp 98 3 °F (36 8 °C)   Ht 5' 11" (1 803 m)   Wt 124 kg (273 lb)   SpO2 99%   BMI 38 08 kg/m²     Physical Exam  Constitutional:       General: He is not in acute distress  Appearance: He is well-developed  He is not diaphoretic  HENT:      Head: Normocephalic and atraumatic  Eyes:      Conjunctiva/sclera: Conjunctivae normal       Pupils: Pupils are equal, round, and reactive to light  Neck:      Thyroid: No thyromegaly  Vascular: No JVD  Cardiovascular:      Rate and Rhythm: Normal rate and regular rhythm  Heart sounds: Normal heart sounds  No murmur heard  No friction rub  No gallop  Pulmonary:      Effort: Pulmonary effort is normal       Breath sounds: Normal breath sounds  Abdominal:      Tenderness: There is no abdominal tenderness  Musculoskeletal:      Cervical back: Neck supple  Skin:     General: Skin is warm and dry  Findings: No erythema or rash  Neurological:      General: No focal deficit present  Mental Status: He is alert and oriented to person, place, and time  Cranial Nerves: No cranial nerve deficit  Psychiatric:         Mood and Affect: Mood normal          Behavior: Behavior normal          Thought Content: Thought content normal          Judgment: Judgment normal          Cardiographics  ECG: sinus bradycardia   LV Ejection Fraction: LV ejection fraction >= 40%  Lab Review  Lab Results   Component Value Date    TRIG 183 (H) 12/10/2021    TRIG 142 08/06/2021    TRIG 123 05/07/2021    HDL 42 12/10/2021    HDL 48 08/06/2021    HDL 40 05/07/2021    LDLDIRECT 79 04/10/2020    LDLDIRECT 83 12/27/2019     Assessment/Plan     1  Coronary artery disease involving native coronary artery of native heart without angina pectoris    2  Essential hypertension    3  Mixed hyperlipidemia    4   Chronic diastolic congestive heart failure ([facility] [address] )      - Discussed diet and weight loss in detail   -  Blood pressure is stable  Continue current medication regimen including losartan and hydrochlorothiazide  Target BP is < 130/80 mmHg  - diabetes management per Dr Bre Alvarez  Last A1C was elevated  - Continue atorvastatin - last LDL was 70     - Continue current Rx  - recommend using torsemide when edema is present  He previously developed congestive heart failure  Encouraged to reduce salt in diet if possible

## 2022-02-15 ENCOUNTER — NEW PATIENT (OUTPATIENT)
Dept: URBAN - METROPOLITAN AREA CLINIC 6 | Facility: CLINIC | Age: 62
End: 2022-02-15

## 2022-02-15 DIAGNOSIS — H25.813: ICD-10-CM

## 2022-02-15 DIAGNOSIS — E11.9: ICD-10-CM

## 2022-02-15 LAB
LEFT EYE DIABETIC RETINOPATHY: NORMAL
RIGHT EYE DIABETIC RETINOPATHY: NORMAL

## 2022-02-15 PROCEDURE — 92004 COMPRE OPH EXAM NEW PT 1/>: CPT

## 2022-02-15 ASSESSMENT — VISUAL ACUITY
OD_CC: 20/25-2
OD_GLARE: 20/30-1
OD_CC: J1+
OS_GLARE: 20/30
OS_CC: J1+
OS_CC: 20/20

## 2022-02-15 ASSESSMENT — TONOMETRY
OS_IOP_MMHG: 18
OD_IOP_MMHG: 19

## 2022-03-04 ENCOUNTER — RA CDI HCC (OUTPATIENT)
Dept: OTHER | Facility: HOSPITAL | Age: 62
End: 2022-03-04

## 2022-03-04 PROBLEM — I11.0 HYPERTENSIVE HEART DISEASE WITH CONGESTIVE HEART FAILURE (HCC): Status: ACTIVE | Noted: 2022-03-04

## 2022-03-04 PROBLEM — E11.65 TYPE 2 DIABETES MELLITUS WITH HYPERGLYCEMIA (HCC): Status: ACTIVE | Noted: 2022-03-04

## 2022-03-04 NOTE — PROGRESS NOTES
[facility]  coding opportunities             Chart Reviewed * (Number of) Melinda suggestions sent to Provider: 2      I11 0 Hypertensive heart disease with heart failure    *Per ICD 10 CM coding guidelines 2020-21: The classification presumes a causal relationship between hypertension and heart involvement     E11 65 Type 2 diabetes mellitus with hyperglycemia   *Elevated A1c:Labs    If this is correct, please document and assess at your next visit,3/11/2022            Patients insurance company: [address] [place] [address] (Medicare and Commercial for [employer] and [employer])

## 2022-03-11 ENCOUNTER — OFFICE VISIT (OUTPATIENT)
Dept: INTERNAL MEDICINE CLINIC | Facility: CLINIC | Age: 62
End: 2022-03-11
Payer: COMMERCIAL

## 2022-03-11 VITALS
DIASTOLIC BLOOD PRESSURE: 80 MMHG | OXYGEN SATURATION: 97 % | SYSTOLIC BLOOD PRESSURE: 124 MMHG | WEIGHT: 275 LBS | HEIGHT: 71 IN | HEART RATE: 60 BPM | BODY MASS INDEX: 38.5 KG/M2 | TEMPERATURE: 97.9 F

## 2022-03-11 DIAGNOSIS — I25.10 CORONARY ARTERY DISEASE INVOLVING NATIVE CORONARY ARTERY OF NATIVE HEART WITHOUT ANGINA PECTORIS: ICD-10-CM

## 2022-03-11 DIAGNOSIS — E11.65 TYPE 2 DIABETES MELLITUS WITH HYPERGLYCEMIA, UNSPECIFIED WHETHER LONG TERM INSULIN USE (HCC): ICD-10-CM

## 2022-03-11 DIAGNOSIS — E55.9 VITAMIN D DEFICIENCY: ICD-10-CM

## 2022-03-11 DIAGNOSIS — E11.40 NEUROPATHY DUE TO TYPE 2 DIABETES MELLITUS (HCC): ICD-10-CM

## 2022-03-11 DIAGNOSIS — K21.9 GASTROESOPHAGEAL REFLUX DISEASE WITHOUT ESOPHAGITIS: ICD-10-CM

## 2022-03-11 DIAGNOSIS — E11.65 TYPE 2 DIABETES MELLITUS WITH HYPERGLYCEMIA, WITHOUT LONG-TERM CURRENT USE OF INSULIN (HCC): Primary | ICD-10-CM

## 2022-03-11 DIAGNOSIS — I49.3 ASYMPTOMATIC PVCS: ICD-10-CM

## 2022-03-11 DIAGNOSIS — E66.09 CLASS 2 OBESITY DUE TO EXCESS CALORIES WITHOUT SERIOUS COMORBIDITY WITH BODY MASS INDEX (BMI) OF 38.0 TO 38.9 IN ADULT: ICD-10-CM

## 2022-03-11 DIAGNOSIS — E78.2 MIXED HYPERLIPIDEMIA: ICD-10-CM

## 2022-03-11 DIAGNOSIS — I10 ESSENTIAL HYPERTENSION: ICD-10-CM

## 2022-03-11 DIAGNOSIS — M25.50 POLYARTHRALGIA: ICD-10-CM

## 2022-03-11 DIAGNOSIS — N40.0 BENIGN PROSTATIC HYPERPLASIA WITHOUT LOWER URINARY TRACT SYMPTOMS: ICD-10-CM

## 2022-03-11 DIAGNOSIS — E53.8 B12 DEFICIENCY: ICD-10-CM

## 2022-03-11 DIAGNOSIS — J30.1 SEASONAL ALLERGIC RHINITIS DUE TO POLLEN: ICD-10-CM

## 2022-03-11 PROCEDURE — 1036F TOBACCO NON-USER: CPT | Performed by: INTERNAL MEDICINE

## 2022-03-11 PROCEDURE — 3079F DIAST BP 80-89 MM HG: CPT | Performed by: INTERNAL MEDICINE

## 2022-03-11 PROCEDURE — 3074F SYST BP LT 130 MM HG: CPT | Performed by: INTERNAL MEDICINE

## 2022-03-11 PROCEDURE — 3725F SCREEN DEPRESSION PERFORMED: CPT | Performed by: INTERNAL MEDICINE

## 2022-03-11 PROCEDURE — 99214 OFFICE O/P EST MOD 30 MIN: CPT | Performed by: INTERNAL MEDICINE

## 2022-03-11 PROCEDURE — 3008F BODY MASS INDEX DOCD: CPT | Performed by: INTERNAL MEDICINE

## 2022-03-11 NOTE — ASSESSMENT & PLAN NOTE
Neuropathy mild chronic unchanged,   He is on vitamin B12  Patient was started on Mirapex by neurological services  He has picked up has not tried    Will see how it works out he remains on gabapentin 100 mg 1 in the morning 2 at nighttime the patient will follow with neurologist  Lab Results   Component Value Date    HGBA1C 7 2 (H) 12/10/2021

## 2022-03-11 NOTE — ASSESSMENT & PLAN NOTE
A more symptomatic usually in the morning with scratchy eyes        Recommend saline nasal spray twice a day for, he will continue Clarinex once a day,    Recommend either saline eyedrops or consider Pataday or pattern all per your eye doctor    Patient will see her optometrist next week

## 2022-03-11 NOTE — PATIENT INSTRUCTIONS
Follow with Consultants as per their and our suggestion    Follow up in 12 week(s) or as needed earlier    Follow all instructions as advised and discussed  Take your medications as prescribed  Call the office immediately if you experience any side effects  Ask questions if you do not understand  Keep your scheduled appointment as advised or come sooner if necessary or in doubt  Best time to call for non-urgent matter or questions on weekdays is between 9am and 12 noon  See physician for any new symptoms or worsening of current symptoms  Urgent or emergent situations call 911 and report to nearest emergency room  I spent  30 -40 minutes taking care of this patient including clinical care, conseling, collaboration, chart, lab and consultaion review as appropriate    Patient is to get labs 1 week(s) prior to next visit if advised            Basic Carbohydrate Counting   AMBULATORY CARE:   Carbohydrate counting  is a way to plan your meals by counting the amount of carbohydrate in foods  Carbohydrates are the sugars, starches, and fiber found in fruit, grains, vegetables, and milk products  Carbohydrates increase your blood sugar levels  Carbohydrate counting can help you eat the right amount of carbohydrate to keep your blood sugar levels under control  What you need to know about planning meals using carbohydrate counting:  · A dietitian or healthcare provider will help you develop a healthy meal plan that works best for you  You will be taught how much carbohydrate to eat or drink for each meal and snack  Your meal plan will be based on your age, weight, usual food intake, and physical activity level  If you have diabetes, it will also include your blood sugar levels and diabetes medicine  Once you know how much carbohydrate you should eat, you can decide what type of food you want to eat  · You will need to know what foods contain carbohydrate and how much they contain   Keep track of the amount of carbohydrate in meals and snacks in order to follow your meal plan  Do not avoid carbohydrates or skip meals  Your blood sugar may fall too low if you do not eat enough carbohydrate or you skip meals  Foods that contain carbohydrate:   · Breads:  Each serving of food listed below contains about 15 g of carbohydrate   ? 1 slice of bread (1 ounce) or 1 flour or corn tortilla (6 inch)    ? ½ of a hamburger bun or ¼ of a large bagel (about 1 ounce)    ? 1 pancake (about 4 inches across and ¼ inch thick)    · Cereals and grains:  Serving sizes of ready-to-eat cereals vary  Look at the serving size and the total carbohydrate amount listed on the food label  Each serving of food listed below contains about 15 g of carbohydrate   ? ¾ cup of dry, unsweetened, ready-to-eat cereal or ¼ cup of low-fat granola     ? ½ cup of oatmeal or other cooked cereal     ? ? cup of cooked rice or pasta    · Starchy vegetables and beans:  Each serving of food listed below contains about 15 g of carbohydrate   ? ½ cup of corn, green peas, sweet potatoes, or mashed potatoes    ? ¼ of a large baked potato    ? ½ cup of beans, lentils, and peas (garbanzo, deng, kidney, white, split, black-eyed)    · Crackers and snacks:  Each serving of food listed below contains about 15 g of carbohydrate   ? 3 alicia cracker squares or 8 animal crackers     ? 6 saltine-type crackers    ? 3 cups of popcorn or ¾ ounce of pretzels, potato chips, or tortilla chips    · Fruit:  Each serving of food listed below contains about 15 g of carbohydrate   ? 1 small (4 ounce) piece of fresh fruit or ¾ to 1 cup of fresh fruit    ? ½ cup of canned or frozen fruit, packed in natural juice    ? ½ cup (4 ounces) of unsweetened fruit juice    ? 2 tablespoons of dried fruit    · Desserts or sugary foods:  Each serving of food listed below contains about 15 g of carbohydrate   ?  2-inch square unfrosted cake or brownie     ? 2 small cookies    ? ½ cup of ice cream, frozen yogurt, or nondairy frozen yogurt    ? ¼ cup of sherbet or sorbet    ? 1 tablespoon of regular syrup, jam, or jelly    ? 2 tablespoons of light syrup    · Milk and yogurt:  Foods from the milk group contain about 12 g of carbohydrate per serving  ? 1 cup of fat-free or low-fat milk    ? 1 cup of soy milk    ? ? cup of fat-free, yogurt sweetened with artificial sweetener    · Non-starchy vegetables:  Each serving contains about 5 g of carbohydrate   Three servings of non-starch vegetables count as 1 carbohydrate serving  ? ½ cup of cooked vegetables or 1 cup of raw vegetables  This includes beets, broccoli, cabbage, cauliflower, cucumber, mushrooms, tomatoes, and zucchini    ? ½ cup of vegetable juice    How to use carbohydrate counting to plan meals:   · Count carbohydrate amounts using serving sizes:      ? Pasta dinner example: You plan to have pasta, tossed salad, and an 8-ounce glass of milk  Your healthcare provider tells you that you may have 4 carbohydrate servings for dinner  One carbohydrate serving of pasta is ? cup  One cup of pasta will equal 3 carbohydrate servings  An 8-ounce glass of milk will count as 1 carbohydrate serving  These amounts of food would equal 4 carbohydrate servings  One cup of tossed salad does not count toward your carbohydrate servings  · Count carbohydrate amounts using food labels:  Find the total amount of carbohydrate in a packaged food by reading the food label  Food labels tell you the serving size of the food and the total carbohydrate amount in each serving  Find the serving size on the food label and then decide how many servings you will eat  Multiply the number of servings you plan to eat by the carbohydrate amount per serving  ? Granola bar snack example: Your meal plan allows you to have 2 carbohydrate servings (30 grams) of carbohydrate for a snack   You plan to eat 1 package of granola bars, which contains 2 bars  According to the food label, the serving size of food in this package is 1 bar  Each serving (1 bar) contains 25 grams of carbohydrate  The total amount of carbohydrate in this package of granola bars would be 50 g  Based on your meal plan, you should eat only 1 bar  Follow up with your doctor as directed:  Write down your questions so you remember to ask them during your visits  © Copyright [employer] 2022 Information is for End User's use only and may not be sold, redistributed or otherwise used for commercial purposes  All illustrations and images included in CareNotes® are the copyrighted property of [employer]  or [facility] Rosario Morocho   The above information is an  only  It is not intended as medical advice for individual conditions or treatments  Talk to your doctor, nurse or pharmacist before following any medical regimen to see if it is safe and effective for you

## 2022-03-11 NOTE — ASSESSMENT & PLAN NOTE
A continue losartan 50 mg daily, hydro Diuril 25 mg daily, wife prior blood pressure well controlled

## 2022-03-11 NOTE — PROGRESS NOTES
[facility] Office Visit Note  22     Josette Cheng 63 y o  male MRN: [id number]  : 1960    Assessment:     1  Type 2 diabetes mellitus with hyperglycemia, without long-term current use of insulin [facility])  Assessment & Plan:    Lab Results   Component Value Date    HGBA1C 7 2 (H) 12/10/2021   He had hemoglobin A1c done today report of which is awaited  Recommend to continue to watch diet do exercise we will do urine for microalbumin next visit  Patient is currently on reveals a 7 mg and metformin 1000 mg twice a day    Orders:  -     Comprehensive metabolic panel; Future; Expected date: 2022  -     Hemoglobin A1C; Future  -     Microalbumin / creatinine urine ratio  -     Lipid panel; Future  -     Comprehensive metabolic panel  -     Hemoglobin A1C  -     Lipid panel    2  Essential hypertension  Assessment & Plan:  A continue losartan 50 mg daily, hydro Diuril 25 mg daily, wife prior blood pressure well controlled    Orders:  -     Comprehensive metabolic panel; Future; Expected date: 2022  -     Hemoglobin A1C; Future  -     Microalbumin / creatinine urine ratio  -     Lipid panel; Future  -     Comprehensive metabolic panel  -     Hemoglobin A1C  -     Lipid panel    3  Coronary artery disease involving native coronary artery of native heart without angina pectoris  Assessment & Plan:  The CAD symptom-free, continue the risk factor management with hypertension diabetes hyperlipidemia    Orders:  -     Comprehensive metabolic panel; Future; Expected date: 2022  -     Hemoglobin A1C; Future  -     Microalbumin / creatinine urine ratio  -     Lipid panel; Future  -     Comprehensive metabolic panel  -     Hemoglobin A1C  -     Lipid panel    4  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)  Assessment & Plan:  Neuropathy mild chronic unchanged,   He is on vitamin B12  Patient was started on Mirapex by neurological services  He has picked up has not tried    Will see how it works out he remains on gabapentin 100 mg 1 in the morning 2 at nighttime the patient will follow with neurologist  Lab Results   Component Value Date    HGBA1C 7 2 (H) 12/10/2021         5  Mixed hyperlipidemia  -     Comprehensive metabolic panel; Future; Expected date: 06/11/2022  -     Hemoglobin A1C; Future  -     Microalbumin / creatinine urine ratio  -     Lipid panel; Future  -     Comprehensive metabolic panel  -     Hemoglobin A1C  -     Lipid panel    6  Benign prostatic hyperplasia without lower urinary tract symptoms    7  Class 2 obesity due to excess calories without serious comorbidity with body mass index (BMI) of 38 0 to 38 9 in adult    8  B12 deficiency    9  Type 2 diabetes mellitus with hyperglycemia, unspecified whether long term insulin use ([facility] [address] )    10  Seasonal allergic rhinitis due to pollen  Assessment & Plan:  A more symptomatic usually in the morning with scratchy eyes  Recommend saline nasal spray twice a day for, he will continue Clarinex once a day,    Recommend either saline eyedrops or consider Pataday or pattern all per your eye doctor    Patient will see her optometrist next week      11  Asymptomatic PVCs  Assessment & Plan:  Seen by a cardiologist the notes were reviewed relatively symptom-free      12  Polyarthralgia    13  Vitamin D deficiency    14  Gastroesophageal reflux disease without esophagitis        Discussion Summary and Plan: Today's care plan and medications were reviewed with patient in detail and all their questions answered to their satisfaction  Chief Complaint   Patient presents with    Hypertension    Hyperlipidemia    Diabetes    Coronary Artery Disease    GERD    Allergic Rhinitis    Arthritis    Obesity    Follow-up     bph      Subjective:  Ranjan Hawley is here for follow-up of chronic disease management    No new sx    Diabetes:  Last 5-10 years, check sugar once a day, seen by optometrist or ophthalmologist formal report not available, underwent hemoglobin A1c today report awaited, home will do urine for microalbumin next visit  Remains on metformin and reveals sinus 7 mg daily  Allergic rhinitis:  Fair at this time  Of last 2 weeks or more symptomatic  Recommend to start using saline nasal spray, Clarinex, has some eye symptoms home he has appointment with optometrist next week may require eyedrops per them  Hypertension:  Symptom-free remains on amlodipine 5 mg daily    Diabetes:  Diagnosed: 5-10 years  Current Medicine for Diabetes: Per Med List  Finger stick: Once a day  Glucose Log: home sugar reivewed  Hypoglycemia event and intervention: None  Diet: reviewed, not watching diet  Exercise: None  Comorbidity: see HPI and Assessment/Plan  Last Eye Exam: 2 year  Last Foot exam:  Done previously  HbA1c hemoglobin A1c done today report awaited  Patient remains on metformin and rybelsus dose adjusted    Allergic rhinitis : doing well, + sx, He used to see allergist specialist his to get allergy in the injection insurance was not paying local doctor he does not want to travel to other doctors  , rec saline,    Hypertension symptom-free remains on losartan 50 mg daily and amlodipin hydrochlorothiazide daily  He uses torsemide p r n  Lance Goody Coronary artery disease symptom-free the remains on aspirin losartan , hydrochlorothiazide and Lipitor, sxfree, seen by cardiologist the notes were reviewed  Seen by cardiologist in February the notes were reviewed  Hyperlipidemia symptom-free,lipid profile reviewed, continue statin atorvastatin 40 mg daily lipid profile awaited  Diverticulosis not a problem  Vitamin-D deficiency remains on 1000 unit    ity BMI 38 35, Pt is seeing nutrionist, he does carbohydrate controlled 14-16 g carbohydrate with each meal   He sees nutrition specialist   A he does not do formal exercise  BPH fair   Sees urologist once a year the  Lab Results       Component                Value               Date PSA                      0 5                 01/14/2022                 PSA                      0 4                 11/06/2020              Edema of legs fair, remains HCTZ for BP and Torsemide for edema by cardiologist    Colonic polyp colonoscopy January 2021  Colonoscopic findings were reviewed  History of compression fracture not a problem  Will do formal DEXA scan    GERD fair  PSA 11-6-2020: 0 4    Neuropathy fair; tolerating gabapentin  He had a EMG nerve conduction study done in month of February  He does have a moderate carpal tunnel on the left hand, mild to moderate ulnar compression, he he does have a 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 likely related to diabetes  Patient was recently seen by hand surgeon carpal tunnel surgery recommended, is going for repeat EMG nerve conduction study of upper extremity on right side  Neuropathy symptoms are fair control  Surgery is on hold  Remains on gabapentin 100 mg daily and 20 mg at nighttime  Patient is started on pramipexole the home however he did not start yet  He will also follow with them they empirically give vitamin B12 he is level was normal in 2020 and last year it were what it was more than 2000 partly because of the post injection    MRI of the lumbar spine did not reveal any hand major sciatica  Please refer to the full finding  He does have a varicose vein and chronic stasis dermatitis  Thyroid nodule:  Patient seen by endocrinologist going for thyroid ultrasound for surveillance no compressive symptoms thyroid blood test were done today seen by endocrinologist recently will monitor    Obesity:  A hand surgery or a continue  Diet exercise weight loss management    08/06/2021:  Urine for microalbumin/creatinine ratio normal   Hemoglobin A1c 6 5  The lipid profile reveals LDL of 70  CMP unremarkable except blood sugar 126   GFR normal 72     05/07/2021:  Hemoglobin A1c 7 2, cholesterol 123 LDL 61 HDL 40 blood sugar 123 rest of the CMP normal except ALT 55 and urine for microalbumin negative  02/05/2021:  Urine for microalbumin negative, blood sugar 140, creatinine 1 15, GFR 70, a ALT 70 AST 64 hemoglobin A1c 7 2 LDL 68 HDL 40 rest of the CMP and lipid profile normal              The following portions of the patient's history were reviewed and updated as appropriate: allergies, current medications, past family history, past medical history, past social history, past surgical history and problem list     Review of Systems   All other systems reviewed and are negative  Historical Information   Patient Active Problem List   Diagnosis    Diabetes mellitus ([facility] [address] )    Hyperlipidemia    Essential hypertension    Sleep apnea    Elevated liver enzymes    Polyarthralgia    Hydronephrosis    Class 2 obesity due to excess calories without serious comorbidity with body mass index (BMI) of 38 0 to 38 9 in adult    Chronic pain of both knees    Primary osteoarthritis of knees, bilateral    CAD (coronary artery disease)    SOB (shortness of breath)    Asymptomatic PVCs    Obstructive sleep apnea    Obesity (BMI 30-39  9)    Seasonal allergic rhinitis due to pollen    GERD (gastroesophageal reflux disease)    Nasal septal deformity    Bundle branch block, right    CPAP (continuous positive airway pressure) dependence    Vitamin D deficiency    BPH (benign prostatic hyperplasia)    History of vertebral compression fracture    Colon polyp    History of angioplasty    Ocular migraine    Inguinal hernia    Umbilical hernia    Bradycardia    Asymptomatic varicose veins of both lower extremities    Neuropathy    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)    Carpal tunnel syndrome of left wrist    Ulnar neuropathy at elbow of left upper extremity    Onychomycosis of toenail    Impacted cerumen of left ear    Contusion of right knee    Chronic left-sided low back pain with left-sided sciatica    Varicose veins of leg with swelling, bilateral    Thyroid nodule    B12 deficiency    Compression fracture of body of thoracic vertebra (HCC)    Disc disease, degenerative, lumbar or lumbosacral    History of coronary artery stent placement- pCx and OM2    Carpal tunnel syndrome on right    Numbness and tingling in right hand    Close exposure to COVID-19 virus    Restless leg syndrome    Hypertensive heart disease with congestive heart failure (HCC)    Type 2 diabetes mellitus with hyperglycemia (HCC)     Past Medical History:   Diagnosis Date    Anemia     Bundle branch block, right     CAD (coronary artery disease)     CPAP (continuous positive airway pressure) dependence     Diabetes mellitus (HCC)     borderline, controlled with diet and activity    Diverticulitis     GERD (gastroesophageal reflux disease)     History of coronary artery stent placement- pCx and OM2 11/24/2017    Hyperlipidemia     Nasal septal deformity     Neuropathy     Obesity (BMI 30-39  9)     Sleep apnea     wears c-pap    Vitamin D deficiency      Past Surgical History:   Procedure Laterality Date    COLON SIGMOID RESECTION N/A 12/16/2016    Procedure: RESECTION COLON SIGMOID;  Surgeon: Mikey Garza MD;  [facility];  Service:     COLON SIGMOID RESECTION LAPAROSCOPIC N/A 12/16/2016    Procedure: RESECTION COLON SIGMOID LAPAROSCOPIC:CONVERTED TO [email or web address];  Surgeon: Mikey Garza MD;  [facility];  Service:     COLON SURGERY      Sigmoidectomy    COLONOSCOPY N/A 10/6/2016    Procedure: COLONOSCOPY;  Surgeon: Bridgette Boss MD;  Location[facility] GI LAB; Service:    Suzy Hays CYSTOSCOPY W/ RETROGRADES Left 2/3/2017    Procedure: CYSTOSCOPY WITH BILATERAL RETROGRADES, LEFT STENT REMOVAL;  Surgeon: Khalif Story MD;  Location: [address] [place] [address];  Service:     ESOPHAGOGASTRODUODENOSCOPY N/A 10/6/2016    Procedure: ESOPHAGOGASTRODUODENOSCOPY (EGD);   Surgeon: Bridgette Boss MD; [facility];   Service:     HERNIA REPAIR      KNEE ARTHROSCOPY W/ MENISCECTOMY      OK CYSTOURETHROSCOPY,URETER CATHETER Left 12/4/2016    Procedure: CYSTOSCOPY RETROGRADE PYELOGRAM WITH INSERTION STENT URETERAL;  Surgeon: Jazmyn Beavers MD;  Location: [address] [place] [address];  Service: Urology    US GUIDED THYROID BIOPSY  9/27/2021    VARICOSE VEIN SURGERY       Social History     Substance and Sexual Activity   Alcohol Use Yes    Alcohol/week: 3 0 standard drinks    Types: 3 Cans of beer per week     Social History     Substance and Sexual Activity   Drug Use No     Social History     Tobacco Use   Smoking Status Former Smoker    Packs/day: 0 50    Years: 37 00    Pack years: 18 50    Types: Cigarettes    Quit date: 3/30/2018    Years since quitting: 3 9   Smokeless Tobacco Former User    Types: Chew   Tobacco Comment     requit 3/30/18     Family History   Problem Relation Age of Onset    Diabetes Brother     Osteoarthritis Mother     Atrial fibrillation Mother     Aortic aneurysm Father     Colon cancer Brother      Health Maintenance Due   Topic    DM Eye Exam     HIV Screening     Annual Physical     Depression Screening     HEMOGLOBIN A1C     BMI: Followup Plan       Meds/Allergies       Current Outpatient Medications:     albuterol (PROVENTIL HFA,VENTOLIN HFA) 90 mcg/act inhaler, Inhale 2 puffs every 4 (four) hours as needed for wheezing or shortness of breath, Disp: 18 g, Rfl: 6    Alpha-Lipoic Acid 100 MG CAPS, Take by mouth, Disp: , Rfl:     Ascorbic Acid (VITAMIN C) 100 MG tablet, Take 500 mg by mouth , Disp: , Rfl:     aspirin 81 MG tablet, Take 162 mg by mouth daily  , Disp: , Rfl:     atorvastatin (LIPITOR) 40 mg tablet, Take 1 tablet (40 mg total) by mouth daily with dinner, Disp: 90 tablet, Rfl: 1    Cholecalciferol (VITAMIN D3) 1000 units CAPS, Take by mouth daily , Disp: , Rfl:     desloratadine (CLARINEX) 5 MG tablet, Take 1 tablet (5 mg total) by mouth daily at bedtime, Disp: 90 tablet, Rfl: 1    famotidine (PEPCID) 20 mg tablet, Take 20 mg by mouth daily, Disp: , Rfl:     FREESTYLE LITE test strip, USE 1 STRIP TO CHECK GLUCOSE ONCE DAILY AS DIRECTED, Disp: 100 each, Rfl: 0    gabapentin (NEURONTIN) 100 mg capsule, TAKE 1 CAPSULE BY MOUTH IN THE MORNING AND 2 AT BEDTIME, Disp: 270 capsule, Rfl: 0    glucosamine-chondroitin 500-400 MG tablet, Take 1 tablet by mouth daily, Disp: , Rfl:     hydrochlorothiazide (HYDRODIURIL) 25 mg tablet, Take 1 tablet by mouth once daily, Disp: 90 tablet, Rfl: 0    losartan (COZAAR) 50 mg tablet, Take 1 tablet by mouth once daily, Disp: 90 tablet, Rfl: 0    Magnesium Hydroxide (MAGNESIA PO), Take by mouth, Disp: , Rfl:     metFORMIN (GLUCOPHAGE) 1000 MG tablet, TAKE 1 TABLET BY MOUTH TWICE DAILY WITH MEALS, Disp: 180 tablet, Rfl: 0    Multiple Vitamin (MULTIVITAMIN) capsule, Take 1 capsule by mouth daily, Disp: , Rfl:     nitroglycerin (NITROSTAT) 0 3 mg SL tablet, Place 1 tablet (0 3 mg total) under the tongue every 5 (five) minutes as needed for chest pain, Disp: 25 tablet, Rfl: 1    ONETOUCH DELICA LANCETS 13K MISC, 1 Units by Does not apply route daily, Disp: 100 each, Rfl: 6    pramipexole (MIRAPEX) 0 125 mg tablet, Take 1 tablet (0 125 mg total) by mouth daily at bedtime, Disp: 30 tablet, Rfl: 2    Semaglutide (Rybelsus) 7 MG TABS, Take 7 mg by mouth daily, Disp: 90 tablet, Rfl: 3    torsemide (DEMADEX) 10 mg tablet, TAKE 1 TABLET BY MOUTH ONCE DAILY (Patient taking differently: Take by mouth as needed  ), Disp: 90 tablet, Rfl: 3    cyanocobalamin (VITAMIN B-12) 100 mcg tablet, Take by mouth daily, Disp: , Rfl:     Current Facility-Administered Medications:     cyanocobalamin injection 1,000 mcg, 1,000 mcg, Intramuscular, Q30 Days, LUCERO Tasng, 1,000 mcg at 09/10/21 1141      Objective:    Vitals:   /80   Pulse 60   Temp 97 9 °F (36 6 °C)   Ht 5' 11" (1 803 m)   Wt 125 kg (275 lb) SpO2 97%   BMI 38 35 kg/m²   Body mass index is 38 35 kg/m²  Vitals:    03/11/22 1246   Weight: 125 kg (275 lb)       Physical Exam    Lab Review   Orders Only on 01/14/2022   Component Date Value Ref Range Status    Prostate Specific Antigen Total 01/14/2022 0 5  0 0 - 4 0 ng/mL Final    Comment: Roche ECLIA methodology  According to the American Urological Association, Serum PSA should  decrease and remain at undetectable levels after radical  prostatectomy  The AUA defines biochemical recurrence as an initial  PSA value 0 2 ng/mL or greater followed by a subsequent confirmatory  PSA value 0 2 ng/mL or greater  Values obtained with different assay methods or kits cannot be used  interchangeably  Results cannot be interpreted as absolute evidence  of the presence or absence of malignant disease  Patient Instructions       Follow with Consultants as per their and our suggestion    Follow up in 12 week(s) or as needed earlier    Follow all instructions as advised and discussed  Take your medications as prescribed  Call the office immediately if you experience any side effects  Ask questions if you do not understand  Keep your scheduled appointment as advised or come sooner if necessary or in doubt  Best time to call for non-urgent matter or questions on weekdays is between 9am and 12 noon  See physician for any new symptoms or worsening of current symptoms  Urgent or emergent situations call 911 and report to nearest emergency room  I spent  30 -40 minutes taking care of this patient including clinical care, conseling, collaboration, chart, lab and consultaion review as appropriate    Patient is to get labs 1 week(s) prior to next visit if advised            Basic Carbohydrate Counting   AMBULATORY CARE:   Carbohydrate counting  is a way to plan your meals by counting the amount of carbohydrate in foods   Carbohydrates are the sugars, starches, and fiber found in fruit, grains, vegetables, and milk products  Carbohydrates increase your blood sugar levels  Carbohydrate counting can help you eat the right amount of carbohydrate to keep your blood sugar levels under control  What you need to know about planning meals using carbohydrate counting:  · A dietitian or healthcare provider will help you develop a healthy meal plan that works best for you  You will be taught how much carbohydrate to eat or drink for each meal and snack  Your meal plan will be based on your age, weight, usual food intake, and physical activity level  If you have diabetes, it will also include your blood sugar levels and diabetes medicine  Once you know how much carbohydrate you should eat, you can decide what type of food you want to eat  · You will need to know what foods contain carbohydrate and how much they contain  Keep track of the amount of carbohydrate in meals and snacks in order to follow your meal plan  Do not avoid carbohydrates or skip meals  Your blood sugar may fall too low if you do not eat enough carbohydrate or you skip meals  Foods that contain carbohydrate:   · Breads:  Each serving of food listed below contains about 15 g of carbohydrate   ? 1 slice of bread (1 ounce) or 1 flour or corn tortilla (6 inch)    ? ½ of a hamburger bun or ¼ of a large bagel (about 1 ounce)    ? 1 pancake (about 4 inches across and ¼ inch thick)    · Cereals and grains:  Serving sizes of ready-to-eat cereals vary  Look at the serving size and the total carbohydrate amount listed on the food label  Each serving of food listed below contains about 15 g of carbohydrate   ? ¾ cup of dry, unsweetened, ready-to-eat cereal or ¼ cup of low-fat granola     ? ½ cup of oatmeal or other cooked cereal     ? ? cup of cooked rice or pasta    · Starchy vegetables and beans:  Each serving of food listed below contains about 15 g of carbohydrate   ? ½ cup of corn, green peas, sweet potatoes, or mashed potatoes    ?  ¼ of a large baked potato    ? ½ cup of beans, lentils, and peas (garbanzo, deng, kidney, white, split, black-eyed)    · Crackers and snacks:  Each serving of food listed below contains about 15 g of carbohydrate   ? 3 alicia cracker squares or 8 animal crackers     ? 6 saltine-type crackers    ? 3 cups of popcorn or ¾ ounce of pretzels, potato chips, or tortilla chips    · Fruit:  Each serving of food listed below contains about 15 g of carbohydrate   ? 1 small (4 ounce) piece of fresh fruit or ¾ to 1 cup of fresh fruit    ? ½ cup of canned or frozen fruit, packed in natural juice    ? ½ cup (4 ounces) of unsweetened fruit juice    ? 2 tablespoons of dried fruit    · Desserts or sugary foods:  Each serving of food listed below contains about 15 g of carbohydrate   ? 2-inch square unfrosted cake or brownie     ? 2 small cookies    ? ½ cup of ice cream, frozen yogurt, or nondairy frozen yogurt    ? ¼ cup of sherbet or sorbet    ? 1 tablespoon of regular syrup, jam, or jelly    ? 2 tablespoons of light syrup    · Milk and yogurt:  Foods from the milk group contain about 12 g of carbohydrate per serving  ? 1 cup of fat-free or low-fat milk    ? 1 cup of soy milk    ? ? cup of fat-free, yogurt sweetened with artificial sweetener    · Non-starchy vegetables:  Each serving contains about 5 g of carbohydrate   Three servings of non-starch vegetables count as 1 carbohydrate serving  ? ½ cup of cooked vegetables or 1 cup of raw vegetables  This includes beets, broccoli, cabbage, cauliflower, cucumber, mushrooms, tomatoes, and zucchini    ? ½ cup of vegetable juice    How to use carbohydrate counting to plan meals:   · Count carbohydrate amounts using serving sizes:      ? Pasta dinner example: You plan to have pasta, tossed salad, and an 8-ounce glass of milk  Your healthcare provider tells you that you may have 4 carbohydrate servings for dinner  One carbohydrate serving of pasta is ? cup   One cup of pasta will equal 3 carbohydrate servings  An 8-ounce glass of milk will count as 1 carbohydrate serving  These amounts of food would equal 4 carbohydrate servings  One cup of tossed salad does not count toward your carbohydrate servings  · Count carbohydrate amounts using food labels:  Find the total amount of carbohydrate in a packaged food by reading the food label  Food labels tell you the serving size of the food and the total carbohydrate amount in each serving  Find the serving size on the food label and then decide how many servings you will eat  Multiply the number of servings you plan to eat by the carbohydrate amount per serving  ? Granola bar snack example: Your meal plan allows you to have 2 carbohydrate servings (30 grams) of carbohydrate for a snack  You plan to eat 1 package of granola bars, which contains 2 bars  According to the food label, the serving size of food in this package is 1 bar  Each serving (1 bar) contains 25 grams of carbohydrate  The total amount of carbohydrate in this package of granola bars would be 50 g  Based on your meal plan, you should eat only 1 bar  Follow up with your doctor as directed:  Write down your questions so you remember to ask them during your visits  © Copyright [employer] 2022 Information is for End User's use only and may not be sold, redistributed or otherwise used for commercial purposes  All illustrations and images included in CareNotes® are the copyrighted property of [employer]  or [facility] Rosario Morocho   The above information is an  only  It is not intended as medical advice for individual conditions or treatments  Talk to your doctor, nurse or pharmacist before following any medical regimen to see if it is safe and effective for you  Dr Zara Giang MD  [facility]       "This note has been constructed using a voice recognition system  Therefore there may be syntax, spelling, and/or grammatical errors   Please call if you have any questions  "

## 2022-03-11 NOTE — ASSESSMENT & PLAN NOTE
Lab Results   Component Value Date    HGBA1C 7 2 (H) 12/10/2021   He had hemoglobin A1c done today report of which is awaited  Recommend to continue to watch diet do exercise we will do urine for microalbumin next visit      Patient is currently on reveals a 7 mg and metformin 1000 mg twice a day

## 2022-03-15 ENCOUNTER — TELEPHONE (OUTPATIENT)
Dept: ENDOCRINOLOGY | Facility: CLINIC | Age: 62
End: 2022-03-15

## 2022-03-15 NOTE — TELEPHONE ENCOUNTER
----- Message from Magy Cardenas MD sent at 3/14/2022  3:49 PM EDT -----  Renal function stable    A1c has improved to 6 7%

## 2022-03-25 DIAGNOSIS — E78.2 MIXED HYPERLIPIDEMIA: ICD-10-CM

## 2022-03-25 DIAGNOSIS — E11.9 TYPE 2 DIABETES MELLITUS WITHOUT COMPLICATION, WITHOUT LONG-TERM CURRENT USE OF INSULIN (HCC): ICD-10-CM

## 2022-03-25 RX ORDER — ATORVASTATIN CALCIUM 40 MG/1
TABLET, FILM COATED ORAL
Qty: 90 TABLET | Refills: 0 | Status: SHIPPED | OUTPATIENT
Start: 2022-03-25 | End: 2022-06-16

## 2022-04-15 ENCOUNTER — IMMUNIZATIONS (OUTPATIENT)
Dept: FAMILY MEDICINE CLINIC | Facility: HOSPITAL | Age: 62
End: 2022-04-15

## 2022-04-15 DIAGNOSIS — Z23 ENCOUNTER FOR IMMUNIZATION: Primary | ICD-10-CM

## 2022-04-15 PROCEDURE — 91306 COVID-19 MODERNA VACC 0.25 ML BOOSTER: CPT

## 2022-04-15 PROCEDURE — 0064A COVID-19 MODERNA VACC 0.25 ML BOOSTER: CPT

## 2022-04-25 ENCOUNTER — TELEPHONE (OUTPATIENT)
Dept: NEUROLOGY | Facility: CLINIC | Age: 62
End: 2022-04-25

## 2022-05-05 ENCOUNTER — TELEPHONE (OUTPATIENT)
Dept: PULMONOLOGY | Facility: MEDICAL CENTER | Age: 62
End: 2022-05-05

## 2022-05-12 DIAGNOSIS — J30.1 SEASONAL ALLERGIC RHINITIS DUE TO POLLEN: ICD-10-CM

## 2022-05-12 RX ORDER — DESLORATADINE 5 MG/1
TABLET ORAL
Qty: 90 TABLET | Refills: 0 | Status: SHIPPED | OUTPATIENT
Start: 2022-05-12

## 2022-05-13 ENCOUNTER — OFFICE VISIT (OUTPATIENT)
Dept: NEUROLOGY | Facility: CLINIC | Age: 62
End: 2022-05-13
Payer: COMMERCIAL

## 2022-05-13 VITALS
WEIGHT: 281 LBS | BODY MASS INDEX: 39.34 KG/M2 | SYSTOLIC BLOOD PRESSURE: 128 MMHG | HEIGHT: 71 IN | HEART RATE: 62 BPM | DIASTOLIC BLOOD PRESSURE: 76 MMHG | TEMPERATURE: 96.6 F

## 2022-05-13 DIAGNOSIS — E53.8 B12 DEFICIENCY: ICD-10-CM

## 2022-05-13 DIAGNOSIS — E83.42 HYPOMAGNESEMIA: ICD-10-CM

## 2022-05-13 DIAGNOSIS — E11.40 NEUROPATHY DUE TO TYPE 2 DIABETES MELLITUS (HCC): ICD-10-CM

## 2022-05-13 DIAGNOSIS — G25.81 RESTLESS LEG SYNDROME: Primary | ICD-10-CM

## 2022-05-13 PROCEDURE — 3074F SYST BP LT 130 MM HG: CPT | Performed by: NURSE PRACTITIONER

## 2022-05-13 PROCEDURE — 3078F DIAST BP <80 MM HG: CPT | Performed by: NURSE PRACTITIONER

## 2022-05-13 PROCEDURE — 1036F TOBACCO NON-USER: CPT | Performed by: NURSE PRACTITIONER

## 2022-05-13 PROCEDURE — 3008F BODY MASS INDEX DOCD: CPT | Performed by: NURSE PRACTITIONER

## 2022-05-13 PROCEDURE — 99214 OFFICE O/P EST MOD 30 MIN: CPT | Performed by: NURSE PRACTITIONER

## 2022-05-13 RX ORDER — PRAMIPEXOLE DIHYDROCHLORIDE 0.12 MG/1
TABLET ORAL
Qty: 90 TABLET | Refills: 3 | Status: SHIPPED | OUTPATIENT
Start: 2022-05-13

## 2022-05-13 RX ORDER — GABAPENTIN 100 MG/1
CAPSULE ORAL
Qty: 360 CAPSULE | Refills: 1 | Status: SHIPPED | OUTPATIENT
Start: 2022-05-13

## 2022-05-13 NOTE — PROGRESS NOTES
Patient ID: Jasmina Odonnell is a 63 y o  male  Assessment/Plan:       Diagnoses and all orders for this visit:    Restless leg syndrome  -     pramipexole (MIRAPEX) 0 125 mg tablet; Take 1 tablet (0 125 mg total) by mouth in the morning AND 2 tablets (0 25 mg total) daily at bedtime  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)  -     gabapentin (NEURONTIN) 100 mg capsule; TAKE 1 CAPSULE BY MOUTH IN THE MORNING AND 3 AT BEDTIME    Hypomagnesemia  -     Magnesium; Future  -     Magnesium    B12 deficiency     Continue Gabapentin 100mg in am and increase to 300mg at bedtime  Restart on Mirapex 0 125mg in the am and increase bedtime dose to 0 25mg  Magnesium level with your next blood work  Continue with Magnesium 250mg twice a day  Continue with daily Vitamin B12  Follow up with neurology office in 5 months     Subjective/HPI:  Jasmina Odonnell is a 65yo male follows in outpatient neurology office for medical management of neuropathy  Patient has PMH of diabetes for which he is working with endocrinology  His last A1c had been 6 57 2  He has been working to maintain good control with diet exercise and Rybelsus  Patient takes vitamin-D repletion and has had evidence of vitamin B12 deficiencies for which he takes IM injections monthly at this point in time  Patient has had reports of leg cramping however suspected may be related to medication changes and magnesium level 1 5  He is started on oral daily magnesium  MRI of the lumbar spine completed showing multilevel degenerative disc changes, focal disc protrusion at L3-4 possible impingement on L4 descending nerve root  Patient has T11 chronic compression fracture with mild kyphosis  Bone density was normal  EMG of the upper extremities with evidence of median nerve entrapment consistent with carpal tunnel syndrome  Left ulnar neuropathy across the elbow    Patient had been following with orthopedics and receiving bilateral injections which he reports has been effective  Patient has obstructive sleep apnea see sleep medicine and recommendations for CPAP  Patient also advised he has restless leg  Patient reported having sharp shooting pains in the lower extremities however seemed to have improvement of this  Patient also with neuropathic pain to the bottoms of his feet now standing over the top of his foot up into his legs  His PCP placed him on gabapentin, last office appointment in January 2022 we discussed increasing his dosages  Patient is a , 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  Feels cautious about anything that might make his mentation foggy  We discussed increasing his bedtime dose to 200 mg, maintaining 100 mg during the day for which patient was agreeable  Patient reports as though he does not feel as if he has had much change with this  We did discuss increasing his gabapentin however would only be able to increase his evening dose  Patient was increased to 300 mg at bedtime and can continue 100 mg daily in a m  Matt [employer]les Patient also reported having odd sensations to his feet and lower legs  He feels creepy crawly sensation he believes this happens during the day however he is usually busy in it does not bother him quite as much as it does at rest   Patient notes increased twitching and cramping in his legs for which he was started on magnesium  Patient was taking 250 mg, as magnesium was 1 5 therefore patient instructed to increase to 400 mg  Patient reports today that he is taking 250 mg twice a day  He has not had follow-up on his magnesium level in over year, will get follow-up level with his next yearly blood work  Patient was started on Mirapex 0 125 at bedtime during last office appointment for his RLS and nighttime twitching  Patient did not feel as though it made much of a difference and did not continue it  Patient reports that the majority of his issues are in the morning when he wakes  He has involuntary twitching and jerking of his upper extremity  States that occasionally when he is holding something in his hands will begin to tremor  Patient does do surgical intervention, is generally steady however sometimes when timing sutures his right hand will tremor  He is able to stabilize and complete his work however also reports during the night and into the morning having increased twitching and jerking  States that when he holds his phone he will tremor  Discussed with patient 0 125 Mirapex dosing may have been too low, recommendations for increasing the dosing  Patient will increase his nighttime dose to 0 25 mg  With regards to a m  Twitching, recommended patient trial Mirapex 0 125 mg in the morning when he wakes  Recommendations for him to initiate this on days when he is not working to determine if he has increased fatigue or mental fogginess  Patient was vitamin B12 deficient, he received his monthly injections and has been on daily dosing  Currently patient is biggest issue is muscle movement and twitching, through the night and into the morning  He has seen restless legs syndrome during sleep study, he was started on gabapentin  He also has neuropathy of the lower extremities and mild tremor, possible essential tremor versus myoclonus  Patient unable to reproduce the action  Will increase gabapentin to cover neuropathy and sharp shooting lower extremity pains which he reports come and go occasionally with no rhyme or reason  Will resume Mirapex at higher doses were bedtime for his restless leg and nighttime twitching/myoclonus  Will trial low-dose in a m  For a m  Symptoms  Patient will start this when he is off to determine any issues with his mental fogginess  Patient will follow-up magnesium level with next lab draws and continues on 250 mg b i d  Jean Pierre Toribio Patient will follow-up in the outpatient neurology office in 5 months or sooner if needed        The following portions of the patient's history were reviewed and updated as appropriate: allergies, current medications, past family history, past medical history, past social history, past surgical history and problem list       Past Medical History:   Diagnosis Date    Anemia     Bundle branch block, right     CAD (coronary artery disease)     Cataract     CPAP (continuous positive airway pressure) dependence     Diabetes mellitus ([address] )     borderline, controlled with diet and activity    Diverticulitis     GERD (gastroesophageal reflux disease)     History of coronary artery stent placement- pCx and OM2 11/24/2017    Hyperlipidemia     Nasal septal deformity     Neuropathy     Obesity (BMI 30-39  9)     Sleep apnea     wears c-pap    Vitamin D deficiency        Past Surgical History:   Procedure Laterality Date    COLON SIGMOID RESECTION N/A 12/16/2016    Procedure: RESECTION COLON SIGMOID;  Surgeon: Oralia Vargas MD;  Location: [facility];  Service:     COLON SIGMOID RESECTION LAPAROSCOPIC N/A 12/16/2016    Procedure: RESECTION COLON SIGMOID LAPAROSCOPIC:CONVERTED TO [email or web address];  Surgeon: Oralia Vargas MD;  Location: [facility];  Service:     COLON SURGERY      Sigmoidectomy    COLONOSCOPY N/A 10/6/2016    Procedure: COLONOSCOPY;  Surgeon: Radha Worthy MD;  Location: [facility]; Service:    [place] CYSTOSCOPY W/ RETROGRADES Left 2/3/2017    Procedure: CYSTOSCOPY WITH BILATERAL RETROGRADES, LEFT STENT REMOVAL;  Surgeon: Maryam Christianson MD;  Location: [address] [place] [address];  Service:     ESOPHAGOGASTRODUODENOSCOPY N/A 10/6/2016    Procedure: ESOPHAGOGASTRODUODENOSCOPY (EGD); Surgeon: Radha Worthy MD;  Location: [facility];   Service:     HERNIA REPAIR      KNEE ARTHROSCOPY W/ MENISCECTOMY      OH CYSTOURETHROSCOPY,URETER CATHETER Left 12/4/2016    Procedure: CYSTOSCOPY RETROGRADE PYELOGRAM WITH INSERTION STENT URETERAL;  Surgeon: Maryam Christianson MD;  Location: [address] [place] [address];  Service: Urology   [place] [address] THYROID BIOPSY  2021    VARICOSE VEIN SURGERY         Social History     Socioeconomic History    Marital status: Single     Spouse name: None    Number of children: None    Years of education: None    Highest education level: None   Occupational History    None   Tobacco Use    Smoking status: Former Smoker     Packs/day: 0 50     Years: 37 00     Pack years: 18 50     Types: Cigarettes     Quit date: 3/30/2018     Years since quittin 1    Smokeless tobacco: Current User     Types: Chew    Tobacco comment: chewing nicotine   Vaping Use    Vaping Use: Never used   Substance and Sexual Activity    Alcohol use: Yes     Alcohol/week: 3 0 standard drinks     Types: 3 Cans of beer per week    Drug use: No    Sexual activity: Never   Other Topics Concern    None   Social History Narrative    Lives alone       Social Determinants of Health     Financial Resource Strain: Not on file   Food Insecurity: Not on file   Transportation Needs: Not on file   Physical Activity: Not on file   Stress: Not on file   Social Connections: Not on file   Intimate Partner Violence: Not on file   Housing Stability: Not on file       Family History   Problem Relation Age of Onset    Diabetes Brother     Osteoarthritis Mother     Atrial fibrillation Mother     Aortic aneurysm Father     Colon cancer Brother          Current Outpatient Medications:     albuterol (PROVENTIL HFA,VENTOLIN HFA) 90 mcg/act inhaler, Inhale 2 puffs every 4 (four) hours as needed for wheezing or shortness of breath, Disp: 18 g, Rfl: 6    Alpha-Lipoic Acid 100 MG CAPS, Take by mouth, Disp: , Rfl:     Ascorbic Acid (VITAMIN C) 100 MG tablet, Take 500 mg by mouth , Disp: , Rfl:     aspirin 81 MG tablet, Take 162 mg by mouth daily  , Disp: , Rfl:     atorvastatin (LIPITOR) 40 mg tablet, TAKE 1 TABLET BY MOUTH ONCE DAILY WITH DINNER, Disp: 90 tablet, Rfl: 0    BIOTIN PO, Take 5 mg by mouth daily, Disp: , Rfl:     Cholecalciferol (VITAMIN D3) 1000 units CAPS, Take by mouth daily , Disp: , Rfl:     ciclopirox (LOPROX) 0 77 % cream, APPLY CREAM TOPICALLY TWICE DAILY  GENTLY MASSAGE INTO AFFECTED AREAS AND SURROUNDING SKIN, Disp: , Rfl:     cyanocobalamin (VITAMIN B-12) 100 mcg tablet, Take by mouth daily, Disp: , Rfl:     desloratadine (CLARINEX) 5 MG tablet, TAKE 1 TABLET BY MOUTH AT BEDTIME, Disp: 90 tablet, Rfl: 0    famotidine (PEPCID) 20 mg tablet, Take 20 mg by mouth daily, Disp: , Rfl:     FREESTYLE LITE test strip, USE 1 STRIP TO CHECK GLUCOSE ONCE DAILY AS DIRECTED, Disp: 100 each, Rfl: 0    gabapentin (NEURONTIN) 100 mg capsule, TAKE 1 CAPSULE BY MOUTH IN THE MORNING AND 3 AT BEDTIME, Disp: 360 capsule, Rfl: 1    glucosamine-chondroitin 500-400 MG tablet, Take 1 tablet by mouth daily, Disp: , Rfl:     hydrochlorothiazide (HYDRODIURIL) 25 mg tablet, Take 1 tablet by mouth once daily, Disp: 90 tablet, Rfl: 0    losartan (COZAAR) 50 mg tablet, Take 1 tablet by mouth once daily, Disp: 90 tablet, Rfl: 0    Magnesium Hydroxide (MAGNESIA PO), Take by mouth 2 (two) times a day, Disp: , Rfl:     metFORMIN (GLUCOPHAGE) 1000 MG tablet, TAKE 1 TABLET BY MOUTH TWICE DAILY WITH MEALS, Disp: 180 tablet, Rfl: 0    Multiple Vitamin (MULTIVITAMIN) capsule, Take 1 capsule by mouth daily, Disp: , Rfl:     nitroglycerin (NITROSTAT) 0 3 mg SL tablet, Place 1 tablet (0 3 mg total) under the tongue every 5 (five) minutes as needed for chest pain, Disp: 25 tablet, Rfl: 1    ONETOUCH DELICA LANCETS 36Z MISC, 1 Units by Does not apply route daily, Disp: 100 each, Rfl: 6    pramipexole (MIRAPEX) 0 125 mg tablet, Take 1 tablet (0 125 mg total) by mouth in the morning AND 2 tablets (0 25 mg total) daily at bedtime  , Disp: 90 tablet, Rfl: 3    Semaglutide (Rybelsus) 7 MG TABS, Take 7 mg by mouth daily, Disp: 90 tablet, Rfl: 3    torsemide (DEMADEX) 10 mg tablet, TAKE 1 TABLET BY MOUTH ONCE DAILY (Patient taking differently: Take by mouth as needed), Disp: 90 tablet, Rfl: 3    Current Facility-Administered Medications:     cyanocobalamin injection 1,000 mcg, 1,000 mcg, Intramuscular, Q30 Days, [address], LUCERO, 1,000 mcg at 09/10/21 1141    Allergies   Allergen Reactions    Levaquin [Levofloxacin In D5w] Swelling     Knee swelling    Sulfa Antibiotics GI Intolerance     Abdominal pain          Blood pressure 128/76, pulse 62, temperature (!) 96 6 °F (35 9 °C), temperature source Tympanic, height 5' 11" (1 803 m), weight 127 kg (281 lb)  Objective:    Blood pressure 128/76, pulse 62, temperature (!) 96 6 °F (35 9 °C), temperature source Tympanic, height 5' 11" (1 803 m), weight 127 kg (281 lb)  Physical Exam  Vitals reviewed  Constitutional:       Appearance: Normal appearance  He is well-developed  HENT:      Head: Normocephalic  Right Ear: Hearing normal       Left Ear: Hearing normal       Nose: Nose normal       Mouth/Throat:      Mouth: Mucous membranes are moist    Eyes:      General: Lids are normal       Extraocular Movements: Extraocular movements intact  Conjunctiva/sclera: Conjunctivae normal       Pupils: Pupils are equal, round, and reactive to light  Cardiovascular:      Rate and Rhythm: Normal rate  Pulmonary:      Effort: Pulmonary effort is normal  No respiratory distress  Abdominal:      Palpations: Abdomen is soft  Tenderness: There is no abdominal tenderness  Musculoskeletal:         General: Normal range of motion  Cervical back: Normal range of motion  Skin:     General: Skin is warm and dry  Neurological:      Mental Status: He is alert  Coordination: Romberg sign negative  Deep Tendon Reflexes: Strength normal       Reflex Scores:       Bicep reflexes are 1+ on the right side and 1+ on the left side  Brachioradialis reflexes are 1+ on the right side and 1+ on the left side    Psychiatric:         Attention and Perception: Attention and perception normal          Mood and Affect: Mood and affect normal          Speech: Speech normal          Behavior: Behavior normal  Behavior is cooperative  Thought Content: Thought content normal          Cognition and Memory: Cognition and memory normal          Judgment: Judgment normal          Neurological Exam  Mental Status  Alert  Oriented to person, place, time and situation  Memory is normal  Recent and remote memory are intact  Speech is normal  Language is fluent with no aphasia  Attention and concentration are normal  Fund of knowledge is appropriate for level of education  Cranial Nerves  CN II: Visual acuity is normal  Visual fields full to confrontation  CN III, IV, VI: Extraocular movements intact bilaterally  Normal lids and orbits bilaterally  Pupils equal round and reactive to light bilaterally  CN V: Facial sensation is normal   CN VII: Full and symmetric facial movement  CN VIII: Hearing is normal   Right: Hearing is normal   Left: Hearing is normal   CN IX, X: Palate elevates symmetrically  Normal gag reflex  CN XI: Shoulder shrug strength is normal   CN XII: Tongue midline without atrophy or fasciculations  Motor  Normal muscle bulk throughout  Normal muscle tone  No abnormal involuntary movements  Strength is 5/5 throughout all four extremities  Sensory  Light touch is normal in upper and lower extremities  Temperature is normal in upper and lower extremities  Vibration is normal in upper and lower extremities  Proprioception is normal in upper and lower extremities  Reflexes                                            Right                      Left  Brachioradialis                    1+                         1+  Biceps                                 1+                         1+  Patellar                                Tr                         Tr    Right pathological reflexes: Azul's absent  Left pathological reflexes: Azul's absent      Coordination  Right: Finger-to-nose normal  Rapid alternating movement normal  Heel-to-shin normal Left: Finger-to-nose normal  Rapid alternating movement normal  Heel-to-shin normal     Gait  Casual gait is normal including stance, stride, and arm swing  Normal toe walking  Normal heel walking  Normal tandem gait  Romberg is absent  Able to rise from chair without using arms  ROS:    Review of Systems   Constitutional: Negative  Negative for appetite change and fever  HENT: Positive for tinnitus  Negative for hearing loss, trouble swallowing and voice change  Eyes: Negative  Negative for photophobia and pain  Respiratory: Positive for shortness of breath  Cardiovascular: Negative  Negative for palpitations  Gastrointestinal: Negative  Negative for nausea and vomiting  Endocrine: Negative  Negative for cold intolerance  Genitourinary: Negative  Negative for dysuria, frequency and urgency  Musculoskeletal: Negative  Negative for myalgias and neck pain  Skin: Negative  Negative for rash  Neurological: Positive for dizziness, tremors, light-headedness, numbness and headaches  Negative for seizures, syncope, facial asymmetry, speech difficulty and weakness  Hematological: Negative  Does not bruise/bleed easily  Psychiatric/Behavioral: Negative  Negative for confusion, hallucinations and sleep disturbance  ROS reviewed and discussed with patient

## 2022-05-13 NOTE — PATIENT INSTRUCTIONS
Continue Gabapentin 100mg in am and increase to 300mg at bedtime  Restart on Mirapex 0 125mg in the am and increase bedtime dose to 0 25mg  Magnesium level with your next blood work  Continue with Magnesium 250mg twice a day  Continue with daily Vitamin B12  Follow up with neurology office in 5 months

## 2022-06-03 ENCOUNTER — OFFICE VISIT (OUTPATIENT)
Dept: ENDOCRINOLOGY | Facility: CLINIC | Age: 62
End: 2022-06-03
Payer: COMMERCIAL

## 2022-06-03 VITALS
HEIGHT: 71 IN | BODY MASS INDEX: 38.92 KG/M2 | WEIGHT: 278 LBS | SYSTOLIC BLOOD PRESSURE: 130 MMHG | HEART RATE: 89 BPM | DIASTOLIC BLOOD PRESSURE: 80 MMHG

## 2022-06-03 DIAGNOSIS — I10 ESSENTIAL HYPERTENSION: ICD-10-CM

## 2022-06-03 DIAGNOSIS — E11.40 NEUROPATHY DUE TO TYPE 2 DIABETES MELLITUS (HCC): ICD-10-CM

## 2022-06-03 DIAGNOSIS — E11.9 TYPE 2 DIABETES MELLITUS WITHOUT COMPLICATION, WITHOUT LONG-TERM CURRENT USE OF INSULIN (HCC): ICD-10-CM

## 2022-06-03 DIAGNOSIS — E11.65 TYPE 2 DIABETES MELLITUS WITH HYPERGLYCEMIA, WITHOUT LONG-TERM CURRENT USE OF INSULIN (HCC): Primary | ICD-10-CM

## 2022-06-03 DIAGNOSIS — I25.10 CORONARY ARTERY DISEASE INVOLVING NATIVE CORONARY ARTERY OF NATIVE HEART WITHOUT ANGINA PECTORIS: ICD-10-CM

## 2022-06-03 DIAGNOSIS — E04.1 THYROID NODULE: ICD-10-CM

## 2022-06-03 DIAGNOSIS — E78.2 MIXED HYPERLIPIDEMIA: ICD-10-CM

## 2022-06-03 PROCEDURE — 3008F BODY MASS INDEX DOCD: CPT | Performed by: INTERNAL MEDICINE

## 2022-06-03 PROCEDURE — 1036F TOBACCO NON-USER: CPT | Performed by: INTERNAL MEDICINE

## 2022-06-03 PROCEDURE — 3075F SYST BP GE 130 - 139MM HG: CPT | Performed by: INTERNAL MEDICINE

## 2022-06-03 PROCEDURE — 3079F DIAST BP 80-89 MM HG: CPT | Performed by: INTERNAL MEDICINE

## 2022-06-03 PROCEDURE — 99214 OFFICE O/P EST MOD 30 MIN: CPT | Performed by: INTERNAL MEDICINE

## 2022-06-03 NOTE — PROGRESS NOTES
Jonatan Pierce 63 y o  male MRN: [id number]    Encounter: [id number]      Assessment/Plan     1  Type 2 diabetes mellitus not on long term insulin therapy with hyperglycemia  2  Obesity   Well controlled based on Last A1c 6 7%  Recent blood sugars over the last 2 weeks better in comparison to before, patient has been more active     Recommend the following at this time  Continue current dose of metformin   Change ryblesus to 3 mg orally daily  Continue healthy lifestyle, include more physical activity into daily routine  Labs to be done this month and prior to follow-up     3  Hyperlipidemia  - continue statin therapy    4  Hypertension  Blood pressure at goal  - continue medications including ACE-I/ ARB    5  Thyroid nodule-repeat thyroid ultrasound 09/2022, ordered  -check TSH, free T4    CC: Diabetes    History of Present Illness     HPI:  Jonatan Pierce is a 63 y o  male presents for a follow-up visit regarding diabetes management  Also has hypertension, hyperlipidemia, CAD, thyroid nodule, fatty liver, obesity    Last seen in 12/2021  Last Eye exam: 2/2022 and 3/2022 - No DR ; mild cataracts     Current regimen:   Metformin 1 gm twice a day   Ryblesus 3 5 mg daily - misunderstanding, patient thought the dose was not being increased and so has been taking 1/2 a pill of the 7 mg daily     Statin:  Lipitor, still has muscle cramps - started pramipexole  ACE-I/ARB:  Losartan    Has been working in the yard   Planning to start going to the gym; has been active at home - will be moving   Avoiding red meat, less carbs, more veggies   Weight stable     Home glucose monitoring: Will be scanned in   110- 214 mg/dl   Symptoms of hypoglycemia :  No lows     Thyroid nodule-FNA 09/2021 AUS, Afirma benign    All other systems were reviewed and are negative      Review of Systems      Historical Information   Past Medical History:   Diagnosis Date    Anemia     Bundle branch block, right     CAD (coronary artery disease)     Cataract     CPAP (continuous positive airway pressure) dependence     Diabetes mellitus (HCC)     borderline, controlled with diet and activity    Diverticulitis     GERD (gastroesophageal reflux disease)     History of coronary artery stent placement- pCx and OM2 11/24/2017    Hyperlipidemia     Nasal septal deformity     Neuropathy     Obesity (BMI 30-39  9)     Sleep apnea     wears c-pap    Vitamin D deficiency      Past Surgical History:   Procedure Laterality Date    COLON SIGMOID RESECTION N/A 12/16/2016    Procedure: RESECTION COLON SIGMOID;  Surgeon: Jeanette Pacheco MD;  Location: [facility];  Service:     COLON SIGMOID RESECTION LAPAROSCOPIC N/A 12/16/2016    Procedure: RESECTION COLON SIGMOID LAPAROSCOPIC:CONVERTED TO @google com;  Surgeon: Jeanette Pacheco MD;  Location[facility];  Service:     COLON SURGERY      Sigmoidectomy    COLONOSCOPY N/A 10/6/2016    Procedure: COLONOSCOPY;  Surgeon: Chung Thurston MD;  Location: [place] [address] [facility]; Service:    Junius Mcfarlane CYSTOSCOPY W/ RETROGRADES Left 2/3/2017    Procedure: CYSTOSCOPY WITH BILATERAL RETROGRADES, LEFT STENT REMOVAL;  Surgeon: Eleanor Hidalgo MD;  Location: [address] [place] [address];  Service:     ESOPHAGOGASTRODUODENOSCOPY N/A 10/6/2016    Procedure: ESOPHAGOGASTRODUODENOSCOPY (EGD); Surgeon: Chung Thurston MD;  Location: [place] [facility] [address] [facility];   Service:     HERNIA REPAIR      KNEE ARTHROSCOPY W/ MENISCECTOMY      LA CYSTOURETHROSCOPY,URETER CATHETER Left 12/4/2016    Procedure: CYSTOSCOPY RETROGRADE PYELOGRAM WITH INSERTION STENT URETERAL;  Surgeon: Eleanor Hidalgo MD;  Location: [address] [place] [address];  Service: Urology    US GUIDED THYROID BIOPSY  9/27/2021    VARICOSE VEIN SURGERY       Social History   Social History     Substance and Sexual Activity   Alcohol Use Yes    Alcohol/week: 3 0 standard drinks    Types: 3 Cans of beer per week     Social History     Substance and Sexual Activity   Drug Use No     Social History     Tobacco Use Smoking Status Former Smoker    Packs/day: 0 50    Years: 37 00    Pack years: 18 50    Types: Cigarettes    Quit date: 3/30/2018    Years since quittin 1   Smokeless Tobacco Current User    Types: Chew   Tobacco Comment    chewing nicotine     Family History:   Family History   Problem Relation Age of Onset    Diabetes Brother     Osteoarthritis Mother     Atrial fibrillation Mother     Aortic aneurysm Father     Colon cancer Brother        Meds/Allergies   Current Outpatient Medications   Medication Sig Dispense Refill    albuterol (PROVENTIL HFA,VENTOLIN HFA) 90 mcg/act inhaler Inhale 2 puffs every 4 (four) hours as needed for wheezing or shortness of breath 18 g 6    Alpha-Lipoic Acid 100 MG CAPS Take by mouth      Ascorbic Acid (VITAMIN C) 100 MG tablet Take 500 mg by mouth       aspirin 81 MG tablet Take 162 mg by mouth daily        atorvastatin (LIPITOR) 40 mg tablet TAKE 1 TABLET BY MOUTH ONCE DAILY WITH DINNER 90 tablet 0    BIOTIN PO Take 5 mg by mouth daily      Cholecalciferol (VITAMIN D3) 1000 units CAPS Take by mouth daily       ciclopirox (LOPROX) 0 77 % cream APPLY CREAM TOPICALLY TWICE DAILY   GENTLY MASSAGE INTO AFFECTED AREAS AND SURROUNDING SKIN      cyanocobalamin (VITAMIN B-12) 100 mcg tablet Take by mouth daily      desloratadine (CLARINEX) 5 MG tablet TAKE 1 TABLET BY MOUTH AT BEDTIME 90 tablet 0    famotidine (PEPCID) 20 mg tablet Take 20 mg by mouth daily      FREESTYLE LITE test strip USE 1 STRIP TO CHECK GLUCOSE ONCE DAILY AS DIRECTED 100 each 0    gabapentin (NEURONTIN) 100 mg capsule TAKE 1 CAPSULE BY MOUTH IN THE MORNING AND 3 AT BEDTIME 360 capsule 1    glucosamine-chondroitin 500-400 MG tablet Take 1 tablet by mouth daily      hydrochlorothiazide (HYDRODIURIL) 25 mg tablet Take 1 tablet by mouth once daily 90 tablet 3    losartan (COZAAR) 50 mg tablet Take 1 tablet by mouth once daily 90 tablet 0    Magnesium Hydroxide (MAGNESIA PO) Take by mouth 2 (two) times a day      metFORMIN (GLUCOPHAGE) 1000 MG tablet TAKE 1 TABLET BY MOUTH TWICE DAILY WITH MEALS 180 tablet 0    Multiple Vitamin (MULTIVITAMIN) capsule Take 1 capsule by mouth daily      nitroglycerin (NITROSTAT) 0 3 mg SL tablet Place 1 tablet (0 3 mg total) under the tongue every 5 (five) minutes as needed for chest pain 25 tablet 1    ONETOUCH DELICA LANCETS 94Y MISC 1 Units by Does not apply route daily 100 each 6    pramipexole (MIRAPEX) 0 125 mg tablet Take 1 tablet (0 125 mg total) by mouth in the morning AND 2 tablets (0 25 mg total) daily at bedtime  90 tablet 3    Semaglutide (Rybelsus) 7 MG TABS Take 7 mg by mouth daily 90 tablet 3    torsemide (DEMADEX) 10 mg tablet TAKE 1 TABLET BY MOUTH ONCE DAILY (Patient taking differently: Take by mouth as needed) 90 tablet 3     Current Facility-Administered Medications   Medication Dose Route Frequency Provider Last Rate Last Admin    cyanocobalamin injection 1,000 mcg  1,000 mcg Intramuscular Q30 Days LUCERO Garvey   1,000 mcg at 09/10/21 1141     Allergies   Allergen Reactions    Levaquin [Levofloxacin In D5w] Swelling     Knee swelling    Sulfa Antibiotics GI Intolerance     Abdominal pain         Objective   Vitals: Blood pressure 130/80, pulse 89, height 5' 11" (1 803 m), weight 126 kg (278 lb)  Physical Exam  Constitutional:       General: He is not in acute distress  Appearance: He is well-developed  He is not diaphoretic  HENT:      Head: Normocephalic and atraumatic  Eyes:      Conjunctiva/sclera: Conjunctivae normal       Pupils: Pupils are equal, round, and reactive to light  Cardiovascular:      Rate and Rhythm: Normal rate and regular rhythm  Heart sounds: Normal heart sounds  No murmur heard  Pulmonary:      Effort: Pulmonary effort is normal  No respiratory distress  Breath sounds: Normal breath sounds  No wheezing  Abdominal:      General: There is no distension        Palpations: Abdomen is soft  Tenderness: There is no abdominal tenderness  There is no guarding  Musculoskeletal:      Cervical back: Normal range of motion and neck supple  Comments: Trace edema   Skin:     General: Skin is warm and dry  Findings: No erythema or rash  Neurological:      Mental Status: He is alert and oriented to person, place, and time  Psychiatric:         Behavior: Behavior normal          Thought Content: Thought content normal          The history was obtained from the review of the chart, patient  Lab Results:   Lab Results   Component Value Date/Time    Hemoglobin A1C 6 7 (H) 03/11/2022 09:28 AM    Hemoglobin A1C 7 2 (H) 12/10/2021 10:07 AM    Hemoglobin A1C 6 5 (H) 08/06/2021 09:56 AM    White Blood Cell Count 6 8 08/06/2021 09:56 AM    Hemoglobin 14 7 08/06/2021 09:56 AM    HCT 42 1 08/06/2021 09:56 AM     (H) 08/06/2021 09:56 AM    Platelet Count 251 10/23/4600 09:56 AM    BUN 15 03/11/2022 09:28 AM    BUN 12 12/10/2021 10:02 AM    BUN 15 08/06/2021 09:56 AM    Potassium 4 7 03/11/2022 09:28 AM    Potassium 4 4 12/10/2021 10:02 AM    Potassium 4 4 08/06/2021 09:56 AM    Chloride 100 03/11/2022 09:28 AM    Chloride 99 12/10/2021 10:02 AM    Chloride 102 08/06/2021 09:56 AM    CO2 24 03/11/2022 09:28 AM    CO2 24 12/10/2021 10:02 AM    CO2 24 08/06/2021 09:56 AM    Creatinine 1 17 03/11/2022 09:28 AM    Creatinine 1 10 12/10/2021 10:02 AM    Creatinine 1 12 08/06/2021 09:56 AM    AST 40 12/10/2021 10:02 AM    AST 34 08/06/2021 09:56 AM    ALT 57 (H) 12/10/2021 10:02 AM    ALT 49 (H) 08/06/2021 09:56 AM    Albumin 4 2 12/10/2021 10:02 AM    Albumin 4 4 08/06/2021 09:56 AM    Globulin, Total 2 6 12/10/2021 10:02 AM    Globulin, Total 2 6 08/06/2021 09:56 AM    HDL 42 12/10/2021 10:02 AM    HDL 48 08/06/2021 09:56 AM    Triglycerides 183 (H) 12/10/2021 10:02 AM    Triglycerides 142 08/06/2021 09:56 AM         Imaging Studies: I have personally reviewed pertinent reports  Portions of the record may have been created with voice recognition software  Occasional wrong word or "sound a like" substitutions may have occurred due to the inherent limitations of voice recognition software  Read the chart carefully and recognize, using context, where substitutions have occurred

## 2022-06-03 NOTE — PATIENT INSTRUCTIONS
Continue current regimen - change to ryblesus 3 mg   Follow-up in 3 months with repeat labs   Consistent carb diet, regular exercise as discussed

## 2022-06-06 PROCEDURE — 4010F ACE/ARB THERAPY RXD/TAKEN: CPT | Performed by: INTERNAL MEDICINE

## 2022-06-15 DIAGNOSIS — E78.2 MIXED HYPERLIPIDEMIA: ICD-10-CM

## 2022-06-16 RX ORDER — ATORVASTATIN CALCIUM 40 MG/1
TABLET, FILM COATED ORAL
Qty: 90 TABLET | Refills: 1 | Status: SHIPPED | OUTPATIENT
Start: 2022-06-16

## 2022-06-22 DIAGNOSIS — E11.9 TYPE 2 DIABETES MELLITUS WITHOUT COMPLICATION, WITHOUT LONG-TERM CURRENT USE OF INSULIN (HCC): ICD-10-CM

## 2022-07-01 ENCOUNTER — RA CDI HCC (OUTPATIENT)
Dept: OTHER | Facility: HOSPITAL | Age: 62
End: 2022-07-01

## 2022-07-01 NOTE — PROGRESS NOTES
[facility]  coding opportunities          Chart Reviewed number of suggestions sent to Provider: 3     Patients Insurance        Commercial Insurance: [employer]     [id number]  [id number]  [id number]

## 2022-07-03 LAB
ALBUMIN SERPL-MCNC: 4.5 G/DL (ref 3.8–4.9)
ALBUMIN/CREAT UR: 8 MG/G CREAT (ref 0–29)
ALBUMIN/GLOB SERPL: 1.7 {RATIO} (ref 1.2–2.2)
ALP SERPL-CCNC: 55 IU/L (ref 44–121)
ALT SERPL-CCNC: 53 IU/L (ref 0–44)
AST SERPL-CCNC: 44 IU/L (ref 0–40)
BILIRUB SERPL-MCNC: 0.5 MG/DL (ref 0–1.2)
BUN SERPL-MCNC: 15 MG/DL (ref 6–24)
BUN/CREAT SERPL: 13 (ref 9–20)
CALCIUM SERPL-MCNC: 9.6 MG/DL (ref 8.7–10.2)
CHLORIDE SERPL-SCNC: 97 MMOL/L (ref 96–106)
CHOLEST SERPL-MCNC: 123 MG/DL (ref 100–199)
CHOLEST/HDLC SERPL: 3.7 RATIO (ref 0–5)
CO2 SERPL-SCNC: 23 MMOL/L (ref 20–29)
CREAT SERPL-MCNC: 1.12 MG/DL (ref 0.76–1.27)
CREAT UR-MCNC: 96.7 MG/DL
EGFR: 76 ML/MIN/1.73
EST. AVERAGE GLUCOSE BLD GHB EST-MCNC: 186 MG/DL
GLOBULIN SER-MCNC: 2.7 G/DL (ref 1.5–4.5)
GLUCOSE SERPL-MCNC: 172 MG/DL (ref 65–99)
HBA1C MFR BLD: 8.1 % (ref 4.8–5.6)
HDLC SERPL-MCNC: 33 MG/DL
LDLC SERPL CALC-MCNC: 54 MG/DL (ref 0–99)
MAGNESIUM SERPL-MCNC: 2.2 MG/DL (ref 1.6–2.3)
MICROALBUMIN UR-MCNC: 8.2 UG/ML
POTASSIUM SERPL-SCNC: 4.4 MMOL/L (ref 3.5–5.2)
PROT SERPL-MCNC: 7.2 G/DL (ref 6–8.5)
SL AMB VLDL CHOLESTEROL CALC: 36 MG/DL (ref 5–40)
SODIUM SERPL-SCNC: 136 MMOL/L (ref 134–144)
TRIGL SERPL-MCNC: 220 MG/DL (ref 0–149)

## 2022-07-03 PROCEDURE — 3052F HG A1C>EQUAL 8.0%<EQUAL 9.0%: CPT | Performed by: INTERNAL MEDICINE

## 2022-07-03 PROCEDURE — 3061F NEG MICROALBUMINURIA REV: CPT | Performed by: INTERNAL MEDICINE

## 2022-07-08 ENCOUNTER — TELEPHONE (OUTPATIENT)
Dept: ADMINISTRATIVE | Facility: OTHER | Age: 62
End: 2022-07-08

## 2022-07-08 ENCOUNTER — OFFICE VISIT (OUTPATIENT)
Dept: INTERNAL MEDICINE CLINIC | Facility: CLINIC | Age: 62
End: 2022-07-08
Payer: COMMERCIAL

## 2022-07-08 VITALS
WEIGHT: 278 LBS | BODY MASS INDEX: 38.92 KG/M2 | HEART RATE: 67 BPM | HEIGHT: 71 IN | DIASTOLIC BLOOD PRESSURE: 70 MMHG | SYSTOLIC BLOOD PRESSURE: 120 MMHG | OXYGEN SATURATION: 98 %

## 2022-07-08 DIAGNOSIS — R74.8 ELEVATED LIVER ENZYMES: ICD-10-CM

## 2022-07-08 DIAGNOSIS — Z95.5 HISTORY OF CORONARY ARTERY STENT PLACEMENT: ICD-10-CM

## 2022-07-08 DIAGNOSIS — K21.9 GASTROESOPHAGEAL REFLUX DISEASE WITHOUT ESOPHAGITIS: ICD-10-CM

## 2022-07-08 DIAGNOSIS — I49.3 ASYMPTOMATIC PVCS: ICD-10-CM

## 2022-07-08 DIAGNOSIS — E66.09 CLASS 2 OBESITY DUE TO EXCESS CALORIES WITHOUT SERIOUS COMORBIDITY WITH BODY MASS INDEX (BMI) OF 38.0 TO 38.9 IN ADULT: ICD-10-CM

## 2022-07-08 DIAGNOSIS — E11.40 NEUROPATHY DUE TO TYPE 2 DIABETES MELLITUS (HCC): ICD-10-CM

## 2022-07-08 DIAGNOSIS — N40.0 BENIGN PROSTATIC HYPERPLASIA WITHOUT LOWER URINARY TRACT SYMPTOMS: ICD-10-CM

## 2022-07-08 DIAGNOSIS — I11.0 HYPERTENSIVE HEART DISEASE WITH CONGESTIVE HEART FAILURE, UNSPECIFIED HEART FAILURE TYPE (HCC): ICD-10-CM

## 2022-07-08 DIAGNOSIS — G25.81 RESTLESS LEG SYNDROME: ICD-10-CM

## 2022-07-08 DIAGNOSIS — E66.9 OBESITY (BMI 30-39.9): ICD-10-CM

## 2022-07-08 DIAGNOSIS — I10 ESSENTIAL HYPERTENSION: ICD-10-CM

## 2022-07-08 DIAGNOSIS — E78.2 MIXED HYPERLIPIDEMIA: ICD-10-CM

## 2022-07-08 DIAGNOSIS — E55.9 VITAMIN D DEFICIENCY: ICD-10-CM

## 2022-07-08 DIAGNOSIS — G47.33 OBSTRUCTIVE SLEEP APNEA: ICD-10-CM

## 2022-07-08 DIAGNOSIS — E11.65 TYPE 2 DIABETES MELLITUS WITH HYPERGLYCEMIA, UNSPECIFIED WHETHER LONG TERM INSULIN USE (HCC): Primary | ICD-10-CM

## 2022-07-08 DIAGNOSIS — I25.10 CORONARY ARTERY DISEASE INVOLVING NATIVE CORONARY ARTERY OF NATIVE HEART WITHOUT ANGINA PECTORIS: ICD-10-CM

## 2022-07-08 PROBLEM — H61.22 IMPACTED CERUMEN OF LEFT EAR: Status: RESOLVED | Noted: 2020-08-07 | Resolved: 2022-07-08

## 2022-07-08 PROCEDURE — 99214 OFFICE O/P EST MOD 30 MIN: CPT | Performed by: INTERNAL MEDICINE

## 2022-07-08 NOTE — ASSESSMENT & PLAN NOTE
Pt is symptom free with from CAD stand point    Will continue medicine as advised, if any question or side effect, discuss with your physician/cardiologist    Pt advised risk factor control including Blood Pressure, weight, Sugar and cholesterol control for secondary prevention  Pt is advised to continue to follow with cardiologist for close monitoring, periodic evaluation and management of CAD    Go to emergency room/call 911 if you have any chest pain or concerning symptoms as it relates to heart  Continue aspirin 81 mg, atorvastatin 40 mg daily, losartan 50 mg daily, hydrochlorothiazide 25 mg daily, torsemide 10 mg the recommend diabetes hypertension hyperlipidemia and obesity control    The recommend to continue to follow with Cardiology

## 2022-07-08 NOTE — TELEPHONE ENCOUNTER
----- Message from Baldev Rose sent at 7/8/2022  9:59 AM EDT -----  Regarding: DM eye  07/08/22 10:00 AM    Hello, our patient Michaeline Castleman has had Diabetic Eye Exam completed/performed  Please assist in updating the patient chart by making an External outreach to [facility] facility located in [place]  The date of service is 02/15/2022  Saw Dr Fonseca Pro      Thank you,  Kayla Vincent  [facility]

## 2022-07-08 NOTE — PROGRESS NOTES
[facility] Office Visit Note  22     Leatha Cheng 63 y o  male MRN: [id number]  : 1960    Assessment:     1  Type 2 diabetes mellitus with hyperglycemia, unspecified whether long term insulin use ([facility] )  Assessment & Plan:  12/10/2021:  Hemoglobin A1c 7 2  2022:  Hemoglobin A1c 6 7  2022:  Hemoglobin A1c 8 1, urine for microalbumin/creatinine ratio 8,    Seen by endocrinologist   Oksana Gilbert current dose of metformin   Change ryblesus to 3 mg orally daily  Continue healthy lifestyle, include more physical activity into daily routine  Labs to be done this month and prior to follow-up   Advised to contact endocrinologist regarding rising hemoglobin A1c and further discussion and management  Patient used to be on 7 mg of rebelsys now down to 3 mg probably need to go back on 7 mg  Lab Results   Component Value Date    HGBA1C 8 1 (H) 2022       Orders:  -     Comprehensive metabolic panel; Future; Expected date: 10/08/2022  -     Hemoglobin A1C; Future  -     Lipid panel; Future  -     Comprehensive metabolic panel  -     Hemoglobin A1C  -     Lipid panel    2  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 2 diabetes mellitus [facility])  Assessment & Plan:  Recently seen by neurologist the notes were reviewed  Currently on gabapentin 100 mg in the morning and 3 tablet of 100 mg at bedtime  Symptom fair control  Recommend tight diabetic control  Magnesium improved normal  Lab Results   Component Value Date    HGBA1C 8 1 (H) 2022       Orders:  -     Hemoglobin A1C; Future  -     Hemoglobin A1C    3  Coronary artery disease involving native coronary artery of native heart without angina pectoris  Assessment & Plan:  Pt is symptom free with from CAD stand point    Will continue medicine as advised, if any question or side effect, discuss with your physician/cardiologist    Pt advised risk factor control including Blood Pressure, weight, Sugar and cholesterol control for secondary prevention      Pt is advised to continue to follow with cardiologist for close monitoring, periodic evaluation and management of CAD    Go to emergency room/call 911 if you have any chest pain or concerning symptoms as it relates to heart  Continue aspirin 81 mg, atorvastatin 40 mg daily, losartan 50 mg daily, hydrochlorothiazide 25 mg daily, torsemide 10 mg the recommend diabetes hypertension hyperlipidemia and obesity control  The recommend to continue to follow with Cardiology    Orders:  -     Comprehensive metabolic panel; Future; Expected date: 10/08/2022  -     Hemoglobin A1C; Future  -     Lipid panel; Future  -     Comprehensive metabolic panel  -     Hemoglobin A1C  -     Lipid panel    4  Essential hypertension  Assessment & Plan:  Hypertension well controlled  Recommend lifestyle modification exercise  Continue losartan 50 mg daily, hydro Diuril 25 mg daily, torsemide 10 mg per cardiologist   Recommend to check blood pressure daily at home    Orders:  -     Comprehensive metabolic panel; Future; Expected date: 10/08/2022  -     Hemoglobin A1C; Future  -     Lipid panel; Future  -     Comprehensive metabolic panel  -     Hemoglobin A1C  -     Lipid panel    5  Hypertensive heart disease with congestive heart failure, unspecified heart failure type [facility])  Assessment & Plan:  Wt Readings from Last 3 Encounters:   07/08/22 126 kg (278 lb)   06/03/22 126 kg (278 lb)   05/13/22 127 kg (281 lb)       CHF CHF compensated        6  Benign prostatic hyperplasia without lower urinary tract symptoms  Assessment & Plan:  Beginning of the year PSA was normal   Being followed by urologist   Symptom fair control      7  Mixed hyperlipidemia  Assessment & Plan:    LDL to the target    Lab Results   Component Value Date    CHOLESTEROL 123 07/02/2022    CHOLESTEROL 137 12/10/2021    CHOLESTEROL 143 08/06/2021     Lab Results   Component Value Date    HDL 33 (L) 07/02/2022    HDL 42 12/10/2021    HDL 48 08/06/2021     Lab Results Component Value Date    TRIG 220 (H) 07/02/2022    TRIG 183 (H) 12/10/2021    TRIG 142 08/06/2021     Triglyceride mildly high  Hemoglobin decompensated  Obesity is a challenging issue  Recommend diet exercise lifestyle modification  Continue atorvastatin 40 mg daily    Orders:  -     Comprehensive metabolic panel; Future; Expected date: 10/08/2022  -     Hemoglobin A1C; Future  -     Lipid panel; Future  -     Comprehensive metabolic panel  -     Hemoglobin A1C  -     Lipid panel    8  Class 2 obesity due to excess calories without serious comorbidity with body mass index (BMI) of 38 0 to 38 9 in adult  Assessment & Plan:  BMI 38 77 patient again advised 1 more time      9  Vitamin D deficiency    10  Obesity (BMI 30-39 9)    11  History of coronary artery stent placement- pCx and OM2    12  Restless leg syndrome  Assessment & Plan:  Continue Mirapex 0 125 mg  Recently seen by a neurologist   Symptom fair control  Magnesium normal       13  Elevated liver enzymes  Assessment & Plan:  12/10/2021:  AST 40 ALT 57 rest of the liver profile  normal  07/02/2022:  AST 44 ALT 53 rest of the liver profile normal      14  Gastroesophageal reflux disease without esophagitis  Assessment & Plan:  Symptom-free not requiring any medicine for acid reflux      15  Obstructive sleep apnea  Assessment & Plan:  Patient has upcoming appointment with pulmonologist     Remains on CPAP 9 cm of water pressure per patient      16  Asymptomatic PVCs  Assessment & Plan:  Risk factor management          Discussion Summary and Plan: Today's care plan and medications were reviewed with patient in detail and all their questions answered to their satisfaction      Chief Complaint   Patient presents with    Hypertension    Hyperlipidemia    Diabetes    Coronary Artery Disease    Congestive Heart Failure    Follow-up     BPH, neuropathy, history of compression fracture,    Abnormal Lab    Obesity      Subjective:  Sandie Kelley is here for follow-up of chronic disease management  No new sx    Diabetes:  Last 5-10 years, check sugar once a day, seen by optometrist or ophthalmologist formal report not available, underwent hemoglobin A1c today report awaited, home will do urine for microalbumin next visit  Remains on metformin and reveals sinus 7 mg daily  Allergic rhinitis:  Fair at this time  Of last 2 weeks or more symptomatic  Recommend to start using saline nasal spray, Clarinex, has some eye symptoms home he has appointment with optometrist next week may require eyedrops per them  Hypertension:  Symptom-free remains on amlodipine 5 mg daily    Diabetes:  Diagnosed: 5-10 years  Current Medicine for Diabetes: Per Med List  Finger stick: Once a day  Glucose Log: home sugar reivewed  Hypoglycemia event and intervention: None  Diet: reviewed, not watching diet  Exercise: None  Comorbidity: see HPI and Assessment/Plan  Last Eye Exam: 2 year  Last Foot exam:  Done previously  HbA1c hemoglobin A1c done today report awaited  Patient remains on metformin and rybelsus dose adjusted    Allergic rhinitis : doing well, + sx, He used to see allergist specialist his to get allergy in the injection insurance was not paying local doctor he does not want to travel to other doctors  , rec saline,    Hypertension symptom-free remains on losartan 50 mg daily and amlodipin hydrochlorothiazide daily  He uses torsemide p r n  Phil Oakes Coronary artery disease symptom-free the remains on aspirin losartan , hydrochlorothiazide and Lipitor, sxfree, seen by cardiologist the notes were reviewed  Seen by cardiologist in February the notes were reviewed  Hyperlipidemia symptom-free,lipid profile reviewed, continue statin atorvastatin 40 mg daily lipid profile awaited  Diverticulosis not a problem        Vitamin-D deficiency remains on 1000 unit    ity BMI 38 35, Pt is seeing nutrionist, he does carbohydrate controlled 14-16 g carbohydrate with each meal   He sees nutrition specialist   A he does not do formal exercise  BPH fair  Sees urologist once a year the  Lab Results       Component                Value               Date                       PSA                      0 5                 01/14/2022                 PSA                      0 4                 11/06/2020              Edema of legs fair, remains HCTZ for BP and Torsemide for edema by cardiologist    Colonic polyp colonoscopy January 2021  Colonoscopic findings were reviewed  History of compression fracture not a problem  Will do formal DEXA scan    GERD fair  PSA 11-6-2020: 0 4    Neuropathy fair; tolerating gabapentin  He had a EMG nerve conduction study done in month of February  He does have a moderate carpal tunnel on the left hand, mild to moderate ulnar compression, he he does have a 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 likely related to diabetes  Patient was recently seen by hand surgeon carpal tunnel surgery recommended, is going for repeat EMG nerve conduction study of upper extremity on right side  Neuropathy symptoms are fair control  Surgery is on hold  Remains on gabapentin 100 mg daily and 20 mg at nighttime  Patient is started on pramipexole the home however he did not start yet  He will also follow with them they empirically give vitamin B12 he is level was normal in 2020 and last year it were what it was more than 2000 partly because of the post injection    MRI of the lumbar spine did not reveal any hand major sciatica  Please refer to the full finding  He does have a varicose vein and chronic stasis dermatitis      Thyroid nodule:  Patient seen by endocrinologist going for thyroid ultrasound for surveillance no compressive symptoms thyroid blood test were done today seen by endocrinologist recently will monitor    Obesity:  A hand surgery or a continue  Diet exercise weight loss management      07/02/2022:  Magnesium 2 2, hemoglobin A1c 8 1, LDL 54, triglyceride 220, cholesterol 123, urine for microalbumin/creatinine ratio 8, CMP normal except blood sugar 172, ALT 53 and AST 44  03/11/2022: BMP normal except blood sugar 155 hemoglobin A1c 6 7  01/14/2022:  PSA 0 5  12/10/2021:  Blood sugar 155 ALT 57 cholesterol 137 triglyceride 183 LDL 64 hemoglobin A1c 7 2 magnesium 1 5  08/06/2021:  Urine for microalbumin/creatinine ratio normal   Hemoglobin A1c 6 5  The lipid profile reveals LDL of 70  CMP unremarkable except blood sugar 126  GFR normal 72     05/07/2021:  Hemoglobin A1c 7 2, cholesterol 123 LDL 61 HDL 40 blood sugar 123 rest of the CMP normal except ALT 55 and urine for microalbumin negative  02/05/2021:  Urine for microalbumin negative, blood sugar 140, creatinine 1 15, GFR 70, a ALT 70 AST 64 hemoglobin A1c 7 2 LDL 68 HDL 40 rest of the CMP and lipid profile normal            The following portions of the patient's history were reviewed and updated as appropriate: allergies, current medications, past family history, past medical history, past social history, past surgical history and problem list     Review of Systems   All other systems reviewed and are negative  Historical Information   Patient Active Problem List   Diagnosis    Diabetes mellitus ([facility] )    Hyperlipidemia    Essential hypertension    Sleep apnea    Elevated liver enzymes    Polyarthralgia    Hydronephrosis    Class 2 obesity due to excess calories without serious comorbidity with body mass index (BMI) of 38 0 to 38 9 in adult    Chronic pain of both knees    Primary osteoarthritis of knees, bilateral    CAD (coronary artery disease)    SOB (shortness of breath)    Asymptomatic PVCs    Obstructive sleep apnea    Obesity (BMI 30-39  9)    Seasonal allergic rhinitis due to pollen    GERD (gastroesophageal reflux disease)    Nasal septal deformity    Bundle branch block, right    CPAP (continuous positive airway pressure) dependence    Vitamin D deficiency    BPH (benign prostatic hyperplasia)    History of vertebral compression fracture    Colon polyp    History of angioplasty    Ocular migraine    Inguinal hernia    Umbilical hernia    Bradycardia    Asymptomatic varicose veins of both lower extremities    Neuropathy    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)    Carpal tunnel syndrome of left wrist    Ulnar neuropathy at elbow of left upper extremity    Onychomycosis of toenail    Contusion of right knee    Chronic left-sided low back pain with left-sided sciatica    Varicose veins of leg with swelling, bilateral    Thyroid nodule    B12 deficiency    Compression fracture of body of thoracic vertebra (HCC)    Disc disease, degenerative, lumbar or lumbosacral    History of coronary artery stent placement- pCx and OM2    Carpal tunnel syndrome on right    Numbness and tingling in right hand    Close exposure to COVID-19 virus    Restless leg syndrome    Hypertensive heart disease with congestive heart failure (HCC)    Type 2 diabetes mellitus with hyperglycemia (HCC)    Hypomagnesemia     Past Medical History:   Diagnosis Date    Anemia     Bundle branch block, right     CAD (coronary artery disease)     Cataract     CPAP (continuous positive airway pressure) dependence     Diabetes mellitus (HCC)     borderline, controlled with diet and activity    Diverticulitis     GERD (gastroesophageal reflux disease)     History of coronary artery stent placement- pCx and OM2 11/24/2017    Hyperlipidemia     Nasal septal deformity     Neuropathy     Obesity (BMI 30-39  9)     Sleep apnea     wears c-pap    Vitamin D deficiency      Past Surgical History:   Procedure Laterality Date    COLON SIGMOID RESECTION N/A 12/16/2016    Procedure: RESECTION COLON SIGMOID;  Surgeon: Naima Oliva MD;  [facility];  Service:     COLON SIGMOID RESECTION LAPAROSCOPIC N/A 12/16/2016    Procedure: RESECTION COLON SIGMOID LAPAROSCOPIC:CONVERTED TO [email or web address];  Surgeon: Pedro Jones MD;  Location:  MAIN OR;  Service:     COLON SURGERY      Sigmoidectomy    COLONOSCOPY N/A 10/6/2016    Procedure: COLONOSCOPY;  Surgeon: Tomas Haile MD;  Location: [place] [address] GI LAB; Service:    [place] CYSTOSCOPY W/ RETROGRADES Left 2/3/2017    Procedure: CYSTOSCOPY WITH BILATERAL RETROGRADES, LEFT STENT REMOVAL;  Surgeon: Maggy Reyes MD;  Location: [address] [place] [address];  Service:     ESOPHAGOGASTRODUODENOSCOPY N/A 10/6/2016    Procedure: ESOPHAGOGASTRODUODENOSCOPY (EGD); Surgeon: Tomas Haile MD;  Location: [place] [address] GI LAB;   Service:     HERNIA REPAIR      KNEE ARTHROSCOPY W/ MENISCECTOMY      ID CYSTOURETHROSCOPY,URETER CATHETER Left 2016    Procedure: CYSTOSCOPY RETROGRADE PYELOGRAM WITH INSERTION STENT URETERAL;  Surgeon: Maggy Reyes MD;  Location: [address] [place] [address];  Service: Urology    US GUIDED THYROID BIOPSY  2021    VARICOSE VEIN SURGERY       Social History     Substance and Sexual Activity   Alcohol Use Yes    Alcohol/week: 3 0 standard drinks    Types: 3 Cans of beer per week     Social History     Substance and Sexual Activity   Drug Use No     Social History     Tobacco Use   Smoking Status Former Smoker    Packs/day: 0 50    Years: 37 00    Pack years: 18 50    Types: Cigarettes    Quit date: 3/30/2018    Years since quittin 2   Smokeless Tobacco Current User    Types: Chew   Tobacco Comment    chewing nicotine     Family History   Problem Relation Age of Onset    Diabetes Brother     Osteoarthritis Mother     Atrial fibrillation Mother     Aortic aneurysm Father     Colon cancer Brother      Health Maintenance Due   Topic    DM Eye Exam     HIV Screening     Annual Physical     Pneumococcal Vaccine: Pediatrics (0 to 5 Years) and At-Risk Patients (6 to 59 Years) (2 - PCV)    BMI: Followup Plan     Influenza Vaccine (1)      Meds/Allergies       Current Outpatient Medications:     albuterol (PROVENTIL HFA,VENTOLIN HFA) 90 mcg/act inhaler, Inhale 2 puffs every 4 (four) hours as needed for wheezing or shortness of breath, Disp: 18 g, Rfl: 6    Alpha-Lipoic Acid 100 MG CAPS, Take by mouth, Disp: , Rfl:     Ascorbic Acid (VITAMIN C) 100 MG tablet, Take 500 mg by mouth , Disp: , Rfl:     aspirin 81 MG tablet, Take 162 mg by mouth daily  , Disp: , Rfl:     atorvastatin (LIPITOR) 40 mg tablet, TAKE 1 TABLET BY MOUTH ONCE DAILY WITH SUPPER, Disp: 90 tablet, Rfl: 1    BIOTIN PO, Take 5 mg by mouth daily, Disp: , Rfl:     Cholecalciferol (VITAMIN D3) 1000 units CAPS, Take by mouth daily , Disp: , Rfl:     ciclopirox (LOPROX) 0 77 % cream, APPLY CREAM TOPICALLY TWICE DAILY   GENTLY MASSAGE INTO AFFECTED AREAS AND SURROUNDING SKIN, Disp: , Rfl:     cyanocobalamin (VITAMIN B-12) 100 mcg tablet, Take by mouth daily, Disp: , Rfl:     desloratadine (CLARINEX) 5 MG tablet, TAKE 1 TABLET BY MOUTH AT BEDTIME, Disp: 90 tablet, Rfl: 0    famotidine (PEPCID) 20 mg tablet, Take 20 mg by mouth daily, Disp: , Rfl:     FREESTYLE LITE test strip, USE 1 STRIP TO CHECK GLUCOSE ONCE DAILY AS DIRECTED, Disp: 100 each, Rfl: 0    gabapentin (NEURONTIN) 100 mg capsule, TAKE 1 CAPSULE BY MOUTH IN THE MORNING AND 3 AT BEDTIME, Disp: 360 capsule, Rfl: 1    glucosamine-chondroitin 500-400 MG tablet, Take 1 tablet by mouth daily, Disp: , Rfl:     hydrochlorothiazide (HYDRODIURIL) 25 mg tablet, Take 1 tablet by mouth once daily, Disp: 90 tablet, Rfl: 3    losartan (COZAAR) 50 mg tablet, Take 1 tablet by mouth once daily, Disp: 90 tablet, Rfl: 0    Magnesium Hydroxide (MAGNESIA PO), Take by mouth 2 (two) times a day, Disp: , Rfl:     metFORMIN (GLUCOPHAGE) 1000 MG tablet, TAKE 1 TABLET BY MOUTH TWICE DAILY WITH MEALS, Disp: 180 tablet, Rfl: 0    Multiple Vitamin (MULTIVITAMIN) capsule, Take 1 capsule by mouth daily, Disp: , Rfl:     nitroglycerin (NITROSTAT) 0 3 mg SL tablet, Place 1 tablet (0 3 mg total) under the tongue every 5 (five) minutes as needed for chest pain, Disp: 25 tablet, Rfl: 1    ONETOUCH DELICA LANCETS 31D MISC, 1 Units by Does not apply route daily, Disp: 100 each, Rfl: 6    pramipexole (MIRAPEX) 0 125 mg tablet, Take 1 tablet (0 125 mg total) by mouth in the morning AND 2 tablets (0 25 mg total) daily at bedtime  , Disp: 90 tablet, Rfl: 3    Semaglutide (Rybelsus) 3 MG TABS, Take 3 mg by mouth in the morning, Disp: 90 tablet, Rfl: 3    torsemide (DEMADEX) 10 mg tablet, TAKE 1 TABLET BY MOUTH ONCE DAILY (Patient taking differently: Take by mouth as needed), Disp: 90 tablet, Rfl: 3    Current Facility-Administered Medications:     cyanocobalamin injection 1,000 mcg, 1,000 mcg, Intramuscular, Q30 Days, [employer], CRNP, 1,000 mcg at 09/10/21 1141      Objective:    Vitals:   /70   Pulse 67   Ht 5' 11" (1 803 m)   Wt 126 kg (278 lb)   SpO2 98%   BMI 38 77 kg/m²   Body mass index is 38 77 kg/m²  Vitals:    07/08/22 0958   Weight: 126 kg (278 lb)       Physical Exam  Vitals and nursing note reviewed  Constitutional:       Appearance: He is well-developed  He is obese  He is not ill-appearing or diaphoretic  Comments: Male pattern baldness   HENT:      Head: Normocephalic  Salivary Glands: Right salivary gland is not diffusely enlarged  Left salivary gland is not diffusely enlarged  Right Ear: External ear normal  Decreased hearing noted  Left Ear: External ear normal  Decreased hearing noted  Eyes:      General:         Right eye: No discharge  Left eye: No discharge  Conjunctiva/sclera: Conjunctivae normal    Neck:      Thyroid: No thyroid mass, thyromegaly or thyroid tenderness  Vascular: Normal carotid pulses  No carotid bruit, hepatojugular reflux or JVD  Trachea: Trachea normal       Comments: No axillary mass, nose suppressed live clavicular mass, a bone right or left  Cardiovascular:      Rate and Rhythm: Regular rhythm        Heart sounds: Normal heart sounds  No murmur heard  No systolic murmur is present  No diastolic murmur is present  No gallop  Pulmonary:      Effort: No respiratory distress  Breath sounds: Normal breath sounds  No wheezing or rales  Musculoskeletal:      Cervical back: Normal range of motion  No rigidity  No muscular tenderness  Right lower leg: No edema  Left lower leg: No edema  Lymphadenopathy:      Cervical: No cervical adenopathy  Right cervical: No superficial, deep or posterior cervical adenopathy  Skin:     General: Skin is warm  Coloration: Skin is not jaundiced or pale  Findings: No rash  Comments: Stasis dermatitis and discoloration present   Neurological:      Mental Status: He is oriented to person, place, and time  Motor: No weakness  Gait: Gait normal    Psychiatric:         Mood and Affect: Mood normal          Behavior: Behavior normal          Thought Content: Thought content normal          Judgment: Judgment normal          Lab Review   Office Visit on 05/13/2022   Component Date Value Ref Range Status    Magnesium, Serum 07/02/2022 2 2  1 6 - 2 3 mg/dL Final         Patient Instructions   Recommend to contact endocrinologist about adjustment in the dose of rebelysis           Dr Delonte Teague MD  [facility]       "This note has been constructed using a voice recognition system  Therefore there may be syntax, spelling, and/or grammatical errors   Please call if you have any questions  "

## 2022-07-08 NOTE — ASSESSMENT & PLAN NOTE
12/10/2021:  AST 40 ALT 57 rest of the liver profile  normal  07/02/2022:  AST 44 ALT 53 rest of the liver profile normal

## 2022-07-08 NOTE — ASSESSMENT & PLAN NOTE
Patient has upcoming appointment with pulmonologist     Remains on CPAP 9 cm of water pressure per patient

## 2022-07-08 NOTE — ASSESSMENT & PLAN NOTE
12/10/2021:  Hemoglobin A1c 7 2  03/11/2022:  Hemoglobin A1c 6 7  07/02/2022:  Hemoglobin A1c 8 1, urine for microalbumin/creatinine ratio 8,    Seen by endocrinologist   Continue current dose of metformin   Change ryblesus to 3 mg orally daily  Continue healthy lifestyle, include more physical activity into daily routine  Labs to be done this month and prior to follow-up   Advised to contact endocrinologist regarding rising hemoglobin A1c and further discussion and management    Patient used to be on 7 mg of rebelsys now down to 3 mg probably need to go back on 7 mg  Lab Results   Component Value Date    HGBA1C 8 1 (H) 07/02/2022

## 2022-07-08 NOTE — TELEPHONE ENCOUNTER
Upon review of the In Basket request and the patient's chart, initial outreach has been made via fax, please see Contacts section for details        [phone]         Thank you  Satish Mccloud MA

## 2022-07-08 NOTE — ASSESSMENT & PLAN NOTE
Hypertension well controlled  Recommend lifestyle modification exercise    Continue losartan 50 mg daily, hydro Diuril 25 mg daily, torsemide 10 mg per cardiologist   Recommend to check blood pressure daily at home

## 2022-07-08 NOTE — ASSESSMENT & PLAN NOTE
Recently seen by neurologist the notes were reviewed  Currently on gabapentin 100 mg in the morning and 3 tablet of 100 mg at bedtime  Symptom fair control  Recommend tight diabetic control    Magnesium improved normal  Lab Results   Component Value Date    HGBA1C 8 1 (H) 07/02/2022

## 2022-07-08 NOTE — ASSESSMENT & PLAN NOTE
Wt Readings from Last 3 Encounters:   07/08/22 126 kg (278 lb)   06/03/22 126 kg (278 lb)   05/13/22 127 kg (281 lb)       CHF CHF compensated

## 2022-07-08 NOTE — ASSESSMENT & PLAN NOTE
Continue Mirapex 0 125 mg  Recently seen by a neurologist   Symptom fair control    Magnesium normal

## 2022-07-08 NOTE — ASSESSMENT & PLAN NOTE
Patient will be going for thyroid ultrasound surveillance in September of this year order is given by endocrinologist no compressive symptoms    Last ultrasound on 05/21/2021 reviewed Noni Cifuentes(Attending)

## 2022-07-08 NOTE — LETTER
Diabetic Eye Exam Form    Date Requested: 22  Patient: Skyla Melvin  Patient : [id number]   Referring Provider: Amanda Hartmann MD    DIABETIC Eye Exam Date _______________________________    Type of Exam MUST be documented for Diabetic Eye Exams  Please CHECK ONE  Retinal Exam       Dilated Retinal Exam       OCT       Optomap-Iris Exam      Fundus Photography     Left Eye - Please check Retinopathy AND Type or No Retinopathy      Exam did show retinopathy    Exam did not show retinopathy         Mild     Proliferative           Moderate    Severe            None         Right Eye - Please check Retinopathy AND Type or No Retinopathy     Exam did show retinopathy    Exam did not show retinopathy         Mild     Proliferative        Moderate    Severe        None       Comments __________________________________________________________    Practice Providing Exam ______________________________________________    Exam Performed By (print name) _______________________________________      Provider Signature ___________________________________________________    These reports are needed for  compliance  Please fax this completed form and a copy of the Diabetic Eye Exam report to our office located at [place] [address] as soon as possible via [phone] attention Vu Arias: Phone [phone]  We thank you for your assistance in treating our mutual patient

## 2022-07-08 NOTE — ASSESSMENT & PLAN NOTE
LDL to the target  Lab Results   Component Value Date    CHOLESTEROL 123 07/02/2022    CHOLESTEROL 137 12/10/2021    CHOLESTEROL 143 08/06/2021     Lab Results   Component Value Date    HDL 33 (L) 07/02/2022    HDL 42 12/10/2021    HDL 48 08/06/2021     Lab Results   Component Value Date    TRIG 220 (H) 07/02/2022    TRIG 183 (H) 12/10/2021    TRIG 142 08/06/2021     Triglyceride mildly high  Hemoglobin decompensated  Obesity is a challenging issue  Recommend diet exercise lifestyle modification    Continue atorvastatin 40 mg daily

## 2022-07-12 ENCOUNTER — TELEPHONE (OUTPATIENT)
Dept: ENDOCRINOLOGY | Facility: CLINIC | Age: 62
End: 2022-07-12

## 2022-07-12 NOTE — TELEPHONE ENCOUNTER
Upon review of the In Basket request we were able to locate, review, and update the patient chart as requested for Diabetic Eye Exam     Any additional questions or concerns should be emailed to the Practice Liaisons via [email or web address] email, please do not reply via In Basket      Thank you  Xenia Vera MA

## 2022-07-12 NOTE — TELEPHONE ENCOUNTER
Ordered Alexei through [employer] through [employer]    Did prior auth for Rybelsus 3 mg through [employer]

## 2022-07-14 ENCOUNTER — TELEPHONE (OUTPATIENT)
Dept: ENDOCRINOLOGY | Facility: CLINIC | Age: 62
End: 2022-07-14

## 2022-07-15 ENCOUNTER — OFFICE VISIT (OUTPATIENT)
Dept: PULMONOLOGY | Facility: MEDICAL CENTER | Age: 62
End: 2022-07-15
Payer: COMMERCIAL

## 2022-07-15 VITALS
SYSTOLIC BLOOD PRESSURE: 120 MMHG | BODY MASS INDEX: 39.34 KG/M2 | OXYGEN SATURATION: 97 % | WEIGHT: 281 LBS | TEMPERATURE: 98.6 F | DIASTOLIC BLOOD PRESSURE: 70 MMHG | HEART RATE: 81 BPM | HEIGHT: 71 IN | RESPIRATION RATE: 12 BRPM

## 2022-07-15 DIAGNOSIS — R06.02 SOB (SHORTNESS OF BREATH): ICD-10-CM

## 2022-07-15 DIAGNOSIS — E66.09 CLASS 2 OBESITY DUE TO EXCESS CALORIES WITHOUT SERIOUS COMORBIDITY WITH BODY MASS INDEX (BMI) OF 39.0 TO 39.9 IN ADULT: ICD-10-CM

## 2022-07-15 DIAGNOSIS — G47.33 OBSTRUCTIVE SLEEP APNEA: Primary | ICD-10-CM

## 2022-07-15 PROCEDURE — 3074F SYST BP LT 130 MM HG: CPT | Performed by: INTERNAL MEDICINE

## 2022-07-15 PROCEDURE — 3078F DIAST BP <80 MM HG: CPT | Performed by: INTERNAL MEDICINE

## 2022-07-15 PROCEDURE — 99214 OFFICE O/P EST MOD 30 MIN: CPT | Performed by: INTERNAL MEDICINE

## 2022-07-15 NOTE — PATIENT INSTRUCTIONS
Ask [facility] about the AirFit F30 mask you would need a medium size one    This is considered a full face mask

## 2022-07-23 LAB
DME PARACHUTE DELIVERY DATE REQUESTED: NORMAL
DME PARACHUTE ITEM DESCRIPTION: NORMAL
DME PARACHUTE ITEM DESCRIPTION: NORMAL
DME PARACHUTE ORDER STATUS: NORMAL
DME PARACHUTE SUPPLIER NAME: NORMAL
DME PARACHUTE SUPPLIER PHONE: NORMAL

## 2022-07-24 NOTE — ASSESSMENT & PLAN NOTE
Severe obstructive sleep apnea with good compliance to CPAP therapy  He has a Resmed Airview CPAP machine which was issued in May of 2017  He is eligible for new CPAP machine  Presently he is on auto CPAP with pressure range of 9-12 cm water  I reviewed compliance data for past 1 month  He is compliant using his CPAP every night and his average CPAP pressure was 11 cm water  This resulted in AHI of 1 6 which is good  I will place order for new auto CPAP machine for him with same pressure range of 9-12 cm water    We did go over different mask and I did show him the AirFit F30 mask which he may want to try place of his AirFit F10 fullface mask

## 2022-07-24 NOTE — PROGRESS NOTES
Assessment/Plan        Problem List Items Addressed This Visit        Respiratory    Obstructive sleep apnea - Primary     Severe obstructive sleep apnea with good compliance to CPAP therapy  He has a Resmed Airview CPAP machine which was issued in May of 2017  He is eligible for new CPAP machine  Presently he is on auto CPAP with pressure range of 9-12 cm water  I reviewed compliance data for past 1 month  He is compliant using his CPAP every night and his average CPAP pressure was 11 cm water  This resulted in AHI of 1 6 which is good  I will place order for new auto CPAP machine for him with same pressure range of 9-12 cm water  We did go over different mask and I did show him the AirFit F30 mask which he may want to try place of his AirFit F10 fullface mask         Relevant Orders    CPAP Auto New DME       Other    Class 2 obesity due to excess calories without serious comorbidity with body mass index (BMI) of 39 0 to 39 9 in adult     I did encourage Ant Mckinley to try to watch his carbohydrate intake and  try to lose weight  SOB (shortness of breath)     Does have occasional shortness of breath with activity  Suspect this is due to his being overweight  He did have complete PFT done July 1, 2021  He quit smoking 2018 but did smoke 1/2 pack of cigarettes per day for 37 years  I reviewed PFT with him  Lung volumes were normal   FEV1 was 3 28 L or 84% of predicted  Residual volume and total lung capacity were normal   Diffusion capacity was only minimally decreased at 77% when corrected for alveolar volume was normal at 88% of predicted  No significant change after bronchodilator    Does have albuterol inhaler as he does get occasional wheeze                 Follow-up (Yearly)      HPI     Ant Mckinley presents for follow-up of his severe BING  He is on auto CPAP set at pressure range of 9-12 cm water  DME is [facility]   He was eligible for new machine as of 05/05/2022    He has been benefiting and compliant with using CPAP  Does use medium size AirFit F10 fullface mask    Diagnostic sleep study done back in March of 2015 showed overall AHI of 101 4 with oxygen jodi of 80% consistent with severe BING  Does have some mild seasonal allergies for which she will use over-the-counter antihistamine steroid nasal spray as needed  Also has history coronary disease and an November 2017 had 2 drug-eluting stents  Also has history of hypertension  He is a   Does working back [place]  He did smoke in 2018 and smoked 1/2 pack of cigarettes per day for 37 years  Does have some mild exertional shortness of breath  No chest pain  Does have albuterol inhaler he can use periodically as needed  Past Medical History:   Diagnosis Date    Anemia     Bundle branch block, right     CAD (coronary artery disease)     Cataract     CPAP (continuous positive airway pressure) dependence     Diabetes mellitus (HCC)     borderline, controlled with diet and activity    Diverticulitis     GERD (gastroesophageal reflux disease)     History of coronary artery stent placement- pCx and OM2 11/24/2017    Hyperlipidemia     Nasal septal deformity     Neuropathy     Obesity (BMI 30-39  9)     Sleep apnea     wears c-pap    Vitamin D deficiency        Past Surgical History:   Procedure Laterality Date    COLON SIGMOID RESECTION N/A 12/16/2016    Procedure: RESECTION COLON SIGMOID;  Surgeon: Torres Collins MD;  [facility];  Service:     COLON SIGMOID RESECTION LAPAROSCOPIC N/A 12/16/2016    Procedure: RESECTION COLON SIGMOID LAPAROSCOPIC:CONVERTED TO [email or web address];  Surgeon: Torres Collins MD;  [facility];  Service:     COLON SURGERY      Sigmoidectomy    COLONOSCOPY N/A 10/6/2016    Procedure: COLONOSCOPY;  Surgeon: Glenn Melgar MD;  Location[facility];   Service:     CYSTOSCOPY W/ RETROGRADES Left 2/3/2017    Procedure: CYSTOSCOPY WITH BILATERAL RETROGRADES, LEFT STENT REMOVAL;  Surgeon: Jesusita Monroe MD;  Location: [address] [place] [address];  Service:     ESOPHAGOGASTRODUODENOSCOPY N/A 10/6/2016    Procedure: ESOPHAGOGASTRODUODENOSCOPY (EGD); Surgeon: Carlo Eid MD;  Location: [place] [address] GI LAB; Service:     HERNIA REPAIR      KNEE ARTHROSCOPY W/ MENISCECTOMY      NC CYSTOURETHROSCOPY,URETER CATHETER Left 12/4/2016    Procedure: CYSTOSCOPY RETROGRADE PYELOGRAM WITH INSERTION STENT URETERAL;  Surgeon: Jesusita Monroe MD;  Location: [address] [place] [address];  Service: Urology    US GUIDED THYROID BIOPSY  9/27/2021    VARICOSE VEIN SURGERY           Current Outpatient Medications:     albuterol (PROVENTIL HFA,VENTOLIN HFA) 90 mcg/act inhaler, Inhale 2 puffs every 4 (four) hours as needed for wheezing or shortness of breath, Disp: 18 g, Rfl: 6    Alpha-Lipoic Acid 100 MG CAPS, Take by mouth, Disp: , Rfl:     Ascorbic Acid (VITAMIN C) 100 MG tablet, Take 500 mg by mouth , Disp: , Rfl:     aspirin 81 MG tablet, Take 162 mg by mouth daily  , Disp: , Rfl:     atorvastatin (LIPITOR) 40 mg tablet, TAKE 1 TABLET BY MOUTH ONCE DAILY WITH SUPPER, Disp: 90 tablet, Rfl: 1    BIOTIN PO, Take 5 mg by mouth daily, Disp: , Rfl:     Cholecalciferol (VITAMIN D3) 1000 units CAPS, Take by mouth daily , Disp: , Rfl:     ciclopirox (LOPROX) 0 77 % cream, APPLY CREAM TOPICALLY TWICE DAILY   GENTLY MASSAGE INTO AFFECTED AREAS AND SURROUNDING SKIN, Disp: , Rfl:     cyanocobalamin (VITAMIN B-12) 100 mcg tablet, Take by mouth daily, Disp: , Rfl:     desloratadine (CLARINEX) 5 MG tablet, TAKE 1 TABLET BY MOUTH AT BEDTIME, Disp: 90 tablet, Rfl: 0    famotidine (PEPCID) 20 mg tablet, Take 20 mg by mouth daily, Disp: , Rfl:     FREESTYLE LITE test strip, USE 1 STRIP TO CHECK GLUCOSE ONCE DAILY AS DIRECTED, Disp: 100 each, Rfl: 0    gabapentin (NEURONTIN) 100 mg capsule, TAKE 1 CAPSULE BY MOUTH IN THE MORNING AND 3 AT BEDTIME, Disp: 360 capsule, Rfl: 1    glucosamine-chondroitin 500-400 MG tablet, Take 1 tablet by mouth daily, Disp: , Rfl:     hydrochlorothiazide (HYDRODIURIL) 25 mg tablet, Take 1 tablet by mouth once daily, Disp: 90 tablet, Rfl: 3    losartan (COZAAR) 50 mg tablet, Take 1 tablet by mouth once daily, Disp: 90 tablet, Rfl: 0    Magnesium Hydroxide (MAGNESIA PO), Take by mouth 2 (two) times a day, Disp: , Rfl:     metFORMIN (GLUCOPHAGE) 1000 MG tablet, TAKE 1 TABLET BY MOUTH TWICE DAILY WITH MEALS, Disp: 180 tablet, Rfl: 0    Multiple Vitamin (MULTIVITAMIN) capsule, Take 1 capsule by mouth daily, Disp: , Rfl:     nitroglycerin (NITROSTAT) 0 3 mg SL tablet, Place 1 tablet (0 3 mg total) under the tongue every 5 (five) minutes as needed for chest pain, Disp: 25 tablet, Rfl: 1    ONETOUCH DELICA LANCETS 44T MISC, 1 Units by Does not apply route daily, Disp: 100 each, Rfl: 6    pramipexole (MIRAPEX) 0 125 mg tablet, Take 1 tablet (0 125 mg total) by mouth in the morning AND 2 tablets (0 25 mg total) daily at bedtime  , Disp: 90 tablet, Rfl: 3    Semaglutide (Rybelsus) 3 MG TABS, Take 3 mg by mouth in the morning, Disp: 90 tablet, Rfl: 3    torsemide (DEMADEX) 10 mg tablet, TAKE 1 TABLET BY MOUTH ONCE DAILY (Patient taking differently: Take by mouth as needed), Disp: 90 tablet, Rfl: 3    Current Facility-Administered Medications:     cyanocobalamin injection 1,000 mcg, 1,000 mcg, Intramuscular, Q30 Days, [facility], 1,000 mcg at 09/10/21 1141    Allergies   Allergen Reactions    Levaquin [Levofloxacin In D5w] Swelling     Knee swelling    Sulfa Antibiotics GI Intolerance     Abdominal pain         Social History     Tobacco Use    Smoking status: Former Smoker     Packs/day: 0 50     Years: 37 00     Pack years: 18 50     Types: Cigarettes     Quit date: 3/30/2018     Years since quittin 3    Smokeless tobacco: Current User     Types: Chew    Tobacco comment: chewing nicotine   Substance Use Topics    Alcohol use:  Yes     Alcohol/week: 3 0 standard drinks     Types: 3 Cans of beer per week         Family History   Problem Relation Age of Onset    Diabetes Brother     Osteoarthritis Mother     Atrial fibrillation Mother     Aortic aneurysm Father     Colon cancer Brother        Review of Systems   Constitutional: Negative for chills, fever and unexpected weight change  HENT: Negative for congestion, rhinorrhea and sore throat  Eyes: Negative for discharge and redness  Respiratory:        Has some mild shortness of breath at times with exertion   Cardiovascular: Negative for chest pain, palpitations and leg swelling  Gastrointestinal: Negative for abdominal distention, abdominal pain and nausea  Endocrine: Negative for polydipsia and polyphagia  Genitourinary: Negative for dysuria  Musculoskeletal: Negative for joint swelling and myalgias  Skin: Negative for rash  Neurological: Negative for light-headedness  Psychiatric/Behavioral: Negative for decreased concentration  Vitals:    07/15/22 0808   BP: 120/70   Pulse: 81   Resp: 12   Temp: 98 6 °F (37 °C)   SpO2: 97%       Height 5 ft 11 in tall weight 281 lb BMI 39 19    Physical Exam  Vitals reviewed  Constitutional:       General: He is not in acute distress  Appearance: Normal appearance  He is well-developed  He is obese  HENT:      Head: Normocephalic  Right Ear: External ear normal       Left Ear: External ear normal       Nose: Nose normal       Mouth/Throat:      Mouth: Mucous membranes are moist       Pharynx: Oropharynx is clear  No oropharyngeal exudate  Comments: Mallampati score is 3  Eyes:      Conjunctiva/sclera: Conjunctivae normal       Pupils: Pupils are equal, round, and reactive to light  Cardiovascular:      Rate and Rhythm: Normal rate and regular rhythm  Heart sounds: Normal heart sounds  Pulmonary:      Effort: Pulmonary effort is normal       Comments: Lung sounds are clear    No wheezes, crackles or rhonchi  Abdominal: General: There is no distension  Palpations: Abdomen is soft  Tenderness: There is no abdominal tenderness  Musculoskeletal:      Cervical back: Neck supple  Comments: No edema, cyanosis or clubbing   Lymphadenopathy:      Cervical: No cervical adenopathy  Skin:     General: Skin is warm and dry  Neurological:      General: No focal deficit present  Mental Status: He is alert and oriented to person, place, and time     Psychiatric:         Mood and Affect: Mood normal          Behavior: Behavior normal

## 2022-07-24 NOTE — ASSESSMENT & PLAN NOTE
Does have occasional shortness of breath with activity  Suspect this is due to his being overweight  He did have complete PFT done July 1, 2021  He quit smoking 2018 but did smoke 1/2 pack of cigarettes per day for 37 years  I reviewed PFT with him  Lung volumes were normal   FEV1 was 3 28 L or 84% of predicted  Residual volume and total lung capacity were normal   Diffusion capacity was only minimally decreased at 77% when corrected for alveolar volume was normal at 88% of predicted    No significant change after bronchodilator    Does have albuterol inhaler as he does get occasional wheeze

## 2022-07-26 ENCOUNTER — TELEPHONE (OUTPATIENT)
Dept: PULMONOLOGY | Facility: MEDICAL CENTER | Age: 62
End: 2022-07-26

## 2022-07-26 NOTE — PROGRESS NOTES
Cpap order processed through [place] to [facility]   Patient is scheduled for set up 8/9/22 in the [facility]

## 2022-08-08 LAB
DME PARACHUTE DELIVERY DATE EXPECTED: NORMAL
DME PARACHUTE DELIVERY DATE REQUESTED: NORMAL
DME PARACHUTE DELIVERY NOTE: NORMAL
DME PARACHUTE ITEM DESCRIPTION: NORMAL
DME PARACHUTE ORDER STATUS: NORMAL
DME PARACHUTE SUPPLIER NAME: NORMAL
DME PARACHUTE SUPPLIER PHONE: NORMAL

## 2022-08-10 DIAGNOSIS — J30.1 SEASONAL ALLERGIC RHINITIS DUE TO POLLEN: ICD-10-CM

## 2022-08-11 RX ORDER — DESLORATADINE 5 MG/1
TABLET ORAL
Qty: 90 TABLET | Refills: 0 | Status: SHIPPED | OUTPATIENT
Start: 2022-08-11

## 2022-08-19 ENCOUNTER — FOLLOW UP (OUTPATIENT)
Dept: URBAN - METROPOLITAN AREA CLINIC 6 | Facility: CLINIC | Age: 62
End: 2022-08-19

## 2022-08-19 DIAGNOSIS — H25.813: ICD-10-CM

## 2022-08-19 DIAGNOSIS — E11.9: ICD-10-CM

## 2022-08-19 PROCEDURE — 92014 COMPRE OPH EXAM EST PT 1/>: CPT

## 2022-08-19 ASSESSMENT — VISUAL ACUITY
OS_CC: 20/25-2
OU_CC: J1+
OD_CC: 20/25-2

## 2022-08-19 ASSESSMENT — TONOMETRY
OS_IOP_MMHG: 16
OD_IOP_MMHG: 18

## 2022-08-26 ENCOUNTER — OFFICE VISIT (OUTPATIENT)
Dept: CARDIOLOGY CLINIC | Facility: CLINIC | Age: 62
End: 2022-08-26
Payer: COMMERCIAL

## 2022-08-26 VITALS
BODY MASS INDEX: 39.06 KG/M2 | HEIGHT: 71 IN | WEIGHT: 279 LBS | DIASTOLIC BLOOD PRESSURE: 60 MMHG | SYSTOLIC BLOOD PRESSURE: 120 MMHG | OXYGEN SATURATION: 96 % | HEART RATE: 51 BPM | TEMPERATURE: 98.2 F

## 2022-08-26 DIAGNOSIS — E11.9 TYPE 2 DIABETES MELLITUS WITHOUT COMPLICATION, WITHOUT LONG-TERM CURRENT USE OF INSULIN (HCC): ICD-10-CM

## 2022-08-26 DIAGNOSIS — I25.10 CORONARY ARTERY DISEASE INVOLVING NATIVE CORONARY ARTERY OF NATIVE HEART WITHOUT ANGINA PECTORIS: ICD-10-CM

## 2022-08-26 DIAGNOSIS — I10 ESSENTIAL HYPERTENSION: ICD-10-CM

## 2022-08-26 DIAGNOSIS — E78.2 MIXED HYPERLIPIDEMIA: ICD-10-CM

## 2022-08-26 DIAGNOSIS — I50.32 CHRONIC DIASTOLIC CONGESTIVE HEART FAILURE (HCC): Primary | ICD-10-CM

## 2022-08-26 DIAGNOSIS — I45.10 BUNDLE BRANCH BLOCK, RIGHT: ICD-10-CM

## 2022-08-26 PROCEDURE — 99214 OFFICE O/P EST MOD 30 MIN: CPT | Performed by: INTERNAL MEDICINE

## 2022-08-26 PROCEDURE — 3078F DIAST BP <80 MM HG: CPT | Performed by: INTERNAL MEDICINE

## 2022-08-26 PROCEDURE — 3074F SYST BP LT 130 MM HG: CPT | Performed by: INTERNAL MEDICINE

## 2022-08-26 PROCEDURE — 93000 ELECTROCARDIOGRAM COMPLETE: CPT | Performed by: INTERNAL MEDICINE

## 2022-09-09 ENCOUNTER — HOSPITAL ENCOUNTER (OUTPATIENT)
Dept: RADIOLOGY | Facility: HOSPITAL | Age: 62
Discharge: HOME | End: 2022-09-09
Attending: INTERNAL MEDICINE
Payer: COMMERCIAL

## 2022-09-09 DIAGNOSIS — E04.1 THYROID NODULE: ICD-10-CM

## 2022-09-09 PROCEDURE — 76536 US EXAM OF HEAD AND NECK: CPT

## 2022-09-15 PROCEDURE — 4010F ACE/ARB THERAPY RXD/TAKEN: CPT | Performed by: INTERNAL MEDICINE

## 2022-09-20 DIAGNOSIS — E04.1 THYROID NODULE: Primary | ICD-10-CM

## 2022-09-23 DIAGNOSIS — E11.9 TYPE 2 DIABETES MELLITUS WITHOUT COMPLICATION, WITHOUT LONG-TERM CURRENT USE OF INSULIN (HCC): ICD-10-CM

## 2022-10-07 ENCOUNTER — TELEPHONE (OUTPATIENT)
Dept: NEUROLOGY | Facility: CLINIC | Age: 62
End: 2022-10-07

## 2022-10-14 ENCOUNTER — OFFICE VISIT (OUTPATIENT)
Dept: NEUROLOGY | Facility: CLINIC | Age: 62
End: 2022-10-14
Payer: COMMERCIAL

## 2022-10-14 ENCOUNTER — RA CDI HCC (OUTPATIENT)
Dept: OTHER | Facility: HOSPITAL | Age: 62
End: 2022-10-14

## 2022-10-14 VITALS
OXYGEN SATURATION: 97 % | HEIGHT: 71 IN | DIASTOLIC BLOOD PRESSURE: 71 MMHG | BODY MASS INDEX: 39.76 KG/M2 | HEART RATE: 86 BPM | WEIGHT: 284 LBS | TEMPERATURE: 96.4 F | SYSTOLIC BLOOD PRESSURE: 114 MMHG

## 2022-10-14 DIAGNOSIS — G25.81 RESTLESS LEG SYNDROME: Primary | ICD-10-CM

## 2022-10-14 DIAGNOSIS — E11.40 NEUROPATHY DUE TO TYPE 2 DIABETES MELLITUS (HCC): ICD-10-CM

## 2022-10-14 PROCEDURE — 99214 OFFICE O/P EST MOD 30 MIN: CPT | Performed by: NURSE PRACTITIONER

## 2022-10-14 RX ORDER — GABAPENTIN 100 MG/1
CAPSULE ORAL
Qty: 360 CAPSULE | Refills: 1 | Status: SHIPPED | OUTPATIENT
Start: 2022-10-14

## 2022-10-14 RX ORDER — PRAMIPEXOLE DIHYDROCHLORIDE 0.5 MG/1
0.5 TABLET ORAL
Qty: 30 TABLET | Refills: 4 | Status: SHIPPED | OUTPATIENT
Start: 2022-10-14

## 2022-10-14 NOTE — PROGRESS NOTES
[address]  coding opportunities          Chart Reviewed number of suggestions sent to Provider: 4     Patients Insurance        Commercial Insurance: [place] 93     [id number]  [id number]  [id number]  [id number]

## 2022-10-14 NOTE — PROGRESS NOTES
Patient ID: Zack Ayala is a 63 y o  male  Assessment/Plan:     Diagnoses and all orders for this visit:    Restless leg syndrome  -     pramipexole (MIRAPEX) 0 5 mg tablet; Take 1 tablet (0 5 mg total) by mouth daily at bedtime    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)  -     gabapentin (NEURONTIN) 100 mg capsule; TAKE 1 CAPSULE BY MOUTH IN THE MORNING AND 3 AT BEDTIME       Continue with gabapentin 100mg in the am, then 300mg at bedtime  Increase pramipexole 0 5mg daily at bedtime  Increase exercise and activity to the lower extremities  Can get resistance band exercises to increase LE strength  Good oral Hydration  Continue to follow up with Endocrine and Nutrition/Dietician re: Blood Sugar management  Follow up with Neurology office in 5 months  Subjective/HPI:  Zack Ayala is a 65yo male who follow with OP Neurology office for medical management of diabetic neuropathy  Patient has a history of diabetes with last A1c of 6 57  He has been working to maintain good control with diet and exercise as well as on the Bell's this  Patient is also vitamin-D and B12 deficient, he gets IM injections monthly  He has like g suspected related to medication changes and magnesium levels for which he takes daily magnesium  MRI of the lumbar spine completed showing multilevel degenerative disc disease, focal disc protrusion at L3-4 with possible impingement on L4 descending nerve root  Has T11 compression fractures with mild kyphosis  Bone density testing was normal  EMG of the upper extremities with evidence of median nerve entrapment consistent with carpal tunnel syndrome, left ulnar neuropathy across the elbow  Patient had been following with Orthopedics receiving bilateral injections which he reports has been effective  Patient also has disruptive sleep apnea for which she see sleep medicine and was recommended for CPAP  He also advises restless leg syndrome    Patient has had oliver shooting pains over the lower extremities however over time seems to be having some improvements  He takes gabapentin 100 mg in the a m , 300 mg at bedtime and has been able to tolerate this well  Patient has been taking pramipexole 0 25 mg at bedtime, for restless leg due to reports of creepy crawly sensations and movements to his lower extremities while he is resting and trying to sleep  Patient does have some concerns with regards to taking medications in the daytime 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  The patient requires to be more awake and alert  He has increased muscle twitching and cramping in his legs for which he started on magnesium  He has been taking 250 mg twice a day  Last Mag level was 1 5  The patient returns to Neurology office today, he continues to have issues with neuropathy however feels as though this is much improved on the gabapentin dosing  Notes that the burning sensations in his legs are less noticeable  He has more issues with shaking and creeping crawling sensations in his legs  Patient states that he finds this to be more disruptive in the a m   States that his legs will be moving and uncomfortable when he wakes up, before he even gets out of bed  Patient has not trialed the Mirapex 0 125 mg in the a m  Dosing, reported he for bite he was going to be trialing that  He is concerned with regards to fatigue after this dosing but is uncomfortable with the increased movements kicking that he has in the morning  Discussed with him the possibility his bedtime dosing not being strong enough, we will increase him to 0 5 mg at bedtime  Patient states he comes home from work and eats dinner around 8:39 a m , often times he is in bed by 930 which is about when he takes his medication    Encourage patient to increase the dosing to 0 5 mg, if he remains fatigued or lethargic in the morning when he wakes, he can take the medications slightly earlier perhaps 5 or 6:00 p m  Before he loses office  Patient can trialed the increased dose at bedtime this weekend when he is not working in the [employer] Patient will update Neurology office if he has any discomfort or concerns with this dosing  Patient advised if the dosing is too much, he can break it in half and take half at bedtime and try the other half in the [employer] Patient will contact Neurology office if he has any questions or concerns regards to this  Patient has previously indicated some issues with tremor as well  Notes that he will get them in his hands at times however 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  He has been having increased shaking in his legs as well  Patient states when he was younger he has to do some work on his knees, when he got up from kneeling, his legs with tremor and shake  States that this sensation comes back at this point time however is usually when he is sitting and holding his legs together  Patient states that he is relaxed he has no tremors or shakes noted  The longer he holds his legs together, the more intense the shaking gets  When he relaxes them the shaking goes away  Patient is also diabetic, he has been uncontrolled with his last A1c in July being 8 2  Patient is working with his endocrinologist to get better control although reports that his is likely higher at this point time is his blood sugars have been unstable  Discussed with patient diabetes and its effects on his neuropathy, advise this will likely cause an increase in neuropathic sensations until it is better controlled  With regards to the shaking, suspect patient's issues are related to muscle weakness  This is generally only happening when he is afsaneh his muscles for extended period of time  Will take a few seconds before his muscle started to quiver while his muscles are tense  Shaking does stop when he relaxes them    Encourage patient to use a resistance band, look up lower extremity exercises to improve his quad strength  He can use it resistance been any time during the day  Encouraged him to continue working on good oral hydration and blood sugar control  Patient will continue on gabapentin 100 mg in the a m , 300 mg at bedtime  He will increase his bedtime dose of pramipexole to 0 5 mg nightly  Patient will adjust administration time if he should be too groggy in the morning after taking this dose  Patient encouraged to continue working with Endocrine and dietitian to obtain better management of his blood sugars to decrease his A1c  Encouraged to do home exercises to improve strength to the lower extremities  Patient will follow-up in the outpatient neurology office in 5 months or sooner if needed  The following portions of the patient's history were reviewed and updated as appropriate: allergies, current medications, past family history, past medical history, past social history, past surgical history and problem list         Past Medical History:   Diagnosis Date   • Anemia    • Bundle branch block, right    • CAD (coronary artery disease)    • Cataract    • CPAP (continuous positive airway pressure) dependence    • Diabetes mellitus (HCC)     borderline, controlled with diet and activity   • Diverticulitis    • GERD (gastroesophageal reflux disease)    • History of coronary artery stent placement- pCx and OM2 11/24/2017   • Hyperlipidemia    • Nasal septal deformity    • Neuropathy    • Obesity (BMI 30-39  9)    • Sleep apnea     wears c-pap   • Vitamin D deficiency        Past Surgical History:   Procedure Laterality Date   • COLON SIGMOID RESECTION N/A 12/16/2016    Procedure: RESECTION COLON SIGMOID;  Surgeon: Mickey Benitez MD;  Location: [facility];  Service:    • COLON SIGMOID RESECTION LAPAROSCOPIC N/A 12/16/2016    Procedure: RESECTION COLON SIGMOID LAPAROSCOPIC:CONVERTED TO [employer];  Surgeon: Mickey Benitez MD; Location: [facility];  Service:    • COLON SURGERY      Sigmoidectomy   • COLONOSCOPY N/A 10/6/2016    Procedure: COLONOSCOPY;  Surgeon: Henretta Riedel, MD;  Location: [facility] GI LAB; Service:    • CYSTOSCOPY W/ RETROGRADES Left 2/3/2017    Procedure: CYSTOSCOPY WITH BILATERAL RETROGRADES, LEFT STENT REMOVAL;  Surgeon: Scooter Nieves MD;  Location: [address] [place] [address];  Service:    • ESOPHAGOGASTRODUODENOSCOPY N/A 10/6/2016    Procedure: ESOPHAGOGASTRODUODENOSCOPY (EGD); Surgeon: Henretta Riedel, MD;  Location: [facility] GI LAB; Service:    • HERNIA REPAIR     • KNEE ARTHROSCOPY W/ MENISCECTOMY     • KS CYSTOURETHROSCOPY,URETER CATHETER Left 2016    Procedure: CYSTOSCOPY RETROGRADE PYELOGRAM WITH INSERTION STENT URETERAL;  Surgeon: Scooter Nieves MD;  Location: [address] [place] [address];  Service: Urology   • US GUIDED THYROID BIOPSY  2021   • VARICOSE VEIN SURGERY         Social History     Socioeconomic History   • Marital status: Single     Spouse name: None   • Number of children: None   • Years of education: None   • Highest education level: None   Occupational History   • None   Tobacco Use   • Smoking status: Former Smoker     Packs/day: 0 50     Years: 37 00     Pack years: 18 50     Types: Cigarettes     Quit date: 3/30/2018     Years since quittin 5   • Smokeless tobacco: Current User     Types: Chew   • Tobacco comment: chewing nicotine   Vaping Use   • Vaping Use: Never used   Substance and Sexual Activity   • Alcohol use: Yes     Alcohol/week: 3 0 standard drinks     Types: 3 Cans of beer per week   • Drug use: No   • Sexual activity: Never   Other Topics Concern   • None   Social History Narrative    Lives alone       Social Determinants of Health     Financial Resource Strain: Not on file   Food Insecurity: Not on file   Transportation Needs: Not on file   Physical Activity: Not on file   Stress: Not on file   Social Connections: Not on file   Intimate Partner Violence: Not on file   Housing Stability: Not on file       Family History   Problem Relation Age of Onset   • Diabetes Brother    • Osteoarthritis Mother    • Atrial fibrillation Mother    • Aortic aneurysm Father    • Colon cancer Brother          Current Outpatient Medications:   •  albuterol (PROVENTIL HFA,VENTOLIN HFA) 90 mcg/act inhaler, Inhale 2 puffs every 4 (four) hours as needed for wheezing or shortness of breath, Disp: 18 g, Rfl: 6  •  Alpha-Lipoic Acid 100 MG CAPS, Take by mouth, Disp: , Rfl:   •  Ascorbic Acid (VITAMIN C) 100 MG tablet, Take 500 mg by mouth , Disp: , Rfl:   •  aspirin 81 MG tablet, Take 162 mg by mouth daily  , Disp: , Rfl:   •  atorvastatin (LIPITOR) 40 mg tablet, TAKE 1 TABLET BY MOUTH ONCE DAILY WITH SUPPER, Disp: 90 tablet, Rfl: 1  •  BIOTIN PO, Take 5 mg by mouth daily, Disp: , Rfl:   •  Cholecalciferol (VITAMIN D3) 1000 units CAPS, Take by mouth daily , Disp: , Rfl:   •  ciclopirox (LOPROX) 0 77 % cream, APPLY CREAM TOPICALLY TWICE DAILY   GENTLY MASSAGE INTO AFFECTED AREAS AND SURROUNDING SKIN, Disp: , Rfl:   •  cyanocobalamin (VITAMIN B-12) 100 mcg tablet, Take by mouth daily, Disp: , Rfl:   •  desloratadine (CLARINEX) 5 MG tablet, TAKE 1 TABLET BY MOUTH AT BEDTIME, Disp: 90 tablet, Rfl: 0  •  famotidine (PEPCID) 20 mg tablet, Take 20 mg by mouth daily, Disp: , Rfl:   •  FREESTYLE LITE test strip, USE 1 STRIP TO CHECK GLUCOSE ONCE DAILY AS DIRECTED, Disp: 100 each, Rfl: 0  •  gabapentin (NEURONTIN) 100 mg capsule, TAKE 1 CAPSULE BY MOUTH IN THE MORNING AND 3 AT BEDTIME, Disp: 360 capsule, Rfl: 1  •  glucosamine-chondroitin 500-400 MG tablet, Take 1 tablet by mouth daily, Disp: , Rfl:   •  hydrochlorothiazide (HYDRODIURIL) 25 mg tablet, Take 1 tablet by mouth once daily, Disp: 90 tablet, Rfl: 3  •  losartan (COZAAR) 50 mg tablet, Take 1 tablet by mouth once daily, Disp: 90 tablet, Rfl: 0  •  Magnesium Hydroxide (MAGNESIA PO), Take by mouth 2 (two) times a day, Disp: , Rfl:   •  metFORMIN (GLUCOPHAGE) 1000 MG tablet, TAKE 1 TABLET BY MOUTH TWICE DAILY WITH MEALS, Disp: 180 tablet, Rfl: 1  •  Multiple Vitamin (MULTIVITAMIN) capsule, Take 1 capsule by mouth daily, Disp: , Rfl:   •  nitroglycerin (NITROSTAT) 0 3 mg SL tablet, Place 1 tablet (0 3 mg total) under the tongue every 5 (five) minutes as needed for chest pain, Disp: 25 tablet, Rfl: 1  •  ONETOUCH DELICA LANCETS 06U MISC, 1 Units by Does not apply route daily, Disp: 100 each, Rfl: 6  •  pramipexole (MIRAPEX) 0 5 mg tablet, Take 1 tablet (0 5 mg total) by mouth daily at bedtime, Disp: 30 tablet, Rfl: 4  •  Semaglutide (Rybelsus) 3 MG TABS, Take 3 mg by mouth in the morning, Disp: 90 tablet, Rfl: 3  •  torsemide (DEMADEX) 10 mg tablet, TAKE 1 TABLET BY MOUTH ONCE DAILY (Patient taking differently: Take by mouth as needed), Disp: 90 tablet, Rfl: 3    Current Facility-Administered Medications:   •  cyanocobalamin injection 1,000 mcg, 1,000 mcg, Intramuscular, Q30 Days, LUCERO Albarran, 1,000 mcg at 09/10/21 1141    Allergies   Allergen Reactions   • Levaquin [Levofloxacin In D5w] Swelling     Knee swelling   • Sulfa Antibiotics GI Intolerance     Abdominal pain          Blood pressure 114/71, pulse 86, temperature (!) 96 4 °F (35 8 °C), temperature source Tympanic, height 5' 11" (1 803 m), weight 129 kg (284 lb), SpO2 97 %  Objective:    Blood pressure 114/71, pulse 86, temperature (!) 96 4 °F (35 8 °C), temperature source Tympanic, height 5' 11" (1 803 m), weight 129 kg (284 lb), SpO2 97 %  Physical Exam  Vitals reviewed  Constitutional:       Appearance: Normal appearance  He is well-developed  HENT:      Head: Normocephalic  Right Ear: Hearing normal       Left Ear: Hearing normal       Nose: Nose normal       Mouth/Throat:      Mouth: Mucous membranes are moist    Eyes:      General: Lids are normal       Extraocular Movements: Extraocular movements intact        Conjunctiva/sclera: Conjunctivae normal       Pupils: Pupils are equal, round, and reactive to light  Cardiovascular:      Rate and Rhythm: Normal rate  Pulmonary:      Effort: Pulmonary effort is normal  No respiratory distress  Abdominal:      Palpations: Abdomen is soft  Tenderness: There is no abdominal tenderness  Musculoskeletal:         General: Normal range of motion  Cervical back: Normal range of motion  Skin:     General: Skin is warm and dry  Neurological:      Mental Status: He is alert  Coordination: Romberg sign negative  Deep Tendon Reflexes: Strength normal    Psychiatric:         Attention and Perception: Attention and perception normal          Mood and Affect: Mood and affect normal          Speech: Speech normal          Behavior: Behavior normal  Behavior is cooperative  Thought Content: Thought content normal          Cognition and Memory: Cognition and memory normal          Judgment: Judgment normal          Neurological Exam  Mental Status  Alert  Oriented to person, place, time and situation  Memory is normal  Recent and remote memory are intact  Speech is normal  Language is fluent with no aphasia  Attention and concentration are normal  Fund of knowledge is appropriate for level of education  Cranial Nerves  CN II: Visual acuity is normal  Visual fields full to confrontation  CN III, IV, VI: Extraocular movements intact bilaterally  Normal lids and orbits bilaterally  Pupils equal round and reactive to light bilaterally  CN V: Facial sensation is normal   CN VII: Full and symmetric facial movement  CN VIII: Hearing is normal   Right: Hearing is normal   Left: Hearing is normal   CN IX, X: Palate elevates symmetrically  Normal gag reflex  CN XI: Shoulder shrug strength is normal   CN XII: Tongue midline without atrophy or fasciculations  Motor  Normal muscle bulk throughout  Normal muscle tone  No abnormal involuntary movements  Strength is 5/5 throughout all four extremities      Sensory  Light touch is normal in upper and lower extremities  Temperature is normal in upper and lower extremities  Vibration is normal in upper and lower extremities  Proprioception is normal in upper and lower extremities  Reflexes  Deep tendon reflexes are 2+ and symmetric except as noted  Right                      Left  Patellar                                Tr                         Tr    Right pathological reflexes: Azul's absent  Left pathological reflexes: Azul's absent  Coordination  Right: Finger-to-nose normal  Rapid alternating movement normal  Heel-to-shin normal Left: Finger-to-nose normal  Rapid alternating movement normal  Heel-to-shin normal     Gait  Casual gait: Narrow stance  Reduced stride length  Reduced right arm swing  Reduced left arm swing  Romberg is absent  Unable to rise from chair without using arms  Patient up and ambulating with a limp    ROS:    Review of Systems   Constitutional: Negative  Negative for appetite change and fever  HENT: Negative  Negative for hearing loss, tinnitus, trouble swallowing and voice change  Eyes: Negative  Negative for photophobia, pain and visual disturbance  Respiratory: Positive for shortness of breath  Cardiovascular: Negative  Negative for palpitations  Gastrointestinal: Negative  Negative for nausea and vomiting  Endocrine: Negative  Negative for cold intolerance  Genitourinary: Negative  Negative for dysuria, frequency and urgency  Musculoskeletal: Positive for gait problem  Negative for myalgias and neck pain  Skin: Negative  Negative for rash  Allergic/Immunologic: Negative  Neurological: Positive for tremors and numbness (BL feet)  Negative for dizziness, seizures, syncope, facial asymmetry, speech difficulty, weakness, light-headedness and headaches  Hematological: Bruises/bleeds easily  Psychiatric/Behavioral: Negative    Negative for confusion, hallucinations and sleep disturbance  ROS reviewed and discussed with patient

## 2022-10-15 LAB
ALBUMIN SERPL-MCNC: 4.6 G/DL (ref 3.8–4.9)
ALBUMIN/GLOB SERPL: 1.6 {RATIO} (ref 1.2–2.2)
ALP SERPL-CCNC: 61 IU/L (ref 44–121)
ALT SERPL-CCNC: 45 IU/L (ref 0–44)
AST SERPL-CCNC: 31 IU/L (ref 0–40)
BILIRUB SERPL-MCNC: 0.4 MG/DL (ref 0–1.2)
BUN SERPL-MCNC: 17 MG/DL (ref 6–24)
BUN/CREAT SERPL: 15 (ref 9–20)
CALCIUM SERPL-MCNC: 9.5 MG/DL (ref 8.7–10.2)
CHLORIDE SERPL-SCNC: 99 MMOL/L (ref 96–106)
CHOLEST SERPL-MCNC: 145 MG/DL (ref 100–199)
CHOLEST/HDLC SERPL: 3.8 RATIO (ref 0–5)
CO2 SERPL-SCNC: 22 MMOL/L (ref 20–29)
CREAT SERPL-MCNC: 1.16 MG/DL (ref 0.76–1.27)
EGFR: 73 ML/MIN/1.73
EST. AVERAGE GLUCOSE BLD GHB EST-MCNC: 177 MG/DL
GLOBULIN SER-MCNC: 2.8 G/DL (ref 1.5–4.5)
GLUCOSE SERPL-MCNC: 186 MG/DL (ref 70–99)
HBA1C MFR BLD: 7.8 % (ref 4.8–5.6)
HDLC SERPL-MCNC: 38 MG/DL
LDLC SERPL CALC-MCNC: 79 MG/DL (ref 0–99)
POTASSIUM SERPL-SCNC: 4.4 MMOL/L (ref 3.5–5.2)
PROT SERPL-MCNC: 7.4 G/DL (ref 6–8.5)
SL AMB VLDL CHOLESTEROL CALC: 28 MG/DL (ref 5–40)
SODIUM SERPL-SCNC: 138 MMOL/L (ref 134–144)
TRIGL SERPL-MCNC: 161 MG/DL (ref 0–149)

## 2022-10-21 ENCOUNTER — OFFICE VISIT (OUTPATIENT)
Dept: INTERNAL MEDICINE CLINIC | Facility: CLINIC | Age: 62
End: 2022-10-21
Payer: COMMERCIAL

## 2022-10-21 VITALS
SYSTOLIC BLOOD PRESSURE: 138 MMHG | OXYGEN SATURATION: 97 % | HEART RATE: 83 BPM | WEIGHT: 281 LBS | BODY MASS INDEX: 39.19 KG/M2 | DIASTOLIC BLOOD PRESSURE: 80 MMHG

## 2022-10-21 DIAGNOSIS — H91.91 HEARING DEFICIT, RIGHT: ICD-10-CM

## 2022-10-21 DIAGNOSIS — Z12.2 SCREENING FOR LUNG CANCER: ICD-10-CM

## 2022-10-21 DIAGNOSIS — K21.9 GASTROESOPHAGEAL REFLUX DISEASE WITHOUT ESOPHAGITIS: Primary | ICD-10-CM

## 2022-10-21 DIAGNOSIS — G47.33 OBSTRUCTIVE SLEEP APNEA: ICD-10-CM

## 2022-10-21 DIAGNOSIS — I10 ESSENTIAL HYPERTENSION: ICD-10-CM

## 2022-10-21 DIAGNOSIS — E11.65 TYPE 2 DIABETES MELLITUS WITH HYPERGLYCEMIA, UNSPECIFIED WHETHER LONG TERM INSULIN USE (HCC): ICD-10-CM

## 2022-10-21 DIAGNOSIS — J30.1 SEASONAL ALLERGIC RHINITIS DUE TO POLLEN: ICD-10-CM

## 2022-10-21 DIAGNOSIS — I25.10 CORONARY ARTERY DISEASE INVOLVING NATIVE CORONARY ARTERY OF NATIVE HEART WITHOUT ANGINA PECTORIS: ICD-10-CM

## 2022-10-21 DIAGNOSIS — Z23 ENCOUNTER FOR IMMUNIZATION: ICD-10-CM

## 2022-10-21 DIAGNOSIS — E11.40 NEUROPATHY DUE TO TYPE 2 DIABETES MELLITUS (HCC): ICD-10-CM

## 2022-10-21 PROCEDURE — 99214 OFFICE O/P EST MOD 30 MIN: CPT | Performed by: INTERNAL MEDICINE

## 2022-10-21 PROCEDURE — 90471 IMMUNIZATION ADMIN: CPT | Performed by: INTERNAL MEDICINE

## 2022-10-21 PROCEDURE — 90682 RIV4 VACC RECOMBINANT DNA IM: CPT | Performed by: INTERNAL MEDICINE

## 2022-10-21 RX ORDER — NITROGLYCERIN 0.3 MG/1
0.3 TABLET SUBLINGUAL
Qty: 25 TABLET | Refills: 1 | Status: SHIPPED | OUTPATIENT
Start: 2022-10-21

## 2022-10-21 NOTE — ASSESSMENT & PLAN NOTE
Patient history of severe sleep apnea and he is comfortable and getting benefits with CPAP therapy  Patient follows pulmonology Dr Edouard Sneed

## 2022-10-21 NOTE — ASSESSMENT & PLAN NOTE
No chest pain or angina equivalent  Patient follows Cardiology  Advised risk factor management including blood pressure, diabetic, weight, lipid control for secondary prevention  Patient is not using nitroglycerin sublingual for over 2 years and would like to refill medication in case if needed    Continue aspirin 81 mg daily

## 2022-10-21 NOTE — ASSESSMENT & PLAN NOTE
Blood pressure reasonably controlled  Currently patient is taking losartan 50 mg daily, HCTZ 25 mg daily  Report patient is not taking torsemide since he does not have significant leg swelling except dependent edema at night    Continue current regimen and recommend lifestyle and dietary modifications

## 2022-10-21 NOTE — ASSESSMENT & PLAN NOTE
Patient reports of hearing impairment in right ear for a few months  Patient reports he is having nasal congestion and seasonal allergies currently  He denies any tinnitus, ear fullness, vertigo  No problem in the left ear  Patient denies any history of audiogram screening in the past   Ear exam:  Benign except some cerumen in both ears  Likely due to seasonal allergies, nasal congestion  Need to rule out underlying hearing problems  Ambulatory referral to audiogram screening

## 2022-10-21 NOTE — PROGRESS NOTES
[facility] Office Visit Note  10/27/22     Reese Cheng 63 y o  male MRN: [id number]  : 1960    Assessment:     1  Gastroesophageal reflux disease without esophagitis  Assessment & Plan:  Symptom free  On pepcid 20 mg qd    Orders:  -     CBC and differential; Future  -     CBC and differential    2  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 2 diabetes mellitus [facility])  Assessment & Plan:    Lab Results   Component Value Date    HGBA1C 7 8 (H) 10/14/2022     Symptoms fairly controlled  Currently on gabapentin 100 mg 1 tablet in the morning and 3 tablets at bedtime  Recommend diabetic diet and tight diabetic controlled  The last Magnesium level was normal   He is taking magnesium supplement  Orders:  -     HEMOGLOBIN A1C W/ EAG ESTIMATION; Future  -     Lipid panel; Future  -     CBC and differential; Future  -     Comprehensive metabolic panel; Future  -     HEMOGLOBIN A1C W/ EAG ESTIMATION  -     Lipid panel  -     CBC and differential  -     Comprehensive metabolic panel    3  Coronary artery disease involving native coronary artery of native heart without angina pectoris  Assessment & Plan:  No chest pain or angina equivalent  Patient follows Cardiology  Advised risk factor management including blood pressure, diabetic, weight, lipid control for secondary prevention  Patient is not using nitroglycerin sublingual for over 2 years and would like to refill medication in case if needed  Continue aspirin 81 mg daily    Orders:  -     nitroglycerin (NITROSTAT) 0 3 mg SL tablet; Place 1 tablet (0 3 mg total) under the tongue every 5 (five) minutes as needed for chest pain  -     HEMOGLOBIN A1C W/ EAG ESTIMATION; Future  -     Lipid panel; Future  -     Comprehensive metabolic panel; Future  -     HEMOGLOBIN A1C W/ EAG ESTIMATION  -     Lipid panel  -     Comprehensive metabolic panel    4   Type 2 diabetes mellitus with hyperglycemia, unspecified whether long term insulin use [facility])  Assessment & Plan:    Lab Results Component Value Date    HGBA1C 7 8 (H) 10/14/2022     12/10/2021:  Hemoglobin A1c 7 2  03/11/2022:  Hemoglobin A1c 6 7  07/02/2022:  Hemoglobin A1c 8 1, urine for microalbumin/creatinine ratio 8    Patient is currently taking metformin 1000 mg b i d  and semaglutide 3 mg oral daily  Could be considered increasing semaglutide  He follows endocrinology  Advised tight glycemic controlled with healthy diet, lifestyle modification  Advised to follow-up with his primary endocrinology for further recommendation  5  Obstructive sleep apnea  Assessment & Plan:  Patient history of severe sleep apnea and he is comfortable and getting benefits with CPAP therapy  Patient follows pulmonology Dr Iwona Ochoa  Orders:  -     CBC and differential; Future  -     CBC and differential    6  Essential hypertension  Assessment & Plan:  Blood pressure reasonably controlled  Currently patient is taking losartan 50 mg daily, HCTZ 25 mg daily  Report patient is not taking torsemide since he does not have significant leg swelling except dependent edema at night  Continue current regimen and recommend lifestyle and dietary modifications    Orders:  -     CBC and differential; Future  -     CBC and differential    7  Screening for lung cancer  -     CT lung screening program; Future; Expected date: 10/21/2022    8  Hearing deficit, right  Assessment & Plan:  Patient reports of hearing impairment in right ear for a few months  Patient reports he is having nasal congestion and seasonal allergies currently  He denies any tinnitus, ear fullness, vertigo  No problem in the left ear  Patient denies any history of audiogram screening in the past   Ear exam:  Benign except some cerumen in both ears  Likely due to seasonal allergies, nasal congestion  Need to rule out underlying hearing problems  Ambulatory referral to audiogram screening  Orders:  -     Audiogram screen; Future    9   Encounter for immunization  - influenza vaccine, quadrivalent, recombinant, PF, 0 5 mL    10  Seasonal allergic rhinitis due to pollen  Assessment & Plan:  Currently having seasonal allergies due to weather changes  Using nasal spray, Clarinex  Currently having nasal congestion and right hearing impairments  Not requiring albuterol  Continue supportive management for allergies  Ambulatory referral to audiogram screening        I personally saw this patient with resident  We reviewed care, documentation, notes assessment and plan  I provided guidance and supervision  I agree with the note and finding  Care plan is done under my direct guidance    BMI Counseling: Body mass index is 39 19 kg/m²  The BMI is above normal  Nutrition recommendations include reducing portion sizes, decreasing overall calorie intake and 3-5 servings of fruits/vegetables daily  Exercise recommendations include exercising 3-5 times per week  Discussion Summary and Plan: Today's care plan and medications were reviewed with patient in detail and all their questions answered to their satisfaction  No chief complaint on file  Subjective:  Donnella Hodgkins is here for follow-up of chronic disease management  Diabetes:  Last 5-10 years, check sugar once a day, seen by optometrist or ophthalmologist formal report not available  Remains on metformin 1000 mg b i d  and semaglutide 3 mg daily  Next appointment for eye exam is in March 2023  Allergic rhinitis:  Reports of more symptomatic these days  He is using saline nasal spray, Clarinex  Currently having nasal congestion and right hearing impairment  Hypertension:  Symptom-free remains on losartan 50 mg daily and HCTZ 25 mg daily  Diabetes:  Diagnosed: 5-10 years  Current Medicine for Diabetes:  Reviewed Med List  Finger stick: Once a day  Hypoglycemia event and intervention: None  Diet: reviewed, not watching diet  Dietary modification was advised    Exercise: None  Comorbidity: see HPI and Assessment/Plan  Eye Exam:  Scheduled in March 2023  Last Foot exam:  Done previously  Last HbA1c hemoglobin A1c 7 8  Patient remains on metformin and rybelsus     Coronary artery disease symptom-free the remains on aspirin losartan , hydrochlorothiazide and Lipitor  [facility] cardiology  Hyperlipidemia symptom-free, lipid profile reviewed, continue statin atorvastatin 40 mg daily  Follow up with lipid panel in next visit  Vitamin-D deficiency remains on 1000 unit    BMI 39 19: He does not do formal exercise  Advised life style modification  Edema of legs fair, remains HCTZ for BP and Torsemide for edema by cardiologist    Colonic polyp colonoscopy January 2021  Colonoscopic findings were reviewed  Recommended colonoscopy in 3 years due to personal history of polyps per GI  GERD: symptom free  He reports acid reflux symptoms improved significantly when he started using CPAP for BING  PSA 11-6-2020: 0 4    Neuropathy fair; tolerating gabapentin  He had a EMG nerve conduction study done in month of February  Remains on gabapentin 100 mg daily and 300 mg at nighttime  Patient was advised to increase pramipexole however currently he continues taking lower dose  He does have a varicose vein and chronic stasis dermatitis  Advised to use compression stockings  Thyroid nodule:  Patient seen by endocrinologist going for thyroid ultrasound for surveillance no compressive symptoms    Diet exercise weight loss management    Annual lung cancer screening with low dose CT chest was ordered today  Flu shot given at the clinic today  He will get COVID bivalent vaccine in 2 weeks  Ambulatory referral to audiogram  Follow up in 3 months with repeat labs               The following portions of the patient's history were reviewed and updated as appropriate: allergies, current medications, past family history, past medical history, past social history, past surgical history and problem list     Review of Systems   Constitutional: Negative for activity change, appetite change, chills, fatigue, fever and unexpected weight change  HENT: Positive for congestion and sinus pressure  Negative for postnasal drip, rhinorrhea, sore throat and voice change  Eyes: Negative for discharge  Respiratory: Negative for cough, shortness of breath and wheezing  Cardiovascular: Negative for chest pain and leg swelling  Gastrointestinal: Negative for nausea and vomiting  Genitourinary: Negative  Musculoskeletal: Negative  Neurological: Negative for dizziness and headaches  Psychiatric/Behavioral: Negative  All other systems reviewed and are negative  Historical Information   Patient Active Problem List   Diagnosis   • Diabetes mellitus ([facility] [address] )   • Hyperlipidemia   • Essential hypertension   • Sleep apnea   • Elevated liver enzymes   • Polyarthralgia   • Hydronephrosis   • Class 2 obesity due to excess calories without serious comorbidity with body mass index (BMI) of 39 0 to 39 9 in adult   • Chronic pain of both knees   • Primary osteoarthritis of knees, bilateral   • CAD (coronary artery disease)   • SOB (shortness of breath)   • Asymptomatic PVCs   • Obstructive sleep apnea   • Obesity (BMI 30-39  9)   • Seasonal allergic rhinitis due to pollen   • GERD (gastroesophageal reflux disease)   • Nasal septal deformity   • Bundle branch block, right   • CPAP (continuous positive airway pressure) dependence   • Vitamin D deficiency   • BPH (benign prostatic hyperplasia)   • History of vertebral compression fracture   • Colon polyp   • History of angioplasty   • Ocular migraine   • Inguinal hernia   • Umbilical hernia   • Bradycardia   • Asymptomatic varicose veins of both lower extremities   • Neuropathy   • 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)   • Carpal tunnel syndrome of left wrist   • Ulnar neuropathy at elbow of left upper extremity   • Onychomycosis of toenail   • Contusion of right knee   • Chronic left-sided low back pain with left-sided sciatica   • Varicose veins of leg with swelling, bilateral   • Thyroid nodule   • B12 deficiency   • Compression fracture of body of thoracic vertebra (HCC)   • Disc disease, degenerative, lumbar or lumbosacral   • History of coronary artery stent placement- pCx and OM2   • Carpal tunnel syndrome on right   • Numbness and tingling in right hand   • Close exposure to COVID-19 virus   • Restless leg syndrome   • Hypertensive heart disease with congestive heart failure (HCC)   • Type 2 diabetes mellitus with hyperglycemia (HCC)   • Hypomagnesemia   • Hearing deficit, right   • Screening for lung cancer   • COVID-19     Past Medical History:   Diagnosis Date   • Anemia    • Bundle branch block, right    • CAD (coronary artery disease)    • Cataract    • CPAP (continuous positive airway pressure) dependence    • Diabetes mellitus (HCC)     borderline, controlled with diet and activity   • Diverticulitis    • GERD (gastroesophageal reflux disease)    • History of coronary artery stent placement- pCx and OM2 11/24/2017   • Hyperlipidemia    • Nasal septal deformity    • Neuropathy    • Obesity (BMI 30-39  9)    • Sleep apnea     wears c-pap   • Vitamin D deficiency      Past Surgical History:   Procedure Laterality Date   • COLON SIGMOID RESECTION N/A 12/16/2016    Procedure: RESECTION COLON SIGMOID;  Surgeon: Caridad Hendrix MD;  Location: [facility];  Service:    • COLON SIGMOID RESECTION LAPAROSCOPIC N/A 12/16/2016    Procedure: RESECTION COLON SIGMOID LAPAROSCOPIC:CONVERTED TO [email or web address];  Surgeon: Caridad Hendrix MD;  Location: [facility];  Service:    • COLON SURGERY      Sigmoidectomy   • COLONOSCOPY N/A 10/6/2016    Procedure: COLONOSCOPY;  Surgeon: Chayo Knight MD;  Location: [place] [address] GI LAB;   Service:    • CYSTOSCOPY W/ RETROGRADES Left 2/3/2017    Procedure: CYSTOSCOPY WITH BILATERAL RETROGRADES, LEFT STENT REMOVAL;  Surgeon: Bita Ruff MD;  Location: [facility] MAIN OR;  Service:    • ESOPHAGOGASTRODUODENOSCOPY N/A 10/6/2016    Procedure: ESOPHAGOGASTRODUODENOSCOPY (EGD); Surgeon: Darcy Favre, MD;  Location: [facility] GI LAB;   Service:    • HERNIA REPAIR     • KNEE ARTHROSCOPY W/ MENISCECTOMY     • [place] CYSTOURETHROSCOPY,URETER CATHETER Left 2016    Procedure: CYSTOSCOPY RETROGRADE PYELOGRAM WITH INSERTION STENT URETERAL;  Surgeon: Gisela Giang MD;  Location: [address] [place] [address];  Service: Urology   • US GUIDED THYROID BIOPSY  2021   • VARICOSE VEIN SURGERY       Social History     Substance and Sexual Activity   Alcohol Use Yes   • Alcohol/week: 3 0 standard drinks   • Types: 3 Cans of beer per week     Social History     Substance and Sexual Activity   Drug Use No     Social History     Tobacco Use   Smoking Status Former Smoker   • Packs/day: 1 00   • Years: 37 00   • Pack years: 37 00   • Types: Cigarettes   • Quit date: 3/30/2018   • Years since quittin 5   Smokeless Tobacco Current User   • Types: Chew   Tobacco Comment    chewing nicotine     Family History   Problem Relation Age of Onset   • Diabetes Brother    • Osteoarthritis Mother    • Atrial fibrillation Mother    • Aortic aneurysm Father    • Colon cancer Brother      Health Maintenance Due   Topic   • HIV Screening    • Annual Physical    • Pneumococcal Vaccine: Pediatrics (0 to 5 Years) and At-Risk Patients (6 to 59 Years) (2 - PCV)   • Lung Cancer Screening    • Diabetic Foot Exam    • Depression Screening       Meds/Allergies       Current Outpatient Medications:   •  nitroglycerin (NITROSTAT) 0 3 mg SL tablet, Place 1 tablet (0 3 mg total) under the tongue every 5 (five) minutes as needed for chest pain, Disp: 25 tablet, Rfl: 1  •  albuterol (PROVENTIL HFA,VENTOLIN HFA) 90 mcg/act inhaler, Inhale 2 puffs every 4 (four) hours as needed for wheezing or shortness of breath, Disp: 18 g, Rfl: 6  •  Alpha-Lipoic Acid 100 MG CAPS, Take by mouth, Disp: , Rfl:   •  Ascorbic Acid (VITAMIN C) 100 MG tablet, Take 500 mg by mouth , Disp: , Rfl:   •  aspirin 81 MG tablet, Take 162 mg by mouth daily  , Disp: , Rfl:   •  BIOTIN PO, Take 5 mg by mouth daily, Disp: , Rfl:   •  Cholecalciferol (VITAMIN D3) 1000 units CAPS, Take by mouth daily , Disp: , Rfl:   •  ciclopirox (LOPROX) 0 77 % cream, APPLY CREAM TOPICALLY TWICE DAILY   GENTLY MASSAGE INTO AFFECTED AREAS AND SURROUNDING SKIN, Disp: , Rfl:   •  cyanocobalamin (VITAMIN B-12) 100 mcg tablet, Take by mouth daily, Disp: , Rfl:   •  desloratadine (CLARINEX) 5 MG tablet, TAKE 1 TABLET BY MOUTH AT BEDTIME, Disp: 90 tablet, Rfl: 0  •  famotidine (PEPCID) 20 mg tablet, Take 20 mg by mouth daily, Disp: , Rfl:   •  FREESTYLE LITE test strip, USE 1 STRIP TO CHECK GLUCOSE ONCE DAILY AS DIRECTED, Disp: 100 each, Rfl: 0  •  gabapentin (NEURONTIN) 100 mg capsule, TAKE 1 CAPSULE BY MOUTH IN THE MORNING AND 3 AT BEDTIME, Disp: 360 capsule, Rfl: 1  •  glucosamine-chondroitin 500-400 MG tablet, Take 1 tablet by mouth daily, Disp: , Rfl:   •  hydrochlorothiazide (HYDRODIURIL) 25 mg tablet, Take 1 tablet by mouth once daily, Disp: 90 tablet, Rfl: 3  •  losartan (COZAAR) 50 mg tablet, Take 1 tablet by mouth once daily, Disp: 90 tablet, Rfl: 0  •  Magnesium Hydroxide (MAGNESIA PO), Take by mouth 2 (two) times a day, Disp: , Rfl:   •  metFORMIN (GLUCOPHAGE) 1000 MG tablet, TAKE 1 TABLET BY MOUTH TWICE DAILY WITH MEALS, Disp: 180 tablet, Rfl: 1  •  Multiple Vitamin (MULTIVITAMIN) capsule, Take 1 capsule by mouth daily, Disp: , Rfl:   •  nirmatrelvir & ritonavir (Paxlovid, 300/100,) tablet therapy pack, Take 3 tablets by mouth 2 (two) times a day for 5 days Take 2 nirmatrelvir tablets + 1 ritonavir tablet together per dose, Disp: 30 tablet, Rfl: 0  •  ONETOUCH DELICA LANCETS 44V MISC, 1 Units by Does not apply route daily, Disp: 100 each, Rfl: 6  •  pramipexole (MIRAPEX) 0 5 mg tablet, Take 1 tablet (0 5 mg total) by mouth daily at bedtime, Disp: 30 tablet, Rfl: 4  •  Semaglutide (Rybelsus) 3 MG TABS, Take 3 mg by mouth in the morning, Disp: 90 tablet, Rfl: 3  •  torsemide (DEMADEX) 10 mg tablet, TAKE 1 TABLET BY MOUTH ONCE DAILY (Patient taking differently: Take by mouth as needed), Disp: 90 tablet, Rfl: 3    Current Facility-Administered Medications:   •  cyanocobalamin injection 1,000 mcg, 1,000 mcg, Intramuscular, Q30 Days, [address], CRNP, 1,000 mcg at 09/10/21 1141      Objective:    Vitals:   /80   Pulse 83   Wt 127 kg (281 lb)   SpO2 97%   BMI 39 19 kg/m²   Body mass index is 39 19 kg/m²  Vitals:    10/21/22 1119   Weight: 127 kg (281 lb)       Physical Exam  Vitals and nursing note reviewed  Constitutional:       Appearance: He is well-developed  He is obese  He is not ill-appearing or diaphoretic  Comments: Male pattern baldness   HENT:      Head: Normocephalic and atraumatic  Salivary Glands: Right salivary gland is not diffusely enlarged  Left salivary gland is not diffusely enlarged  Right Ear: External ear normal  Decreased hearing noted  There is impacted cerumen  Left Ear: External ear normal  No decreased hearing noted  There is no impacted cerumen  Nose: Nose normal  No congestion or rhinorrhea  Mouth/Throat:      Mouth: Mucous membranes are moist       Pharynx: Oropharynx is clear  No oropharyngeal exudate or posterior oropharyngeal erythema  Eyes:      General: No scleral icterus  Right eye: No discharge  Left eye: No discharge  Conjunctiva/sclera: Conjunctivae normal    Cardiovascular:      Rate and Rhythm: Regular rhythm  Pulses: Normal pulses  Heart sounds: Normal heart sounds  No murmur heard  No systolic murmur is present  No diastolic murmur is present  No gallop  Pulmonary:      Effort: Pulmonary effort is normal  No respiratory distress  Breath sounds: Normal breath sounds  No wheezing or rales  Abdominal:      General: Bowel sounds are normal  There is no distension  Musculoskeletal:      Cervical back: Normal range of motion  Right lower leg: No edema  Left lower leg: No edema  Comments: Chronic skin changes in bilateral lower extremities due to stasis dermatitis   Skin:     General: Skin is warm  Coloration: Skin is not jaundiced or pale  Findings: No rash  Neurological:      General: No focal deficit present  Motor: No weakness  Gait: Gait normal    Psychiatric:         Mood and Affect: Mood normal          Behavior: Behavior normal          Thought Content: Thought content normal          Judgment: Judgment normal          Lab Review   No visits with results within 2 Month(s) from this visit     Latest known visit with results is:   Orders Only on 07/12/2022   Component Date Value Ref Range Status   • Supplier Name 07/12/2022 [employer]   In process   • Supplier Phone Number 07/12/2022 [phone]   In process   • Order Status 07/12/2022 Confirmation In Progress   In process   • Delivery Request Date 07/12/2022 07/12/2022   In process   • Item Description 07/12/2022 [employer]   In process    Comment: Qty: 1  Day Supply: 90  Refills: 3  Directions: Use per  directions     • Item Description 07/12/2022 [employer] Spatz 2 Sensor, change every 14 days   In process    Comment: Qty: 7  Day Supply: 90  Refills: 3  Directions: Use per  directions     • Supplier Name 07/26/2022 [employer] [facility]   In process   • Supplier Phone Number 07/26/2022 [phone]   In process   • Order Status 07/26/2022 Ready For Delivery   In process   • Delivery Note 07/26/2022 Scheduled for set up in the [facility] No Name Shane on 8/9/2022   In process   • Delivery Request Date 07/26/2022 07/26/2022   In process   • Supplier Name 07/26/2022 11/04/2022   In process   • Item Description 07/26/2022 CPAP Machine, Resmed S10 Auto-CPAP   In process    Comment: Qty: 1  Auto Min Pressure: 9 cm  Auto Max Pressure: 12 cm  Oxygen Usage: None     • Item Description 07/26/2022 PAP Mask, Full Face, Fit Upon Setup, N/A, 1 per 3 months   In process    Qty: 1   • Item Description 07/26/2022 PAP Mask Interface Cushion, Full Face, 1 per 1 month   In process    Qty: 1   • Item Description 07/26/2022 PAP Headgear, 1 per 6 months   In process    Qty: 1   • Item Description 07/26/2022 PAP Humidifier, Heated   In process    Qty: 1   • Item Description 07/26/2022 Disposable PAP Filter, 2 per 1 month   In process    Qty: 1   • Item Description 07/26/2022 Non-Disposable PAP Filter, 1 per 6 months   In process    Qty: 1   • Item Description 07/26/2022 PAP Machine Tubing, Heated, 1 per 3 months   In process    Qty: 1   • Item Description 07/26/2022 PAP Monitoring Modem   In process    Qty: 1   • Item Description 07/26/2022 Humidifier Water Chamber, 1 per 6 months   In process    Qty: 1         Patient Instructions   Follow with Consultants as per their and our suggestion    Follow up in 12 week(s) or as needed earlier    Follow all instructions as advised and discussed  Take your medications as prescribed  Call the office immediately if you experience any side effects  Ask questions if you do not understand  Keep your scheduled appointment as advised or come sooner if necessary or in doubt  Best time to call for non-urgent matter or questions on weekdays is between 9am and 12 noon  See physician for any new symptoms or worsening of current symptoms  Urgent or emergent situations call 911 and report to nearest emergency room  I spent  30 -40 minutes taking care of this patient including clinical care, conseling, collaboration, chart, lab and consultaion review as appropriate    Patient is to get labs 1 week(s) prior to next visit if advised       Basic Carbohydrate Counting   AMBULATORY CARE:   Carbohydrate counting  is a way to plan your meals by counting the amount of carbohydrate in foods   Carbohydrates are the sugars, starches, and fiber found in fruit, grains, vegetables, and milk products  Carbohydrates increase your blood sugar levels  Carbohydrate counting can help you eat the right amount of carbohydrate to keep your blood sugar levels under control  What you need to know about planning meals using carbohydrate counting:  A dietitian or healthcare provider will help you develop a healthy meal plan that works best for you  You will be taught how much carbohydrate to eat or drink for each meal and snack  Your meal plan will be based on your age, weight, usual food intake, and physical activity level  If you have diabetes, it will also include your blood sugar levels and diabetes medicine  Once you know how much carbohydrate you should eat, you can decide what type of food you want to eat  You will need to know what foods contain carbohydrate and how much they contain  Keep track of the amount of carbohydrate in meals and snacks in order to follow your meal plan  Do not avoid carbohydrates or skip meals  Your blood sugar may fall too low if you do not eat enough carbohydrate or you skip meals  Foods that contain carbohydrate:   Breads:  Each serving of food listed below contains about 15 g of carbohydrate   1 slice of bread (1 ounce) or 1 flour or corn tortilla (6 inch)    ½ of a hamburger bun or ¼ of a large bagel (about 1 ounce)    1 pancake (about 4 inches across and ¼ inch thick)    Cereals and grains:  Serving sizes of ready-to-eat cereals vary  Look at the serving size and the total carbohydrate amount listed on the food label  Each serving of food listed below contains about 15 g of carbohydrate   ¾ cup of dry, unsweetened, ready-to-eat cereal or ¼ cup of low-fat granola     ½ cup of oatmeal or other cooked cereal     ? cup of cooked rice or pasta    Starchy vegetables and beans:  Each serving of food listed below contains about 15 g of carbohydrate       ½ cup of corn, green peas, sweet potatoes, or mashed potatoes    ¼ of a large baked potato    ½ cup of beans, lentils, and peas (garbanzo, deng, kidney, white, split, black-eyed)    Crackers and snacks:  Each serving of food listed below contains about 15 g of carbohydrate   3 alicia cracker squares or 8 animal crackers     6 saltine-type crackers    3 cups of popcorn or ¾ ounce of pretzels, potato chips, or tortilla chips    Fruit:  Each serving of food listed below contains about 15 g of carbohydrate   1 small (4 ounce) piece of fresh fruit or ¾ to 1 cup of fresh fruit    ½ cup of canned or frozen fruit, packed in natural juice    ½ cup (4 ounces) of unsweetened fruit juice    2 tablespoons of dried fruit    Desserts or sugary foods:  Each serving of food listed below contains about 15 g of carbohydrate   2-inch square unfrosted cake or brownie     2 small cookies    ½ cup of ice cream, frozen yogurt, or nondairy frozen yogurt    ¼ cup of sherbet or sorbet    1 tablespoon of regular syrup, jam, or jelly    2 tablespoons of light syrup    Milk and yogurt:  Foods from the milk group contain about 12 g of carbohydrate per serving  1 cup of fat-free or low-fat milk    1 cup of soy milk    ? cup of fat-free, yogurt sweetened with artificial sweetener    Non-starchy vegetables:  Each serving contains about 5 g of carbohydrate   Three servings of non-starch vegetables count as 1 carbohydrate serving  ½ cup of cooked vegetables or 1 cup of raw vegetables  This includes beets, broccoli, cabbage, cauliflower, cucumber, mushrooms, tomatoes, and zucchini    ½ cup of vegetable juice    How to use carbohydrate counting to plan meals:   Count carbohydrate amounts using serving sizes:      Pasta dinner example: You plan to have pasta, tossed salad, and an 8-ounce glass of milk  Your healthcare provider tells you that you may have 4 carbohydrate servings for dinner  One carbohydrate serving of pasta is ? cup  One cup of pasta will equal 3 carbohydrate servings   An 8-ounce glass of milk will count as 1 carbohydrate serving  These amounts of food would equal 4 carbohydrate servings  One cup of tossed salad does not count toward your carbohydrate servings  Count carbohydrate amounts using food labels:  Find the total amount of carbohydrate in a packaged food by reading the food label  Food labels tell you the serving size of the food and the total carbohydrate amount in each serving  Find the serving size on the food label and then decide how many servings you will eat  Multiply the number of servings you plan to eat by the carbohydrate amount per serving  Granola bar snack example: Your meal plan allows you to have 2 carbohydrate servings (30 grams) of carbohydrate for a snack  You plan to eat 1 package of granola bars, which contains 2 bars  According to the food label, the serving size of food in this package is 1 bar  Each serving (1 bar) contains 25 grams of carbohydrate  The total amount of carbohydrate in this package of granola bars would be 50 g  Based on your meal plan, you should eat only 1 bar  Follow up with your doctor as directed:  Write down your questions so you remember to ask them during your visits  © Copyright [employer] 2022 Information is for End User's use only and may not be sold, redistributed or otherwise used for commercial purposes  All illustrations and images included in CareNotes® are the copyrighted property of A D A M , Inc  or [facility] Morocho   The above information is an  only  It is not intended as medical advice for individual conditions or treatments  Talk to your doctor, nurse or pharmacist before following any medical regimen to see if it is safe and effective for you  Basic Carbohydrate Counting   AMBULATORY CARE:   Carbohydrate counting  is a way to plan your meals by counting the amount of carbohydrate in foods   Carbohydrates are the sugars, starches, and fiber found in fruit, grains, vegetables, and milk products  Carbohydrates increase your blood sugar levels  Carbohydrate counting can help you eat the right amount of carbohydrate to keep your blood sugar levels under control  What you need to know about planning meals using carbohydrate counting:  A dietitian or healthcare provider will help you develop a healthy meal plan that works best for you  You will be taught how much carbohydrate to eat or drink for each meal and snack  Your meal plan will be based on your age, weight, usual food intake, and physical activity level  If you have diabetes, it will also include your blood sugar levels and diabetes medicine  Once you know how much carbohydrate you should eat, you can decide what type of food you want to eat  You will need to know what foods contain carbohydrate and how much they contain  Keep track of the amount of carbohydrate in meals and snacks in order to follow your meal plan  Do not avoid carbohydrates or skip meals  Your blood sugar may fall too low if you do not eat enough carbohydrate or you skip meals  Foods that contain carbohydrate:   Breads:  Each serving of food listed below contains about 15 g of carbohydrate   1 slice of bread (1 ounce) or 1 flour or corn tortilla (6 inch)    ½ of a hamburger bun or ¼ of a large bagel (about 1 ounce)    1 pancake (about 4 inches across and ¼ inch thick)    Cereals and grains:  Serving sizes of ready-to-eat cereals vary  Look at the serving size and the total carbohydrate amount listed on the food label  Each serving of food listed below contains about 15 g of carbohydrate   ¾ cup of dry, unsweetened, ready-to-eat cereal or ¼ cup of low-fat granola     ½ cup of oatmeal or other cooked cereal     ? cup of cooked rice or pasta    Starchy vegetables and beans:  Each serving of food listed below contains about 15 g of carbohydrate       ½ cup of corn, green peas, sweet potatoes, or mashed potatoes    ¼ of a large baked potato    ½ cup of beans, lentils, and peas (garbanzo, deng, kidney, white, split, black-eyed)    Crackers and snacks:  Each serving of food listed below contains about 15 g of carbohydrate   3 alicia cracker squares or 8 animal crackers     6 saltine-type crackers    3 cups of popcorn or ¾ ounce of pretzels, potato chips, or tortilla chips    Fruit:  Each serving of food listed below contains about 15 g of carbohydrate   1 small (4 ounce) piece of fresh fruit or ¾ to 1 cup of fresh fruit    ½ cup of canned or frozen fruit, packed in natural juice    ½ cup (4 ounces) of unsweetened fruit juice    2 tablespoons of dried fruit    Desserts or sugary foods:  Each serving of food listed below contains about 15 g of carbohydrate   2-inch square unfrosted cake or brownie     2 small cookies    ½ cup of ice cream, frozen yogurt, or nondairy frozen yogurt    ¼ cup of sherbet or sorbet    1 tablespoon of regular syrup, jam, or jelly    2 tablespoons of light syrup    Milk and yogurt:  Foods from the milk group contain about 12 g of carbohydrate per serving  1 cup of fat-free or low-fat milk    1 cup of soy milk    ? cup of fat-free, yogurt sweetened with artificial sweetener    Non-starchy vegetables:  Each serving contains about 5 g of carbohydrate   Three servings of non-starch vegetables count as 1 carbohydrate serving  ½ cup of cooked vegetables or 1 cup of raw vegetables  This includes beets, broccoli, cabbage, cauliflower, cucumber, mushrooms, tomatoes, and zucchini    ½ cup of vegetable juice    How to use carbohydrate counting to plan meals:   Count carbohydrate amounts using serving sizes:      Pasta dinner example: You plan to have pasta, tossed salad, and an 8-ounce glass of milk  Your healthcare provider tells you that you may have 4 carbohydrate servings for dinner  One carbohydrate serving of pasta is ? cup  One cup of pasta will equal 3 carbohydrate servings  An 8-ounce glass of milk will count as 1 carbohydrate serving   These amounts of food would equal 4 carbohydrate servings  One cup of tossed salad does not count toward your carbohydrate servings  Count carbohydrate amounts using food labels:  Find the total amount of carbohydrate in a packaged food by reading the food label  Food labels tell you the serving size of the food and the total carbohydrate amount in each serving  Find the serving size on the food label and then decide how many servings you will eat  Multiply the number of servings you plan to eat by the carbohydrate amount per serving  Granola bar snack example: Your meal plan allows you to have 2 carbohydrate servings (30 grams) of carbohydrate for a snack  You plan to eat 1 package of granola bars, which contains 2 bars  According to the food label, the serving size of food in this package is 1 bar  Each serving (1 bar) contains 25 grams of carbohydrate  The total amount of carbohydrate in this package of granola bars would be 50 g  Based on your meal plan, you should eat only 1 bar  Follow up with your doctor as directed:  Write down your questions so you remember to ask them during your visits  © Copyright [employer] 2022 Information is for End User's use only and may not be sold, redistributed or otherwise used for commercial purposes  All illustrations and images included in CareNotes® are the copyrighted property of A D A M , Inc  or [address] [place], [place] [address]   The above information is an  only  It is not intended as medical advice for individual conditions or treatments  Talk to your doctor, nurse or pharmacist before following any medical regimen to see if it is safe and effective for you  "This note has been constructed using a voice recognition system  Therefore there may be syntax, spelling, and/or grammatical errors   Please call if you have any questions  "

## 2022-10-21 NOTE — ASSESSMENT & PLAN NOTE
Lab Results   Component Value Date    HGBA1C 7 8 (H) 10/14/2022     Symptoms fairly controlled  Currently on gabapentin 100 mg 1 tablet in the morning and 3 tablets at bedtime  Recommend diabetic diet and tight diabetic controlled  The last Magnesium level was normal   He is taking magnesium supplement

## 2022-10-21 NOTE — PATIENT INSTRUCTIONS
Follow with Consultants as per their and our suggestion    Follow up in 12 week(s) or as needed earlier    Follow all instructions as advised and discussed  Take your medications as prescribed  Call the office immediately if you experience any side effects  Ask questions if you do not understand  Keep your scheduled appointment as advised or come sooner if necessary or in doubt  Best time to call for non-urgent matter or questions on weekdays is between 9am and 12 noon  See physician for any new symptoms or worsening of current symptoms  Urgent or emergent situations call 911 and report to nearest emergency room  I spent  30 -40 minutes taking care of this patient including clinical care, conseling, collaboration, chart, lab and consultaion review as appropriate    Patient is to get labs 1 week(s) prior to next visit if advised       Basic Carbohydrate Counting   AMBULATORY CARE:   Carbohydrate counting  is a way to plan your meals by counting the amount of carbohydrate in foods  Carbohydrates are the sugars, starches, and fiber found in fruit, grains, vegetables, and milk products  Carbohydrates increase your blood sugar levels  Carbohydrate counting can help you eat the right amount of carbohydrate to keep your blood sugar levels under control  What you need to know about planning meals using carbohydrate counting:  · A dietitian or healthcare provider will help you develop a healthy meal plan that works best for you  You will be taught how much carbohydrate to eat or drink for each meal and snack  Your meal plan will be based on your age, weight, usual food intake, and physical activity level  If you have diabetes, it will also include your blood sugar levels and diabetes medicine  Once you know how much carbohydrate you should eat, you can decide what type of food you want to eat  · You will need to know what foods contain carbohydrate and how much they contain   Keep track of the amount of carbohydrate in meals and snacks in order to follow your meal plan  Do not avoid carbohydrates or skip meals  Your blood sugar may fall too low if you do not eat enough carbohydrate or you skip meals  Foods that contain carbohydrate:   · Breads:  Each serving of food listed below contains about 15 g of carbohydrate   ? 1 slice of bread (1 ounce) or 1 flour or corn tortilla (6 inch)    ? ½ of a hamburger bun or ¼ of a large bagel (about 1 ounce)    ? 1 pancake (about 4 inches across and ¼ inch thick)    · Cereals and grains:  Serving sizes of ready-to-eat cereals vary  Look at the serving size and the total carbohydrate amount listed on the food label  Each serving of food listed below contains about 15 g of carbohydrate   ? ¾ cup of dry, unsweetened, ready-to-eat cereal or ¼ cup of low-fat granola     ? ½ cup of oatmeal or other cooked cereal     ? ? cup of cooked rice or pasta    · Starchy vegetables and beans:  Each serving of food listed below contains about 15 g of carbohydrate   ? ½ cup of corn, green peas, sweet potatoes, or mashed potatoes    ? ¼ of a large baked potato    ? ½ cup of beans, lentils, and peas (garbanzo, deng, kidney, white, split, black-eyed)    · Crackers and snacks:  Each serving of food listed below contains about 15 g of carbohydrate   ? 3 alicia cracker squares or 8 animal crackers     ? 6 saltine-type crackers    ? 3 cups of popcorn or ¾ ounce of pretzels, potato chips, or tortilla chips    · Fruit:  Each serving of food listed below contains about 15 g of carbohydrate   ? 1 small (4 ounce) piece of fresh fruit or ¾ to 1 cup of fresh fruit    ? ½ cup of canned or frozen fruit, packed in natural juice    ? ½ cup (4 ounces) of unsweetened fruit juice    ? 2 tablespoons of dried fruit    · Desserts or sugary foods:  Each serving of food listed below contains about 15 g of carbohydrate   ? 2-inch square unfrosted cake or brownie     ? 2 small cookies    ?  ½ cup of ice cream, frozen yogurt, or nondairy frozen yogurt    ? ¼ cup of sherbet or sorbet    ? 1 tablespoon of regular syrup, jam, or jelly    ? 2 tablespoons of light syrup    · Milk and yogurt:  Foods from the milk group contain about 12 g of carbohydrate per serving  ? 1 cup of fat-free or low-fat milk    ? 1 cup of soy milk    ? ? cup of fat-free, yogurt sweetened with artificial sweetener    · Non-starchy vegetables:  Each serving contains about 5 g of carbohydrate   Three servings of non-starch vegetables count as 1 carbohydrate serving  ? ½ cup of cooked vegetables or 1 cup of raw vegetables  This includes beets, broccoli, cabbage, cauliflower, cucumber, mushrooms, tomatoes, and zucchini    ? ½ cup of vegetable juice    How to use carbohydrate counting to plan meals:   · Count carbohydrate amounts using serving sizes:      ? Pasta dinner example: You plan to have pasta, tossed salad, and an 8-ounce glass of milk  Your healthcare provider tells you that you may have 4 carbohydrate servings for dinner  One carbohydrate serving of pasta is ? cup  One cup of pasta will equal 3 carbohydrate servings  An 8-ounce glass of milk will count as 1 carbohydrate serving  These amounts of food would equal 4 carbohydrate servings  One cup of tossed salad does not count toward your carbohydrate servings  · Count carbohydrate amounts using food labels:  Find the total amount of carbohydrate in a packaged food by reading the food label  Food labels tell you the serving size of the food and the total carbohydrate amount in each serving  Find the serving size on the food label and then decide how many servings you will eat  Multiply the number of servings you plan to eat by the carbohydrate amount per serving  ? Granola bar snack example: Your meal plan allows you to have 2 carbohydrate servings (30 grams) of carbohydrate for a snack  You plan to eat 1 package of granola bars, which contains 2 bars   According to the food label, the serving size of food in this package is 1 bar  Each serving (1 bar) contains 25 grams of carbohydrate  The total amount of carbohydrate in this package of granola bars would be 50 g  Based on your meal plan, you should eat only 1 bar  Follow up with your doctor as directed:  Write down your questions so you remember to ask them during your visits  © Copyright [employer] 2022 Information is for End User's use only and may not be sold, redistributed or otherwise used for commercial purposes  All illustrations and images included in CareNotes® are the copyrighted property of [employer] A M , Inc  or [address] [place] [address] [facility] [place]  The above information is an  only  It is not intended as medical advice for individual conditions or treatments  Talk to your doctor, nurse or pharmacist before following any medical regimen to see if it is safe and effective for you  Basic Carbohydrate Counting   AMBULATORY CARE:   Carbohydrate counting  is a way to plan your meals by counting the amount of carbohydrate in foods  Carbohydrates are the sugars, starches, and fiber found in fruit, grains, vegetables, and milk products  Carbohydrates increase your blood sugar levels  Carbohydrate counting can help you eat the right amount of carbohydrate to keep your blood sugar levels under control  What you need to know about planning meals using carbohydrate counting:  · A dietitian or healthcare provider will help you develop a healthy meal plan that works best for you  You will be taught how much carbohydrate to eat or drink for each meal and snack  Your meal plan will be based on your age, weight, usual food intake, and physical activity level  If you have diabetes, it will also include your blood sugar levels and diabetes medicine  Once you know how much carbohydrate you should eat, you can decide what type of food you want to eat      · You will need to know what foods contain carbohydrate and how much they contain  Keep track of the amount of carbohydrate in meals and snacks in order to follow your meal plan  Do not avoid carbohydrates or skip meals  Your blood sugar may fall too low if you do not eat enough carbohydrate or you skip meals  Foods that contain carbohydrate:   · Breads:  Each serving of food listed below contains about 15 g of carbohydrate   ? 1 slice of bread (1 ounce) or 1 flour or corn tortilla (6 inch)    ? ½ of a hamburger bun or ¼ of a large bagel (about 1 ounce)    ? 1 pancake (about 4 inches across and ¼ inch thick)    · Cereals and grains:  Serving sizes of ready-to-eat cereals vary  Look at the serving size and the total carbohydrate amount listed on the food label  Each serving of food listed below contains about 15 g of carbohydrate   ? ¾ cup of dry, unsweetened, ready-to-eat cereal or ¼ cup of low-fat granola     ? ½ cup of oatmeal or other cooked cereal     ? ? cup of cooked rice or pasta    · Starchy vegetables and beans:  Each serving of food listed below contains about 15 g of carbohydrate   ? ½ cup of corn, green peas, sweet potatoes, or mashed potatoes    ? ¼ of a large baked potato    ? ½ cup of beans, lentils, and peas (garbanzo, deng, kidney, white, split, black-eyed)    · Crackers and snacks:  Each serving of food listed below contains about 15 g of carbohydrate   ? 3 alicia cracker squares or 8 animal crackers     ? 6 saltine-type crackers    ? 3 cups of popcorn or ¾ ounce of pretzels, potato chips, or tortilla chips    · Fruit:  Each serving of food listed below contains about 15 g of carbohydrate   ? 1 small (4 ounce) piece of fresh fruit or ¾ to 1 cup of fresh fruit    ? ½ cup of canned or frozen fruit, packed in natural juice    ? ½ cup (4 ounces) of unsweetened fruit juice    ? 2 tablespoons of dried fruit    · Desserts or sugary foods:  Each serving of food listed below contains about 15 g of carbohydrate   ?  2-inch square unfrosted cake or brownie ? 2 small cookies    ? ½ cup of ice cream, frozen yogurt, or nondairy frozen yogurt    ? ¼ cup of sherbet or sorbet    ? 1 tablespoon of regular syrup, jam, or jelly    ? 2 tablespoons of light syrup    · Milk and yogurt:  Foods from the milk group contain about 12 g of carbohydrate per serving  ? 1 cup of fat-free or low-fat milk    ? 1 cup of soy milk    ? ? cup of fat-free, yogurt sweetened with artificial sweetener    · Non-starchy vegetables:  Each serving contains about 5 g of carbohydrate   Three servings of non-starch vegetables count as 1 carbohydrate serving  ? ½ cup of cooked vegetables or 1 cup of raw vegetables  This includes beets, broccoli, cabbage, cauliflower, cucumber, mushrooms, tomatoes, and zucchini    ? ½ cup of vegetable juice    How to use carbohydrate counting to plan meals:   · Count carbohydrate amounts using serving sizes:      ? Pasta dinner example: You plan to have pasta, tossed salad, and an 8-ounce glass of milk  Your healthcare provider tells you that you may have 4 carbohydrate servings for dinner  One carbohydrate serving of pasta is ? cup  One cup of pasta will equal 3 carbohydrate servings  An 8-ounce glass of milk will count as 1 carbohydrate serving  These amounts of food would equal 4 carbohydrate servings  One cup of tossed salad does not count toward your carbohydrate servings  · Count carbohydrate amounts using food labels:  Find the total amount of carbohydrate in a packaged food by reading the food label  Food labels tell you the serving size of the food and the total carbohydrate amount in each serving  Find the serving size on the food label and then decide how many servings you will eat  Multiply the number of servings you plan to eat by the carbohydrate amount per serving  ? Granola bar snack example: Your meal plan allows you to have 2 carbohydrate servings (30 grams) of carbohydrate for a snack   You plan to eat 1 package of granola bars, which contains 2 bars  According to the food label, the serving size of food in this package is 1 bar  Each serving (1 bar) contains 25 grams of carbohydrate  The total amount of carbohydrate in this package of granola bars would be 50 g  Based on your meal plan, you should eat only 1 bar  Follow up with your doctor as directed:  Write down your questions so you remember to ask them during your visits  © Copyright [employer] 2022 Information is for End User's use only and may not be sold, redistributed or otherwise used for commercial purposes  All illustrations and images included in CareNotes® are the copyrighted property of [employer]  or [address] [place], [place] [address]   The above information is an  only  It is not intended as medical advice for individual conditions or treatments  Talk to your doctor, nurse or pharmacist before following any medical regimen to see if it is safe and effective for you

## 2022-10-21 NOTE — ASSESSMENT & PLAN NOTE
Lab Results   Component Value Date    HGBA1C 7 8 (H) 10/14/2022     12/10/2021:  Hemoglobin A1c 7 2  03/11/2022:  Hemoglobin A1c 6 7  07/02/2022:  Hemoglobin A1c 8 1, urine for microalbumin/creatinine ratio 8    Patient is currently taking metformin 1000 mg b i d  and semaglutide 3 mg oral daily  Could be considered increasing semaglutide  He follows endocrinology  Advised tight glycemic controlled with healthy diet, lifestyle modification  Advised to follow-up with his primary endocrinology for further recommendation

## 2022-10-21 NOTE — ASSESSMENT & PLAN NOTE
Currently having seasonal allergies due to weather changes  Using nasal spray, Clarinex  Currently having nasal congestion and right hearing impairments  Not requiring albuterol     Continue supportive management for allergies  Ambulatory referral to audiogram screening

## 2022-10-24 ENCOUNTER — TELEMEDICINE (OUTPATIENT)
Dept: INTERNAL MEDICINE CLINIC | Facility: CLINIC | Age: 62
End: 2022-10-24
Payer: COMMERCIAL

## 2022-10-24 VITALS
TEMPERATURE: 97.9 F | WEIGHT: 281 LBS | OXYGEN SATURATION: 96 % | RESPIRATION RATE: 18 BRPM | HEIGHT: 71 IN | BODY MASS INDEX: 39.34 KG/M2

## 2022-10-24 DIAGNOSIS — U07.1 COVID-19: Primary | ICD-10-CM

## 2022-10-24 DIAGNOSIS — E11.65 TYPE 2 DIABETES MELLITUS WITH HYPERGLYCEMIA, WITHOUT LONG-TERM CURRENT USE OF INSULIN (HCC): ICD-10-CM

## 2022-10-24 DIAGNOSIS — I25.10 CORONARY ARTERY DISEASE INVOLVING NATIVE CORONARY ARTERY OF NATIVE HEART WITHOUT ANGINA PECTORIS: ICD-10-CM

## 2022-10-24 DIAGNOSIS — E66.09 CLASS 2 OBESITY DUE TO EXCESS CALORIES WITHOUT SERIOUS COMORBIDITY WITH BODY MASS INDEX (BMI) OF 39.0 TO 39.9 IN ADULT: ICD-10-CM

## 2022-10-24 DIAGNOSIS — I10 ESSENTIAL HYPERTENSION: ICD-10-CM

## 2022-10-24 PROCEDURE — 99213 OFFICE O/P EST LOW 20 MIN: CPT | Performed by: INTERNAL MEDICINE

## 2022-10-24 RX ORDER — NIRMATRELVIR AND RITONAVIR 300-100 MG
3 KIT ORAL 2 TIMES DAILY
Qty: 30 TABLET | Refills: 0 | Status: SHIPPED | OUTPATIENT
Start: 2022-10-24 | End: 2022-10-29

## 2022-10-24 NOTE — PATIENT INSTRUCTIONS
No atorvastatin for 5 days    Paxlovid 3 tablets twice a day for 5 days    5 days quarantine and may go out with mask after days    [place] of fluid, tylenol as needed for the pain, headache, claritin one a day for nasal contgestion, Mucinex DM one tablet twice a day as needed for cough, [place] as needed  Salt water gargles, soft diet    Keep social distancing at least 6 feet, Drink plenty of fluid, Take temp  Twice a day or more frequently as needed  Use Mask to cover your nose and face when appropriate and possible  Take enough rest   If your breathing gets worse, or persistent high fever or if you get worse go to Emergency room  Ask questions  [address]    Your healthcare provider and/or public health staff have evaluated you and have determined that you do not need to remain in the hospital at this time  At this time you can be isolated at home where you will be monitored by staff from your local or state health department  You should carefully follow the prevention and isolation steps below until a healthcare provider or local or state health department says that you can return to your normal activities  Stay home except to get medical care    People who are mildly ill with COVID-19 are able to isolate at home during their illness  You should restrict activities outside your home, except for getting medical care  Do not go to work, school, or public areas  Avoid using public transportation, ride-sharing, or taxis  Separate yourself from other people and animals in your home    People: As much as possible, you should stay in a specific room and away from other people in your home  Also, you should use a separate bathroom, if available  Animals: You should restrict contact with pets and other animals while you are sick with COVID-19, just like you would around other people   Although there have not been reports of pets or other animals becoming sick with COVID-19, it is still recommended that people sick with COVID-19 limit contact with animals until more information is known about the virus  When possible, have another member of your household care for your animals while you are sick  If you are sick with COVID-19, avoid contact with your pet, including petting, snuggling, being kissed or licked, and sharing food  If you must care for your pet or be around animals while you are sick, wash your hands before and after you interact with pets and wear a facemask  See COVID-19 and Animals for more information  Call ahead before visiting your doctor    If you have a medical appointment, call the healthcare provider and tell them that you have or may have COVID-19  This will help the healthcare provider’s office take steps to keep other people from getting infected or exposed  Wear a facemask    You should wear a facemask when you are around other people (e g , sharing a room or vehicle) or pets and before you enter a healthcare provider’s office  If you are not able to wear a facemask (for example, because it causes trouble breathing), then people who live with you should not stay in the same room with you, or they should wear a facemask if they enter your room  Cover your coughs and sneezes    Cover your mouth and nose with a tissue when you cough or sneeze  Throw used tissues in a lined trash can  Immediately wash your hands with soap and water for at least 20 seconds or, if soap and water are not available, clean your hands with an alcohol-based hand  that contains at least 60% alcohol  Clean your hands often    Wash your hands often with soap and water for at least 20 seconds, especially after blowing your nose, coughing, or sneezing; going to the bathroom; and before eating or preparing food   If soap and water are not readily available, use an alcohol-based hand  with at least 60% alcohol, covering all surfaces of your hands and rubbing them together until they feel dry  Soap and water are the best option if hands are visibly dirty  Avoid touching your eyes, nose, and mouth with unwashed hands  Avoid sharing personal household items    You should not share dishes, drinking glasses, cups, eating utensils, towels, or bedding with other people or pets in your home  After using these items, they should be washed thoroughly with soap and water  Clean all “high-touch” surfaces everyday    High touch surfaces include counters, tabletops, doorknobs, bathroom fixtures, toilets, phones, keyboards, tablets, and bedside tables  Also, clean any surfaces that may have blood, stool, or body fluids on them  Use a household cleaning spray or wipe, according to the label instructions  Labels contain instructions for safe and effective use of the cleaning product including precautions you should take when applying the product, such as wearing gloves and making sure you have good ventilation during use of the product  Monitor your symptoms    Seek prompt medical attention if your illness is worsening (e g , difficulty breathing)  Before seeking care, call your healthcare provider and tell them that you have, or are being evaluated for, COVID-19  Put on a facemask before you enter the facility  These steps will help the healthcare provider’s office to keep other people in the office or waiting room from getting infected or exposed  Ask your healthcare provider to call the local or state health department  Persons who are placed under active monitoring or facilitated self-monitoring should follow instructions provided by their local health department or occupational health professionals, as appropriate  If you have a medical emergency and need to call 911, notify the dispatch personnel that you have, or are being evaluated for COVID-19  If possible, put on a facemask before emergency medical services arrive      Discontinuing home isolation    Patients with confirmed COVID-19 should remain under home isolation precautions until the following conditions are met: They have had no fever for at least 24 hours (that is one full day of no fever without the use medicine that reduces fevers)  AND  other symptoms have improved (for example, when their cough or shortness of breath have improved)  AND  If had mild or moderate illness, at least 10 days have passed since their symptoms first appeared or if severe illness (needed oxygen) or immunosuppressed, at least 20 days have passed since symptoms first appeared  Patients with confirmed COVID-19 should also notify close contacts (including their workplace) and ask that they self-quarantine  Currently, close contact is defined as being within 6 feet for 15 minutes or more from the period 24 hours starting 48 hours before symptom onset to the time at which the patient went into isolation  Close contacts of patients diagnosed with COVID-19 should be instructed by the patient to self-quarantine for 14 days from the last time of their last contact with the patient       Source: [employer]

## 2022-10-24 NOTE — ASSESSMENT & PLAN NOTE
Keep social distancing at least 6 feet, Drink plenty of fluid, Take temp  Twice a day or more frequently as needed  Use Mask to cover your nose and face when appropriate and possible  Take enough rest   If your breathing gets worse, or persistent high fever or if you get worse go to Emergency room  Ask questions  [address]    Your healthcare provider and/or public health staff have evaluated you and have determined that you do not need to remain in the hospital at this time  At this time you can be isolated at home where you will be monitored by staff from your local or state health department  You should carefully follow the prevention and isolation steps below until a healthcare provider or local or state health department says that you can return to your normal activities  Stay home except to get medical care    People who are mildly ill with COVID-19 are able to isolate at home during their illness  You should restrict activities outside your home, except for getting medical care  Do not go to work, school, or public areas  Avoid using public transportation, ride-sharing, or taxis  Separate yourself from other people and animals in your home    People: As much as possible, you should stay in a specific room and away from other people in your home  Also, you should use a separate bathroom, if available  Animals: You should restrict contact with pets and other animals while you are sick with COVID-19, just like you would around other people  Although there have not been reports of pets or other animals becoming sick with COVID-19, it is still recommended that people sick with COVID-19 limit contact with animals until more information is known about the virus  When possible, have another member of your household care for your animals while you are sick   If you are sick with COVID-19, avoid contact with your pet, including petting, snuggling, being kissed or licked, and sharing food  If you must care for your pet or be around animals while you are sick, wash your hands before and after you interact with pets and wear a facemask  See COVID-19 and Animals for more information  Call ahead before visiting your doctor    If you have a medical appointment, call the healthcare provider and tell them that you have or may have COVID-19  This will help the healthcare provider’s office take steps to keep other people from getting infected or exposed  Wear a facemask    You should wear a facemask when you are around other people (e g , sharing a room or vehicle) or pets and before you enter a healthcare provider’s office  If you are not able to wear a facemask (for example, because it causes trouble breathing), then people who live with you should not stay in the same room with you, or they should wear a facemask if they enter your room  Cover your coughs and sneezes    Cover your mouth and nose with a tissue when you cough or sneeze  Throw used tissues in a lined trash can  Immediately wash your hands with soap and water for at least 20 seconds or, if soap and water are not available, clean your hands with an alcohol-based hand  that contains at least 60% alcohol  Clean your hands often    Wash your hands often with soap and water for at least 20 seconds, especially after blowing your nose, coughing, or sneezing; going to the bathroom; and before eating or preparing food  If soap and water are not readily available, use an alcohol-based hand  with at least 60% alcohol, covering all surfaces of your hands and rubbing them together until they feel dry  Soap and water are the best option if hands are visibly dirty  Avoid touching your eyes, nose, and mouth with unwashed hands  Avoid sharing personal household items    You should not share dishes, drinking glasses, cups, eating utensils, towels, or bedding with other people or pets in your home   After using these items, they should be washed thoroughly with soap and water  Clean all “high-touch” surfaces everyday    High touch surfaces include counters, tabletops, doorknobs, bathroom fixtures, toilets, phones, keyboards, tablets, and bedside tables  Also, clean any surfaces that may have blood, stool, or body fluids on them  Use a household cleaning spray or wipe, according to the label instructions  Labels contain instructions for safe and effective use of the cleaning product including precautions you should take when applying the product, such as wearing gloves and making sure you have good ventilation during use of the product  Monitor your symptoms    Seek prompt medical attention if your illness is worsening (e g , difficulty breathing)  Before seeking care, call your healthcare provider and tell them that you have, or are being evaluated for, COVID-19  Put on a facemask before you enter the facility  These steps will help the healthcare provider’s office to keep other people in the office or waiting room from getting infected or exposed  Ask your healthcare provider to call the local or [facility] health department  Persons who are placed under active monitoring or facilitated self-monitoring should follow instructions provided by their local health department or occupational health professionals, as appropriate  If you have a medical emergency and need to call 911, notify the dispatch personnel that you have, or are being evaluated for COVID-19  If possible, put on a facemask before emergency medical services arrive      Discontinuing home isolation    Patients with confirmed COVID-19 should remain under home isolation precautions until the following conditions are met:   - They have had no fever for at least 24 hours (that is one full day of no fever without the use medicine that reduces fevers)  AND  - other symptoms have improved (for example, when their cough or shortness of breath have improved)  AND  - If had mild or moderate illness, at least 10 days have passed since their symptoms first appeared or if severe illness (needed oxygen) or immunosuppressed, at least 20 days have passed since symptoms first appeared  Patients with confirmed COVID-19 should also notify close contacts (including their workplace) and ask that they self-quarantine  Currently, close contact is defined as being within 6 feet for 15 minutes or more from the period 24 hours starting 48 hours before symptom onset to the time at which the patient went into isolation  Close contacts of patients diagnosed with COVID-19 should be instructed by the patient to self-quarantine for 14 days from the last time of their last contact with the patient       Source: [employer]

## 2022-10-24 NOTE — PROGRESS NOTES
COVID-19 Outpatient Progress Note    Assessment/Plan:    Problem List Items Addressed This Visit        Endocrine    Diabetes mellitus ([facility] [address] )       Cardiovascular and Mediastinum    Essential hypertension    CAD (coronary artery disease)       Other    Class 2 obesity due to excess calories without serious comorbidity with body mass index (BMI) of 39 0 to 39 9 in adult    COVID-19 - Primary         Keep social distancing at least 6 feet, Drink plenty of fluid, Take temp  Twice a day or more frequently as needed  Use Mask to cover your nose and face when appropriate and possible  Take enough rest   If your breathing gets worse, or persistent high fever or if you get worse go to Emergency room  Ask questions  [address]    Your healthcare provider and/or public health staff have evaluated you and have determined that you do not need to remain in the hospital at this time  At this time you can be isolated at home where you will be monitored by staff from your local or state health department  You should carefully follow the prevention and isolation steps below until a healthcare provider or local or state health department says that you can return to your normal activities  Stay home except to get medical care    People who are mildly ill with COVID-19 are able to isolate at home during their illness  You should restrict activities outside your home, except for getting medical care  Do not go to work, school, or public areas  Avoid using public transportation, ride-sharing, or taxis  Separate yourself from other people and animals in your home    People: As much as possible, you should stay in a specific room and away from other people in your home  Also, you should use a separate bathroom, if available  Animals: You should restrict contact with pets and other animals while you are sick with COVID-19, just like you would around other people   Although there have not been reports of pets or other animals becoming sick with COVID-19, it is still recommended that people sick with COVID-19 limit contact with animals until more information is known about the virus  When possible, have another member of your household care for your animals while you are sick  If you are sick with COVID-19, avoid contact with your pet, including petting, snuggling, being kissed or licked, and sharing food  If you must care for your pet or be around animals while you are sick, wash your hands before and after you interact with pets and wear a facemask  See COVID-19 and Animals for more information  Call ahead before visiting your doctor    If you have a medical appointment, call the healthcare provider and tell them that you have or may have COVID-19  This will help the healthcare provider’s office take steps to keep other people from getting infected or exposed  Wear a facemask    You should wear a facemask when you are around other people (e g , sharing a room or vehicle) or pets and before you enter a healthcare provider’s office  If you are not able to wear a facemask (for example, because it causes trouble breathing), then people who live with you should not stay in the same room with you, or they should wear a facemask if they enter your room  Cover your coughs and sneezes    Cover your mouth and nose with a tissue when you cough or sneeze  Throw used tissues in a lined trash can  Immediately wash your hands with soap and water for at least 20 seconds or, if soap and water are not available, clean your hands with an alcohol-based hand  that contains at least 60% alcohol  Clean your hands often    Wash your hands often with soap and water for at least 20 seconds, especially after blowing your nose, coughing, or sneezing; going to the bathroom; and before eating or preparing food   If soap and water are not readily available, use an alcohol-based hand  with at least 60% alcohol, covering all surfaces of your hands and rubbing them together until they feel dry  Soap and water are the best option if hands are visibly dirty  Avoid touching your eyes, nose, and mouth with unwashed hands  Avoid sharing personal household items    You should not share dishes, drinking glasses, cups, eating utensils, towels, or bedding with other people or pets in your home  After using these items, they should be washed thoroughly with soap and water  Clean all “high-touch” surfaces everyday    High touch surfaces include counters, tabletops, doorknobs, bathroom fixtures, toilets, phones, keyboards, tablets, and bedside tables  Also, clean any surfaces that may have blood, stool, or body fluids on them  Use a household cleaning spray or wipe, according to the label instructions  Labels contain instructions for safe and effective use of the cleaning product including precautions you should take when applying the product, such as wearing gloves and making sure you have good ventilation during use of the product  Monitor your symptoms    Seek prompt medical attention if your illness is worsening (e g , difficulty breathing)  Before seeking care, call your healthcare provider and tell them that you have, or are being evaluated for, COVID-19  Put on a facemask before you enter the facility  These steps will help the healthcare provider’s office to keep other people in the office or waiting room from getting infected or exposed  Ask your healthcare provider to call the local or [facility] health department  Persons who are placed under active monitoring or facilitated self-monitoring should follow instructions provided by their local health department or occupational health professionals, as appropriate  If you have a medical emergency and need to call 911, notify the dispatch personnel that you have, or are being evaluated for COVID-19  If possible, put on a facemask before emergency medical services arrive      Discontinuing home isolation    Patients with confirmed COVID-19 should remain under home isolation precautions until the following conditions are met: They have had no fever for at least 24 hours (that is one full day of no fever without the use medicine that reduces fevers)  AND  other symptoms have improved (for example, when their cough or shortness of breath have improved)  AND  If had mild or moderate illness, at least 10 days have passed since their symptoms first appeared or if severe illness (needed oxygen) or immunosuppressed, at least 20 days have passed since symptoms first appeared  Patients with confirmed COVID-19 should also notify close contacts (including their workplace) and ask that they self-quarantine  Currently, close contact is defined as being within 6 feet for 15 minutes or more from the period 24 hours starting 48 hours before symptom onset to the time at which the patient went into isolation  Close contacts of patients diagnosed with COVID-19 should be instructed by the patient to self-quarantine for 14 days from the last time of their last contact with the patient  Source: [employer]           Relevant Medications    nirmatrelvir & ritonavir (Paxlovid, 300/100,) tablet therapy pack         Disposition:     Discussed symptom directed medication options with patient  Discussed vitamin D, vitamin C, and/or zinc supplementation with patient  Patient meets criteria for PAXLOVID and they have been counseled appropriately according to EUA documentation released by the FDA  After discussion, patient agrees to treatment      Anthony Screen is an investigational medicine used to treat mild-to-moderate COVID-19 in adults and children (15years of age and older weighing at least 80 pounds (40 kg)) with positive results of direct SARS-CoV-2 viral testing, and who are at high risk for progression to severe COVID-19, including hospitalization or melvi óGmez is investigational because it is still being studied  There is limited information about the safety and effectiveness of using PAXLOVID to treat people with mild-to-moderate COVID-19  The FDA has authorized the emergency use of PAXLOVID for the treatment of mild-tomoderate COVID-19 in adults and children (15years of age and older weighing at least 80 pounds (40 kg)) with a positive test for the virus that causes COVID-19, and who are at high risk for progression to severe COVID-19, including hospitalization or death, under an EUA  What should I tell my healthcare provider before I take PAXLOVID? Tell your healthcare provider if you:  - Have any allergies  - Have liver or kidney disease  - Are pregnant or plan to become pregnant  - Are breastfeeding a child  - Have any serious illnesses    Tell your healthcare provider about all the medicines you take, including prescription and over-the-counter medicines, vitamins, and herbal supplements  Some medicines may interact with PAXLOVID and may cause serious side effects  Keep a list of your medicines to show your healthcare provider and pharmacist when you get a new medicine  You can ask your healthcare provider or pharmacist for a list of medicines that interact with PAXLOVID  Do not start taking a new medicine without telling your healthcare provider  Your healthcare provider can tell you if it is safe to take PAXLOVID with other medicines  Tell your healthcare provider if you are taking combined hormonal contraceptive  PAXLOVID may affect how your birth control pills work  Females who are able to become pregnant should use another effective alternative form of contraception or an additional barrier method of contraception  Talk to your healthcare provider if you have any questions about contraceptive methods that might be right for you  How do I take PAXLOVID? PAXLOVID consists of 2 medicines: nirmatrelvir and ritonavir    - Take 2 pink tablets of nirmatrelvir with 1 white tablet of ritonavir by mouth 2 times each day (in the morning and in the evening) for 5 days  For each dose, take all 3 tablets at the same time  - If you have kidney disease, talk to your healthcare provider  You may need a different dose  - Swallow the tablets whole  Do not chew, break, or crush the tablets  - Take PAXLOVID with or without food  - Do not stop taking PAXLOVID without talking to your healthcare provider, even if you feel better  - If you miss a dose of PAXLOVID within 8 hours of the time it is usually taken, take it as soon as you remember  If you miss a dose by more than 8 hours, skip the missed dose and take the next dose at your regular time  Do not take 2 doses of PAXLOVID at the same time  - If you take too much PAXLOVID, call your healthcare provider or go to the nearest hospital emergency room right away  - If you are taking a ritonavir- or cobicistat-containing medicine to treat hepatitis C or Human Immunodeficiency Virus (HIV), you should continue to take your medicine as prescribed by your healthcare provider   - Talk to your healthcare provider if you do not feel better or if you feel worse after 5 days  Who should generally not take PAXLOVID? Do not take PAXLOVID if:  You are allergic to nirmatrelvir, ritonavir, or any of the ingredients in PAXLOVID  You are taking any of the following medicines:  - Alfuzosin  - Pethidine, piroxicam, propoxyphene  - Ranolazine  - Amiodarone, dronedarone, flecainide, propafenone, quinidine  - Colchicine  - Lurasidone, pimozide, clozapine  - Dihydroergotamine, ergotamine, methylergonovine  - Lovastatin, simvastatin  - Sildenafil (Revatio®) for pulmonary arterial hypertension (PAH)  - Triazolam, oral midazolam  - Apalutamide  - Carbamazepine, phenobarbital, phenytoin  - Rifampin  - St  Al’s Wort (hypericum perforatum)    What are the important possible side effects of PAXLOVID?     Possible side effects of PAXLOVID are:  - Liver Problems  Tell your healthcare provider right away if you have any of these signs and symptoms of liver problems: loss of appetite, yellowing of your skin and the whites of eyes (jaundice), dark-colored urine, pale colored stools and itchy skin, stomach area (abdominal) pain  - Resistance to HIV Medicines  If you have untreated HIV infection, PAXLOVID may lead to some HIV medicines not working as well in the future  - Other possible side effects include: altered sense of taste, diarrhea, high blood pressure, or muscle aches    These are not all the possible side effects of PAXLOVID  Not many people have taken PAXLOVID  Serious and unexpected side effects may happen  Eldora Boxer is still being studied, so it is possible that all of the risks are not known at this time  What other treatment choices are there? Like Kimberli Leal may allow for the emergency use of other medicines to treat people with COVID-19  Go to https://[employer]/ for information on the emergency use of other medicines that are authorized by FDA to treat people with COVID-19  Your healthcare provider may talk with you about clinical trials for which you may be eligible  It is your choice to be treated or not to be treated with PAXLOVID  Should you decide not to receive it or for your child not to receive it, it will not change your standard medical care  What if I am pregnant or breastfeeding? There is no experience treating pregnant women or breastfeeding mothers with PAXLOVID  For a mother and unborn baby, the benefit of taking PAXLOVID may be greater than the risk from the treatment  If you are pregnant, discuss your options and specific situation with your healthcare provider      It is recommended that you use effective barrier contraception or do not have sexual activity while taking PAXLOVID  If you are breastfeeding, discuss your options and specific situation with your healthcare provider  How do I report side effects with PAXLOVID? Contact your healthcare provider if you have any side effects that bother you or do not go away  Report side effects to FDA MedWatch at www fda gov/medwatch or call 8-311-QDS1127 or you can report side effects to [facility]  at the contact information provided below  Website Fax number Telephone number   [employer] [phone]     How should I store [address]? Store PAXLOVID tablets at room temperature between 68°F to 77°F (20°C to 25°C)  Full fact sheet for patients, parents, and caregivers can be found at: [employer]    I have spent 20 minutes directly with the patient  Greater than 50% of this time was spent in counseling/coordination of care regarding: diagnostic results, prognosis, risks and benefits of treatment options, instructions for management, risk factor reductions and impressions  No atorvastatin for 5 days    Paxlovid 3 tablets twice a day for 5 days    5 days quarantine and may go out with mask after days    [place] of fluid, tylenol as needed for the pain, headache, claritin one a day for nasal contgestion, Mucinex DM one tablet twice a day as needed for cough, [place] as needed  Salt water gargles, soft diet    Keep social distancing at least 6 feet, Drink plenty of fluid, Take temp  Twice a day or more frequently as needed  Use Mask to cover your nose and face when appropriate and possible  Take enough rest   If your breathing gets worse, or persistent high fever or if you get worse go to Emergency room  Ask questions        [address]    Your healthcare provider and/or public health staff have evaluated you and have determined that you do not need to remain in the hospital at this time   At this time you can be isolated at home where you will be monitored by staff from your local or state health department  You should carefully follow the prevention and isolation steps below until a healthcare provider or local or state health department says that you can return to your normal activities  Stay home except to get medical care    People who are mildly ill with COVID-19 are able to isolate at home during their illness  You should restrict activities outside your home, except for getting medical care  Do not go to work, school, or public areas  Avoid using public transportation, ride-sharing, or taxis  Separate yourself from other people and animals in your home    People: As much as possible, you should stay in a specific room and away from other people in your home  Also, you should use a separate bathroom, if available  Animals: You should restrict contact with pets and other animals while you are sick with COVID-19, just like you would around other people  Although there have not been reports of pets or other animals becoming sick with COVID-19, it is still recommended that people sick with COVID-19 limit contact with animals until more information is known about the virus  When possible, have another member of your household care for your animals while you are sick  If you are sick with COVID-19, avoid contact with your pet, including petting, snuggling, being kissed or licked, and sharing food  If you must care for your pet or be around animals while you are sick, wash your hands before and after you interact with pets and wear a facemask  See COVID-19 and Animals for more information  Call ahead before visiting your doctor    If you have a medical appointment, call the healthcare provider and tell them that you have or may have COVID-19  This will help the healthcare provider’s office take steps to keep other people from getting infected or exposed      Wear a facemask    You should wear a facemask when you are around other people (e g , sharing a room or vehicle) or pets and before you enter a healthcare provider’s office  If you are not able to wear a facemask (for example, because it causes trouble breathing), then people who live with you should not stay in the same room with you, or they should wear a facemask if they enter your room  Cover your coughs and sneezes    Cover your mouth and nose with a tissue when you cough or sneeze  Throw used tissues in a lined trash can  Immediately wash your hands with soap and water for at least 20 seconds or, if soap and water are not available, clean your hands with an alcohol-based hand  that contains at least 60% alcohol  Clean your hands often    Wash your hands often with soap and water for at least 20 seconds, especially after blowing your nose, coughing, or sneezing; going to the bathroom; and before eating or preparing food  If soap and water are not readily available, use an alcohol-based hand  with at least 60% alcohol, covering all surfaces of your hands and rubbing them together until they feel dry  Soap and water are the best option if hands are visibly dirty  Avoid touching your eyes, nose, and mouth with unwashed hands  Avoid sharing personal household items    You should not share dishes, drinking glasses, cups, eating utensils, towels, or bedding with other people or pets in your home  After using these items, they should be washed thoroughly with soap and water  Clean all “high-touch” surfaces everyday    High touch surfaces include counters, tabletops, doorknobs, bathroom fixtures, toilets, phones, keyboards, tablets, and bedside tables  Also, clean any surfaces that may have blood, stool, or body fluids on them  Use a household cleaning spray or wipe, according to the label instructions   Labels contain instructions for safe and effective use of the cleaning product including precautions you should take when applying the product, such as wearing gloves and making sure you have good ventilation during use of the product  Monitor your symptoms    Seek prompt medical attention if your illness is worsening (e g , difficulty breathing)  Before seeking care, call your healthcare provider and tell them that you have, or are being evaluated for, COVID-19  Put on a facemask before you enter the facility  These steps will help the healthcare provider’s office to keep other people in the office or waiting room from getting infected or exposed  Ask your healthcare provider to call the local or [facility] health department  Persons who are placed under active monitoring or facilitated self-monitoring should follow instructions provided by their local health department or occupational health professionals, as appropriate  If you have a medical emergency and need to call 911, notify the dispatch personnel that you have, or are being evaluated for COVID-19  If possible, put on a facemask before emergency medical services arrive  Discontinuing home isolation    Patients with confirmed COVID-19 should remain under home isolation precautions until the following conditions are met: They have had no fever for at least 24 hours (that is one full day of no fever without the use medicine that reduces fevers)  AND  other symptoms have improved (for example, when their cough or shortness of breath have improved)  AND  If had mild or moderate illness, at least 10 days have passed since their symptoms first appeared or if severe illness (needed oxygen) or immunosuppressed, at least 20 days have passed since symptoms first appeared  Patients with confirmed COVID-19 should also notify close contacts (including their workplace) and ask that they self-quarantine   Currently, close contact is defined as being within 6 feet for 15 minutes or more from the period 24 hours starting 48 hours before symptom onset to the time at which the patient went into isolation   Close contacts of patients diagnosed with COVID-19 should be instructed by the patient to self-quarantine for 14 days from the last time of their last contact with the patient  Source: [employer]        Soft diet      Encounter provider: Paul Walsh MD     Provider located at: [facility] INTERNAL MEDICINE  [address] [place] [id number] [phone]     Recent Visits  Date Type Provider Dept   10/21/22 Office Visit Paul Walsh MD [address] [id number] [facility] Internal Med   Showing recent visits within past 7 days and meeting all other requirements  Today's Visits  Date Type Provider Dept   10/24/22 Telemedicine Paul Walsh MD [id number] [facility] Internal Med   Showing today's visits and meeting all other requirements  Future Appointments  No visits were found meeting these conditions  Showing future appointments within next 150 days and meeting all other requirements       Patient agrees to participate in a virtual check in via telephone or video visit instead of presenting to the office to address urgent/immediate medical needs  Patient is aware this is a billable service  He acknowledged consent and understanding of privacy and security of the video platform  The patient has agreed to participate and understands they can discontinue the visit at any time  After connecting through [facility], the patient was identified by name and date of birth  [place] was informed that this was a telemedicine visit and that the exam was being conducted confidentially over secure lines  My office door was closed  No one else was in the room  [place] acknowledged consent and understanding of privacy and security of the telemedicine visit  I informed the patient that I have reviewed his record in Epic and presented the opportunity for him to ask any questions regarding the visit today  The patient agreed to participate  Verification of patient location:  Patient is located in the following state in which I hold an active license: [place]    Subjective:   Rigoberto Licea is a 63 y o  male who is concerned about COVID-19  Patient's symptoms include fatigue, nasal congestion, rhinorrhea, cough and myalgias (was in yard)  Patient denies fever, chills, malaise, sore throat (in [place]), anosmia, loss of taste, shortness of breath (occasional -no t uncommon for him), chest tightness, abdominal pain, nausea, vomiting, diarrhea and headaches  - Date of symptom onset: 10/20/2022  - Date of exposure: 10/17/2022      COVID-19 vaccination status: Fully vaccinated with booster    Exposure:     Hospitalized recently for fever and/or lower respiratory symptoms?: No      Currently a healthcare worker that is involved in direct patient care?: Yes      Works in a special setting where the risk of COVID-19 transmission may be high? (this may include long-term care, correctional and assisted facilities; homeless shelters; assisted-living facilities and group homes ): No      Resident in a special setting where the risk of COVID-19 transmission may be high? (this may include long-term care, correctional and assisted facilities; homeless shelters; assisted-living facilities and group homes ): No      Patient is a veterinary physician    Thursday evening with sore throat, nasal irritation, Friday went away,  Friday after visit here , started feeling weared, little congestion, Friday learned her  was positive and was exposed to  Monday  Pt self tested and was negative with covid test   Olga Marroquin got more congested, started with  Post nasal drip- like his typical allergy, Sunday night faint positive on covid test, half hours tested again and was positive again faint   Pt did not go to work goday    Today's sx : no fever O2 96-97 HR 80, today    Lab Results   Component Value Date    SARSCOV2 Negative 12/17/2021 Review of Systems   Constitutional: Positive for fatigue  Negative for chills and fever  HENT: Positive for congestion and rhinorrhea  Negative for sore throat (in [place])  Respiratory: Positive for cough  Negative for chest tightness and shortness of breath (occasional -no t uncommon for him)  Cardiovascular: Negative for chest pain  Gastrointestinal: Negative for abdominal pain, diarrhea, nausea and vomiting  Endocrine: Negative for cold intolerance, heat intolerance, polydipsia, polyphagia and polyuria  Musculoskeletal: Positive for myalgias (was in yard)  Negative for arthralgias and back pain  Neurological: Positive for weakness  Negative for light-headedness and headaches  Hematological: Negative for adenopathy  Does not bruise/bleed easily  Psychiatric/Behavioral: Negative for confusion, dysphoric mood, self-injury, sleep disturbance and suicidal ideas  The patient is not nervous/anxious  Current Outpatient Medications on File Prior to Visit   Medication Sig   • albuterol (PROVENTIL HFA,VENTOLIN HFA) 90 mcg/act inhaler Inhale 2 puffs every 4 (four) hours as needed for wheezing or shortness of breath   • Alpha-Lipoic Acid 100 MG CAPS Take by mouth   • Ascorbic Acid (VITAMIN C) 100 MG tablet Take 500 mg by mouth    • aspirin 81 MG tablet Take 162 mg by mouth daily     • BIOTIN PO Take 5 mg by mouth daily   • Cholecalciferol (VITAMIN D3) 1000 units CAPS Take by mouth daily    • ciclopirox (LOPROX) 0 77 % cream APPLY CREAM TOPICALLY TWICE DAILY   GENTLY MASSAGE INTO AFFECTED AREAS AND SURROUNDING SKIN   • cyanocobalamin (VITAMIN B-12) 100 mcg tablet Take by mouth daily   • desloratadine (CLARINEX) 5 MG tablet TAKE 1 TABLET BY MOUTH AT BEDTIME   • famotidine (PEPCID) 20 mg tablet Take 20 mg by mouth daily   • FREESTYLE LITE test strip USE 1 STRIP TO CHECK GLUCOSE ONCE DAILY AS DIRECTED   • gabapentin (NEURONTIN) 100 mg capsule TAKE 1 CAPSULE BY MOUTH IN THE MORNING AND 3 AT BEDTIME   • glucosamine-chondroitin 500-400 MG tablet Take 1 tablet by mouth daily   • hydrochlorothiazide (HYDRODIURIL) 25 mg tablet Take 1 tablet by mouth once daily   • losartan (COZAAR) 50 mg tablet Take 1 tablet by mouth once daily   • Magnesium Hydroxide (MAGNESIA PO) Take by mouth 2 (two) times a day   • metFORMIN (GLUCOPHAGE) 1000 MG tablet TAKE 1 TABLET BY MOUTH TWICE DAILY WITH MEALS   • Multiple Vitamin (MULTIVITAMIN) capsule Take 1 capsule by mouth daily   • nitroglycerin (NITROSTAT) 0 3 mg SL tablet Place 1 tablet (0 3 mg total) under the tongue every 5 (five) minutes as needed for chest pain   • ONETOUCH DELICA LANCETS 43J MISC 1 Units by Does not apply route daily   • pramipexole (MIRAPEX) 0 5 mg tablet Take 1 tablet (0 5 mg total) by mouth daily at bedtime   • Semaglutide (Rybelsus) 3 MG TABS Take 3 mg by mouth in the morning   • torsemide (DEMADEX) 10 mg tablet TAKE 1 TABLET BY MOUTH ONCE DAILY (Patient taking differently: Take by mouth as needed)   • [DISCONTINUED] atorvastatin (LIPITOR) 40 mg tablet TAKE 1 TABLET BY MOUTH ONCE DAILY WITH SUPPER       Objective:    Temp 97 9 °F (36 6 °C)   Resp 18   Ht 5' 11" (1 803 m)   Wt 127 kg (281 lb)   SpO2 96%   BMI 39 19 kg/m²      Physical Exam  Constitutional:       Appearance: He is obese  He is not ill-appearing or diaphoretic  Neurological:      Mental Status: He is oriented to person, place, and time         Dhaval Joe MD

## 2022-10-27 ENCOUNTER — TELEMEDICINE (OUTPATIENT)
Dept: INTERNAL MEDICINE CLINIC | Facility: CLINIC | Age: 62
End: 2022-10-27
Payer: COMMERCIAL

## 2022-10-27 VITALS
SYSTOLIC BLOOD PRESSURE: 130 MMHG | OXYGEN SATURATION: 97 % | HEART RATE: 78 BPM | TEMPERATURE: 98.1 F | BODY MASS INDEX: 39.34 KG/M2 | DIASTOLIC BLOOD PRESSURE: 79 MMHG | WEIGHT: 281 LBS | HEIGHT: 71 IN

## 2022-10-27 DIAGNOSIS — U07.1 COVID-19: Primary | ICD-10-CM

## 2022-10-27 PROCEDURE — 99213 OFFICE O/P EST LOW 20 MIN: CPT | Performed by: INTERNAL MEDICINE

## 2022-10-27 NOTE — PATIENT INSTRUCTIONS
Finish 5 days of quarantine  May go out as of Sunday however wear mask for additional 5 days  Finish up your antiviral as tolerated  Please continue vitamin-C, vitamin-D, and zinc as tolerated tolerated  May continue over-the-counter medication such as Claritin for nasal congestions or Mucinex DM for cough and Tylenol for pain in aches  Continue to monitor your temperature blood pressure pulse pulse ox for next 3 days  Paragraphs call me any problem  We will see you as per your scheduled visit if you are no better will be more than happy to see you earlier

## 2022-10-27 NOTE — PROGRESS NOTES
COVID-19 Outpatient Progress Note    Assessment/Plan:    Problem List Items Addressed This Visit        Other    COVID-19 - Primary     Finish 5 days of quarantine  May go out as of Sunday however wear mask for additional 5 days  Finish up your antiviral as tolerated  Please continue vitamin-C, vitamin-D, and zinc as tolerated tolerated  May continue over-the-counter medication such as Claritin for nasal congestions or Mucinex DM for cough and Tylenol for pain in aches  Continue to monitor your temperature blood pressure pulse pulse ox for next 3 days  Paragraphs call me any problem  We will see you as per your scheduled visit if you are no better will be more than happy to see you earlier  Disposition:     Discussed symptom directed medication options with patient  I have spent 20 minutes directly with the patient  Greater than 50% of this time was spent in counseling/coordination of care regarding: diagnostic results, prognosis, risks and benefits of treatment options, instructions for management and impressions  May go back to work on Monday  If you can tolerate finish a your antiviral medicines  May continue vitamins C ,D and zinc as discussed  May take symptomatic treatment for stuffy nose runny nose cough as appropriate      Encounter provider:  Lloyd Watts MD     Provider located at: [facility]  [address] [place] [address]     Recent Visits  Date Type Provider Dept   10/24/22 Telemedicine Lloyd Watts MD [address] [place] [address] [facility]   10/21/22 Office Visit Lloyd Watts MD [address] [place] [address] [facility]   Showing recent visits within past 7 days and meeting all other requirements  Today's Visits  Date Type Provider Dept   10/27/22 Telemedicine Lloyd Watts MD [address] [place] [address] [facility]   Showing today's visits and meeting all other requirements  Future Appointments  No visits were found meeting these conditions  Showing future appointments within next 150 days and meeting all other requirements     This virtual check-in was done via [address] and patient was informed that this is a secure, HIPAA-compliant platform  He agrees to proceed  Patient agrees to participate in a virtual check in via telephone or video visit instead of presenting to the office to address urgent/immediate medical needs  Patient is aware this is a billable service  He acknowledged consent and understanding of privacy and security of the video platform  The patient has agreed to participate and understands they can discontinue the visit at any time  After connecting through [employer], the patient was identified by name and date of birth  Fany Baron was informed that this was a telemedicine visit and that the exam was being conducted confidentially over secure lines  My office door was closed  No one else was in the room  Fany Baron acknowledged consent and understanding of privacy and security of the telemedicine visit  I informed the patient that I have reviewed his record in Epic and presented the opportunity for him to ask any questions regarding the visit today  The patient agreed to participate  Verification of patient location:  Patient is located in the following state in which I hold an active license: [place]    Subjective:   Fany Baron is a 63 y o  male who has been screened for COVID-19  Symptom change since last report: improving  Patient's symptoms include chills (Not too bad), malaise ( improved), nasal congestion ( improved), rhinorrhea ( S a improved slight), cough, shortness of breath ( fairly good), chest tightness ( occasional a a occasional) and myalgias ( mild often on improved)  Patient denies fever, sore throat, anosmia, loss of taste, abdominal pain, nausea, vomiting, diarrhea and headaches   Fatigue:  foot fatigue improved  - Date of symptom onset: 10/20/2022  - Date of exposure: 10/17/2022      COVID-19 vaccination status: Fully vaccinated with booster    Joan Henriquez has been staying home and has isolated themselves in his home  He is taking care to not share personal items and is cleaning all surfaces that are touched often, like counters, tabletops, and doorknobs using household cleaning sprays or wipes  He is wearing a mask when he leaves his room  Had one loose bm yesterday   Started paxlovid yesterday morning,    Test was positive on Sunday    Lab Results   Component Value Date    SARSCOV2 Negative 12/17/2021       Review of Systems   Constitutional: Positive for chills (Not too bad)  Negative for fever  Fatigue:  foot fatigue improved  HENT: Positive for congestion ( improved) and rhinorrhea ( S a improved slight)  Negative for sore throat  Respiratory: Positive for cough, chest tightness ( occasional a a occasional) and shortness of breath ( fairly good)  Cardiovascular: Negative  Gastrointestinal: Negative for abdominal pain, diarrhea, nausea and vomiting  Genitourinary: Negative  Musculoskeletal: Positive for myalgias ( mild often on improved)  Neurological: Negative  Negative for headaches  Hematological: Negative  Psychiatric/Behavioral: Negative  Current Outpatient Medications on File Prior to Visit   Medication Sig   • albuterol (PROVENTIL HFA,VENTOLIN HFA) 90 mcg/act inhaler Inhale 2 puffs every 4 (four) hours as needed for wheezing or shortness of breath   • Alpha-Lipoic Acid 100 MG CAPS Take by mouth   • Ascorbic Acid (VITAMIN C) 100 MG tablet Take 500 mg by mouth    • aspirin 81 MG tablet Take 162 mg by mouth daily     • BIOTIN PO Take 5 mg by mouth daily   • Cholecalciferol (VITAMIN D3) 1000 units CAPS Take by mouth daily    • ciclopirox (LOPROX) 0 77 % cream APPLY CREAM TOPICALLY TWICE DAILY   GENTLY MASSAGE INTO AFFECTED AREAS AND SURROUNDING SKIN   • cyanocobalamin (VITAMIN B-12) 100 mcg tablet Take by mouth daily   • desloratadine (CLARINEX) 5 MG tablet TAKE 1 TABLET BY MOUTH AT BEDTIME   • famotidine (PEPCID) 20 mg tablet Take 20 mg by mouth daily   • FREESTYLE LITE test strip USE 1 STRIP TO CHECK GLUCOSE ONCE DAILY AS DIRECTED   • gabapentin (NEURONTIN) 100 mg capsule TAKE 1 CAPSULE BY MOUTH IN THE MORNING AND 3 AT BEDTIME   • glucosamine-chondroitin 500-400 MG tablet Take 1 tablet by mouth daily   • hydrochlorothiazide (HYDRODIURIL) 25 mg tablet Take 1 tablet by mouth once daily   • losartan (COZAAR) 50 mg tablet Take 1 tablet by mouth once daily   • Magnesium Hydroxide (MAGNESIA PO) Take by mouth 2 (two) times a day   • metFORMIN (GLUCOPHAGE) 1000 MG tablet TAKE 1 TABLET BY MOUTH TWICE DAILY WITH MEALS   • Multiple Vitamin (MULTIVITAMIN) capsule Take 1 capsule by mouth daily   • nirmatrelvir & ritonavir (Paxlovid, 300/100,) tablet therapy pack Take 3 tablets by mouth 2 (two) times a day for 5 days Take 2 nirmatrelvir tablets + 1 ritonavir tablet together per dose   • nitroglycerin (NITROSTAT) 0 3 mg SL tablet Place 1 tablet (0 3 mg total) under the tongue every 5 (five) minutes as needed for chest pain   • ONETOUCH DELICA LANCETS 90R MISC 1 Units by Does not apply route daily   • pramipexole (MIRAPEX) 0 5 mg tablet Take 1 tablet (0 5 mg total) by mouth daily at bedtime   • Semaglutide (Rybelsus) 3 MG TABS Take 3 mg by mouth in the morning   • torsemide (DEMADEX) 10 mg tablet TAKE 1 TABLET BY MOUTH ONCE DAILY (Patient taking differently: Take by mouth as needed)       Objective:    /79   Pulse 78   Temp 98 1 °F (36 7 °C)   Ht 5' 11" (1 803 m)   Wt 127 kg (281 lb)   SpO2 97%   BMI 39 19 kg/m²      Physical Exam  Barbara العراقي MD

## 2022-11-04 ENCOUNTER — HOSPITAL ENCOUNTER (OUTPATIENT)
Dept: RADIOLOGY | Facility: HOSPITAL | Age: 62
Discharge: HOME | End: 2022-11-04

## 2022-11-04 DIAGNOSIS — Z12.2 SCREENING FOR LUNG CANCER: ICD-10-CM

## 2022-11-04 LAB
DME PARACHUTE DELIVERY DATE ACTUAL: NORMAL
DME PARACHUTE DELIVERY DATE EXPECTED: NORMAL
DME PARACHUTE DELIVERY DATE REQUESTED: NORMAL
DME PARACHUTE DELIVERY NOTE: NORMAL
DME PARACHUTE ITEM DESCRIPTION: NORMAL
DME PARACHUTE ORDER STATUS: NORMAL
DME PARACHUTE SUPPLIER NAME: NORMAL
DME PARACHUTE SUPPLIER PHONE: NORMAL

## 2022-11-22 ENCOUNTER — OFFICE VISIT (OUTPATIENT)
Dept: AUDIOLOGY | Facility: CLINIC | Age: 62
End: 2022-11-22

## 2022-11-22 DIAGNOSIS — H90.3 SENSORINEURAL HEARING LOSS (SNHL), BILATERAL: Primary | ICD-10-CM

## 2022-11-22 NOTE — PROGRESS NOTES
ADULT HEARING EVALUATION - [place] [address] AUDIOLOGY      Patient Name: Amira Alva   MRN:  [id number]   :  1960   Age: 63 y o  Gender: male   DOS: 2022     HISTORY:     Amira Alva, a 63 y o  male, was seen on 2022 at the referral of Dr Rajani schmidt  provider found for an audiometric evaluation  Mr Maryann Hernadez was unaccompanied to today's visit  Mr Maryann Hernadez is a new patient to our practice  Today, Mr Winston Jolly primary complaint(s) was difficulty understanding speech  Onset of symptoms reportedly began gradually over several years  Mr Maryann Hernadez denied otalgia, otorrhea, aural fullness and tinnitus  History of otitis was negative  Mr Maryann Hernadez has no history of ear surgeries  Family history of hearing loss was Communication difficulties include needing to ask communication partners for speech repetitions  Other documented medical history states that Mr Maryann Hernadez  has a past medical history of Anemia, Bundle branch block, right, CAD (coronary artery disease), Cataract, CPAP (continuous positive airway pressure) dependence, Diabetes mellitus ([facility] [address] ), Diverticulitis, GERD (gastroesophageal reflux disease), History of coronary artery stent placement- pCx and OM2 (2017), Hyperlipidemia, Nasal septal deformity, Neuropathy, Obesity (BMI 30-39 9), Sleep apnea, and Vitamin D deficiency  Mr Maryann Hernadez has not had his hearing tested previously       RESULTS:    Otoscopic Evaluation:   Right Ear: Unremarkable, canal clear   Left Ear: Unremarkable, canal clear    Tympanometry:   Right Ear: Type A; normal middle ear pressure and static compliance    Left Ear: Type A; normal middle ear pressure and static compliance     Audiometry:  Conventional pure tone audiometry from 250 - 8000 Hz  obtained with good reliability and revealed the following:     Right Ear: normal to moderate sensorineural hearing loss (SNHL)   Left Ear: normal to moderate sensorineural hearing loss (SNHL)     Speech Audiometry:    Speech Reception (SRT)   Right Ear: 20 dB HL   Left Ear: 15 dB HL    Word Recognition Scores (WRS):  Right Ear: excellent (90 % correct)     Left Ear: excellent (100 % correct)   Stimuli: [id number]    IMPRESSIONS:  Bilateral HFSNHL, normal to moderate AU  The results of today's findings were reviewed with Mr Taya Grewal and his hearing thresholds were explained at length  Treatment options, including amplification and communication strategies, were discussed as appropriate  Mr Taya Grewal voiced understanding of his test results and had no further questions  RECOMMENDATIONS:    1 ) Follow-up with referring provider  2 ) Annual audiograms, sooner if problems/concerns arise  *see attached audiogram*    It was a pleasure working with Mr Taya Grewal today  Thank you for referring this patient  Nicolle Weston    Clinical Audiologist    [address] [address] [id number]

## 2022-11-24 DIAGNOSIS — J30.1 SEASONAL ALLERGIC RHINITIS DUE TO POLLEN: ICD-10-CM

## 2022-11-25 RX ORDER — DESLORATADINE 5 MG/1
TABLET ORAL
Qty: 90 TABLET | Refills: 0 | Status: SHIPPED | OUTPATIENT
Start: 2022-11-25

## 2022-12-16 ENCOUNTER — TELEMEDICINE (OUTPATIENT)
Dept: ENDOCRINOLOGY | Facility: CLINIC | Age: 62
End: 2022-12-16

## 2022-12-16 VITALS — HEIGHT: 71 IN | BODY MASS INDEX: 39.34 KG/M2 | WEIGHT: 281 LBS

## 2022-12-16 DIAGNOSIS — E04.1 THYROID NODULE: Primary | ICD-10-CM

## 2022-12-16 DIAGNOSIS — E55.9 VITAMIN D DEFICIENCY: ICD-10-CM

## 2022-12-16 DIAGNOSIS — E11.65 TYPE 2 DIABETES MELLITUS WITH HYPERGLYCEMIA, WITHOUT LONG-TERM CURRENT USE OF INSULIN (HCC): ICD-10-CM

## 2022-12-16 DIAGNOSIS — E11.9 TYPE 2 DIABETES MELLITUS WITHOUT COMPLICATION, WITHOUT LONG-TERM CURRENT USE OF INSULIN (HCC): ICD-10-CM

## 2022-12-16 DIAGNOSIS — I10 ESSENTIAL HYPERTENSION: ICD-10-CM

## 2022-12-16 DIAGNOSIS — E53.8 VITAMIN B 12 DEFICIENCY: ICD-10-CM

## 2022-12-16 DIAGNOSIS — E78.2 MIXED HYPERLIPIDEMIA: ICD-10-CM

## 2022-12-16 NOTE — PROGRESS NOTES
Virtual Regular Visit    Verification of patient location:    Patient is located in the following state in which I hold an active license [place]      Assessment/Plan:    Diagnoses and all orders for this visit:    Thyroid nodule    Thyroid ultrasound in September 2022, showed stable nodule  Follow-up ultrasound is recommended in 1 year  This nodule was biopsied previously and the result was benign    -     T4, free; Future  -     TSH, 3rd generation; Future  -     T4, free  -     TSH, 3rd generation    Type 2 diabetes mellitus without complication, without long-term current use of insulin ([facility])  Sugars are elevated average 200 mg per DL, once he is elevated  We will increase oral semaglutide to 7 mg daily  Continue metformin  Close to check blood sugar twice daily and send log for review in 2 to 3 weeks  His blood sugars are usually elevated after dinner, blood sugars are consistently elevated after dinner we can consider Prandin low-dose before dinner  Discussed hypoglycemia symptoms and treatment  Discussed  the importance of follow-up with ophthalmology and podiatry  -     Semaglutide (Rybelsus) 7 MG TABS; Take one tablet daily on empty stomach with full glass of water  -     Hemoglobin A1C; Future  -     Hemoglobin A1C    Type 2 diabetes mellitus with hyperglycemia, without long-term current use of insulin ([facility])    Lab Results   Component Value Date    HGBA1C 7 8 (H) 10/14/2022     A1c 7 8%  Adjustments made to the regimen  -     Comprehensive metabolic panel Lab Collect; Future  -     Microalbumin / creatinine urine ratio; Future  -     Comprehensive metabolic panel Lab Collect    Mixed hyperlipidemia    On statin therapy    Lab Results   Component Value Date    LDLCALC 79 10/14/2022   DL is at goal  Essential hypertension  Continue current management as per PCP  Vitamin D deficiency  Continue vitamin D3 supplementation 1000 IU daily  -     Vitamin D 25 hydroxy;  Future  -     Vitamin D 25 hydroxy    Vitamin B 12 deficiency  Continue current dose of vitamin B12 supplementation 100 mcg daily  -     Vitamin B12; Future  -     Vitamin B12        Problem List Items Addressed This Visit        Endocrine    Diabetes mellitus ([address] )    Relevant Medications    Semaglutide (Rybelsus) 7 MG TABS    Other Relevant Orders    Comprehensive metabolic panel Lab Collect    Microalbumin / creatinine urine ratio    Hemoglobin A1C    Thyroid nodule - Primary    Relevant Orders    T4, free    TSH, 3rd generation       Cardiovascular and Mediastinum    Essential hypertension       Other    Hyperlipidemia    Vitamin D deficiency    Relevant Orders    Vitamin D 25 hydroxy   Other Visit Diagnoses     Vitamin B 12 deficiency        Relevant Orders    Vitamin B12               Reason for visit is   Chief Complaint   Patient presents with   • Virtual Regular Visit        Encounter provider Vivian Noriega MD    Provider located at [place] [address]  [address] [place] [address] [address] [id number] [phone]      Recent Visits  No visits were found meeting these conditions  Showing recent visits within past 7 days and meeting all other requirements  Today's Visits  Date Type Provider Dept   12/16/22 Telemedicine Vivian Noriega MD [facility] Restrepo today's visits and meeting all other requirements  Future Appointments  No visits were found meeting these conditions  Showing future appointments within next 150 days and meeting all other requirements       The patient was identified by name and date of birth  Nicole Melgar was informed that this is a telemedicine visit and that the visit is being conducted through the [employer]  He agrees to proceed     My office door was closed  No one else was in the room    He acknowledged consent and understanding of privacy and security of the video platform  The patient has agreed to participate and understands they can discontinue the visit at any time  Patient is aware this is a billable service  Regan Perdomo is a 63 y o  male with medical history of type 2 diabetes managed on oral medication, hypertension, hyperlipidemia coronary artery disease, thyroid nodule, fatty liver and obesity is here for follow-up  He was last seen by Dr Lina Herrera in June 2022  Current regimen is metformin 1 g twice a day  Takes Rybelsus 3 5 mg daily  He checks blood sugars twice daily, his blood sugars are usually in 150-250 mf/dl   Diabetes course has been stable  Denies any hospitalization or new medical illnesses  He takes metformin 1000 mg twice a day, pulses 3 mg daily  Thyroid USG   THYROID ULTRASOUND     INDICATION:    E04 1: Nontoxic single thyroid nodule      COMPARISON:  None     TECHNIQUE:   Ultrasound of the thyroid was performed with a high frequency linear transducer in transverse and sagittal planes including volumetric imaging sweeps as well as traditional still imaging technique      FINDINGS:  Normal homogeneous smooth echotexture      Right lobe: 4 6 x 1 7 x 2 0 cm  Volume 7 2 mL  Left lobe:  4 8 x 1 5 x 2 1 cm  Volume 7 0 mL  Isthmus: 0 3  cm      Nodule #1  Image 43  LEFT midgland nodule measuring 1 1 x 0 9 x 1 cm  No priors for comparison  COMPOSITION:  2 points, solid or almost completely solid   ECHOGENICITY:  2 points, hypoechoic  SHAPE:  0 points, wider-than-tall  MARGIN: 0 points, smooth  ECHOGENIC FOCI:  0 points, none or large comet-tail artifacts  TI-RADS Classification: TR 3 (3 points), FNA if >2 5 cm    Follow if >1 5 cm      Additional subcentimeter thyroid nodules are visualized         IMPRESSION:     No nodule meets current ACR criteria for requiring biopsy but followup ultrasound is recommended in 1 year         Wt Readings from Last 3 Encounters:   12/16/22 127 kg (281 lb)   10/27/22 127 kg (281 lb)   10/24/22 127 kg (281 lb)       Lab Results   Component Value Date    LDLCALC 79 10/14/2022       Component      Latest Ref Rng & Units 7/2/2022 10/14/2022   Glucose, Random      70 - 99 mg/dL 172 (H) 186 (H)   BUN      6 - 24 mg/dL 15 17   Creatinine      0 76 - 1 27 mg/dL 1 12 1 16   eGFR      >59 mL/min/1 73 76 73   SL AMB BUN/CREATININE RATIO      9 - 20 13 15   Sodium      134 - 144 mmol/L 136 138   Potassium      3 5 - 5 2 mmol/L 4 4 4 4   Chloride      96 - 106 mmol/L 97 99   CO2      20 - 29 mmol/L 23 22   CALCIUM      8 7 - 10 2 mg/dL 9 6 9 5   Total Protein      6 0 - 8 5 g/dL 7 2 7 4   Albumin      3 8 - 4 9 g/dL 4 5 4 6   Globulin, Total      1 5 - 4 5 g/dL 2 7 2 8   Albumin/Globulin Ratio      1 2 - 2 2 1 7 1 6   TOTAL BILIRUBIN      0 0 - 1 2 mg/dL 0 5 0 4   ALKALINE PHOSPHATASE ISOENZYMES      44 - 121 IU/L 55 61   AST      0 - 40 IU/L 44 (H) 31   ALT      0 - 44 IU/L 53 (H) 45 (H)   Cholesterol      100 - 199 mg/dL 123 145   Triglycerides      0 - 149 mg/dL 220 (H) 161 (H)   HDL      >39 mg/dL 33 (L) 38 (L)   VLDL Cholesterol Huber      5 - 40 mg/dL 36 28   LDL Calculated      0 - 99 mg/dL 54 79   T   Chol/HDL Ratio      0 0 - 5 0 ratio 3 7 3 8   EXT Creatinine Urine      Not Estab  mg/dL 96 7    MICROALBUM ,U,RANDOM      Not Estab  ug/mL 8 2    MICROALBUMIN/CREATININE RATIO      0 - 29 mg/g creat 8    Hemoglobin A1C      4 8 - 5 6 % 8 1 (H) 7 8 (H)   eAG, EST AVG Glucose      mg/dL 186 177   Magnesium      1 6 - 2 3 mg/dL 2 2        Past Medical History:   Diagnosis Date   • Anemia    • Bundle branch block, right    • CAD (coronary artery disease)    • Cataract    • CPAP (continuous positive airway pressure) dependence    • Diabetes mellitus (HCC)     borderline, controlled with diet and activity   • Diverticulitis    • GERD (gastroesophageal reflux disease)    • History of coronary artery stent placement- pCx and OM2 11/24/2017   • Hyperlipidemia    • Nasal septal deformity    • Neuropathy    • Obesity (BMI 30-39  9)    • Sleep apnea     wears c-pap   • Vitamin D deficiency        Past Surgical History:   Procedure Laterality Date   • COLON SIGMOID RESECTION N/A 12/16/2016    Procedure: RESECTION COLON SIGMOID;  Surgeon: Theresa De Dios MD;  Location: [facility];  Service:    • COLON SIGMOID RESECTION LAPAROSCOPIC N/A 12/16/2016    Procedure: RESECTION COLON SIGMOID LAPAROSCOPIC:CONVERTED TO [email or web address];  Surgeon: Theresa De Dios MD;  Location: [facility];  Service:    • COLON SURGERY      Sigmoidectomy   • COLONOSCOPY N/A 10/6/2016    Procedure: COLONOSCOPY;  Surgeon: Mervat Allred MD;  Location: [place] [address] GI LAB; Service:    • CYSTOSCOPY W/ RETROGRADES Left 2/3/2017    Procedure: CYSTOSCOPY WITH BILATERAL RETROGRADES, LEFT STENT REMOVAL;  Surgeon: Jimmie Leon MD;  Location: [address] [place] [address];  Service:    • ESOPHAGOGASTRODUODENOSCOPY N/A 10/6/2016    Procedure: ESOPHAGOGASTRODUODENOSCOPY (EGD); Surgeon: Mervat Allred MD;  Location: [place] [address] GI LAB; Service:    • HERNIA REPAIR     • KNEE ARTHROSCOPY W/ MENISCECTOMY     • WA CYSTOURETHROSCOPY,URETER CATHETER Left 12/4/2016    Procedure: CYSTOSCOPY RETROGRADE PYELOGRAM WITH INSERTION STENT URETERAL;  Surgeon: Jimmie Leon MD;  Location: [address] [place] [address];  Service: Urology   • US GUIDED THYROID BIOPSY  9/27/2021   • VARICOSE VEIN SURGERY         Current Outpatient Medications   Medication Sig Dispense Refill   • albuterol (PROVENTIL HFA,VENTOLIN HFA) 90 mcg/act inhaler Inhale 2 puffs every 4 (four) hours as needed for wheezing or shortness of breath 18 g 6   • Alpha-Lipoic Acid 100 MG CAPS Take by mouth     • Ascorbic Acid (VITAMIN C) 100 MG tablet Take 500 mg by mouth      • aspirin 81 MG tablet Take 162 mg by mouth daily       • BIOTIN PO Take 5 mg by mouth daily     • Cholecalciferol (VITAMIN D3) 1000 units CAPS Take by mouth daily      • ciclopirox (LOPROX) 0 77 % cream APPLY CREAM TOPICALLY TWICE DAILY   GENTLY MASSAGE INTO AFFECTED AREAS AND SURROUNDING SKIN     • cyanocobalamin (VITAMIN B-12) 100 mcg tablet Take by mouth daily     • desloratadine (CLARINEX) 5 MG tablet TAKE 1 TABLET BY MOUTH AT BEDTIME 90 tablet 0   • famotidine (PEPCID) 20 mg tablet Take 20 mg by mouth daily     • FREESTYLE LITE test strip USE 1 STRIP TO CHECK GLUCOSE ONCE DAILY AS DIRECTED 100 each 0   • gabapentin (NEURONTIN) 100 mg capsule TAKE 1 CAPSULE BY MOUTH IN THE MORNING AND 3 AT BEDTIME 360 capsule 1   • glucosamine-chondroitin 500-400 MG tablet Take 1 tablet by mouth daily     • hydrochlorothiazide (HYDRODIURIL) 25 mg tablet Take 1 tablet by mouth once daily 90 tablet 3   • losartan (COZAAR) 50 mg tablet Take 1 tablet by mouth once daily 90 tablet 0   • Magnesium Hydroxide (MAGNESIA PO) Take by mouth 2 (two) times a day     • metFORMIN (GLUCOPHAGE) 1000 MG tablet TAKE 1 TABLET BY MOUTH TWICE DAILY WITH MEALS 180 tablet 1   • Multiple Vitamin (MULTIVITAMIN) capsule Take 1 capsule by mouth daily     • nitroglycerin (NITROSTAT) 0 3 mg SL tablet Place 1 tablet (0 3 mg total) under the tongue every 5 (five) minutes as needed for chest pain 25 tablet 1   • ONETOUCH DELICA LANCETS 53B MISC 1 Units by Does not apply route daily 100 each 6   • pramipexole (MIRAPEX) 0 5 mg tablet Take 1 tablet (0 5 mg total) by mouth daily at bedtime 30 tablet 4   • Semaglutide (Rybelsus) 7 MG TABS Take one tablet daily on empty stomach with full glass of water 30 tablet 5   • torsemide (DEMADEX) 10 mg tablet TAKE 1 TABLET BY MOUTH ONCE DAILY (Patient taking differently: Take by mouth as needed) 90 tablet 3     Current Facility-Administered Medications   Medication Dose Route Frequency Provider Last Rate Last Admin   • cyanocobalamin injection 1,000 mcg  1,000 mcg Intramuscular Q30 Days [facility]   1,000 mcg at 09/10/21 1141        Allergies   Allergen Reactions   • Levaquin [Levofloxacin In D5w] Swelling     Knee swelling   • Sulfa Antibiotics GI Intolerance     Abdominal pain         Review of Systems   Constitutional: Negative for activity change, diaphoresis, fatigue, fever and unexpected weight change  HENT: Negative  Eyes: Negative for visual disturbance  Respiratory: Negative for cough, chest tightness and shortness of breath  Cardiovascular: Negative for chest pain, palpitations and leg swelling  Gastrointestinal: Negative for abdominal pain, blood in stool, constipation, diarrhea, nausea and vomiting  Endocrine: Negative for cold intolerance, heat intolerance, polydipsia, polyphagia and polyuria  Genitourinary: Negative for dysuria, enuresis, frequency and urgency  Musculoskeletal: Negative for arthralgias and myalgias  Skin: Negative for pallor, rash and wound  Allergic/Immunologic: Negative  Neurological: Negative for dizziness, tremors, weakness and numbness  Hematological: Negative  Psychiatric/Behavioral: Negative  Video Exam    Vitals:    12/16/22 1015   Weight: 127 kg (281 lb)   Height: 5' 11" (1 803 m)       Physical Exam  Constitutional:       General: He is not in acute distress  Appearance: Normal appearance  He is not ill-appearing  HENT:      Head: Normocephalic and atraumatic  Nose: Nose normal    Eyes:      Extraocular Movements: Extraocular movements intact  Conjunctiva/sclera: Conjunctivae normal    Pulmonary:      Effort: No respiratory distress  Musculoskeletal:      Cervical back: Normal range of motion  Neurological:      General: No focal deficit present  Mental Status: He is alert and oriented to person, place, and time     Psychiatric:         Mood and Affect: Mood normal          Behavior: Behavior normal           I spent 20 minutes directly with the patient during this visit

## 2023-01-08 DIAGNOSIS — E78.2 MIXED HYPERLIPIDEMIA: ICD-10-CM

## 2023-01-12 ENCOUNTER — RA CDI HCC (OUTPATIENT)
Dept: OTHER | Facility: HOSPITAL | Age: 63
End: 2023-01-12

## 2023-01-12 RX ORDER — ATORVASTATIN CALCIUM 40 MG/1
TABLET, FILM COATED ORAL
Qty: 90 TABLET | Refills: 0 | Status: SHIPPED | OUTPATIENT
Start: 2023-01-12

## 2023-01-12 NOTE — PROGRESS NOTES
[facility]  coding opportunities          Chart Reviewed number of suggestions sent to Provider: 3     Patients Insurance        Commercial Insurance: Jose Dodge  [id number]  [id number]

## 2023-01-13 LAB
ALBUMIN SERPL-MCNC: 4.5 G/DL (ref 3.8–4.9)
ALBUMIN/GLOB SERPL: 1.7 {RATIO} (ref 1.2–2.2)
ALP SERPL-CCNC: 76 IU/L (ref 44–121)
ALT SERPL-CCNC: 73 IU/L (ref 0–44)
AST SERPL-CCNC: 49 IU/L (ref 0–40)
BASOPHILS # BLD AUTO: 0.1 X10E3/UL (ref 0–0.2)
BASOPHILS NFR BLD AUTO: 1 %
BILIRUB SERPL-MCNC: 0.5 MG/DL (ref 0–1.2)
BUN SERPL-MCNC: 13 MG/DL (ref 8–27)
BUN/CREAT SERPL: 14 (ref 10–24)
CALCIUM SERPL-MCNC: 9.9 MG/DL (ref 8.6–10.2)
CHLORIDE SERPL-SCNC: 100 MMOL/L (ref 96–106)
CHOLEST SERPL-MCNC: 165 MG/DL (ref 100–199)
CHOLEST/HDLC SERPL: 4.7 RATIO (ref 0–5)
CO2 SERPL-SCNC: 26 MMOL/L (ref 20–29)
CREAT SERPL-MCNC: 0.92 MG/DL (ref 0.76–1.27)
EGFR: 95 ML/MIN/1.73
EOSINOPHIL # BLD AUTO: 0.3 X10E3/UL (ref 0–0.4)
EOSINOPHIL NFR BLD AUTO: 4 %
ERYTHROCYTE [DISTWIDTH] IN BLOOD BY AUTOMATED COUNT: 13.1 % (ref 11.6–15.4)
EST. AVERAGE GLUCOSE BLD GHB EST-MCNC: 186 MG/DL
GLOBULIN SER-MCNC: 2.6 G/DL (ref 1.5–4.5)
GLUCOSE SERPL-MCNC: 171 MG/DL (ref 70–99)
HBA1C MFR BLD: 8.1 % (ref 4.8–5.6)
HCT VFR BLD AUTO: 43.1 % (ref 37.5–51)
HDLC SERPL-MCNC: 35 MG/DL
HGB BLD-MCNC: 14.5 G/DL (ref 13–17.7)
IMM GRANULOCYTES # BLD: 0.1 X10E3/UL (ref 0–0.1)
IMM GRANULOCYTES NFR BLD: 1 %
LDLC SERPL CALC-MCNC: 88 MG/DL (ref 0–99)
LYMPHOCYTES # BLD AUTO: 1.9 X10E3/UL (ref 0.7–3.1)
LYMPHOCYTES NFR BLD AUTO: 31 %
MCH RBC QN AUTO: 33.3 PG (ref 26.6–33)
MCHC RBC AUTO-ENTMCNC: 33.6 G/DL (ref 31.5–35.7)
MCV RBC AUTO: 99 FL (ref 79–97)
MONOCYTES # BLD AUTO: 0.6 X10E3/UL (ref 0.1–0.9)
MONOCYTES NFR BLD AUTO: 10 %
NEUTROPHILS # BLD AUTO: 3.4 X10E3/UL (ref 1.4–7)
NEUTROPHILS NFR BLD AUTO: 53 %
PLATELET # BLD AUTO: 268 X10E3/UL (ref 150–450)
POTASSIUM SERPL-SCNC: 4.6 MMOL/L (ref 3.5–5.2)
PROT SERPL-MCNC: 7.1 G/DL (ref 6–8.5)
RBC # BLD AUTO: 4.36 X10E6/UL (ref 4.14–5.8)
SL AMB VLDL CHOLESTEROL CALC: 42 MG/DL (ref 5–40)
SODIUM SERPL-SCNC: 139 MMOL/L (ref 134–144)
TRIGL SERPL-MCNC: 247 MG/DL (ref 0–149)
WBC # BLD AUTO: 6.2 X10E3/UL (ref 3.4–10.8)

## 2023-01-20 ENCOUNTER — OFFICE VISIT (OUTPATIENT)
Dept: INTERNAL MEDICINE CLINIC | Facility: CLINIC | Age: 63
End: 2023-01-20

## 2023-01-20 VITALS
HEIGHT: 71 IN | BODY MASS INDEX: 39.06 KG/M2 | HEART RATE: 76 BPM | WEIGHT: 279 LBS | DIASTOLIC BLOOD PRESSURE: 72 MMHG | TEMPERATURE: 98 F | OXYGEN SATURATION: 96 % | SYSTOLIC BLOOD PRESSURE: 140 MMHG | RESPIRATION RATE: 16 BRPM

## 2023-01-20 DIAGNOSIS — E66.9 OBESITY (BMI 30-39.9): ICD-10-CM

## 2023-01-20 DIAGNOSIS — I25.10 CORONARY ARTERY DISEASE INVOLVING NATIVE CORONARY ARTERY OF NATIVE HEART WITHOUT ANGINA PECTORIS: ICD-10-CM

## 2023-01-20 DIAGNOSIS — R19.4 CHANGE IN BOWEL HABIT: ICD-10-CM

## 2023-01-20 DIAGNOSIS — I50.32 CHRONIC DIASTOLIC (CONGESTIVE) HEART FAILURE (HCC): ICD-10-CM

## 2023-01-20 DIAGNOSIS — I10 ESSENTIAL HYPERTENSION: ICD-10-CM

## 2023-01-20 DIAGNOSIS — G62.9 NEUROPATHY: ICD-10-CM

## 2023-01-20 DIAGNOSIS — R10.12 LEFT UPPER QUADRANT ABDOMINAL PAIN: Primary | ICD-10-CM

## 2023-01-20 DIAGNOSIS — N40.0 BENIGN PROSTATIC HYPERPLASIA WITHOUT LOWER URINARY TRACT SYMPTOMS: ICD-10-CM

## 2023-01-20 DIAGNOSIS — E11.40 NEUROPATHY DUE TO TYPE 2 DIABETES MELLITUS (HCC): ICD-10-CM

## 2023-01-20 DIAGNOSIS — E83.42 HYPOMAGNESEMIA: ICD-10-CM

## 2023-01-20 DIAGNOSIS — E78.2 MIXED HYPERLIPIDEMIA: ICD-10-CM

## 2023-01-20 DIAGNOSIS — G25.81 RESTLESS LEG SYNDROME: ICD-10-CM

## 2023-01-20 DIAGNOSIS — E11.65 TYPE 2 DIABETES MELLITUS WITH HYPERGLYCEMIA, WITHOUT LONG-TERM CURRENT USE OF INSULIN (HCC): ICD-10-CM

## 2023-01-20 PROBLEM — R06.02 SOB (SHORTNESS OF BREATH): Status: RESOLVED | Noted: 2017-12-01 | Resolved: 2023-01-20

## 2023-01-20 PROBLEM — R10.9 ABDOMINAL DISCOMFORT: Status: ACTIVE | Noted: 2023-01-20

## 2023-01-20 PROBLEM — U07.1 COVID-19: Status: RESOLVED | Noted: 2022-10-24 | Resolved: 2023-01-20

## 2023-01-20 NOTE — ASSESSMENT & PLAN NOTE
Change in the bowel habit is a new problem for last 3 weeks or so  Also simultaneously his diabetic medications were adjusted recently  Appears to have 2 different component of the abdominal discomfort 1 appears to be gastroparesis in the left upper quadrant  Otherwise just left-sided discomfort probably related to change in bowel habit  Will do appropriate work-up with CBC CMP LDH urine analysis sed rate amylase lipase TSH  Other major issue is change in bowel habit he does not have a bowel movement for days or couple of days then small bowel movement followed by a large bowel movement on probably day #3  The symptoms are going on for last 3 weeks  Of note patient's Rybelsus were recently increased  There is no blood in the bowel movement or weight loss or fever or chills  Will do CT scan of abdomen and pelvis  Will refer back to the gastroenterologist   Will add Metamucil 1 tablespoonful daily in the morning or at lunchtime  Recommend MiraLAX 17 g every other day and keep track of blood bowel movement  Increase activity that will help your bowel movement  Will see you back in 2 to 3 weeks  You will need to see gastroenterologist please call and set up an appointment  Also does appear to have likely gastroparesis

## 2023-01-20 NOTE — ASSESSMENT & PLAN NOTE
Lab Results   Component Value Date    LDLCALC 88 01/13/2023     Lab Results   Component Value Date    ALT 73 (H) 01/13/2023     Lab Results   Component Value Date    CHOLESTEROL 165 01/13/2023    CHOLESTEROL 145 10/14/2022    CHOLESTEROL 123 07/02/2022     Lab Results   Component Value Date    HDL 35 (L) 01/13/2023    HDL 38 (L) 10/14/2022    HDL 33 (L) 07/02/2022     Lab Results   Component Value Date    TRIG 247 (H) 01/13/2023    TRIG 161 (H) 10/14/2022    TRIG 220 (H) 07/02/2022   DL slightly went up to 88  Triglyceride 247  Remains on atorvastatin 40 mg daily  We will continue that at this time

## 2023-01-20 NOTE — ASSESSMENT & PLAN NOTE
Appears to have 2 different component of the abdominal discomfort 1 appears to be gastroparesis in the left upper quadrant  Otherwise just left-sided discomfort probably related to change in bowel habit  Will do appropriate work-up with CBC CMP LDH urine analysis sed rate amylase lipase TSH  Other major issue is change in bowel habit he does not have a bowel movement for days or couple of days then small bowel movement followed by a large bowel movement on probably day #3  The symptoms are going on for last 3 weeks  Of note patient's Rybelsus were recently increased  There is no blood in the bowel movement or weight loss or fever or chills  Will do CT scan of abdomen and pelvis  Will refer back to the gastroenterologist   Will add Metamucil 1 tablespoonful daily in the morning or at lunchtime  Recommend MiraLAX 17 g every other day and keep track of blood bowel movement  Increase activity that will help your bowel movement  Will see you back in 2 to 3 weeks  You will need to see gastroenterologist please call and set up an appointment

## 2023-01-20 NOTE — ASSESSMENT & PLAN NOTE
Hypertension excellent control  Will discontinue torsemide  He has been not taking very often anyway  Will continue hydrochlorothiazide as well as the losartan  May have to discontinue hydrochlorothiazide if continues to have problem with the bowel movement    Recommend increase fluid intake meanwhile

## 2023-01-20 NOTE — ASSESSMENT & PLAN NOTE
Lab Results   Component Value Date    HGBA1C 8 1 (H) 01/13/2023   Diabetic suboptimal control  We will continue metformin 1000 mg twice a day  Patient's Rybelsus were increased in December from 3 to 7 mg  However he does appear to have some change in bowel habit  Also hemoglobin A1c  Is uncontrolled  Recommend to see endocrinologist and consider alternate medications to Rybelsus given this change as well as the uncontrolled diabetes    He will call and set up an appointment

## 2023-01-20 NOTE — PROGRESS NOTES
Name: Layla Duque      : 1960      MRN: [id number]  Encounter Provider: Luci Bauer MD  Encounter Date: 2023   Encounter department: [facility] INTERNAL MEDICINE    Assessment & Plan     1  Left upper quadrant abdominal pain  Assessment & Plan:  Appears to have 2 different component of the abdominal discomfort 1 appears to be gastroparesis in the left upper quadrant  Otherwise just left-sided discomfort probably related to change in bowel habit  Will do appropriate work-up with CBC CMP LDH urine analysis sed rate amylase lipase TSH  Other major issue is change in bowel habit he does not have a bowel movement for days or couple of days then small bowel movement followed by a large bowel movement on probably day #3  The symptoms are going on for last 3 weeks  Of note patient's Rybelsus were recently increased  There is no blood in the bowel movement or weight loss or fever or chills  Will do CT scan of abdomen and pelvis  Will refer back to the gastroenterologist   Will add Metamucil 1 tablespoonful daily in the morning or at lunchtime  Recommend MiraLAX 17 g every other day and keep track of blood bowel movement  Increase activity that will help your bowel movement  Will see you back in 2 to 3 weeks  You will need to see gastroenterologist please call and set up an appointment  Orders:  -     TSH, 3rd generation; Future; Expected date: 2023  -     Sedimentation rate, automated; Future; Expected date: 2023  -     Amylase; Future; Expected date: 2023  -     UA (URINE) with reflex to Scope  -     Ambulatory referral to Gastroenterology; Future  -     LD,Blood; Future; Expected date: 2023  -     CBC; Future; Expected date: 2023  -     Comprehensive metabolic panel; Future; Expected date: 2023  -     Lipase; Future; Expected date: 2023  -     Magnesium;  Future; Expected date: 2023  -     TSH, 3rd generation  -     Sedimentation rate, automated  -     Amylase  -     LD,Blood  -     CBC  -     Comprehensive metabolic panel  -     Lipase  -     Magnesium  -     CT abdomen pelvis w contrast; Future; Expected date: 01/27/2023    2  Chronic diastolic (congestive) heart failure ([address] )    3  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 2 diabetes mellitus [facility])  Assessment & Plan:    Lab Results   Component Value Date    HGBA1C 8 1 (H) 01/13/2023   Neuropathy fair control  We will continue gabapentin      4  Type 2 diabetes mellitus with hyperglycemia, without long-term current use of insulin [facility])  Assessment & Plan:    Lab Results   Component Value Date    HGBA1C 8 1 (H) 01/13/2023   Diabetic suboptimal control  We will continue metformin 1000 mg twice a day  Patient's Rybelsus were increased in December from 3 to 7 mg  However he does appear to have some change in bowel habit  Also hemoglobin A1c  Is uncontrolled  Recommend to see endocrinologist and consider alternate medications to Rybelsus given this change as well as the uncontrolled diabetes  He will call and set up an appointment      5  Essential hypertension  Assessment & Plan:  Hypertension excellent control  Will discontinue torsemide  He has been not taking very often anyway  Will continue hydrochlorothiazide as well as the losartan  May have to discontinue hydrochlorothiazide if continues to have problem with the bowel movement  Recommend increase fluid intake meanwhile      6  Coronary artery disease involving native coronary artery of native heart without angina pectoris  Assessment & Plan:  CAD symptom free  Patient will continue to follow with cardiologist   Continue same drug regimen      7  Neuropathy    8  Benign prostatic hyperplasia without lower urinary tract symptoms  Assessment & Plan:  BPH fair  Sees urologist as needed  Lab Results   Component Value Date    PSA 0 5 01/14/2022    PSA 0 4 11/06/2020           9   Mixed hyperlipidemia  Assessment & Plan:  Lab Results   Component Value Date    LDLCALC 88 01/13/2023     Lab Results   Component Value Date    ALT 73 (H) 01/13/2023     Lab Results   Component Value Date    CHOLESTEROL 165 01/13/2023    CHOLESTEROL 145 10/14/2022    CHOLESTEROL 123 07/02/2022     Lab Results   Component Value Date    HDL 35 (L) 01/13/2023    HDL 38 (L) 10/14/2022    HDL 33 (L) 07/02/2022     Lab Results   Component Value Date    TRIG 247 (H) 01/13/2023    TRIG 161 (H) 10/14/2022    TRIG 220 (H) 07/02/2022   DL slightly went up to 88  Triglyceride 247  Remains on atorvastatin 40 mg daily  We will continue that at this time  10  Obesity (BMI 30-39 9)    11  Restless leg syndrome    12  Change in bowel habit  Assessment & Plan:  Change in the bowel habit is a new problem for last 3 weeks or so  Also simultaneously his diabetic medications were adjusted recently  Appears to have 2 different component of the abdominal discomfort 1 appears to be gastroparesis in the left upper quadrant  Otherwise just left-sided discomfort probably related to change in bowel habit  Will do appropriate work-up with CBC CMP LDH urine analysis sed rate amylase lipase TSH  Other major issue is change in bowel habit he does not have a bowel movement for days or couple of days then small bowel movement followed by a large bowel movement on probably day #3  The symptoms are going on for last 3 weeks  Of note patient's Rybelsus were recently increased  There is no blood in the bowel movement or weight loss or fever or chills  Will do CT scan of abdomen and pelvis  Will refer back to the gastroenterologist   Will add Metamucil 1 tablespoonful daily in the morning or at lunchtime  Recommend MiraLAX 17 g every other day and keep track of blood bowel movement  Increase activity that will help your bowel movement  Will see you back in 2 to 3 weeks      You will need to see gastroenterologist please call and set up an appointment  Also does appear to have likely gastroparesis  Orders:  -     TSH, 3rd generation; Future; Expected date: 01/27/2023  -     Sedimentation rate, automated; Future; Expected date: 01/27/2023  -     Amylase; Future; Expected date: 01/27/2023  -     UA (URINE) with reflex to Scope  -     Ambulatory referral to Gastroenterology; Future  -     LD,Blood; Future; Expected date: 01/27/2023  -     CBC; Future; Expected date: 01/27/2023  -     Comprehensive metabolic panel; Future; Expected date: 01/27/2023  -     Lipase; Future; Expected date: 01/27/2023  -     Magnesium; Future; Expected date: 01/27/2023  -     TSH, 3rd generation  -     Sedimentation rate, automated  -     Amylase  -     LD,Blood  -     CBC  -     Comprehensive metabolic panel  -     Lipase  -     Magnesium  -     CT abdomen pelvis w contrast; Future; Expected date: 01/27/2023    13  Hypomagnesemia  Assessment & Plan:  Change in bowel habit  We will check magnesium level  Currently taking magnesium twice a day        BMI Counseling: Body mass index is 38 91 kg/m²  The BMI is above normal  Nutrition recommendations include decreasing portion sizes, encouraging healthy choices of fruits and vegetables, decreasing fast food intake, consuming healthier snacks, moderation in carbohydrate intake and increasing intake of lean protein  Exercise recommendations include exercising 3-5 times per week and obtaining a gym membership  Rationale for BMI follow-up plan is due to patient being overweight or obese  Tony Luaer is here for chronic disease management however patient is a to new complaint  First 1: Change in bowel habit: Change in the bowel habit is a new problem for last 3 weeks or so  Also simultaneously his diabetic medications were adjusted recently  He usually have a some tendency for constipation then every 3 days he has a large bowel movement and then couple of loose bowel movement    He has tried multiple stool softeners with partial or limited help  He does get some abdominal discomfort as he is bowel movement builds up  He had remote history of diverticulitis  He also required sigmoid colectomy  He denies any hematochezia melena diarrhea  He denies any travel history  He does heavy fiber meal   Symptoms are there for 3 weeks  He thinks since diabetic medications were adjusted symptoms happen  Second complaint is that of a abdominal discomfort  Abdominal discomfort is localized on the left side mostly on the left upper quadrant  It is more like a discomfort of the stomach is filled up and takes couple of hours after eating to settle down  Also left lower quadrant discomfort may be related to change in bowel habit at times  Correlates to change in his diabetic medications  He denies any blood in the urine denies any fever chills denies any nausea vomiting denies any rash denies any vomiting blood  Denies any dysphagia odynophagia  Of note patient's Rybelsus were recently increased 3-4 weeks ago    Chronic disease: Diabetes: Remains on metformin 1000 mg twice a day as well as Rybelsus which were increased to 7 mg in the recent past   By endocrinologist  Allergic rhinitis symptoms are fair control remains on nasal spray saline nasal spray and Clarinex as needed  Hypertension remains on losartan 50 mg daily and HCTZ 25 mg daily no symptoms related to it  CAD denies any chest pain palpitation PND orthopnea remains on aspirin losartan hydrochlorothiazide Lipitor and being followed by cardiologist   Hyperlipidemia symptom-free, on statin atorvastatin 40 mg daily  Vitamin-D deficiency remains on 1000 unit  BMI 38:   Edema of legs fair, remains HCTZ for BP and Torsemide for edema by cardiologist  Colonic polyp colonoscopy January 2021  Colonoscopic findings were reviewed  Recommended colonoscopy in 3 years due to personal history of polyps per GI  GERD: No dysphagia odynophagia    Does get some regurgitation  PSA 11-6-2020: 0 4  Neuropathy fair; tolerating gabapentin  Remains on gabapentin 100 mg daily and 300 mg at nighttime  Patient was advised to increase pramipexole however currently he continues taking lower dose  He does have a varicose vein and chronic stasis dermatitis  Advised to use compression stockings  Thyroid nodule:  Patient seen by endocrinologist going for thyroid ultrasound for surveillance no compressive symptoms    Diet exercise weight loss management  Annual lung cancer screening with low dose CT chest was ordered today  Flu shot given at the clinic today  He will get COVID bivalent vaccine in 2 weeks  Ambulatory referral to audiogram  Follow up in 3 months with repeat labs  No visits with results within 2 Week(s) from this visit  Latest known visit with results is:  Office Visit on 10/21/2022  Hemoglobin A1C            Value: 8 1(%) (H)         Dt: 01/13/2023  Estimated Average Glucose Value: 186(mg/dL)         Dt: 01/13/2023  Cholesterol, Total        Value: 165(mg/dL)         Dt: 01/13/2023  Triglycerides             Value: 247(mg/dL) (H)     Dt: 01/13/2023  HDL                       Value: 35(mg/dL) (L)      Dt: 01/13/2023  VLDL Cholesterol Calcula* Value: 42(mg/dL) (H)      Dt: 01/13/2023  LDL Calculated            Value: 88(mg/dL)          Dt: 01/13/2023  T  Chol/HDL Ratio         Value: 4  7(ratio)         Dt: 01/13/2023  White Blood Cell Count    Value: 6 2(x10E3/uL)      Dt: 01/13/2023  Red Blood Cell Count      Value: 4 36(x10E6/uL)     Dt: 01/13/2023  Hemoglobin                Value: 14 5(g/dL)         Dt: 01/13/2023  HCT                       Value: 43 1(%)            Dt: 01/13/2023  MCV                       Value: 99(fL) (H)         Dt: 01/13/2023  MCH                       Value: 33 3(pg) (H)       Dt: 01/13/2023  MCHC                      Value: 33 6(g/dL)         Dt: 01/13/2023  RDW                       Value: 13 1(%)            Dt: 01/13/2023  Platelet Count            Value: 268(x10E3/uL)      Dt: 01/13/2023  Neutrophils               Value: 53(%)              Dt: 01/13/2023  Lymphocytes               Value: 31(%)              Dt: 01/13/2023  Monocytes                 Value: 10(%)              Dt: 01/13/2023  Eosinophils               Value: 4(%)               Dt: 01/13/2023  Basophils PCT             Value: 1(%)               Dt: 01/13/2023  Neutrophils (Absolute)    Value: 3 4(x10E3/uL)      Dt: 01/13/2023  Lymphocytes (Absolute)    Value: 1 9(x10E3/uL)      Dt: 01/13/2023  Monocytes (Absolute)      Value: 0 6(x10E3/uL)      Dt: 01/13/2023  Eosinophils (Absolute)    Value: 0 3(x10E3/uL)      Dt: 01/13/2023  Basophils ABS             Value: 0 1(x10E3/uL)      Dt: 01/13/2023  Immature Granulocytes     Value: 1(%)               Dt: 01/13/2023  Immature Granulocytes (A* Value: 0 1(x10E3/uL)      Dt: 01/13/2023  Glucose, Random           Value: 171(mg/dL) (H)     Dt: 01/13/2023  BUN                       Value: 13(mg/dL)          Dt: 01/13/2023  Creatinine                Value: 0 92(mg/dL)        Dt: 01/13/2023  eGFR                      Value: 95(mL/min/1 73)    Dt: 01/13/2023  SL AMB BUN/CREATININE RA* Value: 14                 Dt: 01/13/2023  Sodium                    Value: 139(mmol/L)        Dt: 01/13/2023  Potassium                 Value: 4 6(mmol/L)        Dt: 01/13/2023  Chloride                  Value: 100(mmol/L)        Dt: 01/13/2023  CO2                       Value: 26(mmol/L)         Dt: 01/13/2023  CALCIUM                   Value: 9 9(mg/dL)         Dt: 01/13/2023  Protein, Total            Value: 7 1(g/dL)          Dt: 01/13/2023  Albumin                   Value: 4 5(g/dL)          Dt: 01/13/2023  Globulin, Total           Value: 2 6(g/dL)          Dt: 01/13/2023  Albumin/Globulin Ratio    Value: 1 7                Dt: 01/13/2023  TOTAL BILIRUBIN           Value: 0 5(mg/dL)         Dt: 01/13/2023  Alk Phos Isoenzymes       Value: 76(IU/L) Dt: 01/13/2023  AST                       Value: 49(IU/L) (H)       Dt: 01/13/2023  ALT                       Value: 73(IU/L) (H)       Dt: 01/13/2023  ------------ - 2 weeks      Review of Systems   Constitutional: Negative for appetite change, fatigue, fever and unexpected weight change  HENT: Negative for congestion, ear pain and sore throat  Eyes: Negative for pain and redness  Respiratory: Negative for cough and shortness of breath  Cardiovascular: Negative for chest pain, palpitations and leg swelling  Gastrointestinal: Positive for abdominal distention, abdominal pain, constipation and diarrhea  Negative for nausea and vomiting  Endocrine: Negative for cold intolerance, polydipsia and polyuria  Genitourinary: Negative for dysuria, frequency, hematuria and urgency  Musculoskeletal: Negative for arthralgias, gait problem and myalgias  Skin: Negative for rash  Allergic/Immunologic: Negative  Neurological: Negative for dizziness and headaches  Hematological: Negative for adenopathy  Psychiatric/Behavioral: Negative for behavioral problems  Past Medical History:   Diagnosis Date   • Anemia    • Bundle branch block, right    • CAD (coronary artery disease)    • Cataract    • CPAP (continuous positive airway pressure) dependence    • Diabetes mellitus (HCC)     borderline, controlled with diet and activity   • Diverticulitis    • GERD (gastroesophageal reflux disease)    • History of coronary artery stent placement- pCx and OM2 11/24/2017   • Hyperlipidemia    • Nasal septal deformity    • Neuropathy    • Obesity (BMI 30-39  9)    • Sleep apnea     wears c-pap   • Vitamin D deficiency      Past Surgical History:   Procedure Laterality Date   • COLON SIGMOID RESECTION N/A 12/16/2016    Procedure: RESECTION COLON SIGMOID;  Surgeon: Tyree Li MD;  Location: [facility];  Service:    • COLON SIGMOID RESECTION LAPAROSCOPIC N/A 12/16/2016    Procedure: RESECTION COLON SIGMOID LAPAROSCOPIC:CONVERTED TO [email or web address];  Surgeon: Diana Cheung MD;  Location: [facility] OR;  Service:    • COLON SURGERY      Sigmoidectomy   • COLONOSCOPY N/A 10/6/2016    Procedure: COLONOSCOPY;  Surgeon: Alvaro Renteria MD;  Location: [place] [address] [facility]; Service:    • CYSTOSCOPY W/ RETROGRADES Left 2/3/2017    Procedure: CYSTOSCOPY WITH BILATERAL RETROGRADES, LEFT STENT REMOVAL;  Surgeon: Jani Huang MD;  Location: [address] [place] [address];  Service:    • ESOPHAGOGASTRODUODENOSCOPY N/A 10/6/2016    Procedure: ESOPHAGOGASTRODUODENOSCOPY (EGD); Surgeon: Alvaro Renteria MD;  Location: [place] [address] [facility]; Service:    • HERNIA REPAIR     • KNEE ARTHROSCOPY W/ MENISCECTOMY     • DC CYSTO BLADDER W/URETERAL CATHETERIZATION Left 2016    Procedure: CYSTOSCOPY RETROGRADE PYELOGRAM WITH INSERTION STENT URETERAL;  Surgeon: Jani Huang MD;  Location: [facility];  Service: Urology   • US GUIDED THYROID BIOPSY  2021   • VARICOSE VEIN SURGERY       Family History   Problem Relation Age of Onset   • Diabetes Brother    • Osteoarthritis Mother    • Atrial fibrillation Mother    • Aortic aneurysm Father    • Colon cancer Brother      Social History     Socioeconomic History   • Marital status: Single     Spouse name: None   • Number of children: None   • Years of education: None   • Highest education level: None   Occupational History   • None   Tobacco Use   • Smoking status: Former     Packs/day: 1 00     Years: 37 00     Pack years: 37 00     Types: Cigarettes     Quit date: 3/30/2018     Years since quittin 8   • Smokeless tobacco: Current     Types: Chew   • Tobacco comments:     chewing nicotine   Vaping Use   • Vaping Use: Never used   Substance and Sexual Activity   • Alcohol use: Yes     Alcohol/week: 3 0 standard drinks     Types: 3 Cans of beer per week   • Drug use: No   • Sexual activity: Never   Other Topics Concern   • None   Social History Narrative    Lives alone       Social Determinants of Health     Financial Resource Strain: Not on file   Food Insecurity: Not on file   Transportation Needs: Not on file   Physical Activity: Not on file   Stress: Not on file   Social Connections: Not on file   Intimate Partner Violence: Not on file   Housing Stability: Not on file     Current Outpatient Medications on File Prior to Visit   Medication Sig   • albuterol (PROVENTIL HFA,VENTOLIN HFA) 90 mcg/act inhaler Inhale 2 puffs every 4 (four) hours as needed for wheezing or shortness of breath   • Alpha-Lipoic Acid 100 MG CAPS Take by mouth   • Ascorbic Acid (VITAMIN C) 100 MG tablet Take 500 mg by mouth    • aspirin 81 MG tablet Take 162 mg by mouth daily     • atorvastatin (LIPITOR) 40 mg tablet TAKE 1 TABLET BY MOUTH ONCE DAILY WITH SUPPER   • BIOTIN PO Take 5 mg by mouth daily   • Cholecalciferol (VITAMIN D3) 1000 units CAPS Take by mouth daily    • ciclopirox (LOPROX) 0 77 % cream APPLY CREAM TOPICALLY TWICE DAILY   GENTLY MASSAGE INTO AFFECTED AREAS AND SURROUNDING SKIN   • cyanocobalamin (VITAMIN B-12) 100 mcg tablet Take by mouth daily   • desloratadine (CLARINEX) 5 MG tablet TAKE 1 TABLET BY MOUTH AT BEDTIME   • famotidine (PEPCID) 20 mg tablet Take 20 mg by mouth daily   • FREESTYLE LITE test strip USE 1 STRIP TO CHECK GLUCOSE ONCE DAILY AS DIRECTED   • gabapentin (NEURONTIN) 100 mg capsule TAKE 1 CAPSULE BY MOUTH IN THE MORNING AND 3 AT BEDTIME   • glucosamine-chondroitin 500-400 MG tablet Take 1 tablet by mouth daily   • hydrochlorothiazide (HYDRODIURIL) 25 mg tablet Take 1 tablet by mouth once daily   • losartan (COZAAR) 50 mg tablet Take 1 tablet by mouth once daily   • Magnesium Hydroxide (MAGNESIA PO) Take by mouth 2 (two) times a day   • metFORMIN (GLUCOPHAGE) 1000 MG tablet TAKE 1 TABLET BY MOUTH TWICE DAILY WITH MEALS   • Multiple Vitamin (MULTIVITAMIN) capsule Take 1 capsule by mouth daily   • nitroglycerin (NITROSTAT) 0 3 mg SL tablet Place 1 tablet (0 3 mg total) under the tongue every 5 (five) minutes as needed for chest pain   • ONETOUCH DELICA LANCETS 05B MISC 1 Units by Does not apply route daily   • Semaglutide (Rybelsus) 7 MG TABS Take one tablet daily on empty stomach with full glass of water     Allergies   Allergen Reactions   • Levaquin [Levofloxacin In D5w] Swelling     Knee swelling   • Sulfa Antibiotics GI Intolerance     Abdominal pain       Immunization History   Administered Date(s) Administered   • COVID-19 MODERNA VACC 0 25 ML IM BOOSTER 10/29/2021, 04/15/2022   • COVID-19 MODERNA VACC 0 5 ML IM 01/29/2021, 02/26/2021   • Influenza Quadrivalent Preservative Free 3 years and older IM 10/08/2021   • Influenza, injectable, quadrivalent, preservative free 0 5 mL 11/01/2019, 09/25/2020   • Influenza, recombinant, quadrivalent,injectable, preservative free 10/21/2022   • Pneumococcal Polysaccharide PPV23 12/06/2016   • Rabies-IM Human Diploid Cell Culture 08/06/2021, 08/10/2021   • Tdap 08/07/2016       Objective     /72   Pulse 76   Temp 98 °F (36 7 °C)   Resp 16   Ht 5' 11" (1 803 m)   Wt 127 kg (279 lb)   SpO2 96%   BMI 38 91 kg/m²     Physical Exam  Constitutional:       General: He is not in acute distress  Appearance: He is well-developed  He is not ill-appearing or diaphoretic  HENT:      Head: Normocephalic and atraumatic  Right Ear: External ear normal       Left Ear: External ear normal    Eyes:      General: Lids are normal          Right eye: No discharge  Left eye: No discharge  Conjunctiva/sclera: Conjunctivae normal    Neck:      Thyroid: No thyroid mass or thyromegaly  Vascular: No carotid bruit or JVD  Trachea: Trachea normal    Cardiovascular:      Rate and Rhythm: Regular rhythm  Heart sounds: Normal heart sounds  Pulmonary:      Effort: No respiratory distress  Breath sounds: No wheezing, rhonchi or rales  Abdominal:      General: There is no distension  Palpations: There is no mass  Tenderness: There is abdominal tenderness  There is no right CVA tenderness, left CVA tenderness, guarding or rebound  Hernia: No hernia is present  Musculoskeletal:      Right lower leg: No edema  Left lower leg: No edema  Lymphadenopathy:      Cervical: No cervical adenopathy  Skin:     General: Skin is warm  Coloration: Skin is not jaundiced or pale  Findings: No rash  Neurological:      General: No focal deficit present  Mental Status: He is alert and oriented to person, place, and time  Psychiatric:         Mood and Affect: Mood normal          Behavior: Behavior normal          Thought Content:  Thought content normal          Judgment: Judgment normal        Anita Concepcion MD

## 2023-01-20 NOTE — PATIENT INSTRUCTIONS
Go for CBC, CMP, amylase, lipase, urinalysis, sed rate, and LDH at your convenience within the next couple of days as best as possible  Go for the CT scan of the abdomen and pelvis regarding your change in bowel habit, abdominal discomfort evaluation  Recommend to call gastroenterologist and set up an appointment  Recommend high-fiber diet  Consider adding Metamucil 1 tablespoonful every day  Start taking MiraLAX 17 g in 6 to 8 ounces of water or juice every other day  Contact your endocrinologist regarding uncontrolled diabetes  As you are hemoglobin A1c is higher     Also question is whether Rybelsus is causing any change in bowel habit and you may need alternate medications  Do not take metformin on the day of the CAT scan  And drink enough fluid  Increase your activity  Follow with Consultants as per their and our suggestion    Follow up in two weeks or as needed earlier    Follow all instructions as advised and discussed  Take your medications as prescribed  Call the office immediately if you experience any side effects  Ask questions if you do not understand  Keep your scheduled appointment as advised or come sooner if necessary or in doubt  Best time to call for non-urgent matter or questions on weekdays is between 9am and 12 noon  See physician for any new symptoms or worsening of current symptoms  Urgent or emergent situations call 911 and report to nearest emergency room      I spent  30 -40 minutes taking care of this patient including clinical care, conseling, collaboration, chart, lab and consultaion review as appropriate    Patient is to get labs 1 week(s) prior to next visit if advised

## 2023-01-20 NOTE — ASSESSMENT & PLAN NOTE
BPH fair  Sees urologist as needed      Lab Results   Component Value Date    PSA 0 5 01/14/2022    PSA 0 4 11/06/2020

## 2023-01-20 NOTE — ASSESSMENT & PLAN NOTE
Lab Results   Component Value Date    HGBA1C 8 1 (H) 01/13/2023   Neuropathy fair control    We will continue gabapentin

## 2023-01-20 NOTE — ASSESSMENT & PLAN NOTE
Thyroid ultrasound recently was unremarkable  Endocrinologist note were noted  They will due for surveillance ultrasound back in 1 year    9/9/2022: Ultrasound of the thyroid:No nodule meets current ACR criteria for requiring biopsy but followup ultrasound is recommended in 1 year  Nodule #1  Image 43  LEFT midgland nodule measuring 1 1 x 0 9 x 1 cm  No priors for comparison  COMPOSITION:  2 points, solid or almost completely solid   ECHOGENICITY:  2 points, hypoechoic  SHAPE:  0 points, wider-than-tall  MARGIN: 0 points, smooth  ECHOGENIC FOCI:  0 points, none or large comet-tail artifacts  TI-RADS Classification: TR 3 (3 points), FNA if >2 5 cm  Follow if >1 5 cm      Additional subcentimeter thyroid nodules are visualized

## 2023-01-23 ENCOUNTER — TELEPHONE (OUTPATIENT)
Dept: OTHER | Facility: OTHER | Age: 63
End: 2023-01-23

## 2023-01-23 NOTE — TELEPHONE ENCOUNTER
Called and lmom for Peg Sneed to call back and schedule and appointment in our [facility] office

## 2023-01-23 NOTE — TELEPHONE ENCOUNTER
Patient called in post OV with PCP on 1/20/23 where patient discussed changes in his bowel habits  Patient reports bowels have gone from regular to cow pies, diarrhea, no BM for 2-3 days, small pellets for BM, and then he has to purge and has a large VM  Patient thinks it might be related to increase in his dose of Semaglutide (Rybelsus) since the end of December  PCP advised patient to follow up with GI for significant changes in bowel habits  Patient ordered CT abdomen w and wo contrast; CT scheduled for 1/27/23    Please follow up with patient for OV  Last OV with provider (Oma) was 1/19/21

## 2023-01-24 ENCOUNTER — TELEPHONE (OUTPATIENT)
Dept: ENDOCRINOLOGY | Facility: CLINIC | Age: 63
End: 2023-01-24

## 2023-01-25 ENCOUNTER — TELEPHONE (OUTPATIENT)
Dept: ENDOCRINOLOGY | Facility: CLINIC | Age: 63
End: 2023-01-25

## 2023-01-27 ENCOUNTER — OFFICE VISIT (OUTPATIENT)
Dept: CARDIOLOGY CLINIC | Facility: CLINIC | Age: 63
End: 2023-01-27

## 2023-01-27 VITALS
TEMPERATURE: 95 F | BODY MASS INDEX: 39.2 KG/M2 | HEART RATE: 98 BPM | HEIGHT: 71 IN | DIASTOLIC BLOOD PRESSURE: 74 MMHG | WEIGHT: 280 LBS | OXYGEN SATURATION: 97 % | SYSTOLIC BLOOD PRESSURE: 120 MMHG

## 2023-01-27 DIAGNOSIS — I25.10 CORONARY ARTERY DISEASE INVOLVING NATIVE CORONARY ARTERY OF NATIVE HEART WITHOUT ANGINA PECTORIS: Primary | ICD-10-CM

## 2023-01-27 DIAGNOSIS — I45.10 BUNDLE BRANCH BLOCK, RIGHT: ICD-10-CM

## 2023-01-27 DIAGNOSIS — E78.2 MIXED HYPERLIPIDEMIA: ICD-10-CM

## 2023-01-27 DIAGNOSIS — E11.9 TYPE 2 DIABETES MELLITUS WITHOUT COMPLICATION, WITHOUT LONG-TERM CURRENT USE OF INSULIN (HCC): ICD-10-CM

## 2023-01-27 DIAGNOSIS — I49.3 ASYMPTOMATIC PVCS: ICD-10-CM

## 2023-01-27 DIAGNOSIS — I10 ESSENTIAL HYPERTENSION: ICD-10-CM

## 2023-01-27 DIAGNOSIS — I50.32 CHRONIC DIASTOLIC CONGESTIVE HEART FAILURE (HCC): ICD-10-CM

## 2023-01-27 RX ORDER — BLOOD-GLUCOSE METER
1 KIT MISCELLANEOUS DAILY
Qty: 100 EACH | Refills: 0 | Status: SHIPPED | OUTPATIENT
Start: 2023-01-27

## 2023-01-27 NOTE — TELEPHONE ENCOUNTER
Spoke with patient and he confirmed starting the higher dose of Rybelsus around 12/16/2022  Around 1 week later the diarrhea began  It is better now but still is happening

## 2023-01-27 NOTE — PROGRESS NOTES
Yusuf Bardales  1960  [id number]  [employer] [facility]  [facility] [address] [id number]    Interval History:  Yusuf Bardales is a 63 y o  male who presents for routine coronary artery disease follow-up  Since his last visit, he denies any chest pain, shortness of breath, lower extremity edema, orthopnea or paroxysmal nocturnal dyspnea  He has GI issues for which he is following with gastroenterologist   He is scheduled to have CT of his abdomen soon  He has mild lower extremity edema  He does not use torsemide which he was taking in the past   Weight has been stable  Does not exercise regularly  He had chest discomfort during his last visit  A stress echocardiogram was done which was normal    He had a PFT study in June which was normal     Treadmill stress test in 2019 was normal   He wore a holter monitor because of resting bradycardia  Monitor showed an average HR of 75 bpm and no heart blocks  Previously was having dyspnea  that improved with torsemide  He continues to refrain from smoking  In November 2017, he underwent drug-eluting stent placement to left circumflex and OM 2 lesions after presenting to hospital with chest and arm pain, elevated BP and ST depressions  Past Medical History:   Diagnosis Date   • Anemia    • Bundle branch block, right    • CAD (coronary artery disease)    • Cataract    • CPAP (continuous positive airway pressure) dependence    • Diabetes mellitus (HCC)     borderline, controlled with diet and activity   • Diverticulitis    • GERD (gastroesophageal reflux disease)    • History of coronary artery stent placement- pCx and OM2 11/24/2017   • Hyperlipidemia    • Nasal septal deformity    • Neuropathy    • Obesity (BMI 30-39  9)    • Sleep apnea     wears c-pap   • Vitamin D deficiency      Past Surgical History:   Procedure Laterality Date   • COLON SIGMOID RESECTION N/A 12/16/2016 Procedure: RESECTION COLON SIGMOID;  Surgeon: Milo Sesay MD;  Location:  MAIN OR;  Service:    • COLON SIGMOID RESECTION LAPAROSCOPIC N/A 2016    Procedure: RESECTION COLON SIGMOID LAPAROSCOPIC:CONVERTED TO [email or web address] [facility];  Surgeon: Milo Sesay MD;  Location:  MAIN OR;  Service:    • COLON SURGERY      Sigmoidectomy   • COLONOSCOPY N/A 10/6/2016    Procedure: COLONOSCOPY;  Surgeon: Jose Villaseñor MD;  Location: [facility] GI LAB; Service:    • CYSTOSCOPY W/ RETROGRADES Left 2/3/2017    Procedure: CYSTOSCOPY WITH BILATERAL RETROGRADES, LEFT STENT REMOVAL;  Surgeon: Carie Hamilton MD;  Location: [address] [place] [address];  Service:    • ESOPHAGOGASTRODUODENOSCOPY N/A 10/6/2016    Procedure: ESOPHAGOGASTRODUODENOSCOPY (EGD); Surgeon: Jose Villaseñor MD;  Location: [facility] GI LAB; Service:    • HERNIA REPAIR     • KNEE ARTHROSCOPY W/ MENISCECTOMY     • [place] CYSTO BLADDER W/URETERAL CATHETERIZATION Left 2016    Procedure: CYSTOSCOPY RETROGRADE PYELOGRAM WITH INSERTION STENT URETERAL;  Surgeon: Carie Hamilton MD;  Location: [facility]IN OR;  Service: Urology   • US GUIDED THYROID BIOPSY  2021   • VARICOSE VEIN SURGERY       Social History     Socioeconomic History   • Marital status: Single     Spouse name: Not on file   • Number of children: Not on file   • Years of education: Not on file   • Highest education level: Not on file   Occupational History   • Not on file   Tobacco Use   • Smoking status: Former     Packs/day: 1 00     Years: 37 00     Pack years: 37 00     Types: Cigarettes     Quit date: 3/30/2018     Years since quittin 8   • Smokeless tobacco: Current     Types: Chew   • Tobacco comments:     chewing nicotine   Vaping Use   • Vaping Use: Never used   Substance and Sexual Activity   • Alcohol use:  Yes     Alcohol/week: 3 0 standard drinks     Types: 3 Cans of beer per week     Comment: occ   • Drug use: No   • Sexual activity: Never   Other Topics Concern   • Not on file   Social History Narrative    Lives alone  Social Determinants of Health     Financial Resource Strain: Not on file   Food Insecurity: Not on file   Transportation Needs: Not on file   Physical Activity: Not on file   Stress: Not on file   Social Connections: Not on file   Intimate Partner Violence: Not on file   Housing Stability: Not on file     Family History   Problem Relation Age of Onset   • Diabetes Brother    • Osteoarthritis Mother    • Atrial fibrillation Mother    • Aortic aneurysm Father    • Colon cancer Brother        Current Outpatient Medications:   •  albuterol (PROVENTIL HFA,VENTOLIN HFA) 90 mcg/act inhaler, Inhale 2 puffs every 4 (four) hours as needed for wheezing or shortness of breath, Disp: 18 g, Rfl: 6  •  Alpha-Lipoic Acid 100 MG CAPS, Take by mouth, Disp: , Rfl:   •  Ascorbic Acid (VITAMIN C) 100 MG tablet, Take 500 mg by mouth , Disp: , Rfl:   •  aspirin 81 MG tablet, Take 162 mg by mouth daily  , Disp: , Rfl:   •  atorvastatin (LIPITOR) 40 mg tablet, TAKE 1 TABLET BY MOUTH ONCE DAILY WITH SUPPER, Disp: 90 tablet, Rfl: 0  •  BIOTIN PO, Take 5 mg by mouth daily, Disp: , Rfl:   •  Cholecalciferol (VITAMIN D3) 1000 units CAPS, Take by mouth daily , Disp: , Rfl:   •  ciclopirox (LOPROX) 0 77 % cream, APPLY CREAM TOPICALLY TWICE DAILY   GENTLY MASSAGE INTO AFFECTED AREAS AND SURROUNDING SKIN, Disp: , Rfl:   •  cyanocobalamin (VITAMIN B-12) 100 mcg tablet, Take by mouth daily, Disp: , Rfl:   •  desloratadine (CLARINEX) 5 MG tablet, TAKE 1 TABLET BY MOUTH AT BEDTIME, Disp: 90 tablet, Rfl: 0  •  famotidine (PEPCID) 20 mg tablet, Take 20 mg by mouth daily, Disp: , Rfl:   •  FREESTYLE LITE test strip, USE 1 STRIP TO CHECK GLUCOSE ONCE DAILY AS DIRECTED, Disp: 100 each, Rfl: 0  •  gabapentin (NEURONTIN) 100 mg capsule, TAKE 1 CAPSULE BY MOUTH IN THE MORNING AND 3 AT BEDTIME, Disp: 360 capsule, Rfl: 1  •  glucosamine-chondroitin 500-400 MG tablet, Take 1 tablet by mouth daily, Disp: , Rfl:   •  hydrochlorothiazide (HYDRODIURIL) 25 mg tablet, Take 1 tablet by mouth once daily, Disp: 90 tablet, Rfl: 3  •  losartan (COZAAR) 50 mg tablet, Take 1 tablet by mouth once daily, Disp: 90 tablet, Rfl: 1  •  Magnesium Hydroxide (MAGNESIA PO), Take by mouth 2 (two) times a day, Disp: , Rfl:   •  metFORMIN (GLUCOPHAGE) 1000 MG tablet, TAKE 1 TABLET BY MOUTH TWICE DAILY WITH MEALS, Disp: 180 tablet, Rfl: 1  •  Multiple Vitamin (MULTIVITAMIN) capsule, Take 1 capsule by mouth daily, Disp: , Rfl:   •  nitroglycerin (NITROSTAT) 0 3 mg SL tablet, Place 1 tablet (0 3 mg total) under the tongue every 5 (five) minutes as needed for chest pain, Disp: 25 tablet, Rfl: 1  •  ONETOUCH DELICA LANCETS 33Z MISC, 1 Units by Does not apply route daily, Disp: 100 each, Rfl: 6  •  Semaglutide (Rybelsus) 7 MG TABS, Take one tablet daily on empty stomach with full glass of water, Disp: 30 tablet, Rfl: 5    Current Facility-Administered Medications:   •  cyanocobalamin injection 1,000 mcg, 1,000 mcg, Intramuscular, Q30 Days, [employer], CRNP, 1,000 mcg at 09/10/21 1141  The following portions of the patient's history were reviewed and updated as appropriate: allergies, current medications, past family history, past medical history, past social history, past surgical history and problem list     Review of Systems:  Review of Systems   Respiratory: Positive for shortness of breath  Cardiovascular: Negative for chest pain, palpitations and leg swelling  Gastrointestinal: Positive for abdominal pain  Musculoskeletal: Positive for arthralgias and myalgias  All other systems reviewed and are negative  Physical Exam:  /74 (BP Location: Left arm, Patient Position: Sitting, Cuff Size: Standard)   Pulse 98   Temp (!) 95 °F (35 °C)   Ht 5' 11" (1 803 m)   Wt 127 kg (280 lb)   SpO2 97%   BMI 39 05 kg/m²     Physical Exam  Constitutional:       General: He is not in acute distress  Appearance: He is well-developed   He is not diaphoretic  HENT:      Head: Normocephalic and atraumatic  Eyes:      Conjunctiva/sclera: Conjunctivae normal       Pupils: Pupils are equal, round, and reactive to light  Neck:      Thyroid: No thyromegaly  Vascular: No JVD  Cardiovascular:      Rate and Rhythm: Normal rate and regular rhythm  Heart sounds: Normal heart sounds  No murmur heard  No friction rub  No gallop  Pulmonary:      Effort: Pulmonary effort is normal       Breath sounds: Normal breath sounds  Musculoskeletal:      Cervical back: Neck supple  Skin:     General: Skin is warm and dry  Findings: No erythema or rash  Neurological:      General: No focal deficit present  Mental Status: He is alert and oriented to person, place, and time  Cranial Nerves: No cranial nerve deficit  Psychiatric:         Mood and Affect: Mood normal          Behavior: Behavior normal          Thought Content: Thought content normal          Judgment: Judgment normal          Cardiographics  ECG: sinus bradycardia   LV Ejection Fraction: LV ejection fraction >= 40%  Lab Review  Lab Results   Component Value Date    TRIG 247 (H) 01/13/2023    TRIG 161 (H) 10/14/2022    TRIG 220 (H) 07/02/2022    HDL 35 (L) 01/13/2023    HDL 38 (L) 10/14/2022    HDL 33 (L) 07/02/2022    LDLDIRECT 79 04/10/2020    LDLDIRECT 83 12/27/2019     Assessment/Plan     1  Coronary artery disease involving native coronary artery of native heart without angina pectoris    2  Essential hypertension    3  Mixed hyperlipidemia    4  Chronic diastolic congestive heart failure ([facility] [address] )    5  Type 2 diabetes mellitus without complication, without long-term current use of insulin ([facility] [address] )    6  Bundle branch block, right    7  Asymptomatic PVCs      - patient with symptoms of shortness of breath with exertion which is likely due to poor conditioning due to lack of activity    His last stress echocardiogram was done in the past year which was normal   He will call if symptoms persist and is agreeable to further testing at that time  - encouraged to start exercising regularly and to get blood sugars under control   -  Blood pressure is stable  Continue current medication regimen including losartan and hydrochlorothiazide  Target BP is < 130/80 mmHg  -  Resume torsemide if edema persists  - diabetes management per Dr Dante Basurto  Last A1C was elevated  - Continue atorvastatin - last LDL was 70     - Continue current Rx  - recommend using torsemide when edema is present  He previously developed congestive heart failure  Encouraged to reduce salt in diet if possible

## 2023-02-02 ENCOUNTER — OFFICE VISIT (OUTPATIENT)
Dept: GASTROENTEROLOGY | Facility: CLINIC | Age: 63
End: 2023-02-02

## 2023-02-02 VITALS
BODY MASS INDEX: 39.33 KG/M2 | WEIGHT: 282 LBS | HEART RATE: 54 BPM | DIASTOLIC BLOOD PRESSURE: 72 MMHG | SYSTOLIC BLOOD PRESSURE: 135 MMHG

## 2023-02-02 DIAGNOSIS — E11.65 TYPE 2 DIABETES MELLITUS WITH HYPERGLYCEMIA, UNSPECIFIED WHETHER LONG TERM INSULIN USE (HCC): ICD-10-CM

## 2023-02-02 DIAGNOSIS — R19.4 CHANGE IN BOWEL HABIT: Primary | ICD-10-CM

## 2023-02-02 DIAGNOSIS — K21.9 GASTROESOPHAGEAL REFLUX DISEASE WITHOUT ESOPHAGITIS: ICD-10-CM

## 2023-02-02 DIAGNOSIS — R19.7 DIARRHEA, UNSPECIFIED TYPE: ICD-10-CM

## 2023-02-02 DIAGNOSIS — K57.90 DIVERTICULAR DISEASE: ICD-10-CM

## 2023-02-02 DIAGNOSIS — K63.5 POLYP OF COLON, UNSPECIFIED PART OF COLON, UNSPECIFIED TYPE: ICD-10-CM

## 2023-02-02 NOTE — PROGRESS NOTES
[facility] - Outpatient Follow-up Note  Radha Linares 63 y o  male MRN: [id number]  Encounter: [id number]          ASSESSMENT AND PLAN:      1  Change in bowel habit    2  Diarrhea, unspecified type    3  Polyp of colon, unspecified part of colon, unspecified type    4  Diverticular disease    5  Gastroesophageal reflux disease without esophagitis    6  Type 2 diabetes mellitus with hyperglycemia, unspecified whether long term insulin use ([facility] [address] )    Patient has a loose diarrheal stool and soft stools since he started Rybelsus  Clinically no evidence of acute abdomen ileus obstruction  Colonoscopy 2 years ago was generally unremarkable  Bleeding weight loss or abdominal pain  I believe his symptoms are either from medication, dietary indiscretion  Advised him to take Metamucil like fiber to manage his bowels better  He is going for a CT scan, if any significant findings will go from there  He will call us if not any better     ______________________________________________________________________    SUBJECTIVE:     Patient came in for evaluation of his change in bowel habits, he is having episodes of loose soft stool with diarrhea, 1-2 times in the day with some evacuation  Sometimes he has soft mushy stools  Appetite is fair weight fluctuates  Denies any blood in stools melena hematochezia  Denies any significant abdominal pain nausea vomiting dysphagia coughing choking spells  Does feel full and bloated  Thinks the symptoms started after he is started Rybelsus  Diabetes is not well controlled  Does not drink much milk soda coffee fried foods denies any chest pain shortness of breath  Diet medications more than 10 pertinent systems and some of the prior records noted  REVIEW OF SYSTEMS IS OTHERWISE NEGATIVE        Historical Information   Past Medical History:   Diagnosis Date   • Anemia    • Bundle branch block, right    • CAD (coronary artery disease)    • Cataract    • CPAP (continuous positive airway pressure) dependence    • Diabetes mellitus (HCC)     borderline, controlled with diet and activity   • Diverticulitis    • GERD (gastroesophageal reflux disease)    • History of coronary artery stent placement- pCx and OM2 11/24/2017   • Hyperlipidemia    • Nasal septal deformity    • Neuropathy    • Obesity (BMI 30-39  9)    • Sleep apnea     wears c-pap   • Vitamin D deficiency      Past Surgical History:   Procedure Laterality Date   • COLON SIGMOID RESECTION N/A 12/16/2016    Procedure: RESECTION COLON SIGMOID;  Surgeon: Nataliia Del Angel MD;  Location: [facility];  Service:    • COLON SIGMOID RESECTION LAPAROSCOPIC N/A 12/16/2016    Procedure: RESECTION COLON SIGMOID LAPAROSCOPIC:CONVERTED TO [email or web address];  Surgeon: Nataliia Del Angel MD;  Location: [facility];  Service:    • COLON SURGERY      Sigmoidectomy   • COLONOSCOPY N/A 10/6/2016    Procedure: COLONOSCOPY;  Surgeon: Castillo Villar MD;  Location: [place] [address] GI LAB; Service:    • CYSTOSCOPY W/ RETROGRADES Left 2/3/2017    Procedure: CYSTOSCOPY WITH BILATERAL RETROGRADES, LEFT STENT REMOVAL;  Surgeon: Kristi Palomo MD;  Location: [address] [place] [address];  Service:    • ESOPHAGOGASTRODUODENOSCOPY N/A 10/6/2016    Procedure: ESOPHAGOGASTRODUODENOSCOPY (EGD); Surgeon: Castillo Villar MD;  Location: [place] [address] GI LAB;   Service:    • HERNIA REPAIR     • KNEE ARTHROSCOPY W/ MENISCECTOMY     • [place] CYSTO BLADDER W/URETERAL CATHETERIZATION Left 12/4/2016    Procedure: CYSTOSCOPY RETROGRADE PYELOGRAM WITH INSERTION STENT URETERAL;  Surgeon: Kristi Palomo MD;  Location: [address] [place] [address];  Service: Urology   • US GUIDED THYROID BIOPSY  9/27/2021   • VARICOSE VEIN SURGERY       Social History   Social History     Substance and Sexual Activity   Alcohol Use Yes   • Alcohol/week: 3 0 standard drinks   • Types: 3 Cans of beer per week    Comment: occ     Social History     Substance and Sexual Activity   Drug Use No     Social History     Tobacco Use   Smoking Status Former   • Packs/day: 1 00   • Years: 37 00   • Pack years: 37 00   • Types: Cigarettes   • Quit date: 3/30/2018   • Years since quittin 8   Smokeless Tobacco Current   • Types: Chew   Tobacco Comments    chewing nicotine     Family History   Problem Relation Age of Onset   • Diabetes Brother    • Osteoarthritis Mother    • Atrial fibrillation Mother    • Aortic aneurysm Father    • Colon cancer Brother        Meds/Allergies       Current Outpatient Medications:   •  albuterol (PROVENTIL HFA,VENTOLIN HFA) 90 mcg/act inhaler  •  Alpha-Lipoic Acid 100 MG CAPS  •  Ascorbic Acid (VITAMIN C) 100 MG tablet  •  aspirin 81 MG tablet  •  atorvastatin (LIPITOR) 40 mg tablet  •  BIOTIN PO  •  Cholecalciferol (VITAMIN D3) 1000 units CAPS  •  ciclopirox (LOPROX) 0 77 % cream  •  cyanocobalamin (VITAMIN B-12) 100 mcg tablet  •  desloratadine (CLARINEX) 5 MG tablet  •  famotidine (PEPCID) 20 mg tablet  •  FREESTYLE LITE test strip  •  gabapentin (NEURONTIN) 100 mg capsule  •  glucosamine-chondroitin 500-400 MG tablet  •  glucose blood (FREESTYLE LITE) test strip  •  hydrochlorothiazide (HYDRODIURIL) 25 mg tablet  •  losartan (COZAAR) 50 mg tablet  •  Magnesium Hydroxide (MAGNESIA PO)  •  metFORMIN (GLUCOPHAGE) 1000 MG tablet  •  Multiple Vitamin (MULTIVITAMIN) capsule  •  nitroglycerin (NITROSTAT) 0 3 mg SL tablet  •  ONETOUCH DELICA LANCETS 29T MISC  •  Semaglutide (Rybelsus) 7 MG TABS    Current Facility-Administered Medications:   •  cyanocobalamin injection 1,000 mcg, 1,000 mcg, Intramuscular, Q30 Days, 1,000 mcg at 09/10/21 1141    Allergies   Allergen Reactions   • Levaquin [Levofloxacin In D5w] Swelling     Knee swelling   • Sulfa Antibiotics GI Intolerance     Abdominal pain             Objective     Blood pressure 135/72, pulse (!) 54, weight 128 kg (282 lb)  Body mass index is 39 33 kg/m²        PHYSICAL EXAM:      General Appearance:   Alert, cooperative, no distress   HEENT:   Normocephalic, atraumatic, anicteric  Neck:  Supple, symmetrical, trachea midline   Lungs:   Clear to auscultation bilaterally; no rales, rhonchi or wheezing; respirations unlabored    Heart[de-identified]   Regular rate and rhythm; no murmur  Abdomen:   Soft, non-tender, non-distended; normal bowel sounds; no masses, no organomegaly    Genitalia:   Deferred    Rectal:   Deferred    Extremities:  No cyanosis, clubbing or edema    Skin:  No jaundice, rashes, or lesions    Lymph nodes:  No palpable cervical lymphadenopathy        Lab Results:   No visits with results within 1 Day(s) from this visit  Latest known visit with results is:   Office Visit on 10/21/2022   Component Date Value   • Hemoglobin A1C 01/13/2023 8 1 (H)    • Estimated Average Glucose 01/13/2023 186    • Cholesterol, Total 01/13/2023 165    • Triglycerides 01/13/2023 247 (H)    • HDL 01/13/2023 35 (L)    • VLDL Cholesterol Calcula* 01/13/2023 42 (H)    • LDL Calculated 01/13/2023 88    • T   Chol/HDL Ratio 01/13/2023 4 7    • White Blood Cell Count 01/13/2023 6 2    • Red Blood Cell Count 01/13/2023 4 36    • Hemoglobin 01/13/2023 14 5    • HCT 01/13/2023 43 1    • MCV 01/13/2023 99 (H)    • MCH 01/13/2023 33 3 (H)    • MCHC 01/13/2023 33 6    • RDW 01/13/2023 13 1    • Platelet Count 01/02/7150 268    • Neutrophils 01/13/2023 53    • Lymphocytes 01/13/2023 31    • Monocytes 01/13/2023 10    • Eosinophils 01/13/2023 4    • Basophils PCT 01/13/2023 1    • Neutrophils (Absolute) 01/13/2023 3 4    • Lymphocytes (Absolute) 01/13/2023 1 9    • Monocytes (Absolute) 01/13/2023 0 6    • Eosinophils (Absolute) 01/13/2023 0 3    • Basophils ABS 01/13/2023 0 1    • Immature Granulocytes 01/13/2023 1    • Immature Granulocytes (A* 01/13/2023 0 1    • Glucose, Random 01/13/2023 171 (H)    • BUN 01/13/2023 13    • Creatinine 01/13/2023 0 92    • eGFR 01/13/2023 95    • SL AMB BUN/CREATININE RA* 01/13/2023 14    • Sodium 01/13/2023 139    • Potassium 01/13/2023 4 6    • Chloride 01/13/2023 100    • CO2 01/13/2023 26    • CALCIUM 01/13/2023 9 9    • Protein, Total 01/13/2023 7 1    • Albumin 01/13/2023 4 5    • Globulin, Total 01/13/2023 2 6    • Albumin/Globulin Ratio 01/13/2023 1 7    • TOTAL BILIRUBIN 01/13/2023 0 5    • Alk Phos Isoenzymes 01/13/2023 76    • AST 01/13/2023 49 (H)    • ALT 01/13/2023 73 (H)          Radiology Results:   No results found

## 2023-02-04 LAB
ALBUMIN SERPL-MCNC: 4.5 G/DL (ref 3.8–4.9)
ALBUMIN/GLOB SERPL: 1.7 {RATIO} (ref 1.2–2.2)
ALP SERPL-CCNC: 88 IU/L (ref 44–121)
ALT SERPL-CCNC: 65 IU/L (ref 0–44)
AMYLASE SERPL-CCNC: 59 U/L (ref 31–110)
APPEARANCE UR: CLEAR
AST SERPL-CCNC: 49 IU/L (ref 0–40)
BILIRUB SERPL-MCNC: 0.5 MG/DL (ref 0–1.2)
BILIRUB UR QL STRIP: NEGATIVE
BUN SERPL-MCNC: 15 MG/DL (ref 8–27)
BUN/CREAT SERPL: 14 (ref 10–24)
CALCIUM SERPL-MCNC: 9.8 MG/DL (ref 8.6–10.2)
CHLORIDE SERPL-SCNC: 99 MMOL/L (ref 96–106)
CO2 SERPL-SCNC: 22 MMOL/L (ref 20–29)
COLOR UR: YELLOW
CREAT SERPL-MCNC: 1.11 MG/DL (ref 0.76–1.27)
EGFR: 76 ML/MIN/1.73
ERYTHROCYTE [DISTWIDTH] IN BLOOD BY AUTOMATED COUNT: 12.9 % (ref 11.6–15.4)
ERYTHROCYTE [SEDIMENTATION RATE] IN BLOOD BY WESTERGREN METHOD: 6 MM/HR (ref 0–30)
GLOBULIN SER-MCNC: 2.6 G/DL (ref 1.5–4.5)
GLUCOSE SERPL-MCNC: 205 MG/DL (ref 70–99)
GLUCOSE UR QL: NEGATIVE
HCT VFR BLD AUTO: 41.7 % (ref 37.5–51)
HGB BLD-MCNC: 14.9 G/DL (ref 13–17.7)
HGB UR QL STRIP: NEGATIVE
KETONES UR QL STRIP: NEGATIVE
LDH SERPL-CCNC: 214 IU/L (ref 121–224)
LEUKOCYTE ESTERASE UR QL STRIP: NEGATIVE
LIPASE SERPL-CCNC: 64 U/L (ref 13–78)
MAGNESIUM SERPL-MCNC: 1.8 MG/DL (ref 1.6–2.3)
MCH RBC QN AUTO: 34.7 PG (ref 26.6–33)
MCHC RBC AUTO-ENTMCNC: 35.7 G/DL (ref 31.5–35.7)
MCV RBC AUTO: 97 FL (ref 79–97)
MICRO URNS: NORMAL
NITRITE UR QL STRIP: NEGATIVE
PH UR STRIP: 6 [PH] (ref 5–7.5)
PLATELET # BLD AUTO: 271 X10E3/UL (ref 150–450)
POTASSIUM SERPL-SCNC: 4.5 MMOL/L (ref 3.5–5.2)
PROT SERPL-MCNC: 7.1 G/DL (ref 6–8.5)
PROT UR QL STRIP: NORMAL
RBC # BLD AUTO: 4.29 X10E6/UL (ref 4.14–5.8)
SODIUM SERPL-SCNC: 140 MMOL/L (ref 134–144)
SP GR UR: 1.02 (ref 1–1.03)
TSH SERPL DL<=0.005 MIU/L-ACNC: 1.41 UIU/ML (ref 0.45–4.5)
UROBILINOGEN UR STRIP-ACNC: 0.2 MG/DL (ref 0.2–1)
WBC # BLD AUTO: 6.8 X10E3/UL (ref 3.4–10.8)

## 2023-02-10 ENCOUNTER — RA CDI HCC (OUTPATIENT)
Dept: OTHER | Facility: HOSPITAL | Age: 63
End: 2023-02-10

## 2023-02-10 ENCOUNTER — HOSPITAL ENCOUNTER (OUTPATIENT)
Dept: RADIOLOGY | Facility: HOSPITAL | Age: 63
Discharge: HOME | End: 2023-02-10
Attending: INTERNAL MEDICINE

## 2023-02-10 DIAGNOSIS — R19.4 CHANGE IN BOWEL HABIT: ICD-10-CM

## 2023-02-10 DIAGNOSIS — R10.12 LEFT UPPER QUADRANT ABDOMINAL PAIN: ICD-10-CM

## 2023-02-10 RX ADMIN — IOHEXOL 100 ML: 350 INJECTION, SOLUTION INTRAVENOUS at 12:56

## 2023-02-10 NOTE — PROGRESS NOTES
[facility]  coding opportunities          Chart Reviewed number of suggestions sent to Provider: 4     Patients Insurance        Commercial Insurance: [address]     E66 01  I11 [id number]  [id number]  [id number] [id number]    Guerry Mohs, MD Dorsie Rung  I have reviewed the recommendation(s) and accept the change(s) suggested         Guerry Mohs

## 2023-02-13 PROBLEM — E66.01 MORBID OBESITY (HCC): Status: ACTIVE | Noted: 2017-11-22

## 2023-02-16 PROBLEM — R19.7 DIARRHEA: Status: ACTIVE | Noted: 2023-02-16

## 2023-02-17 ENCOUNTER — OFFICE VISIT (OUTPATIENT)
Dept: INTERNAL MEDICINE CLINIC | Facility: CLINIC | Age: 63
End: 2023-02-17

## 2023-02-17 VITALS
HEART RATE: 78 BPM | OXYGEN SATURATION: 98 % | WEIGHT: 282 LBS | SYSTOLIC BLOOD PRESSURE: 140 MMHG | HEIGHT: 71 IN | DIASTOLIC BLOOD PRESSURE: 78 MMHG | BODY MASS INDEX: 39.48 KG/M2

## 2023-02-17 DIAGNOSIS — R10.9 ABDOMINAL DISCOMFORT: Primary | ICD-10-CM

## 2023-02-17 DIAGNOSIS — I10 PRIMARY HYPERTENSION: ICD-10-CM

## 2023-02-17 DIAGNOSIS — K21.9 GASTROESOPHAGEAL REFLUX DISEASE WITHOUT ESOPHAGITIS: ICD-10-CM

## 2023-02-17 DIAGNOSIS — E11.65 TYPE 2 DIABETES MELLITUS WITH HYPERGLYCEMIA, UNSPECIFIED WHETHER LONG TERM INSULIN USE (HCC): ICD-10-CM

## 2023-02-17 DIAGNOSIS — K63.5 POLYP OF COLON, UNSPECIFIED PART OF COLON, UNSPECIFIED TYPE: ICD-10-CM

## 2023-02-17 DIAGNOSIS — I50.32 CHRONIC DIASTOLIC (CONGESTIVE) HEART FAILURE (HCC): ICD-10-CM

## 2023-02-17 DIAGNOSIS — E11.9 TYPE 2 DIABETES MELLITUS WITHOUT COMPLICATION, WITHOUT LONG-TERM CURRENT USE OF INSULIN (HCC): ICD-10-CM

## 2023-02-17 DIAGNOSIS — R19.7 DIARRHEA, UNSPECIFIED TYPE: ICD-10-CM

## 2023-02-17 DIAGNOSIS — Z12.2 SCREENING FOR LUNG CANCER: ICD-10-CM

## 2023-02-17 DIAGNOSIS — E78.2 MIXED HYPERLIPIDEMIA: ICD-10-CM

## 2023-02-17 DIAGNOSIS — R19.4 CHANGE IN BOWEL HABIT: ICD-10-CM

## 2023-02-17 DIAGNOSIS — E11.40 NEUROPATHY DUE TO TYPE 2 DIABETES MELLITUS (HCC): ICD-10-CM

## 2023-02-17 NOTE — ASSESSMENT & PLAN NOTE
No major heartburn or acid reflux symptoms  Seen by gastroenterologist     No major new recommendations

## 2023-02-17 NOTE — PROGRESS NOTES
Name: Landy Christensen      : 1960      MRN: [id number]  Encounter Provider: Esther Astorga MD  Encounter Date: 2023   Encounter department: [facility] INTERNAL MEDICINE    Assessment & Plan     1  Abdominal discomfort  Assessment & Plan:  Abdominal discomfort component is better since he has been eating more yogurt  He also had a possible explanation of his change in bowel habit, abdominal discomfort mostly just before going for bowel movement and then significant relief,   CT scan awaited  Lipid unremarkable  Differential diagnosis #1 decreased absorption due to colectomy contributing to his symptoms  2   Possible diabetic gastroparesis #3 motility disorder cannot be ruled out  Plan will be to continue high-fiber diet, continue yogurt  He is subjectively feeling better  Reviewed CT scan  Seen by gastroenterologist   They have nothing much to offer  Last CAT scan were reviewed  Last colonoscopy 2 years ago was unremarkable did not planning to do anything different  Patient subjectively feeling better  Will continue to monitor  2  Change in bowel habit  Assessment & Plan:    Bowel habit modest improvement with going on the high yogurt diet and continued fiber  Seen by GI as well as endocrinologist approach were reviewed  Abdominal discomfort component is better since he has been eating more yogurt  He also had a possible explanation of his change in bowel habit, abdominal discomfort mostly just before going for bowel movement and then significant relief,   CT scan awaited  Lipid unremarkable  Differential diagnosis #1 decreased absorption due to colectomy contributing to his symptoms  2   Possible diabetic gastroparesis #3 motility disorder cannot be ruled out  Plan will be to continue high-fiber diet, continue yogurt  He is subjectively feeling better  Reviewed CT scan    Seen by gastroenterologist   They have nothing much to offer  Last CAT scan were reviewed  Last colonoscopy 2 years ago was unremarkable did not planning to do anything different  Patient subjectively feeling better  Will continue to monitor  3  Gastroesophageal reflux disease without esophagitis  Assessment & Plan:  No major heartburn or acid reflux symptoms  Seen by gastroenterologist     No major new recommendations  4  Polyp of colon, unspecified part of colon, unspecified type  Assessment & Plan:  01/19/2021:  Colonoscopy  1  Small polyp removed  2  Few small diverticuli seen and normal appearing anastomosis in the rectosigmoid area      5  Type 2 diabetes mellitus with hyperglycemia, unspecified whether long term insulin use (HCC)  Assessment & Plan:    Lab Results   Component Value Date    HGBA1C 8 1 (H) 01/13/2023   Endocrinologist     There is a question about diabetic gastroparesis  Also there is a questions about Rybelsus cool link or contributing to his change in the bowel habit  They did recommend to stop Rybelsus  Patient is somewhat reluctant we talked about only way to find out if that is what is causing his to stop  Give it a trial of 2 weeks of medicine  After 5 7 days he should see a significant improvement if it is related to medications  If there is no significant improvement after 2 weeks he could go back on Rybelsus as this most likely does not the medications  Meanwhile be more aggressive with the diet  Continue to monitor blood sugar  Also explanation for change in bowel habit is related to partial colectomy, there is probable diabetic gastroparesis,  Lab has been unremarkable  CT scan of the abdomen and pelvis is pending  We will discuss but I doubt  Anything we going to find  Patient was seen by gastroenterologist and endocrinologist and also noted      Meanwhile he started eating more yogurt and he has a significant significant clinical improvement he will continue to do that and he will continue to follow with high-fiber diet  6  Diarrhea, unspecified type  Assessment & Plan:  01/19/2021:  Colonoscopy  1  Small polyp removed  2  Few small diverticuli seen and normal appearing anastomosis in the rectosigmoid area    As above extensively  7  Screening for lung cancer  Assessment & Plan:  11-4-22: Lungs Rad 1 neg    rec continued annual screen with LDCT in 12 months             Subjective     Angie Oneil is here for follow up    Change in bowel habit: for last 3 weeks Prior to last visit  Also simultaneously his diabetic medications were adjusted   He usually have a some tendency for constipation then every 3 days he has a large bowel movement and then couple of loose bowel movement  He has tried multiple stool softeners with partial or limited help  He does get some abdominal discomfort as he is bowel movement builds up  He had remote history of diverticulitis  He also required sigmoid colectomy  Gwenette Carrier He does heavy fiber meal   Symptoms are there for 3 weeks  abdominal discomfort:localized on the left side mostly on the left upper quadrant-  like a discomfort of the stomach is filled up and takes couple of hours after eating to settle down  Also left lower quadrant discomfort may be related to change in bowel habit at times  Correlates to change in his diabetic medications  Of note patient's Rybelsus were recently increased 3-4 weeks ago    1-13-23: HbA1c 8 1 LDL 88 CBC wnl, ALT 73, AST 49 GFR 95  Work Up:  2-3-23: u/a neg, tsh 1 41, cbc wnl,  rest cmp wnl, lipase wnl mg 1 8, u/a neg, sed rate normal, tsh normal, amylase wnl, mg wnl  2-: CT of Abdomen and Pelvis done, report pending    2-2-2023: GI notes notes, had colonoscopy 2 years ago  1-27-23: Cardiology notes noted      Review of Systems   Constitutional: Negative for appetite change, fatigue, fever and unexpected weight change  HENT: Negative for congestion, ear pain and sore throat      Eyes: Negative for pain and redness  Respiratory: Negative for cough and shortness of breath  Cardiovascular: Negative for chest pain, palpitations and leg swelling  Gastrointestinal: Positive for abdominal distention, abdominal pain, constipation and diarrhea  Negative for nausea and vomiting  Endocrine: Negative for cold intolerance, polydipsia and polyuria  Genitourinary: Negative for dysuria, frequency, hematuria and urgency  Musculoskeletal: Negative for arthralgias, gait problem and myalgias  Skin: Negative for rash  Allergic/Immunologic: Negative  Neurological: Negative for dizziness and headaches  Hematological: Negative for adenopathy  Psychiatric/Behavioral: Negative for behavioral problems  Past Medical History:   Diagnosis Date   • Anemia    • Bundle branch block, right    • CAD (coronary artery disease)    • Cataract    • CPAP (continuous positive airway pressure) dependence    • Diabetes mellitus (HCC)     borderline, controlled with diet and activity   • Diverticulitis    • GERD (gastroesophageal reflux disease)    • History of coronary artery stent placement- pCx and OM2 11/24/2017   • Hyperlipidemia    • Nasal septal deformity    • Neuropathy    • Obesity (BMI 30-39  9)    • Sleep apnea     wears c-pap   • Vitamin D deficiency      Past Surgical History:   Procedure Laterality Date   • COLON SIGMOID RESECTION N/A 12/16/2016    Procedure: RESECTION COLON SIGMOID;  Surgeon: Anel Carcamo MD;  Location: [facility];  Service:    • COLON SIGMOID RESECTION LAPAROSCOPIC N/A 12/16/2016    Procedure: RESECTION COLON SIGMOID LAPAROSCOPIC:CONVERTED TO [email or web address];  Surgeon: Anel Carcamo MD;  Location: [facility];  Service:    • COLON SURGERY      Sigmoidectomy   • COLONOSCOPY N/A 10/6/2016    Procedure: COLONOSCOPY;  Surgeon: Kiana Harrison MD;  Location: [facility] LAB;   Service:    • CYSTOSCOPY W/ RETROGRADES Left 2/3/2017    Procedure: CYSTOSCOPY WITH BILATERAL RETROGRADES, LEFT STENT REMOVAL;  Surgeon: Waldo Guzman MD;  Location: [address] [place] [address];  Service:    • ESOPHAGOGASTRODUODENOSCOPY N/A 10/6/2016    Procedure: ESOPHAGOGASTRODUODENOSCOPY (EGD); Surgeon: Roosevelt Franz MD;  Location: [place] [address] GI LAB; Service:    • HERNIA REPAIR     • KNEE ARTHROSCOPY W/ MENISCECTOMY     • LA CYSTO BLADDER W/URETERAL CATHETERIZATION Left 2016    Procedure: CYSTOSCOPY RETROGRADE PYELOGRAM WITH INSERTION STENT URETERAL;  Surgeon: Waldo Guzman MD;  Location: [facility] OR;  Service: Urology   • US GUIDED THYROID BIOPSY  2021   • VARICOSE VEIN SURGERY       Family History   Problem Relation Age of Onset   • Diabetes Brother    • Osteoarthritis Mother    • Atrial fibrillation Mother    • Aortic aneurysm Father    • Colon cancer Brother      Social History     Socioeconomic History   • Marital status: Single     Spouse name: None   • Number of children: None   • Years of education: None   • Highest education level: None   Occupational History   • None   Tobacco Use   • Smoking status: Former     Packs/day: 1 00     Years: 37 00     Pack years: 37 00     Types: Cigarettes     Quit date: 3/30/2018     Years since quittin 8   • Smokeless tobacco: Current     Types: Chew   • Tobacco comments:     chewing nicotine   Vaping Use   • Vaping Use: Never used   Substance and Sexual Activity   • Alcohol use: Yes     Alcohol/week: 3 0 standard drinks     Types: 3 Cans of beer per week     Comment: occ   • Drug use: No   • Sexual activity: Never   Other Topics Concern   • None   Social History Narrative    Lives alone       Social Determinants of Health     Financial Resource Strain: Not on file   Food Insecurity: Not on file   Transportation Needs: Not on file   Physical Activity: Not on file   Stress: Not on file   Social Connections: Not on file   Intimate Partner Violence: Not on file   Housing Stability: Not on file     Current Outpatient Medications on File Prior to Visit   Medication Sig   • albuterol (PROVENTIL HFA,VENTOLIN HFA) 90 mcg/act inhaler Inhale 2 puffs every 4 (four) hours as needed for wheezing or shortness of breath   • Alpha-Lipoic Acid 100 MG CAPS Take by mouth   • Ascorbic Acid (VITAMIN C) 100 MG tablet Take 500 mg by mouth    • aspirin 81 MG tablet Take 162 mg by mouth daily     • atorvastatin (LIPITOR) 40 mg tablet TAKE 1 TABLET BY MOUTH ONCE DAILY WITH SUPPER   • BIOTIN PO Take 5 mg by mouth daily   • Cholecalciferol (VITAMIN D3) 1000 units CAPS Take by mouth daily    • ciclopirox (LOPROX) 0 77 % cream APPLY CREAM TOPICALLY TWICE DAILY   GENTLY MASSAGE INTO AFFECTED AREAS AND SURROUNDING SKIN   • cyanocobalamin (VITAMIN B-12) 100 mcg tablet Take by mouth daily   • desloratadine (CLARINEX) 5 MG tablet TAKE 1 TABLET BY MOUTH AT BEDTIME   • famotidine (PEPCID) 20 mg tablet Take 20 mg by mouth daily   • FREESTYLE LITE test strip USE 1 STRIP TO CHECK GLUCOSE ONCE DAILY AS DIRECTED   • gabapentin (NEURONTIN) 100 mg capsule TAKE 1 CAPSULE BY MOUTH IN THE MORNING AND 3 AT BEDTIME   • glucosamine-chondroitin 500-400 MG tablet Take 1 tablet by mouth daily   • glucose blood (FREESTYLE LITE) test strip Use 1 each daily Use as instructed   • hydrochlorothiazide (HYDRODIURIL) 25 mg tablet Take 1 tablet by mouth once daily   • losartan (COZAAR) 50 mg tablet Take 1 tablet by mouth once daily   • Magnesium Hydroxide (MAGNESIA PO) Take by mouth 2 (two) times a day   • metFORMIN (GLUCOPHAGE) 1000 MG tablet TAKE 1 TABLET BY MOUTH TWICE DAILY WITH MEALS   • Multiple Vitamin (MULTIVITAMIN) capsule Take 1 capsule by mouth daily   • nitroglycerin (NITROSTAT) 0 3 mg SL tablet Place 1 tablet (0 3 mg total) under the tongue every 5 (five) minutes as needed for chest pain   • ONETOUCH DELICA LANCETS 83W MISC 1 Units by Does not apply route daily   • Semaglutide (Rybelsus) 7 MG TABS Take one tablet daily on empty stomach with full glass of water     Allergies   Allergen Reactions   • Levaquin [Levofloxacin In D5w] Swelling Knee swelling   • Sulfa Antibiotics GI Intolerance     Abdominal pain       Immunization History   Administered Date(s) Administered   • COVID-19 MODERNA VACC 0 25 ML IM BOOSTER 10/29/2021, 04/15/2022   • COVID-19 MODERNA VACC 0 5 ML IM 01/29/2021, 02/26/2021   • Influenza Quadrivalent Preservative Free 3 years and older IM 10/08/2021   • Influenza, injectable, quadrivalent, preservative free 0 5 mL 11/01/2019, 09/25/2020   • Influenza, recombinant, quadrivalent,injectable, preservative free 10/21/2022   • Pneumococcal Polysaccharide PPV23 12/06/2016   • Rabies-IM Human Diploid Cell Culture 08/06/2021, 08/10/2021   • Tdap 08/07/2016       Objective     /78   Pulse 78   Ht 5' 11" (1 803 m)   Wt 128 kg (282 lb)   SpO2 98%   BMI 39 33 kg/m²     Physical Exam  Constitutional:       General: He is not in acute distress  Appearance: He is well-developed  He is not ill-appearing or diaphoretic  HENT:      Head: Normocephalic and atraumatic  Right Ear: External ear normal       Left Ear: External ear normal    Eyes:      General: Lids are normal          Right eye: No discharge  Left eye: No discharge  Conjunctiva/sclera: Conjunctivae normal    Neck:      Thyroid: No thyroid mass or thyromegaly  Vascular: No carotid bruit or JVD  Trachea: Trachea normal    Cardiovascular:      Rate and Rhythm: Regular rhythm  Pulses: Pulses are weak  Dorsalis pedis pulses are 1+ on the right side and 1+ on the left side  Posterior tibial pulses are 1+ on the right side and 1+ on the left side  Heart sounds: Normal heart sounds  Pulmonary:      Effort: No respiratory distress  Breath sounds: No wheezing, rhonchi or rales  Abdominal:      General: There is no distension  Palpations: There is no mass  Tenderness: There is no abdominal tenderness  There is no right CVA tenderness, left CVA tenderness, guarding or rebound        Hernia: No hernia is present  Musculoskeletal:      Right lower leg: No edema  Left lower leg: No edema  Feet:      Right foot:      Skin integrity: No ulcer, skin breakdown, erythema, warmth, callus or dry skin  Left foot:      Skin integrity: No ulcer, skin breakdown, erythema, warmth, callus or dry skin  Lymphadenopathy:      Cervical: No cervical adenopathy  Skin:     General: Skin is warm  Coloration: Skin is not jaundiced or pale  Findings: No rash  Neurological:      General: No focal deficit present  Mental Status: He is alert and oriented to person, place, and time  Psychiatric:         Mood and Affect: Mood normal          Behavior: Behavior normal          Thought Content: Thought content normal          Judgment: Judgment normal      Diabetic Foot Exam    Patient's shoes and socks removed  Right Foot/Ankle   Right Foot Inspection  Skin Exam: skin normal and skin intact  No dry skin, no warmth, no callus, no erythema, no maceration, no abnormal color, no pre-ulcer, no ulcer and no callus  Toe Exam: ROM and strength within normal limits  no right toe deformity    Sensory   Proprioception: diminished  Monofilament testing: diminished    Vascular  Capillary refills: < 3 seconds  The right DP pulse is 1+  The right PT pulse is 1+  Left Foot/Ankle  Left Foot Inspection  Skin Exam: skin normal and skin intact  No dry skin, no warmth, no erythema, no maceration, normal color, no pre-ulcer, no ulcer and no callus  Toe Exam: ROM and strength within normal limits  No left toe deformity  Sensory   Proprioception: diminished  Monofilament testing: diminished    Vascular  Capillary refills: < 3 seconds  The left DP pulse is 1+  The left PT pulse is 1+  Assign Risk Category  No deformity present  Loss of protective sensation  Weak pulses  Risk: 2  Varicose vein is present bilaterally and secondary stasis dermatitis present    Skin is somewhat pale bilaterally in the distal legs  Nilesh Tapia MD

## 2023-02-17 NOTE — PATIENT INSTRUCTIONS
Continue high yogurt diet  Continue high-fiber diet  Given a trial of stopping Rybelsus for 2 weeks  If you see improvement stay off it and contact endocrinologist regarding alternate medications  If your sugar goes high then you may have to reach out to him earlier  If you do not have improvement go back on the Rybelsus and monitor blood sugar  Let me know if there is any worsening of problem anytime  Call me this afternoon for the CT scan of abdomen or pelvis report  Follow with Consultants as per their and our suggestion    Follow up [phone] or as needed earlier    Follow all instructions as advised and discussed  Take your medications as prescribed  Call the office immediately if you experience any side effects  Ask questions if you do not understand  Keep your scheduled appointment as advised or come sooner if necessary or in doubt  Best time to call for non-urgent matter or questions on weekdays is between 9am and 12 noon  See physician for any new symptoms or worsening of current symptoms  Urgent or emergent situations call 911 and report to nearest emergency room      I spent  30 -40 minutes taking care of this patient including clinical care, conseling, collaboration, chart, lab and consultaion review as appropriate    Patient is to get labs 1 week(s) prior to next visit if advised

## 2023-02-17 NOTE — ASSESSMENT & PLAN NOTE
Lab Results   Component Value Date    HGBA1C 8 1 (H) 01/13/2023   Endocrinologist     There is a question about diabetic gastroparesis  Also there is a questions about Rybelsus cool link or contributing to his change in the bowel habit  They did recommend to stop Rybelsus  Patient is somewhat reluctant we talked about only way to find out if that is what is causing his to stop  Give it a trial of 2 weeks of medicine  After 5 7 days he should see a significant improvement if it is related to medications  If there is no significant improvement after 2 weeks he could go back on Rybelsus as this most likely does not the medications  Meanwhile be more aggressive with the diet  Continue to monitor blood sugar  Also explanation for change in bowel habit is related to partial colectomy, there is probable diabetic gastroparesis,  Lab has been unremarkable  CT scan of the abdomen and pelvis is pending  We will discuss but I doubt  Anything we going to find  Patient was seen by gastroenterologist and endocrinologist and also noted  Meanwhile he started eating more yogurt and he has a significant significant clinical improvement he will continue to do that and he will continue to follow with high-fiber diet

## 2023-02-17 NOTE — ASSESSMENT & PLAN NOTE
01/19/2021:  Colonoscopy  1  Small polyp removed  2  Few small diverticuli seen and normal appearing anastomosis in the rectosigmoid area    As above extensively

## 2023-02-17 NOTE — ASSESSMENT & PLAN NOTE
Abdominal discomfort component is better since he has been eating more yogurt  He also had a possible explanation of his change in bowel habit, abdominal discomfort mostly just before going for bowel movement and then significant relief,   CT scan awaited  Lipid unremarkable  Differential diagnosis #1 decreased absorption due to colectomy contributing to his symptoms  2   Possible diabetic gastroparesis #3 motility disorder cannot be ruled out  Plan will be to continue high-fiber diet, continue yogurt  He is subjectively feeling better  Reviewed CT scan  Seen by gastroenterologist   They have nothing much to offer  Last CAT scan were reviewed  Last colonoscopy 2 years ago was unremarkable did not planning to do anything different  Patient subjectively feeling better  Will continue to monitor

## 2023-02-17 NOTE — ASSESSMENT & PLAN NOTE
Bowel habit modest improvement with going on the high yogurt diet and continued fiber  Seen by GI as well as endocrinologist approach were reviewed  Abdominal discomfort component is better since he has been eating more yogurt  He also had a possible explanation of his change in bowel habit, abdominal discomfort mostly just before going for bowel movement and then significant relief,   CT scan awaited  Lipid unremarkable  Differential diagnosis #1 decreased absorption due to colectomy contributing to his symptoms  2   Possible diabetic gastroparesis #3 motility disorder cannot be ruled out  Plan will be to continue high-fiber diet, continue yogurt  He is subjectively feeling better  Reviewed CT scan  Seen by gastroenterologist   They have nothing much to offer  Last CAT scan were reviewed  Last colonoscopy 2 years ago was unremarkable did not planning to do anything different  Patient subjectively feeling better  Will continue to monitor

## 2023-03-09 DIAGNOSIS — J30.1 SEASONAL ALLERGIC RHINITIS DUE TO POLLEN: ICD-10-CM

## 2023-03-09 RX ORDER — DESLORATADINE 5 MG/1
TABLET ORAL
Qty: 90 TABLET | Refills: 0 | Status: SHIPPED | OUTPATIENT
Start: 2023-03-09

## 2023-03-17 ENCOUNTER — OFFICE VISIT (OUTPATIENT)
Dept: NEUROLOGY | Facility: CLINIC | Age: 63
End: 2023-03-17

## 2023-03-17 VITALS
HEART RATE: 81 BPM | TEMPERATURE: 96.6 F | HEIGHT: 71 IN | SYSTOLIC BLOOD PRESSURE: 102 MMHG | WEIGHT: 281 LBS | OXYGEN SATURATION: 98 % | DIASTOLIC BLOOD PRESSURE: 58 MMHG | BODY MASS INDEX: 39.34 KG/M2

## 2023-03-17 DIAGNOSIS — G25.81 RESTLESS LEG SYNDROME: ICD-10-CM

## 2023-03-17 DIAGNOSIS — E11.40 NEUROPATHY DUE TO TYPE 2 DIABETES MELLITUS (HCC): Primary | ICD-10-CM

## 2023-03-17 DIAGNOSIS — R25.1 TREMOR OF LEFT HAND: ICD-10-CM

## 2023-03-17 RX ORDER — GABAPENTIN 100 MG/1
CAPSULE ORAL
Qty: 360 CAPSULE | Refills: 1 | Status: SHIPPED | OUTPATIENT
Start: 2023-03-17

## 2023-03-17 NOTE — PATIENT INSTRUCTIONS
Continue with Gabapentin 100mg in the am and 300mg at bedtime  Referral to OT for hand strength and tremor, will get eval and give Home Exercise Program to complete    Follow up with Neurology office in 5 months or sooner if needed

## 2023-03-17 NOTE — PROGRESS NOTES
Patient ID: Belen Gonzalez is a 63 y o  male  Assessment/Plan:     Diagnoses and all orders for this visit:    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)  -     gabapentin (NEURONTIN) 100 mg capsule; TAKE 1 CAPSULE BY MOUTH IN THE MORNING AND 3 AT BEDTIME    Tremor of left hand  -     Ambulatory Referral to Occupational Therapy; Future    Restless leg syndrome       Continue with Gabapentin 100mg in the am and 300mg at bedtime  Referral to OT for hand strength and tremor, will get eval and give Home Exercise Program to complete  Follow up with Neurology office in 5 months or sooner if needed     Subjective/HPI:  Belen Gonzalez is a 65yo male who follows with outpatient neurology office for medical management of his diabetic neuropathy  Patient has history of diabetes, his last A1c was 8 1, this is up from 7 8  He is also history of vitamin and vitamin B deficiencies for which she is gotten injections  Patient has had MRI of the lumbar spine showing multi degenerative disc disease, focal disc protrusion at L3-4 with possible impingement on L4 descending nerve root  He has a T11 compression fracture with mild kyphosis  Bone density testing was normal  EMG of the upper extremities with evidence of median nerve entrapment consistent with carpal tunnel syndrome, left ulnar neuropathy across the elbow  Patient was following with orthopedics for bilateral injections which were effective  Patient has disruptive sleep apnea for which he has a CPAP machine  He is noted some restless leg syndrome with sharp shooting pains in his lower extremities from his neuropathies  Patient was started on gabapentin, he is a  and does surgical interventions, therefore he opted to start gabapentin at bedtime only  He did start 1 small dose in the morning at 100 mg she only takes on nonsurgical days  Patient states that he can get foggy on the medication    With regards to his restless leg, he had reported a creepy crawly sensation and movement in the lower extremities  He states that he actually has more discomfort in the morning when he wakes that he does at night to go to sleep  Patient reports increased muscle cramping and twitching as well for which she was started on magnesium  Patient takes magnesium twice a day  We did discuss possibly starting on pramipexole for his restless leg syndrome  He was started on low-dose, titrated up  Patient stated that he was unable to tolerate the medication and it made him too foggy mentally and he was unable to take the medication and go to work at the same time  He had stopped taking the Mirapex for this reason  Patient reports back to the neurology office today, he notes no significant changes in his neuropathies, restless leg or cramping  Patient reports with regards to his neuropathies he feels as though his feet are wearing heavy socks and denies any significant pains to the feet  Patient states occasionally he will get a sharp shooting pain up through his toe however this is fleeting and irregular in pattern, random  Patient states since his last appointment he has had multiple other issues, suspects he has gastroparesis now which may be related to his diabetes  Patient has had difficulty with getting his A1c under control  He is noted some GI issues which is causing him problems and he may need to switch his DM medication  He is currently holding on his Rybelsus which may cause an alteration in his A1c as well  Patient aware this may cause some fluctuation in the way his neuropathy feels  Currently patient is on a total of 400 mg of gabapentin a day, for him this is keeping him functional and limiting his daytime fatigue for his work  With regards to patient's restless leg syndrome, he feels as though there is sensations have settled down and stabilized  He is not noticing it quite as much    He does have more muscle cramping than odd sensations at this point in time  Patient continues on over-the-counter magnesium  Pt will continue to work with his GI and Endo providers re: his GI and BS needs  Continues with Gabapentin 100mg in am and 300mg bedtime  Pt will remain off of pramipexole  Pt will follow up with neurology office in 6 months or sooner if needed  The following portions of the patient's history were reviewed and updated as appropriate: allergies, current medications, past family history, past medical history, past social history, past surgical history and problem list         Past Medical History:   Diagnosis Date   • Anemia    • Bundle branch block, right    • CAD (coronary artery disease)    • Cataract    • CPAP (continuous positive airway pressure) dependence    • Diabetes mellitus (HCC)     borderline, controlled with diet and activity   • Diverticulitis    • GERD (gastroesophageal reflux disease)    • History of coronary artery stent placement- pCx and OM2 11/24/2017   • Hyperlipidemia    • Nasal septal deformity    • Neuropathy    • Obesity (BMI 30-39  9)    • Sleep apnea     wears c-pap   • Vitamin D deficiency        Past Surgical History:   Procedure Laterality Date   • COLON SIGMOID RESECTION N/A 12/16/2016    Procedure: RESECTION COLON SIGMOID;  Surgeon: Alex Diaz MD;  Location: [facility];  Service:    • COLON SIGMOID RESECTION LAPAROSCOPIC N/A 12/16/2016    Procedure: RESECTION COLON SIGMOID LAPAROSCOPIC:CONVERTED TO [email or web address];  Surgeon: Alex Diaz MD;  Location: [facility];  Service:    • COLON SURGERY      Sigmoidectomy   • COLONOSCOPY N/A 10/6/2016    Procedure: COLONOSCOPY;  Surgeon: Adia Smith MD;  Location: [facility];   Service:    • CYSTOSCOPY W/ RETROGRADES Left 2/3/2017    Procedure: CYSTOSCOPY WITH BILATERAL RETROGRADES, LEFT STENT REMOVAL;  Surgeon: Dixon Rosales MD;  Location: [address] [place] [address];  Service:    • ESOPHAGOGASTRODUODENOSCOPY N/A 10/6/2016    Procedure: ESOPHAGOGASTRODUODENOSCOPY (EGD); Surgeon: Castillo Villar MD;  Location: [place] [address] GI LAB; Service:    • HERNIA REPAIR     • KNEE ARTHROSCOPY W/ MENISCECTOMY     • MN CYSTO BLADDER W/URETERAL CATHETERIZATION Left 2016    Procedure: CYSTOSCOPY RETROGRADE PYELOGRAM WITH INSERTION STENT URETERAL;  Surgeon: Kristi Palomo MD;  Location: [address] [place] [address];  Service: Urology   • US GUIDED THYROID BIOPSY  2021   • VARICOSE VEIN SURGERY         Social History     Socioeconomic History   • Marital status: Single     Spouse name: None   • Number of children: None   • Years of education: None   • Highest education level: None   Occupational History   • None   Tobacco Use   • Smoking status: Former     Packs/day: 1 00     Years: 37 00     Pack years: 37 00     Types: Cigarettes     Quit date: 3/30/2018     Years since quittin 9   • Smokeless tobacco: Current     Types: Chew   • Tobacco comments:     chewing nicotine   Vaping Use   • Vaping Use: Never used   Substance and Sexual Activity   • Alcohol use: Yes     Alcohol/week: 3 0 standard drinks     Types: 3 Cans of beer per week     Comment: occ   • Drug use: No   • Sexual activity: Never   Other Topics Concern   • None   Social History Narrative    Lives alone       Social Determinants of Health     Financial Resource Strain: Not on file   Food Insecurity: Not on file   Transportation Needs: Not on file   Physical Activity: Not on file   Stress: Not on file   Social Connections: Not on file   Intimate Partner Violence: Not on file   Housing Stability: Not on file       Family History   Problem Relation Age of Onset   • Diabetes Brother    • Osteoarthritis Mother    • Atrial fibrillation Mother    • Aortic aneurysm Father    • Colon cancer Brother          Current Outpatient Medications:   •  albuterol (PROVENTIL HFA,VENTOLIN HFA) 90 mcg/act inhaler, Inhale 2 puffs every 4 (four) hours as needed for wheezing or shortness of breath, Disp: 18 g, Rfl: 6  •  Alpha-Lipoic Acid 100 MG CAPS, Take by mouth, Disp: , Rfl:   •  Ascorbic Acid (VITAMIN C) 100 MG tablet, Take 500 mg by mouth , Disp: , Rfl:   •  aspirin 81 MG tablet, Take 162 mg by mouth daily  , Disp: , Rfl:   •  atorvastatin (LIPITOR) 40 mg tablet, TAKE 1 TABLET BY MOUTH ONCE DAILY WITH SUPPER, Disp: 90 tablet, Rfl: 0  •  BIOTIN PO, Take 5 mg by mouth daily, Disp: , Rfl:   •  Cholecalciferol (VITAMIN D3) 1000 units CAPS, Take by mouth daily , Disp: , Rfl:   •  cyanocobalamin (VITAMIN B-12) 100 mcg tablet, Take by mouth daily, Disp: , Rfl:   •  desloratadine (CLARINEX) 5 MG tablet, TAKE 1 TABLET BY MOUTH AT BEDTIME, Disp: 90 tablet, Rfl: 0  •  famotidine (PEPCID) 20 mg tablet, Take 20 mg by mouth daily, Disp: , Rfl:   •  FREESTYLE LITE test strip, USE 1 STRIP TO CHECK GLUCOSE ONCE DAILY AS DIRECTED, Disp: 100 each, Rfl: 0  •  gabapentin (NEURONTIN) 100 mg capsule, TAKE 1 CAPSULE BY MOUTH IN THE MORNING AND 3 AT BEDTIME, Disp: 360 capsule, Rfl: 1  •  glucosamine-chondroitin 500-400 MG tablet, Take 1 tablet by mouth daily, Disp: , Rfl:   •  glucose blood (FREESTYLE LITE) test strip, Use 1 each daily Use as instructed, Disp: 100 each, Rfl: 0  •  hydrochlorothiazide (HYDRODIURIL) 25 mg tablet, Take 1 tablet by mouth once daily, Disp: 90 tablet, Rfl: 3  •  losartan (COZAAR) 50 mg tablet, Take 1 tablet by mouth once daily, Disp: 90 tablet, Rfl: 1  •  Magnesium Hydroxide (MAGNESIA PO), Take by mouth 2 (two) times a day, Disp: , Rfl:   •  metFORMIN (GLUCOPHAGE) 1000 MG tablet, TAKE 1 TABLET BY MOUTH TWICE DAILY WITH MEALS, Disp: 180 tablet, Rfl: 1  •  Multiple Vitamin (MULTIVITAMIN) capsule, Take 1 capsule by mouth daily, Disp: , Rfl:   •  nitroglycerin (NITROSTAT) 0 3 mg SL tablet, Place 1 tablet (0 3 mg total) under the tongue every 5 (five) minutes as needed for chest pain, Disp: 25 tablet, Rfl: 1  •  ONETOUCH DELICA LANCETS 83I MISC, 1 Units by Does not apply route daily, Disp: 100 each, Rfl: 6  •  ciclopirox (LOPROX) 0 77 % cream, APPLY CREAM TOPICALLY TWICE DAILY  GENTLY MASSAGE INTO AFFECTED AREAS AND SURROUNDING SKIN (Patient not taking: Reported on 3/17/2023), Disp: , Rfl:   •  Semaglutide (Rybelsus) 7 MG TABS, Take one tablet daily on empty stomach with full glass of water (Patient not taking: Reported on 3/17/2023), Disp: 30 tablet, Rfl: 5    Current Facility-Administered Medications:   •  cyanocobalamin injection 1,000 mcg, 1,000 mcg, Intramuscular, Q30 Days, [facility], NP, 1,000 mcg at 09/10/21 1141    Allergies   Allergen Reactions   • Levaquin [Levofloxacin In D5w] Swelling     Knee swelling   • Sulfa Antibiotics GI Intolerance     Abdominal pain          Blood pressure 102/58, pulse 81, temperature (!) 96 6 °F (35 9 °C), temperature source Tympanic, height 5' 11" (1 803 m), weight 127 kg (281 lb), SpO2 98 %  Objective:    Blood pressure 102/58, pulse 81, temperature (!) 96 6 °F (35 9 °C), temperature source Tympanic, height 5' 11" (1 803 m), weight 127 kg (281 lb), SpO2 98 %  Physical Exam  Vitals reviewed  Constitutional:       Appearance: Normal appearance  He is well-developed  HENT:      Head: Normocephalic  Right Ear: Hearing normal       Left Ear: Hearing normal       Nose: Nose normal    Eyes:      General: Lids are normal       Extraocular Movements: Extraocular movements intact  Conjunctiva/sclera: Conjunctivae normal       Pupils: Pupils are equal, round, and reactive to light  Cardiovascular:      Rate and Rhythm: Normal rate  Pulmonary:      Effort: Pulmonary effort is normal  No respiratory distress  Abdominal:      Palpations: Abdomen is soft  Tenderness: There is no abdominal tenderness  Musculoskeletal:         General: Normal range of motion  Cervical back: Normal range of motion  Skin:     General: Skin is warm and dry  Neurological:      Mental Status: He is alert        Motor: Motor strength is normal       Coordination: Romberg sign negative  Deep Tendon Reflexes: Reflexes are normal and symmetric  Psychiatric:         Attention and Perception: Attention and perception normal          Mood and Affect: Mood and affect normal          Speech: Speech normal          Behavior: Behavior normal  Behavior is cooperative  Thought Content: Thought content normal          Cognition and Memory: Cognition and memory normal          Judgment: Judgment normal          Neurological Exam  Mental Status  Alert  Oriented to person, place, time and situation  Memory is normal  Recent and remote memory are intact  Speech is normal  Language is fluent with no aphasia  Attention and concentration are normal  Fund of knowledge is appropriate for level of education  Cranial Nerves  CN II: Visual acuity is normal  Visual fields full to confrontation  CN III, IV, VI: Extraocular movements intact bilaterally  Normal lids and orbits bilaterally  Pupils equal round and reactive to light bilaterally  CN V: Facial sensation is normal   CN VII: Full and symmetric facial movement  CN VIII: Hearing is normal   Right: Hearing is normal   Left: Hearing is normal   CN IX, X: Palate elevates symmetrically  Normal gag reflex  CN XI: Shoulder shrug strength is normal   CN XII: Tongue midline without atrophy or fasciculations  Motor  Normal muscle bulk throughout  Normal muscle tone  No abnormal involuntary movements  Strength is 5/5 throughout all four extremities  Sensory  Light touch is normal in upper and lower extremities  Temperature is normal in upper and lower extremities  Vibration is normal in upper and lower extremities  Proprioception is normal in upper and lower extremities  Reflexes  Deep tendon reflexes are 2+ and symmetric in all four extremities  Right pathological reflexes: Azul's absent  Left pathological reflexes: Azul's absent      Coordination  Right: Finger-to-nose normal  Rapid alternating movement normal  Heel-to-shin normal Left: Finger-to-nose normal  Rapid alternating movement normal  Heel-to-shin normal     Gait  Casual gait is normal including stance, stride, and arm swing  Normal toe walking  Normal heel walking  Normal tandem gait  Romberg is absent  Able to rise from chair without using arms  ROS:    Review of Systems   Constitutional: Negative  Negative for appetite change and fever  HENT: Positive for tinnitus and trouble swallowing  Negative for hearing loss and voice change  Eyes: Negative  Negative for photophobia, pain and visual disturbance  Respiratory: Positive for shortness of breath  Cardiovascular: Negative  Negative for palpitations  Gastrointestinal: Positive for nausea  Negative for vomiting  Endocrine: Negative  Negative for cold intolerance  Genitourinary: Negative  Negative for dysuria, frequency and urgency  Musculoskeletal: Positive for neck pain (Due to the way he sleeps)  Negative for gait problem and myalgias  Skin: Negative  Negative for rash  Allergic/Immunologic: Negative  Neurological: Positive for tremors, weakness and light-headedness  Negative for dizziness, seizures, syncope, facial asymmetry, speech difficulty, numbness and headaches  Hematological: Negative  Does not bruise/bleed easily  Psychiatric/Behavioral: Positive for sleep disturbance  Negative for confusion and hallucinations  ROS reviewed and discussed with patient

## 2023-04-30 DIAGNOSIS — E11.9 TYPE 2 DIABETES MELLITUS WITHOUT COMPLICATION, WITHOUT LONG-TERM CURRENT USE OF INSULIN (HCC): ICD-10-CM

## 2023-05-12 ENCOUNTER — OFFICE VISIT (OUTPATIENT)
Dept: ENDOCRINOLOGY | Facility: CLINIC | Age: 63
End: 2023-05-12

## 2023-05-12 VITALS
HEIGHT: 71 IN | WEIGHT: 278 LBS | SYSTOLIC BLOOD PRESSURE: 120 MMHG | HEART RATE: 84 BPM | DIASTOLIC BLOOD PRESSURE: 80 MMHG | BODY MASS INDEX: 38.92 KG/M2

## 2023-05-12 DIAGNOSIS — R42 LIGHTHEADEDNESS: ICD-10-CM

## 2023-05-12 DIAGNOSIS — E11.65 TYPE 2 DIABETES MELLITUS WITH HYPERGLYCEMIA, WITHOUT LONG-TERM CURRENT USE OF INSULIN (HCC): Primary | ICD-10-CM

## 2023-05-12 DIAGNOSIS — E55.9 VITAMIN D DEFICIENCY: ICD-10-CM

## 2023-05-12 DIAGNOSIS — I10 ESSENTIAL HYPERTENSION: ICD-10-CM

## 2023-05-12 DIAGNOSIS — E53.8 VITAMIN B 12 DEFICIENCY: ICD-10-CM

## 2023-05-12 DIAGNOSIS — E78.2 MIXED HYPERLIPIDEMIA: ICD-10-CM

## 2023-05-12 DIAGNOSIS — E04.1 THYROID NODULE: ICD-10-CM

## 2023-05-12 DIAGNOSIS — R53.83 OTHER FATIGUE: ICD-10-CM

## 2023-05-12 DIAGNOSIS — I25.10 CORONARY ARTERY DISEASE INVOLVING NATIVE CORONARY ARTERY OF NATIVE HEART WITHOUT ANGINA PECTORIS: ICD-10-CM

## 2023-05-12 NOTE — PROGRESS NOTES
" Janiece Boeck 63 y o  male MRN: [id number]    Encounter: [id number]      Assessment/Plan     1  Type 2 diabetes mellitus not on long-term insulin therapy with hyperglycemia  2  Obesity, BMI 38 77  3  Neuropathy, history of vitamin B12 deficiency  Poorly controlled with last A1c 8 1%  Will be having labs done next week  CGM not covered by insurance   Discussed with patient that it would be very unlikely for his symptoms of tiredness, brain fog to be from his medications for diabetes mellitus  May hold on a different day than Tuesday to see if there is an improvement in symptoms  Recommend the following at this time  Continue metformin 1000 mg orally twice a day  Discontinue Rybelsus  Start Ozempic 0 25 mg subcutaneously weekly  Send blood sugar log for review in 3 to 4 weeks  In addition to A1c, BMP, lipid panel, urine microalbumin, will check vitamin B12 levels    4 fatigue, lightheadedness, tiredness-repeat thyroid function test TSH, free T4, check 8 AM cortisol    5  Hyperlipidemia  - continue statin therapy    6 Hypertension, CAD, CHF  Blood pressure at goal  - continue current medications  Follow up with cardiology     7  Thyroid nodule- repeat ultrasound around 9/2023  8  Vitamin D deficiency-check vitamin D level    I have spent a total time of 40 minutes on 05/12/23 in caring for this patient  CC: Diabetes    History of Present Illness     HPI:  Janiece Boeck is a 63 y o  male presents for a follow-up visit regarding diabetes management  Last seen in 12/2022  Last Eye exam: 2/2022, 8/2022- No DR       Current regimen:   metformin 1000 mg orally twice a day  Rybelsus 7 mg orally daily     Says that for the last few weeks c/o feeling fatigue/ tiredness by the afternoon  Needs to get coffee to help him through the day   Had given up coffee a long time ago   For a long time around 10-11 am - has been feeling \" wired\" and as if he has brain fog; One day skipped metformin and says he felt " better   On Tuesadays when he is in the [address] () does take any medications and says that he feels better  January - had a period of diarrhea, nausea; met with GI, CT abdomen within normal limits   Stopped Rybelus ion February - improvement in diarrhea   Restarted Rybelsus around [facility]  Now has diarrhea once in a while   Occasional lightheadedness/dizziness  No more nausea or abdominal discomfort     C/o numbness/tingling   Gets blurriness in vision     Statin:  lipitor   ACE-I/ARB: losartan     Home glucose monitoring:  will be scanned into chart   Symptoms of hypoglycemia :     Still taking B12 supplementation     All other systems were reviewed and are negative  Review of Systems      Historical Information   Past Medical History:   Diagnosis Date   • Anemia    • Bundle branch block, right    • CAD (coronary artery disease)    • Cataract    • CPAP (continuous positive airway pressure) dependence    • Diabetes mellitus (HCC)     borderline, controlled with diet and activity   • Diverticulitis    • GERD (gastroesophageal reflux disease)    • History of coronary artery stent placement- pCx and OM2 11/24/2017   • Hyperlipidemia    • Nasal septal deformity    • Neuropathy    • Obesity (BMI 30-39  9)    • Sleep apnea     wears c-pap   • Vitamin D deficiency      Past Surgical History:   Procedure Laterality Date   • COLON SIGMOID RESECTION N/A 12/16/2016    Procedure: RESECTION COLON SIGMOID;  Surgeon: Bailey Carranza MD;  Location: [facility];  Service:    • COLON SIGMOID RESECTION LAPAROSCOPIC N/A 12/16/2016    Procedure: RESECTION COLON SIGMOID LAPAROSCOPIC:CONVERTED TO [email or web address];  Surgeon: Bailey Carranza MD;  Location: [facility];  Service:    • COLON SURGERY      Sigmoidectomy   • COLONOSCOPY N/A 10/6/2016    Procedure: COLONOSCOPY;  Surgeon: Carmencita Domínguez MD;  Location: [facility];   Service:    • CYSTOSCOPY W/ RETROGRADES Left 2/3/2017    Procedure: CYSTOSCOPY WITH BILATERAL RETROGRADES, LEFT STENT REMOVAL;  Surgeon: Kasandra Wilkinson MD;  Location: [address] [place] [address];  Service:    • ESOPHAGOGASTRODUODENOSCOPY N/A 10/6/2016    Procedure: ESOPHAGOGASTRODUODENOSCOPY (EGD); Surgeon: Fransisca Herring MD;  Location: [facility];   Service:    • HERNIA REPAIR     • KNEE ARTHROSCOPY W/ MENISCECTOMY     • TN CYSTO BLADDER W/URETERAL CATHETERIZATION Left 2016    Procedure: CYSTOSCOPY RETROGRADE PYELOGRAM WITH INSERTION STENT URETERAL;  Surgeon: Kasandra Wilkinson MD;  Location: [address] [place] [address];  Service: Urology   • US GUIDED THYROID BIOPSY  2021   • VARICOSE VEIN SURGERY       Social History   Social History     Substance and Sexual Activity   Alcohol Use Yes   • Alcohol/week: 3 0 standard drinks   • Types: 3 Cans of beer per week    Comment: occ     Social History     Substance and Sexual Activity   Drug Use No     Social History     Tobacco Use   Smoking Status Former   • Packs/day: 1 00   • Years: 37 00   • Pack years: 37 00   • Types: Cigarettes   • Quit date: 3/30/2018   • Years since quittin 1   Smokeless Tobacco Current   • Types: Chew   Tobacco Comments    chewing nicotine     Family History:   Family History   Problem Relation Age of Onset   • Diabetes Brother    • Osteoarthritis Mother    • Atrial fibrillation Mother    • Aortic aneurysm Father    • Colon cancer Brother        Meds/Allergies   Current Outpatient Medications   Medication Sig Dispense Refill   • albuterol (PROVENTIL HFA,VENTOLIN HFA) 90 mcg/act inhaler Inhale 2 puffs every 4 (four) hours as needed for wheezing or shortness of breath 18 g 6   • Alpha-Lipoic Acid 100 MG CAPS Take by mouth     • Ascorbic Acid (VITAMIN C) 100 MG tablet Take 500 mg by mouth      • aspirin 81 MG tablet Take 162 mg by mouth daily       • atorvastatin (LIPITOR) 40 mg tablet TAKE 1 TABLET BY MOUTH ONCE DAILY WITH SUPPER 90 tablet 0   • BIOTIN PO Take 5 mg by mouth daily     • Cholecalciferol (VITAMIN D3) 1000 units CAPS Take by mouth daily      • cyanocobalamin (VITAMIN B-12) 100 mcg tablet Take by mouth daily     • desloratadine (CLARINEX) 5 MG tablet TAKE 1 TABLET BY MOUTH AT BEDTIME 90 tablet 0   • famotidine (PEPCID) 20 mg tablet Take 20 mg by mouth daily     • FREESTYLE LITE test strip USE 1 STRIP TO CHECK GLUCOSE ONCE DAILY AS DIRECTED 100 each 0   • gabapentin (NEURONTIN) 100 mg capsule TAKE 1 CAPSULE BY MOUTH IN THE MORNING AND 3 AT BEDTIME 360 capsule 1   • glucosamine-chondroitin 500-400 MG tablet Take 1 tablet by mouth daily     • glucose blood (FREESTYLE LITE) test strip Use 1 each daily Use as instructed 100 each 0   • hydrochlorothiazide (HYDRODIURIL) 25 mg tablet Take 1 tablet by mouth once daily 90 tablet 3   • losartan (COZAAR) 50 mg tablet Take 1 tablet by mouth once daily 90 tablet 1   • Magnesium Hydroxide (MAGNESIA PO) Take by mouth 2 (two) times a day     • metFORMIN (GLUCOPHAGE) 1000 MG tablet TAKE 1 TABLET BY MOUTH TWICE DAILY WITH MEALS 180 tablet 0   • Multiple Vitamin (MULTIVITAMIN) capsule Take 1 capsule by mouth daily     • nitroglycerin (NITROSTAT) 0 3 mg SL tablet Place 1 tablet (0 3 mg total) under the tongue every 5 (five) minutes as needed for chest pain 25 tablet 1   • ONETOUCH DELICA LANCETS 11S MISC 1 Units by Does not apply route daily 100 each 6   • ciclopirox (LOPROX) 0 77 % cream APPLY CREAM TOPICALLY TWICE DAILY   GENTLY MASSAGE INTO AFFECTED AREAS AND SURROUNDING SKIN (Patient not taking: Reported on 3/17/2023)     • Semaglutide (Rybelsus) 7 MG TABS Take one tablet daily on empty stomach with full glass of water (Patient not taking: Reported on 3/17/2023) 30 tablet 5     Current Facility-Administered Medications   Medication Dose Route Frequency Provider Last Rate Last Admin   • cyanocobalamin injection 1,000 mcg  1,000 mcg Intramuscular Q30 Days LUCERO Conroy   1,000 mcg at 09/10/21 1141     Allergies   Allergen Reactions   • Levaquin [Levofloxacin In D5w] Swelling     Knee swelling   • Sulfa Antibiotics GI "Intolerance     Abdominal pain         Objective   Vitals: Blood pressure 120/80, pulse 84, height 5' 11\" (1 803 m), weight 126 kg (278 lb)  Physical Exam  Constitutional:       General: He is not in acute distress  Appearance: He is well-developed  He is not diaphoretic  HENT:      Head: Normocephalic and atraumatic  Eyes:      Conjunctiva/sclera: Conjunctivae normal       Pupils: Pupils are equal, round, and reactive to light  Cardiovascular:      Rate and Rhythm: Normal rate and regular rhythm  Heart sounds: Normal heart sounds  No murmur heard  Pulmonary:      Effort: Pulmonary effort is normal  No respiratory distress  Breath sounds: Normal breath sounds  No wheezing  Abdominal:      General: There is no distension  Palpations: Abdomen is soft  Tenderness: There is no abdominal tenderness  There is no guarding  Musculoskeletal:      Cervical back: Normal range of motion and neck supple  Skin:     General: Skin is warm and dry  Findings: No erythema or rash  Neurological:      Mental Status: He is alert and oriented to person, place, and time  Psychiatric:         Behavior: Behavior normal          Thought Content: Thought content normal          The history was obtained from the review of the chart, patient      Lab Results:   Lab Results   Component Value Date/Time    Hemoglobin A1C 8 1 (H) 01/13/2023 11:58 AM    Hemoglobin A1C 7 8 (H) 10/14/2022 10:48 AM    Hemoglobin A1C 8 1 (H) 07/02/2022 09:58 AM    White Blood Cell Count 6 8 02/03/2023 08:56 AM    White Blood Cell Count 6 2 01/13/2023 11:58 AM    Hemoglobin 14 9 02/03/2023 08:56 AM    Hemoglobin 14 5 01/13/2023 11:58 AM    HCT 41 7 02/03/2023 08:56 AM    HCT 43 1 01/13/2023 11:58 AM    MCV 97 02/03/2023 08:56 AM    MCV 99 (H) 01/13/2023 11:58 AM    Platelet Count 462 56/19/4768 08:56 AM    Platelet Count 211 38/51/8637 11:58 AM    BUN 15 02/03/2023 08:56 AM    BUN 13 01/13/2023 11:58 AM    BUN 17 10/14/2022 " "10:48 AM    Potassium 4 5 02/03/2023 08:56 AM    Potassium 4 6 01/13/2023 11:58 AM    Potassium 4 4 10/14/2022 10:48 AM    Chloride 99 02/03/2023 08:56 AM    Chloride 100 01/13/2023 11:58 AM    Chloride 99 10/14/2022 10:48 AM    CO2 22 02/03/2023 08:56 AM    CO2 26 01/13/2023 11:58 AM    CO2 22 10/14/2022 10:48 AM    Creatinine 1 11 02/03/2023 08:56 AM    Creatinine 0 92 01/13/2023 11:58 AM    Creatinine 1 16 10/14/2022 10:48 AM    AST 49 (H) 02/03/2023 08:56 AM    AST 49 (H) 01/13/2023 11:58 AM    AST 31 10/14/2022 10:48 AM    ALT 65 (H) 02/03/2023 08:56 AM    ALT 73 (H) 01/13/2023 11:58 AM    ALT 45 (H) 10/14/2022 10:48 AM    Albumin 4 5 02/03/2023 08:56 AM    Albumin 4 5 01/13/2023 11:58 AM    Albumin 4 6 10/14/2022 10:48 AM    Globulin, Total 2 6 02/03/2023 08:56 AM    Globulin, Total 2 6 01/13/2023 11:58 AM    Globulin, Total 2 8 10/14/2022 10:48 AM    HDL 35 (L) 01/13/2023 11:58 AM    HDL 38 (L) 10/14/2022 10:48 AM    HDL 33 (L) 07/02/2022 09:58 AM    Triglycerides 247 (H) 01/13/2023 11:58 AM    Triglycerides 161 (H) 10/14/2022 10:48 AM    Triglycerides 220 (H) 07/02/2022 09:58 AM         Imaging Studies: I have personally reviewed pertinent reports  Portions of the record may have been created with voice recognition software  Occasional wrong word or \"sound a like\" substitutions may have occurred due to the inherent limitations of voice recognition software  Read the chart carefully and recognize, using context, where substitutions have occurred      "

## 2023-05-12 NOTE — PATIENT INSTRUCTIONS
Continue metformin 1000 mg orally twice a day  Discontinue Rybelsus  Start Ozempic 0 25 mg subcutaneously weekly, if you have no gastric side effects, increase to 0 5 mg in 2 weeks  Send blood sugar log for review in 3 to 4 weeks  Las as ordered

## 2023-05-20 LAB
25(OH)D3+25(OH)D2 SERPL-MCNC: 40.7 NG/ML (ref 30–100)
ALBUMIN SERPL-MCNC: 4.4 G/DL (ref 3.8–4.9)
ALBUMIN SERPL-MCNC: 4.5 G/DL (ref 3.8–4.9)
ALBUMIN/CREAT UR: 8 MG/G CREAT (ref 0–29)
ALBUMIN/GLOB SERPL: 1.5 {RATIO} (ref 1.2–2.2)
ALBUMIN/GLOB SERPL: 1.6 {RATIO} (ref 1.2–2.2)
ALP SERPL-CCNC: 70 IU/L (ref 44–121)
ALP SERPL-CCNC: 75 IU/L (ref 44–121)
ALT SERPL-CCNC: 77 IU/L (ref 0–44)
ALT SERPL-CCNC: 79 IU/L (ref 0–44)
AST SERPL-CCNC: 49 IU/L (ref 0–40)
AST SERPL-CCNC: 53 IU/L (ref 0–40)
BILIRUB SERPL-MCNC: 0.6 MG/DL (ref 0–1.2)
BILIRUB SERPL-MCNC: 0.6 MG/DL (ref 0–1.2)
BUN SERPL-MCNC: 16 MG/DL (ref 8–27)
BUN SERPL-MCNC: 17 MG/DL (ref 8–27)
BUN/CREAT SERPL: 15 (ref 10–24)
BUN/CREAT SERPL: 16 (ref 10–24)
CALCIUM SERPL-MCNC: 10 MG/DL (ref 8.6–10.2)
CALCIUM SERPL-MCNC: 9.8 MG/DL (ref 8.6–10.2)
CHLORIDE SERPL-SCNC: 97 MMOL/L (ref 96–106)
CHLORIDE SERPL-SCNC: 98 MMOL/L (ref 96–106)
CHOLEST SERPL-MCNC: 119 MG/DL (ref 100–199)
CO2 SERPL-SCNC: 21 MMOL/L (ref 20–29)
CO2 SERPL-SCNC: 25 MMOL/L (ref 20–29)
CREAT SERPL-MCNC: 1.07 MG/DL (ref 0.76–1.27)
CREAT SERPL-MCNC: 1.09 MG/DL (ref 0.76–1.27)
CREAT UR-MCNC: 178.7 MG/DL
EGFR: 78 ML/MIN/1.73
EGFR: 79 ML/MIN/1.73
EST. AVERAGE GLUCOSE BLD GHB EST-MCNC: 186 MG/DL
EST. AVERAGE GLUCOSE BLD GHB EST-MCNC: 206 MG/DL
GLOBULIN SER-MCNC: 2.8 G/DL (ref 1.5–4.5)
GLOBULIN SER-MCNC: 2.9 G/DL (ref 1.5–4.5)
GLUCOSE SERPL-MCNC: 184 MG/DL (ref 70–99)
GLUCOSE SERPL-MCNC: 187 MG/DL (ref 70–99)
HBA1C MFR BLD: 8.1 % (ref 4.8–5.6)
HBA1C MFR BLD: 8.8 % (ref 4.8–5.6)
HDLC SERPL-MCNC: 39 MG/DL
LDLC SERPL CALC-MCNC: 54 MG/DL (ref 0–99)
LDLC/HDLC SERPL: 1.4 RATIO (ref 0–3.6)
MICROALBUMIN UR-MCNC: 14.5 UG/ML
POTASSIUM SERPL-SCNC: 4.6 MMOL/L (ref 3.5–5.2)
POTASSIUM SERPL-SCNC: 4.8 MMOL/L (ref 3.5–5.2)
PROT SERPL-MCNC: 7.3 G/DL (ref 6–8.5)
PROT SERPL-MCNC: 7.3 G/DL (ref 6–8.5)
SL AMB VLDL CHOLESTEROL CALC: 26 MG/DL (ref 5–40)
SODIUM SERPL-SCNC: 136 MMOL/L (ref 134–144)
SODIUM SERPL-SCNC: 137 MMOL/L (ref 134–144)
T4 FREE SERPL-MCNC: 1.13 NG/DL (ref 0.82–1.77)
TRIGL SERPL-MCNC: 151 MG/DL (ref 0–149)
TSH SERPL DL<=0.005 MIU/L-ACNC: 1.11 UIU/ML (ref 0.45–4.5)
VIT B12 SERPL-MCNC: 792 PG/ML (ref 232–1245)

## 2023-05-26 NOTE — TELEPHONE ENCOUNTER
Called [phone] and left a message on pt's answering machine to let him know that I mailed podiatrist referral to the address on file  Location for this podiatrist is in [facility]  Call back for further questions or concerns  Outpatient Wound Clinician Visit Note    Chief Complaint:   Chief Complaint   Patient presents with   • Wound     LLE medial   • Leg     LLE edema       Home Care Service:  No    Type of Supervision: Patient seen by provider    Was care transitioned to this department today? No   Was care transitioned from this department today?  No   Hospitalization within the last 30 days (if yes, date of discharge)? No     Arrival Disposition: Walker  Transfer Assist: 1 person  Special Needs: Special Needs List: none    Comments:  Patient arrival information, vital signs and dressing removed by staff member noted.  Staff obtained consents, records, test results or processed orders.  Patient and Caregiver education was given regarding procedures/therapy planned.  Weight was obtained in clinic.  Fall Risk screening performed.  Patient identified to be at a risk for a fall and extra precautionary measures have been taken to ensure this patient's safety.    Additional POCT Services Provided: None      Assessment:  Wound Ankle Left Medial Venous Ulcer (Active)   Date First Assessed: 10/07/22   Present on Hospital Admission: Yes  Location: Ankle  Laterality: Left  Modifier: Medial  Level of Skin Injury: Partial Thickness  Primary Wound Type: Venous Ulcer  Wound Approximate Age at First Assessment (Weeks): 0.5 ...      Assessments 5/26/2023  2:40 PM   Wound Image      Dressing Assessment Intact;Drainage present   Dressing Activity Changed   Dressing Changed On   05/26/23   Wound Exudate Moderate;Serosanguineous;No odor   Cleansing Agent Normal saline;Hypochlorous acid (e.g. Vashe)   Wound Bed/Tissue Type Granulated;Red;Epithelialized;Necrotic tissue, slough   Periwound Condition Erythema, blanchable;Hyperpigmented   Wound Edge Attached to wound bed   Wound Status Unchanged   Topical Agent Collagen (Puracol plus/Triamcinolone/)   Wound Dressing Bactiostatic dressing (e.g. hydrofera blue);Tape-paper (hydrofera blue transfer, drawtex,  kerramax care)   Wound Compression Cotton roll;Paste wrap with calamine (UNNA boot)   Wound Last Measured 05/26/23   Wound Length (cm) 7.4 cm   Wound Width (cm) 2.8 cm   Wound Depth (cm) 0.2 cm   Wound Surface Area (cm^2) 20.72 cm^2   Wound Volume (cm^3) 4.144 cm^3   PhotoTaken? Yes   Debridement Percentage (%) 100   Post-Procedure Length (cm) 7.4 cm   Post-Procedure Width (cm) 2.8 cm   Post-Procedure Depth (cm) 0.2 cm   Post-Procedure Surface Area (cm^2) 20.72 cm^2   Post-Procedure Volume (cm^3) 4.144 cm^3   Wound Bed % Granulated 70 %   Wound Bed % Epithelialized 10 %   Wound Bed % Necrotic Tissue Slough 20 %   Wound Volume Change (Initial) 3.79 cm3   Wound Volume % Change (Initial) 1084 %   Wound Volume Change (30 days) -0.06 cm3   Wound Volume % Change (30 days) -1.33 %   Post-Procedure Volume Change (Initial) 4.04 cm3   Post-Procedure Volume % Change (Initial) 4044 %   Post-Procedure Volume Change (30 days) -0.06 cm3   Post-Procedure Volume % Change (30 days) -1.33 %        Plan:  The below orders were released and performed during the visit:   Complete Wound Care  Every visit  Diagnosis: Other (specify)  Other (specify): venous ulcers  Dressing change(s) to be done by: Wound Care Team  Dressing frequency: Two times/week (specify)  Wound location: left lower leg  Dressing change(s) to be done using: Clean Technique  Clean wound with: Normal saline  Protect periwound with: Other (specify)  Other (specify): Triamcinolone cream/ A and D ointment  Topical agents: Collagen  Topical agents: Other (specify)  Apply Other (specify) to: Puracol plus  Dressing type: Other (specify)  Other (specify): Hydrofera Blue transfer, Drawtex, Kerramax Care  Cover dressing: Tape-paper (Micropore)  Specify order of application: Puracol plus, Hydrofera blue transfer, Drawtex, Kerramax Care, Unna boot wrap with Cotton roll  Wound compression: Other (specify)  Other (specify): Unna boot to the left LE,   patien't own compression  stocking to right leg  Compression for extremity: Bilateral lower legs    Order Comments:  None     Interventions:    Wound Treatment and Goals (most recent)     Wound Treatment and Goals    Patient presents to wound clinic with UNNA boot compression intact, tolerated well. Will continue same treatment.                 Clinical  Procedures performed this visit:   1.Excisional sharp debridement    2. Today's procedure is the application of a compression bandage utilizing an Unna boot to left lower extremity.  I have informed the patient of the risks and benefits of this procedure, and they have had the opportunity to ask questions.  Procedure was performed in a clean field.  Unna boot was applied per the 's instructions.  Patient tolerated procedure well and without complaints of pain or discomfort.  Patient was educated regarding signs and symptoms of over compression, including unrelieved pain, toes turning red or purple, or numbness in foot.  Patient was instructed to manually remove outer layer (without scissors) and contact clinic immediately.      Departure Instruction: Simple D/C (Rx, simple instructions) and Patient Process: Simple    Departure Disposition: Depart with assistance and Home self care or family care    Follow Up  Return in about 4 days (around 5/30/2023), or nurse visit/wound assess.

## 2023-06-02 ENCOUNTER — EVALUATION (OUTPATIENT)
Dept: OCCUPATIONAL THERAPY | Facility: CLINIC | Age: 63
End: 2023-06-02

## 2023-06-02 ENCOUNTER — OFFICE VISIT (OUTPATIENT)
Dept: INTERNAL MEDICINE CLINIC | Facility: CLINIC | Age: 63
End: 2023-06-02

## 2023-06-02 VITALS
HEART RATE: 78 BPM | OXYGEN SATURATION: 98 % | WEIGHT: 278 LBS | HEIGHT: 71 IN | BODY MASS INDEX: 38.92 KG/M2 | SYSTOLIC BLOOD PRESSURE: 124 MMHG | DIASTOLIC BLOOD PRESSURE: 80 MMHG

## 2023-06-02 DIAGNOSIS — E04.1 THYROID NODULE: ICD-10-CM

## 2023-06-02 DIAGNOSIS — J30.1 SEASONAL ALLERGIC RHINITIS DUE TO POLLEN: ICD-10-CM

## 2023-06-02 DIAGNOSIS — E78.2 MIXED HYPERLIPIDEMIA: ICD-10-CM

## 2023-06-02 DIAGNOSIS — E11.65 TYPE 2 DIABETES MELLITUS WITH HYPERGLYCEMIA, WITHOUT LONG-TERM CURRENT USE OF INSULIN (HCC): Primary | ICD-10-CM

## 2023-06-02 DIAGNOSIS — R53.83 OTHER FATIGUE: ICD-10-CM

## 2023-06-02 DIAGNOSIS — R25.1 TREMOR OF LEFT HAND: Primary | ICD-10-CM

## 2023-06-02 DIAGNOSIS — R00.1 BRADYCARDIA: ICD-10-CM

## 2023-06-02 DIAGNOSIS — E11.40 NEUROPATHY DUE TO TYPE 2 DIABETES MELLITUS (HCC): ICD-10-CM

## 2023-06-02 DIAGNOSIS — I10 ESSENTIAL HYPERTENSION: ICD-10-CM

## 2023-06-02 DIAGNOSIS — E55.9 VITAMIN D DEFICIENCY: ICD-10-CM

## 2023-06-02 DIAGNOSIS — K21.9 GASTROESOPHAGEAL REFLUX DISEASE WITHOUT ESOPHAGITIS: ICD-10-CM

## 2023-06-02 DIAGNOSIS — G25.81 RESTLESS LEG SYNDROME: ICD-10-CM

## 2023-06-02 DIAGNOSIS — I11.0 HYPERTENSIVE HEART DISEASE WITH CONGESTIVE HEART FAILURE, UNSPECIFIED HEART FAILURE TYPE (HCC): ICD-10-CM

## 2023-06-02 DIAGNOSIS — I25.10 CORONARY ARTERY DISEASE INVOLVING NATIVE CORONARY ARTERY OF NATIVE HEART WITHOUT ANGINA PECTORIS: ICD-10-CM

## 2023-06-02 DIAGNOSIS — N40.0 BENIGN PROSTATIC HYPERPLASIA WITHOUT LOWER URINARY TRACT SYMPTOMS: ICD-10-CM

## 2023-06-02 PROBLEM — R19.7 DIARRHEA: Status: RESOLVED | Noted: 2023-02-16 | Resolved: 2023-06-02

## 2023-06-02 PROBLEM — R11.0 NAUSEA: Status: ACTIVE | Noted: 2023-06-02

## 2023-06-02 PROBLEM — R10.9 ABDOMINAL DISCOMFORT: Status: RESOLVED | Noted: 2023-01-20 | Resolved: 2023-06-02

## 2023-06-02 PROBLEM — Z20.822 CLOSE EXPOSURE TO COVID-19 VIRUS: Status: RESOLVED | Noted: 2021-12-21 | Resolved: 2023-06-02

## 2023-06-02 PROBLEM — R19.4 CHANGE IN BOWEL HABIT: Status: RESOLVED | Noted: 2023-01-20 | Resolved: 2023-06-02

## 2023-06-02 PROBLEM — S80.01XA CONTUSION OF RIGHT KNEE: Status: RESOLVED | Noted: 2020-08-25 | Resolved: 2023-06-02

## 2023-06-02 RX ORDER — HYDROCHLOROTHIAZIDE 25 MG/1
25 TABLET ORAL DAILY
Qty: 90 TABLET | Refills: 1 | Status: SHIPPED | OUTPATIENT
Start: 2023-06-02

## 2023-06-02 NOTE — ASSESSMENT & PLAN NOTE
Nonspecific  Recommend to go back to gastroenterologist and consider getting EGD done  Could be related to medications  Remains on famotidine  Patient continues to have some nausea since January  Patient was seen by gastroenterologist   February 2023 CT scan of abdomen were unremarkable  His bowel movement is much better  Bowel movement could be either once a day or occasionally twice a day or occasionally every other day  But generally he is better  He continues to have nausea  He had occasional episode of vomiting  He was seen by gastroenterologist in February their notes were reviewed  EGD was deferred at that time however due to ongoing symptoms I strongly believe that he needs to revisit gastroenterologist get reevaluation done and consider getting upper GI endoscopy    May require repeat CAT scan of abdomen if symptoms persist

## 2023-06-02 NOTE — ASSESSMENT & PLAN NOTE
CAD stable, no angina or angina current  And will continue to follow with cardiologist     Pt is symptom free with from CAD stand point    Will continue medicine as advised, if any question or side effect, discuss with your physician/cardiologist    Pt advised risk factor control including Blood Pressure, weight, Sugar and cholesterol control for secondary prevention  Pt is advised to continue to follow with cardiologist for close monitoring, periodic evaluation and management of CAD    Go to emergency room/call 911 if you have any chest pain or concerning symptoms as it relates to heart      Relevant medications includes aspirin, atorvastatin 40 mg daily, Diuril daily, losartan 50 mg daily, diabetes regimen,

## 2023-06-02 NOTE — ASSESSMENT & PLAN NOTE
Lab Results   Component Value Date    LDLCALC 54 05/19/2023     Lab Results   Component Value Date    ALT 79 (H) 05/19/2023     Lab Results   Component Value Date    CHOLESTEROL 119 05/19/2023    CHOLESTEROL 165 01/13/2023    CHOLESTEROL 145 10/14/2022     Lab Results   Component Value Date    HDL 39 (L) 05/19/2023    HDL 35 (L) 01/13/2023    HDL 38 (L) 10/14/2022     Lab Results   Component Value Date    TRIG 151 (H) 05/19/2023    TRIG 247 (H) 01/13/2023    TRIG 161 (H) 10/14/2022   Well to the target  Continue atorvastatin 40 mg daily    Due for CMP lipid profile prior to next visit

## 2023-06-02 NOTE — PATIENT INSTRUCTIONS
Discussed with your neurologist about trying beta-blocker  I will be more than happy to switch over beta-blocker such as metoprolol, atenolol etc  for the benign essential tremor  Also your fatigue needs to be evaluated from the cardiac standpoint  Recommend to discuss with cardiologist consider echocardiogram and/or further cardiac work-up for fatigue  Also your high blood sugar can explain your fatigue  Follow with Consultants as per their and our suggestion    Follow up in 2 months or as needed earlier    Follow all instructions as advised and discussed  Take your medications as prescribed  Call the office immediately if you experience any side effects  Ask questions if you do not understand  Keep your scheduled appointment as advised or come sooner if necessary or in doubt  Best time to call for non-urgent matter or questions on weekdays is between 9am and 12 noon  See physician for any new symptoms or worsening of current symptoms  Urgent or emergent situations call 911 and report to nearest emergency room  I spent  time taking care of this patient including clinical care, conseling, collaboration, chart, lab and consultant's follow up note,images report, documentation, pre visit  review as appropriate      Patient is to get labs 1 week(s) prior to next visit if advised

## 2023-06-02 NOTE — PROGRESS NOTES
Name: Samson Cardenas      : 1960      MRN: [id number]  Encounter Provider: Simin Denis MD  Encounter Date: 2023   Encounter department: [address]     1  Type 2 diabetes mellitus with hyperglycemia, without long-term current use of insulin ([facility])  Assessment & Plan:  Control with hemoglobin A1c variable 2 on the same day same time it irrespective of that there is not control  Patient will continue to follow with endocrinologist endocrinologist note were noted  Remains on metformin 1000 mg twice a day  Since has been discontinued patient has been started on Ozempic  We will monitor the trend  Neuropathy fair chronic mild  Renal function reasonable  Urine for microalbumin noted      Lab Results   Component Value Date    HGBA1C 8 8 (H) 2023       Orders:  -     CBC; Future; Expected date: 2023  -     B-Type Natriuretic Peptide(BNP); Future; Expected date: 2023  -     CBC  -     B-Type Natriuretic Peptide(BNP)    2  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 2 diabetes mellitus ([address] )  Assessment & Plan:  Neuropathy mild  Remains on gabapentin tolerating fairly well  Quality of life is reasonable  Recommend better diabetes control  Lab Results   Component Value Date    HGBA1C 8 8 (H) 2023         3  Thyroid nodule  Assessment & Plan:  Last ultrasound done on 2022  Follow-up ultrasound is recommended  Remote history of thyroid nodule seen by endocrinologist in the remote past   We will leave it up to endocrinologist   No compressive symptoms no hyper or hypothyroid symptoms      4  Seasonal allergic rhinitis due to pollen    5  Essential hypertension  Assessment & Plan:  Hypertension well controlled    Relevant medications include HydroDIURIL 25 mg daily, losartan 50 mg daily,  Lab Results   Component Value Date    AGAP 5 2017    ALKPHOS 67 2017    ALT 79 (H) 2023    AST 53 (H) 2023    BUN 17 05/19/2023    CALCIUM 9 4 11/30/2017    CL 98 05/19/2023    CO2 21 05/19/2023    CREATININE 1 09 05/19/2023    EGFR 78 05/19/2023    GLUC 187 (H) 05/19/2023    K 4 8 05/19/2023     11/30/2017    SODIUM 136 05/19/2023    TBILI 0 6 05/19/2023    TP 7 3 05/19/2023         Orders:  -     hydrochlorothiazide (HYDRODIURIL) 25 mg tablet; Take 1 tablet (25 mg total) by mouth daily    6  Coronary artery disease involving native coronary artery of native heart without angina pectoris  Assessment & Plan:  CAD stable, no angina or angina current  And will continue to follow with cardiologist     Pt is symptom free with from CAD stand point    Will continue medicine as advised, if any question or side effect, discuss with your physician/cardiologist    Pt advised risk factor control including Blood Pressure, weight, Sugar and cholesterol control for secondary prevention  Pt is advised to continue to follow with cardiologist for close monitoring, periodic evaluation and management of CAD    Go to emergency room/call 911 if you have any chest pain or concerning symptoms as it relates to heart  Relevant medications includes aspirin, atorvastatin 40 mg daily, Diuril daily, losartan 50 mg daily, diabetes regimen,    Orders:  -     B-Type Natriuretic Peptide(BNP); Future; Expected date: 06/09/2023  -     B-Type Natriuretic Peptide(BNP)    7  Benign prostatic hyperplasia without lower urinary tract symptoms  Assessment & Plan:  BPH fair  No significant obstructive symptoms  Due for PSA next visit      8   Mixed hyperlipidemia  Assessment & Plan:  Lab Results   Component Value Date    LDLCALC 54 05/19/2023     Lab Results   Component Value Date    ALT 79 (H) 05/19/2023     Lab Results   Component Value Date    CHOLESTEROL 119 05/19/2023    CHOLESTEROL 165 01/13/2023    CHOLESTEROL 145 10/14/2022     Lab Results   Component Value Date    HDL 39 (L) 05/19/2023    HDL 35 (L) 01/13/2023    HDL 38 (L) 10/14/2022     Lab Results   Component Value Date    TRIG 151 (H) 05/19/2023    TRIG 247 (H) 01/13/2023    TRIG 161 (H) 10/14/2022   Well to the target  Continue atorvastatin 40 mg daily  Due for CMP lipid profile prior to next visit      9  Vitamin D deficiency    10  Bradycardia  Assessment & Plan:  Bradycardia fair  Continue to monitor      11  Restless leg syndrome    12  Hypertensive heart disease with congestive heart failure, unspecified heart failure type [facility])  Assessment & Plan:  No clinical evidence of LV failure  Hypertension fair  Continue same regimen relevant medications include HydroDIURIL as well as      Losartan 50 mg daily  We will continue risk factor management with hypertension hyperlipidemia diabetes      13  Other fatigue  Assessment & Plan:  Chronic fatigue nonspecific  Order cortisol level is being ordered  Discussed differential diagnosis with the patient's  Will add additional work-up Including proBNP, testosterone level, CBC, and chest x-ray      Orders:  -     CBC; Future; Expected date: 06/09/2023  -     B-Type Natriuretic Peptide(BNP); Future; Expected date: 06/09/2023  -     Testosterone; Future; Expected date: 06/09/2023  -     PSA, Total Screen; Future; Expected date: 06/09/2023  -     XR chest pa & lateral; Future; Expected date: 06/09/2023  -     CBC  -     B-Type Natriuretic Peptide(BNP)  -     Testosterone    14  Gastroesophageal reflux disease without esophagitis        Depression Screening and Follow-up Plan: Patient was screened for depression during today's encounter  They screened negative with a PHQ-2 score of 0  Tobacco Cessation Counseling: The patient is sincerely urged to quit consumption of tobacco  He is ready to quit tobacco          Subjective      Patient is here for multiple chronic disease however has a new complaint of nausea as outlined underneath  Patient continues to have some nausea since January    Patient was seen by gastroenterologist   February 2023 CT scan of abdomen were unremarkable  His bowel movement is much better  Bowel movement could be either once a day or occasionally twice a day or occasionally every other day  But generally he is better  He continues to have nausea  He had occasional episode of vomiting  He was seen by gastroenterologist in February their notes were reviewed  EGD was deferred at that time however due to ongoing symptoms I strongly believe that he needs to revisit gastroenterologist get reevaluation done and consider getting upper GI endoscopy  May require repeat CAT scan of abdomen if symptoms persist     Patient continues to have some tremor  Seen by neurologist   Being referred to the occupational therapy     Patient was tried probably on primidone he could not tolerate  We talked about considering beta-blocker  Patient was not diagnosed to Parkinson's     Certain activities or holding certain things makes tremor worse  We talked about benign essential tremor classically worse with the anxiety stress or activity  Patient is advised to discuss with neurologist   I will be more than happy to switch over some of his antihypertensive to beta-blocker  Patient does have a mild chronic fatigue  High sugar in the range of 175 to 200 can cause fatigue  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   Patient has a cardiologist appointment coming up I would also recommend him that he discuss with cardiologist to evaluate his cardiac status particularly for fatigue recommend to discuss with them about possible echocardiogram and/or stress test     Last year CT scan of the lung were unremarkable and CT scan of abdomen and pelvis earlier this year were unremarkable for the sake of completion we can do a chest x-ray    No visits with results within 2 Week(s) from this visit    Latest known visit with results is:  Office Visit on 02/17/2023  Hemoglobin A1C            Value: 8 8(%) (H)         Dt: 05/19/2023  Estimated Average Glucose Value: 206(mg/dL)         Dt: 05/19/2023  Glucose, Random           Value: 187(mg/dL) (H)     Dt: 05/19/2023  BUN                       Value: 17(mg/dL)          Dt: 05/19/2023  Creatinine                Value: 1 09(mg/dL)        Dt: 05/19/2023  eGFR                      Value: 78(mL/min/1 73)    Dt: 05/19/2023  SL AMB BUN/CREATININE RA* Value: 16                 Dt: 05/19/2023  Sodium                    Value: 136(mmol/L)        Dt: 05/19/2023  Potassium                 Value: 4 8(mmol/L)        Dt: 05/19/2023  Chloride                  Value: 98(mmol/L)         Dt: 05/19/2023  CO2                       Value: 21(mmol/L)         Dt: 05/19/2023  CALCIUM                   Value: 9 8(mg/dL)         Dt: 05/19/2023  Protein, Total            Value: 7 3(g/dL)          Dt: 05/19/2023  Albumin                   Value: 4 5(g/dL)          Dt: 05/19/2023  Globulin, Total           Value: 2 8(g/dL)          Dt: 05/19/2023  Albumin/Globulin Ratio    Value: 1 6                Dt: 05/19/2023  TOTAL BILIRUBIN           Value: 0 6(mg/dL)         Dt: 05/19/2023  Alk Phos Isoenzymes       Value: 75(IU/L)           Dt: 05/19/2023  AST                       Value: 53(IU/L) (H)       Dt: 05/19/2023  ALT                       Value: 79(IU/L) (H)       Dt: 05/19/2023  Creatinine, Urine         Value: 178  7(mg/dL)       Dt: 05/19/2023  Albumin,U,Random          Value: 14  5(ug/mL)        Dt: 05/19/2023  Microalb/Creat Ratio      Value:  8(mg/g creat)      Dt: 05/19/2023  Cholesterol, Total        Value: 119(mg/dL)         Dt: 05/19/2023  Triglycerides             Value: 151(mg/dL) (H)     Dt: 05/19/2023  HDL                       Value: 39(mg/dL) (L)      Dt: 05/19/2023  VLDL Cholesterol Calcula* Value: 26(mg/dL)          Dt: 05/19/2023  LDL Calculated            Value: 54(mg/dL)          Dt: 05/19/2023  LDl/HDL Ratio             Value: 1 4(ratio)         Dt: 05/19/2023  ------------ - 2 weeks      Review of Systems   Constitutional: Positive for fatigue  Negative for appetite change, fever and unexpected weight change  HENT: Negative for congestion, ear pain and sore throat  Eyes: Negative for pain and redness  Respiratory: Negative for cough and shortness of breath  Cardiovascular: Negative for chest pain, palpitations and leg swelling  Gastrointestinal: Negative for abdominal pain, diarrhea, nausea and vomiting  Endocrine: Negative for cold intolerance, polydipsia and polyuria  Genitourinary: Negative for dysuria, frequency and urgency  Musculoskeletal: Negative for arthralgias, back pain, gait problem and myalgias  Skin: Negative for rash  Allergic/Immunologic: Negative  Neurological: Positive for tremors  Negative for dizziness, weakness and headaches  Hematological: Negative for adenopathy  Psychiatric/Behavioral: Negative for behavioral problems and hallucinations  The patient is not nervous/anxious and is not hyperactive          Current Outpatient Medications on File Prior to Visit   Medication Sig   • albuterol (PROVENTIL HFA,VENTOLIN HFA) 90 mcg/act inhaler Inhale 2 puffs every 4 (four) hours as needed for wheezing or shortness of breath   • Alpha-Lipoic Acid 100 MG CAPS Take by mouth   • Ascorbic Acid (VITAMIN C) 100 MG tablet Take 500 mg by mouth    • aspirin 81 MG tablet Take 162 mg by mouth daily     • atorvastatin (LIPITOR) 40 mg tablet TAKE 1 TABLET BY MOUTH ONCE DAILY WITH SUPPER   • BIOTIN PO Take 5 mg by mouth daily   • Cholecalciferol (VITAMIN D3) 1000 units CAPS Take by mouth daily    • ciclopirox (LOPROX) 0 77 % cream    • cyanocobalamin (VITAMIN B-12) 100 mcg tablet Take by mouth daily   • desloratadine (CLARINEX) 5 MG tablet TAKE 1 TABLET BY MOUTH AT BEDTIME   • famotidine (PEPCID) 20 mg tablet Take 20 mg by mouth daily   • FREESTYLE LITE test strip USE 1 STRIP TO CHECK GLUCOSE ONCE DAILY AS DIRECTED   • gabapentin (NEURONTIN) 100 mg capsule TAKE "1 CAPSULE BY MOUTH IN THE MORNING AND 3 AT BEDTIME   • glucosamine-chondroitin 500-400 MG tablet Take 1 tablet by mouth daily   • glucose blood (FREESTYLE LITE) test strip Use 1 each daily Use as instructed   • losartan (COZAAR) 50 mg tablet Take 1 tablet by mouth once daily   • Magnesium Hydroxide (MAGNESIA PO) Take by mouth 2 (two) times a day   • metFORMIN (GLUCOPHAGE) 1000 MG tablet TAKE 1 TABLET BY MOUTH TWICE DAILY WITH MEALS   • Multiple Vitamin (MULTIVITAMIN) capsule Take 1 capsule by mouth daily   • nitroglycerin (NITROSTAT) 0 3 mg SL tablet Place 1 tablet (0 3 mg total) under the tongue every 5 (five) minutes as needed for chest pain   • ONETOUCH DELICA LANCETS 36F MISC 1 Units by Does not apply route daily   • semaglutide, 0 25 or 0 5 mg/dose, (Ozempic, 0 25 or 0 5 MG/DOSE,) 2 mg/3 mL injection pen Inject 0 38 mL (0 2533 mg total) under the skin every 7 days   • [DISCONTINUED] hydrochlorothiazide (HYDRODIURIL) 25 mg tablet Take 1 tablet by mouth once daily       Objective     /80   Pulse 78   Ht 5' 11\" (1 803 m)   Wt 126 kg (278 lb)   SpO2 98%   BMI 38 77 kg/m²     Physical Exam  Constitutional:       General: He is not in acute distress  Appearance: He is well-developed  He is not ill-appearing or diaphoretic  HENT:      Head: Normocephalic and atraumatic  Right Ear: External ear normal       Left Ear: External ear normal    Eyes:      General: Lids are normal          Right eye: No discharge  Left eye: No discharge  Conjunctiva/sclera: Conjunctivae normal    Neck:      Thyroid: No thyroid mass or thyromegaly  Vascular: No carotid bruit or JVD  Trachea: Trachea normal    Cardiovascular:      Rate and Rhythm: Regular rhythm  Heart sounds: Normal heart sounds  Pulmonary:      Effort: No respiratory distress  Breath sounds: No wheezing, rhonchi or rales  Abdominal:      General: There is no distension  Palpations: There is no mass        " Tenderness: There is no abdominal tenderness  There is no rebound  Hernia: No hernia is present  Musculoskeletal:      Right lower leg: No edema  Left lower leg: No edema  Lymphadenopathy:      Cervical: No cervical adenopathy  Skin:     General: Skin is warm  Coloration: Skin is not jaundiced or pale  Findings: No rash  Neurological:      General: No focal deficit present  Mental Status: He is alert and oriented to person, place, and time  Sensory: No sensory deficit  Motor: Tremor present  No weakness, atrophy, abnormal muscle tone, seizure activity or pronator drift  Coordination: Romberg sign negative  Coordination normal  Finger-Nose-Finger Test and Heel to Shin Test normal       Comments: Fine tremor worse with activity   Psychiatric:         Mood and Affect: Mood normal          Behavior: Behavior normal          Thought Content:  Thought content normal          Judgment: Judgment normal        Laura Butler MD

## 2023-06-02 NOTE — ASSESSMENT & PLAN NOTE
Neuropathy mild  Remains on gabapentin tolerating fairly well  Quality of life is reasonable  Recommend better diabetes control    Lab Results   Component Value Date    HGBA1C 8 8 (H) 05/19/2023

## 2023-06-02 NOTE — PROGRESS NOTES
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, 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  Patient completes on average three to four surgicel procedures a day with increased tremors of the LUE by the end of the day, with difficulty with tremors when completing surgical procedures  PMH of diabetic neuropathy and EMG of the upper extremities with evidence of median nerve entrapment consistent with CTS and 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  Patient notes that his mother  from Parkinson's Disease and Dementia  Patient referred by Dr Sharion Harada to initiate treatment including hand therapy for strengthening and HEP  Impairments: abnormal or restricted ROM, impaired physical strength and lacks appropriate home exercise program  Understanding of Dx/Px/POC: good   Prognosis: good    Goals  Short Term Goals by 2 - 4 weeks:    Establish HEP to increase performance with daily activities  Patient will increase LUE  strength by at least 10 lbs to assist in completing ADLs  Patient will increase LUE pinch strength by at least 2 lbs to assist in completing ADLs  Long Term Goals by discharge:    Establish final home exercise program to enhance maximal functional level with ADLs      Achieve functional active range of motion of LUE for full return to household chores  Achieve functional strength of LUE for full return to high level ADLs  Plan  Patient would benefit from: OT eval and skilled occupational therapy  Planned modality interventions: cryotherapy, thermotherapy: hydrocollator packs and unattended electrical stimulation  Planned therapy interventions: strengthening, stretching, therapeutic activities, graded activity, graded exercise, home exercise program, patient education and therapeutic exercise  Frequency: 1x week  Duration in weeks: 8  Plan of Care beginning date: 2023  Plan of Care expiration date: 2023  Treatment plan discussed with: patient         Subjective Evaluation    History of Present Illness  Mechanism of injury: Patient is a 63 y o  RHD male who presents for OT IE and treatment for left upper extremity hand tremor   Patient reports having BUE tremors for three to four years, specifically the LUE > RUE  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, 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  Patient completes on average three to four surgicel procedures a day with increased tremors of the LUE by the end of the day, with difficulty with tremors when completing surgical procedures  PMH of diabetic neuropathy and EMG of the upper extremities with evidence of median nerve entrapment consistent with CTS and 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  Patient notes that his mother  from Parkinson's Disease and Dementia    Patient referred by LUCERO Tovar to initiate treatment including hand therapy for strengthening and HEP  Quality of life: good    Pain  Current pain ratin  At best pain ratin  At worst pain ratin    Social Support    Employment status: working  Hand dominance: right    Patient Goals  Patient goals for therapy: return to sport/leisure activities and increased strength          Objective     Active Range of Motion     Left Elbow   Flexion: WFL  Extension: WFL  Forearm supination: WFL  Forearm pronation: WFL    Right Elbow   Flexion: WFL  Extension: WFL  Forearm supination: WFL  Forearm pronation: WFL    Left Wrist   Wrist flexion: WFL  Wrist extension: WFL  Radial deviation: WFL  Ulnar deviation: WFL      Right Wrist   Wrist flexion: WFL  Wrist extension: WFl  Radial deviation: WFL  Ulnar deviation: WFL    Strength/Myotome Testing     Left Wrist/Hand      (2nd hand position)     Trial 1: 96    Thumb Strength  Key/Lateral Pinch     Trial 1: 26  Palmar/Three-Point Pinch     Trial 1: 22    Right Wrist/Hand      (2nd hand position)     Trial 1: 100    Thumb Strength   Key/Lateral Pinch     Trial 1: 28  Palmar/Three-Point Pinch     Trial 1: 20                     Precautions: Universal      Visit Tracker: NO Jayson Req  [address] Only  Copay:$20  Date 6/2  IE                                                                                       Manuals HEP 6/2/2023                       Ther Ex     Education on Dx and HEP x x5min   Wrist PREs x 2x10 6lbs   Elbow PREs  Bicep Curls  Hammer Curls x 2x10 8lbs   Theraband tricep extension x 2x10 black/green   Theraband shoulder rows & extension x 2x10 black/green   Forearm rotation resistive pro/sup x x10   Resistive digit extension with rubberband x 2x10 red/green                                 Modalities                   Assessment:   Patient tolerated session well  Patient demonstrated minimal strength deficits at today's assessment  Patient session focused on patient education on anatomy and physiology concerning current dx, techniques for decreasing deficits through HEP, and appropriate use of modalities  Patient educated on HEP with verbal instructions and handouts for patient reference  Patient educated on treatment plan at this time   Patient benefiting from skilled hand therapy OT to reduce deficits to improve independence with daily activities  Plan:   Focus on AROM and strengthening to improve ability to complete daily activites with ease   POC: 6/02/2023 - 7/28/2023

## 2023-06-02 NOTE — ASSESSMENT & PLAN NOTE
No clinical evidence of LV failure  Hypertension fair  Continue same regimen relevant medications include HydroDIURIL as well as      Losartan 50 mg daily    We will continue risk factor management with hypertension hyperlipidemia diabetes

## 2023-06-02 NOTE — ASSESSMENT & PLAN NOTE
Chronic fatigue nonspecific  Order cortisol level is being ordered  Discussed differential diagnosis with the patient's    Will add additional work-up Including proBNP, testosterone level, CBC, and chest x-ray

## 2023-06-02 NOTE — ASSESSMENT & PLAN NOTE
Last ultrasound done on 9/9/2022  Follow-up ultrasound is recommended    Remote history of thyroid nodule seen by endocrinologist in the remote past   We will leave it up to endocrinologist   No compressive symptoms no hyper or hypothyroid symptoms

## 2023-06-02 NOTE — ASSESSMENT & PLAN NOTE
Control with hemoglobin A1c variable 2 on the same day same time it irrespective of that there is not control  Patient will continue to follow with endocrinologist endocrinologist note were noted  Remains on metformin 1000 mg twice a day  Since has been discontinued patient has been started on Ozempic  We will monitor the trend  Neuropathy fair chronic mild  Renal function reasonable    Urine for microalbumin noted      Lab Results   Component Value Date    HGBA1C 8 8 (H) 05/19/2023

## 2023-06-02 NOTE — ASSESSMENT & PLAN NOTE
Hypertension well controlled    Relevant medications include HydroDIURIL 25 mg daily, losartan 50 mg daily,  Lab Results   Component Value Date    AGAP 5 11/25/2017    ALKPHOS 67 11/23/2017    ALT 79 (H) 05/19/2023    AST 53 (H) 05/19/2023    BUN 17 05/19/2023    CALCIUM 9 4 11/30/2017    CL 98 05/19/2023    CO2 21 05/19/2023    CREATININE 1 09 05/19/2023    EGFR 78 05/19/2023    GLUC 187 (H) 05/19/2023    K 4 8 05/19/2023     11/30/2017    SODIUM 136 05/19/2023    TBILI 0 6 05/19/2023    TP 7 3 05/19/2023

## 2023-06-08 DIAGNOSIS — J30.1 SEASONAL ALLERGIC RHINITIS DUE TO POLLEN: ICD-10-CM

## 2023-06-08 RX ORDER — DESLORATADINE 5 MG/1
TABLET ORAL
Qty: 90 TABLET | Refills: 0 | Status: SHIPPED | OUTPATIENT
Start: 2023-06-08

## 2023-06-10 LAB
25(OH)D3+25(OH)D2 SERPL-MCNC: 33.8 NG/ML (ref 30–100)
BNP SERPL-MCNC: 11.2 PG/ML (ref 0–100)
CORTIS AM PEAK SERPL-MCNC: 9.2 UG/DL (ref 6.2–19.4)
ERYTHROCYTE [DISTWIDTH] IN BLOOD BY AUTOMATED COUNT: 13 % (ref 11.6–15.4)
HCT VFR BLD AUTO: 42.6 % (ref 37.5–51)
HGB BLD-MCNC: 14.8 G/DL (ref 13–17.7)
MCH RBC QN AUTO: 34.2 PG (ref 26.6–33)
MCHC RBC AUTO-ENTMCNC: 34.7 G/DL (ref 31.5–35.7)
MCV RBC AUTO: 98 FL (ref 79–97)
PLATELET # BLD AUTO: 283 X10E3/UL (ref 150–450)
PSA SERPL-MCNC: 0.4 NG/ML (ref 0–4)
RBC # BLD AUTO: 4.33 X10E6/UL (ref 4.14–5.8)
T4 FREE SERPL-MCNC: 1.02 NG/DL (ref 0.82–1.77)
TESTOST SERPL-MCNC: 226 NG/DL (ref 264–916)
TSH SERPL DL<=0.005 MIU/L-ACNC: 1.42 UIU/ML (ref 0.45–4.5)
VIT B12 SERPL-MCNC: 806 PG/ML (ref 232–1245)
WBC # BLD AUTO: 7.1 X10E3/UL (ref 3.4–10.8)

## 2023-06-14 ENCOUNTER — PATIENT MESSAGE (OUTPATIENT)
Dept: ENDOCRINOLOGY | Facility: CLINIC | Age: 63
End: 2023-06-14

## 2023-06-14 DIAGNOSIS — R79.89 LOW TESTOSTERONE: Primary | ICD-10-CM

## 2023-06-21 ENCOUNTER — TELEPHONE (OUTPATIENT)
Dept: OTHER | Facility: OTHER | Age: 63
End: 2023-06-21

## 2023-07-09 NOTE — TELEPHONE ENCOUNTER
----- Message from Homa Buckley DO sent at 9/24/2018 11:16 AM EDT -----  Can you please let the patient know holter was normal and they should follow up in 6 months?
Called PT no answer left message to call back 
Spoke to PT, results given 
0

## 2023-07-21 ENCOUNTER — TELEMEDICINE (OUTPATIENT)
Dept: INTERNAL MEDICINE CLINIC | Facility: CLINIC | Age: 63
End: 2023-07-21
Payer: COMMERCIAL

## 2023-07-21 VITALS — TEMPERATURE: 100.2 F | HEIGHT: 71 IN | OXYGEN SATURATION: 96 % | HEART RATE: 79 BPM | BODY MASS INDEX: 38.77 KG/M2

## 2023-07-21 DIAGNOSIS — J30.1 SEASONAL ALLERGIC RHINITIS DUE TO POLLEN: ICD-10-CM

## 2023-07-21 DIAGNOSIS — I25.10 CORONARY ARTERY DISEASE INVOLVING NATIVE CORONARY ARTERY OF NATIVE HEART WITHOUT ANGINA PECTORIS: ICD-10-CM

## 2023-07-21 DIAGNOSIS — E11.65 TYPE 2 DIABETES MELLITUS WITH HYPERGLYCEMIA, WITHOUT LONG-TERM CURRENT USE OF INSULIN (HCC): ICD-10-CM

## 2023-07-21 DIAGNOSIS — I10 ESSENTIAL HYPERTENSION: ICD-10-CM

## 2023-07-21 DIAGNOSIS — E78.2 MIXED HYPERLIPIDEMIA: ICD-10-CM

## 2023-07-21 DIAGNOSIS — U07.1 COVID-19: Primary | ICD-10-CM

## 2023-07-21 PROCEDURE — 99213 OFFICE O/P EST LOW 20 MIN: CPT | Performed by: INTERNAL MEDICINE

## 2023-07-21 RX ORDER — NIRMATRELVIR AND RITONAVIR 300-100 MG
3 KIT ORAL 2 TIMES DAILY
Qty: 30 TABLET | Refills: 0 | Status: SHIPPED | OUTPATIENT
Start: 2023-07-21 | End: 2023-07-26

## 2023-07-21 NOTE — PROGRESS NOTES
Virtual Regular Visit    Verification of patient location:    Patient is located at Home in the following state in which I hold an active license [place]      Assessment/Plan:    Problem List Items Addressed This Visit        Endocrine    Diabetes mellitus ([address])    Relevant Orders    Albumin / creatinine urine ratio    Comprehensive metabolic panel    Hemoglobin A1C    Lipid Panel with Direct LDL reflex       Respiratory    Seasonal allergic rhinitis due to pollen       Cardiovascular and Mediastinum    Essential hypertension    Relevant Orders    Albumin / creatinine urine ratio    Comprehensive metabolic panel    Hemoglobin A1C    Lipid Panel with Direct LDL reflex    Coronary artery disease involving native coronary artery of native heart without angina pectoris    Relevant Orders    Albumin / creatinine urine ratio    Comprehensive metabolic panel    Hemoglobin A1C    Lipid Panel with Direct LDL reflex       Other    Hyperlipidemia    Relevant Orders    Albumin / creatinine urine ratio    Comprehensive metabolic panel    Hemoglobin A1C    Lipid Panel with Direct LDL reflex    COVID-19 - Primary     No atorvastatin for 1 week. Weekly tablets twice a day watch for any side effect let us know if there is any problem. Take vitamin C, vitamin D and zinc as discussed. Continue your empty stomach may take guaifenesin or guaifenesin DM as needed. Check your temperature blood pressure pulse and twice a day. Stay home for total of full 5 days. You may go out, as long as you do not have fever chills cough shortness of breath on Tuesday. Wear mask for another 5 days total of 10 days. If you feel strong enough you can walk around in the home. Drink plenty of fluid. You may take Tylenol for pain and aches. Should you have any significant shortness of breath cough chest congestion feeling worse dizziness lightheadedness or anything unusual headache go to the emergency room call the squad.     Give us a call next Tuesday let us know how you are doing      [address]    Your healthcare provider and/or public health staff have evaluated you and have determined that you do not need to remain in the hospital at this time. At this time you can be isolated at home where you will be monitored by staff from your local or state health department. You should carefully follow the prevention and isolation steps below until a healthcare provider or local or state health department says that you can return to your normal activities. Stay home except to get medical care    People who are mildly ill with COVID-19 are able to isolate at home during their illness. You should restrict activities outside your home, except for getting medical care. Do not go to work, school, or public areas. Avoid using public transportation, ride-sharing, or taxis. Separate yourself from other people and animals in your home    People: As much as possible, you should stay in a specific room and away from other people in your home. Also, you should use a separate bathroom, if available. Animals: You should restrict contact with pets and other animals while you are sick with COVID-19, just like you would around other people. Although there have not been reports of pets or other animals becoming sick with COVID-19, it is still recommended that people sick with COVID-19 limit contact with animals until more information is known about the virus. When possible, have another member of your household care for your animals while you are sick. If you are sick with COVID-19, avoid contact with your pet, including petting, snuggling, being kissed or licked, and sharing food. If you must care for your pet or be around animals while you are sick, wash your hands before and after you interact with pets and wear a facemask. See COVID-19 and Animals for more information.     Call ahead before visiting your doctor    If you have a medical appointment, call the healthcare provider and tell them that you have or may have COVID-19. This will help the healthcare provider’s office take steps to keep other people from getting infected or exposed. Wear a facemask    You should wear a facemask when you are around other people (e.g., sharing a room or vehicle) or pets and before you enter a healthcare provider’s office. If you are not able to wear a facemask (for example, because it causes trouble breathing), then people who live with you should not stay in the same room with you, or they should wear a facemask if they enter your room. Cover your coughs and sneezes    Cover your mouth and nose with a tissue when you cough or sneeze. Throw used tissues in a lined trash can. Immediately wash your hands with soap and water for at least 20 seconds or, if soap and water are not available, clean your hands with an alcohol-based hand  that contains at least 60% alcohol. Clean your hands often    Wash your hands often with soap and water for at least 20 seconds, especially after blowing your nose, coughing, or sneezing; going to the bathroom; and before eating or preparing food. If soap and water are not readily available, use an alcohol-based hand  with at least 60% alcohol, covering all surfaces of your hands and rubbing them together until they feel dry. Soap and water are the best option if hands are visibly dirty. Avoid touching your eyes, nose, and mouth with unwashed hands. Avoid sharing personal household items    You should not share dishes, drinking glasses, cups, eating utensils, towels, or bedding with other people or pets in your home. After using these items, they should be washed thoroughly with soap and water. Clean all “high-touch” surfaces everyday    High touch surfaces include counters, tabletops, doorknobs, bathroom fixtures, toilets, phones, keyboards, tablets, and bedside tables.  Also, clean any surfaces that may have blood, stool, or body fluids on them. Use a household cleaning spray or wipe, according to the label instructions. Labels contain instructions for safe and effective use of the cleaning product including precautions you should take when applying the product, such as wearing gloves and making sure you have good ventilation during use of the product. Monitor your symptoms    Seek prompt medical attention if your illness is worsening (e.g., difficulty breathing). Before seeking care, call your healthcare provider and tell them that you have, or are being evaluated for, COVID-19. Put on a facemask before you enter the facility. These steps will help the healthcare provider’s office to keep other people in the office or waiting room from getting infected or exposed. Ask your healthcare provider to call the local or state health department. Persons who are placed under active monitoring or facilitated self-monitoring should follow instructions provided by their local health department or occupational health professionals, as appropriate. If you have a medical emergency and need to call 911, notify the dispatch personnel that you have, or are being evaluated for COVID-19. If possible, put on a facemask before emergency medical services arrive. Discontinuing home isolation    Patients with confirmed COVID-19 should remain under home isolation precautions until the following conditions are met:    They have had no fever for at least 24 hours (that is one full day of no fever without the use medicine that reduces fevers)  AND  other symptoms have improved (for example, when their cough or shortness of breath have improved)  AND  If had mild or moderate illness, at least 10 days have passed since their symptoms first appeared or if severe illness (needed oxygen) or immunosuppressed, at least 20 days have passed since symptoms first appeared  Patients with confirmed COVID-19 should also notify close contacts (including their workplace) and ask that they self-quarantine. Currently, close contact is defined as being within 6 feet for 15 minutes or more from the period 24 hours starting 48 hours before symptom onset to the time at which the patient went into isolation. Close contacts of patients diagnosed with COVID-19 should be instructed by the patient to self-quarantine for 14 days from the last time of their last contact with the patient. Source: [employer]  Vitamin C 500 mg one tablet daily, Vitamin D 1000 units daily, Zinc 25 mg daily  Drink plenty of fluid  Take temp. Blood pressure, Pulse Oxygen Twice a day or more frequently as needed. Use Mask total of 5 days, if you have no further fever  If your breathing gets worse, or persistent high fever or if you get worse go to Emergency room. Ask questions. Take Tylenol 500 mg every six hours as needed for pain, headache or bodyache  You make take following any of the following as described below:  -Claritin 10 mg daily as needed for nasal congestion   -Mucinex DM one tablet twice a day for cough as needed    Or    Dayquil/niquil as needed for nasal congestion, cough, chest congestion    Or OTC Cough Medicine of your choice    During first 5 days of quarantine, stay in a private room, if you feel strong may sit, walk around. Wear mask if needed  May take some deep breath few times a day. If you go to common areas in your home wear mask  Encourage your family members and visitors to wear mask    After total of 5 days, if you may go out, but you must wear mask for next 5 days( in some condition, we may suggest long quarantine or wearing mask longer)  No need to do follow up covid test generally.   Wash surfaces, hands as appropriate         Relevant Medications    nirmatrelvir & ritonavir (Paxlovid, 300/100,) tablet therapy pack            Reason for visit is   Chief Complaint   Patient presents with • COVID-19   • Virtual Brief Visit   • Virtual Regular Visit        Encounter provider Dinesh Mart MD    Provider located at [address] [address]  [address]  [address] [address] [address] [facility]      Recent Visits  No visits were found meeting these conditions. Showing recent visits within past 7 days and meeting all other requirements  Today's Visits  Date Type Provider Dept   07/21/23 Telemedicine Dinesh Mart MD [address] [facility]   Showing today's visits and meeting all other requirements  Future Appointments  No visits were found meeting these conditions. Showing future appointments within next 150 days and meeting all other requirements       The patient was identified by name and date of birth. Christina Schmitz was informed that this is a telemedicine visit and that the visit is being conducted through the [employer]. He agrees to proceed. .  My office door was closed. No one else was in the room. He acknowledged consent and understanding of privacy and security of the video platform. The patient has agreed to participate and understands they can discontinue the visit at any time. Patient is aware this is a billable service. Subjective  Christina Schmitz is a 63 y.o. male covid   . HPI     Past Medical History:   Diagnosis Date   • Anemia    • Bundle branch block, right    • CAD (coronary artery disease)    • Cataract    • CPAP (continuous positive airway pressure) dependence    • Diabetes mellitus (HCC)     borderline, controlled with diet and activity   • Diverticulitis    • GERD (gastroesophageal reflux disease)    • History of coronary artery stent placement- pCx and OM2 11/24/2017   • Hyperlipidemia    • Nasal septal deformity    • Neuropathy    • Obesity (BMI 30-39. 9)    • Sleep apnea     wears c-pap   • Vitamin D deficiency        Past Surgical History:   Procedure Laterality Date   • COLON SIGMOID RESECTION N/A 12/16/2016    Procedure: RESECTION COLON SIGMOID;  Surgeon: Mauro Bush MD;  Location: [facility];  Service:    • COLON SIGMOID RESECTION LAPAROSCOPIC N/A 12/16/2016    Procedure: RESECTION COLON SIGMOID LAPAROSCOPIC:CONVERTED TO [email or web address];  Surgeon: Mauro Bush MD;  Location: [facility];  Service:    • COLON SURGERY      Sigmoidectomy   • COLONOSCOPY N/A 10/6/2016    Procedure: COLONOSCOPY;  Surgeon: Thelma Ayala MD;  Location: [address] [place] [address] [facility]; Service:    • CYSTOSCOPY W/ RETROGRADES Left 2/3/2017    Procedure: CYSTOSCOPY WITH BILATERAL RETROGRADES, LEFT STENT REMOVAL;  Surgeon: Davidson Lyn MD;  Location: [facility];  Service:    • ESOPHAGOGASTRODUODENOSCOPY N/A 10/6/2016    Procedure: ESOPHAGOGASTRODUODENOSCOPY (EGD); Surgeon: Thelma Ayala MD;  Location: [address] [place] [address] [facility];   Service:    • HERNIA REPAIR     • KNEE ARTHROSCOPY W/ MENISCECTOMY     • LA CYSTO BLADDER W/URETERAL CATHETERIZATION Left 12/4/2016    Procedure: CYSTOSCOPY RETROGRADE PYELOGRAM WITH INSERTION STENT URETERAL;  Surgeon: Davidson Lyn MD;  Location: [facility];  Service: Urology   • US GUIDED THYROID BIOPSY  9/27/2021   • VARICOSE VEIN SURGERY         Current Outpatient Medications   Medication Sig Dispense Refill   • nirmatrelvir & ritonavir (Paxlovid, 300/100,) tablet therapy pack Take 3 tablets by mouth 2 (two) times a day for 5 days Take 2 nirmatrelvir tablets + 1 ritonavir tablet together per dose 30 tablet 0   • albuterol (PROVENTIL HFA,VENTOLIN HFA) 90 mcg/act inhaler Inhale 2 puffs every 4 (four) hours as needed for wheezing or shortness of breath 18 g 6   • Alpha-Lipoic Acid 100 MG CAPS Take by mouth     • Ascorbic Acid (VITAMIN C) 100 MG tablet Take 500 mg by mouth      • aspirin 81 MG tablet Take 162 mg by mouth daily       • atorvastatin (LIPITOR) 40 mg tablet TAKE 1 TABLET BY MOUTH ONCE DAILY WITH SUPPER 90 tablet 0   • BIOTIN PO Take 5 mg by mouth daily     • Cholecalciferol (VITAMIN D3) 1000 units CAPS Take by mouth daily      • ciclopirox (LOPROX) 0.77 % cream      • ciclopirox (PENLAC) 8 % solution      • cyanocobalamin (VITAMIN B-12) 100 mcg tablet Take by mouth daily     • desloratadine (CLARINEX) 5 MG tablet TAKE 1 TABLET BY MOUTH AT BEDTIME 90 tablet 0   • famotidine (PEPCID) 20 mg tablet Take 20 mg by mouth daily     • FREESTYLE LITE test strip USE 1 STRIP TO CHECK GLUCOSE ONCE DAILY AS DIRECTED 100 each 0   • gabapentin (NEURONTIN) 100 mg capsule TAKE 1 CAPSULE BY MOUTH IN THE MORNING AND 3 AT BEDTIME 360 capsule 1   • glucosamine-chondroitin 500-400 MG tablet Take 1 tablet by mouth daily     • glucose blood (FREESTYLE LITE) test strip Use 1 each daily Use as instructed 100 each 0   • hydrochlorothiazide (HYDRODIURIL) 25 mg tablet Take 1 tablet (25 mg total) by mouth daily 90 tablet 1   • losartan (COZAAR) 50 mg tablet Take 1 tablet by mouth once daily 90 tablet 0   • Magnesium Hydroxide (MAGNESIA PO) Take by mouth 2 (two) times a day     • metFORMIN (GLUCOPHAGE) 1000 MG tablet TAKE 1 TABLET BY MOUTH TWICE DAILY WITH MEALS 180 tablet 0   • Multiple Vitamin (MULTIVITAMIN) capsule Take 1 capsule by mouth daily     • nitroglycerin (NITROSTAT) 0.3 mg SL tablet Place 1 tablet (0.3 mg total) under the tongue every 5 (five) minutes as needed for chest pain 25 tablet 1   • ONETOUCH DELICA LANCETS 99Q MISC 1 Units by Does not apply route daily 100 each 6   • semaglutide, 0.25 or 0.5 mg/dose, (Ozempic, 0.25 or 0.5 MG/DOSE,) 2 mg/3 mL injection pen Inject 0.38 mL (0.2533 mg total) under the skin every 7 days 9 mL 3     Current Facility-Administered Medications   Medication Dose Route Frequency Provider Last Rate Last Admin   • cyanocobalamin injection 1,000 mcg  1,000 mcg Intramuscular Q30 Days LUCERO Valera   1,000 mcg at 09/10/21 1141        Allergies   Allergen Reactions   • Levaquin [Levofloxacin In D5w] Swelling     Knee swelling   • Sulfa Antibiotics GI Intolerance     Abdominal pain         Review of Systems   Constitutional: Negative for chills and fever. HENT: Positive for congestion, rhinorrhea and sneezing. Negative for dental problem, drooling, ear discharge, ear pain, facial swelling, hearing loss, mouth sores, nosebleeds, postnasal drip, sinus pressure, sinus pain, sore throat, tinnitus, trouble swallowing and voice change. Eyes: Negative for pain and visual disturbance. Respiratory: Positive for cough (phlegm clear). Negative for shortness of breath. Cardiovascular: Negative for chest pain and palpitations. Gastrointestinal: Negative for abdominal pain and vomiting. Genitourinary: Negative for dysuria and hematuria. Musculoskeletal: Negative for arthralgias, back pain, gait problem, joint swelling, myalgias, neck pain and neck stiffness. Skin: Negative for color change and rash. Neurological: Negative for seizures and syncope. All other systems reviewed and are negative. Video Exam    Vitals:    07/21/23 1104   Pulse: 79   Temp: 100.2 °F (37.9 °C)   SpO2: 96%   Height: 5' 11" (1.803 m)       Physical Exam  Vitals and nursing note reviewed. Constitutional:       Appearance: He is obese. Pulmonary:      Effort: Pulmonary effort is normal. No respiratory distress. Neurological:      General: No focal deficit present. Mental Status: He is alert and oriented to person, place, and time. Psychiatric:         Mood and Affect: Mood normal.         Behavior: Behavior normal.         Thought Content:  Thought content normal.         Judgment: Judgment normal.          Visit Time  Total Visit Duration: 20

## 2023-07-21 NOTE — PATIENT INSTRUCTIONS
No atorvastatin for 1 week. Weekly tablets twice a day watch for any side effect let us know if there is any problem. Take vitamin C, vitamin D and zinc as discussed. Continue your empty stomach may take guaifenesin or guaifenesin DM as needed. Check your temperature blood pressure pulse and twice a day. Stay home for total of full 5 days. You may go out, as long as you do not have fever chills cough shortness of breath on Tuesday. Wear mask for another 5 days total of 10 days. If you feel strong enough you can walk around in the home. Drink plenty of fluid. You may take Tylenol for pain and aches. Should you have any significant shortness of breath cough chest congestion feeling worse dizziness lightheadedness or anything unusual headache go to the emergency room call the squad. Give us a call next Tuesday let us know how you are doing      [address] [facility]    Your healthcare provider and/or public health staff have evaluated you and have determined that you do not need to remain in the hospital at this time. At this time you can be isolated at home where you will be monitored by staff from your local or state health department. You should carefully follow the prevention and isolation steps below until a healthcare provider or local or state health department says that you can return to your normal activities. Stay home except to get medical care    People who are mildly ill with COVID-19 are able to isolate at home during their illness. You should restrict activities outside your home, except for getting medical care. Do not go to work, school, or public areas. Avoid using public transportation, ride-sharing, or taxis. Separate yourself from other people and animals in your home    People: As much as possible, you should stay in a specific room and away from other people in your home. Also, you should use a separate bathroom, if available. Animals:  You should restrict contact with pets and other animals while you are sick with COVID-19, just like you would around other people. Although there have not been reports of pets or other animals becoming sick with COVID-19, it is still recommended that people sick with COVID-19 limit contact with animals until more information is known about the virus. When possible, have another member of your household care for your animals while you are sick. If you are sick with COVID-19, avoid contact with your pet, including petting, snuggling, being kissed or licked, and sharing food. If you must care for your pet or be around animals while you are sick, wash your hands before and after you interact with pets and wear a facemask. See COVID-19 and Animals for more information. Call ahead before visiting your doctor    If you have a medical appointment, call the healthcare provider and tell them that you have or may have COVID-19. This will help the healthcare provider’s office take steps to keep other people from getting infected or exposed. Wear a facemask    You should wear a facemask when you are around other people (e.g., sharing a room or vehicle) or pets and before you enter a healthcare provider’s office. If you are not able to wear a facemask (for example, because it causes trouble breathing), then people who live with you should not stay in the same room with you, or they should wear a facemask if they enter your room. Cover your coughs and sneezes    Cover your mouth and nose with a tissue when you cough or sneeze. Throw used tissues in a lined trash can. Immediately wash your hands with soap and water for at least 20 seconds or, if soap and water are not available, clean your hands with an alcohol-based hand  that contains at least 60% alcohol.     Clean your hands often    Wash your hands often with soap and water for at least 20 seconds, especially after blowing your nose, coughing, or sneezing; going to the bathroom; and before eating or preparing food. If soap and water are not readily available, use an alcohol-based hand  with at least 60% alcohol, covering all surfaces of your hands and rubbing them together until they feel dry. Soap and water are the best option if hands are visibly dirty. Avoid touching your eyes, nose, and mouth with unwashed hands. Avoid sharing personal household items    You should not share dishes, drinking glasses, cups, eating utensils, towels, or bedding with other people or pets in your home. After using these items, they should be washed thoroughly with soap and water. Clean all “high-touch” surfaces everyday    High touch surfaces include counters, tabletops, doorknobs, bathroom fixtures, toilets, phones, keyboards, tablets, and bedside tables. Also, clean any surfaces that may have blood, stool, or body fluids on them. Use a household cleaning spray or wipe, according to the label instructions. Labels contain instructions for safe and effective use of the cleaning product including precautions you should take when applying the product, such as wearing gloves and making sure you have good ventilation during use of the product. Monitor your symptoms    Seek prompt medical attention if your illness is worsening (e.g., difficulty breathing). Before seeking care, call your healthcare provider and tell them that you have, or are being evaluated for, COVID-19. Put on a facemask before you enter the facility. These steps will help the healthcare provider’s office to keep other people in the office or waiting room from getting infected or exposed. Ask your healthcare provider to call the local or state health department. Persons who are placed under active monitoring or facilitated self-monitoring should follow instructions provided by their local health department or occupational health professionals, as appropriate.   If you have a medical emergency and need to call 911, notify the dispatch personnel that you have, or are being evaluated for COVID-19. If possible, put on a facemask before emergency medical services arrive. Discontinuing home isolation    Patients with confirmed COVID-19 should remain under home isolation precautions until the following conditions are met: They have had no fever for at least 24 hours (that is one full day of no fever without the use medicine that reduces fevers)  AND  other symptoms have improved (for example, when their cough or shortness of breath have improved)  AND  If had mild or moderate illness, at least 10 days have passed since their symptoms first appeared or if severe illness (needed oxygen) or immunosuppressed, at least 20 days have passed since symptoms first appeared  Patients with confirmed COVID-19 should also notify close contacts (including their workplace) and ask that they self-quarantine. Currently, close contact is defined as being within 6 feet for 15 minutes or more from the period 24 hours starting 48 hours before symptom onset to the time at which the patient went into isolation. Close contacts of patients diagnosed with COVID-19 should be instructed by the patient to self-quarantine for 14 days from the last time of their last contact with the patient. Source: [employer]  Vitamin C 500 mg one tablet daily, Vitamin D 1000 units daily, Zinc 25 mg daily  Drink plenty of fluid  Take temp. Blood pressure, Pulse Oxygen Twice a day or more frequently as needed. Use Mask total of 5 days, if you have no further fever  If your breathing gets worse, or persistent high fever or if you get worse go to Emergency room. Ask questions.   Take Tylenol 500 mg every six hours as needed for pain, headache or bodyache  You make take following any of the following as described below:  -Claritin 10 mg daily as needed for nasal congestion   -Mucinex DM one tablet twice a day for cough as needed    Or    Dayquil/niquil as needed for nasal congestion, cough, chest congestion    Or OTC Cough Medicine of your choice    During first 5 days of quarantine, stay in a private room, if you feel strong may sit, walk around. Wear mask if needed  May take some deep breath few times a day. If you go to common areas in your home wear mask  Encourage your family members and visitors to wear mask    After total of 5 days, if you may go out, but you must wear mask for next 5 days( in some condition, we may suggest long quarantine or wearing mask longer)  No need to do follow up covid test generally.   Wash surfaces, hands as appropriate

## 2023-07-21 NOTE — ASSESSMENT & PLAN NOTE
No atorvastatin for 1 week. Weekly tablets twice a day watch for any side effect let us know if there is any problem. Take vitamin C, vitamin D and zinc as discussed. Continue your empty stomach may take guaifenesin or guaifenesin DM as needed. Check your temperature blood pressure pulse and twice a day. Stay home for total of full 5 days. You may go out, as long as you do not have fever chills cough shortness of breath on Tuesday. Wear mask for another 5 days total of 10 days. If you feel strong enough you can walk around in the home. Drink plenty of fluid. You may take Tylenol for pain and aches. Should you have any significant shortness of breath cough chest congestion feeling worse dizziness lightheadedness or anything unusual headache go to the emergency room call the squad. Give us a call next Tuesday let us know how you are doing      [address] [facility]    Your healthcare provider and/or public health staff have evaluated you and have determined that you do not need to remain in the hospital at this time. At this time you can be isolated at home where you will be monitored by staff from your local or state health department. You should carefully follow the prevention and isolation steps below until a healthcare provider or local or state health department says that you can return to your normal activities. Stay home except to get medical care    People who are mildly ill with COVID-19 are able to isolate at home during their illness. You should restrict activities outside your home, except for getting medical care. Do not go to work, school, or public areas. Avoid using public transportation, ride-sharing, or taxis. Separate yourself from other people and animals in your home    People: As much as possible, you should stay in a specific room and away from other people in your home. Also, you should use a separate bathroom, if available. Animals:  You should restrict contact with pets and other animals while you are sick with COVID-19, just like you would around other people. Although there have not been reports of pets or other animals becoming sick with COVID-19, it is still recommended that people sick with COVID-19 limit contact with animals until more information is known about the virus. When possible, have another member of your household care for your animals while you are sick. If you are sick with COVID-19, avoid contact with your pet, including petting, snuggling, being kissed or licked, and sharing food. If you must care for your pet or be around animals while you are sick, wash your hands before and after you interact with pets and wear a facemask. See COVID-19 and Animals for more information. Call ahead before visiting your doctor    If you have a medical appointment, call the healthcare provider and tell them that you have or may have COVID-19. This will help the healthcare provider’s office take steps to keep other people from getting infected or exposed. Wear a facemask    You should wear a facemask when you are around other people (e.g., sharing a room or vehicle) or pets and before you enter a healthcare provider’s office. If you are not able to wear a facemask (for example, because it causes trouble breathing), then people who live with you should not stay in the same room with you, or they should wear a facemask if they enter your room. Cover your coughs and sneezes    Cover your mouth and nose with a tissue when you cough or sneeze. Throw used tissues in a lined trash can. Immediately wash your hands with soap and water for at least 20 seconds or, if soap and water are not available, clean your hands with an alcohol-based hand  that contains at least 60% alcohol.     Clean your hands often    Wash your hands often with soap and water for at least 20 seconds, especially after blowing your nose, coughing, or sneezing; going to the bathroom; and before eating or preparing food. If soap and water are not readily available, use an alcohol-based hand  with at least 60% alcohol, covering all surfaces of your hands and rubbing them together until they feel dry. Soap and water are the best option if hands are visibly dirty. Avoid touching your eyes, nose, and mouth with unwashed hands. Avoid sharing personal household items    You should not share dishes, drinking glasses, cups, eating utensils, towels, or bedding with other people or pets in your home. After using these items, they should be washed thoroughly with soap and water. Clean all “high-touch” surfaces everyday    High touch surfaces include counters, tabletops, doorknobs, bathroom fixtures, toilets, phones, keyboards, tablets, and bedside tables. Also, clean any surfaces that may have blood, stool, or body fluids on them. Use a household cleaning spray or wipe, according to the label instructions. Labels contain instructions for safe and effective use of the cleaning product including precautions you should take when applying the product, such as wearing gloves and making sure you have good ventilation during use of the product. Monitor your symptoms    Seek prompt medical attention if your illness is worsening (e.g., difficulty breathing). Before seeking care, call your healthcare provider and tell them that you have, or are being evaluated for, COVID-19. Put on a facemask before you enter the facility. These steps will help the healthcare provider’s office to keep other people in the office or waiting room from getting infected or exposed. Ask your healthcare provider to call the local or state health department. Persons who are placed under active monitoring or facilitated self-monitoring should follow instructions provided by their local health department or occupational health professionals, as appropriate.   If you have a medical emergency and need to call 911, notify the dispatch personnel that you have, or are being evaluated for COVID-19. If possible, put on a facemask before emergency medical services arrive. Discontinuing home isolation    Patients with confirmed COVID-19 should remain under home isolation precautions until the following conditions are met:   - They have had no fever for at least 24 hours (that is one full day of no fever without the use medicine that reduces fevers)  AND  - other symptoms have improved (for example, when their cough or shortness of breath have improved)  AND  - If had mild or moderate illness, at least 10 days have passed since their symptoms first appeared or if severe illness (needed oxygen) or immunosuppressed, at least 20 days have passed since symptoms first appeared  Patients with confirmed COVID-19 should also notify close contacts (including their workplace) and ask that they self-quarantine. Currently, close contact is defined as being within 6 feet for 15 minutes or more from the period 24 hours starting 48 hours before symptom onset to the time at which the patient went into isolation. Close contacts of patients diagnosed with COVID-19 should be instructed by the patient to self-quarantine for 14 days from the last time of their last contact with the patient. Source: [employer]  Vitamin C 500 mg one tablet daily, Vitamin D 1000 units daily, Zinc 25 mg daily  Drink plenty of fluid  Take temp. Blood pressure, Pulse Oxygen Twice a day or more frequently as needed. Use Mask total of 5 days, if you have no further fever  If your breathing gets worse, or persistent high fever or if you get worse go to Emergency room. Ask questions.   Take Tylenol 500 mg every six hours as needed for pain, headache or bodyache  You make take following any of the following as described below:  -Claritin 10 mg daily as needed for nasal congestion   -Mucinex DM one tablet twice a day for cough as needed    Or    Dayquil/niquil as needed for nasal congestion, cough, chest congestion    Or OTC Cough Medicine of your choice    During first 5 days of quarantine, stay in a private room, if you feel strong may sit, walk around. Wear mask if needed  May take some deep breath few times a day. If you go to common areas in your home wear mask  Encourage your family members and visitors to wear mask    After total of 5 days, if you may go out, but you must wear mask for next 5 days( in some condition, we may suggest long quarantine or wearing mask longer)  No need to do follow up covid test generally.   Wash surfaces, hands as appropriate Lea Mccarthy

## 2023-07-21 NOTE — PROGRESS NOTES
COVID-19 Outpatient Progress Note    Assessment/Plan:    Problem List Items Addressed This Visit        Endocrine    Diabetes mellitus ([address])    Relevant Orders    Albumin / creatinine urine ratio    Comprehensive metabolic panel    Hemoglobin A1C    Lipid Panel with Direct LDL reflex       Respiratory    Seasonal allergic rhinitis due to pollen       Cardiovascular and Mediastinum    Essential hypertension    Relevant Orders    Albumin / creatinine urine ratio    Comprehensive metabolic panel    Hemoglobin A1C    Lipid Panel with Direct LDL reflex    Coronary artery disease involving native coronary artery of native heart without angina pectoris    Relevant Orders    Albumin / creatinine urine ratio    Comprehensive metabolic panel    Hemoglobin A1C    Lipid Panel with Direct LDL reflex       Other    Hyperlipidemia    Relevant Orders    Albumin / creatinine urine ratio    Comprehensive metabolic panel    Hemoglobin A1C    Lipid Panel with Direct LDL reflex    COVID-19 - Primary     No atorvastatin for 1 week. Weekly tablets twice a day watch for any side effect let us know if there is any problem. Take vitamin C, vitamin D and zinc as discussed. Continue your empty stomach may take guaifenesin or guaifenesin DM as needed. Check your temperature blood pressure pulse and twice a day. Stay home for total of full 5 days. You may go out, as long as you do not have fever chills cough shortness of breath on Tuesday. Wear mask for another 5 days total of 10 days. If you feel strong enough you can walk around in the home. Drink plenty of fluid. You may take Tylenol for pain and aches. Should you have any significant shortness of breath cough chest congestion feeling worse dizziness lightheadedness or anything unusual headache go to the emergency room call the squanne marie.     Give us a call next Tuesday let us know how you are doing      [facility]    Your healthcare provider and/or public health staff have evaluated you and have determined that you do not need to remain in the hospital at this time. At this time you can be isolated at home where you will be monitored by staff from your local or state health department. You should carefully follow the prevention and isolation steps below until a healthcare provider or local or state health department says that you can return to your normal activities. Stay home except to get medical care    People who are mildly ill with COVID-19 are able to isolate at home during their illness. You should restrict activities outside your home, except for getting medical care. Do not go to work, school, or public areas. Avoid using public transportation, ride-sharing, or taxis. Separate yourself from other people and animals in your home    People: As much as possible, you should stay in a specific room and away from other people in your home. Also, you should use a separate bathroom, if available. Animals: You should restrict contact with pets and other animals while you are sick with COVID-19, just like you would around other people. Although there have not been reports of pets or other animals becoming sick with COVID-19, it is still recommended that people sick with COVID-19 limit contact with animals until more information is known about the virus. When possible, have another member of your household care for your animals while you are sick. If you are sick with COVID-19, avoid contact with your pet, including petting, snuggling, being kissed or licked, and sharing food. If you must care for your pet or be around animals while you are sick, wash your hands before and after you interact with pets and wear a facemask. See COVID-19 and Animals for more information. Call ahead before visiting your doctor    If you have a medical appointment, call the healthcare provider and tell them that you have or may have COVID-19.  This will help the healthcare provider’s office take steps to keep other people from getting infected or exposed. Wear a facemask    You should wear a facemask when you are around other people (e.g., sharing a room or vehicle) or pets and before you enter a healthcare provider’s office. If you are not able to wear a facemask (for example, because it causes trouble breathing), then people who live with you should not stay in the same room with you, or they should wear a facemask if they enter your room. Cover your coughs and sneezes    Cover your mouth and nose with a tissue when you cough or sneeze. Throw used tissues in a lined trash can. Immediately wash your hands with soap and water for at least 20 seconds or, if soap and water are not available, clean your hands with an alcohol-based hand  that contains at least 60% alcohol. Clean your hands often    Wash your hands often with soap and water for at least 20 seconds, especially after blowing your nose, coughing, or sneezing; going to the bathroom; and before eating or preparing food. If soap and water are not readily available, use an alcohol-based hand  with at least 60% alcohol, covering all surfaces of your hands and rubbing them together until they feel dry. Soap and water are the best option if hands are visibly dirty. Avoid touching your eyes, nose, and mouth with unwashed hands. Avoid sharing personal household items    You should not share dishes, drinking glasses, cups, eating utensils, towels, or bedding with other people or pets in your home. After using these items, they should be washed thoroughly with soap and water. Clean all “high-touch” surfaces everyday    High touch surfaces include counters, tabletops, doorknobs, bathroom fixtures, toilets, phones, keyboards, tablets, and bedside tables. Also, clean any surfaces that may have blood, stool, or body fluids on them.  Use a household cleaning spray or wipe, according to the label instructions. Labels contain instructions for safe and effective use of the cleaning product including precautions you should take when applying the product, such as wearing gloves and making sure you have good ventilation during use of the product. Monitor your symptoms    Seek prompt medical attention if your illness is worsening (e.g., difficulty breathing). Before seeking care, call your healthcare provider and tell them that you have, or are being evaluated for, COVID-19. Put on a facemask before you enter the facility. These steps will help the healthcare provider’s office to keep other people in the office or waiting room from getting infected or exposed. Ask your healthcare provider to call the local or [facility] health department. Persons who are placed under active monitoring or facilitated self-monitoring should follow instructions provided by their local health department or occupational health professionals, as appropriate. If you have a medical emergency and need to call 911, notify the dispatch personnel that you have, or are being evaluated for COVID-19. If possible, put on a facemask before emergency medical services arrive. Discontinuing home isolation    Patients with confirmed COVID-19 should remain under home isolation precautions until the following conditions are met: They have had no fever for at least 24 hours (that is one full day of no fever without the use medicine that reduces fevers)  AND  other symptoms have improved (for example, when their cough or shortness of breath have improved)  AND  If had mild or moderate illness, at least 10 days have passed since their symptoms first appeared or if severe illness (needed oxygen) or immunosuppressed, at least 20 days have passed since symptoms first appeared  Patients with confirmed COVID-19 should also notify close contacts (including their workplace) and ask that they self-quarantine.  Currently, close contact is defined as being within 6 feet for 15 minutes or more from the period 24 hours starting 48 hours before symptom onset to the time at which the patient went into isolation. Close contacts of patients diagnosed with COVID-19 should be instructed by the patient to self-quarantine for 14 days from the last time of their last contact with the patient. Source: [employer].  Vitamin C 500 mg one tablet daily, Vitamin D 1000 units daily, Zinc 25 mg daily  Drink plenty of fluid  Take temp. Blood pressure, Pulse Oxygen Twice a day or more frequently as needed. Use Mask total of 5 days, if you have no further fever  If your breathing gets worse, or persistent high fever or if you get worse go to Emergency room. Ask questions. Take Tylenol 500 mg every six hours as needed for pain, headache or bodyache  You make take following any of the following as described below:  -Claritin 10 mg daily as needed for nasal congestion   -Mucinex DM one tablet twice a day for cough as needed    Or    Dayquil/niquil as needed for nasal congestion, cough, chest congestion    Or OTC Cough Medicine of your choice    During first 5 days of quarantine, stay in a private room, if you feel strong may sit, walk around. Wear mask if needed  May take some deep breath few times a day. If you go to common areas in your home wear mask  Encourage your family members and visitors to wear mask    After total of 5 days, if you may go out, but you must wear mask for next 5 days( in some condition, we may suggest long quarantine or wearing mask longer)  No need to do follow up covid test generally. Wash surfaces, hands as appropriate         Relevant Medications    nirmatrelvir & ritonavir (Paxlovid, 300/100,) tablet therapy pack      Disposition:     No atorvastatin for 1 week. Weekly tablets twice a day watch for any side effect let us know if there is any problem.     Take vitamin C, vitamin D and zinc as discussed. Continue your empty stomach may take guaifenesin or guaifenesin DM as needed. Check your temperature blood pressure pulse and twice a day. Stay home for total of full 5 days. You may go out, as long as you do not have fever chills cough shortness of breath on Tuesday. Wear mask for another 5 days total of 10 days. If you feel strong enough you can walk around in the home. Drink plenty of fluid. You may take Tylenol for pain and aches. Should you have any significant shortness of breath cough chest congestion feeling worse dizziness lightheadedness or anything unusual headache go to the emergency room call the squad. Give us a call next Tuesday let us know how you are doing      [facility]    Your healthcare provider and/or public health staff have evaluated you and have determined that you do not need to remain in the hospital at this time.  At this time you can be isolated at home where you will be monitored by staff from your local or state health department. You should carefully follow the prevention and isolation steps below until a healthcare provider or local or state health department says that you can return to your normal activities. Stay home except to get medical care    People who are mildly ill with COVID-19 are able to isolate at home during their illness. You should restrict activities outside your home, except for getting medical care. Do not go to work, school, or public areas. Avoid using public transportation, ride-sharing, or taxis. Separate yourself from other people and animals in your home    People: As much as possible, you should stay in a specific room and away from other people in your home. Also, you should use a separate bathroom, if available. Animals: You should restrict contact with pets and other animals while you are sick with COVID-19, just like you would around other people.  Although there have not been reports of pets or other animals becoming sick with COVID-19, it is still recommended that people sick with COVID-19 limit contact with animals until more information is known about the virus. When possible, have another member of your household care for your animals while you are sick. If you are sick with COVID-19, avoid contact with your pet, including petting, snuggling, being kissed or licked, and sharing food. If you must care for your pet or be around animals while you are sick, wash your hands before and after you interact with pets and wear a facemask. See COVID-19 and Animals for more information. Call ahead before visiting your doctor    If you have a medical appointment, call the healthcare provider and tell them that you have or may have COVID-19. This will help the healthcare provider's office take steps to keep other people from getting infected or exposed. Wear a facemask    You should wear a facemask when you are around other people (e.g., sharing a room or vehicle) or pets and before you enter a healthcare provider's office. If you are not able to wear a facemask (for example, because it causes trouble breathing), then people who live with you should not stay in the same room with you, or they should wear a facemask if they enter your room. Cover your coughs and sneezes    Cover your mouth and nose with a tissue when you cough or sneeze. Throw used tissues in a lined trash can. Immediately wash your hands with soap and water for at least 20 seconds or, if soap and water are not available, clean your hands with an alcohol-based hand  that contains at least 60% alcohol. Clean your hands often    Wash your hands often with soap and water for at least 20 seconds, especially after blowing your nose, coughing, or sneezing; going to the bathroom; and before eating or preparing food.  If soap and water are not readily available, use an alcohol-based hand  with at least 60% alcohol, covering all surfaces of your hands and rubbing them together until they feel dry. Soap and water are the best option if hands are visibly dirty. Avoid touching your eyes, nose, and mouth with unwashed hands. Avoid sharing personal household items    You should not share dishes, drinking glasses, cups, eating utensils, towels, or bedding with other people or pets in your home. After using these items, they should be washed thoroughly with soap and water. Clean all "high-touch" surfaces everyday    High touch surfaces include counters, tabletops, doorknobs, bathroom fixtures, toilets, phones, keyboards, tablets, and bedside tables. Also, clean any surfaces that may have blood, stool, or body fluids on them. Use a household cleaning spray or wipe, according to the label instructions. Labels contain instructions for safe and effective use of the cleaning product including precautions you should take when applying the product, such as wearing gloves and making sure you have good ventilation during use of the product. Monitor your symptoms    Seek prompt medical attention if your illness is worsening (e.g., difficulty breathing). Before seeking care, call your healthcare provider and tell them that you have, or are being evaluated for, COVID-19. Put on a facemask before you enter the facility. These steps will help the healthcare provider's office to keep other people in the office or waiting room from getting infected or exposed. Ask your healthcare provider to call the local or state health department. Persons who are placed under active monitoring or facilitated self-monitoring should follow instructions provided by their local health department or occupational health professionals, as appropriate. If you have a medical emergency and need to call 911, notify the dispatch personnel that you have, or are being evaluated for COVID-19.  If possible, put on a facemask before emergency medical services arrive. Discontinuing home isolation    Patients with confirmed COVID-19 should remain under home isolation precautions until the following conditions are met:     ? They have had no fever for at least 24 hours (that is one full day of no fever without the use medicine that reduces fevers)  AND    ? other symptoms have improved (for example, when their cough or shortness of breath have improved)  AND    ? If had mild or moderate illness, at least 10 days have passed since their symptoms first appeared or if severe illness (needed oxygen) or immunosuppressed, at least 20 days have passed since symptoms first appeared  Patients with confirmed COVID-19 should also notify close contacts (including their workplace) and ask that they self-quarantine. Currently, close contact is defined as being within 6 feet for 15 minutes or more from the period 24 hours starting 48 hours before symptom onset to the time at which the patient went into isolation.  Close contacts of patients diagnosed with COVID-19 should be instructed by the patient to self-quarantine for 14 days from the last time of their last contact with the patient. Source: [employer]  Vitamin C 500 mg one tablet daily, Vitamin D 1000 units daily, Zinc 25 mg daily  Drink plenty of fluid  Take temp. Blood pressure, Pulse Oxygen Twice a day or more frequently as needed. Use Mask total of 5 days, if you have no further fever  If your breathing gets worse, or persistent high fever or if you get worse go to Emergency room. Ask questions.   Take Tylenol 500 mg every six hours as needed for pain, headache or bodyache  You make take following any of the following as described below:  -Claritin 10 mg daily as needed for nasal congestion   -Mucinex DM one tablet twice a day for cough as needed    Or    Dayquil/niquil as needed for nasal congestion, cough, chest congestion    Or OTC Cough Medicine of your choice    During first 5 days of quarantine, stay in a private room, if you feel strong may sit, walk around. Wear mask if needed  May take some deep breath few times a day. If you go to common areas in your home wear mask  Encourage your family members and visitors to wear mask    After total of 5 days, if you may go out, but you must wear mask for next 5 days( in some condition, we may suggest long quarantine or wearing mask longer)  No need to do follow up covid test generally. Wash surfaces, hands as appropriate      I have spent a total time of 20 minutes on the day of the encounter for this patient including discussing diagnostic results, discussing prognosis, risks and benefits of treatment options, instructions for management, patient and family education, importance of treatment compliance, risk factor reductions, impressions, documenting in the medical record and reviewing/ordering tests, medicine, procedures. Encounter provider: Andrei Sorenson MD     Provider located at: [address] [facility]  [address] [facility]  [address] [facility] [address] [facility]     Recent Visits  No visits were found meeting these conditions. Showing recent visits within past 7 days and meeting all other requirements  Today's Visits  Date Type Provider Dept   07/21/23 Telemedicine Andrei Sorenson MD [address] [facility]   Showing today's visits and meeting all other requirements  Future Appointments  No visits were found meeting these conditions. Showing future appointments within next 150 days and meeting all other requirements     This virtual check-in was done via [address] [place] [address] and patient was informed that this is a secure, HIPAA-compliant platform. He agrees to proceed. Patient agrees to participate in a virtual check in via telephone or video visit instead of presenting to the office to address urgent/immediate medical needs. Patient is aware this is a billable service. He acknowledged consent and understanding of privacy and security of the video platform. The patient has agreed to participate and understands they can discontinue the visit at any time. After connecting through [facility], the patient was identified by name and date of birth. Lucero Stinson was informed that this was a telemedicine visit and that the exam was being conducted confidentially over secure lines. My office door was closed. Lucero Stinson acknowledged consent and understanding of privacy and security of the telemedicine visit. I informed the patient that I have reviewed his record in Epic and presented the opportunity for him to ask any questions regarding the visit today. The patient agreed to participate. Verification of patient location:  Patient is located in the following state in which I hold an active license: [place]    Subjective:   Lucero Stinson is a 63 y.o. male who is concerned about COVID-19. Patient's symptoms include fever, fatigue, malaise, nasal congestion, rhinorrhea and cough. Patient denies chills, anosmia, loss of taste, shortness of breath, chest tightness, abdominal pain, nausea, vomiting, diarrhea, myalgias and headaches.      - Date of symptom onset: 7/18/2023      COVID-19 vaccination status: Fully vaccinated with booster    Exposure:   Contact with a person who is under investigation (PUI) for or who is positive for COVID-19 within the last 14 days?: No    Hospitalized recently for fever and/or lower respiratory symptoms?: No      Currently a healthcare worker that is involved in direct patient care?: No      Works in a special setting where the risk of COVID-19 transmission may be high? (this may include long-term care, correctional and nursing home facilities; homeless shelters; assisted-living facilities and group homes.): No      Resident in a special setting where the risk of COVID-19 transmission may be high? (this may include long-term care, correctional and skilled nursing facilities; homeless shelters; assisted-living facilities and group homes.): No      Started tues with occasional sneeze, little runny nose,, Wednesday  Cough, runny nose and occasional sneezing, Thursday, mesurable more congestion, scratchy, nasal congestion, cough continued occasional,     Did covid test Thursday night came back positive      Last night chills , fever fluctuating between 99 and 100.5 F , now 100.2 F    Today as above    Had covid  October 2022, pt took paxlovid and tolerated except last time well,    Takes antihistamin, vitamin c and vitamin d daily    Lab Results   Component Value Date    SARSCOV2 Negative 12/17/2021       Review of Systems   Constitutional: Positive for fatigue and fever. Negative for chills. HENT: Positive for congestion and rhinorrhea. Respiratory: Positive for cough. Negative for chest tightness and shortness of breath. Cardiovascular: Negative for chest pain, palpitations and leg swelling. Gastrointestinal: Negative for abdominal pain, diarrhea, nausea and vomiting. Musculoskeletal: Negative for myalgias. Neurological: Positive for seizures and numbness. Negative for dizziness, facial asymmetry, light-headedness and headaches.      Current Outpatient Medications on File Prior to Visit   Medication Sig   • albuterol (PROVENTIL HFA,VENTOLIN HFA) 90 mcg/act inhaler Inhale 2 puffs every 4 (four) hours as needed for wheezing or shortness of breath   • Alpha-Lipoic Acid 100 MG CAPS Take by mouth   • Ascorbic Acid (VITAMIN C) 100 MG tablet Take 500 mg by mouth    • aspirin 81 MG tablet Take 162 mg by mouth daily     • atorvastatin (LIPITOR) 40 mg tablet TAKE 1 TABLET BY MOUTH ONCE DAILY WITH SUPPER   • BIOTIN PO Take 5 mg by mouth daily   • Cholecalciferol (VITAMIN D3) 1000 units CAPS Take by mouth daily    • ciclopirox (LOPROX) 0.77 % cream    • ciclopirox (PENLAC) 8 % solution    • cyanocobalamin (VITAMIN B-12) 100 mcg tablet Take by mouth daily   • desloratadine (CLARINEX) 5 MG tablet TAKE 1 TABLET BY MOUTH AT BEDTIME   • famotidine (PEPCID) 20 mg tablet Take 20 mg by mouth daily   • FREESTYLE LITE test strip USE 1 STRIP TO CHECK GLUCOSE ONCE DAILY AS DIRECTED   • gabapentin (NEURONTIN) 100 mg capsule TAKE 1 CAPSULE BY MOUTH IN THE MORNING AND 3 AT BEDTIME   • glucosamine-chondroitin 500-400 MG tablet Take 1 tablet by mouth daily   • glucose blood (FREESTYLE LITE) test strip Use 1 each daily Use as instructed   • hydrochlorothiazide (HYDRODIURIL) 25 mg tablet Take 1 tablet (25 mg total) by mouth daily   • losartan (COZAAR) 50 mg tablet Take 1 tablet by mouth once daily   • Magnesium Hydroxide (MAGNESIA PO) Take by mouth 2 (two) times a day   • metFORMIN (GLUCOPHAGE) 1000 MG tablet TAKE 1 TABLET BY MOUTH TWICE DAILY WITH MEALS   • Multiple Vitamin (MULTIVITAMIN) capsule Take 1 capsule by mouth daily   • nitroglycerin (NITROSTAT) 0.3 mg SL tablet Place 1 tablet (0.3 mg total) under the tongue every 5 (five) minutes as needed for chest pain   • ONETOUCH DELICA LANCETS 50E MISC 1 Units by Does not apply route daily   • semaglutide, 0.25 or 0.5 mg/dose, (Ozempic, 0.25 or 0.5 MG/DOSE,) 2 mg/3 mL injection pen Inject 0.38 mL (0.2533 mg total) under the skin every 7 days       Objective:    Pulse 79   Temp 100.2 °F (37.9 °C)   Ht 5' 11" (1.803 m)   SpO2 96%   BMI 38.77 kg/m²        Physical Exam  Constitutional:       Appearance: He is obese. He is not ill-appearing or diaphoretic. Neurological:      Mental Status: He is oriented to person, place, and time.        Lisa Rahman MD

## 2023-07-27 ENCOUNTER — TELEPHONE (OUTPATIENT)
Dept: NEUROLOGY | Facility: CLINIC | Age: 63
End: 2023-07-27

## 2023-08-05 LAB
ALBUMIN SERPL-MCNC: 4.2 G/DL (ref 3.8–4.9)
ALBUMIN/CREAT UR: <7 MG/G CREAT (ref 0–29)
ALBUMIN/GLOB SERPL: 1.6 {RATIO} (ref 1.2–2.2)
ALP SERPL-CCNC: 54 IU/L (ref 44–121)
ALT SERPL-CCNC: 77 IU/L (ref 0–44)
AST SERPL-CCNC: 50 IU/L (ref 0–40)
BILIRUB SERPL-MCNC: 0.6 MG/DL (ref 0–1.2)
BUN SERPL-MCNC: 15 MG/DL (ref 8–27)
BUN/CREAT SERPL: 15 (ref 10–24)
CALCIUM SERPL-MCNC: 9.4 MG/DL (ref 8.6–10.2)
CHLORIDE SERPL-SCNC: 100 MMOL/L (ref 96–106)
CHOLEST SERPL-MCNC: 129 MG/DL (ref 100–199)
CO2 SERPL-SCNC: 24 MMOL/L (ref 20–29)
CREAT SERPL-MCNC: 1.03 MG/DL (ref 0.76–1.27)
CREAT UR-MCNC: 40.1 MG/DL
EGFR: 83 ML/MIN/1.73
EST. AVERAGE GLUCOSE BLD GHB EST-MCNC: 171 MG/DL
GLOBULIN SER-MCNC: 2.6 G/DL (ref 1.5–4.5)
GLUCOSE SERPL-MCNC: 164 MG/DL (ref 70–99)
HBA1C MFR BLD: 7.6 % (ref 4.8–5.6)
HDLC SERPL-MCNC: 34 MG/DL
LDLC SERPL CALC-MCNC: 56 MG/DL (ref 0–99)
LDLC/HDLC SERPL: 1.6 RATIO (ref 0–3.6)
MICROALBUMIN UR-MCNC: <3 UG/ML
POTASSIUM SERPL-SCNC: 4.6 MMOL/L (ref 3.5–5.2)
PROT SERPL-MCNC: 6.8 G/DL (ref 6–8.5)
SL AMB VLDL CHOLESTEROL CALC: 39 MG/DL (ref 5–40)
SODIUM SERPL-SCNC: 139 MMOL/L (ref 134–144)
TRIGL SERPL-MCNC: 247 MG/DL (ref 0–149)

## 2023-08-07 ENCOUNTER — TELEPHONE (OUTPATIENT)
Dept: PULMONOLOGY | Facility: MEDICAL CENTER | Age: 63
End: 2023-08-07

## 2023-08-08 LAB
SHBG SERPL-SCNC: 41.8 NMOL/L (ref 19.3–76.4)
TESTOST FREE SERPL-MCNC: 5 PG/ML (ref 6.6–18.1)
TESTOST SERPL-MCNC: 375 NG/DL (ref 264–916)

## 2023-08-09 DIAGNOSIS — E11.9 TYPE 2 DIABETES MELLITUS WITHOUT COMPLICATION, WITHOUT LONG-TERM CURRENT USE OF INSULIN (HCC): ICD-10-CM

## 2023-08-11 ENCOUNTER — RA CDI HCC (OUTPATIENT)
Dept: OTHER | Facility: HOSPITAL | Age: 63
End: 2023-08-11

## 2023-08-11 ENCOUNTER — OFFICE VISIT (OUTPATIENT)
Dept: CARDIOLOGY CLINIC | Facility: CLINIC | Age: 63
End: 2023-08-11
Payer: COMMERCIAL

## 2023-08-11 ENCOUNTER — OFFICE VISIT (OUTPATIENT)
Dept: ENDOCRINOLOGY | Facility: CLINIC | Age: 63
End: 2023-08-11
Payer: COMMERCIAL

## 2023-08-11 ENCOUNTER — OFFICE VISIT (OUTPATIENT)
Dept: NEUROLOGY | Facility: CLINIC | Age: 63
End: 2023-08-11
Payer: COMMERCIAL

## 2023-08-11 VITALS
BODY MASS INDEX: 38.78 KG/M2 | HEART RATE: 66 BPM | DIASTOLIC BLOOD PRESSURE: 84 MMHG | WEIGHT: 277 LBS | TEMPERATURE: 97.1 F | HEIGHT: 71 IN | SYSTOLIC BLOOD PRESSURE: 136 MMHG | OXYGEN SATURATION: 96 %

## 2023-08-11 VITALS
DIASTOLIC BLOOD PRESSURE: 82 MMHG | HEIGHT: 71 IN | SYSTOLIC BLOOD PRESSURE: 126 MMHG | HEART RATE: 70 BPM | OXYGEN SATURATION: 97 % | BODY MASS INDEX: 38.78 KG/M2 | WEIGHT: 277 LBS

## 2023-08-11 VITALS
WEIGHT: 276.2 LBS | BODY MASS INDEX: 38.67 KG/M2 | HEART RATE: 80 BPM | HEIGHT: 71 IN | SYSTOLIC BLOOD PRESSURE: 126 MMHG | DIASTOLIC BLOOD PRESSURE: 84 MMHG

## 2023-08-11 DIAGNOSIS — E78.2 MIXED HYPERLIPIDEMIA: ICD-10-CM

## 2023-08-11 DIAGNOSIS — I10 ESSENTIAL HYPERTENSION: ICD-10-CM

## 2023-08-11 DIAGNOSIS — G25.81 RESTLESS LEG SYNDROME: ICD-10-CM

## 2023-08-11 DIAGNOSIS — I50.32 CHRONIC DIASTOLIC CONGESTIVE HEART FAILURE (HCC): ICD-10-CM

## 2023-08-11 DIAGNOSIS — I45.10 BUNDLE BRANCH BLOCK, RIGHT: ICD-10-CM

## 2023-08-11 DIAGNOSIS — I49.3 ASYMPTOMATIC PVCS: Primary | ICD-10-CM

## 2023-08-11 DIAGNOSIS — E11.9 TYPE 2 DIABETES MELLITUS WITHOUT COMPLICATION, WITHOUT LONG-TERM CURRENT USE OF INSULIN (HCC): ICD-10-CM

## 2023-08-11 DIAGNOSIS — E11.40 NEUROPATHY DUE TO TYPE 2 DIABETES MELLITUS (HCC): Primary | ICD-10-CM

## 2023-08-11 DIAGNOSIS — R42 LIGHTHEADEDNESS: ICD-10-CM

## 2023-08-11 DIAGNOSIS — E55.9 VITAMIN D DEFICIENCY: ICD-10-CM

## 2023-08-11 DIAGNOSIS — R06.02 EXERTIONAL SHORTNESS OF BREATH: ICD-10-CM

## 2023-08-11 DIAGNOSIS — R25.1 TREMOR OF LEFT HAND: ICD-10-CM

## 2023-08-11 DIAGNOSIS — R00.1 BRADYCARDIA: ICD-10-CM

## 2023-08-11 DIAGNOSIS — E53.8 VITAMIN B 12 DEFICIENCY: ICD-10-CM

## 2023-08-11 DIAGNOSIS — E11.40 NEUROPATHY DUE TO TYPE 2 DIABETES MELLITUS (HCC): ICD-10-CM

## 2023-08-11 DIAGNOSIS — E04.1 THYROID NODULE: ICD-10-CM

## 2023-08-11 DIAGNOSIS — I25.10 CORONARY ARTERY DISEASE INVOLVING NATIVE CORONARY ARTERY OF NATIVE HEART WITHOUT ANGINA PECTORIS: ICD-10-CM

## 2023-08-11 DIAGNOSIS — E11.65 TYPE 2 DIABETES MELLITUS WITH HYPERGLYCEMIA, UNSPECIFIED WHETHER LONG TERM INSULIN USE (HCC): Primary | ICD-10-CM

## 2023-08-11 PROCEDURE — 99214 OFFICE O/P EST MOD 30 MIN: CPT | Performed by: NURSE PRACTITIONER

## 2023-08-11 PROCEDURE — 99214 OFFICE O/P EST MOD 30 MIN: CPT | Performed by: INTERNAL MEDICINE

## 2023-08-11 PROCEDURE — 93000 ELECTROCARDIOGRAM COMPLETE: CPT | Performed by: INTERNAL MEDICINE

## 2023-08-11 RX ORDER — TORSEMIDE 10 MG/1
10 TABLET ORAL DAILY
Qty: 30 TABLET | Refills: 1 | Status: SHIPPED | OUTPATIENT
Start: 2023-08-11

## 2023-08-11 NOTE — PROGRESS NOTES
Yayo Estrella  1960  [id number]  [place] [facility] [facility]  [facility] [address] [id number]    Interval History:  Yayo Estrella is a 63 y.o. male who presents for routine coronary artery disease follow-up. Since his last visit, he has been feeling well.  he denies any palpitations, chest pain, shortness of breath, LE edema, orthopnea or PND. Diet is overall unchanged. There has not been a significant change in weight. Most recent blood work showed LDL of 56 mg/dL with TG of 247 and T cholesterol 129. He has mild lower extremity edema. He does not use torsemide which he was taking in the past.  Weight has been stable. Does not exercise regularly. He had chest discomfort during his last visit. A stress echocardiogram was done which was normal.   He had a PFT study in June which was normal.    Treadmill stress test in 2019 was normal.  He wore a holter monitor because of resting bradycardia. Monitor showed an average HR of 75 bpm and no heart blocks. Previously was having dyspnea  that improved with torsemide. He continues to refrain from smoking. In November 2017, he underwent drug-eluting stent placement to left circumflex and OM 2 lesions after presenting to hospital with chest and arm pain, elevated BP and ST depressions. Past Medical History:   Diagnosis Date   • Anemia    • Bundle branch block, right    • CAD (coronary artery disease)    • Cataract    • CPAP (continuous positive airway pressure) dependence    • Diabetes mellitus (HCC)     borderline, controlled with diet and activity   • Diverticulitis    • GERD (gastroesophageal reflux disease)    • History of coronary artery stent placement- pCx and OM2 11/24/2017   • Hyperlipidemia    • Nasal septal deformity    • Neuropathy    • Obesity (BMI 30-39. 9)    • Sleep apnea     wears c-pap   • Vitamin D deficiency      Past Surgical History:   Procedure Laterality Date   • COLON SIGMOID RESECTION N/A 2016    Procedure: RESECTION COLON SIGMOID;  Surgeon: Karl Sacks, MD;  Location:  MAIN OR;  Service:    • COLON SIGMOID RESECTION LAPAROSCOPIC N/A 2016    Procedure: RESECTION COLON SIGMOID LAPAROSCOPIC:CONVERTED TO [email or web address];  Surgeon: Karl Sacks, MD;  Location:  MAIN OR;  Service:    • COLON SURGERY      Sigmoidectomy   • COLONOSCOPY N/A 10/6/2016    Procedure: COLONOSCOPY;  Surgeon: Kari Conley MD;  [facility] [address] [place] [address] [facility]; Service:    • CYSTOSCOPY W/ RETROGRADES Left 2/3/2017    Procedure: CYSTOSCOPY WITH BILATERAL RETROGRADES, LEFT STENT REMOVAL;  Surgeon: Vic Rich MD;  Location: [facility];  Service:    • ESOPHAGOGASTRODUODENOSCOPY N/A 10/6/2016    Procedure: ESOPHAGOGASTRODUODENOSCOPY (EGD); Surgeon: Kari Conley MD;  Location: [address] [place] [address] [facility]; Service:    • HERNIA REPAIR     • KNEE ARTHROSCOPY W/ MENISCECTOMY     • WV CYSTO BLADDER W/URETERAL CATHETERIZATION Left 2016    Procedure: CYSTOSCOPY RETROGRADE PYELOGRAM WITH INSERTION STENT URETERAL;  Surgeon: Vic Rich MD;  Location: [facility];  Service: Urology   • US GUIDED THYROID BIOPSY  2021   • VARICOSE VEIN SURGERY       Social History     Socioeconomic History   • Marital status: Single     Spouse name: Not on file   • Number of children: Not on file   • Years of education: Not on file   • Highest education level: Not on file   Occupational History   • Not on file   Tobacco Use   • Smoking status: Former     Packs/day: 1.00     Years: 37.00     Total pack years: 37.00     Types: Cigarettes     Quit date: 3/30/2018     Years since quittin.3     Passive exposure: Past   • Smokeless tobacco: Current     Types: Chew   • Tobacco comments:     chewing nicotine   Vaping Use   • Vaping Use: Never used   Substance and Sexual Activity   • Alcohol use:  Yes     Alcohol/week: 3.0 standard drinks of alcohol     Types: 3 Cans of beer per week     Comment: occ • Drug use: No   • Sexual activity: Never   Other Topics Concern   • Not on file   Social History Narrative    Lives alone.      Social Determinants of Health     Financial Resource Strain: Not on file   Food Insecurity: Not on file   Transportation Needs: Not on file   Physical Activity: Not on file   Stress: Not on file   Social Connections: Not on file   Intimate Partner Violence: Not on file   Housing Stability: Not on file     Family History   Problem Relation Age of Onset   • Osteoarthritis Mother    • Atrial fibrillation Mother    • Aortic aneurysm Father    • Diabetes Brother    • Colon cancer Brother        Current Outpatient Medications:   •  albuterol (PROVENTIL HFA,VENTOLIN HFA) 90 mcg/act inhaler, Inhale 2 puffs every 4 (four) hours as needed for wheezing or shortness of breath, Disp: 18 g, Rfl: 6  •  Alpha-Lipoic Acid 100 MG CAPS, Take by mouth, Disp: , Rfl:   •  Ascorbic Acid (VITAMIN C) 100 MG tablet, Take 500 mg by mouth , Disp: , Rfl:   •  aspirin 81 MG tablet, Take 162 mg by mouth daily  , Disp: , Rfl:   •  atorvastatin (LIPITOR) 40 mg tablet, TAKE 1 TABLET BY MOUTH ONCE DAILY WITH SUPPER, Disp: 90 tablet, Rfl: 0  •  BIOTIN PO, Take 5 mg by mouth daily, Disp: , Rfl:   •  Cholecalciferol (VITAMIN D3) 1000 units CAPS, Take by mouth daily , Disp: , Rfl:   •  ciclopirox (LOPROX) 0.77 % cream, , Disp: , Rfl:   •  ciclopirox (PENLAC) 8 % solution, , Disp: , Rfl:   •  cyanocobalamin (VITAMIN B-12) 100 mcg tablet, Take by mouth daily, Disp: , Rfl:   •  desloratadine (CLARINEX) 5 MG tablet, TAKE 1 TABLET BY MOUTH AT BEDTIME, Disp: 90 tablet, Rfl: 0  •  famotidine (PEPCID) 20 mg tablet, Take 20 mg by mouth daily, Disp: , Rfl:   •  FREESTYLE LITE test strip, USE 1 STRIP TO CHECK GLUCOSE ONCE DAILY AS DIRECTED, Disp: 100 each, Rfl: 0  •  gabapentin (NEURONTIN) 100 mg capsule, TAKE 1 CAPSULE BY MOUTH IN THE MORNING AND 3 AT BEDTIME, Disp: 360 capsule, Rfl: 1  •  glucosamine-chondroitin 500-400 MG tablet, Take 1 tablet by mouth daily, Disp: , Rfl:   •  glucose blood (FREESTYLE LITE) test strip, Use 1 each daily Use as instructed, Disp: 100 each, Rfl: 0  •  hydrochlorothiazide (HYDRODIURIL) 25 mg tablet, Take 1 tablet (25 mg total) by mouth daily, Disp: 90 tablet, Rfl: 1  •  losartan (COZAAR) 50 mg tablet, Take 1 tablet by mouth once daily, Disp: 90 tablet, Rfl: 0  •  Magnesium Hydroxide (MAGNESIA PO), Take by mouth 2 (two) times a day, Disp: , Rfl:   •  metFORMIN (GLUCOPHAGE) 1000 MG tablet, TAKE 1 TABLET BY MOUTH TWICE DAILY WITH MEALS, Disp: 180 tablet, Rfl: 0  •  Multiple Vitamin (MULTIVITAMIN) capsule, Take 1 capsule by mouth daily, Disp: , Rfl:   •  nitroglycerin (NITROSTAT) 0.3 mg SL tablet, Place 1 tablet (0.3 mg total) under the tongue every 5 (five) minutes as needed for chest pain, Disp: 25 tablet, Rfl: 1  •  ONETOUCH DELICA LANCETS 26W MISC, 1 Units by Does not apply route daily, Disp: 100 each, Rfl: 6  •  semaglutide, 1 mg/dose, (Ozempic, 1 MG/DOSE,) 4 mg/3 mL injection pen, Inject 0.75 mL (1 mg total) under the skin every 7 days, Disp: 3 mL, Rfl: 2    Current Facility-Administered Medications:   •  cyanocobalamin injection 1,000 mcg, 1,000 mcg, Intramuscular, Q30 Days, [employer], CRNP, 1,000 mcg at 09/10/21 1141  The following portions of the patient's history were reviewed and updated as appropriate: allergies, current medications, past family history, past medical history, past social history, past surgical history and problem list..    Review of Systems:  Review of Systems   Respiratory: Positive for shortness of breath. Cardiovascular: Positive for leg swelling. Negative for chest pain and palpitations. Musculoskeletal: Positive for arthralgias. All other systems reviewed and are negative.       Physical Exam:  /82 (BP Location: Right arm, Patient Position: Sitting, Cuff Size: Large)   Pulse 70   Ht 5' 11" (1.803 m)   Wt 126 kg (277 lb)   SpO2 97%   BMI 38.63 kg/m² Physical Exam  Constitutional:       General: He is not in acute distress. Appearance: He is well-developed. He is not diaphoretic. HENT:      Head: Normocephalic and atraumatic. Eyes:      Conjunctiva/sclera: Conjunctivae normal.      Pupils: Pupils are equal, round, and reactive to light. Neck:      Thyroid: No thyromegaly. Vascular: No JVD. Cardiovascular:      Rate and Rhythm: Normal rate and regular rhythm. Heart sounds: Normal heart sounds. No murmur heard. No friction rub. No gallop. Pulmonary:      Effort: Pulmonary effort is normal.      Breath sounds: Normal breath sounds. Musculoskeletal:      Cervical back: Neck supple. Right lower leg: Edema present. Left lower leg: Edema present. Skin:     General: Skin is warm and dry. Findings: No erythema or rash. Neurological:      Mental Status: He is alert and oriented to person, place, and time. Cranial Nerves: No cranial nerve deficit. Psychiatric:         Behavior: Behavior normal.         Thought Content: Thought content normal.         Judgment: Judgment normal.         Cardiographics  ECG: sinus bradycardia   LV Ejection Fraction: LV ejection fraction >= 40%. Lab Review  Lab Results   Component Value Date    TRIG 247 (H) 08/04/2023    TRIG 151 (H) 05/19/2023    TRIG 247 (H) 01/13/2023    HDL 34 (L) 08/04/2023    HDL 39 (L) 05/19/2023    HDL 35 (L) 01/13/2023    LDLDIRECT 79 04/10/2020    LDLDIRECT 83 12/27/2019     Assessment/Plan     1. Asymptomatic PVCs    2. Coronary artery disease involving native coronary artery of native heart without angina pectoris    3. Essential hypertension    4. Mixed hyperlipidemia    5. Chronic diastolic congestive heart failure ([address])    6. Type 2 diabetes mellitus without complication, without long-term current use of insulin (HCC)    7. Bundle branch block, right    8. Exertional shortness of breath    9. Bradycardia      - Encouraged regular exercise.     - Blood pressure is stable. Continue current medication regimen including losartan and hydrochlorothiazide. Target BP is < 130/80 mmHg. -  Resume torsemide if edema persists. - diabetes management per Dr. Tra Hector. Last A1C was elevated. - Continue atorvastatin - last LDL was <70.    - Continue current Rx  - recommend using torsemide when edema is present. He previously developed congestive heart failure. Encouraged to reduce salt in diet if possible.

## 2023-08-11 NOTE — PROGRESS NOTES
Established Patient Progress Note      CC: Type 2 Diabetes Mellitus    Impression & Plan:    Problem List Items Addressed This Visit        Endocrine    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 2 diabetes mellitus ([address])       Lab Results   Component Value Date    HGBA1C 7.6 (H) 08/04/2023     Follows with podiatry, on gabapentin. Relevant Medications    semaglutide, 1 mg/dose, (Ozempic, 1 MG/DOSE,) 4 mg/3 mL injection pen    Thyroid nodule     Denies neck compressive symptoms. Repeat thyroid ultrasound recommended in September 2023 or 12 months from the last [place] Type 2 diabetes mellitus with hyperglycemia (HCC) - Primary       Lab Results   Component Value Date    HGBA1C 7.6 (H) 08/04/2023     Hemoglobin A1c has improved. Currently on metformin 1000 mg twice daily and Ozempic 0.5 mg weekly. He has a history of intermittent nausea and diarrhea but he feels symptoms are tolerable. He is aware that symptoms may intensify with increasing Ozempic 1 mg weekly dose, he is comfortable with this and would like to proceed with increased dose. He is also aware of the risk of gastroparesis with GLP-1 RA which we discussed in detail. If he would like to discuss other treatment options, he is aware that he can call at any times. Relevant Medications    semaglutide, 1 mg/dose, (Ozempic, 1 MG/DOSE,) 4 mg/3 mL injection pen    Other Relevant Orders    Lipid panel- Lab Collect    Comprehensive metabolic panel    Hemoglobin A1C       Other    Hyperlipidemia    Relevant Orders    Lipid panel- Lab Collect    Vitamin D deficiency     Continues on daily supplementation. Vitamin B 12 deficiency     Currently on daily supplementation. Lightheadedness    Relevant Orders    Cortisol Level,7-9 AM Specimen       Orders Placed This Encounter   Procedures   • Cortisol Level,7-9 AM Specimen     This is a patient instruction:  This test must be collected between 7-9 AM.      Standing Status:   Future     Number of Occurrences:   1     Standing Expiration Date:   8/11/2024   • Lipid panel- Lab Collect     This is a patient instruction: This test requires patient fasting for 10-12 hours or longer. Drinking of black coffee or black tea is acceptable. Standing Status:   Future     Number of Occurrences:   1     Standing Expiration Date:   8/11/2024   • Comprehensive metabolic panel     This is a patient instruction: Patient fasting for 8 hours or longer recommended. Standing Status:   Future     Number of Occurrences:   1     Standing Expiration Date:   8/11/2024   • Hemoglobin A1C     Standing Status:   Future     Number of Occurrences:   1     Standing Expiration Date:   8/11/2024     Present Illness:   Anthony Barkley is a 63 y.o. male with a history of type 2 diabetes without long term use of insulin. Reports complications of neuropathy follows with podiatry has an appointment in August and claudication. Denies retinopathy follows annually with with optho, denies heart attack or stroke. Has significant past medical history of CAD with stents. Denies recent illness or hospitalizations. Denies recent severe hypoglycemic or severe hyperglycemic episodes. Denies any issues with his current regimen. Home glucose monitoring: are performed regularly. Last seen May 2023 by Dr. Guevara Shade:     Home blood glucose readings:   Before breakfast: 139-213 mg/dL   Before lunch: 112-149 mg/dL  Before dinner: 112-175 mg/dL  Bedtime: 160-224 mg/dL     Current regimen:   Metformin 1000 mg orally twice a day  Ozempic 0.5 mg subcutaneously weekly    Has hypertension: Taking losaratan  Has hyperlipidemia: Taking atorvastatin   Has vitamin B12 deficiency: Taking supplementation    He has been experiencing occasional lightheadedness/dizziness would like to recheck level.      Patient Active Problem List   Diagnosis   • Diabetes mellitus ([address])   • Hyperlipidemia   • Essential hypertension   • Elevated liver enzymes   • Polyarthralgia   • Morbid obesity (HCC)   • Chronic pain of both knees   • Primary osteoarthritis of knees, bilateral   • Coronary artery disease involving native coronary artery of native heart without angina pectoris   • Asymptomatic PVCs   • Obstructive sleep apnea   • Obesity (BMI 30-39. 9)   • Seasonal allergic rhinitis due to pollen   • GERD (gastroesophageal reflux disease)   • Nasal septal deformity   • Bundle branch block, right   • CPAP (continuous positive airway pressure) dependence   • Vitamin D deficiency   • BPH (benign prostatic hyperplasia)   • History of vertebral compression fracture   • Colon polyp   • History of angioplasty   • Ocular migraine   • Inguinal hernia   • Umbilical hernia   • Bradycardia   • Asymptomatic varicose veins of both lower extremities   • Neuropathy   • 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)   • Carpal tunnel syndrome of left wrist   • Ulnar neuropathy at elbow of left upper extremity   • Onychomycosis of toenail   • Chronic left-sided low back pain with left-sided sciatica   • Varicose veins of leg with swelling, bilateral   • Thyroid nodule   • Vitamin B 12 deficiency   • Compression fracture of body of thoracic vertebra (HCC)   • Disc disease, degenerative, lumbar or lumbosacral   • History of coronary artery stent placement- pCx and OM2   • Carpal tunnel syndrome on right   • Tremor of left hand   • Restless leg syndrome   • Hypertensive heart disease with congestive heart failure (HCC)   • Type 2 diabetes mellitus with hyperglycemia ([facility])   • Hypomagnesemia   • Hearing deficit, right   • Screening for lung cancer   • COVID-19   • Lightheadedness   • Other fatigue   • Nausea      Past Medical History:   Diagnosis Date   • Anemia    • Bundle branch block, right    • CAD (coronary artery disease)    • Cataract    • CPAP (continuous positive airway pressure) dependence    • Diabetes mellitus (HCC)     borderline, controlled with diet and activity   • Diverticulitis    • GERD (gastroesophageal reflux disease)    • History of coronary artery stent placement- pCx and OM2 2017   • Hyperlipidemia    • Nasal septal deformity    • Neuropathy    • Obesity (BMI 30-39. 9)    • Sleep apnea     wears c-pap   • Vitamin D deficiency       Past Surgical History:   Procedure Laterality Date   • COLON SIGMOID RESECTION N/A 2016    Procedure: RESECTION COLON SIGMOID;  Surgeon: Zion Kenney MD;  Location:  MAIN OR;  Service:    • COLON SIGMOID RESECTION LAPAROSCOPIC N/A 2016    Procedure: RESECTION COLON SIGMOID LAPAROSCOPIC:CONVERTED TO [email or web address];  Surgeon: Zion Kenney MD;  Location:  MAIN OR;  Service:    • COLON SURGERY      Sigmoidectomy   • COLONOSCOPY N/A 10/6/2016    Procedure: COLONOSCOPY;  Surgeon: Marcelina Christine MD;  Location: [address] [address] GI LAB; Service:    • CYSTOSCOPY W/ RETROGRADES Left 2/3/2017    Procedure: CYSTOSCOPY WITH BILATERAL RETROGRADES, LEFT STENT REMOVAL;  Surgeon: Yared Vigil MD;  Location: [facility];  Service:    • ESOPHAGOGASTRODUODENOSCOPY N/A 10/6/2016    Procedure: ESOPHAGOGASTRODUODENOSCOPY (EGD); Surgeon: Marcelina Christine MD;  Location: [address] [place] [address] GI LAB;   Service:    • HERNIA REPAIR     • KNEE ARTHROSCOPY W/ MENISCECTOMY     • ND CYSTO BLADDER W/URETERAL CATHETERIZATION Left 2016    Procedure: CYSTOSCOPY RETROGRADE PYELOGRAM WITH INSERTION STENT URETERAL;  Surgeon: Yared Vigil MD;  Location: [facility];  Service: Urology   • US GUIDED THYROID BIOPSY  2021   • VARICOSE VEIN SURGERY        Family History   Problem Relation Age of Onset   • Osteoarthritis Mother    • Atrial fibrillation Mother    • Aortic aneurysm Father    • Diabetes Brother    • Colon cancer Brother      Social History     Tobacco Use   • Smoking status: Former     Packs/day: 1.00     Years: 37.00     Total pack years: 37.00     Types: Cigarettes     Quit date: 3/30/2018     Years since quittin.3     Passive exposure: Past   • Smokeless tobacco: Current Types: Chew   • Tobacco comments:     chewing nicotine   Substance Use Topics   • Alcohol use:  Yes     Alcohol/week: 3.0 standard drinks of alcohol     Types: 3 Cans of beer per week     Comment: occ     Allergies   Allergen Reactions   • Levaquin [Levofloxacin In D5w] Swelling     Knee swelling   • Sulfa Antibiotics GI Intolerance     Abdominal pain           Current Outpatient Medications:   •  albuterol (PROVENTIL HFA,VENTOLIN HFA) 90 mcg/act inhaler, Inhale 2 puffs every 4 (four) hours as needed for wheezing or shortness of breath, Disp: 18 g, Rfl: 6  •  Alpha-Lipoic Acid 100 MG CAPS, Take by mouth, Disp: , Rfl:   •  Ascorbic Acid (VITAMIN C) 100 MG tablet, Take 500 mg by mouth , Disp: , Rfl:   •  aspirin 81 MG tablet, Take 162 mg by mouth daily  , Disp: , Rfl:   •  atorvastatin (LIPITOR) 40 mg tablet, TAKE 1 TABLET BY MOUTH ONCE DAILY WITH SUPPER, Disp: 90 tablet, Rfl: 0  •  BIOTIN PO, Take 5 mg by mouth daily, Disp: , Rfl:   •  Cholecalciferol (VITAMIN D3) 1000 units CAPS, Take by mouth daily , Disp: , Rfl:   •  ciclopirox (LOPROX) 0.77 % cream, , Disp: , Rfl:   •  ciclopirox (PENLAC) 8 % solution, , Disp: , Rfl:   •  cyanocobalamin (VITAMIN B-12) 100 mcg tablet, Take by mouth daily, Disp: , Rfl:   •  desloratadine (CLARINEX) 5 MG tablet, TAKE 1 TABLET BY MOUTH AT BEDTIME, Disp: 90 tablet, Rfl: 0  •  famotidine (PEPCID) 20 mg tablet, Take 20 mg by mouth daily, Disp: , Rfl:   •  FREESTYLE LITE test strip, USE 1 STRIP TO CHECK GLUCOSE ONCE DAILY AS DIRECTED, Disp: 100 each, Rfl: 0  •  gabapentin (NEURONTIN) 100 mg capsule, TAKE 1 CAPSULE BY MOUTH IN THE MORNING AND 3 AT BEDTIME, Disp: 360 capsule, Rfl: 1  •  glucosamine-chondroitin 500-400 MG tablet, Take 1 tablet by mouth daily, Disp: , Rfl:   •  glucose blood (FREESTYLE LITE) test strip, Use 1 each daily Use as instructed, Disp: 100 each, Rfl: 0  •  hydrochlorothiazide (HYDRODIURIL) 25 mg tablet, Take 1 tablet (25 mg total) by mouth daily, Disp: 90 tablet, Rfl: 1  •  losartan (COZAAR) 50 mg tablet, Take 1 tablet by mouth once daily, Disp: 90 tablet, Rfl: 0  •  Magnesium Hydroxide (MAGNESIA PO), Take by mouth 2 (two) times a day, Disp: , Rfl:   •  metFORMIN (GLUCOPHAGE) 1000 MG tablet, TAKE 1 TABLET BY MOUTH TWICE DAILY WITH MEALS, Disp: 180 tablet, Rfl: 0  •  Multiple Vitamin (MULTIVITAMIN) capsule, Take 1 capsule by mouth daily, Disp: , Rfl:   •  nitroglycerin (NITROSTAT) 0.3 mg SL tablet, Place 1 tablet (0.3 mg total) under the tongue every 5 (five) minutes as needed for chest pain, Disp: 25 tablet, Rfl: 1  •  ONETOUCH DELICA LANCETS 76H MISC, 1 Units by Does not apply route daily, Disp: 100 each, Rfl: 6  •  semaglutide, 1 mg/dose, (Ozempic, 1 MG/DOSE,) 4 mg/3 mL injection pen, Inject 0.75 mL (1 mg total) under the skin every 7 days, Disp: 3 mL, Rfl: 2    Current Facility-Administered Medications:   •  cyanocobalamin injection 1,000 mcg, 1,000 mcg, Intramuscular, Q30 Days, [employer], CRNP, 1,000 mcg at 09/10/21 1141    Review of Systems  Constitutional: Negative for activity change, appetite change, fatigue and unexpected weight change. HENT: Negative for ear pain, sore throat, trouble swallowing and voice change. Eyes: Negative for visual disturbance. Respiratory: Negative for cough and shortness of breath. Cardiovascular: Negative for chest pain and palpitations. Gastrointestinal: Negative for abdominal distention, abdominal pain, constipation, diarrhea and vomiting. Endocrine: Negative for cold intolerance, heat intolerance, polydipsia and polyuria. Genitourinary: Negative for dysuria and hematuria. Musculoskeletal: Negative for arthralgias and back pain. Skin: Negative for color change and rash. Neurological: Negative for seizures and syncope. All other systems reviewed and are negative. Physical Exam:  Body mass index is 38.52 kg/m².   /84   Pulse 80   Ht 5' 11" (1.803 m)   Wt 125 kg (276 lb 3.2 oz)   BMI 38.52 kg/m²    Wt Readings from Last 3 Encounters:   08/11/23 125 kg (276 lb 3.2 oz)   06/02/23 126 kg (278 lb)   05/12/23 126 kg (278 lb)       Physical Exam  Diabetic Foot Exam    Labs:   Lab Results   Component Value Date    HGBA1C 7.6 (H) 08/04/2023    HGBA1C 8.8 (H) 05/19/2023    HGBA1C 8.1 (H) 05/19/2023     Lab Results   Component Value Date    CREATININE 1.03 08/04/2023    CREATININE 1.09 05/19/2023    CREATININE 1.07 05/19/2023    BUN 15 08/04/2023     11/30/2017    K 4.6 08/04/2023     08/04/2023    CO2 24 08/04/2023     eGFR   Date Value Ref Range Status   08/04/2023 83 >59 mL/min/1.73 Final   11/25/2017 73 ml/min/1.73sq m Final     Lab Results   Component Value Date    HDL 34 (L) 08/04/2023    TRIG 247 (H) 08/04/2023    CHOLHDL 4.7 01/13/2023     Lab Results   Component Value Date    ALT 77 (H) 08/04/2023    AST 50 (H) 08/04/2023    ALKPHOS 67 11/23/2017     Lab Results   Component Value Date    KGY5HHKIBUHZ 2.809 11/22/2017    KZG1WPFJQYDI 3.509 12/06/2016     Lab Results   Component Value Date    FREET4 1.02 06/09/2023       Discussed with the patient and all questioned fully answered. He will call me if any problems arise. Follow-up appointment in 3 months. Counseled patient on diagnostic results, prognosis, risk and benefit of treatment options, instruction for management, importance of treatment compliance, Risk  factor reduction and impressions    There are no Patient Instructions on file for this visit.     Angelica Cody

## 2023-08-11 NOTE — PATIENT INSTRUCTIONS
Can start to decrease Gabapentin by 100mg every 5 days until it is completely off. If pain increases during the weaning process, contact Neurology office and we can consider use of Cymbalta or resuming the gabapentin. When off can update the Neurology office with any further symptoms, if crawling sensation is worse, we can consider use of ropinirole for restless legs. Continue with home exercise program re: hand weakness and tremor. Follow up with Neurology office in 5 months or sooner if needed.

## 2023-08-11 NOTE — ASSESSMENT & PLAN NOTE
Lab Results   Component Value Date    HGBA1C 7.6 (H) 08/04/2023     Hemoglobin A1c has improved. Currently on metformin 1000 mg twice daily and Ozempic 0.5 mg weekly. He has a history of intermittent nausea and diarrhea but he feels symptoms are tolerable. He is aware that symptoms may intensify with increasing Ozempic 1 mg weekly dose, he is comfortable with this and would like to proceed with increased dose. He is also aware of the risk of gastroparesis with GLP-1 RA which we discussed in detail. If he would like to discuss other treatment options, he is aware that he can call at any times.

## 2023-08-11 NOTE — ASSESSMENT & PLAN NOTE
Lab Results   Component Value Date    HGBA1C 7.6 (H) 08/04/2023     Follows with podiatry, on gabapentin.

## 2023-08-11 NOTE — PROGRESS NOTES
Patient ID: Jeannie Fall is a 63 y.o. male. Assessment/Plan:       Diagnoses and all orders for this visit:    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 2 diabetes mellitus ([address])    Tremor of left hand    Restless leg syndrome       Can start to decrease Gabapentin by 100mg every 5 days until it is completely off. If pain increases during the weaning process, contact Neurology office and we can consider use of Cymbalta or resuming the gabapentin. When off can update the Neurology office with any further symptoms, if crawling sensation is worse, we can consider use of ropinirole for restless legs. Continue with home exercise program re: hand weakness and tremor. Follow up with Neurology office in 5 months or sooner if needed. Subjective/HPI:  Jeannie Fall is a 64yr old male who follows in the outpatient neurology office for medical management diabetic neuropathy. Patient has a history of diabetes, most recent A1c is 7.6 which is down from 8.8. He also has a history of vitamin deficiencies for which he gets injections or has gotten injections in the past.  MRI of the lumbar spine showing multi degenerative disc disease, focal disc protrusion at L 3-4 with possible impingement at L4 descending nerve root. He has a T11 compression fracture with mild kyphosis. Bone density testing was normal.  EMG of the upper extremities with evidence of median nerve entrapment consistent with carpal tunnel syndrome, left ulnar neuropathy across the elbow. He is followed with orthopedics for bilateral injections which are effective for him. Patient has disruptive sleep apnea for which she wears a CPAP machine. He has noted restless legs with sharp shooting pains over his extremities with his neuropathy. He was started on gabapentin for neuropathies. Patient is a , does surgical interventions. He is opted to take gabapentin at bedtime only as small dose in the morning tends to make him foggy.   Patient will take 100 mg in the mornings on his nonsurgical days, at this point in time patient skips his morning dose on Tuesdays only. Patient is also had reports of crawling sensations on his leg. States that it is generally happening to him at night although sometimes when he is sitting in an office appointment he will feel the crawling sensations about his legs. He did try pramipexole. Patient reported muscle cramping and twitching as well for which she was recommended magnesium, he takes magnesium twice a day now. Patient has had ongoing issues with regards to his A1c and diabetes control. This is created issues with him from a GI standpoint as he now has gastroparesis related to his diabetes. He is continue to work with his endocrinologist on medication management in order to decrease his A1c, he is continuing to trend down over time. Patient returns to neurology office today, he indicates no significant changes in his neuropathies. He states he does have ongoing numbness to his feet, notes that his great toes are numb but he has had an increase in burning type pains over the tops of his feet. Patient states his left is worse than his right. Patient's A1c's are trending down however he is not noticing much difference with regards to his neuropathies. He continues on his 300 mg at bedtime. He takes his 100 mg dosing Wednesday through Monday, he holds his dosing on Tuesdays which is a surgical days. Patient does have ongoing issues with crawling sensation. He states it happens throughout the day, most the time he does not notice it however there are times when he is sitting still where he can feel it and noticed it more. States that it happens mostly at night and can be very uncomfortable for him. He takes his gabapentin for this. Patient reports he did go to OT for evaluation and treatment on his left hand tremor. States it continues to be intermittent.   At times it will be still but if he continues to hold something or use the hand for any appreciable amount of time it will start to tremor and shake again. Patient stated his co-pay was very high, they gave him home exercise programs to do which she was intending on doing. Patient had been getting injections, he is due for an evaluation however his hand surgeon has moved to another area. There are additional hand surgeons available, patient advised to give them a call and schedule an evaluation. Suspect patient's tremor may be related to weakness related to his carpal tunnel syndrome, encouraged to continue his home exercise program and exercising. We will continue to follow this over time as patient does have family history of PAD although displays no signs of Parkinson's at this time. Patient has incomplete treatment of his gabapentin, he continues to have symptoms of both his neuropathies and restless leg syndrome. Patient would like to trial coming off of the gabapentin to see how he feels without the medication. He notes on Tuesdays when he does not take his morning dose he has significant clarity in his thought process. Patient will decrease his gabapentin by 100 mg every 5 days until it is off. Patient advised if he should have pain during this time to stop the titration, contact neurology office at which point we can consider adding something like Cymbalta for treatment of his neuropathies. Once this is loaded on then we can consider continuing weaning off of the gabapentin. Advised patient if his restless leg syndrome should worsen during his titration process, would consider the addition of Requip or ropinirole 1 mg nightly at bedtime for RLS. Patient will start the weaning titration and contact neurology office when it is complete and/or if he had to stop due to symptoms. Patient will follow-up in outpatient neurology office in 5 months or sooner if needed.       The following portions of the patient's history were reviewed and updated as appropriate: allergies, current medications, past family history, past medical history, past social history, past surgical history and problem list.        Past Medical History:   Diagnosis Date   • Anemia    • Bundle branch block, right    • CAD (coronary artery disease)    • Cataract    • CPAP (continuous positive airway pressure) dependence    • Diabetes mellitus (HCC)     borderline, controlled with diet and activity   • Diverticulitis    • GERD (gastroesophageal reflux disease)    • History of coronary artery stent placement- pCx and OM2 11/24/2017   • Hyperlipidemia    • Nasal septal deformity    • Neuropathy    • Obesity (BMI 30-39. 9)    • Sleep apnea     wears c-pap   • Vitamin D deficiency        Past Surgical History:   Procedure Laterality Date   • COLON SIGMOID RESECTION N/A 12/16/2016    Procedure: RESECTION COLON SIGMOID;  Surgeon: Karl Sacks, MD;  Location: [facility];  Service:    • COLON SIGMOID RESECTION LAPAROSCOPIC N/A 12/16/2016    Procedure: RESECTION COLON SIGMOID LAPAROSCOPIC:CONVERTED TO [email or web address];  Surgeon: Karl Sacks, MD;  Location: [facility];  Service:    • COLON SURGERY      Sigmoidectomy   • COLONOSCOPY N/A 10/6/2016    Procedure: COLONOSCOPY;  Surgeon: Kari Conley MD;  Location: [address] [place] [address] GI LAB; Service:    • CYSTOSCOPY W/ RETROGRADES Left 2/3/2017    Procedure: CYSTOSCOPY WITH BILATERAL RETROGRADES, LEFT STENT REMOVAL;  Surgeon: Vic Rich MD;  Location: [facility];  Service:    • ESOPHAGOGASTRODUODENOSCOPY N/A 10/6/2016    Procedure: ESOPHAGOGASTRODUODENOSCOPY (EGD); Surgeon: Kari Conley MD;  Location: [address] [place] [address] GI LAB;   Service:    • HERNIA REPAIR     • KNEE ARTHROSCOPY W/ MENISCECTOMY     • TN CYSTO BLADDER W/URETERAL CATHETERIZATION Left 12/4/2016    Procedure: CYSTOSCOPY RETROGRADE PYELOGRAM WITH INSERTION STENT URETERAL;  Surgeon: Vic Rich MD;  Location: [facility];  Service: Urology   • US GUIDED THYROID BIOPSY  9/27/2021   • VARICOSE VEIN SURGERY         Social History     Socioeconomic History   • Marital status: Single     Spouse name: None   • Number of children: None   • Years of education: None   • Highest education level: None   Occupational History   • None   Tobacco Use   • Smoking status: Former     Packs/day: 1.00     Years: 37.00     Total pack years: 37.00     Types: Cigarettes     Quit date: 3/30/2018     Years since quittin.3     Passive exposure: Past   • Smokeless tobacco: Current     Types: Chew   • Tobacco comments:     chewing nicotine   Vaping Use   • Vaping Use: Never used   Substance and Sexual Activity   • Alcohol use: Yes     Alcohol/week: 3.0 standard drinks of alcohol     Types: 3 Cans of beer per week     Comment: occ   • Drug use: No   • Sexual activity: Never   Other Topics Concern   • None   Social History Narrative    Lives alone.      Social Determinants of Health     Financial Resource Strain: Not on file   Food Insecurity: Not on file   Transportation Needs: Not on file   Physical Activity: Not on file   Stress: Not on file   Social Connections: Not on file   Intimate Partner Violence: Not on file   Housing Stability: Not on file       Family History   Problem Relation Age of Onset   • Osteoarthritis Mother    • Atrial fibrillation Mother    • Aortic aneurysm Father    • Diabetes Brother    • Colon cancer Brother          Current Outpatient Medications:   •  albuterol (PROVENTIL HFA,VENTOLIN HFA) 90 mcg/act inhaler, Inhale 2 puffs every 4 (four) hours as needed for wheezing or shortness of breath, Disp: 18 g, Rfl: 6  •  Alpha-Lipoic Acid 100 MG CAPS, Take by mouth, Disp: , Rfl:   •  Ascorbic Acid (VITAMIN C) 100 MG tablet, Take 500 mg by mouth , Disp: , Rfl:   •  aspirin 81 MG tablet, Take 162 mg by mouth daily  , Disp: , Rfl:   •  atorvastatin (LIPITOR) 40 mg tablet, TAKE 1 TABLET BY MOUTH ONCE DAILY WITH SUPPER, Disp: 90 tablet, Rfl: 0  •  BIOTIN PO, Take 5 mg by mouth daily, Disp: , Rfl:   •  Cholecalciferol (VITAMIN D3) 1000 units CAPS, Take by mouth daily , Disp: , Rfl:   •  ciclopirox (LOPROX) 0.77 % cream, , Disp: , Rfl:   •  ciclopirox (PENLAC) 8 % solution, , Disp: , Rfl:   •  cyanocobalamin (VITAMIN B-12) 100 mcg tablet, Take by mouth daily, Disp: , Rfl:   •  desloratadine (CLARINEX) 5 MG tablet, TAKE 1 TABLET BY MOUTH AT BEDTIME, Disp: 90 tablet, Rfl: 0  •  famotidine (PEPCID) 20 mg tablet, Take 20 mg by mouth daily, Disp: , Rfl:   •  FREESTYLE LITE test strip, USE 1 STRIP TO CHECK GLUCOSE ONCE DAILY AS DIRECTED, Disp: 100 each, Rfl: 0  •  gabapentin (NEURONTIN) 100 mg capsule, TAKE 1 CAPSULE BY MOUTH IN THE MORNING AND 3 AT BEDTIME, Disp: 360 capsule, Rfl: 1  •  glucosamine-chondroitin 500-400 MG tablet, Take 1 tablet by mouth daily, Disp: , Rfl:   •  glucose blood (FREESTYLE LITE) test strip, Use 1 each daily Use as instructed, Disp: 100 each, Rfl: 0  •  hydrochlorothiazide (HYDRODIURIL) 25 mg tablet, Take 1 tablet (25 mg total) by mouth daily, Disp: 90 tablet, Rfl: 1  •  losartan (COZAAR) 50 mg tablet, Take 1 tablet by mouth once daily, Disp: 90 tablet, Rfl: 0  •  Magnesium Hydroxide (MAGNESIA PO), Take by mouth 2 (two) times a day, Disp: , Rfl:   •  metFORMIN (GLUCOPHAGE) 1000 MG tablet, TAKE 1 TABLET BY MOUTH TWICE DAILY WITH MEALS, Disp: 180 tablet, Rfl: 0  •  Multiple Vitamin (MULTIVITAMIN) capsule, Take 1 capsule by mouth daily, Disp: , Rfl:   •  nitroglycerin (NITROSTAT) 0.3 mg SL tablet, Place 1 tablet (0.3 mg total) under the tongue every 5 (five) minutes as needed for chest pain, Disp: 25 tablet, Rfl: 1  •  ONETOUCH DELICA LANCETS 79A MISC, 1 Units by Does not apply route daily, Disp: 100 each, Rfl: 6  •  semaglutide, 1 mg/dose, (Ozempic, 1 MG/DOSE,) 4 mg/3 mL injection pen, Inject 0.75 mL (1 mg total) under the skin every 7 days, Disp: 3 mL, Rfl: 2  •  torsemide (DEMADEX) 10 mg tablet, Take 1 tablet (10 mg total) by mouth daily, Disp: 30 tablet, Rfl: 1    Current Facility-Administered Medications: •  cyanocobalamin injection 1,000 mcg, 1,000 mcg, Intramuscular, Q30 Days, [address], CRNP, 1,000 mcg at 09/10/21 1141    Allergies   Allergen Reactions   • Levaquin [Levofloxacin In D5w] Swelling     Knee swelling   • Sulfa Antibiotics GI Intolerance     Abdominal pain          Blood pressure 136/84, pulse 66, temperature (!) 97.1 °F (36.2 °C), temperature source Tympanic, height 5' 11" (1.803 m), weight 126 kg (277 lb), SpO2 96 %. Objective:    Blood pressure 136/84, pulse 66, temperature (!) 97.1 °F (36.2 °C), temperature source Tympanic, height 5' 11" (1.803 m), weight 126 kg (277 lb), SpO2 96 %. Physical Exam  Vitals reviewed. Constitutional:       Appearance: Normal appearance. He is well-developed. HENT:      Head: Normocephalic. Right Ear: Hearing normal.      Left Ear: Hearing normal.      Nose: Nose normal.   Eyes:      General: Lids are normal.      Extraocular Movements: Extraocular movements intact. Conjunctiva/sclera: Conjunctivae normal.      Pupils: Pupils are equal, round, and reactive to light. Cardiovascular:      Rate and Rhythm: Normal rate. Pulmonary:      Effort: Pulmonary effort is normal. No respiratory distress. Abdominal:      Palpations: Abdomen is soft. Tenderness: There is no abdominal tenderness. Musculoskeletal:         General: Normal range of motion. Cervical back: Normal range of motion. Skin:     General: Skin is warm and dry. Neurological:      Mental Status: He is alert. Motor: Motor strength is normal.     Coordination: Romberg sign negative. Deep Tendon Reflexes: Reflexes are normal and symmetric. Psychiatric:         Attention and Perception: Attention and perception normal.         Mood and Affect: Mood and affect normal.         Speech: Speech normal.         Behavior: Behavior normal. Behavior is cooperative. Thought Content:  Thought content normal.         Cognition and Memory: Cognition and memory normal.         Judgment: Judgment normal.         Neurological Exam  Mental Status  Alert. Oriented to person, place, time and situation. Memory is normal. Recent and remote memory are intact. Speech is normal. Language is fluent with no aphasia. Attention and concentration are normal. Fund of knowledge is appropriate for level of education. Cranial Nerves  CN II: Visual acuity is normal. Visual fields full to confrontation. CN III, IV, VI: Extraocular movements intact bilaterally. Normal lids and orbits bilaterally. Pupils equal round and reactive to light bilaterally. CN V: Facial sensation is normal.  CN VII: Full and symmetric facial movement. CN VIII: Hearing is normal.  Right: Hearing is normal.  Left: Hearing is normal.  CN IX, X: Palate elevates symmetrically. Normal gag reflex. CN XI: Shoulder shrug strength is normal.  CN XII: Tongue midline without atrophy or fasciculations. Motor  Normal muscle bulk throughout. Normal muscle tone. No abnormal involuntary movements. Strength is 5/5 throughout all four extremities. Sensory  Light touch is normal in upper and lower extremities. Temperature is normal in upper and lower extremities. Vibration abnormality: Proprioception is normal in upper and lower extremities. Sensation: Increased sensation to vibration in the left lower extremity compared to the right. Reflexes  Deep tendon reflexes are 2+ and symmetric in all four extremities. Right pathological reflexes: Azul's absent. Left pathological reflexes: Azul's absent. Coordination  Right: Finger-to-nose normal. Rapid alternating movement normal. Heel-to-shin normal.Left: Finger-to-nose normal. Rapid alternating movement normal. Heel-to-shin normal.    Gait  Casual gait is normal including stance, stride, and arm swing. Normal toe walking. Normal heel walking. Normal tandem gait. Romberg is absent. Able to rise from chair without using arms.         ROS:    Review of Systems   Constitutional: Negative for appetite change, fatigue and fever. HENT: Negative. Negative for hearing loss, tinnitus, trouble swallowing and voice change. Eyes: Negative. Negative for photophobia, pain and visual disturbance. Respiratory: Negative. Negative for shortness of breath. Cardiovascular: Negative. Negative for palpitations. Gastrointestinal: Negative. Negative for nausea and vomiting. Endocrine: Negative. Negative for cold intolerance. Genitourinary: Negative. Negative for dysuria, frequency and urgency. Musculoskeletal: Negative for back pain, gait problem, myalgias and neck pain. Skin: Negative. Negative for rash. Allergic/Immunologic: Negative. Neurological: Positive for tremors, weakness and numbness. Negative for dizziness, seizures, syncope, facial asymmetry, speech difficulty, light-headedness and headaches. Hematological: Negative. Does not bruise/bleed easily. Psychiatric/Behavioral: Negative. Negative for confusion, hallucinations and sleep disturbance. ROS reviewed and discussed with patient.

## 2023-08-11 NOTE — ASSESSMENT & PLAN NOTE
Denies neck compressive symptoms.  Repeat thyroid ultrasound recommended in September 2023 or 12 months from the last [place]

## 2023-08-12 NOTE — PROGRESS NOTES
[address] [facility] coding opportunities          Chart Reviewed number of suggestions sent to Provider: 2  I11.0  E11.36     Patients Insurance     Medicare Insurance: Medicare

## 2023-08-18 ENCOUNTER — OFFICE VISIT (OUTPATIENT)
Dept: INTERNAL MEDICINE CLINIC | Facility: CLINIC | Age: 63
End: 2023-08-18
Payer: COMMERCIAL

## 2023-08-18 VITALS
BODY MASS INDEX: 38.64 KG/M2 | OXYGEN SATURATION: 98 % | DIASTOLIC BLOOD PRESSURE: 72 MMHG | HEART RATE: 80 BPM | WEIGHT: 276 LBS | SYSTOLIC BLOOD PRESSURE: 118 MMHG | HEIGHT: 71 IN

## 2023-08-18 DIAGNOSIS — Z12.2 SCREENING FOR LUNG CANCER: ICD-10-CM

## 2023-08-18 DIAGNOSIS — K21.9 GASTROESOPHAGEAL REFLUX DISEASE WITHOUT ESOPHAGITIS: ICD-10-CM

## 2023-08-18 DIAGNOSIS — E11.65 TYPE 2 DIABETES MELLITUS WITH HYPERGLYCEMIA, UNSPECIFIED WHETHER LONG TERM INSULIN USE (HCC): Primary | ICD-10-CM

## 2023-08-18 DIAGNOSIS — I25.10 CORONARY ARTERY DISEASE INVOLVING NATIVE CORONARY ARTERY OF NATIVE HEART WITHOUT ANGINA PECTORIS: ICD-10-CM

## 2023-08-18 DIAGNOSIS — N40.0 BENIGN PROSTATIC HYPERPLASIA WITHOUT LOWER URINARY TRACT SYMPTOMS: ICD-10-CM

## 2023-08-18 DIAGNOSIS — I83.893 VARICOSE VEINS OF LEG WITH SWELLING, BILATERAL: ICD-10-CM

## 2023-08-18 DIAGNOSIS — I10 ESSENTIAL HYPERTENSION: ICD-10-CM

## 2023-08-18 DIAGNOSIS — E78.2 MIXED HYPERLIPIDEMIA: ICD-10-CM

## 2023-08-18 DIAGNOSIS — E11.40 NEUROPATHY DUE TO TYPE 2 DIABETES MELLITUS (HCC): ICD-10-CM

## 2023-08-18 DIAGNOSIS — E04.1 THYROID NODULE: ICD-10-CM

## 2023-08-18 PROBLEM — U07.1 COVID-19: Status: RESOLVED | Noted: 2022-10-24 | Resolved: 2023-08-18

## 2023-08-18 PROBLEM — R11.0 NAUSEA: Status: RESOLVED | Noted: 2023-06-02 | Resolved: 2023-08-18

## 2023-08-18 PROBLEM — R42 LIGHTHEADEDNESS: Status: RESOLVED | Noted: 2023-05-12 | Resolved: 2023-08-18

## 2023-08-18 PROCEDURE — 99214 OFFICE O/P EST MOD 30 MIN: CPT | Performed by: INTERNAL MEDICINE

## 2023-08-18 NOTE — PROGRESS NOTES
Name: Christina Schmitz      : 1960      MRN: [id number]  Encounter Provider: Dinesh Mart MD  Encounter Date: 2023   Encounter department: [address]     1. Type 2 diabetes mellitus with hyperglycemia, unspecified whether long term insulin use ([facility])  Assessment & Plan:  Patient seen by endocrinologist.  1 episode of hypoglycemia. Patient educated to drink 4 ounces of juice before any unusual activity. Hemoglobin A1c 7.6. Urine for microalbumin negative. Up-to-date with eye examination. No major symptoms related to neuropathy. Relevant medications Ozempic increased to 1 mg weekly. Remains on metformin 1000 mg twice a day. Will continue heart healthy diet Home blood sugar checkup follow-up laboratory in 3 months  Lab Results   Component Value Date    HGBA1C 7.6 (H) 2023         2. 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 2 diabetes mellitus ([facility])  Assessment & Plan:  Neuropathy symptoms suboptimal control. Remains on gabapentin 100 mg. Neuro neurologist PA taking him off gabapentin because he thinks there is not working they will be trying they may consider Cymbalta I will leave it up to them neuropathy remains symptoms symptomatic  Lab Results   Component Value Date    HGBA1C 7.6 (H) 2023         3. Essential hypertension  Assessment & Plan:  Hypertension well controlled. Remains on hydrochlorothiazide 25 mg daily, losartan 2050 mg daily, nitroglycerin as needed, torsemide 10 mg.     Recent CMP reviewed    Lab Results   Component Value Date     2017    SODIUM 139 2023    K 4.6 2023     2023    CO2 24 2023    AGAP 5 2017    BUN 15 2023    CREATININE 1.03 2023    GLUC 164 (H) 2023    CALCIUM 9.4 2017    AST 50 (H) 2023    ALT 77 (H) 2023    ALKPHOS 67 2017    TP 6.8 2023    TBILI 0.6 2023    EGFR 83 2023     Please check your cortisol level we have report of 9.2 if you are getting some different I would like you to bring the copy be certainly will look into that. Clinically you do not appear to have a adrenal insufficiency        4. Coronary artery disease involving native coronary artery of native heart without angina pectoris  Assessment & Plan:  No true exertional angina or symptoms of LV failure. Relevant medications includes atorvastatin 40 mg daily, aspirin 81 mg daily, hydrochlorothiazide 25 mg daily, losartan 50 mg daily, nitroglycerin as needed, and torsemide 10 mg every day. Since patient is taking torsemide 10 mg which she is started taking recently daily edema is better. Recommend to wear stockings. Lab Results   Component Value Date     11/30/2017    SODIUM 139 08/04/2023    K 4.6 08/04/2023     08/04/2023    CO2 24 08/04/2023    AGAP 5 11/25/2017    BUN 15 08/04/2023    CREATININE 1.03 08/04/2023    GLUC 164 (H) 08/04/2023    CALCIUM 9.4 11/30/2017    AST 50 (H) 08/04/2023    ALT 77 (H) 08/04/2023    ALKPHOS 67 11/23/2017    TP 6.8 08/04/2023    TBILI 0.6 08/04/2023    EGFR 83 08/04/2023           5. Gastroesophageal reflux disease without esophagitis  Assessment & Plan: On famotidine. GERD symptomatic at times. Recommend to watch diet and losing weight will help it. May take Tums as needed  No Heartburn; GERD related symptoms are well controlled with antiacid medicine. Diet:  Avoid Orange Juice/citrus Juice/Grapefruit juice ,Caffeine  including cola, coffee, tea     Avoid Tomato  sauce, ketch up , pizza sauce, marinara saucet as appropriate  Avoid mint, herbs, garlic, onions, spicy food,   No Alcohol of any type, No Smoking  Minimize/avoid stress    Continue antiacid medicine. 6. Varicose veins of leg with swelling, bilateral  Assessment & Plan:  Recommend to wear knee-high stockings.       7. Benign prostatic hyperplasia without lower urinary tract symptoms  Assessment & Plan:  Patient denies hesitancy. Nighttime frequency 0-1 time. Stream is reasonably stable. BPH is no major symptoms at this time we will continue to monitor    Lab Results   Component Value Date    PSA 0.4 06/09/2023    PSA 0.5 01/14/2022    PSA 0.4 11/06/2020           8. Mixed hyperlipidemia  Assessment & Plan:  Lab Results   Component Value Date    LDLCALC 56 08/04/2023     Lab Results   Component Value Date    CHOLESTEROL 129 08/04/2023    CHOLESTEROL 119 05/19/2023    CHOLESTEROL 165 01/13/2023     Lab Results   Component Value Date    HDL 34 (L) 08/04/2023    HDL 39 (L) 05/19/2023    HDL 35 (L) 01/13/2023     Lab Results   Component Value Date    TRIG 247 (H) 08/04/2023    TRIG 151 (H) 05/19/2023    TRIG 247 (H) 01/13/2023     No results found for: "3003 Smart Plate"  Lab Results   Component Value Date    ALT 77 (H) 08/04/2023    AST 50 (H) 08/04/2023    ALKPHOS 67 11/23/2017   Mild chronic elevated LFTs secondary to steatohepatitis. Triglyceride went up. Relevant medications includes atorvastatin 40 mg daily. Recommend to be more aggressive in the diet. Refer not to increase atorvastatin due to mild elevated LFT as well as consider not adding medicine for triglycerides such as Tricor fenofibric acid or fish oil tablets        9. Screening for lung cancer  Assessment & Plan:  I discussed with him that he is a candidate for lung cancer CT screening. The following Shared Decision-Making points were covered:  Benefits of screening were discussed, including the rates of reduction in death from lung cancer and other causes. Harms of screening were reviewed, including false positive tests, radiation exposure levels, risks of invasive procedures, risks of complications of screening, and risk of overdiagnosis. I counseled on the importance of adherence to annual lung cancer LDCT screening, impact of co-morbidities, and ability or willingness to undergo diagnosis and treatment.   I counseled on the importance of maintaining abstinence as a former smoker or was counseled on the importance of smoking cessation if a current smoker    Review of Eligibility Criteria: He meets all of the criteria for Lung Cancer Screening. He is 63 y.o. He has 20 pack year tobacco history and is a current smoker or has quit within the past 15 years  He presents no signs or symptoms of lung cancer    After discussion, the patient decided to elect lung cancer screening.       No nodules and/or definitely benign nodules.       Lung-RADS1, negative. Continued annual screening with LDCT in 12 months. We will go ahead and add follow-up CT scan on 11/4/2023. Orders:  -     CT lung screening program; Future; Expected date: 11/18/2023    10. Thyroid nodule  Assessment & Plan:  Last ultrasound done on 9/9/2022. Patient was seen by endocrinologist.  Luis Redd for follow-up thyroid ultrasound in September 2023 by endocrinologist.  No compressive symptoms or hypo or hyperthyroid symptoms    Lab Results   Component Value Date    [id number] 2.809 11/22/2017    TSH 1.420 06/09/2023                  Subjective      Is here for follow-up of chronic disease management. Recently seen by endocrinologist NP/PA, cardiologist, neurologist, their notes were reviewed. Generally speaking he seems to be doing fairly well. Cardiac status stable CAD symptom-free hypertension reasonable control. He thinks that at 1 time cortisol level was low reportedly I could not find that evidence. He will check his lipid database. Follow-up is being ordered. His blood pressure is well controlled. Requiring hypertension hypertensive medications. Electrolytes are reasonably stable. Edema of the legs recommend stockings. Neuropathy symptomatic they can try alternative medications being taken off the gabapentin gabapentin can give the edema of the legs. Hypertension well controlled. BMI remains 38 he will continue to watch diet.     Triglyceride went up LDL to the target HDL slightly low continue same regimen. Mildly elevated LFT as usual nothing any worse. BPH no BPH symptoms. GERD fair. Up-to-date with colonoscopy. PSA symptom-free. DJD reasonable. Polyarthralgia fair. Restless leg reasonable. Due for surveillance CT of the lung study    No visits with results within 2 Week(s) from this visit. Latest known visit with results is:  Telemedicine on 07/21/2023  Creatinine, Urine         Value: 40. 1(mg/dL)        Dt: 08/04/2023  Albumin,U,Random          Value: <3.0(ug/mL)        Dt: 08/04/2023  Microalb/Creat Ratio      Value: <7(mg/g creat)     Dt: 08/04/2023  Glucose, Random           Value: 164(mg/dL) (H)     Dt: 08/04/2023  BUN                       Value: 15(mg/dL)          Dt: 08/04/2023  Creatinine                Value: 1.03(mg/dL)        Dt: 08/04/2023  eGFR                      Value: 83(mL/min/1.73)    Dt: 08/04/2023  SL AMB BUN/CREATININE RA* Value: 15                 Dt: 08/04/2023  Sodium                    Value: 139(mmol/L)        Dt: 08/04/2023  Potassium                 Value: 4.6(mmol/L)        Dt: 08/04/2023  Chloride                  Value: 100(mmol/L)        Dt: 08/04/2023  CO2                       Value: 24(mmol/L)         Dt: 08/04/2023  CALCIUM                   Value: 9.4(mg/dL)         Dt: 08/04/2023  Protein, Total            Value: 6.8(g/dL)          Dt: 08/04/2023  Albumin                   Value: 4.2(g/dL)          Dt: 08/04/2023  Globulin, Total           Value: 2.6(g/dL)          Dt: 08/04/2023  Albumin/Globulin Ratio    Value: 1.6                Dt: 08/04/2023  TOTAL BILIRUBIN           Value: 0.6(mg/dL)         Dt: 08/04/2023  Alk Phos Isoenzymes       Value: 54(IU/L)           Dt: 08/04/2023  AST                       Value: 50(IU/L) (H)       Dt: 08/04/2023  ALT                       Value: 77(IU/L) (H)       Dt: 08/04/2023  Hemoglobin A1C            Value: 7.6(%) (H)         Dt: 08/04/2023  Estimated Average Glucose Value: 171(mg/dL)         Dt: 08/04/2023  Cholesterol, Total        Value: 129(mg/dL)         Dt: 08/04/2023  Triglycerides             Value: 247(mg/dL) (H)     Dt: 08/04/2023  HDL                       Value: 34(mg/dL) (L)      Dt: 08/04/2023  VLDL Cholesterol Calcula* Value: 39(mg/dL)          Dt: 08/04/2023  LDL Calculated            Value: 56(mg/dL)          Dt: 08/04/2023  LDl/HDL Ratio             Value: 1.6(ratio)         Dt: 08/04/2023  ------------ - 2 weeks          Review of Systems   Constitutional: Negative for chills, fatigue and fever. HENT: Negative for ear pain, sore throat and trouble swallowing. Eyes: Negative for pain and visual disturbance. Respiratory: Negative for cough and shortness of breath. Cardiovascular: Positive for leg swelling. Negative for chest pain and palpitations. Gastrointestinal: Negative for abdominal pain, constipation, diarrhea and vomiting. Genitourinary: Negative for difficulty urinating, dysuria, hematuria and testicular pain. Musculoskeletal: Positive for arthralgias. Negative for back pain and myalgias. Skin: Negative for color change and rash. Neurological: Negative for dizziness, seizures, syncope and headaches. Psychiatric/Behavioral: Negative for confusion and sleep disturbance. The patient is not nervous/anxious. All other systems reviewed and are negative. Past Medical History:   Diagnosis Date   • Anemia    • Bundle branch block, right    • CAD (coronary artery disease)    • Cataract    • CPAP (continuous positive airway pressure) dependence    • Diabetes mellitus (HCC)     borderline, controlled with diet and activity   • Diverticulitis    • GERD (gastroesophageal reflux disease)    • History of coronary artery stent placement- pCx and OM2 11/24/2017   • Hyperlipidemia    • Nasal septal deformity    • Neuropathy    • Obesity (BMI 30-39. 9)    • Sleep apnea     wears c-pap   • Vitamin D deficiency      Past Surgical History: Procedure Laterality Date   • COLON SIGMOID RESECTION N/A 2016    Procedure: RESECTION COLON SIGMOID;  Surgeon: Buzz Dukes MD;  Location:  MAIN OR;  Service:    • COLON SIGMOID RESECTION LAPAROSCOPIC N/A 2016    Procedure: RESECTION COLON SIGMOID LAPAROSCOPIC:CONVERTED TO [email or web address];  Surgeon: Buzz Dukes MD;  Location:  MAIN OR;  Service:    • COLON SURGERY      Sigmoidectomy   • COLONOSCOPY N/A 10/6/2016    Procedure: COLONOSCOPY;  Surgeon: Irineo Millan MD;  Location: [address] [place] [address] GI LAB; Service:    • CYSTOSCOPY W/ RETROGRADES Left 2/3/2017    Procedure: CYSTOSCOPY WITH BILATERAL RETROGRADES, LEFT STENT REMOVAL;  Surgeon: Shanique Rainey MD;  Location: [facility];  Service:    • ESOPHAGOGASTRODUODENOSCOPY N/A 10/6/2016    Procedure: ESOPHAGOGASTRODUODENOSCOPY (EGD); Surgeon: Irineo Millan MD;  Location: [address] [place] [address] GI LAB;   Service:    • HERNIA REPAIR     • KNEE ARTHROSCOPY W/ MENISCECTOMY     • AL CYSTO BLADDER W/URETERAL CATHETERIZATION Left 2016    Procedure: CYSTOSCOPY RETROGRADE PYELOGRAM WITH INSERTION STENT URETERAL;  Surgeon: Shanique Rainey MD;  Location: [facility];  Service: Urology   • US GUIDED THYROID BIOPSY  2021   • VARICOSE VEIN SURGERY       Family History   Problem Relation Age of Onset   • Osteoarthritis Mother    • Atrial fibrillation Mother    • Aortic aneurysm Father    • Diabetes Brother    • Colon cancer Brother      Social History     Socioeconomic History   • Marital status: Single     Spouse name: None   • Number of children: None   • Years of education: None   • Highest education level: None   Occupational History   • None   Tobacco Use   • Smoking status: Former     Packs/day: 1.00     Years: 37.00     Total pack years: 37.00     Types: Cigarettes     Quit date: 3/30/2018     Years since quittin.3     Passive exposure: Past   • Smokeless tobacco: Current     Types: Chew   • Tobacco comments:     chewing nicotine   Vaping Use   • Vaping Use: Never used   Substance and Sexual Activity   • Alcohol use: Yes     Alcohol/week: 3.0 standard drinks of alcohol     Types: 3 Cans of beer per week     Comment: occ   • Drug use: No   • Sexual activity: Never   Other Topics Concern   • None   Social History Narrative    Lives alone.      Social Determinants of Health     Financial Resource Strain: Not on file   Food Insecurity: Not on file   Transportation Needs: Not on file   Physical Activity: Not on file   Stress: Not on file   Social Connections: Not on file   Intimate Partner Violence: Not on file   Housing Stability: Not on file     Current Outpatient Medications on File Prior to Visit   Medication Sig   • albuterol (PROVENTIL HFA,VENTOLIN HFA) 90 mcg/act inhaler Inhale 2 puffs every 4 (four) hours as needed for wheezing or shortness of breath   • Alpha-Lipoic Acid 100 MG CAPS Take by mouth   • Ascorbic Acid (VITAMIN C) 100 MG tablet Take 500 mg by mouth    • aspirin 81 MG tablet Take 162 mg by mouth daily     • atorvastatin (LIPITOR) 40 mg tablet TAKE 1 TABLET BY MOUTH ONCE DAILY WITH SUPPER   • BIOTIN PO Take 5 mg by mouth daily   • Cholecalciferol (VITAMIN D3) 1000 units CAPS Take by mouth daily    • ciclopirox (LOPROX) 0.77 % cream    • ciclopirox (PENLAC) 8 % solution    • cyanocobalamin (VITAMIN B-12) 100 mcg tablet Take by mouth daily   • desloratadine (CLARINEX) 5 MG tablet TAKE 1 TABLET BY MOUTH AT BEDTIME   • famotidine (PEPCID) 20 mg tablet Take 20 mg by mouth daily   • FREESTYLE LITE test strip USE 1 STRIP TO CHECK GLUCOSE ONCE DAILY AS DIRECTED   • gabapentin (NEURONTIN) 100 mg capsule TAKE 1 CAPSULE BY MOUTH IN THE MORNING AND 3 AT BEDTIME   • glucosamine-chondroitin 500-400 MG tablet Take 1 tablet by mouth daily   • glucose blood (FREESTYLE LITE) test strip Use 1 each daily Use as instructed   • hydrochlorothiazide (HYDRODIURIL) 25 mg tablet Take 1 tablet (25 mg total) by mouth daily   • losartan (COZAAR) 50 mg tablet Take 1 tablet by mouth once daily   • Magnesium Hydroxide (MAGNESIA PO) Take by mouth 2 (two) times a day   • metFORMIN (GLUCOPHAGE) 1000 MG tablet TAKE 1 TABLET BY MOUTH TWICE DAILY WITH MEALS   • Multiple Vitamin (MULTIVITAMIN) capsule Take 1 capsule by mouth daily   • nitroglycerin (NITROSTAT) 0.3 mg SL tablet Place 1 tablet (0.3 mg total) under the tongue every 5 (five) minutes as needed for chest pain   • ONETOUCH DELICA LANCETS 04U MISC 1 Units by Does not apply route daily   • semaglutide, 1 mg/dose, (Ozempic, 1 MG/DOSE,) 4 mg/3 mL injection pen Inject 0.75 mL (1 mg total) under the skin every 7 days   • torsemide (DEMADEX) 10 mg tablet Take 1 tablet (10 mg total) by mouth daily     Allergies   Allergen Reactions   • Levaquin [Levofloxacin In D5w] Swelling     Knee swelling   • Sulfa Antibiotics GI Intolerance     Abdominal pain       Immunization History   Administered Date(s) Administered   • COVID-19 MODERNA VACC 0.25 ML IM BOOSTER 10/29/2021, 04/15/2022   • COVID-19 MODERNA VACC 0.5 ML IM 01/29/2021, 02/26/2021, 10/29/2021, 04/15/2022   • INFLUENZA 10/21/2022   • Influenza Quadrivalent Preservative Free 3 years and older IM 10/08/2021   • Influenza, injectable, quadrivalent, preservative free 0.5 mL 11/01/2019, 09/25/2020   • Influenza, recombinant, quadrivalent,injectable, preservative free 10/21/2022   • Pneumococcal Polysaccharide PPV23 12/06/2016   • Rabies-IM Human Diploid Cell Culture 08/06/2021, 08/10/2021   • Tdap 08/07/2016       Objective     /72   Pulse 80   Ht 5' 11" (1.803 m)   Wt 125 kg (276 lb)   SpO2 98%   BMI 38.49 kg/m²     Physical Exam  Vitals and nursing note reviewed. Constitutional:       Appearance: Normal appearance. He is obese. He is not ill-appearing. HENT:      Head: Normocephalic. Right Ear: External ear normal. There is no impacted cerumen. Left Ear: External ear normal. There is no impacted cerumen. Nose: Nose normal. No congestion or rhinorrhea.       Mouth/Throat: Pharynx: Oropharynx is clear. No posterior oropharyngeal erythema. Eyes:      General: No scleral icterus. Right eye: No discharge. Left eye: No discharge. Conjunctiva/sclera: Conjunctivae normal.   Cardiovascular:      Rate and Rhythm: Normal rate. Pulmonary:      Effort: Pulmonary effort is normal. No respiratory distress. Abdominal:      General: Bowel sounds are normal. There is no distension. Palpations: There is no mass. Tenderness: There is no abdominal tenderness. Hernia: No hernia is present. Musculoskeletal:         General: Normal range of motion. Right lower leg: No edema. Left lower leg: No edema. Skin:     Coloration: Skin is not jaundiced or pale. Findings: No bruising or rash. Neurological:      General: No focal deficit present. Mental Status: He is alert and oriented to person, place, and time. Sensory: No sensory deficit. Motor: No weakness. Gait: Gait normal.   Psychiatric:         Mood and Affect: Mood normal.         Behavior: Behavior normal.         Thought Content:  Thought content normal.         Judgment: Judgment normal.       Leann Galeas MD

## 2023-08-18 NOTE — ASSESSMENT & PLAN NOTE
Neuropathy symptoms suboptimal control. Remains on gabapentin 100 mg.   Neuro neurologist PA taking him off gabapentin because he thinks there is not working they will be trying they may consider Cymbalta I will leave it up to them neuropathy remains symptoms symptomatic  Lab Results   Component Value Date    HGBA1C 7.6 (H) 08/04/2023

## 2023-08-18 NOTE — ASSESSMENT & PLAN NOTE
Patient seen by endocrinologist.  1 episode of hypoglycemia. Patient educated to drink 4 ounces of juice before any unusual activity. Hemoglobin A1c 7.6. Urine for microalbumin negative. Up-to-date with eye examination. No major symptoms related to neuropathy. Relevant medications Ozempic increased to 1 mg weekly. Remains on metformin 1000 mg twice a day.   Will continue heart healthy diet Home blood sugar checkup follow-up laboratory in 3 months  Lab Results   Component Value Date    HGBA1C 7.6 (H) 08/04/2023

## 2023-08-18 NOTE — ASSESSMENT & PLAN NOTE
No true exertional angina or symptoms of LV failure. Relevant medications includes atorvastatin 40 mg daily, aspirin 81 mg daily, hydrochlorothiazide 25 mg daily, losartan 50 mg daily, nitroglycerin as needed, and torsemide 10 mg every day. Since patient is taking torsemide 10 mg which she is started taking recently daily edema is better. Recommend to wear stockings.     Lab Results   Component Value Date     11/30/2017    SODIUM 139 08/04/2023    K 4.6 08/04/2023     08/04/2023    CO2 24 08/04/2023    AGAP 5 11/25/2017    BUN 15 08/04/2023    CREATININE 1.03 08/04/2023    GLUC 164 (H) 08/04/2023    CALCIUM 9.4 11/30/2017    AST 50 (H) 08/04/2023    ALT 77 (H) 08/04/2023    ALKPHOS 67 11/23/2017    TP 6.8 08/04/2023    TBILI 0.6 08/04/2023    EGFR 83 08/04/2023

## 2023-08-18 NOTE — ASSESSMENT & PLAN NOTE
Last ultrasound done on 9/9/2022.   Patient was seen by endocrinologist.  Austin Burns for follow-up thyroid ultrasound in September 2023 by endocrinologist.  No compressive symptoms or hypo or hyperthyroid symptoms    Lab Results   Component Value Date    [id number] 2.809 11/22/2017    TSH 1.420 06/09/2023

## 2023-08-18 NOTE — ASSESSMENT & PLAN NOTE
I discussed with him that he is a candidate for lung cancer CT screening. The following Shared Decision-Making points were covered:  1. Benefits of screening were discussed, including the rates of reduction in death from lung cancer and other causes. Harms of screening were reviewed, including false positive tests, radiation exposure levels, risks of invasive procedures, risks of complications of screening, and risk of overdiagnosis. 2. I counseled on the importance of adherence to annual lung cancer LDCT screening, impact of co-morbidities, and ability or willingness to undergo diagnosis and treatment. 3. I counseled on the importance of maintaining abstinence as a former smoker or was counseled on the importance of smoking cessation if a current smoker    Review of Eligibility Criteria: He meets all of the criteria for Lung Cancer Screening. · He is 63 y.o.   · He has 20 pack year tobacco history and is a current smoker or has quit within the past 15 years  · He presents no signs or symptoms of lung cancer    After discussion, the patient decided to elect lung cancer screening.       No nodules and/or definitely benign nodules.       Lung-RADS1, negative. Continued annual screening with LDCT in 12 months. We will go ahead and add follow-up CT scan on 11/4/2023.

## 2023-08-18 NOTE — ASSESSMENT & PLAN NOTE
Hypertension well controlled. Remains on hydrochlorothiazide 25 mg daily, losartan 2050 mg daily, nitroglycerin as needed, torsemide 10 mg. Recent CMP reviewed    Lab Results   Component Value Date     11/30/2017    SODIUM 139 08/04/2023    K 4.6 08/04/2023     08/04/2023    CO2 24 08/04/2023    AGAP 5 11/25/2017    BUN 15 08/04/2023    CREATININE 1.03 08/04/2023    GLUC 164 (H) 08/04/2023    CALCIUM 9.4 11/30/2017    AST 50 (H) 08/04/2023    ALT 77 (H) 08/04/2023    ALKPHOS 67 11/23/2017    TP 6.8 08/04/2023    TBILI 0.6 08/04/2023    EGFR 83 08/04/2023     Please check your cortisol level we have report of 9.2 if you are getting some different I would like you to bring the copy be certainly will look into that.   Clinically you do not appear to have a adrenal insufficiency

## 2023-08-18 NOTE — ASSESSMENT & PLAN NOTE
Patient denies hesitancy. Nighttime frequency 0-1 time. Stream is reasonably stable.   BPH is no major symptoms at this time we will continue to monitor    Lab Results   Component Value Date    PSA 0.4 06/09/2023    PSA 0.5 01/14/2022    PSA 0.4 11/06/2020

## 2023-08-18 NOTE — ASSESSMENT & PLAN NOTE
On famotidine. GERD symptomatic at times. Recommend to watch diet and losing weight will help it. May take Tums as needed  No Heartburn; GERD related symptoms are well controlled with antiacid medicine. Diet:  Avoid Orange Juice/citrus Juice/Grapefruit juice ,Caffeine  including cola, coffee, tea     Avoid Tomato  sauce, ketch up , pizza sauce, marinara saucet as appropriate  Avoid mint, herbs, garlic, onions, spicy food,   No Alcohol of any type, No Smoking  Minimize/avoid stress    Continue antiacid medicine.

## 2023-08-18 NOTE — ASSESSMENT & PLAN NOTE
Lab Results   Component Value Date    LDLCALC 56 08/04/2023     Lab Results   Component Value Date    CHOLESTEROL 129 08/04/2023    CHOLESTEROL 119 05/19/2023    CHOLESTEROL 165 01/13/2023     Lab Results   Component Value Date    HDL 34 (L) 08/04/2023    HDL 39 (L) 05/19/2023    HDL 35 (L) 01/13/2023     Lab Results   Component Value Date    TRIG 247 (H) 08/04/2023    TRIG 151 (H) 05/19/2023    TRIG 247 (H) 01/13/2023     No results found for: "[employer]"  Lab Results   Component Value Date    ALT 77 (H) 08/04/2023    AST 50 (H) 08/04/2023    ALKPHOS 67 11/23/2017   Mild chronic elevated LFTs secondary to steatohepatitis. Triglyceride went up. Relevant medications includes atorvastatin 40 mg daily. Recommend to be more aggressive in the diet.   Refer not to increase atorvastatin due to mild elevated LFT as well as consider not adding medicine for triglycerides such as Tricor fenofibric acid or fish oil tablets

## 2023-08-25 ENCOUNTER — ESTABLISHED COMPREHENSIVE EXAM (OUTPATIENT)
Dept: URBAN - METROPOLITAN AREA CLINIC 6 | Facility: CLINIC | Age: 63
End: 2023-08-25

## 2023-08-25 DIAGNOSIS — H25.813: ICD-10-CM

## 2023-08-25 DIAGNOSIS — E11.9: ICD-10-CM

## 2023-08-25 PROCEDURE — 92014 COMPRE OPH EXAM EST PT 1/>: CPT

## 2023-08-25 ASSESSMENT — VISUAL ACUITY
OS_CC: 20/25
OD_CC: 20/25
OU_CC: 20/25
OU_CC: J1+

## 2023-08-25 ASSESSMENT — TONOMETRY
OD_IOP_MMHG: 14
OS_IOP_MMHG: 16

## 2023-09-08 ENCOUNTER — OFFICE VISIT (OUTPATIENT)
Dept: PULMONOLOGY | Facility: MEDICAL CENTER | Age: 63
End: 2023-09-08
Payer: COMMERCIAL

## 2023-09-08 VITALS
RESPIRATION RATE: 12 BRPM | SYSTOLIC BLOOD PRESSURE: 102 MMHG | OXYGEN SATURATION: 98 % | WEIGHT: 276 LBS | HEIGHT: 71 IN | HEART RATE: 62 BPM | BODY MASS INDEX: 38.64 KG/M2 | DIASTOLIC BLOOD PRESSURE: 64 MMHG

## 2023-09-08 DIAGNOSIS — R06.02 SOB (SHORTNESS OF BREATH): ICD-10-CM

## 2023-09-08 DIAGNOSIS — E66.09 CLASS 2 OBESITY DUE TO EXCESS CALORIES WITHOUT SERIOUS COMORBIDITY WITH BODY MASS INDEX (BMI) OF 39.0 TO 39.9 IN ADULT: ICD-10-CM

## 2023-09-08 DIAGNOSIS — G47.33 OBSTRUCTIVE SLEEP APNEA: Primary | ICD-10-CM

## 2023-09-08 PROCEDURE — 99214 OFFICE O/P EST MOD 30 MIN: CPT | Performed by: STUDENT IN AN ORGANIZED HEALTH CARE EDUCATION/TRAINING PROGRAM

## 2023-09-08 NOTE — PROGRESS NOTES
Consultation - Pulmonary Medicine   Maryann Yung 63 y.o. male MRN: [id number]      Reason for Consult: BING    Maryann Yung is a 63 y.o. male  with a PMH of BING, DM, CAD(Stent), HTN,  who presents for follow up. BING  - AHI 1.2, Median Pressure 9.2 cm H2O -   - Compliance 100% > 4hrs per night  - Continue AutoPAP    Obesity -  Discussions on weight loss techniques, TDEE, calorie counting and exercise. Tobacco Use - Quit 2018 - ~30pack year  - Annual CT 11/23    Low Testosterone - Labile mood, weight loss issue - Patient has evidence of low T on multiple tests would benefit from a trial of testosterone replacement   - Referral to endocrinology     Immunization History   Administered Date(s) Administered   • COVID-19 MODERNA VACC 0.25 ML IM BOOSTER 10/29/2021, 04/15/2022   • COVID-19 MODERNA VACC 0.5 ML IM 01/29/2021, 02/26/2021, 10/29/2021, 04/15/2022   • INFLUENZA 10/21/2022   • Influenza Quadrivalent Preservative Free 3 years and older IM 10/08/2021   • Influenza, injectable, quadrivalent, preservative free 0.5 mL 11/01/2019, 09/25/2020   • Influenza, recombinant, quadrivalent,injectable, preservative free 10/21/2022   • Pneumococcal Polysaccharide PPV23 12/06/2016   • Rabies-IM Human Diploid Cell Culture 08/06/2021, 08/10/2021   • Tdap 08/07/2016        I have spent a total time of 20-50 minutes on 09/08/23 in caring for this patient including Diagnostic results, Prognosis, Risks and benefits of tx options, Instructions for management, Patient and family education, Importance of tx compliance, Risk factor reductions, Impressions, Counseling / Coordination of care, Documenting in the medical record, Reviewing / ordering tests, medicine, procedures   and Obtaining or reviewing history  . ______________________________________________________________________    HPI:    Maryann Yung is a 63 y.o. male with a PMH of BING, DM, CAD(Stent), HTN,  who presents for follow up.     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     Current Medications:    Current Outpatient Medications:   •  albuterol (PROVENTIL HFA,VENTOLIN HFA) 90 mcg/act inhaler, Inhale 2 puffs every 4 (four) hours as needed for wheezing or shortness of breath, Disp: 18 g, Rfl: 6  •  Alpha-Lipoic Acid 100 MG CAPS, Take by mouth, Disp: , Rfl:   •  Ascorbic Acid (VITAMIN C) 100 MG tablet, Take 500 mg by mouth , Disp: , Rfl:   •  aspirin 81 MG tablet, Take 162 mg by mouth daily  , Disp: , Rfl:   •  atorvastatin (LIPITOR) 40 mg tablet, TAKE 1 TABLET BY MOUTH ONCE DAILY WITH SUPPER, Disp: 90 tablet, Rfl: 0  •  BIOTIN PO, Take 5 mg by mouth daily, Disp: , Rfl:   •  ciclopirox (LOPROX) 0.77 % cream, , Disp: , Rfl:   •  ciclopirox (PENLAC) 8 % solution, , Disp: , Rfl:   •  desloratadine (CLARINEX) 5 MG tablet, TAKE 1 TABLET BY MOUTH AT BEDTIME, Disp: 90 tablet, Rfl: 0  •  famotidine (PEPCID) 20 mg tablet, Take 20 mg by mouth daily, Disp: , Rfl:   •  FREESTYLE LITE test strip, USE 1 STRIP TO CHECK GLUCOSE ONCE DAILY AS DIRECTED, Disp: 100 each, Rfl: 0  •  gabapentin (NEURONTIN) 100 mg capsule, TAKE 1 CAPSULE BY MOUTH IN THE MORNING AND 3 AT BEDTIME, Disp: 360 capsule, Rfl: 1  •  glucosamine-chondroitin 500-400 MG tablet, Take 1 tablet by mouth daily, Disp: , Rfl:   •  glucose blood (FREESTYLE LITE) test strip, Use 1 each daily Use as instructed, Disp: 100 each, Rfl: 0  •  hydrochlorothiazide (HYDRODIURIL) 25 mg tablet, Take 1 tablet (25 mg total) by mouth daily, Disp: 90 tablet, Rfl: 1  •  losartan (COZAAR) 50 mg tablet, Take 1 tablet by mouth once daily, Disp: 90 tablet, Rfl: 0  •  Magnesium Hydroxide (MAGNESIA PO), Take by mouth 2 (two) times a day, Disp: , Rfl:   •  metFORMIN (GLUCOPHAGE) 1000 MG tablet, TAKE 1 TABLET BY MOUTH TWICE DAILY WITH MEALS, Disp: 180 tablet, Rfl: 0  •  Multiple Vitamin (MULTIVITAMIN) capsule, Take 1 capsule by mouth daily, Disp: , Rfl:   •  nitroglycerin (NITROSTAT) 0.3 mg SL tablet, Place 1 tablet (0.3 mg total) under the tongue every 5 (five) minutes as needed for chest pain, Disp: 25 tablet, Rfl: 1  •  ONETOUCH DELICA LANCETS 65I MISC, 1 Units by Does not apply route daily, Disp: 100 each, Rfl: 6  •  semaglutide, 1 mg/dose, (Ozempic, 1 MG/DOSE,) 4 mg/3 mL injection pen, Inject 0.75 mL (1 mg total) under the skin every 7 days, Disp: 3 mL, Rfl: 2  •  torsemide (DEMADEX) 10 mg tablet, Take 1 tablet (10 mg total) by mouth daily, Disp: 30 tablet, Rfl: 1  •  Cholecalciferol (VITAMIN D3) 1000 units CAPS, Take by mouth daily , Disp: , Rfl:   •  cyanocobalamin (VITAMIN B-12) 100 mcg tablet, Take by mouth daily, Disp: , Rfl:     Current Facility-Administered Medications:   •  cyanocobalamin injection 1,000 mcg, 1,000 mcg, Intramuscular, Q30 Days, [address], CRNP, 1,000 mcg at 09/10/21 1141    Historical Information   Past Medical History:   Diagnosis Date   • Anemia    • Bundle branch block, right    • CAD (coronary artery disease)    • Cataract    • CPAP (continuous positive airway pressure) dependence    • Diabetes mellitus (HCC)     borderline, controlled with diet and activity   • Diverticulitis    • GERD (gastroesophageal reflux disease)    • History of coronary artery stent placement- pCx and OM2 11/24/2017   • Hyperlipidemia    • Nasal septal deformity    • Neuropathy    • Obesity (BMI 30-39. 9)    • Sleep apnea     wears c-pap   • Vitamin D deficiency      Past Surgical History:   Procedure Laterality Date   • COLON SIGMOID RESECTION N/A 12/16/2016    Procedure: RESECTION COLON SIGMOID;  Surgeon: Da Guidry MD;  Location: [facility] OR;  Service:    • COLON SIGMOID RESECTION LAPAROSCOPIC N/A 2016    Procedure: RESECTION COLON SIGMOID LAPAROSCOPIC:CONVERTED TO [place];  Surgeon: Tejas Young MD;  Location:  MAIN OR;  Service:    • COLON SURGERY      Sigmoidectomy   • COLONOSCOPY N/A 10/6/2016    Procedure: COLONOSCOPY;  Surgeon: Carlos Enrique Correa MD;  Location: [address] [place] [address] GI LAB; Service:    • CYSTOSCOPY W/ RETROGRADES Left 2/3/2017    Procedure: CYSTOSCOPY WITH BILATERAL RETROGRADES, LEFT STENT REMOVAL;  Surgeon: Savana Wooten MD;  Location: [facility] ([facility]3];  Service:    • ESOPHAGOGASTRODUODENOSCOPY N/A 10/6/2016    Procedure: ESOPHAGOGASTRODUODENOSCOPY (EGD); Surgeon: Carlos Enrique Correa MD;  Location: [address] [place] [address] GI LAB;   Service:    • HERNIA REPAIR     • KNEE ARTHROSCOPY W/ MENISCECTOMY     • NV CYSTO BLADDER W/URETERAL CATHETERIZATION Left 2016    Procedure: CYSTOSCOPY RETROGRADE PYELOGRAM WITH INSERTION STENT URETERAL;  Surgeon: Savana Wooten MD;  Location: [facility] ([facility]3];  Service: Urology   • US GUIDED THYROID BIOPSY  2021   • VARICOSE VEIN SURGERY       Social History   Social History     Tobacco Use   Smoking Status Former   • Packs/day: 1.00   • Years: 37.00   • Total pack years: 37.00   • Types: Cigarettes   • Quit date: 3/30/2018   • Years since quittin.4   • Passive exposure: Past   Smokeless Tobacco Current   • Types: Chew   Tobacco Comments    chewing nicotine       Family History:   Family History   Problem Relation Age of Onset   • Osteoarthritis Mother    • Atrial fibrillation Mother    • Aortic aneurysm Father    • Diabetes Brother    • Colon cancer Brother          PhysicalExamination:  Vitals:   /64 (BP Location: Left arm, Patient Position: Sitting, Cuff Size: Extra-Large)   Pulse 62   Resp 12   Ht 5' 11" (1.803 m)   Wt 125 kg (276 lb)   SpO2 98%   BMI 38.49 kg/m²     Appearance -- NAD, speaking full sentences  HEENT -- anicteric sclera, clear OP, MMM  Neck -- no JVD  Heart -- RRR, no murmurs  Lungs -- CTAB  Abdomen -- soft, NTND, +bs  Extremities -- WWP, no LE edema  Skin -- no rash  Neuro -- A&Ox3, wnl  Psych -- no obvious depression or hallucination        Diagnostic Data:  Labs: I personally reviewed the most recent laboratory data pertinent to today's visit    Lab Results   Component Value Date    WBC 7.1 06/09/2023    HGB 14.8 06/09/2023    HCT 42.6 06/09/2023    MCV 98 (H) 06/09/2023     06/09/2023     Lab Results   Component Value Date    GLUCOSE 102 (H) 11/30/2017    CALCIUM 9.4 11/30/2017     11/30/2017    K 4.6 08/04/2023    CO2 24 08/04/2023     08/04/2023    BUN 15 08/04/2023    CREATININE 1.03 08/04/2023     No results found for: "IGE"  Lab Results   Component Value Date    ALT 77 (H) 08/04/2023    AST 50 (H) 08/04/2023    ALKPHOS 67 11/23/2017       PFT results: The most recent pulmonary function tests were reviewed. 2/21 FEV1 3.9 84% FVC 5.05L 89% TLC 89% DLCO 77%    Imaging:  I personally reviewed the images on the [facility] system pertinent to today's visit  CT Chest  No nodules and/or definitely benign nodules. The patient is a candidate for lung cancer CT screening and we discussed the following. The following Shared Decision-Making points were covered:  1. Benefits of screening were discussed, including the rates of reduction in death from lung cancer and other causes. Harms of screening were reviewed, including false positive tests, radiation exposure levels, risks of invasive procedures, risks of complications of screening, and risk of overdiagnosis. 2. I counseled on the importance of adherence to annual lung cancer LDCT screening, impact of co-morbidities, and ability or willingness to undergo diagnosis and treatment. 3. I counseled on the importance of maintaining abstinence as a former smoker or was counseled on the importance of smoking cessation if a current smoker    Review of Eligibility Criteria: He meets all of the criteria for Lung Cancer Screening. · He is 63 y.o. [place]ilotomás Kilo · He has at least a 20 pack year tobacco history and is a current smoker or has quit within the past 15 years  · He presents no signs or symptoms of lung cancer    After discussion, the patient decided to elect lung cancer screening.         Zarina Velazquez MD  [facility]

## 2023-09-13 DIAGNOSIS — J30.1 SEASONAL ALLERGIC RHINITIS DUE TO POLLEN: ICD-10-CM

## 2023-09-13 DIAGNOSIS — E11.9 TYPE 2 DIABETES MELLITUS WITHOUT COMPLICATION, WITHOUT LONG-TERM CURRENT USE OF INSULIN (HCC): ICD-10-CM

## 2023-09-14 RX ORDER — BLOOD-GLUCOSE METER
KIT MISCELLANEOUS
Qty: 100 EACH | Refills: 0 | Status: SHIPPED | OUTPATIENT
Start: 2023-09-14

## 2023-09-14 RX ORDER — DESLORATADINE 5 MG/1
TABLET ORAL
Qty: 90 TABLET | Refills: 0 | Status: SHIPPED | OUTPATIENT
Start: 2023-09-14

## 2023-09-29 ENCOUNTER — TELEPHONE (OUTPATIENT)
Age: 63
End: 2023-09-29

## 2023-09-29 DIAGNOSIS — G25.81 RESTLESS LEG SYNDROME: Primary | ICD-10-CM

## 2023-09-29 RX ORDER — ROPINIROLE 0.25 MG/1
TABLET, FILM COATED ORAL
Qty: 30 TABLET | Refills: 3 | Status: SHIPPED | OUTPATIENT
Start: 2023-09-29

## 2023-09-29 NOTE — TELEPHONE ENCOUNTER
Spoke with patient, patient is requesting to have his lab scripts sent to his home. Patient would like to have his labs done prior to appt. Please advise.

## 2023-09-29 NOTE — PROGRESS NOTES
Pt contacted Neurology office indicating that being off the Gabapentin did not really seem to make a difference. Although he continues with some neuropathy discomfort he continues to have more issues with the creepy crawling sensations. We had previously discussed starting on ropinirole but was deferred to optimize the Gabapentin. Pt did not like the SE and has weaned off. Will start on ropinirole 0.125 mg nightly at bedtime, x 2 weeks, if tolerating can increase to 0.25mg at hs. Pt is a  and does surgical procedures so opts for no medications that will alter him during the day so this may be best option, may need additional dosing after dinner time as well to help reduce his uncomfortable sensations.

## 2023-11-04 LAB
ALBUMIN SERPL-MCNC: 4.5 G/DL (ref 3.8–4.9)
ALBUMIN/GLOB SERPL: 1.5 {RATIO} (ref 1.2–2.2)
ALP SERPL-CCNC: 54 IU/L (ref 44–121)
ALT SERPL-CCNC: 66 IU/L (ref 0–44)
AST SERPL-CCNC: 40 IU/L (ref 0–40)
BILIRUB SERPL-MCNC: 0.4 MG/DL (ref 0–1.2)
BUN SERPL-MCNC: 16 MG/DL (ref 8–27)
BUN/CREAT SERPL: 14 (ref 10–24)
CALCIUM SERPL-MCNC: 9.7 MG/DL (ref 8.6–10.2)
CHLORIDE SERPL-SCNC: 103 MMOL/L (ref 96–106)
CHOLEST SERPL-MCNC: 125 MG/DL (ref 100–199)
CHOLEST/HDLC SERPL: 3 RATIO (ref 0–5)
CO2 SERPL-SCNC: 23 MMOL/L (ref 20–29)
CORTIS AM PEAK SERPL-MCNC: 10.1 UG/DL (ref 6.2–19.4)
CREAT SERPL-MCNC: 1.11 MG/DL (ref 0.76–1.27)
EGFR: 76 ML/MIN/1.73
EST. AVERAGE GLUCOSE BLD GHB EST-MCNC: 171 MG/DL
GLOBULIN SER-MCNC: 3 G/DL (ref 1.5–4.5)
GLUCOSE SERPL-MCNC: 187 MG/DL (ref 70–99)
HBA1C MFR BLD: 7.6 % (ref 4.8–5.6)
HDLC SERPL-MCNC: 42 MG/DL
LDLC SERPL CALC-MCNC: 45 MG/DL (ref 0–99)
POTASSIUM SERPL-SCNC: 4.5 MMOL/L (ref 3.5–5.2)
PROT SERPL-MCNC: 7.5 G/DL (ref 6–8.5)
SL AMB VLDL CHOLESTEROL CALC: 38 MG/DL (ref 5–40)
SODIUM SERPL-SCNC: 142 MMOL/L (ref 134–144)
TRIGL SERPL-MCNC: 241 MG/DL (ref 0–149)

## 2023-11-08 DIAGNOSIS — E11.65 TYPE 2 DIABETES MELLITUS WITH HYPERGLYCEMIA, UNSPECIFIED WHETHER LONG TERM INSULIN USE (HCC): ICD-10-CM

## 2023-11-09 RX ORDER — SEMAGLUTIDE 1.34 MG/ML
INJECTION, SOLUTION SUBCUTANEOUS
Qty: 3 ML | Refills: 0 | Status: SHIPPED | OUTPATIENT
Start: 2023-11-09

## 2023-11-10 ENCOUNTER — OFFICE VISIT (OUTPATIENT)
Dept: ENDOCRINOLOGY | Facility: CLINIC | Age: 63
End: 2023-11-10
Payer: COMMERCIAL

## 2023-11-10 ENCOUNTER — RA CDI HCC (OUTPATIENT)
Dept: OTHER | Facility: HOSPITAL | Age: 63
End: 2023-11-10

## 2023-11-10 ENCOUNTER — TELEPHONE (OUTPATIENT)
Dept: ENDOCRINOLOGY | Facility: CLINIC | Age: 63
End: 2023-11-10

## 2023-11-10 VITALS
DIASTOLIC BLOOD PRESSURE: 72 MMHG | WEIGHT: 277 LBS | OXYGEN SATURATION: 98 % | HEART RATE: 66 BPM | BODY MASS INDEX: 38.78 KG/M2 | HEIGHT: 71 IN | SYSTOLIC BLOOD PRESSURE: 118 MMHG

## 2023-11-10 DIAGNOSIS — E04.1 THYROID NODULE: ICD-10-CM

## 2023-11-10 DIAGNOSIS — E11.65 TYPE 2 DIABETES MELLITUS WITH HYPERGLYCEMIA, UNSPECIFIED WHETHER LONG TERM INSULIN USE (HCC): Primary | ICD-10-CM

## 2023-11-10 DIAGNOSIS — R79.89 LOW TESTOSTERONE: ICD-10-CM

## 2023-11-10 DIAGNOSIS — E78.2 MIXED HYPERLIPIDEMIA: ICD-10-CM

## 2023-11-10 DIAGNOSIS — E55.9 VITAMIN D DEFICIENCY: ICD-10-CM

## 2023-11-10 DIAGNOSIS — R79.89 LOW TESTOSTERONE IN MALE: ICD-10-CM

## 2023-11-10 DIAGNOSIS — I10 ESSENTIAL HYPERTENSION: ICD-10-CM

## 2023-11-10 DIAGNOSIS — G47.33 OBSTRUCTIVE SLEEP APNEA: ICD-10-CM

## 2023-11-10 DIAGNOSIS — E11.65 TYPE 2 DIABETES MELLITUS WITH HYPERGLYCEMIA, WITHOUT LONG-TERM CURRENT USE OF INSULIN (HCC): ICD-10-CM

## 2023-11-10 DIAGNOSIS — E11.40 NEUROPATHY DUE TO TYPE 2 DIABETES MELLITUS (HCC): ICD-10-CM

## 2023-11-10 PROCEDURE — 99214 OFFICE O/P EST MOD 30 MIN: CPT | Performed by: NURSE PRACTITIONER

## 2023-11-10 RX ORDER — BLOOD-GLUCOSE SENSOR
EACH MISCELLANEOUS
Qty: 2 EACH | Refills: 2 | Status: SHIPPED | OUTPATIENT
Start: 2023-11-10

## 2023-11-10 NOTE — ASSESSMENT & PLAN NOTE
Lab Results   Component Value Date    HGBA1C 7.6 (H) 11/03/2023     HGA1C above goal however patient does not feel this is reflective of his blood sugars. Recommended CGM trial with Ameriprime 3 device as patient is interested in personal device. After patient has worn for 14 days patient will send in report for review and will adjust Ozempic dose as needed. BGL Reviewed: [place] sample provided    Treatment regimen:   Continue metformin 1000 mg twice daily continue Ozempic 1 mg subcutaneous weekly for now. Discussed risks/complications associated with uncontrolled diabetes including organ involvement, heart attack, stroke, death. Recommended referral to diabetes education. Advised lifestyle modifications including attention to diet including the amount and types of carbohydrates consumed and regular activity. Call for blood sugars less than 70 mg/dl or patterns over 250  mg/dl. Monitor blood glucose levels at least 2 times a day    Discussed use of CGM to collect additional blood glucose data to reveal patterns that can be used to improve glucose levels and adjust insulin regimen. Discussed symptoms and treatment of hypoglycemia. Reviewed risks associated with hypoglycemia. Always carry rapid acting carbohydrates and a glucometer (a way to check your blood sugar). Routine follow up for diabetic eye and foot exams. Ordered blood work to complete prior to next visit. Send glucose logs/CGM download in 1-2 weeks for review    Follow up in 3 months.

## 2023-11-10 NOTE — PROGRESS NOTES
Established Patient Progress Note      CC: Type 2 Diabetes Mellitus        Impression & Plan:    Problem List Items Addressed This Visit          Endocrine    Diabetes mellitus ([address])       Lab Results   Component Value Date    HGBA1C 7.6 (H) 11/03/2023   HGA1C above goal however patient does not feel this is reflective of his blood sugars. Recommended CGM trial with lino 3 device as patient is interested in personal device. After patient has worn for 14 days patient will send in report for review and will adjust Ozempic dose as needed. BGL Reviewed: Adriana howard provided    Treatment regimen:   Continue metformin 1000 mg twice daily continue Ozempic 1 mg subcutaneous weekly for now. Discussed risks/complications associated with uncontrolled diabetes including organ involvement, heart attack, stroke, death. Recommended referral to diabetes education. Advised lifestyle modifications including attention to diet including the amount and types of carbohydrates consumed and regular activity. Call for blood sugars less than 70 mg/dl or patterns over 250  mg/dl. Monitor blood glucose levels at least 2 times a day    Discussed use of CGM to collect additional blood glucose data to reveal patterns that can be used to improve glucose levels and adjust insulin regimen. Discussed symptoms and treatment of hypoglycemia. Reviewed risks associated with hypoglycemia. Always carry rapid acting carbohydrates and a glucometer (a way to check your blood sugar). Routine follow up for diabetic eye and foot exams. Ordered blood work to complete prior to next visit. Send glucose logs/CGM download in 1-2 weeks for review    Follow up in 3 months.             Relevant Orders    Hemoglobin A1C    Comprehensive metabolic panel    Lipid panel- Lab Collect    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 2 diabetes mellitus [facility])       Lab Results   Component Value Date    HGBA1C 7.6 (H) 11/03/2023   Follows regularly with podiatry. Thyroid nodule     Has thyroid ultrasound scheduled. Denies any neck compressive symptoms. Type 2 diabetes mellitus with hyperglycemia (HCC) - Primary    Relevant Medications    Continuous Blood Gluc Sensor (FreeStyle Alexei 3 Sensor) MISC    Other Relevant Orders    Hemoglobin A1C    Comprehensive metabolic panel    Lipid panel- Lab Collect       Respiratory    Obstructive sleep apnea     CPAP uses regularly. Cardiovascular and Mediastinum    Essential hypertension     BP under good control on current regimen. Other    Hyperlipidemia     Patient continues on atorvastatin 40 mg         Vitamin D deficiency     Continues on vitamin D supplementation         Low testosterone     Patient has history of low testosterone level in June 2023 followed by normal total testosterone with low free testosterone level in August 2023. Patient would like work-up for testosterone levels. Other Visit Diagnoses       Low testosterone in male        Relevant Orders    Testosterone, free, total- Lab Collect    Follicle stimulating hormone Lab Collect    Luteinizing hormone Lab Collect    Prolactin    TSH, 3rd generation    T4, free- Lab Collect    CBC and differential- Lab Collect            Orders Placed This Encounter   Procedures    Testosterone, free, total- Lab Collect     This is a patient instruction: Fasting preferred. Collections for men not undergoing treatment must be completed between 7am-9am ONLY. Collection time restrictions are not applicable to women or men already undergoing treatment.      Standing Status:   Future     Number of Occurrences:   1     Standing Expiration Date:   84/64/0044    Follicle stimulating hormone Lab Collect     Standing Status:   Future     Number of Occurrences:   1     Standing Expiration Date:   11/10/2024    Luteinizing hormone Lab Collect     Standing Status:   Future     Number of Occurrences:   1     Standing Expiration Date: 11/10/2024    Prolactin     Standing Status:   Future     Number of Occurrences:   1     Standing Expiration Date:   11/10/2024    TSH, 3rd generation     This is a patient instruction: This test is non-fasting. Please drink two glasses of water morning of bloodwork. Standing Status:   Future     Number of Occurrences:   1     Standing Expiration Date:   5/10/2024    T4, free- Lab Collect     Standing Status:   Future     Number of Occurrences:   1     Standing Expiration Date:   11/10/2024    CBC and differential- Lab Collect     This is a patient instruction: This test is non-fasting. Please drink two glasses of water morning of bloodwork. Standing Status:   Future     Number of Occurrences:   1     Standing Expiration Date:   11/10/2024    Hemoglobin A1C     Standing Status:   Future     Number of Occurrences:   1     Standing Expiration Date:   11/10/2024    Comprehensive metabolic panel     This is a patient instruction: Patient fasting for 8 hours or longer recommended. Standing Status:   Future     Number of Occurrences:   1     Standing Expiration Date:   11/10/2024    Lipid panel- Lab Collect     This is a patient instruction: This test requires patient fasting for 10-12 hours or longer. Drinking of black coffee or black tea is acceptable. Standing Status:   Future     Number of Occurrences:   1     Standing Expiration Date:   11/10/2024       History of Present Illness:   Gustavo Child is a 63 y.o. male with a history of type 2 diabetes without long term use of insulin. Reports complications of neuropathy follows with podiatry. Denies retinopathy follows annually with with optho. Denies heart attack or stroke. Has significant past medical history of CAD with stents. Denies recent illness or hospitalizations. Denies recent severe hypoglycemic or severe hyperglycemic episodes.   Denies any adverse effects of metformin or Ozempic including abdominal pain, nausea, vomiting, diarrhea, constipation, severe appetite suppression. Home glucose monitoring: are performed regularly. See scanned blood sugars. Current regimen:   Metformin 1000 mg orally twice a day  Ozempic 1 mg subcutaneously weekly     Has hypertension: Taking losartan 50 mg daily  Has hyperlipidemia: Taking atorvastatin 40 mg daily, tolerating  Has vitamin B12 deficiency: Taking supplementation    Patient Active Problem List   Diagnosis    Diabetes mellitus ([address])    Hyperlipidemia    Essential hypertension    Elevated liver enzymes    Polyarthralgia    Morbid obesity (HCC)    Chronic pain of both knees    Primary osteoarthritis of knees, bilateral    Coronary artery disease involving native coronary artery of native heart without angina pectoris    Asymptomatic PVCs    Obstructive sleep apnea    Obesity (BMI 30-39. 9)    Seasonal allergic rhinitis due to pollen    GERD (gastroesophageal reflux disease)    Nasal septal deformity    Bundle branch block, right    CPAP (continuous positive airway pressure) dependence    Vitamin D deficiency    BPH (benign prostatic hyperplasia)    History of vertebral compression fracture    Colon polyp    History of angioplasty    Ocular migraine    Inguinal hernia    Umbilical hernia    Bradycardia    Asymptomatic varicose veins of both lower extremities    Neuropathy    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)    Carpal tunnel syndrome of left wrist    Ulnar neuropathy at elbow of left upper extremity    Onychomycosis of toenail    Chronic left-sided low back pain with left-sided sciatica    Varicose veins of leg with swelling, bilateral    Thyroid nodule    Vitamin B 12 deficiency    Compression fracture of body of thoracic vertebra (HCC)    Disc disease, degenerative, lumbar or lumbosacral    History of coronary artery stent placement- pCx and OM2    Carpal tunnel syndrome on right    Tremor of left hand    Restless leg syndrome    Hypertensive heart disease with congestive heart failure (HCC)    Type 2 diabetes mellitus with hyperglycemia (HCC)    Hypomagnesemia    Hearing deficit, right    Screening for lung cancer    Other fatigue    Low testosterone      Past Medical History:   Diagnosis Date    Anemia     Bundle branch block, right     CAD (coronary artery disease)     Cataract     CPAP (continuous positive airway pressure) dependence     Diabetes mellitus (HCC)     borderline, controlled with diet and activity    Diverticulitis     GERD (gastroesophageal reflux disease)     History of coronary artery stent placement- pCx and OM2 11/24/2017    Hyperlipidemia     Nasal septal deformity     Neuropathy     Obesity (BMI 30-39. 9)     Sleep apnea     wears c-pap    Vitamin D deficiency       Past Surgical History:   Procedure Laterality Date    COLON SIGMOID RESECTION N/A 12/16/2016    Procedure: RESECTION COLON SIGMOID;  Surgeon: Mello Lujan MD;  Location: [facility];  Service:     COLON SIGMOID RESECTION LAPAROSCOPIC N/A 12/16/2016    Procedure: RESECTION COLON SIGMOID LAPAROSCOPIC:CONVERTED TO [email or web address];  Surgeon: Mello Lujan MD;  Location: [facility];  Service:     COLON SURGERY      Sigmoidectomy    COLONOSCOPY N/A 10/6/2016    Procedure: COLONOSCOPY;  Surgeon: Trey Covarrubias MD;  Location: [address] [place] [address] GI LAB; Service:     CYSTOSCOPY W/ RETROGRADES Left 2/3/2017    Procedure: CYSTOSCOPY WITH BILATERAL RETROGRADES, LEFT STENT REMOVAL;  Surgeon: Deisi Li MD;  Location: [facility]  Service:     ESOPHAGOGASTRODUODENOSCOPY N/A 10/6/2016    Procedure: ESOPHAGOGASTRODUODENOSCOPY (EGD); Surgeon: Trey Covarrubias MD;  Location: [address] [place] [address] GI LAB;   Service:     HERNIA REPAIR      KNEE ARTHROSCOPY W/ MENISCECTOMY      MN CYSTO BLADDER W/URETERAL CATHETERIZATION Left 12/4/2016    Procedure: CYSTOSCOPY RETROGRADE PYELOGRAM WITH INSERTION STENT URETERAL;  Surgeon: Deisi Li MD;  Location: [facility]  Service: Urology    US GUIDED THYROID BIOPSY  9/27/2021    VARICOSE VEIN SURGERY        Family History   Problem Relation Age of Onset    Osteoarthritis Mother     Atrial fibrillation Mother     Aortic aneurysm Father     Diabetes Brother     Colon cancer Brother     Diabetes type II Brother     Colon cancer Brother     Heart disease Maternal Grandmother     Cancer Maternal Grandfather     Stroke Paternal Grandmother     Heart attack Paternal Grandfather      Social History     Tobacco Use    Smoking status: Former     Packs/day: 1.00     Years: 37.00     Total pack years: 37.00     Types: Cigarettes     Quit date: 3/30/2018     Years since quittin.6     Passive exposure: Past    Smokeless tobacco: Current     Types: Chew    Tobacco comments:     chewing nicotine   Substance Use Topics    Alcohol use:  Yes     Alcohol/week: 3.0 standard drinks of alcohol     Types: 3 Cans of beer per week     Comment: occ     Allergies   Allergen Reactions    Levaquin [Levofloxacin In D5w] Swelling     Knee swelling    Sulfa Antibiotics GI Intolerance     Abdominal pain           Current Outpatient Medications:     Alpha-Lipoic Acid 100 MG CAPS, Take by mouth, Disp: , Rfl:     Ascorbic Acid (VITAMIN C) 100 MG tablet, Take 500 mg by mouth , Disp: , Rfl:     aspirin 81 MG tablet, Take 162 mg by mouth daily  , Disp: , Rfl:     atorvastatin (LIPITOR) 40 mg tablet, TAKE 1 TABLET BY MOUTH ONCE DAILY WITH SUPPER, Disp: 90 tablet, Rfl: 0    BIOTIN PO, Take 5 mg by mouth daily, Disp: , Rfl:     Cholecalciferol (VITAMIN D3) 1000 units CAPS, Take by mouth daily , Disp: , Rfl:     Continuous Blood Gluc Sensor (FreeStyle Alexei 3 Sensor) MISC, Change every 14 days as directed., Disp: 2 each, Rfl: 2    cyanocobalamin (VITAMIN B-12) 100 mcg tablet, Take by mouth daily, Disp: , Rfl:     desloratadine (CLARINEX) 5 MG tablet, TAKE 1 TABLET BY MOUTH AT BEDTIME, Disp: 90 tablet, Rfl: 0    famotidine (PEPCID) 20 mg tablet, Take 20 mg by mouth daily, Disp: , Rfl:     FREESTYLE LITE test strip, USE 1 STRIP TO CHECK GLUCOSE ONCE DAILY AS DIRECTED, Disp: 100 each, Rfl: 0    FREESTYLE LITE test strip, USE 1 STRIP TO CHECK GLUCOSE ONCE DAILY AS INSTRUCTED, Disp: 100 each, Rfl: 0    glucosamine-chondroitin 500-400 MG tablet, Take 1 tablet by mouth daily, Disp: , Rfl:     hydrochlorothiazide (HYDRODIURIL) 25 mg tablet, Take 1 tablet (25 mg total) by mouth daily, Disp: 90 tablet, Rfl: 1    losartan (COZAAR) 50 mg tablet, Take 1 tablet by mouth once daily, Disp: 90 tablet, Rfl: 0    Magnesium Hydroxide (MAGNESIA PO), Take by mouth 2 (two) times a day, Disp: , Rfl:     metFORMIN (GLUCOPHAGE) 1000 MG tablet, TAKE 1 TABLET BY MOUTH TWICE DAILY WITH MEALS, Disp: 180 tablet, Rfl: 0    Multiple Vitamin (MULTIVITAMIN) capsule, Take 1 capsule by mouth daily, Disp: , Rfl:     ONETOUCH DELICA LANCETS 54E MISC, 1 Units by Does not apply route daily, Disp: 100 each, Rfl: 6    Ozempic, 1 MG/DOSE, 4 MG/3ML injection pen, INJECT 1MG  SUBCUTANEOUSLY  ONCE A WEEK, Disp: 3 mL, Rfl: 0    rOPINIRole (REQUIP) 0.25 mg tablet, Take 1/2 tablet (0.125mg) daily at bedtime x 2 weeks then increase to full tablet if tolerating, Disp: 30 tablet, Rfl: 3    torsemide (DEMADEX) 10 mg tablet, Take 1 tablet (10 mg total) by mouth daily, Disp: 30 tablet, Rfl: 1    albuterol (PROVENTIL HFA,VENTOLIN HFA) 90 mcg/act inhaler, Inhale 2 puffs every 4 (four) hours as needed for wheezing or shortness of breath, Disp: 18 g, Rfl: 6    ciclopirox (LOPROX) 0.77 % cream, , Disp: , Rfl:     ciclopirox (PENLAC) 8 % solution, , Disp: , Rfl:     nitroglycerin (NITROSTAT) 0.3 mg SL tablet, Place 1 tablet (0.3 mg total) under the tongue every 5 (five) minutes as needed for chest pain, Disp: 25 tablet, Rfl: 1    Current Facility-Administered Medications:     cyanocobalamin injection 1,000 mcg, 1,000 mcg, Intramuscular, Q30 Days, [employer], CRNP, 1,000 mcg at 09/10/21 1141    Review of Systems  See HPI. All other systems reviewed and are negative.     Physical Exam:  Body mass index is 38.63 kg/m². /72 (BP Location: Left arm, Patient Position: Sitting, Cuff Size: Large)   Pulse 66   Ht 5' 11" (1.803 m)   Wt 126 kg (277 lb)   SpO2 98%   BMI 38.63 kg/m²    Wt Readings from Last 3 Encounters:   11/10/23 126 kg (277 lb)   09/08/23 125 kg (276 lb)   08/18/23 125 kg (276 lb)        Physical Exam  Vitals reviewed. Constitutional:       Appearance: Normal appearance. Cardiovascular:      Rate and Rhythm: Normal rate and regular rhythm. Pulses: Normal pulses. Heart sounds: Normal heart sounds. Pulmonary:      Effort: Pulmonary effort is normal.      Breath sounds: Normal breath sounds. Skin:     General: Skin is warm and dry. Capillary Refill: Capillary refill takes less than 2 seconds. Neurological:      General: No focal deficit present. Mental Status: He is alert and oriented to person, place, and time. Psychiatric:         Mood and Affect: Mood normal.         Behavior: Behavior normal.       Labs:   Lab Results   Component Value Date    HGBA1C 7.6 (H) 11/03/2023    HGBA1C 7.6 (H) 08/04/2023    HGBA1C 8.8 (H) 05/19/2023     Lab Results   Component Value Date    CREATININE 1.11 11/03/2023    CREATININE 1.03 08/04/2023    CREATININE 1.09 05/19/2023    BUN 16 11/03/2023     11/30/2017    K 4.5 11/03/2023     11/03/2023    CO2 23 11/03/2023     eGFR   Date Value Ref Range Status   11/03/2023 76 >59 mL/min/1.73 Final   11/25/2017 73 ml/min/1.73sq m Final     Lab Results   Component Value Date    HDL 42 11/03/2023    TRIG 241 (H) 11/03/2023    CHOLHDL 3.0 11/03/2023     Lab Results   Component Value Date    ALT 66 (H) 11/03/2023    AST 40 11/03/2023    ALKPHOS 67 11/23/2017     Lab Results   Component Value Date    CJA3BRXWENFY 2.809 11/22/2017    [id number] 3.509 12/06/2016     Lab Results   Component Value Date    FREET4 1.02 06/09/2023     Discussed with the patient and all questioned fully answered.  He will call me if any problems arise.    Follow-up appointment in 3 months.      Counseled patient on diagnostic results, prognosis, risk and benefit of treatment options, instruction for management, importance of treatment compliance, Risk  factor reduction and impressions      LUCERO Mendez

## 2023-11-10 NOTE — PROGRESS NOTES
[facility] [address] [facility] coding opportunities          Chart Reviewed number of suggestions sent to Provider: 1     Patients Insurance        Commercial Insurance: [employer]     [id number]

## 2023-11-10 NOTE — ASSESSMENT & PLAN NOTE
Patient has history of low testosterone level in June 2023 followed by normal total testosterone with low free testosterone level in August 2023. Patient would like work-up for testosterone levels.

## 2023-11-13 ENCOUNTER — TELEPHONE (OUTPATIENT)
Dept: ENDOCRINOLOGY | Facility: CLINIC | Age: 63
End: 2023-11-13

## 2023-11-13 NOTE — TELEPHONE ENCOUNTER
Patient left a message that his lino sensor isnt workin on his flip phone and is requesting a glucometer and if someone could call him back please  Thank you

## 2023-11-14 ENCOUNTER — TELEPHONE (OUTPATIENT)
Dept: ENDOCRINOLOGY | Facility: CLINIC | Age: 63
End: 2023-11-14

## 2023-11-14 DIAGNOSIS — E11.65 TYPE 2 DIABETES MELLITUS WITH HYPERGLYCEMIA, UNSPECIFIED WHETHER LONG TERM INSULIN USE (HCC): Primary | ICD-10-CM

## 2023-11-14 DIAGNOSIS — E11.9 TYPE 2 DIABETES MELLITUS WITHOUT COMPLICATION, WITHOUT LONG-TERM CURRENT USE OF INSULIN (HCC): ICD-10-CM

## 2023-11-14 RX ORDER — FLASH GLUCOSE SENSOR
1 KIT MISCELLANEOUS ONCE
COMMUNITY
End: 2023-11-14 | Stop reason: SDUPTHER

## 2023-11-14 RX ORDER — FLASH GLUCOSE SENSOR
1 KIT MISCELLANEOUS ONCE
Qty: 1 EACH | Refills: 0 | Status: SHIPPED | OUTPATIENT
Start: 2023-11-14 | End: 2023-11-14

## 2023-11-14 NOTE — TELEPHONE ENCOUNTER
Pharmacy called and stated patient received Lebre 3. Patient has a flip phone and is unable to use the 3. Unfortunatly patient left with RX and they can't take it back. He will need a Lebre 2. Being two orders 1 for sensor and 1 for reader. Please call Paulino Leonard all [address] [place] [address] with any questions.

## 2023-11-16 DIAGNOSIS — E11.65 TYPE 2 DIABETES MELLITUS WITH HYPERGLYCEMIA, UNSPECIFIED WHETHER LONG TERM INSULIN USE (HCC): ICD-10-CM

## 2023-11-24 ENCOUNTER — HOSPITAL ENCOUNTER (OUTPATIENT)
Dept: RADIOLOGY | Facility: HOSPITAL | Age: 63
Discharge: HOME | End: 2023-11-24
Attending: INTERNAL MEDICINE
Payer: COMMERCIAL

## 2023-11-24 DIAGNOSIS — E04.1 THYROID NODULE: ICD-10-CM

## 2023-11-24 DIAGNOSIS — Z12.2 SCREENING FOR LUNG CANCER: ICD-10-CM

## 2023-11-24 PROCEDURE — 76536 US EXAM OF HEAD AND NECK: CPT

## 2023-11-24 PROCEDURE — 71271 CT THORAX LUNG CANCER SCR C-: CPT

## 2023-11-29 DIAGNOSIS — E11.65 TYPE 2 DIABETES MELLITUS WITH HYPERGLYCEMIA, UNSPECIFIED WHETHER LONG TERM INSULIN USE (HCC): ICD-10-CM

## 2023-11-30 RX ORDER — SEMAGLUTIDE 1.34 MG/ML
INJECTION, SOLUTION SUBCUTANEOUS
Qty: 3 ML | Refills: 0 | Status: SHIPPED | OUTPATIENT
Start: 2023-11-30

## 2023-12-01 ENCOUNTER — OFFICE VISIT (OUTPATIENT)
Dept: INTERNAL MEDICINE CLINIC | Facility: CLINIC | Age: 63
End: 2023-12-01
Payer: COMMERCIAL

## 2023-12-01 VITALS
HEART RATE: 58 BPM | HEIGHT: 71 IN | BODY MASS INDEX: 24.36 KG/M2 | DIASTOLIC BLOOD PRESSURE: 70 MMHG | WEIGHT: 174 LBS | SYSTOLIC BLOOD PRESSURE: 118 MMHG | OXYGEN SATURATION: 98 %

## 2023-12-01 DIAGNOSIS — R79.89 LOW TESTOSTERONE: ICD-10-CM

## 2023-12-01 DIAGNOSIS — I49.3 ASYMPTOMATIC PVCS: ICD-10-CM

## 2023-12-01 DIAGNOSIS — E78.2 MIXED HYPERLIPIDEMIA: ICD-10-CM

## 2023-12-01 DIAGNOSIS — I10 ESSENTIAL HYPERTENSION: ICD-10-CM

## 2023-12-01 DIAGNOSIS — G25.81 RESTLESS LEG SYNDROME: ICD-10-CM

## 2023-12-01 DIAGNOSIS — E11.65 TYPE 2 DIABETES MELLITUS WITH HYPERGLYCEMIA, UNSPECIFIED WHETHER LONG TERM INSULIN USE (HCC): ICD-10-CM

## 2023-12-01 DIAGNOSIS — I25.10 CORONARY ARTERY DISEASE INVOLVING NATIVE CORONARY ARTERY OF NATIVE HEART WITHOUT ANGINA PECTORIS: ICD-10-CM

## 2023-12-01 DIAGNOSIS — E11.40 NEUROPATHY DUE TO TYPE 2 DIABETES MELLITUS (HCC): ICD-10-CM

## 2023-12-01 DIAGNOSIS — I11.0 HYPERTENSIVE HEART DISEASE WITH CONGESTIVE HEART FAILURE, UNSPECIFIED HEART FAILURE TYPE (HCC): ICD-10-CM

## 2023-12-01 DIAGNOSIS — Z95.5 HISTORY OF CORONARY ARTERY STENT PLACEMENT: ICD-10-CM

## 2023-12-01 DIAGNOSIS — R00.1 BRADYCARDIA: ICD-10-CM

## 2023-12-01 DIAGNOSIS — N40.0 BENIGN PROSTATIC HYPERPLASIA WITHOUT LOWER URINARY TRACT SYMPTOMS: ICD-10-CM

## 2023-12-01 DIAGNOSIS — E55.9 VITAMIN D DEFICIENCY: ICD-10-CM

## 2023-12-01 DIAGNOSIS — E04.1 THYROID NODULE: Primary | ICD-10-CM

## 2023-12-01 DIAGNOSIS — I45.10 BUNDLE BRANCH BLOCK, RIGHT: ICD-10-CM

## 2023-12-01 DIAGNOSIS — E53.8 VITAMIN B 12 DEFICIENCY: ICD-10-CM

## 2023-12-01 DIAGNOSIS — E11.65 TYPE 2 DIABETES MELLITUS WITH HYPERGLYCEMIA, WITHOUT LONG-TERM CURRENT USE OF INSULIN (HCC): ICD-10-CM

## 2023-12-01 DIAGNOSIS — G62.9 NEUROPATHY: ICD-10-CM

## 2023-12-01 DIAGNOSIS — R74.8 ELEVATED LIVER ENZYMES: ICD-10-CM

## 2023-12-01 DIAGNOSIS — E66.9 OBESITY (BMI 30-39.9): ICD-10-CM

## 2023-12-01 DIAGNOSIS — Z98.62 HISTORY OF ANGIOPLASTY: ICD-10-CM

## 2023-12-01 PROCEDURE — 99214 OFFICE O/P EST MOD 30 MIN: CPT | Performed by: INTERNAL MEDICINE

## 2023-12-01 NOTE — ASSESSMENT & PLAN NOTE
Lab Results   Component Value Date    ALT 66 (H) 11/03/2023    AST 40 11/03/2023    ALKPHOS 67 11/23/2017     Lab Results   Component Value Date    LDLCALC 45 11/03/2023     Lab Results   Component Value Date    CHOLESTEROL 125 11/03/2023    CHOLESTEROL 129 08/04/2023    CHOLESTEROL 119 05/19/2023     Lab Results   Component Value Date    HDL 42 11/03/2023    HDL 34 (L) 08/04/2023    HDL 39 (L) 05/19/2023     Lab Results   Component Value Date    TRIG 241 (H) 11/03/2023    TRIG 247 (H) 08/04/2023    TRIG 151 (H) 05/19/2023     Continue low-cholesterol diet as well as atorvastatin 40 mg daily

## 2023-12-01 NOTE — PROGRESS NOTES
Name: Thad Messina      : 1960      MRN: [id number]  Encounter Provider: Polina Hernadez MD  Encounter Date: 2023   Encounter department: [address]     1. Thyroid nodule  Assessment & Plan:  No symptoms related to thyroid either hypo or hyperthyroidism or compressive symptoms. Recent thyroid ultrasound:No significant change in previously biopsied left mid thyroid nodule. No nodule meets current ACR criteria for requiring biopsy but followup ultrasound may be considered in 1 year. Patient will continue to follow with endocrinologist and they will do surveillance ultrasound in a year      2. Type 2 diabetes mellitus with hyperglycemia, without long-term current use of insulin ([address])  Assessment & Plan:  Under care of Dr Miguel Reyes shows high sugar at night time  Lab Results   Component Value Date    HGBA1C 7.6 (H) 2023   11-3-2023: Hemoglobin A1c 7.6 blood sugar 187 rest of the CMP normal except ALT 66-he does have a chronic mild elevated ALT in the range of 65-77, cholesterol 125, triglyceride 241, LDL 45, HDL 42, cortisol level 10.1    2023: Sex binding hormone 41.8, testosterone level 375 normal, free testosterone 5.0, 2023: 7.6 hemoglobin A1c, urine for microalbumin/creatinine ratio less than 7, blood sugar 164 AST 50 ALT 77 rest of the CMP normal cholesterol 129 triglyceride 247 HDL 34    Control with hemoglobin A1c variable 2 on the same day same time it irrespective of that there is not control. Patient will continue to follow with endocrinologist endocrinologist note were noted. Remains on metformin 1000 mg twice a day and on Ozempic 1 mg weekly, now on lino. We will monitor the trend. Neuropathy fair chronic mild. Renal function reasonable. Urine for microalbumin noted      Lab Results   Component Value Date    HGBA1C 7.6 (H) 2023         3.  Type 2 diabetes mellitus with hyperglycemia, unspecified whether long term insulin use ([address])  Assessment & Plan:    He eats meal at  8 pm , sugar high at night time, on ozempic 1 mg and metformin 1000 mg twice a day, now on [place],     Endo to manage sugar peak at night time      4. 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 2 diabetes mellitus ([address])  Assessment & Plan:  DM as above    Neuropathy: same , some days worse,  Off Gab[place], under care of neurologist,  on Requip, pt no change, pt will contiue to follow, now one tablet daily  Lab Results   Component Value Date    HGBA1C 7.6 (H) 11/03/2023         5. Coronary artery disease involving native coronary artery of native heart without angina pectoris  Assessment & Plan:  11-3-2023: Hemoglobin A1c 7.6 blood sugar 187 rest of the CMP normal except ALT 66-he does have a chronic mild elevated ALT in the range of 65-77, cholesterol 125, triglyceride 241, LDL 45, HDL 42, cortisol level 10.1    No true angina or chest pain, some chronic edema legs, No SOB, No PND,    Relevant medications include torsemide 10 mg, Ozempic 1 mg weekly, losartan 50 mg daily, hydrochlorothiazide 25 mg daily, atorvastatin 40 mg daily and baby aspirin once a day. Patient will continue to follow with cardiologist.    PVCs suppressed, no angina or angina equivalent, read by branch block, CAD stable, hypertension fair control, will continue same regimen      6. Bundle branch block, right  Assessment & Plan:  11-3-2023: Hemoglobin A1c 7.6 blood sugar 187 rest of the CMP normal except ALT 66-he does have a chronic mild elevated ALT in the range of 65-77, cholesterol 125, triglyceride 241, LDL 45, HDL 42, cortisol level 10.1    No true angina or chest pain, some chronic edema legs, No SOB, No PND,    Relevant medications include torsemide 10 mg, Ozempic 1 mg weekly, losartan 50 mg daily, hydrochlorothiazide 25 mg daily, atorvastatin 40 mg daily and baby aspirin once a day.     Patient will continue to follow with cardiologist.    PVCs suppressed, no angina or angina equivalent, read by branch block, CAD stable, hypertension fair control, will continue same regimen      7. Essential hypertension  Assessment & Plan:  11-3-2023: Hemoglobin A1c 7.6 blood sugar 187 rest of the CMP normal except ALT 66-he does have a chronic mild elevated ALT in the range of 65-77, cholesterol 125, triglyceride 241, LDL 45, HDL 42, cortisol level 10.1    No true angina or chest pain, some chronic edema legs, No SOB, No PND,    Relevant medications include torsemide 10 mg, Ozempic 1 mg weekly, losartan 50 mg daily, hydrochlorothiazide 25 mg daily, atorvastatin 40 mg daily and baby aspirin once a day. Patient will continue to follow with cardiologist.    PVCs suppressed, no angina or angina equivalent, read by branch block, CAD stable, hypertension fair control, will continue same regimen      8. Hypertensive heart disease with congestive heart failure, unspecified heart failure type [facility])  Assessment & Plan:  Wt Readings from Last 3 Encounters:   12/01/23 78.9 kg (174 lb)   11/10/23 126 kg (277 lb)   09/08/23 125 kg (276 lb)     11-3-2023: Hemoglobin A1c 7.6 blood sugar 187 rest of the CMP normal except ALT 66-he does have a chronic mild elevated ALT in the range of 65-77, cholesterol 125, triglyceride 241, LDL 45, HDL 42, cortisol level 10.1    No true angina or chest pain, some chronic edema legs, No SOB, No PND,    Relevant medications include torsemide 10 mg, Ozempic 1 mg weekly, losartan 50 mg daily, hydrochlorothiazide 25 mg daily, atorvastatin 40 mg daily and baby aspirin once a day. Patient will continue to follow with cardiologist.    PVCs suppressed, no angina or angina equivalent, read by branch block, CAD stable, hypertension fair control, will continue same regimen            9.  Asymptomatic PVCs  Assessment & Plan:  11-3-2023: Hemoglobin A1c 7.6 blood sugar 187 rest of the CMP normal except ALT 66-he does have a chronic mild elevated ALT in the range of 65-77, cholesterol 125, triglyceride 241, LDL 45, HDL 42, cortisol level 10.1    No true angina or chest pain, some chronic edema legs, No SOB, No PND,    Relevant medications include torsemide 10 mg, Ozempic 1 mg weekly, losartan 50 mg daily, hydrochlorothiazide 25 mg daily, atorvastatin 40 mg daily and baby aspirin once a day. Patient will continue to follow with cardiologist.    PVCs suppressed, no angina or angina equivalent, read by branch block, CAD stable, hypertension fair control, will continue same regimen          10. Neuropathy  Assessment & Plan:  As above      11. Benign prostatic hyperplasia without lower urinary tract symptoms  Assessment & Plan:  Patient denies hesitancy. Nighttime frequency 0-1 time. Stream is reasonably stable. BPH is no major symptoms at this time we will continue to monitor    Lab Results   Component Value Date    PSA 0.4 06/09/2023    PSA 0.5 01/14/2022    PSA 0.4 11/06/2020           12. Bradycardia  Assessment & Plan:  Vitals:    12/01/23 1233   BP: 118/70   Pulse: 58   SpO2: 98%     Fair    Under care of cardiologist      13. Mixed hyperlipidemia  Assessment & Plan:  Lab Results   Component Value Date    ALT 66 (H) 11/03/2023    AST 40 11/03/2023    ALKPHOS 67 11/23/2017     Lab Results   Component Value Date    LDLCALC 45 11/03/2023     Lab Results   Component Value Date    CHOLESTEROL 125 11/03/2023    CHOLESTEROL 129 08/04/2023    CHOLESTEROL 119 05/19/2023     Lab Results   Component Value Date    HDL 42 11/03/2023    HDL 34 (L) 08/04/2023    HDL 39 (L) 05/19/2023     Lab Results   Component Value Date    TRIG 241 (H) 11/03/2023    TRIG 247 (H) 08/04/2023    TRIG 151 (H) 05/19/2023     Continue low-cholesterol diet as well as atorvastatin 40 mg daily        14. Elevated liver enzymes  Assessment & Plan:  Lab Results   Component Value Date    ALT 66 (H) 11/03/2023    AST 40 11/03/2023    ALKPHOS 67 11/23/2017           15. History of coronary artery stent placement- pCx and OM2    16. Low testosterone    17. Obesity (BMI 30-39. 9)  Assessment & Plan:  11-3-2023: Hemoglobin A1c 7.6 blood sugar 187 rest of the CMP normal except ALT 66-he does have a chronic mild elevated ALT in the range of 65-77, cholesterol 125, triglyceride 241, LDL 45, HDL 42, cortisol level 10.1    8-4-2023: Sex binding hormone 41.8, testosterone level 375 normal, free testosterone 5.0, 8/4/2023: 7.6 hemoglobin A1c, urine for microalbumin/creatinine ratio less than 7, blood sugar 164 AST 50 ALT 77 rest of the CMP normal cholesterol 129 triglyceride 247 HDL 34      18. Restless leg syndrome  Assessment & Plan:  Neurology start treating with Requip both for neuropathy as well as the rest leg palpation. 19. Vitamin D deficiency  Assessment & Plan:  Continue supplement. 20. Vitamin B 12 deficiency  Assessment & Plan:  The recommend a B12 supplement      21. History of angioplasty           Subjective      Patient here for chronic disease management. Offers no major complaints. We also talked about RSV risk-benefit and current reported side effect. He will wait for a year or so. Up-to-date with the flu vaccine and COVID-vaccine. 11-:CT Scan of chest  No nodules and/or definitely benign nodules. Lung-RADS1, negative. Continued annual screening with LDCT in 12 months. 11/24/2023: Thyroid ultrasound:No significant change in previously biopsied left mid thyroid nodule. No nodule meets current ACR criteria for requiring biopsy but followup ultrasound may be considered in 1 year.       11-3-2023: Hemoglobin A1c 7.6 blood sugar 187 rest of the CMP normal except ALT 66-he does have a chronic mild elevated ALT in the range of 65-77, cholesterol 125, triglyceride 241, LDL 45, HDL 42, cortisol level 10.1    8-4-2023: Sex binding hormone 41.8, testosterone level 375 normal, free testosterone 5.0, 8/4/2023: 7.6 hemoglobin A1c, urine for microalbumin/creatinine ratio less than 7, blood sugar 164 AST 50 ALT 77 rest of the CMP normal cholesterol 129 triglyceride 247 HDL 34      Review of Systems   Constitutional:  Negative for chills and fever. HENT:  Negative for ear pain and sore throat. Eyes:  Negative for pain and visual disturbance. Respiratory:  Negative for cough and shortness of breath. Cardiovascular:  Negative for chest pain and palpitations. Gastrointestinal:  Negative for abdominal pain and vomiting. Genitourinary:  Negative for dysuria and hematuria. Musculoskeletal:  Positive for joint swelling. Negative for arthralgias and back pain. Skin:  Negative for color change and rash. Neurological:  Positive for numbness. Negative for seizures and syncope. All other systems reviewed and are negative. Current Outpatient Medications on File Prior to Visit   Medication Sig   • albuterol (PROVENTIL HFA,VENTOLIN HFA) 90 mcg/act inhaler Inhale 2 puffs every 4 (four) hours as needed for wheezing or shortness of breath   • Alpha-Lipoic Acid 100 MG CAPS Take by mouth   • Ascorbic Acid (VITAMIN C) 100 MG tablet Take 500 mg by mouth    • aspirin 81 MG tablet Take 162 mg by mouth daily     • atorvastatin (LIPITOR) 40 mg tablet TAKE 1 TABLET BY MOUTH ONCE DAILY WITH SUPPER   • BIOTIN PO Take 5 mg by mouth daily   • Cholecalciferol (VITAMIN D3) 1000 units CAPS Take by mouth daily    • Continuous Blood Gluc Sensor (FreeStyle Alexei 2 Sensor) MISC USE 1 SENSOR EVERY 14 DAYS   • Continuous Blood Gluc Sensor (FreeStyle Alexei 3 Sensor) MISC Change every 14 days as directed.    • cyanocobalamin (VITAMIN B-12) 100 mcg tablet Take by mouth daily   • desloratadine (CLARINEX) 5 MG tablet TAKE 1 TABLET BY MOUTH AT BEDTIME   • famotidine (PEPCID) 20 mg tablet Take 20 mg by mouth daily   • FREESTYLE LITE test strip USE 1 STRIP TO CHECK GLUCOSE ONCE DAILY AS DIRECTED   • FREESTYLE LITE test strip USE 1 STRIP TO CHECK GLUCOSE ONCE DAILY AS INSTRUCTED   • glucosamine-chondroitin 500-400 MG tablet Take 1 tablet by mouth daily   • hydrochlorothiazide (HYDRODIURIL) 25 mg tablet Take 1 tablet (25 mg total) by mouth daily   • losartan (COZAAR) 50 mg tablet Take 1 tablet by mouth once daily   • Magnesium Hydroxide (MAGNESIA PO) Take by mouth 2 (two) times a day   • metFORMIN (GLUCOPHAGE) 1000 MG tablet TAKE 1 TABLET BY MOUTH TWICE DAILY WITH MEALS   • Multiple Vitamin (MULTIVITAMIN) capsule Take 1 capsule by mouth daily   • nitroglycerin (NITROSTAT) 0.3 mg SL tablet Place 1 tablet (0.3 mg total) under the tongue every 5 (five) minutes as needed for chest pain   • ONETOUCH DELICA LANCETS 29V MISC 1 Units by Does not apply route daily   • Ozempic, 1 MG/DOSE, 4 MG/3ML injection pen INJECT 1 MG SUBCUTANEOUSLY ONCE A WEEK   • rOPINIRole (REQUIP) 0.25 mg tablet Take 1/2 tablet (0.125mg) daily at bedtime x 2 weeks then increase to full tablet if tolerating   • torsemide (DEMADEX) 10 mg tablet Take 1 tablet (10 mg total) by mouth daily   • [DISCONTINUED] ciclopirox (LOPROX) 0.77 % cream  (Patient not taking: Reported on 11/10/2023)   • [DISCONTINUED] ciclopirox (PENLAC) 8 % solution  (Patient not taking: Reported on 11/10/2023)       Objective     /70 (BP Location: Left arm, Patient Position: Sitting, Cuff Size: Standard)   Pulse 58   Ht 5' 11" (1.803 m)   Wt 78.9 kg (174 lb)   SpO2 98%   BMI 24.27 kg/m²     Physical Exam  Constitutional:       General: He is not in acute distress. Appearance: He is well-developed. He is not ill-appearing, toxic-appearing or diaphoretic. HENT:      Head: Normocephalic and atraumatic. Right Ear: External ear normal.      Left Ear: External ear normal.   Eyes:      General: Lids are normal. No scleral icterus. Right eye: No discharge. Left eye: No discharge. Conjunctiva/sclera: Conjunctivae normal.   Neck:      Thyroid: No thyroid mass or thyromegaly. Vascular: No carotid bruit or JVD.       Trachea: Trachea normal.   Cardiovascular:      Rate and Rhythm: Regular rhythm. Heart sounds: Normal heart sounds. No murmur heard. No gallop. Pulmonary:      Effort: No respiratory distress. Breath sounds: No wheezing, rhonchi or rales. Abdominal:      Tenderness: There is no abdominal tenderness. Musculoskeletal:         General: No tenderness. Cervical back: No rigidity or tenderness. Right lower leg: No edema. Left lower leg: No edema. Lymphadenopathy:      Cervical: No cervical adenopathy. Skin:     General: Skin is warm. Coloration: Skin is not jaundiced or pale. Findings: No rash. Neurological:      Mental Status: He is alert. Sensory: Sensory deficit present. Psychiatric:         Mood and Affect: Mood normal.         Behavior: Behavior normal.         Thought Content:  Thought content normal.         Judgment: Judgment normal.       Aisha Barrera MD

## 2023-12-01 NOTE — ASSESSMENT & PLAN NOTE
DM as above    Neuropathy: same , some days worse,  Off [place], under care of neurologist,  on Requip, pt no change, pt will contiue to follow, now one tablet daily  Lab Results   Component Value Date    HGBA1C 7.6 (H) 11/03/2023

## 2023-12-01 NOTE — ASSESSMENT & PLAN NOTE
No symptoms related to thyroid either hypo or hyperthyroidism or compressive symptoms. Recent thyroid ultrasound:No significant change in previously biopsied left mid thyroid nodule. No nodule meets current ACR criteria for requiring biopsy but followup ultrasound may be considered in 1 year.     Patient will continue to follow with endocrinologist and they will do surveillance ultrasound in a year

## 2023-12-01 NOTE — ASSESSMENT & PLAN NOTE
11-3-2023: Hemoglobin A1c 7.6 blood sugar 187 rest of the CMP normal except ALT 66-he does have a chronic mild elevated ALT in the range of 65-77, cholesterol 125, triglyceride 241, LDL 45, HDL 42, cortisol level 10.1    No true angina or chest pain, some chronic edema legs, No SOB, No PND,    Relevant medications include torsemide 10 mg, Ozempic 1 mg weekly, losartan 50 mg daily, hydrochlorothiazide 25 mg daily, atorvastatin 40 mg daily and baby aspirin once a day.     Patient will continue to follow with cardiologist.    PVCs suppressed, no angina or angina equivalent, read by branch block, CAD stable, hypertension fair control, will continue same regimen

## 2023-12-01 NOTE — ASSESSMENT & PLAN NOTE
Wt Readings from Last 3 Encounters:   12/01/23 78.9 kg (174 lb)   11/10/23 126 kg (277 lb)   09/08/23 125 kg (276 lb)     11-3-2023: Hemoglobin A1c 7.6 blood sugar 187 rest of the CMP normal except ALT 66-he does have a chronic mild elevated ALT in the range of 65-77, cholesterol 125, triglyceride 241, LDL 45, HDL 42, cortisol level 10.1    No true angina or chest pain, some chronic edema legs, No SOB, No PND,    Relevant medications include torsemide 10 mg, Ozempic 1 mg weekly, losartan 50 mg daily, hydrochlorothiazide 25 mg daily, atorvastatin 40 mg daily and baby aspirin once a day.     Patient will continue to follow with cardiologist.    PVCs suppressed, no angina or angina equivalent, read by branch block, CAD stable, hypertension fair control, will continue same regimen

## 2023-12-01 NOTE — ASSESSMENT & PLAN NOTE
He eats meal at  8 pm , sugar high at night time, on ozempic 1 mg and metformin 1000 mg twice a day, now on [place],     Endo to manage sugar peak at night time High Dose Vitamin A Counseling: Side effects reviewed, pt to contact office should one occur.

## 2023-12-01 NOTE — ASSESSMENT & PLAN NOTE
Under care of Dr Lucas Villa shows high sugar at night time  Lab Results   Component Value Date    HGBA1C 7.6 (H) 11/03/2023   11-3-2023: Hemoglobin A1c 7.6 blood sugar 187 rest of the CMP normal except ALT 66-he does have a chronic mild elevated ALT in the range of 65-77, cholesterol 125, triglyceride 241, LDL 45, HDL 42, cortisol level 10.1    8-4-2023: Sex binding hormone 41.8, testosterone level 375 normal, free testosterone 5.0, 8/4/2023: 7.6 hemoglobin A1c, urine for microalbumin/creatinine ratio less than 7, blood sugar 164 AST 50 ALT 77 rest of the CMP normal cholesterol 129 triglyceride 247 HDL 34    Control with hemoglobin A1c variable 2 on the same day same time it irrespective of that there is not control. Patient will continue to follow with endocrinologist endocrinologist note were noted. Remains on metformin 1000 mg twice a day and on Ozempic 1 mg weekly, now on [place]. We will monitor the trend. Neuropathy fair chronic mild. Renal function reasonable.   Urine for microalbumin noted      Lab Results   Component Value Date    HGBA1C 7.6 (H) 11/03/2023

## 2023-12-01 NOTE — ASSESSMENT & PLAN NOTE
11-3-2023: Hemoglobin A1c 7.6 blood sugar 187 rest of the CMP normal except ALT 66-he does have a chronic mild elevated ALT in the range of 65-77, cholesterol 125, triglyceride 241, LDL 45, HDL 42, cortisol level 10.1    8-4-2023: Sex binding hormone 41.8, testosterone level 375 normal, free testosterone 5.0, 8/4/2023: 7.6 hemoglobin A1c, urine for microalbumin/creatinine ratio less than 7, blood sugar 164 AST 50 ALT 77 rest of the CMP normal cholesterol 129 triglyceride 247 HDL 34

## 2023-12-01 NOTE — ASSESSMENT & PLAN NOTE
Lab Results   Component Value Date    ALT 66 (H) 11/03/2023    AST 40 11/03/2023    ALKPHOS 67 11/23/2017

## 2023-12-14 DIAGNOSIS — J30.1 SEASONAL ALLERGIC RHINITIS DUE TO POLLEN: ICD-10-CM

## 2023-12-15 RX ORDER — DESLORATADINE 5 MG/1
TABLET ORAL
Qty: 90 TABLET | Refills: 1 | Status: SHIPPED | OUTPATIENT
Start: 2023-12-15

## 2024-01-04 ENCOUNTER — TELEPHONE (OUTPATIENT)
Dept: NEUROLOGY | Facility: CLINIC | Age: 64
End: 2024-01-04

## 2024-01-08 ENCOUNTER — TELEPHONE (OUTPATIENT)
Dept: GASTROENTEROLOGY | Facility: CLINIC | Age: 64
End: 2024-01-08

## 2024-01-08 NOTE — TELEPHONE ENCOUNTER
Pt is due for a colonoscopy with Dr Ernandez the end of February for hx of adenomatous polyps/hx of colonic polyps.  I lmom for pt to please call back to schedule.  Will call pt again if do not hear back from him.

## 2024-01-10 DIAGNOSIS — E11.65 TYPE 2 DIABETES MELLITUS WITH HYPERGLYCEMIA, UNSPECIFIED WHETHER LONG TERM INSULIN USE (HCC): ICD-10-CM

## 2024-01-11 RX ORDER — SEMAGLUTIDE 1.34 MG/ML
INJECTION, SOLUTION SUBCUTANEOUS
Qty: 3 ML | Refills: 0 | Status: SHIPPED | OUTPATIENT
Start: 2024-01-11

## 2024-01-19 ENCOUNTER — OFFICE VISIT (OUTPATIENT)
Dept: NEUROLOGY | Facility: CLINIC | Age: 64
End: 2024-01-19
Payer: COMMERCIAL

## 2024-01-19 VITALS
HEART RATE: 92 BPM | BODY MASS INDEX: 38.5 KG/M2 | HEIGHT: 71 IN | DIASTOLIC BLOOD PRESSURE: 66 MMHG | SYSTOLIC BLOOD PRESSURE: 124 MMHG | WEIGHT: 275 LBS

## 2024-01-19 DIAGNOSIS — E11.40 NEUROPATHY DUE TO TYPE 2 DIABETES MELLITUS (HCC): ICD-10-CM

## 2024-01-19 DIAGNOSIS — R25.1 TREMOR OF LEFT HAND: ICD-10-CM

## 2024-01-19 DIAGNOSIS — G25.81 RESTLESS LEG SYNDROME: Primary | ICD-10-CM

## 2024-01-19 PROCEDURE — 99214 OFFICE O/P EST MOD 30 MIN: CPT | Performed by: NURSE PRACTITIONER

## 2024-01-19 NOTE — PROGRESS NOTES
Patient ID: Bobo Cheng is a 63 y.o. male.    Assessment/Plan:       Diagnoses and all orders for this visit:    Restless leg syndrome    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)    Tremor of left hand       Pt will increase ropinirole to .375mg  nightly at bedtime to help reduce the leg twitching in the am, can increase to 0.5mg if tolerated.  Continue with magnesium 400mg twice a day  Increase oral hydration for muscle movement  Contact Neurology office if the increase in ropinirole is effective and if increased to 0.5mg night  Update Neurology office if starting Gabapentin due to the knee pain  Follow up with Neurology office in 5 months or sooner if needed    Subjective/HPI:  Bobo Cheng is a 62yo male who follows in the outpatient neurology office for medical management of his diabetic neuropathy, restless  leg syndrome and tremors of the left hand.  Patient has history of diabetes, has had  High A1c's in the past, last level was 7.6 which has remained stable.He follows with endocrine for medical management.  He also has had vitamin deficiencies for which she was  getting injections in the past.  MRI of the lumbar spine showed mild degenerative disc disease, focal disc protrusion at L3-4 with possible impingement at L4 descending nerve root.  Had T11 compression fracture with mild kyphosis, bone density testing was normal.  EMG of the upper extremities with evidence of median nerve entrapment consistent with carpal tunnel syndrome, left ulnar neuropathy as well.  He followed with orthopedics for bilateral directions which were effective for him.  He does have disrupted sleep apnea for which he wears a CPAP machine.  He noted restless legs with sharp shooting pains over his extremities with neuropathy.  He had started on gabapentin for this.  Patient is a , no surgical interventions and opted taking gabapentin at bedtime only, takes small doses.  Patient also had reports of  crawling sensations on his legs.  States that it is generally happening to him at night although happens when he is just sitting still at times.  Can be during the day.  He did try pramipexole, reported muscle cramping and twitching for which she was recommended magnesium and he takes this twice a day.  Last office appointment he had no significant changes, he had ongoing numbness to his feet and noted his great toes are now and has had some and increased burning type pains over the tops of his feet.  His left is worse than his right.  He was taking gabapentin 100 mg on Wednesday through Monday, holds his Tuesday dosing due to surgical dates.    Patient had reported left hand tremoring as well, he saw OT for this and noted that it continues to be intermittent.  He had to stop OT due to high co-pays.  Patient felt as if his tremors were related to weakness related to the carpal tunnel syndrome.  Encouraged him to continue his home exercise program,  Patient has family history of Parkinson's however has no display of Parkinson's disease on his exam.  Patient has started to decrease his gabapentin to completely weaning off.  We talked about considering Cymbalta or resuming gabapentin if he had any changes in his neuropathy.  We also talked about starting ropinirole for his restless leg syndrome.  He had contacted neurology office the end of September, was off of his gabapentin and did not feel any significant difference.  We started him on ropinirole with an increase to 0.25 mg at bedtime.    Patient reports back to the neurology office today, he did start on the ropinirole and did not feel as though this made much change with his morning twitching and muscle cramping. Patient states that the twitching and muscle cramping in the morning which has been ongoing and not new since starting the ropinirole.  Patient does not indicate creepy crawly sensations however continues with muscle cramping and twitching in the  morning.  He states the symptoms resolve themselves once he is up walking around however can wake him up in the morning.   Pt remains on magnesium for muscle cramping, encouraged to increase oral hydration and will increase ropinirole to 0.375mg nightly with increase up to 0.5mg if tolerating the increase without morning fatigue.   Patient stated he did stop the gabapentin, he is noticed an increase in pain in his knees primarily after stopping the medication.  He feels as though this is more comfortable for him than any of the other sensations.  We have talked about resuming his gabapentin however given it's side effect of grogginess, pt will hold off on restarting it until he is sure he is tolerating the ropinirole changes.   Pt will up date the neurology office with these changes.     Pt continues to have LUE tremor but noted that since he decreased his surgery schedule and did an OT session with bands, putty etc.  He thinks the tremor although still present is less frequent.     Pt will make medication changes, and update the office to make prescription changes if needed. Pt will follow up in the office in 5 months or sooner if needed.       The following portions of the patient's history were reviewed and updated as appropriate: allergies, current medications, past family history, past medical history, past social history, past surgical history, and problem list.      Past Medical History:   Diagnosis Date    Anemia     Bundle branch block, right     CAD (coronary artery disease)     Cataract     CPAP (continuous positive airway pressure) dependence     Diabetes mellitus (HCC)     borderline, controlled with diet and activity    Diverticulitis     GERD (gastroesophageal reflux disease)     History of coronary artery stent placement- pCx and OM2 11/24/2017    Hyperlipidemia     Nasal septal deformity     Neuropathy     Obesity (BMI 30-39.9)     Sleep apnea     wears c-pap    Vitamin D deficiency        Past  Surgical History:   Procedure Laterality Date    COLON SIGMOID RESECTION N/A 2016    Procedure: RESECTION COLON SIGMOID;  Surgeon: KUN Denise MD;  Location:  MAIN OR;  Service:     COLON SIGMOID RESECTION LAPAROSCOPIC N/A 2016    Procedure: RESECTION COLON SIGMOID LAPAROSCOPIC:CONVERTED TO OPEN@1245;  Surgeon: KUN Denise MD;  Location:  MAIN OR;  Service:     COLON SURGERY      Sigmoidectomy    COLONOSCOPY N/A 10/6/2016    Procedure: COLONOSCOPY;  Surgeon: Farzad Ernandez MD;  Location: [facility];  Service:     CYSTOSCOPY W/ RETROGRADES Left 2/3/2017    Procedure: CYSTOSCOPY WITH BILATERAL RETROGRADES, LEFT STENT REMOVAL;  Surgeon: Miguel A Villalobos MD;  Location: [facility];  Service:     ESOPHAGOGASTRODUODENOSCOPY N/A 10/6/2016    Procedure: ESOPHAGOGASTRODUODENOSCOPY (EGD);  Surgeon: Farzad Ernandez MD;  Location: [facility];  Service:     HERNIA REPAIR      KNEE ARTHROSCOPY W/ MENISCECTOMY      MO CYSTO BLADDER W/URETERAL CATHETERIZATION Left 2016    Procedure: CYSTOSCOPY RETROGRADE PYELOGRAM WITH INSERTION STENT URETERAL;  Surgeon: Miguel A Villalobos MD;  Location: [facility];  Service: Urology    US GUIDED THYROID BIOPSY  2021    VARICOSE VEIN SURGERY         Social History     Socioeconomic History    Marital status: Single     Spouse name: None    Number of children: 0    Years of education: None    Highest education level: None   Occupational History    None   Tobacco Use    Smoking status: Former     Current packs/day: 0.00     Average packs/day: 1 pack/day for 37.0 years (37.0 ttl pk-yrs)     Types: Cigarettes     Start date: 3/30/1981     Quit date: 3/30/2018     Years since quittin.8     Passive exposure: Past    Smokeless tobacco: Current     Types: Chew    Tobacco comments:     chewing nicotine   Vaping Use    Vaping status: Never Used   Substance and Sexual Activity    Alcohol use: Yes     Alcohol/week: 3.0 standard drinks of alcohol     Types: 3 Cans  of beer per week     Comment: occ    Drug use: No    Sexual activity: Never   Other Topics Concern    None   Social History Narrative    Lives alone.     Social Determinants of Health     Financial Resource Strain: Not on file   Food Insecurity: Not on file   Transportation Needs: Not on file   Physical Activity: Not on file   Stress: Not on file   Social Connections: Not on file   Intimate Partner Violence: Not on file   Housing Stability: Not on file       Family History   Problem Relation Age of Onset    Osteoarthritis Mother     Atrial fibrillation Mother     Aortic aneurysm Father     Diabetes Brother     Colon cancer Brother     Diabetes type II Brother     Colon cancer Brother     Heart disease Maternal Grandmother     Cancer Maternal Grandfather     Stroke Paternal Grandmother     Heart attack Paternal Grandfather          Current Outpatient Medications:     albuterol (PROVENTIL HFA,VENTOLIN HFA) 90 mcg/act inhaler, Inhale 2 puffs every 4 (four) hours as needed for wheezing or shortness of breath, Disp: 18 g, Rfl: 6    Ascorbic Acid (VITAMIN C) 100 MG tablet, Take 500 mg by mouth , Disp: , Rfl:     aspirin 81 MG tablet, Take 162 mg by mouth daily  , Disp: , Rfl:     atorvastatin (LIPITOR) 40 mg tablet, TAKE 1 TABLET BY MOUTH ONCE DAILY WITH SUPPER, Disp: 90 tablet, Rfl: 0    BIOTIN PO, Take 5 mg by mouth daily, Disp: , Rfl:     Cholecalciferol (VITAMIN D3) 1000 units CAPS, Take by mouth daily , Disp: , Rfl:     Continuous Blood Gluc  (FreeStyle Alexei 2 Sinclair) [facility], USE 1 READER 1 TIME FOR 1 DOSE, Disp: , Rfl:     Continuous Blood Gluc Sensor (FreeStyle Alexei 2 Sensor) MISC, USE 1 SENSOR EVERY 14 DAYS, Disp: 7 each, Rfl: 1    Continuous Blood Gluc Sensor (FreeStyle Alexei 3 Sensor) MISC, Change every 14 days as directed., Disp: 2 each, Rfl: 2    cyanocobalamin (VITAMIN B-12) 100 mcg tablet, Take by mouth daily, Disp: , Rfl:     desloratadine (CLARINEX) 5 MG tablet, TAKE 1 TABLET BY MOUTH AT BEDTIME,  Disp: 90 tablet, Rfl: 1    famotidine (PEPCID) 20 mg tablet, Take 20 mg by mouth daily, Disp: , Rfl:     FREESTYLE LITE test strip, USE 1 STRIP TO CHECK GLUCOSE ONCE DAILY AS DIRECTED, Disp: 100 each, Rfl: 0    FREESTYLE LITE test strip, USE 1 STRIP TO CHECK GLUCOSE ONCE DAILY AS INSTRUCTED, Disp: 100 each, Rfl: 0    glucosamine-chondroitin 500-400 MG tablet, Take 1 tablet by mouth daily, Disp: , Rfl:     hydrochlorothiazide (HYDRODIURIL) 25 mg tablet, Take 1 tablet (25 mg total) by mouth daily, Disp: 90 tablet, Rfl: 1    losartan (COZAAR) 50 mg tablet, Take 1 tablet by mouth once daily, Disp: 90 tablet, Rfl: 0    Magnesium Hydroxide (MAGNESIA PO), Take by mouth 2 (two) times a day, Disp: , Rfl:     metFORMIN (GLUCOPHAGE) 1000 MG tablet, TAKE 1 TABLET BY MOUTH TWICE DAILY WITH MEALS, Disp: 180 tablet, Rfl: 1    Multiple Vitamin (MULTIVITAMIN) capsule, Take 1 capsule by mouth daily, Disp: , Rfl:     nitroglycerin (NITROSTAT) 0.3 mg SL tablet, Place 1 tablet (0.3 mg total) under the tongue every 5 (five) minutes as needed for chest pain, Disp: 25 tablet, Rfl: 1    ONETOUCH DELICA LANCETS 30G MISC, 1 Units by Does not apply route daily, Disp: 100 each, Rfl: 6    Ozempic, 1 MG/DOSE, 4 MG/3ML injection pen, INJECT 1 MG SUBCUTANEOUSLY ONCE A WEEK, Disp: 3 mL, Rfl: 0    rOPINIRole (REQUIP) 0.25 mg tablet, Take 1/2 tablet (0.125mg) daily at bedtime x 2 weeks then increase to full tablet if tolerating, Disp: 30 tablet, Rfl: 3    torsemide (DEMADEX) 10 mg tablet, Take 1 tablet (10 mg total) by mouth daily, Disp: 30 tablet, Rfl: 1    Alpha-Lipoic Acid 100 MG CAPS, Take by mouth, Disp: , Rfl:     Current Facility-Administered Medications:     cyanocobalamin injection 1,000 mcg, 1,000 mcg, Intramuscular, Q30 Days, LUCERO Junior, 1,000 mcg at 09/10/21 1141    Allergies   Allergen Reactions    Levaquin [Levofloxacin In D5w] Swelling     Knee swelling    Sulfa Antibiotics GI Intolerance     Abdominal pain       "    Blood pressure 124/66, pulse 92, height 5' 11\" (1.803 m), weight 125 kg (275 lb).               Objective:    Blood pressure 124/66, pulse 92, height 5' 11\" (1.803 m), weight 125 kg (275 lb).    Physical Exam  Vitals reviewed.   Constitutional:       Appearance: Normal appearance. He is well-developed.   HENT:      Head: Normocephalic.      Right Ear: Hearing normal.      Left Ear: Hearing normal.      Nose: Nose normal.      Mouth/Throat:      Mouth: Mucous membranes are moist.   Eyes:      General: Lids are normal.      Extraocular Movements: Extraocular movements intact.      Conjunctiva/sclera: Conjunctivae normal.      Pupils: Pupils are equal, round, and reactive to light.   Cardiovascular:      Rate and Rhythm: Normal rate.   Pulmonary:      Effort: Pulmonary effort is normal. No respiratory distress.   Abdominal:      Palpations: Abdomen is soft.      Tenderness: There is no abdominal tenderness.   Musculoskeletal:         General: Normal range of motion.      Cervical back: Normal range of motion.   Skin:     General: Skin is warm and dry.   Neurological:      Mental Status: He is alert.      Motor: Motor strength is normal.     Coordination: Coordination is intact. Romberg sign negative.      Deep Tendon Reflexes: Reflexes are normal and symmetric.   Psychiatric:         Attention and Perception: Attention and perception normal.         Mood and Affect: Mood and affect normal.         Speech: Speech normal.         Behavior: Behavior normal. Behavior is cooperative.         Thought Content: Thought content normal.         Cognition and Memory: Cognition and memory normal.         Judgment: Judgment normal.         Neurological Exam  Mental Status  Alert. Oriented to person, place, time and situation. Memory is normal. Recent and remote memory are intact. Speech is normal. Language is fluent with no aphasia. Attention and concentration are normal. Fund of knowledge is appropriate for level of " education.    Cranial Nerves  CN II: Visual acuity is normal. Visual fields full to confrontation.  CN III, IV, VI: Extraocular movements intact bilaterally. Normal lids and orbits bilaterally. Pupils equal round and reactive to light bilaterally.  CN V: Facial sensation is normal.  CN VII: Full and symmetric facial movement.  CN VIII:  Right: Hearing is normal.  Left: Hearing is normal.  CN IX, X: Palate elevates symmetrically. Normal gag reflex.  CN XI: Shoulder shrug strength is normal.  CN XII: Tongue midline without atrophy or fasciculations.    Motor  Normal muscle bulk throughout. Normal muscle tone. No abnormal involuntary movements. Strength is 5/5 throughout all four extremities.    Sensory  Light touch abnormality: Temperature is normal in upper and lower extremities. Vibration is normal in upper and lower extremities. Proprioception is normal in upper and lower extremities. Sensation: Decrease in sensation to the LE bilaterally L>R from knee down  Decreased sensation in the left hand, equal up to the elbow.  .     Reflexes  Deep tendon reflexes are 2+ and symmetric in all four extremities.    Right pathological reflexes: Azul's absent.  Left pathological reflexes: Azul's absent.    Coordination    Finger-to-nose, rapid alternating movements and heel-to-shin normal bilaterally without dysmetria.    Gait  Casual gait is normal including stance, stride, and arm swing.Normal toe walking. Normal heel walking. Normal tandem gait. Romberg is absent. Able to rise from chair without using arms.        ROS:    Review of Systems   Constitutional:  Negative for appetite change, fatigue and fever.   HENT: Negative.  Negative for hearing loss, tinnitus, trouble swallowing and voice change.    Eyes: Negative.  Negative for photophobia, pain and visual disturbance.   Respiratory: Negative.  Negative for shortness of breath.    Cardiovascular: Negative.  Negative for palpitations.   Gastrointestinal: Negative.   Negative for nausea and vomiting.   Endocrine: Negative.  Negative for cold intolerance.   Genitourinary: Negative.  Negative for dysuria, frequency and urgency.   Musculoskeletal:  Negative for back pain, gait problem, myalgias, neck pain and neck stiffness.   Skin: Negative.  Negative for rash.   Allergic/Immunologic: Negative.    Neurological:  Positive for tremors and numbness (feet). Negative for dizziness, seizures, syncope, facial asymmetry, speech difficulty, weakness, light-headedness and headaches.   Hematological: Negative.  Does not bruise/bleed easily.   Psychiatric/Behavioral: Negative.  Negative for confusion, hallucinations and sleep disturbance.      ROS reviewed and d/w pt

## 2024-01-19 NOTE — PATIENT INSTRUCTIONS
Pt will increase ropinirole to .375mg  nightly at bedtime to help reduce the leg twitching in the am, can increase to 0.5mg if tolerated.  Continue with magnesium 400mg twice a day  Increase oral hydration for muscle movement  Contact Neurology office if the increase in ropinirole is effective and if increased to 0.5mg night  Update Neurology office if starting Gabapentin due to the knee pain  Follow up with Neurology office in 5 months or sooner if needed.

## 2024-01-20 DIAGNOSIS — I10 ESSENTIAL HYPERTENSION: ICD-10-CM

## 2024-01-22 RX ORDER — HYDROCHLOROTHIAZIDE 25 MG/1
25 TABLET ORAL DAILY
Qty: 90 TABLET | Refills: 1 | Status: SHIPPED | OUTPATIENT
Start: 2024-01-22

## 2024-02-09 ENCOUNTER — OFFICE VISIT (OUTPATIENT)
Dept: CARDIOLOGY CLINIC | Facility: CLINIC | Age: 64
End: 2024-02-09
Payer: COMMERCIAL

## 2024-02-09 VITALS
SYSTOLIC BLOOD PRESSURE: 138 MMHG | HEART RATE: 65 BPM | HEIGHT: 71 IN | OXYGEN SATURATION: 98 % | DIASTOLIC BLOOD PRESSURE: 70 MMHG | WEIGHT: 273 LBS | BODY MASS INDEX: 38.22 KG/M2

## 2024-02-09 DIAGNOSIS — G25.81 RESTLESS LEG SYNDROME: ICD-10-CM

## 2024-02-09 DIAGNOSIS — I49.3 ASYMPTOMATIC PVCS: ICD-10-CM

## 2024-02-09 DIAGNOSIS — I50.32 CHRONIC DIASTOLIC CONGESTIVE HEART FAILURE (HCC): ICD-10-CM

## 2024-02-09 DIAGNOSIS — E78.2 MIXED HYPERLIPIDEMIA: ICD-10-CM

## 2024-02-09 DIAGNOSIS — I10 ESSENTIAL HYPERTENSION: ICD-10-CM

## 2024-02-09 DIAGNOSIS — I45.10 BUNDLE BRANCH BLOCK, RIGHT: ICD-10-CM

## 2024-02-09 DIAGNOSIS — I25.10 CORONARY ARTERY DISEASE INVOLVING NATIVE CORONARY ARTERY OF NATIVE HEART WITHOUT ANGINA PECTORIS: ICD-10-CM

## 2024-02-09 DIAGNOSIS — R00.1 BRADYCARDIA: Primary | ICD-10-CM

## 2024-02-09 PROCEDURE — 93000 ELECTROCARDIOGRAM COMPLETE: CPT | Performed by: INTERNAL MEDICINE

## 2024-02-09 PROCEDURE — 99214 OFFICE O/P EST MOD 30 MIN: CPT | Performed by: INTERNAL MEDICINE

## 2024-02-09 RX ORDER — ROPINIROLE 0.5 MG/1
0.5 TABLET, FILM COATED ORAL
Qty: 30 TABLET | Refills: 5 | Status: SHIPPED | OUTPATIENT
Start: 2024-02-09

## 2024-02-09 NOTE — PROGRESS NOTES
Bobo Cheng  1960  [id number]  [facility] PROFESSIONAL [facility] [place] [id number] [facility] [id number]    Interval History:  Bobo Cheng is a 63 y.o. male who presents for routine coronary artery disease follow-up.   Since his last visit, he has been feeling well.  he denies any palpitations, chest pain, LE edema, orthopnea or PND.  He had 1 episode of shortness of breath while going up the stairs.    Diet is overall unchanged.  There has not been a significant change in weight.    Most recent blood work showed LDL of 45 mg/dL with TG of 241 and Total cholesterol 125.   He has mild lower extremity edema.  He does not use torsemide which he was taking in the past.  Weight has been stable.  Does not exercise regularly.  He had chest discomfort during his last visit.  A stress echocardiogram was done which was normal.   He had a PFT study in June which was normal.    Treadmill stress test in 2019 was normal.  He wore a holter monitor because of resting bradycardia.  Monitor showed an average HR of 75 bpm and no heart blocks.        Previously was having dyspnea  that improved with torsemide.     He continues to refrain from smoking.    In November 2017, he underwent drug-eluting stent placement to left circumflex and OM 2 lesions after presenting to hospital with chest and arm pain, elevated BP and ST depressions.       Past Medical History:   Diagnosis Date    Anemia     Bundle branch block, right     CAD (coronary artery disease)     Cataract     CPAP (continuous positive airway pressure) dependence     Diabetes mellitus (HCC)     borderline, controlled with diet and activity    Diverticulitis     GERD (gastroesophageal reflux disease)     History of coronary artery stent placement- pCx and OM2 11/24/2017    Hyperlipidemia     Nasal septal deformity     Neuropathy     Obesity (BMI 30-39.9)     Sleep apnea     wears c-pap    Vitamin D deficiency       Past Surgical History:   Procedure Laterality Date    COLON SIGMOID RESECTION N/A 2016    Procedure: RESECTION COLON SIGMOID;  Surgeon: KUN Denise MD;  Location:  MAIN OR;  Service:     COLON SIGMOID RESECTION LAPAROSCOPIC N/A 2016    Procedure: RESECTION COLON SIGMOID LAPAROSCOPIC:CONVERTED TO OPEN@1245;  Surgeon: KUN Densie MD;  Location:  MAIN OR;  Service:     COLON SURGERY      Sigmoidectomy    COLONOSCOPY N/A 10/6/2016    Procedure: COLONOSCOPY;  Surgeon: Farzad Ernandez MD;  Location: [facility] GI [facility];  Service:     CYSTOSCOPY W/ RETROGRADES Left 2/3/2017    Procedure: CYSTOSCOPY WITH BILATERAL RETROGRADES, LEFT STENT REMOVAL;  Surgeon: Miguel A Villalobos MD;  Location: [facility] OR;  Service:     ESOPHAGOGASTRODUODENOSCOPY N/A 10/6/2016    Procedure: ESOPHAGOGASTRODUODENOSCOPY (EGD);  Surgeon: Farzad Ernandez MD;  Location: [facility];  Service:     HERNIA REPAIR      KNEE ARTHROSCOPY W/ MENISCECTOMY      IA CYSTO BLADDER W/URETERAL CATHETERIZATION Left 2016    Procedure: CYSTOSCOPY RETROGRADE PYELOGRAM WITH INSERTION STENT URETERAL;  Surgeon: Miguel A Villalobos MD;  Location: [facility];  Service: Urology    US GUIDED THYROID BIOPSY  2021    VARICOSE VEIN SURGERY       Social History     Socioeconomic History    Marital status: Single     Spouse name: Not on file    Number of children: 0    Years of education: Not on file    Highest education level: Not on file   Occupational History    Not on file   Tobacco Use    Smoking status: Former     Current packs/day: 0.00     Average packs/day: 1 pack/day for 37.0 years (37.0 ttl pk-yrs)     Types: Cigarettes     Start date: 3/30/1981     Quit date: 3/30/2018     Years since quittin.8     Passive exposure: Past    Smokeless tobacco: Current     Types: Chew    Tobacco comments:     chewing nicotine   Vaping Use    Vaping status: Never Used   Substance and Sexual Activity    Alcohol use: Yes     Alcohol/week: 3.0 standard  drinks of alcohol     Types: 3 Cans of beer per week     Comment: occ    Drug use: No    Sexual activity: Never   Other Topics Concern    Not on file   Social History Narrative    Lives alone.     Social Determinants of Health     Financial Resource Strain: Not on file   Food Insecurity: Not on file   Transportation Needs: Not on file   Physical Activity: Not on file   Stress: Not on file   Social Connections: Not on file   Intimate Partner Violence: Not on file   Housing Stability: Not on file     Family History   Problem Relation Age of Onset    Osteoarthritis Mother     Atrial fibrillation Mother     Aortic aneurysm Father     Diabetes Brother     Colon cancer Brother     Diabetes type II Brother     Colon cancer Brother     Heart disease Maternal Grandmother     Cancer Maternal Grandfather     Stroke Paternal Grandmother     Heart attack Paternal Grandfather        Current Outpatient Medications:     albuterol (PROVENTIL HFA,VENTOLIN HFA) 90 mcg/act inhaler, Inhale 2 puffs every 4 (four) hours as needed for wheezing or shortness of breath, Disp: 18 g, Rfl: 6    Alpha-Lipoic Acid 100 MG CAPS, Take by mouth, Disp: , Rfl:     Ascorbic Acid (VITAMIN C) 100 MG tablet, Take 500 mg by mouth , Disp: , Rfl:     aspirin 81 MG tablet, Take 162 mg by mouth daily  , Disp: , Rfl:     atorvastatin (LIPITOR) 40 mg tablet, TAKE 1 TABLET BY MOUTH ONCE DAILY WITH SUPPER, Disp: 90 tablet, Rfl: 0    BIOTIN PO, Take 5 mg by mouth daily, Disp: , Rfl:     Cholecalciferol (VITAMIN D3) 1000 units CAPS, Take by mouth daily , Disp: , Rfl:     Continuous Blood Gluc  (FreeStyle Alexei 2 [place]) STEVE, USE 1 READER 1 TIME FOR 1 DOSE, Disp: , Rfl:     Continuous Blood Gluc Sensor (FreeStyle Alexei 2 Sensor) MISC, USE 1 SENSOR EVERY 14 DAYS, Disp: 7 each, Rfl: 1    Continuous Blood Gluc Sensor (FreeStyle Alexei 3 Sensor) MISC, Change every 14 days as directed., Disp: 2 each, Rfl: 2    cyanocobalamin (VITAMIN B-12) 100 mcg tablet, Take by  mouth daily, Disp: , Rfl:     desloratadine (CLARINEX) 5 MG tablet, TAKE 1 TABLET BY MOUTH AT BEDTIME, Disp: 90 tablet, Rfl: 1    famotidine (PEPCID) 20 mg tablet, Take 20 mg by mouth daily, Disp: , Rfl:     FREESTYLE LITE test strip, USE 1 STRIP TO CHECK GLUCOSE ONCE DAILY AS DIRECTED, Disp: 100 each, Rfl: 0    FREESTYLE LITE test strip, USE 1 STRIP TO CHECK GLUCOSE ONCE DAILY AS INSTRUCTED, Disp: 100 each, Rfl: 0    glucosamine-chondroitin 500-400 MG tablet, Take 1 tablet by mouth daily, Disp: , Rfl:     hydrochlorothiazide (HYDRODIURIL) 25 mg tablet, Take 1 tablet by mouth once daily, Disp: 90 tablet, Rfl: 1    losartan (COZAAR) 50 mg tablet, Take 1 tablet by mouth once daily, Disp: 90 tablet, Rfl: 0    Magnesium Hydroxide (MAGNESIA PO), Take by mouth 2 (two) times a day, Disp: , Rfl:     metFORMIN (GLUCOPHAGE) 1000 MG tablet, TAKE 1 TABLET BY MOUTH TWICE DAILY WITH MEALS, Disp: 180 tablet, Rfl: 1    Multiple Vitamin (MULTIVITAMIN) capsule, Take 1 capsule by mouth daily, Disp: , Rfl:     nitroglycerin (NITROSTAT) 0.3 mg SL tablet, Place 1 tablet (0.3 mg total) under the tongue every 5 (five) minutes as needed for chest pain, Disp: 25 tablet, Rfl: 1    ONETOUCH DELICA LANCETS 30G MISC, 1 Units by Does not apply route daily, Disp: 100 each, Rfl: 6    Ozempic, 1 MG/DOSE, 4 MG/3ML injection pen, INJECT 1 MG SUBCUTANEOUSLY ONCE A WEEK, Disp: 3 mL, Rfl: 0    rOPINIRole (REQUIP) 0.25 mg tablet, Take 1/2 tablet (0.125mg) daily at bedtime x 2 weeks then increase to full tablet if tolerating, Disp: 30 tablet, Rfl: 3    torsemide (DEMADEX) 10 mg tablet, Take 1 tablet (10 mg total) by mouth daily, Disp: 30 tablet, Rfl: 1    Current Facility-Administered Medications:     cyanocobalamin injection 1,000 mcg, 1,000 mcg, Intramuscular, Q30 Days, LUCERO Junior, 1,000 mcg at 09/10/21 1141  The following portions of the patient's history were reviewed and updated as appropriate: allergies, current medications,  "past family history, past medical history, past social history, past surgical history and problem list..    Review of Systems:  Review of Systems   Respiratory:  Negative for shortness of breath.    Cardiovascular:  Negative for chest pain, palpitations and leg swelling.   Musculoskeletal:  Positive for arthralgias.   All other systems reviewed and are negative.      Physical Exam:  /70 (BP Location: Right arm, Patient Position: Sitting, Cuff Size: Large)   Pulse 65   Ht 5' 11\" (1.803 m)   Wt 124 kg (273 lb)   SpO2 98%   BMI 38.08 kg/m²     Physical Exam  Constitutional:       General: He is not in acute distress.     Appearance: Normal appearance. He is well-developed. He is not diaphoretic.   HENT:      Head: Normocephalic and atraumatic.   Eyes:      General: No scleral icterus.     Conjunctiva/sclera: Conjunctivae normal.      Pupils: Pupils are equal, round, and reactive to light.   Neck:      Thyroid: No thyromegaly.      Vascular: No JVD.   Cardiovascular:      Rate and Rhythm: Normal rate and regular rhythm.      Heart sounds: Normal heart sounds. No murmur heard.     No friction rub. No gallop.   Pulmonary:      Effort: Pulmonary effort is normal.      Breath sounds: Normal breath sounds.   Musculoskeletal:      Cervical back: Neck supple.      Right lower leg: No edema.      Left lower leg: No edema.   Skin:     General: Skin is warm and dry.      Findings: No erythema or rash.   Neurological:      General: No focal deficit present.      Mental Status: He is alert and oriented to person, place, and time. Mental status is at baseline.   Psychiatric:         Mood and Affect: Mood normal.         Behavior: Behavior normal.         Thought Content: Thought content normal.         Judgment: Judgment normal.         Cardiographics  ECG: sinus rhythm with rate 65  LV Ejection Fraction: LV ejection fraction >= 40%.       Lab Review  Lab Results   Component Value Date    TRIG 241 (H) 11/03/2023    TRIG " 247 (H) 08/04/2023    TRIG 151 (H) 05/19/2023    HDL 42 11/03/2023    HDL 34 (L) 08/04/2023    HDL 39 (L) 05/19/2023    LDLDIRECT 79 04/10/2020    LDLDIRECT 83 12/27/2019     Assessment/Plan     1. Bradycardia    2. Asymptomatic PVCs    3. Bundle branch block, right    4. Chronic diastolic congestive heart failure (HCC)    5. Mixed hyperlipidemia    6. Coronary artery disease involving native coronary artery of native heart without angina pectoris    7. Essential hypertension      - Encouraged regular exercise.    -  Blood pressure is stable.  Continue current medication regimen including losartan and hydrochlorothiazide.  Target BP is < 130/80 mmHg.  -  Resume torsemide if edema returns.   - diabetes management per endocrinologist.    - Continue atorvastatin - last LDL was <70.    - Continue current Rx  - recommend using torsemide when edema is present.  He previously developed congestive heart failure.  Encouraged to reduce salt in diet if possible.

## 2024-02-10 LAB
ALBUMIN SERPL-MCNC: 4.6 G/DL (ref 3.9–4.9)
ALBUMIN/GLOB SERPL: 1.6 {RATIO} (ref 1.2–2.2)
ALP SERPL-CCNC: 52 IU/L (ref 44–121)
ALT SERPL-CCNC: 84 IU/L (ref 0–44)
AST SERPL-CCNC: 51 IU/L (ref 0–40)
BASOPHILS # BLD AUTO: 0.1 X10E3/UL (ref 0–0.2)
BASOPHILS NFR BLD AUTO: 1 %
BILIRUB SERPL-MCNC: 0.6 MG/DL (ref 0–1.2)
BUN SERPL-MCNC: 16 MG/DL (ref 8–27)
BUN/CREAT SERPL: 16 (ref 10–24)
CALCIUM SERPL-MCNC: 9.8 MG/DL (ref 8.6–10.2)
CHLORIDE SERPL-SCNC: 99 MMOL/L (ref 96–106)
CHOLEST SERPL-MCNC: 122 MG/DL (ref 100–199)
CHOLEST/HDLC SERPL: 3.1 RATIO (ref 0–5)
CO2 SERPL-SCNC: 23 MMOL/L (ref 20–29)
CREAT SERPL-MCNC: 1.03 MG/DL (ref 0.76–1.27)
EGFR: 83 ML/MIN/1.73
EOSINOPHIL # BLD AUTO: 0.5 X10E3/UL (ref 0–0.4)
EOSINOPHIL NFR BLD AUTO: 7 %
ERYTHROCYTE [DISTWIDTH] IN BLOOD BY AUTOMATED COUNT: 13 % (ref 11.6–15.4)
EST. AVERAGE GLUCOSE BLD GHB EST-MCNC: 163 MG/DL
FSH SERPL-ACNC: 11 MIU/ML (ref 1.5–12.4)
GLOBULIN SER-MCNC: 2.8 G/DL (ref 1.5–4.5)
GLUCOSE SERPL-MCNC: 149 MG/DL (ref 70–99)
HBA1C MFR BLD: 7.3 % (ref 4.8–5.6)
HCT VFR BLD AUTO: 44.2 % (ref 37.5–51)
HDLC SERPL-MCNC: 40 MG/DL
HGB BLD-MCNC: 15.5 G/DL (ref 13–17.7)
IMM GRANULOCYTES # BLD: 0 X10E3/UL (ref 0–0.1)
IMM GRANULOCYTES NFR BLD: 0 %
LDLC SERPL CALC-MCNC: 48 MG/DL (ref 0–99)
LH SERPL-ACNC: 8.3 MIU/ML (ref 1.7–8.6)
LYMPHOCYTES # BLD AUTO: 1.9 X10E3/UL (ref 0.7–3.1)
LYMPHOCYTES NFR BLD AUTO: 24 %
MCH RBC QN AUTO: 34.9 PG (ref 26.6–33)
MCHC RBC AUTO-ENTMCNC: 35.1 G/DL (ref 31.5–35.7)
MCV RBC AUTO: 100 FL (ref 79–97)
MONOCYTES # BLD AUTO: 0.7 X10E3/UL (ref 0.1–0.9)
MONOCYTES NFR BLD AUTO: 9 %
NEUTROPHILS # BLD AUTO: 4.8 X10E3/UL (ref 1.4–7)
NEUTROPHILS NFR BLD AUTO: 59 %
PLATELET # BLD AUTO: 300 X10E3/UL (ref 150–450)
POTASSIUM SERPL-SCNC: 4.4 MMOL/L (ref 3.5–5.2)
PROLACTIN SERPL-MCNC: 7.6 NG/ML (ref 3.6–25.2)
PROT SERPL-MCNC: 7.4 G/DL (ref 6–8.5)
RBC # BLD AUTO: 4.44 X10E6/UL (ref 4.14–5.8)
SL AMB VLDL CHOLESTEROL CALC: 34 MG/DL (ref 5–40)
SODIUM SERPL-SCNC: 138 MMOL/L (ref 134–144)
T4 FREE SERPL-MCNC: 1.03 NG/DL (ref 0.82–1.77)
TESTOST FREE SERPL-MCNC: 2.2 PG/ML (ref 6.6–18.1)
TESTOST SERPL-MCNC: 293 NG/DL (ref 264–916)
TRIGL SERPL-MCNC: 209 MG/DL (ref 0–149)
TSH SERPL DL<=0.005 MIU/L-ACNC: 1.38 UIU/ML (ref 0.45–4.5)
WBC # BLD AUTO: 8 X10E3/UL (ref 3.4–10.8)

## 2024-02-12 DIAGNOSIS — E11.65 TYPE 2 DIABETES MELLITUS WITH HYPERGLYCEMIA, UNSPECIFIED WHETHER LONG TERM INSULIN USE (HCC): ICD-10-CM

## 2024-02-13 RX ORDER — SEMAGLUTIDE 1.34 MG/ML
INJECTION, SOLUTION SUBCUTANEOUS
Qty: 3 ML | Refills: 0 | Status: SHIPPED | OUTPATIENT
Start: 2024-02-13

## 2024-02-16 ENCOUNTER — OFFICE VISIT (OUTPATIENT)
Dept: ENDOCRINOLOGY | Facility: CLINIC | Age: 64
End: 2024-02-16
Payer: COMMERCIAL

## 2024-02-16 VITALS
SYSTOLIC BLOOD PRESSURE: 136 MMHG | HEART RATE: 83 BPM | WEIGHT: 276 LBS | OXYGEN SATURATION: 98 % | HEIGHT: 71 IN | BODY MASS INDEX: 38.64 KG/M2 | DIASTOLIC BLOOD PRESSURE: 80 MMHG

## 2024-02-16 DIAGNOSIS — E11.65 TYPE 2 DIABETES MELLITUS WITH HYPERGLYCEMIA, UNSPECIFIED WHETHER LONG TERM INSULIN USE (HCC): ICD-10-CM

## 2024-02-16 DIAGNOSIS — E78.2 MIXED HYPERLIPIDEMIA: ICD-10-CM

## 2024-02-16 DIAGNOSIS — I10 ESSENTIAL HYPERTENSION: ICD-10-CM

## 2024-02-16 DIAGNOSIS — E55.9 VITAMIN D DEFICIENCY: ICD-10-CM

## 2024-02-16 DIAGNOSIS — Z12.11 COLON CANCER SCREENING: Primary | ICD-10-CM

## 2024-02-16 PROCEDURE — 99214 OFFICE O/P EST MOD 30 MIN: CPT | Performed by: NURSE PRACTITIONER

## 2024-02-16 NOTE — PROGRESS NOTES
Established Patient Progress Note    Chief Complaint:  Diabetes follow up visit    Impression & Plan:    Problem List Items Addressed This Visit          Endocrine    Type 2 diabetes mellitus with hyperglycemia (HCC)       Lab Results   Component Value Date    HGBA1C 7.3 (H) 02/09/2024     HGA1C close to goal. Discussed increase Ozempic or transition to Mounjaro.     BGL Reviewed: Continue CGM    Treatment regimen:   Metformin 1000 mg orally twice a day  Ozempic 1 mg subcutaneously weekly    Discussed risks/complications associated with uncontrolled diabetes including organ involvement, heart attack, stroke, death.    Advised lifestyle modifications including attention to diet including the amount and types of carbohydrates consumed and regular activity.     Call for blood sugars less than 70 mg/dl or patterns over 250 mg/dl.     Recommendation for medical identification either bracelet, necklace.    Routine follow up for diabetic eye and foot exams.     Ordered blood work to complete prior to next visit.    Send glucose logs/CGM download in 1-2 weeks for review    Follow up in 3 months.            Relevant Orders    Hemoglobin A1C    Basic metabolic panel    Albumin / creatinine urine ratio       Cardiovascular and Mediastinum    Essential hypertension     BP well controlled on current regimen. Continues on ARB.            Other    Hyperlipidemia     Continues on statin therapy. LDL 48.          Vitamin D deficiency     Continues on vitamin D supplementation.          Other Visit Diagnoses       Colon cancer screening    -  Primary    Relevant Orders    Ambulatory Referral to Gastroenterology            History of Present Illness:   Bobo Cheng is a 63 y.o. male with a history of type 2 diabetes without long term use of insulin. Reports complications of neuropathy follows with podiatry.  Denies retinopathy follows annually with with optho. Denies heart attack or stroke.  Has significant past medical  history of CAD with stents.   Denies recent illness or hospitalizations. Denies recent severe hypoglycemic or severe hyperglycemic episodes.  Denies any adverse effects of metformin or Ozempic including abdominal pain, nausea, vomiting, diarrhea, constipation, severe appetite suppression.  Home glucose monitoring: are performed regularly.  See scanned blood sugars.     CGM Interpretation  Bobo Cheng   Device used Freestyle lino for Personal Use  Indication: Type of Diabetes: Type 2 Diabetes  More than 72 hours of data was reviewed. Report to be scanned to chart.   Date Range: February 3, 2024-February 16, 2024  Analysis of data:   Average Glucose: 173 mg/dl  Coefficient of Variation: 15.9%  GMI: 7.4%  Time in Target Range: 65%  Time Above Range: 35%  Time Below Range: 0%   Interpretation of data:   Some hyperglycemia post meals.     Current regimen:   Metformin 1000 mg orally twice a day  Ozempic 1 mg subcutaneously weekly     Has hypertension: Taking losartan, compliant  Has hyperlipidemia: Taking atorvastatin, tolerating  Has vitamin B12 deficiency: Taking supplementation    Patient Active Problem List   Diagnosis    Diabetes mellitus (HCC)    Hyperlipidemia    Essential hypertension    Elevated liver enzymes    Polyarthralgia    Morbid obesity (HCC)    Chronic pain of both knees    Primary osteoarthritis of knees, bilateral    Coronary artery disease involving native coronary artery of native heart without angina pectoris    Asymptomatic PVCs    Obstructive sleep apnea    Obesity (BMI 30-39.9)    Seasonal allergic rhinitis due to pollen    GERD (gastroesophageal reflux disease)    Nasal septal deformity    Bundle branch block, right    CPAP (continuous positive airway pressure) dependence    Vitamin D deficiency    BPH (benign prostatic hyperplasia)    History of vertebral compression fracture    Colon polyp    History of angioplasty    Ocular migraine    Inguinal hernia    Umbilical hernia     Bradycardia    Asymptomatic varicose veins of both lower extremities    Neuropathy    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)    Carpal tunnel syndrome of left wrist    Ulnar neuropathy at elbow of left upper extremity    Onychomycosis of toenail    Chronic left-sided low back pain with left-sided sciatica    Varicose veins of leg with swelling, bilateral    Thyroid nodule    Vitamin B 12 deficiency    Compression fracture of body of thoracic vertebra (HCC)    Disc disease, degenerative, lumbar or lumbosacral    History of coronary artery stent placement- pCx and OM2    Carpal tunnel syndrome on right    Tremor of left hand    Restless leg syndrome    Hypertensive heart disease with congestive heart failure (HCC)    Type 2 diabetes mellitus with hyperglycemia (HCC)    Hypomagnesemia    Hearing deficit, right    Screening for lung cancer    Other fatigue    Low testosterone      Past Medical History:   Diagnosis Date    Anemia     Bundle branch block, right     CAD (coronary artery disease)     Cataract     CPAP (continuous positive airway pressure) dependence     Diabetes mellitus (HCC)     borderline, controlled with diet and activity    Diverticulitis     GERD (gastroesophageal reflux disease)     History of coronary artery stent placement- pCx and OM2 11/24/2017    Hyperlipidemia     Nasal septal deformity     Neuropathy     Obesity (BMI 30-39.9)     Sleep apnea     wears c-pap    Vitamin D deficiency       Past Surgical History:   Procedure Laterality Date    COLON SIGMOID RESECTION N/A 12/16/2016    Procedure: RESECTION COLON SIGMOID;  Surgeon: KUN Denise MD;  Location: [facility];  Service:     COLON SIGMOID RESECTION LAPAROSCOPIC N/A 12/16/2016    Procedure: RESECTION COLON SIGMOID LAPAROSCOPIC:CONVERTED TO OPEN@1245;  Surgeon: KUN Denise MD;  Location: [facility];  Service:     COLON SURGERY      Sigmoidectomy    COLONOSCOPY N/A 10/6/2016    Procedure: COLONOSCOPY;  Surgeon: Farazd  MD Oma;  Location: [facility];  Service:     CYSTOSCOPY W/ RETROGRADES Left 2/3/2017    Procedure: CYSTOSCOPY WITH BILATERAL RETROGRADES, LEFT STENT REMOVAL;  Surgeon: Miguel A Villalobos MD;  Location: [facility];  Service:     ESOPHAGOGASTRODUODENOSCOPY N/A 10/6/2016    Procedure: ESOPHAGOGASTRODUODENOSCOPY (EGD);  Surgeon: Farzad Ernandez MD;  Location: [facility];  Service:     HERNIA REPAIR      KNEE ARTHROSCOPY W/ MENISCECTOMY      [place] CYSTO BLADDER W/URETERAL CATHETERIZATION Left 2016    Procedure: CYSTOSCOPY RETROGRADE PYELOGRAM WITH INSERTION STENT URETERAL;  Surgeon: Miguel A Villalobos MD;  Location: [facility];  Service: Urology    US GUIDED THYROID BIOPSY  2021    VARICOSE VEIN SURGERY        Family History   Problem Relation Age of Onset    Osteoarthritis Mother     Atrial fibrillation Mother     Aortic aneurysm Father     Diabetes Brother     Colon cancer Brother     Diabetes type II Brother     Colon cancer Brother     Heart disease Maternal Grandmother     Cancer Maternal Grandfather     Stroke Paternal Grandmother     Heart attack Paternal Grandfather      Social History     Tobacco Use    Smoking status: Former     Current packs/day: 0.00     Average packs/day: 1 pack/day for 37.0 years (37.0 ttl pk-yrs)     Types: Cigarettes     Start date: 3/30/1981     Quit date: 3/30/2018     Years since quittin.8     Passive exposure: Past    Smokeless tobacco: Current     Types: Chew    Tobacco comments:     chewing nicotine   Substance Use Topics    Alcohol use: Yes     Alcohol/week: 3.0 standard drinks of alcohol     Types: 3 Cans of beer per week     Comment: occ     Allergies   Allergen Reactions    Levaquin [Levofloxacin In D5w] Swelling     Knee swelling    Sulfa Antibiotics GI Intolerance     Abdominal pain           Current Outpatient Medications:     Alpha-Lipoic Acid 100 MG CAPS, Take by mouth, Disp: , Rfl:     Ascorbic Acid (VITAMIN C) 100 MG tablet, Take 500 mg by mouth , Disp:  , Rfl:     aspirin 81 MG tablet, Take 162 mg by mouth daily  , Disp: , Rfl:     atorvastatin (LIPITOR) 40 mg tablet, TAKE 1 TABLET BY MOUTH ONCE DAILY WITH SUPPER, Disp: 90 tablet, Rfl: 0    BIOTIN PO, Take 5 mg by mouth daily, Disp: , Rfl:     Cholecalciferol (VITAMIN D3) 1000 units CAPS, Take by mouth daily , Disp: , Rfl:     Continuous Blood Gluc  (FreeStyle Alexei 2 [place]) STEVE, USE 1 READER 1 TIME FOR 1 DOSE, Disp: , Rfl:     Continuous Blood Gluc Sensor (FreeStyle Alexei 2 Sensor) MISC, USE 1 SENSOR EVERY 14 DAYS, Disp: 7 each, Rfl: 1    Continuous Blood Gluc Sensor (FreeStyle Alexei 3 Sensor) MISC, Change every 14 days as directed., Disp: 2 each, Rfl: 2    cyanocobalamin (VITAMIN B-12) 100 mcg tablet, Take by mouth daily, Disp: , Rfl:     desloratadine (CLARINEX) 5 MG tablet, TAKE 1 TABLET BY MOUTH AT BEDTIME, Disp: 90 tablet, Rfl: 1    famotidine (PEPCID) 20 mg tablet, Take 20 mg by mouth daily, Disp: , Rfl:     glucosamine-chondroitin 500-400 MG tablet, Take 1 tablet by mouth daily, Disp: , Rfl:     hydrochlorothiazide (HYDRODIURIL) 25 mg tablet, Take 1 tablet by mouth once daily, Disp: 90 tablet, Rfl: 1    losartan (COZAAR) 50 mg tablet, Take 1 tablet by mouth once daily, Disp: 90 tablet, Rfl: 0    Magnesium Hydroxide (MAGNESIA PO), Take by mouth 2 (two) times a day, Disp: , Rfl:     metFORMIN (GLUCOPHAGE) 1000 MG tablet, TAKE 1 TABLET BY MOUTH TWICE DAILY WITH MEALS, Disp: 180 tablet, Rfl: 1    Multiple Vitamin (MULTIVITAMIN) capsule, Take 1 capsule by mouth daily, Disp: , Rfl:     Ozempic, 1 MG/DOSE, 4 MG/3ML injection pen, INJECT 1 MG  SUBCUTANEOUSLY ONCE A WEEK, Disp: 3 mL, Rfl: 0    rOPINIRole (REQUIP) 0.5 mg tablet, Take 1 tablet (0.5 mg total) by mouth daily at bedtime, Disp: 30 tablet, Rfl: 5    torsemide (DEMADEX) 10 mg tablet, Take 1 tablet (10 mg total) by mouth daily, Disp: 30 tablet, Rfl: 1    albuterol (PROVENTIL HFA,VENTOLIN HFA) 90 mcg/act inhaler, Inhale 2 puffs every 4 (four) hours  "as needed for wheezing or shortness of breath, Disp: 18 g, Rfl: 6    FREESTYLE LITE test strip, USE 1 STRIP TO CHECK GLUCOSE ONCE DAILY AS DIRECTED, Disp: 100 each, Rfl: 0    FREESTYLE LITE test strip, USE 1 STRIP TO CHECK GLUCOSE ONCE DAILY AS INSTRUCTED, Disp: 100 each, Rfl: 0    nitroglycerin (NITROSTAT) 0.3 mg SL tablet, Place 1 tablet (0.3 mg total) under the tongue every 5 (five) minutes as needed for chest pain, Disp: 25 tablet, Rfl: 1    ONETOUCH DELICA LANCETS 30G MISC, 1 Units by Does not apply route daily, Disp: 100 each, Rfl: 6    Current Facility-Administered Medications:     cyanocobalamin injection 1,000 mcg, 1,000 mcg, Intramuscular, Q30 Days, LUCERO Junior, 1,000 mcg at 09/10/21 1141    Review of Systems  Constitutional: Negative for activity change, appetite change, fatigue and unexpected weight change.   HENT: Negative for ear pain, sore throat, trouble swallowing and voice change.    Eyes: Negative for visual disturbance.   Respiratory: Negative for cough and shortness of breath.    Cardiovascular: Negative for chest pain and palpitations.   Gastrointestinal: Negative for abdominal distention, abdominal pain, constipation, diarrhea and vomiting.   Endocrine: Negative for cold intolerance, heat intolerance, polydipsia and polyuria.   Genitourinary: Negative for dysuria and hematuria.   Musculoskeletal: Negative for arthralgias and back pain.   Skin: Negative for color change and rash.   Neurological: Negative for weakness or tremors.   All other systems reviewed and are negative.      Physical Exam:  Body mass index is 38.49 kg/m².  /80 (BP Location: Left arm, Patient Position: Sitting, Cuff Size: Large)   Pulse 83   Ht 5' 11\" (1.803 m)   Wt 125 kg (276 lb)   SpO2 98%   BMI 38.49 kg/m²    Wt Readings from Last 3 Encounters:   02/16/24 125 kg (276 lb)   02/09/24 124 kg (273 lb)   01/19/24 125 kg (275 lb)     Physical Exam   Constitutional: He is oriented to person, " place, and time. He appears well-developed and well-nourished. No distress.   HENT:   Head: Normocephalic and atraumatic.   Neck: Normal range of motion.   Pulmonary/Chest: Effort normal.   Musculoskeletal: Normal range of motion.   Neurological: He is alert and oriented to person, place, and time.   Skin: He is not diaphoretic.   Psychiatric: He has a normal mood and affect. His behavior is normal.     Labs:   Lab Results   Component Value Date    HGBA1C 7.3 (H) 02/09/2024    HGBA1C 7.6 (H) 11/03/2023    HGBA1C 7.6 (H) 08/04/2023     Lab Results   Component Value Date    CREATININE 1.03 02/09/2024    CREATININE 1.11 11/03/2023    CREATININE 1.03 08/04/2023    BUN 16 02/09/2024     11/30/2017    K 4.4 02/09/2024    CL 99 02/09/2024    CO2 23 02/09/2024     eGFR   Date Value Ref Range Status   02/09/2024 83 >59 mL/min/1.73 Final   11/25/2017 73 ml/min/1.73sq m Final     Lab Results   Component Value Date    HDL 40 02/09/2024    TRIG 209 (H) 02/09/2024    CHOLHDL 3.1 02/09/2024     Lab Results   Component Value Date    ALT 84 (H) 02/09/2024    AST 51 (H) 02/09/2024    ALKPHOS 67 11/23/2017     Lab Results   Component Value Date    LNA2STGRPESV 2.809 11/22/2017    BUO4SSLVDYMP 3.509 12/06/2016     Lab Results   Component Value Date    FREET4 1.03 02/09/2024       Discussed with the patient and all questioned fully answered. He will call me if any problems arise.    Follow-up appointment in 3 months.     Counseled patient on diagnostic results, prognosis, risk and benefit of treatment options, instruction for management, importance of treatment compliance, Risk  factor reduction and impressions    LUCERO Rios

## 2024-02-16 NOTE — ASSESSMENT & PLAN NOTE
Lab Results   Component Value Date    HGBA1C 7.3 (H) 02/09/2024     HGA1C close to goal. Discussed increase Ozempic or transition to Mounjaro.     BGL Reviewed: Continue CGM    Treatment regimen:   Metformin 1000 mg orally twice a day  Ozempic 1 mg subcutaneously weekly    Discussed risks/complications associated with uncontrolled diabetes including organ involvement, heart attack, stroke, death.    Advised lifestyle modifications including attention to diet including the amount and types of carbohydrates consumed and regular activity.     Call for blood sugars less than 70 mg/dl or patterns over 250 mg/dl.     Recommendation for medical identification either bracelet, necklace.    Routine follow up for diabetic eye and foot exams.     Ordered blood work to complete prior to next visit.    Send glucose logs/CGM download in 1-2 weeks for review    Follow up in 3 months.

## 2024-02-23 ENCOUNTER — RA CDI HCC (OUTPATIENT)
Dept: OTHER | Facility: HOSPITAL | Age: 64
End: 2024-02-23

## 2024-02-23 NOTE — PROGRESS NOTES
HCC coding opportunities          Chart Reviewed number of suggestions sent to Provider: 3     Patients Insurance        Commercial Insurance: [employer] Commercial [employer]     E11.36  I11.0  E66.01

## 2024-03-01 ENCOUNTER — OFFICE VISIT (OUTPATIENT)
Dept: INTERNAL MEDICINE CLINIC | Facility: CLINIC | Age: 64
End: 2024-03-01
Payer: COMMERCIAL

## 2024-03-01 VITALS
SYSTOLIC BLOOD PRESSURE: 140 MMHG | HEIGHT: 71 IN | BODY MASS INDEX: 38.64 KG/M2 | WEIGHT: 276 LBS | DIASTOLIC BLOOD PRESSURE: 80 MMHG | OXYGEN SATURATION: 100 % | HEART RATE: 70 BPM

## 2024-03-01 DIAGNOSIS — I11.0 HYPERTENSIVE HEART DISEASE WITH CHRONIC DIASTOLIC CONGESTIVE HEART FAILURE (HCC): Primary | ICD-10-CM

## 2024-03-01 DIAGNOSIS — E11.65 TYPE 2 DIABETES MELLITUS WITH HYPERGLYCEMIA, WITHOUT LONG-TERM CURRENT USE OF INSULIN (HCC): ICD-10-CM

## 2024-03-01 DIAGNOSIS — I50.32 HYPERTENSIVE HEART DISEASE WITH CHRONIC DIASTOLIC CONGESTIVE HEART FAILURE (HCC): Primary | ICD-10-CM

## 2024-03-01 DIAGNOSIS — J30.1 SEASONAL ALLERGIC RHINITIS DUE TO POLLEN: ICD-10-CM

## 2024-03-01 PROCEDURE — 99213 OFFICE O/P EST LOW 20 MIN: CPT | Performed by: INTERNAL MEDICINE

## 2024-03-01 RX ORDER — DESLORATADINE 5 MG/1
5 TABLET ORAL
Qty: 90 TABLET | Refills: 1 | Status: SHIPPED | OUTPATIENT
Start: 2024-03-01

## 2024-03-01 NOTE — PATIENT INSTRUCTIONS
Allergic Rhinitis   WHAT YOU NEED TO KNOW:   Allergic rhinitis, or hay fever, is swelling of the inside of your nose. The swelling is a reaction to allergens in the air. An allergen can be anything that causes an allergic reaction. Allergies to weeds, grass, trees, or mold often cause seasonal allergic rhinitis. Indoor dust mites, cockroaches, pet dander, or mold can also cause allergic rhinitis.   DISCHARGE INSTRUCTIONS:   Call 911 for the following:   You have chest pain or shortness of breath.       Return to the emergency department if:   You have severe pain.    You cough up blood.    Contact your healthcare provider if:   You have a fever.     You have ear or sinus pain, or a headache.    Your symptoms get worse, even after treatment.     You have yellow, green, brown, or bloody mucus coming from your nose.     Your nose is bleeding or you have pain inside your nose.     You have trouble sleeping because of your symptoms.    You have questions or concerns about your condition or care.    Medicines:   Medicines  help decrease your symptoms and clear your stuffy nose.    Take your medicine as directed.  Contact your healthcare provider if you think your medicine is not helping or if you have side effects. Tell him of her if you are allergic to any medicine. Keep a list of the medicines, vitamins, and herbs you take. Include the amounts, and when and why you take them. Bring the list or the pill bottles to follow-up visits. Carry your medicine list with you in case of an emergency.    How to manage allergic rhinitis:  The best way to manage allergic rhinitis is to avoid allergens that can trigger your symptoms. Any of the following may help decrease your symptoms:  Rinse your nose and sinuses  with a salt water solution or use a salt water nasal spray. This will help thin the mucus in your nose and rinse away pollen and dirt. It will also help reduce swelling so you can breathe normally. Ask your healthcare  provider how often to rinse your nose.    Reduce exposure to dust mites.  Wash sheets and towels in hot water every week. Cover your pillows and mattresses with allergen-free covers. Limit the number of stuffed animals and soft toys your child has. Wash your child's toys in hot water regularly. Vacuum weekly and use a vacuum  with an air filter. If possible, get rid of carpets and curtains. These collect dust and dust mites.     Reduce exposure to pollen.  Keep windows and doors closed in your house and car. Stay inside when air pollution or the pollen count is high. Run your air conditioner on recycle, and change air filters often. Shower and wash your hair before bed every night to rinse away pollen.    Reduce exposure to pet dander.  If possible, do not keep cats, dogs, birds, or other pets. If you do keep pets in your home, keep them out of bedrooms and carpeted rooms. Bathe them often.     Reduce exposure to mold.  Do not spend time in basements. Choose artificial plants instead of live plants. Keep your home's humidity at less than 45%. Do not have ponds or standing water in your home or yard.     Do not smoke.  Avoid others who smoke. Ask your healthcare provider for information if you currently smoke and need help to quit.    Follow up with your healthcare provider as directed:  You may need to see an allergist often to control your symptoms. Write down your questions so you remember to ask them during your visits.  © Copyright [employer] 2022 Information is for End User's use only and may not be sold, redistributed or otherwise used for commercial purposes. All illustrations and images included in CareNotes® are the copyrighted property of [employer] or [employer]  The above information is an  only. It is not intended as medical advice for individual conditions or treatments. Talk to your doctor, nurse or pharmacist before following any medical regimen to see if it is  safe and effective for you.

## 2024-03-01 NOTE — ASSESSMENT & PLAN NOTE
Lab Results   Component Value Date    HGBA1C 7.3 (H) 02/09/2024   A1c improved 7.3 monitoring blood sugar at home improving patient followed by endocrinologist    Agree and continue medication management as follows    Ozempic 1 mg weekly    Metformin 1000 mg twice a day    Hypoglycemia protocol in place    Foot exam done by endocrinologist normal    Continue diabetic diet

## 2024-03-01 NOTE — ASSESSMENT & PLAN NOTE
Persistent nasal congestion postnasal drip was using Clarinex in the past    Agree and continue Clarinex 5 mg daily was ordered

## 2024-03-01 NOTE — PROGRESS NOTES
Dr. Marquez's Office Visit Note  24     Bobo Cheng 63 y.o. male MRN: [id number]  : 1960    Assessment:     1. Hypertensive heart disease with chronic diastolic congestive heart failure (HCC)  Assessment & Plan:  Wt Readings from Last 3 Encounters:   24 125 kg (276 lb)   24 125 kg (276 lb)   24 124 kg (273 lb)     Patient euvolemic CHF compensated continue low-salt diet fluid restriction Daily weight monitoring awaiting echocardiogram seen by cardiologist follow-up with cardiology continue Demadex 10 mg daily            2. Seasonal allergic rhinitis due to pollen  Assessment & Plan:  Persistent nasal congestion postnasal drip was using Clarinex in the past    Agree and continue Clarinex 5 mg daily was ordered    Orders:  -     desloratadine (CLARINEX) 5 MG tablet; Take 1 tablet (5 mg total) by mouth daily at bedtime    3. Type 2 diabetes mellitus with hyperglycemia, without long-term current use of insulin ([facility])  Assessment & Plan:    Lab Results   Component Value Date    HGBA1C 7.3 (H) 2024   A1c improved 7.3 monitoring blood sugar at home improving patient followed by endocrinologist    Agree and continue medication management as follows    Ozempic 1 mg weekly    Metformin 1000 mg twice a day    Hypoglycemia protocol in place    Foot exam done by endocrinologist normal    Continue diabetic diet            Discussion Summary and Plan:  Today's care plan and medications were reviewed with patient in detail and all their questions answered to their satisfaction.    Chief Complaint   Patient presents with   • Follow-up      Subjective:  Came in follow-up chronic medical condition listed visit diagnosis followed by endocrinology A1c reviewed on Ozempic metformin seems to be improving also seen by cardiology awaiting echocardiogram no chest pain no difficulty breathing overall health at baseline no new symptoms        The following portions of the patient's history were reviewed  and updated as appropriate: allergies, current medications, past family history, past medical history, past social history, past surgical history and problem list.    Review of Systems   Constitutional:  Positive for activity change. Negative for appetite change, chills, diaphoresis, fatigue, fever and unexpected weight change.   HENT:  Positive for congestion and rhinorrhea. Negative for dental problem, drooling, ear discharge, ear pain, facial swelling, hearing loss, mouth sores, nosebleeds, postnasal drip, sinus pressure, sneezing, sore throat, tinnitus, trouble swallowing and voice change.    Eyes:  Negative for photophobia, pain, discharge, redness, itching and visual disturbance.   Respiratory:  Negative for apnea, cough, choking, chest tightness, shortness of breath, wheezing and stridor.    Cardiovascular:  Negative for chest pain, palpitations and leg swelling.   Gastrointestinal:  Negative for abdominal distention, abdominal pain, anal bleeding, blood in stool, constipation, diarrhea, nausea, rectal pain and vomiting.   Endocrine: Negative for cold intolerance, heat intolerance, polydipsia, polyphagia and polyuria.   Genitourinary:  Negative for decreased urine volume, difficulty urinating, dysuria, enuresis, flank pain, frequency, genital sores, hematuria and urgency.   Musculoskeletal:  Positive for arthralgias. Negative for back pain, gait problem, joint swelling, myalgias, neck pain and neck stiffness.   Skin:  Negative for color change, pallor, rash and wound.   Allergic/Immunologic: Negative.  Negative for environmental allergies, food allergies and immunocompromised state.   Neurological:  Negative for dizziness, tremors, seizures, syncope, facial asymmetry, speech difficulty, weakness, light-headedness, numbness and headaches.   Psychiatric/Behavioral:  Negative for agitation, behavioral problems, confusion, decreased concentration, dysphoric mood, hallucinations, self-injury, sleep disturbance and  suicidal ideas. The patient is not nervous/anxious and is not hyperactive.          Historical Information   Patient Active Problem List   Diagnosis   • Diabetes mellitus (HCC)   • Hyperlipidemia   • Essential hypertension   • Elevated liver enzymes   • Polyarthralgia   • Morbid obesity (HCC)   • Chronic pain of both knees   • Primary osteoarthritis of knees, bilateral   • Coronary artery disease involving native coronary artery of native heart without angina pectoris   • Asymptomatic PVCs   • Obstructive sleep apnea   • Obesity (BMI 30-39.9)   • Seasonal allergic rhinitis due to pollen   • GERD (gastroesophageal reflux disease)   • Nasal septal deformity   • Bundle branch block, right   • CPAP (continuous positive airway pressure) dependence   • Vitamin D deficiency   • BPH (benign prostatic hyperplasia)   • History of vertebral compression fracture   • Colon polyp   • History of angioplasty   • Ocular migraine   • Inguinal hernia   • Umbilical hernia   • Bradycardia   • Asymptomatic varicose veins of both lower extremities   • Neuropathy   • 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)   • Carpal tunnel syndrome of left wrist   • Ulnar neuropathy at elbow of left upper extremity   • Onychomycosis of toenail   • Chronic left-sided low back pain with left-sided sciatica   • Varicose veins of leg with swelling, bilateral   • Thyroid nodule   • Vitamin B 12 deficiency   • Compression fracture of body of thoracic vertebra (HCC)   • Disc disease, degenerative, lumbar or lumbosacral   • History of coronary artery stent placement- pCx and OM2   • Carpal tunnel syndrome on right   • Tremor of left hand   • Restless leg syndrome   • Hypertensive heart disease with congestive heart failure (HCC)   • Type 2 diabetes mellitus with hyperglycemia (HCC)   • Hypomagnesemia   • Hearing deficit, right   • Screening for lung cancer   • Other fatigue   • Low testosterone     Past Medical History:   Diagnosis Date   • Anemia    •  Bundle branch block, right    • CAD (coronary artery disease)    • Cataract    • CPAP (continuous positive airway pressure) dependence    • Diabetes mellitus (HCC)     borderline, controlled with diet and activity   • Diverticulitis    • GERD (gastroesophageal reflux disease)    • History of coronary artery stent placement- pCx and OM2 11/24/2017   • Hyperlipidemia    • Nasal septal deformity    • Neuropathy    • Obesity (BMI 30-39.9)    • Sleep apnea     wears c-pap   • Vitamin D deficiency      Past Surgical History:   Procedure Laterality Date   • COLON SIGMOID RESECTION N/A 12/16/2016    Procedure: RESECTION COLON SIGMOID;  Surgeon: KUN Denise MD;  Location:  MAIN OR;  Service:    • COLON SIGMOID RESECTION LAPAROSCOPIC N/A 12/16/2016    Procedure: RESECTION COLON SIGMOID LAPAROSCOPIC:CONVERTED TO OPEN@1245;  Surgeon: KUN Denise MD;  Location:  MAIN OR;  Service:    • COLON SURGERY      Sigmoidectomy   • COLONOSCOPY N/A 10/6/2016    Procedure: COLONOSCOPY;  Surgeon: Farzad Ernandez MD;  Location: [facility];  Service:    • CYSTOSCOPY W/ RETROGRADES Left 2/3/2017    Procedure: CYSTOSCOPY WITH BILATERAL RETROGRADES, LEFT STENT REMOVAL;  Surgeon: Miguel A Villalobos MD;  Location: [facility];  Service:    • ESOPHAGOGASTRODUODENOSCOPY N/A 10/6/2016    Procedure: ESOPHAGOGASTRODUODENOSCOPY (EGD);  Surgeon: Farzad Ernandez MD;  Location: [facility];  Service:    • HERNIA REPAIR     • KNEE ARTHROSCOPY W/ MENISCECTOMY     • VA CYSTO BLADDER W/URETERAL CATHETERIZATION Left 12/4/2016    Procedure: CYSTOSCOPY RETROGRADE PYELOGRAM WITH INSERTION STENT URETERAL;  Surgeon: Miguel A Villalobos MD;  Location: [facility];  Service: Urology   • US GUIDED THYROID BIOPSY  9/27/2021   • VARICOSE VEIN SURGERY       Social History     Substance and Sexual Activity   Alcohol Use Yes   • Alcohol/week: 3.0 standard drinks of alcohol   • Types: 3 Cans of beer per week    Comment: occ     Social History     Substance and  Sexual Activity   Drug Use No     Social History     Tobacco Use   Smoking Status Former   • Current packs/day: 0.00   • Average packs/day: 1 pack/day for 37.0 years (37.0 ttl pk-yrs)   • Types: Cigarettes   • Start date: 3/30/1981   • Quit date: 3/30/2018   • Years since quittin.9   • Passive exposure: Past   Smokeless Tobacco Current   • Types: Chew   Tobacco Comments    chewing nicotine     Family History   Problem Relation Age of Onset   • Osteoarthritis Mother    • Atrial fibrillation Mother    • Aortic aneurysm Father    • Diabetes Brother    • Colon cancer Brother    • Diabetes type II Brother    • Colon cancer Brother    • Heart disease Maternal Grandmother    • Cancer Maternal Grandfather    • Stroke Paternal Grandmother    • Heart attack Paternal Grandfather      Health Maintenance Due   Topic   • Hepatitis C Screening    • HIV Screening    • Annual Physical    • Zoster Vaccine (1 of 2)   • Pneumococcal Vaccine: Pediatrics (0 to 5 Years) and At-Risk Patients (6 to 64 Years) (2 of 2 - PCV)   • OT PLAN OF CARE    • COVID-19 Vaccine (8 - 2023-24 season)   • Diabetic Foot Exam    • Colorectal Cancer Screening    • Depression Screening       Meds/Allergies       Current Outpatient Medications:   •  albuterol (PROVENTIL HFA,VENTOLIN HFA) 90 mcg/act inhaler, Inhale 2 puffs every 4 (four) hours as needed for wheezing or shortness of breath, Disp: 18 g, Rfl: 6  •  Alpha-Lipoic Acid 100 MG CAPS, Take by mouth, Disp: , Rfl:   •  Ascorbic Acid (VITAMIN C) 100 MG tablet, Take 500 mg by mouth , Disp: , Rfl:   •  aspirin 81 MG tablet, Take 162 mg by mouth daily  , Disp: , Rfl:   •  atorvastatin (LIPITOR) 40 mg tablet, TAKE 1 TABLET BY MOUTH ONCE DAILY WITH SUPPER, Disp: 90 tablet, Rfl: 0  •  BIOTIN PO, Take 5 mg by mouth daily, Disp: , Rfl:   •  Cholecalciferol (VITAMIN D3) 1000 units CAPS, Take by mouth daily , Disp: , Rfl:   •  Continuous Blood Gluc  (FreeStyle Alexei 2 [place]) STEVE, USE 1 READER 1 TIME FOR  1 DOSE, Disp: , Rfl:   •  Continuous Blood Gluc Sensor (FreeStyle Alexei 2 Sensor) MISC, USE 1 SENSOR EVERY 14 DAYS, Disp: 7 each, Rfl: 1  •  Continuous Blood Gluc Sensor (FreeStyle Alexei 3 Sensor) MISC, Change every 14 days as directed., Disp: 2 each, Rfl: 2  •  cyanocobalamin (VITAMIN B-12) 100 mcg tablet, Take by mouth daily, Disp: , Rfl:   •  desloratadine (CLARINEX) 5 MG tablet, Take 1 tablet (5 mg total) by mouth daily at bedtime, Disp: 90 tablet, Rfl: 1  •  famotidine (PEPCID) 20 mg tablet, Take 20 mg by mouth daily, Disp: , Rfl:   •  FREESTYLE LITE test strip, USE 1 STRIP TO CHECK GLUCOSE ONCE DAILY AS DIRECTED, Disp: 100 each, Rfl: 0  •  FREESTYLE LITE test strip, USE 1 STRIP TO CHECK GLUCOSE ONCE DAILY AS INSTRUCTED, Disp: 100 each, Rfl: 0  •  glucosamine-chondroitin 500-400 MG tablet, Take 1 tablet by mouth daily, Disp: , Rfl:   •  hydrochlorothiazide (HYDRODIURIL) 25 mg tablet, Take 1 tablet by mouth once daily, Disp: 90 tablet, Rfl: 1  •  losartan (COZAAR) 50 mg tablet, Take 1 tablet by mouth once daily, Disp: 90 tablet, Rfl: 0  •  Magnesium Hydroxide (MAGNESIA PO), Take by mouth 2 (two) times a day, Disp: , Rfl:   •  metFORMIN (GLUCOPHAGE) 1000 MG tablet, TAKE 1 TABLET BY MOUTH TWICE DAILY WITH MEALS, Disp: 180 tablet, Rfl: 1  •  Multiple Vitamin (MULTIVITAMIN) capsule, Take 1 capsule by mouth daily, Disp: , Rfl:   •  nitroglycerin (NITROSTAT) 0.3 mg SL tablet, Place 1 tablet (0.3 mg total) under the tongue every 5 (five) minutes as needed for chest pain, Disp: 25 tablet, Rfl: 1  •  ONETOUCH DELICA LANCETS 30G MISC, 1 Units by Does not apply route daily, Disp: 100 each, Rfl: 6  •  Ozempic, 1 MG/DOSE, 4 MG/3ML injection pen, INJECT 1 MG  SUBCUTANEOUSLY ONCE A WEEK, Disp: 3 mL, Rfl: 0  •  rOPINIRole (REQUIP) 0.5 mg tablet, Take 1 tablet (0.5 mg total) by mouth daily at bedtime, Disp: 30 tablet, Rfl: 5  •  torsemide (DEMADEX) 10 mg tablet, Take 1 tablet (10 mg total) by mouth daily, Disp: 30 tablet, Rfl:  "1    Current Facility-Administered Medications:   •  cyanocobalamin injection 1,000 mcg, 1,000 mcg, Intramuscular, Q30 Days, Howard Avila LUCERO Knutson, 1,000 mcg at 09/10/21 1141      Objective:    Vitals:   /80   Pulse 70   Ht 5' 11\" (1.803 m)   Wt 125 kg (276 lb)   SpO2 100%   BMI 38.49 kg/m²   Body mass index is 38.49 kg/m².  Vitals:    03/01/24 1034   Weight: 125 kg (276 lb)       Physical Exam  Vitals and nursing note reviewed.   Constitutional:       General: He is not in acute distress.     Appearance: He is well-developed. He is not ill-appearing, toxic-appearing or diaphoretic.   HENT:      Head: Normocephalic and atraumatic.      Right Ear: External ear normal.      Left Ear: External ear normal.      Nose: Congestion present.      Mouth/Throat:      Pharynx: No oropharyngeal exudate.   Eyes:      General: Lids are normal. Lids are everted, no foreign bodies appreciated. No scleral icterus.        Right eye: No discharge.         Left eye: No discharge.      Conjunctiva/sclera: Conjunctivae normal.      Pupils: Pupils are equal, round, and reactive to light.   Neck:      Thyroid: No thyromegaly.      Vascular: Normal carotid pulses. No carotid bruit, hepatojugular reflux or JVD.      Trachea: No tracheal tenderness or tracheal deviation.   Cardiovascular:      Rate and Rhythm: Normal rate and regular rhythm.      Pulses: Normal pulses.      Heart sounds: Normal heart sounds. No murmur heard.     No friction rub. No gallop.   Pulmonary:      Effort: Pulmonary effort is normal. No respiratory distress.      Breath sounds: Normal breath sounds. No stridor. No wheezing or rales.   Chest:      Chest wall: No tenderness.   Abdominal:      General: Bowel sounds are normal. There is no distension.      Palpations: Abdomen is soft. There is no mass.      Tenderness: There is no abdominal tenderness. There is no guarding or rebound.   Musculoskeletal:         General: No tenderness or deformity. " Normal range of motion.      Cervical back: Normal range of motion and neck supple. No edema, erythema or rigidity. No spinous process tenderness or muscular tenderness. Normal range of motion.   Lymphadenopathy:      Head:      Right side of head: No submental, submandibular, tonsillar, preauricular or posterior auricular adenopathy.      Left side of head: No submental, submandibular, tonsillar, preauricular, posterior auricular or occipital adenopathy.      Cervical: No cervical adenopathy.      Right cervical: No superficial, deep or posterior cervical adenopathy.     Left cervical: No superficial, deep or posterior cervical adenopathy.      Upper Body:      Right upper body: No pectoral adenopathy.      Left upper body: No pectoral adenopathy.   Skin:     General: Skin is warm and dry.      Coloration: Skin is not pale.      Findings: No erythema or rash.   Neurological:      General: No focal deficit present.      Mental Status: He is alert and oriented to person, place, and time.      Cranial Nerves: No cranial nerve deficit.      Sensory: No sensory deficit.      Motor: No tremor, abnormal muscle tone or seizure activity.      Coordination: Coordination normal.      Gait: Gait normal.      Deep Tendon Reflexes: Reflexes are normal and symmetric. Reflexes normal.   Psychiatric:         Behavior: Behavior normal.         Thought Content: Thought content normal.         Judgment: Judgment normal.         Lab Review   No visits with results within 2 Month(s) from this visit.   Latest known visit with results is:   Office Visit on 11/10/2023   Component Date Value Ref Range Status   • TESTOSTERONE TOTAL 02/09/2024 293  264 - 916 ng/dL Final    Comment: Adult male reference interval is based on a population of  healthy nonobese males (BMI <30) between 19 and 39 years old.  Shavonne et.al. JCEM 2017,102;8048-3631. PMID: [id number].     • Testosterone, Free 02/09/2024 2.2 (L)  6.6 - 18.1 pg/mL Final   • FSH  02/09/2024 11.0  1.5 - 12.4 mIU/mL Final   • Luteinizing Hormone (LH) 02/09/2024 8.3  1.7 - 8.6 mIU/mL Final   • Prolactin 02/09/2024 7.6  3.6 - 25.2 ng/mL Final   • TSH 02/09/2024 1.380  0.450 - 4.500 uIU/mL Final   • Free t4 02/09/2024 1.03  0.82 - 1.77 ng/dL Final   • White Blood Cell Count 02/09/2024 8.0  3.4 - 10.8 x10E3/uL Final   • Red Blood Cell Count 02/09/2024 4.44  4.14 - 5.80 x10E6/uL Final   • Hemoglobin 02/09/2024 15.5  13.0 - 17.7 g/dL Final   • HCT 02/09/2024 44.2  37.5 - 51.0 % Final   • MCV 02/09/2024 100 (H)  79 - 97 fL Final   • MCH 02/09/2024 34.9 (H)  26.6 - 33.0 pg Final   • MCHC 02/09/2024 35.1  31.5 - 35.7 g/dL Final   • RDW 02/09/2024 13.0  11.6 - 15.4 % Final   • Platelet Count 02/09/2024 300  150 - 450 x10E3/uL Final   • Neutrophils 02/09/2024 59  Not Estab. % Final   • Lymphocytes 02/09/2024 24  Not Estab. % Final   • Monocytes 02/09/2024 9  Not Estab. % Final   • Eosinophils 02/09/2024 7  Not Estab. % Final   • Basophils PCT 02/09/2024 1  Not Estab. % Final   • Neutrophils (Absolute) 02/09/2024 4.8  1.4 - 7.0 x10E3/uL Final   • Lymphocytes (Absolute) 02/09/2024 1.9  0.7 - 3.1 x10E3/uL Final   • Monocytes (Absolute) 02/09/2024 0.7  0.1 - 0.9 x10E3/uL Final   • Eosinophils (Absolute) 02/09/2024 0.5 (H)  0.0 - 0.4 x10E3/uL Final   • Basophils ABS 02/09/2024 0.1  0.0 - 0.2 x10E3/uL Final   • Immature Granulocytes 02/09/2024 0  Not Estab. % Final   • Immature Granulocytes (Absolute) 02/09/2024 0.0  0.0 - 0.1 x10E3/uL Final   • Hemoglobin A1C 02/09/2024 7.3 (H)  4.8 - 5.6 % Final    Comment:          Prediabetes: 5.7 - 6.4           Diabetes: >6.4           Glycemic control for adults with diabetes: <7.0     • Estimated Average Glucose 02/09/2024 163  mg/dL Final   • Glucose, Random 02/09/2024 149 (H)  70 - 99 mg/dL Final   • BUN 02/09/2024 16  8 - 27 mg/dL Final   • Creatinine 02/09/2024 1.03  0.76 - 1.27 mg/dL Final   • eGFR 02/09/2024 83  >59 mL/min/1.73 Final   • SL AMB BUN/CREATININE  RATIO 02/09/2024 16  10 - 24 Final   • Sodium 02/09/2024 138  134 - 144 mmol/L Final   • Potassium 02/09/2024 4.4  3.5 - 5.2 mmol/L Final   • Chloride 02/09/2024 99  96 - 106 mmol/L Final   • CO2 02/09/2024 23  20 - 29 mmol/L Final   • CALCIUM 02/09/2024 9.8  8.6 - 10.2 mg/dL Final   • Protein, Total 02/09/2024 7.4  6.0 - 8.5 g/dL Final   • Albumin 02/09/2024 4.6  3.9 - 4.9 g/dL Final   • Globulin, Total 02/09/2024 2.8  1.5 - 4.5 g/dL Final   • Albumin/Globulin Ratio 02/09/2024 1.6  1.2 - 2.2 Final   • TOTAL BILIRUBIN 02/09/2024 0.6  0.0 - 1.2 mg/dL Final   • Alk Phos Isoenzymes 02/09/2024 52  44 - 121 IU/L Final   • AST 02/09/2024 51 (H)  0 - 40 IU/L Final   • ALT 02/09/2024 84 (H)  0 - 44 IU/L Final   • Cholesterol, Total 02/09/2024 122  100 - 199 mg/dL Final   • Triglycerides 02/09/2024 209 (H)  0 - 149 mg/dL Final   • HDL 02/09/2024 40  >39 mg/dL Final   • VLDL Cholesterol Calculated 02/09/2024 34  5 - 40 mg/dL Final   • LDL Calculated 02/09/2024 48  0 - 99 mg/dL Final   • T. Chol/HDL Ratio 02/09/2024 3.1  0.0 - 5.0 ratio Final    Comment:                                   T. Chol/HDL Ratio                                              Men  Women                                1/2 Avg.Risk  3.4    3.3                                    Avg.Risk  5.0    4.4                                 2X Avg.Risk  9.6    7.1                                 3X Avg.Risk 23.4   11.0           Patient Instructions   Allergic Rhinitis   WHAT YOU NEED TO KNOW:   Allergic rhinitis, or hay fever, is swelling of the inside of your nose. The swelling is a reaction to allergens in the air. An allergen can be anything that causes an allergic reaction. Allergies to weeds, grass, trees, or mold often cause seasonal allergic rhinitis. Indoor dust mites, cockroaches, pet dander, or mold can also cause allergic rhinitis.   DISCHARGE INSTRUCTIONS:   Call 911 for the following:   You have chest pain or shortness of breath.       Return to the  emergency department if:   You have severe pain.    You cough up blood.    Contact your healthcare provider if:   You have a fever.     You have ear or sinus pain, or a headache.    Your symptoms get worse, even after treatment.     You have yellow, green, brown, or bloody mucus coming from your nose.     Your nose is bleeding or you have pain inside your nose.     You have trouble sleeping because of your symptoms.    You have questions or concerns about your condition or care.    Medicines:   Medicines  help decrease your symptoms and clear your stuffy nose.    Take your medicine as directed.  Contact your healthcare provider if you think your medicine is not helping or if you have side effects. Tell him of her if you are allergic to any medicine. Keep a list of the medicines, vitamins, and herbs you take. Include the amounts, and when and why you take them. Bring the list or the pill bottles to follow-up visits. Carry your medicine list with you in case of an emergency.    How to manage allergic rhinitis:  The best way to manage allergic rhinitis is to avoid allergens that can trigger your symptoms. Any of the following may help decrease your symptoms:  Rinse your nose and sinuses  with a salt water solution or use a salt water nasal spray. This will help thin the mucus in your nose and rinse away pollen and dirt. It will also help reduce swelling so you can breathe normally. Ask your healthcare provider how often to rinse your nose.    Reduce exposure to dust mites.  Wash sheets and towels in hot water every week. Cover your pillows and mattresses with allergen-free covers. Limit the number of stuffed animals and soft toys your child has. Wash your child's toys in hot water regularly. Vacuum weekly and use a vacuum  with an air filter. If possible, get rid of carpets and curtains. These collect dust and dust mites.     Reduce exposure to pollen.  Keep windows and doors closed in your house and car. Stay  "inside when air pollution or the pollen count is high. Run your air conditioner on recycle, and change air filters often. Shower and wash your hair before bed every night to rinse away pollen.    Reduce exposure to pet dander.  If possible, do not keep cats, dogs, birds, or other pets. If you do keep pets in your home, keep them out of bedrooms and carpeted rooms. Bathe them often.     Reduce exposure to mold.  Do not spend time in basements. Choose artificial plants instead of live plants. Keep your home's humidity at less than 45%. Do not have ponds or standing water in your home or yard.     Do not smoke.  Avoid others who smoke. Ask your healthcare provider for information if you currently smoke and need help to quit.    Follow up with your healthcare provider as directed:  You may need to see an allergist often to control your symptoms. Write down your questions so you remember to ask them during your visits.  © Copyright [employer] 2022 Information is for End User's use only and may not be sold, redistributed or otherwise used for commercial purposes. All illustrations and images included in CareNotes® are the copyrighted property of [employer] or [employer]  The above information is an  only. It is not intended as medical advice for individual conditions or treatments. Talk to your doctor, nurse or pharmacist before following any medical regimen to see if it is safe and effective for you.       Sandy Marquez MD        \"This note has been constructed using a voice recognition system.Therefore there may be syntax, spelling, and/or grammatical errors. Please call if you have any questions. \"  "

## 2024-03-01 NOTE — ASSESSMENT & PLAN NOTE
Wt Readings from Last 3 Encounters:   03/01/24 125 kg (276 lb)   02/16/24 125 kg (276 lb)   02/09/24 124 kg (273 lb)     Patient euvolemic CHF compensated continue low-salt diet fluid restriction Daily weight monitoring awaiting echocardiogram seen by cardiologist follow-up with cardiology continue Demadex 10 mg daily

## 2024-03-12 DIAGNOSIS — E11.65 TYPE 2 DIABETES MELLITUS WITH HYPERGLYCEMIA, UNSPECIFIED WHETHER LONG TERM INSULIN USE (HCC): ICD-10-CM

## 2024-03-13 RX ORDER — SEMAGLUTIDE 1.34 MG/ML
INJECTION, SOLUTION SUBCUTANEOUS
Qty: 3 ML | Refills: 0 | Status: SHIPPED | OUTPATIENT
Start: 2024-03-13

## 2024-04-10 DIAGNOSIS — E11.65 TYPE 2 DIABETES MELLITUS WITH HYPERGLYCEMIA, UNSPECIFIED WHETHER LONG TERM INSULIN USE (HCC): ICD-10-CM

## 2024-04-11 RX ORDER — SEMAGLUTIDE 1.34 MG/ML
INJECTION, SOLUTION SUBCUTANEOUS
Qty: 3 ML | Refills: 5 | Status: SHIPPED | OUTPATIENT
Start: 2024-04-11

## 2024-04-15 ENCOUNTER — TELEPHONE (OUTPATIENT)
Age: 64
End: 2024-04-15

## 2024-04-15 ENCOUNTER — PREP FOR PROCEDURE (OUTPATIENT)
Age: 64
End: 2024-04-15

## 2024-04-15 DIAGNOSIS — K63.5 POLYP OF COLON, UNSPECIFIED PART OF COLON, UNSPECIFIED TYPE: Primary | ICD-10-CM

## 2024-04-15 NOTE — TELEPHONE ENCOUNTER
Scheduled date of colonoscopy (as of today):7/5/2024  Physician performing colonoscopy:Dr Ernandez  Location of colonoscopy:[facility]  Bowel prep reviewed with patient:Miralax/Dulcolax  Instructions reviewed with patient by:sent via [employer]  Clearances: N/A

## 2024-04-15 NOTE — TELEPHONE ENCOUNTER
04/15/24  Screened by: Keyonna Vines    Referring Provider     Pre- Screening:     There is no height or weight on file to calculate BMI. 276lbs 38.49  Has patient been referred for a routine screening Colonoscopy? yes  Is the patient between 45-75 years old? yes      Previous Colonoscopy yes   If yes: 3 years    Date:     Facility:     Reason:     Does the patient want to see a Gastroenterologist prior to their procedure OR are they having any GI symptoms? no    Has the patient been hospitalized or had abdominal surgery in the past 6 months? no    Does the patient use supplemental oxygen? no    Does the patient take Coumadin, Lovenox, Plavix, Elliquis, Xarelto, or other blood thinning medication? no    Has the patient had a stroke, cardiac event, or stent placed in the past year? no      If patient is between 45yrs - 49yrs, please advise patient that we will have to confirm benefits & coverage with their insurance company for a routine screening colonoscopy.

## 2024-05-02 DIAGNOSIS — I10 ESSENTIAL HYPERTENSION: ICD-10-CM

## 2024-05-02 RX ORDER — LOSARTAN POTASSIUM 50 MG/1
TABLET ORAL
Qty: 90 TABLET | Refills: 1 | Status: SHIPPED | OUTPATIENT
Start: 2024-05-02

## 2024-05-02 NOTE — TELEPHONE ENCOUNTER
Patient called to request a refill for their losartan (COZAAR) 50 mg tablet  advised a refill was requested on 05- and is pending approval. Patient verbalized understanding and is in agreement.      Patient also wanted to note his doctor is now  and he only has 3 days of medication left

## 2024-05-03 ENCOUNTER — HOSPITAL ENCOUNTER (OUTPATIENT)
Dept: NON INVASIVE DIAGNOSTICS | Facility: HOSPITAL | Age: 64
Discharge: HOME | End: 2024-05-03
Attending: INTERNAL MEDICINE
Payer: COMMERCIAL

## 2024-05-03 VITALS
SYSTOLIC BLOOD PRESSURE: 140 MMHG | HEART RATE: 66 BPM | WEIGHT: 276 LBS | HEIGHT: 71 IN | DIASTOLIC BLOOD PRESSURE: 80 MMHG | BODY MASS INDEX: 38.64 KG/M2

## 2024-05-03 DIAGNOSIS — I25.10 CORONARY ARTERY DISEASE INVOLVING NATIVE CORONARY ARTERY OF NATIVE HEART WITHOUT ANGINA PECTORIS: ICD-10-CM

## 2024-05-03 DIAGNOSIS — I50.32 CHRONIC DIASTOLIC CONGESTIVE HEART FAILURE (HCC): ICD-10-CM

## 2024-05-03 LAB
AORTIC ROOT: 3.5 CM
APICAL FOUR CHAMBER EJECTION FRACTION: 66 %
AV LVOT PEAK GRADIENT: 4 MMHG
AV PEAK GRADIENT: 10 MMHG
BSA FOR ECHO PROCEDURE: 2.42 M2
E WAVE DECELERATION TIME: 196 MS
E/A RATIO: 0.78
FRACTIONAL SHORTENING: 30 (ref 28–44)
INTERVENTRICULAR SEPTUM IN DIASTOLE (PARASTERNAL SHORT AXIS VIEW): 1.4 CM
INTERVENTRICULAR SEPTUM: 1.4 CM (ref 0.6–1.1)
LAAS-AP2: 18.1 CM2
LAAS-AP4: 25 CM2
LEFT ATRIUM SIZE: 3 CM
LEFT ATRIUM VOLUME (MOD BIPLANE): 70 ML
LEFT ATRIUM VOLUME INDEX (MOD BIPLANE): 28.9 ML/M2
LEFT INTERNAL DIMENSION IN SYSTOLE: 3.5 CM (ref 2.1–4)
LEFT VENTRICULAR INTERNAL DIMENSION IN DIASTOLE: 5 CM (ref 3.5–6)
LEFT VENTRICULAR POSTERIOR WALL IN END DIASTOLE: 1.3 CM
LEFT VENTRICULAR STROKE VOLUME: 63 ML
LVSV (TEICH): 63 ML
MV E'TISSUE VEL-SEP: 10 CM/S
MV PEAK A VEL: 0.98 M/S
MV PEAK E VEL: 76 CM/S
MV STENOSIS PRESSURE HALF TIME: 57 MS
MV VALVE AREA P 1/2 METHOD: 3.86
RIGHT ATRIUM AREA SYSTOLE A4C: 16.9 CM2
RIGHT VENTRICLE ID DIMENSION: 3.8 CM
SL CV LEFT ATRIUM LENGTH A2C: 5.1 CM
SL CV LV EF: 55
SL CV PED ECHO LEFT VENTRICLE DIASTOLIC VOLUME (MOD BIPLANE) 2D: 116 ML
SL CV PED ECHO LEFT VENTRICLE SYSTOLIC VOLUME (MOD BIPLANE) 2D: 53 ML
TRICUSPID ANNULAR PLANE SYSTOLIC EXCURSION: 2.1 CM

## 2024-05-03 PROCEDURE — 93306 TTE W/DOPPLER COMPLETE: CPT

## 2024-05-03 PROCEDURE — 93306 TTE W/DOPPLER COMPLETE: CPT | Performed by: INTERNAL MEDICINE

## 2024-05-06 ENCOUNTER — TELEPHONE (OUTPATIENT)
Dept: CARDIOLOGY CLINIC | Facility: CLINIC | Age: 64
End: 2024-05-06

## 2024-05-06 NOTE — TELEPHONE ENCOUNTER
----- Message from Giselle Case DO sent at 5/3/2024  4:20 PM EDT -----  Can you please let the patient know echo overall unchanged.  There has been some increase in his LV wall thickness.  Would like him to start exercising again and keep good control of BP

## 2024-05-10 LAB
ALBUMIN/CREAT UR: <5 MG/G CREAT (ref 0–29)
BUN SERPL-MCNC: 15 MG/DL (ref 8–27)
BUN/CREAT SERPL: 13 (ref 10–24)
CALCIUM SERPL-MCNC: 9.4 MG/DL (ref 8.6–10.2)
CHLORIDE SERPL-SCNC: 100 MMOL/L (ref 96–106)
CO2 SERPL-SCNC: 25 MMOL/L (ref 20–29)
CREAT SERPL-MCNC: 1.14 MG/DL (ref 0.76–1.27)
CREAT UR-MCNC: 56.1 MG/DL
EGFR: 73 ML/MIN/1.73
EST. AVERAGE GLUCOSE BLD GHB EST-MCNC: 183 MG/DL
GLUCOSE SERPL-MCNC: 165 MG/DL (ref 70–99)
HBA1C MFR BLD: 8 % (ref 4.8–5.6)
MICROALBUMIN UR-MCNC: <3 UG/ML
POTASSIUM SERPL-SCNC: 4.2 MMOL/L (ref 3.5–5.2)
SODIUM SERPL-SCNC: 139 MMOL/L (ref 134–144)

## 2024-05-17 ENCOUNTER — OFFICE VISIT (OUTPATIENT)
Dept: ENDOCRINOLOGY | Facility: CLINIC | Age: 64
End: 2024-05-17
Payer: COMMERCIAL

## 2024-05-17 VITALS
HEIGHT: 71 IN | HEART RATE: 80 BPM | WEIGHT: 275 LBS | SYSTOLIC BLOOD PRESSURE: 136 MMHG | OXYGEN SATURATION: 98 % | BODY MASS INDEX: 38.5 KG/M2 | DIASTOLIC BLOOD PRESSURE: 80 MMHG

## 2024-05-17 DIAGNOSIS — I10 ESSENTIAL HYPERTENSION: ICD-10-CM

## 2024-05-17 DIAGNOSIS — E11.40 NEUROPATHY DUE TO TYPE 2 DIABETES MELLITUS (HCC): ICD-10-CM

## 2024-05-17 DIAGNOSIS — E04.1 THYROID NODULE: ICD-10-CM

## 2024-05-17 DIAGNOSIS — E78.2 MIXED HYPERLIPIDEMIA: ICD-10-CM

## 2024-05-17 DIAGNOSIS — E55.9 VITAMIN D DEFICIENCY: ICD-10-CM

## 2024-05-17 DIAGNOSIS — E11.65 TYPE 2 DIABETES MELLITUS WITH HYPERGLYCEMIA, WITHOUT LONG-TERM CURRENT USE OF INSULIN (HCC): Primary | ICD-10-CM

## 2024-05-17 PROCEDURE — 99214 OFFICE O/P EST MOD 30 MIN: CPT | Performed by: NURSE PRACTITIONER

## 2024-05-17 NOTE — PROGRESS NOTES
Established Patient Progress Note    Chief Complaint:  Diabetes follow up visit    Impression & Plan:    Problem List Items Addressed This Visit          Cardiovascular and Mediastinum    Essential hypertension     BP mildly elevated.  Follows with cardiology.            Endocrine    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)     Continue close follow up for diabetic foot exam.            Relevant Medications    semaglutide, 2 mg/dose, (Ozempic, 2 MG/DOSE,) 8 mg/ mL injection pen    Thyroid nodule     Recommend repeat thyroid ultrasound in November 2024.   Denies neck compressive symptoms.  Last thyroid ultrasound from November 2023 demonstrated stability in previously biopsied thyroid nodule.          Type 2 diabetes mellitus with hyperglycemia (HCC) - Primary       Lab Results   Component Value Date    HGBA1C 8.0 (H) 05/10/2024     HGA1C above goal and increasing indicating poor control.    BGL Reviewed: Continue CGM.     Treatment regimen: Recommend increasing Ozempic to 2 mg weekly dose, if not improving glycemic control to goal, recommend transitioning to Mounjaro.     Discussed risks/complications associated with uncontrolled diabetes including organ involvement, heart attack, stroke, death.    Advised lifestyle modifications including attention to diet including the amount and types of carbohydrates consumed and regular activity.     Call for blood sugars less than 70 mg/dl or patterns over 250 mg/dl.     Discussed symptoms and treatment of hypoglycemia.  Reviewed risks associated with hypoglycemia. Always carry rapid acting carbohydrates and a glucometer (a way to check your blood sugar).    Recommendation for medical identification either bracelet, necklace.     Routine follow up for diabetic eye and foot exams.     Ordered blood work to complete prior to next visit.    Follow up in 3 months.            Relevant Medications    semaglutide, 2 mg/dose, (Ozempic, 2 MG/DOSE,) 8 mg/ mL injection pen     Other Relevant Orders    Lipid panel    Comprehensive metabolic panel    Hemoglobin A1C       Other    Hyperlipidemia     Managed by cardiology.          Vitamin D deficiency     Continues supplementation.             History of Present Illness:   Bobo Cheng is a 63 y.o. male with a history of type 2 diabetes without long term use of insulin. Reports complications of neuropathy follows regularly with podiatry, no concerns today.  Denies retinopathy follows annually with with optho, no concerns today. Has significant past medical history of CAD with stents.  Denies recent illness or hospitalizations since his last appointment. Denies recent severe hypoglycemic or severe hyperglycemic episodes.  Denies any adverse effects of metformin or Ozempic including abdominal pain, nausea, vomiting, diarrhea, constipation, severe appetite suppression.  Home glucose monitoring: are performed regularly.  See scanned blood sugars.      CGM Interpretation  Bobo Cheng   Device used Freestyle lino for Personal Use  Indication: Type of Diabetes: Type 2 Diabetes  More than 72 hours of data was reviewed. Report to be scanned to chart.   Date Range: May 4-17, 2024  Analysis of data:   Average Glucose: 181 mg/dl  Coefficient of Variation: 14.4%  GMI: 7.6%  Time in Target Range: 57%  Time Above Range: 43%  Time Below Range: 0%   Interpretation of data:   Some hyperglycemia post meals.      Current regimen:   Metformin 1000 mg orally twice a day  Ozempic 1 mg subcutaneously weekly     Has hypertension: Taking losartan, compliant and tolerating  Has hyperlipidemia: Taking atorvastatin, tolerating       Patient Active Problem List   Diagnosis    Diabetes mellitus (HCC)    Hyperlipidemia    Essential hypertension    Elevated liver enzymes    Polyarthralgia    Morbid obesity (HCC)    Chronic pain of both knees    Primary osteoarthritis of knees, bilateral    Coronary artery disease involving native coronary artery of native  heart without angina pectoris    Asymptomatic PVCs    Obstructive sleep apnea    Obesity (BMI 30-39.9)    Seasonal allergic rhinitis due to pollen    GERD (gastroesophageal reflux disease)    Nasal septal deformity    Bundle branch block, right    CPAP (continuous positive airway pressure) dependence    Vitamin D deficiency    BPH (benign prostatic hyperplasia)    History of vertebral compression fracture    Colon polyp    History of angioplasty    Ocular migraine    Inguinal hernia    Umbilical hernia    Bradycardia    Asymptomatic varicose veins of both lower extremities    Neuropathy    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)    Carpal tunnel syndrome of left wrist    Ulnar neuropathy at elbow of left upper extremity    Onychomycosis of toenail    Chronic left-sided low back pain with left-sided sciatica    Varicose veins of leg with swelling, bilateral    Thyroid nodule    Vitamin B 12 deficiency    Compression fracture of body of thoracic vertebra (HCC)    Disc disease, degenerative, lumbar or lumbosacral    History of coronary artery stent placement- pCx and OM2    Carpal tunnel syndrome on right    Tremor of left hand    Restless leg syndrome    Hypertensive heart disease with congestive heart failure (HCC)    Type 2 diabetes mellitus with hyperglycemia (HCC)    Hypomagnesemia    Hearing deficit, right    Screening for lung cancer    Other fatigue    Low testosterone      Past Medical History:   Diagnosis Date    Anemia     Bundle branch block, right     CAD (coronary artery disease)     Cataract     CPAP (continuous positive airway pressure) dependence     Diabetes mellitus (HCC)     borderline, controlled with diet and activity    Diverticulitis     GERD (gastroesophageal reflux disease)     History of coronary artery stent placement- pCx and OM2 11/24/2017    Hyperlipidemia     Nasal septal deformity     Neuropathy     Obesity (BMI 30-39.9)     Sleep apnea     wears c-pap    Vitamin D deficiency        Past Surgical History:   Procedure Laterality Date    COLON SIGMOID RESECTION N/A 2016    Procedure: RESECTION COLON SIGMOID;  Surgeon: KUN Denise MD;  Location:  MAIN OR;  Service:     COLON SIGMOID RESECTION LAPAROSCOPIC N/A 2016    Procedure: RESECTION COLON SIGMOID LAPAROSCOPIC:CONVERTED TO OPEN@1245;  Surgeon: KUN Denise MD;  Location:  MAIN OR;  Service:     COLON SURGERY      Sigmoidectomy    COLONOSCOPY N/A 10/6/2016    Procedure: COLONOSCOPY;  Surgeon: Farzad Ernandez MD;  Location: [facility] GI LAB;  Service:     CYSTOSCOPY W/ RETROGRADES Left 2/3/2017    Procedure: CYSTOSCOPY WITH BILATERAL RETROGRADES, LEFT STENT REMOVAL;  Surgeon: Miguel A Villalobos MD;  Location: [facility] OR;  Service:     ESOPHAGOGASTRODUODENOSCOPY N/A 10/6/2016    Procedure: ESOPHAGOGASTRODUODENOSCOPY (EGD);  Surgeon: Farzad Ernandez MD;  Location: [facility] GI LAB;  Service:     HERNIA REPAIR      KNEE ARTHROSCOPY W/ MENISCECTOMY      KY CYSTO BLADDER W/URETERAL CATHETERIZATION Left 2016    Procedure: CYSTOSCOPY RETROGRADE PYELOGRAM WITH INSERTION STENT URETERAL;  Surgeon: Miguel A Villalobos MD;  Location: [facility];  Service: Urology    US GUIDED THYROID BIOPSY  2021    VARICOSE VEIN SURGERY        Family History   Problem Relation Age of Onset    Osteoarthritis Mother     Atrial fibrillation Mother     Aortic aneurysm Father     Diabetes Brother     Colon cancer Brother     Diabetes type II Brother     Colon cancer Brother     Heart disease Maternal Grandmother     Cancer Maternal Grandfather     Stroke Paternal Grandmother     Heart attack Paternal Grandfather      Social History     Tobacco Use    Smoking status: Former     Current packs/day: 0.00     Average packs/day: 1 pack/day for 37.0 years (37.0 ttl pk-yrs)     Types: Cigarettes     Start date: 3/30/1981     Quit date: 3/30/2018     Years since quittin.1     Passive exposure: Past    Smokeless tobacco: Current     Types: Chew     Tobacco comments:     chewing nicotine   Substance Use Topics    Alcohol use: Yes     Alcohol/week: 3.0 standard drinks of alcohol     Types: 3 Cans of beer per week     Comment: occ     Allergies   Allergen Reactions    Levaquin [Levofloxacin In D5w] Swelling     Knee swelling    Sulfa Antibiotics GI Intolerance     Abdominal pain           Current Outpatient Medications:     Alpha-Lipoic Acid 100 MG CAPS, Take by mouth, Disp: , Rfl:     Ascorbic Acid (VITAMIN C) 100 MG tablet, Take 500 mg by mouth , Disp: , Rfl:     aspirin 81 MG tablet, Take 162 mg by mouth daily  , Disp: , Rfl:     atorvastatin (LIPITOR) 40 mg tablet, TAKE 1 TABLET BY MOUTH ONCE DAILY WITH SUPPER, Disp: 90 tablet, Rfl: 0    BIOTIN PO, Take 5 mg by mouth daily, Disp: , Rfl:     Cholecalciferol (VITAMIN D3) 1000 units CAPS, Take by mouth daily , Disp: , Rfl:     Continuous Blood Gluc  (FreeStyle Alexei 2 [place]) [facility], USE 1 READER 1 TIME FOR 1 DOSE, Disp: , Rfl:     Continuous Blood Gluc Sensor (FreeStyle Alexei 2 Sensor) MISC, USE 1 SENSOR EVERY 14 DAYS, Disp: 7 each, Rfl: 1    cyanocobalamin (VITAMIN B-12) 100 mcg tablet, Take by mouth daily, Disp: , Rfl:     desloratadine (CLARINEX) 5 MG tablet, Take 1 tablet (5 mg total) by mouth daily at bedtime, Disp: 90 tablet, Rfl: 1    famotidine (PEPCID) 20 mg tablet, Take 20 mg by mouth daily, Disp: , Rfl:     FREESTYLE LITE test strip, USE 1 STRIP TO CHECK GLUCOSE ONCE DAILY AS DIRECTED, Disp: 100 each, Rfl: 0    glucosamine-chondroitin 500-400 MG tablet, Take 1 tablet by mouth daily, Disp: , Rfl:     hydrochlorothiazide (HYDRODIURIL) 25 mg tablet, Take 1 tablet by mouth once daily, Disp: 90 tablet, Rfl: 1    losartan (COZAAR) 50 mg tablet, Take 1 tablet by mouth once daily, Disp: 90 tablet, Rfl: 1    Magnesium Hydroxide (MAGNESIA PO), Take by mouth 2 (two) times a day, Disp: , Rfl:     metFORMIN (GLUCOPHAGE) 1000 MG tablet, TAKE 1 TABLET BY MOUTH TWICE DAILY WITH MEALS, Disp: 180 tablet, Rfl:  "1    Multiple Vitamin (MULTIVITAMIN) capsule, Take 1 capsule by mouth daily, Disp: , Rfl:     ONETOUCH DELICA LANCETS 30G MISC, 1 Units by Does not apply route daily, Disp: 100 each, Rfl: 6    rOPINIRole (REQUIP) 0.5 mg tablet, Take 1 tablet (0.5 mg total) by mouth daily at bedtime, Disp: 30 tablet, Rfl: 5    semaglutide, 2 mg/dose, (Ozempic, 2 MG/DOSE,) 8 mg/ mL injection pen, Inject 0.75 mL (2 mg total) under the skin every 7 days, Disp: 3 mL, Rfl: 2    torsemide (DEMADEX) 10 mg tablet, Take 1 tablet (10 mg total) by mouth daily, Disp: 30 tablet, Rfl: 1    albuterol (PROVENTIL HFA,VENTOLIN HFA) 90 mcg/act inhaler, Inhale 2 puffs every 4 (four) hours as needed for wheezing or shortness of breath, Disp: 18 g, Rfl: 6    Continuous Blood Gluc Sensor (FreeStyle Alexei 3 Sensor) MISC, Change every 14 days as directed. (Patient not taking: Reported on 5/17/2024), Disp: 2 each, Rfl: 2    FREESTYLE LITE test strip, USE 1 STRIP TO CHECK GLUCOSE ONCE DAILY AS INSTRUCTED, Disp: 100 each, Rfl: 0    nitroglycerin (NITROSTAT) 0.3 mg SL tablet, Place 1 tablet (0.3 mg total) under the tongue every 5 (five) minutes as needed for chest pain, Disp: 25 tablet, Rfl: 1    Current Facility-Administered Medications:     cyanocobalamin injection 1,000 mcg, 1,000 mcg, Intramuscular, Q30 Days, LUCERO Junior, 1,000 mcg at 09/10/21 1141    Review of Systems  See HPI   All other systems reviewed and are negative.      Physical Exam:  Body mass index is 38.35 kg/m².  /80 (BP Location: Left arm, Patient Position: Sitting, Cuff Size: Large)   Pulse 80   Ht 5' 11\" (1.803 m)   Wt 125 kg (275 lb)   SpO2 98%   BMI 38.35 kg/m²    Wt Readings from Last 3 Encounters:   05/17/24 125 kg (275 lb)   05/03/24 125 kg (276 lb)   03/01/24 125 kg (276 lb)        Physical Exam  Vitals reviewed.   Constitutional:       Appearance: Nontoxic appearing.   Cardiovascular:      Rate and Rhythm: Normal rate and regular rhythm.      Pulses: " Normal pulses.      Heart sounds: Normal heart sounds.   Pulmonary:      Effort: Pulmonary effort is normal.      Breath sounds: Normal breath sounds.   Skin:     General: Skin is warm and dry.      Capillary Refill: Capillary refill takes less than 2 seconds.   Neurological:      General: No focal deficit present.      Mental Status: He is alert and oriented to person, place, and time.   Psychiatric:         Mood and Affect: Mood normal.         Behavior: Behavior normal.       Labs:   Lab Results   Component Value Date    HGBA1C 8.0 (H) 05/10/2024    HGBA1C 7.3 (H) 02/09/2024    HGBA1C 7.6 (H) 11/03/2023     Lab Results   Component Value Date    CREATININE 1.14 05/10/2024    CREATININE 1.03 02/09/2024    CREATININE 1.11 11/03/2023    BUN 15 05/10/2024     11/30/2017    K 4.2 05/10/2024     05/10/2024    CO2 25 05/10/2024     eGFR   Date Value Ref Range Status   05/10/2024 73 >59 mL/min/1.73 Final   11/25/2017 73 ml/min/1.73sq m Final     Lab Results   Component Value Date    HDL 40 02/09/2024    TRIG 209 (H) 02/09/2024    CHOLHDL 3.1 02/09/2024     Lab Results   Component Value Date    ALT 84 (H) 02/09/2024    AST 51 (H) 02/09/2024    ALKPHOS 67 11/23/2017     Lab Results   Component Value Date    [id number] 2.809 11/22/2017    XCY4LPVOOBPC 3.509 12/06/2016     Lab Results   Component Value Date    FREET4 1.03 02/09/2024       Discussed with the patient and all questioned fully answered. He will call me if any problems arise.    Follow-up appointment in 3 months.     Counseled patient on diagnostic results, prognosis, risk and benefit of treatment options, instruction for management, importance of treatment compliance, Risk  factor reduction and impressions    LUCERO Rios

## 2024-05-17 NOTE — ASSESSMENT & PLAN NOTE
Recommend repeat thyroid ultrasound in November 2024.   Denies neck compressive symptoms.  Last thyroid ultrasound from November 2023 demonstrated stability in previously biopsied thyroid nodule.

## 2024-05-17 NOTE — ASSESSMENT & PLAN NOTE
Lab Results   Component Value Date    HGBA1C 8.0 (H) 05/10/2024     HGA1C above goal and increasing indicating poor control.    BGL Reviewed: Continue CGM.     Treatment regimen: Recommend increasing Ozempic to 2 mg weekly dose, if not improving glycemic control to goal, recommend transitioning to Mounjaro.     Discussed risks/complications associated with uncontrolled diabetes including organ involvement, heart attack, stroke, death.    Advised lifestyle modifications including attention to diet including the amount and types of carbohydrates consumed and regular activity.     Call for blood sugars less than 70 mg/dl or patterns over 250 mg/dl.     Discussed symptoms and treatment of hypoglycemia.  Reviewed risks associated with hypoglycemia. Always carry rapid acting carbohydrates and a glucometer (a way to check your blood sugar).    Recommendation for medical identification either bracelet, necklace.     Routine follow up for diabetic eye and foot exams.     Ordered blood work to complete prior to next visit.    Follow up in 3 months.

## 2024-05-24 ENCOUNTER — TELEPHONE (OUTPATIENT)
Dept: ADMINISTRATIVE | Facility: OTHER | Age: 64
End: 2024-05-24

## 2024-05-24 ENCOUNTER — OFFICE VISIT (OUTPATIENT)
Dept: INTERNAL MEDICINE CLINIC | Facility: CLINIC | Age: 64
End: 2024-05-24
Payer: COMMERCIAL

## 2024-05-24 VITALS
HEART RATE: 61 BPM | OXYGEN SATURATION: 97 % | SYSTOLIC BLOOD PRESSURE: 128 MMHG | DIASTOLIC BLOOD PRESSURE: 78 MMHG | BODY MASS INDEX: 38.5 KG/M2 | WEIGHT: 275 LBS | HEIGHT: 71 IN

## 2024-05-24 DIAGNOSIS — E11.65 TYPE 2 DIABETES MELLITUS WITH HYPERGLYCEMIA, WITHOUT LONG-TERM CURRENT USE OF INSULIN (HCC): Primary | ICD-10-CM

## 2024-05-24 DIAGNOSIS — L98.9 SKIN LESION OF LEFT LEG: ICD-10-CM

## 2024-05-24 DIAGNOSIS — I25.10 CORONARY ARTERY DISEASE INVOLVING NATIVE CORONARY ARTERY OF NATIVE HEART WITHOUT ANGINA PECTORIS: ICD-10-CM

## 2024-05-24 PROCEDURE — 99213 OFFICE O/P EST LOW 20 MIN: CPT | Performed by: INTERNAL MEDICINE

## 2024-05-24 NOTE — PROGRESS NOTES
Dr. Marquez's Office Visit Note  24     Bobo Cheng 63 y.o. male MRN: [id number]  : 1960    Assessment:     1. Type 2 diabetes mellitus with hyperglycemia, without long-term current use of insulin ([facility])  Assessment & Plan:    Lab Results   Component Value Date    HGBA1C 8.0 (H) 05/10/2024   Above reviewed followed by endocrinology diabetes not controlled patient on Ozempic metformin discussed about switching to Mounjaro awaiting until the colonoscopy is done agree and continue management recommended by endocrinology diabetic diet hypoglycemia protocol in place  2. Coronary artery disease involving native coronary artery of native heart without angina pectoris  Assessment & Plan:  Chest pain dyspnea on exertion at baseline seen by cardiology agree and continue secondary prevention as follows    Continue Lipitor 40 mg daily    Aspirin 81 mg daily    Control the blood pressure  Follow-up with cardiology echocardiogram findings reviewed  3. Skin lesion of left leg  -     Ambulatory Referral to Dermatology; Future        Discussion Summary and Plan:  Today's care plan and medications were reviewed with patient in detail and all their questions answered to their satisfaction.    Chief Complaint   Patient presents with   • Follow-up     Lump on right wrist. No pain.      Subjective:  Came in follow-up chronic medical condition listed visit diagnosis seen by endocrinology cardiology urology for now no chest pain difficulty breathing awaiting colonoscopy        The following portions of the patient's history were reviewed and updated as appropriate: allergies, current medications, past family history, past medical history, past social history, past surgical history and problem list.    Review of Systems   Constitutional:  Positive for activity change. Negative for appetite change, chills, diaphoresis, fatigue, fever and unexpected weight change.   HENT:  Positive for rhinorrhea. Negative for dental problem,  drooling, ear discharge, ear pain, facial swelling, hearing loss, mouth sores, nosebleeds, postnasal drip, sinus pressure, sneezing, sore throat, tinnitus, trouble swallowing and voice change.    Eyes:  Negative for photophobia, pain, discharge, redness, itching and visual disturbance.   Respiratory:  Negative for apnea, cough, choking, chest tightness, shortness of breath, wheezing and stridor.    Cardiovascular:  Negative for chest pain, palpitations and leg swelling.   Gastrointestinal:  Negative for abdominal distention, abdominal pain, anal bleeding, blood in stool, constipation, diarrhea, nausea, rectal pain and vomiting.   Endocrine: Negative for cold intolerance, heat intolerance, polydipsia, polyphagia and polyuria.   Genitourinary:  Negative for decreased urine volume, difficulty urinating, dysuria, enuresis, flank pain, frequency, genital sores, hematuria and urgency.   Musculoskeletal:  Positive for arthralgias. Negative for back pain, gait problem, joint swelling, myalgias, neck pain and neck stiffness.   Skin:  Negative for color change, pallor, rash and wound.   Allergic/Immunologic: Negative.  Negative for environmental allergies, food allergies and immunocompromised state.   Neurological:  Negative for dizziness, tremors, seizures, syncope, facial asymmetry, speech difficulty, weakness, light-headedness, numbness and headaches.   Psychiatric/Behavioral:  Negative for agitation, behavioral problems, confusion, decreased concentration, dysphoric mood, hallucinations, self-injury, sleep disturbance and suicidal ideas. The patient is not nervous/anxious and is not hyperactive.          Historical Information   Patient Active Problem List   Diagnosis   • Diabetes mellitus (HCC)   • Hyperlipidemia   • Essential hypertension   • Elevated liver enzymes   • Polyarthralgia   • Morbid obesity (HCC)   • Chronic pain of both knees   • Primary osteoarthritis of knees, bilateral   • Coronary artery disease  involving native coronary artery of native heart without angina pectoris   • Asymptomatic PVCs   • Obstructive sleep apnea   • Obesity (BMI 30-39.9)   • Seasonal allergic rhinitis due to pollen   • GERD (gastroesophageal reflux disease)   • Nasal septal deformity   • Bundle branch block, right   • CPAP (continuous positive airway pressure) dependence   • Vitamin D deficiency   • BPH (benign prostatic hyperplasia)   • History of vertebral compression fracture   • Colon polyp   • History of angioplasty   • Ocular migraine   • Inguinal hernia   • Umbilical hernia   • Bradycardia   • Asymptomatic varicose veins of both lower extremities   • Neuropathy   • 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)   • Carpal tunnel syndrome of left wrist   • Ulnar neuropathy at elbow of left upper extremity   • Onychomycosis of toenail   • Chronic left-sided low back pain with left-sided sciatica   • Varicose veins of leg with swelling, bilateral   • Thyroid nodule   • Vitamin B 12 deficiency   • Compression fracture of body of thoracic vertebra (HCC)   • Disc disease, degenerative, lumbar or lumbosacral   • History of coronary artery stent placement- pCx and OM2   • Carpal tunnel syndrome on right   • Tremor of left hand   • Restless leg syndrome   • Hypertensive heart disease with congestive heart failure (HCC)   • Type 2 diabetes mellitus with hyperglycemia (HCC)   • Hypomagnesemia   • Hearing deficit, right   • Screening for lung cancer   • Other fatigue   • Low testosterone     Past Medical History:   Diagnosis Date   • Anemia    • Bundle branch block, right    • CAD (coronary artery disease)    • Cataract    • CPAP (continuous positive airway pressure) dependence    • Diabetes mellitus (HCC)     borderline, controlled with diet and activity   • Diverticulitis    • GERD (gastroesophageal reflux disease)    • History of coronary artery stent placement- pCx and OM2 11/24/2017   • Hyperlipidemia    • Nasal septal deformity    •  Neuropathy    • Obesity (BMI 30-39.9)    • Sleep apnea     wears c-pap   • Vitamin D deficiency      Past Surgical History:   Procedure Laterality Date   • COLON SIGMOID RESECTION N/A 2016    Procedure: RESECTION COLON SIGMOID;  Surgeon: KUN Denise MD;  Location:  MAIN OR;  Service:    • COLON SIGMOID RESECTION LAPAROSCOPIC N/A 2016    Procedure: RESECTION COLON SIGMOID LAPAROSCOPIC:CONVERTED TO OPEN@1245;  Surgeon: KUN Denise MD;  Location:  MAIN OR;  Service:    • COLON SURGERY      Sigmoidectomy   • COLONOSCOPY N/A 10/6/2016    Procedure: COLONOSCOPY;  Surgeon: Farzad Ernandez MD;  Location: [facility] GI LAB;  Service:    • CYSTOSCOPY W/ RETROGRADES Left 2/3/2017    Procedure: CYSTOSCOPY WITH BILATERAL RETROGRADES, LEFT STENT REMOVAL;  Surgeon: Miguel A Villalobos MD;  Location: [facility] MAIN OR;  Service:    • ESOPHAGOGASTRODUODENOSCOPY N/A 10/6/2016    Procedure: ESOPHAGOGASTRODUODENOSCOPY (EGD);  Surgeon: Farzad Ernandez MD;  Location: [facility] GI LAB;  Service:    • HERNIA REPAIR     • KNEE ARTHROSCOPY W/ MENISCECTOMY     • MA CYSTO BLADDER W/URETERAL CATHETERIZATION Left 2016    Procedure: CYSTOSCOPY RETROGRADE PYELOGRAM WITH INSERTION STENT URETERAL;  Surgeon: Miguel A Villalobos MD;  Location: [facility] OR;  Service: Urology   • US GUIDED THYROID BIOPSY  2021   • VARICOSE VEIN SURGERY       Social History     Substance and Sexual Activity   Alcohol Use Yes   • Alcohol/week: 3.0 standard drinks of alcohol   • Types: 3 Cans of beer per week    Comment: occ     Social History     Substance and Sexual Activity   Drug Use No     Social History     Tobacco Use   Smoking Status Former   • Current packs/day: 0.00   • Average packs/day: 1 pack/day for 37.0 years (37.0 ttl pk-yrs)   • Types: Cigarettes   • Start date: 3/30/1981   • Quit date: 3/30/2018   • Years since quittin.1   • Passive exposure: Past   Smokeless Tobacco Current   • Types: Chew   Tobacco Comments    chewing nicotine      Family History   Problem Relation Age of Onset   • Osteoarthritis Mother    • Atrial fibrillation Mother    • Aortic aneurysm Father    • Diabetes Brother    • Colon cancer Brother    • Diabetes type II Brother    • Colon cancer Brother    • Heart disease Maternal Grandmother    • Cancer Maternal Grandfather    • Stroke Paternal Grandmother    • Heart attack Paternal Grandfather      Health Maintenance Due   Topic   • Hepatitis C Screening    • HIV Screening    • Annual Physical    • Zoster Vaccine (1 of 2)   • Pneumococcal Vaccine: Pediatrics (0 to 5 Years) and At-Risk Patients (6 to 64 Years) (2 of 2 - PCV)   • RSV Vaccine Age 60+ Years (1 - 1-dose 60+ series)   • OT PLAN OF CARE    • COVID-19 Vaccine (8 - 2023-24 season)   • Diabetic Foot Exam    • Colorectal Cancer Screening    • Depression Screening       Meds/Allergies       Current Outpatient Medications:   •  albuterol (PROVENTIL HFA,VENTOLIN HFA) 90 mcg/act inhaler, Inhale 2 puffs every 4 (four) hours as needed for wheezing or shortness of breath, Disp: 18 g, Rfl: 6  •  Alpha-Lipoic Acid 100 MG CAPS, Take by mouth, Disp: , Rfl:   •  Ascorbic Acid (VITAMIN C) 100 MG tablet, Take 500 mg by mouth , Disp: , Rfl:   •  aspirin 81 MG tablet, Take 162 mg by mouth daily  , Disp: , Rfl:   •  atorvastatin (LIPITOR) 40 mg tablet, TAKE 1 TABLET BY MOUTH ONCE DAILY WITH SUPPER, Disp: 90 tablet, Rfl: 0  •  BIOTIN PO, Take 5 mg by mouth daily, Disp: , Rfl:   •  Cholecalciferol (VITAMIN D3) 1000 units CAPS, Take by mouth daily , Disp: , Rfl:   •  Continuous Blood Gluc  (FreeStyle Alexei 2 Clifford) STEVE, USE 1 READER 1 TIME FOR 1 DOSE, Disp: , Rfl:   •  Continuous Blood Gluc Sensor (FreeStyle Alexei 2 Sensor) MISC, USE 1 SENSOR EVERY 14 DAYS, Disp: 7 each, Rfl: 1  •  cyanocobalamin (VITAMIN B-12) 100 mcg tablet, Take by mouth daily, Disp: , Rfl:   •  desloratadine (CLARINEX) 5 MG tablet, Take 1 tablet (5 mg total) by mouth daily at bedtime, Disp: 90 tablet, Rfl:  "1  •  famotidine (PEPCID) 20 mg tablet, Take 20 mg by mouth daily, Disp: , Rfl:   •  FREESTYLE LITE test strip, USE 1 STRIP TO CHECK GLUCOSE ONCE DAILY AS DIRECTED, Disp: 100 each, Rfl: 0  •  FREESTYLE LITE test strip, USE 1 STRIP TO CHECK GLUCOSE ONCE DAILY AS INSTRUCTED, Disp: 100 each, Rfl: 0  •  glucosamine-chondroitin 500-400 MG tablet, Take 1 tablet by mouth daily, Disp: , Rfl:   •  hydrochlorothiazide (HYDRODIURIL) 25 mg tablet, Take 1 tablet by mouth once daily, Disp: 90 tablet, Rfl: 1  •  losartan (COZAAR) 50 mg tablet, Take 1 tablet by mouth once daily, Disp: 90 tablet, Rfl: 1  •  Magnesium Hydroxide (MAGNESIA PO), Take by mouth 2 (two) times a day, Disp: , Rfl:   •  metFORMIN (GLUCOPHAGE) 1000 MG tablet, TAKE 1 TABLET BY MOUTH TWICE DAILY WITH MEALS, Disp: 180 tablet, Rfl: 1  •  Multiple Vitamin (MULTIVITAMIN) capsule, Take 1 capsule by mouth daily, Disp: , Rfl:   •  nitroglycerin (NITROSTAT) 0.3 mg SL tablet, Place 1 tablet (0.3 mg total) under the tongue every 5 (five) minutes as needed for chest pain, Disp: 25 tablet, Rfl: 1  •  ONETOUCH DELICA LANCETS 30G MISC, 1 Units by Does not apply route daily, Disp: 100 each, Rfl: 6  •  rOPINIRole (REQUIP) 0.5 mg tablet, Take 1 tablet (0.5 mg total) by mouth daily at bedtime, Disp: 30 tablet, Rfl: 5  •  semaglutide, 2 mg/dose, (Ozempic, 2 MG/DOSE,) 8 mg/ mL injection pen, Inject 0.75 mL (2 mg total) under the skin every 7 days, Disp: 3 mL, Rfl: 2  •  torsemide (DEMADEX) 10 mg tablet, Take 1 tablet (10 mg total) by mouth daily, Disp: 30 tablet, Rfl: 1  •  Continuous Blood Gluc Sensor (FreeStyle Alexei 3 Sensor) MISC, Change every 14 days as directed. (Patient not taking: Reported on 5/17/2024), Disp: 2 each, Rfl: 2    Current Facility-Administered Medications:   •  cyanocobalamin injection 1,000 mcg, 1,000 mcg, Intramuscular, Q30 Days, LUCERO Junior, 1,000 mcg at 09/10/21 1141      Objective:    Vitals:   /78   Pulse 61   Ht 5' 11\" " (1.803 m)   Wt 125 kg (275 lb)   SpO2 97%   BMI 38.35 kg/m²   Body mass index is 38.35 kg/m².  Vitals:    05/24/24 0937   Weight: 125 kg (275 lb)       Physical Exam  Vitals and nursing note reviewed.   Constitutional:       General: He is not in acute distress.     Appearance: He is well-developed. He is not ill-appearing, toxic-appearing or diaphoretic.   HENT:      Head: Normocephalic and atraumatic.      Right Ear: External ear normal.      Left Ear: External ear normal.      Nose: Congestion present.      Mouth/Throat:      Pharynx: No oropharyngeal exudate.   Eyes:      General: Lids are normal. Lids are everted, no foreign bodies appreciated. No scleral icterus.        Right eye: No discharge.         Left eye: No discharge.      Conjunctiva/sclera: Conjunctivae normal.      Pupils: Pupils are equal, round, and reactive to light.   Neck:      Thyroid: No thyromegaly.      Vascular: Normal carotid pulses. No carotid bruit, hepatojugular reflux or JVD.      Trachea: No tracheal tenderness or tracheal deviation.   Cardiovascular:      Rate and Rhythm: Normal rate and regular rhythm.      Pulses: Normal pulses.      Heart sounds: Normal heart sounds. No murmur heard.     No friction rub. No gallop.   Pulmonary:      Effort: Pulmonary effort is normal. No respiratory distress.      Breath sounds: Normal breath sounds. No stridor. No wheezing or rales.   Chest:      Chest wall: No tenderness.   Abdominal:      General: Bowel sounds are normal. There is no distension.      Palpations: Abdomen is soft. There is no mass.      Tenderness: There is no abdominal tenderness. There is no guarding or rebound.   Musculoskeletal:         General: No tenderness or deformity. Normal range of motion.      Cervical back: Normal range of motion and neck supple. No edema, erythema or rigidity. No spinous process tenderness or muscular tenderness. Normal range of motion.   Lymphadenopathy:      Head:      Right side of head: No  submental, submandibular, tonsillar, preauricular or posterior auricular adenopathy.      Left side of head: No submental, submandibular, tonsillar, preauricular, posterior auricular or occipital adenopathy.      Cervical: No cervical adenopathy.      Right cervical: No superficial, deep or posterior cervical adenopathy.     Left cervical: No superficial, deep or posterior cervical adenopathy.      Upper Body:      Right upper body: No pectoral adenopathy.      Left upper body: No pectoral adenopathy.   Skin:     General: Skin is warm and dry.      Coloration: Skin is not pale.      Findings: No erythema or rash.   Neurological:      General: No focal deficit present.      Mental Status: He is alert and oriented to person, place, and time.      Cranial Nerves: No cranial nerve deficit.      Sensory: No sensory deficit.      Motor: No tremor, abnormal muscle tone or seizure activity.      Coordination: Coordination normal.      Gait: Gait normal.      Deep Tendon Reflexes: Reflexes are normal and symmetric. Reflexes normal.   Psychiatric:         Behavior: Behavior normal.         Thought Content: Thought content normal.         Judgment: Judgment normal.         Lab Review   Hospital Outpatient Visit on 05/03/2024   Component Date Value Ref Range Status   • BSA 05/03/2024 2.42  m2 Final   • A4C EF 05/03/2024 66  % Final   • LVIDd 05/03/2024 5.00  cm Final   • LVIDS 05/03/2024 3.50  cm Final   • IVSd 05/03/2024 1.40  cm Final   • LVPWd 05/03/2024 1.30  cm Final   • FS 05/03/2024 30  28 - 44 Final   • MV E' Tissue Velocity Septal 05/03/2024 10  cm/s Final   • LA Volume Index (BP) 05/03/2024 28.9  mL/m2 Final   • E/A ratio 05/03/2024 0.78   Final   • E wave deceleration time 05/03/2024 196  ms Final   • MV Peak E Radames 05/03/2024 76  cm/s Final   • MV Peak A Radames 05/03/2024 0.98  m/s Final   • AV LVOT peak gradient 05/03/2024 4  mmHg Final   • RVID d 05/03/2024 3.8  cm Final   • Tricuspid annular plane systolic e*  05/03/2024 2.10  cm Final   • LA size 05/03/2024 3  cm Final   • LA length (A2C) 05/03/2024 5.10  cm Final   • LA volume (BP) 05/03/2024 70  mL Final   • RAA A4C 05/03/2024 16.9  cm2 Final   • AV peak gradient 05/03/2024 10  mmHg Final   • MV stenosis pressure 1/2 time 05/03/2024 57  ms Final   • MV valve area p 1/2 method 05/03/2024 3.86   Final   • Ao root 05/03/2024 3.50  cm Final   • Left ventricular stroke volume (2D) 05/03/2024 63.00  mL Final   • IVS 05/03/2024 1.4  cm Final   • LEFT VENTRICLE SYSTOLIC VOLUME (MO* 05/03/2024 53  mL Final   • LV DIASTOLIC VOLUME (MOD BIPLANE) * 05/03/2024 116  mL Final   • Left Atrium Area-systolic Four Lorna* 05/03/2024 25  cm2 Final   • Left Atrium Area-systolic Apical T* 05/03/2024 18.1  cm2 Final   • LVSV, 2D 05/03/2024 63  mL Final   • LV EF 05/03/2024 55   Final         Patient Instructions   Heart Failure   AMBULATORY CARE:   Heart failure  is a condition that does not allow your heart to fill or pump properly. Not enough oxygen in your blood gets to your organs and tissues. Fluid may not move through your body properly. Fluid may build up and cause swelling and trouble breathing. This is known as congestive heart failure. It is important to manage your health to improve your quality of life.       Signs and symptoms  depend on the type of heart failure you have and how severe it is. You may have any of the following:  Trouble breathing with activity that worsens to trouble breathing at rest    Shortness of breath while lying flat    Severe shortness of breath and coughing at night that usually wakes you    Feeling lightheaded when you stand up    Purple color around your mouth and nails    Confusion or anxiety    Chest pain at night    Periods of no breathing, then breathing fast    Lack of energy (often worsened by physical activity), or trouble sleeping    Swelling in your ankles, legs, or abdomen    Heartbeat that is fast or not regular    Fingers and toes feel cool  to the touch    Call your local emergency number (911 in the [place]) if:   You have any of the following signs of a heart attack:      Squeezing, pressure, or pain in your chest    You may  also have any of the following:     Discomfort or pain in your back, neck, jaw, stomach, or arm    Shortness of breath    Nausea or vomiting    Lightheadedness or a sudden cold sweat      Seek immediate care if:   Your heartbeat is fast, slow, or uneven all the time.      Call your doctor if:   You have symptoms of worsening heart failure:      Shortness of breath at rest, at night, or that is getting worse in any way    Weight gain of 3 or more pounds (1.4 kg) in a day, or more than your healthcare provider says is okay    More swelling in your legs or ankles    Abdominal pain or swelling    More coughing    Loss of appetite    Feeling tired all the time    You feel depressed, or you have lost interest in things you used to enjoy.    You often feel worried or afraid.    You have questions or concerns about your condition or care.    Treatment:  Heart failure is often caused by damage or injury to your heart. The damage may be caused by other heart problems, diabetes, or high blood pressure. The damage may have also been caused by an infection. Your healthcare providers will help you manage any other health conditions that may be causing your heart failure. The goals of treatment are to manage, slow, or reverse heart damage. Treatment may include any of the following:  Medicines  may be needed to help regulate your heart rhythm. You may also need medicines to lower your blood pressure, and to decrease extra fluids.    Oxygen  may help you breathe easier if your oxygen level is lower than normal. A CPAP machine may be used to keep your airway open while you sleep.         Cardiac rehab  is a program run by specialists who will help you safely strengthen your heart. In the program you will learn about exercise, relaxation, stress  management, and heart-healthy nutrition. Cardiac rehab may be recommended if your heart failure is not severe.    Surgery  can be done to implant a pacemaker or another device in your chest to regulate your heart rhythm. Other types of surgery can open blocked heart vessels, replace a damaged heart valve, or remove scar tissue.    Manage swelling from extra fluid:   Elevate (raise) your legs above the level of your heart.  This will help with fluid that builds up in your legs or ankles. Elevate your legs as often as possible during the day. Prop your legs on pillows or blankets to keep them elevated comfortably. Try not to stand for long periods of time during the day. Move around to keep your blood circulating.         Limit sodium (salt).  Ask how much sodium you can have each day. Your healthcare provider may give you a limit, such as 2,300 milligrams (mg) a day. Your provider or a dietitian can teach you how to read food labels for the number of mg in a food. He or she can also help you find ways to have less salt. For example, if you add salt to food as you cook, do not add more at the table.         Drink liquids as directed.  You may need to limit the amount of liquid you drink within 24 hours. Your healthcare provider will tell you how much liquid to have and which liquids are best for you. He or she may tell you to limit liquid to 1.5 to 2 liters in a day. He or she will also tell you how often to drink liquid throughout the day.    Weigh yourself every morning.  Use the same scale, in the same spot. Do this after you use the bathroom, but before you eat or drink. Wear the same type of clothing each time. Write down your weight and call your healthcare provider if you have a sudden weight gain. Swelling and weight gain are signs of fluid buildup.       Manage heart failure:  Your quality of life may improve with treatment and the following:  Do not smoke.  Nicotine and other chemicals in cigarettes and  cigars can cause lung and heart damage. Ask your healthcare provider for information if you currently smoke and need help to quit. E-cigarettes or smokeless tobacco still contain nicotine. Talk to your healthcare provider before you use these products.    Do not drink alcohol or use illegal drugs.  Alcohol and drugs can increase your risk for high blood pressure, diabetes, and coronary artery disease.    Eat heart-healthy foods.  Heart-healthy foods include fruits, vegetables, lean meat (such as beef, chicken, or pork), and low-fat dairy products. Fatty fish such as salmon and tuna are also heart healthy. Other heart-healthy foods include walnuts, whole-grain breads, and legumes such as deng beans. Replace butter and margarine with heart-healthy oils such as olive oil or canola oil. Your provider or a dietitian can help you create heart-healthy meal plans.         Manage any chronic health conditions you have.  These include high blood pressure, diabetes, obesity, high cholesterol, metabolic syndrome, and COPD. You will have fewer symptoms if you manage these health conditions. Follow your healthcare provider's recommendations and follow up with him or her regularly.    Maintain a healthy weight.  Being overweight can increase your risk for high blood pressure, diabetes, and coronary artery disease. These conditions can make your symptoms worse. Ask your healthcare provider what a healthy weight is for you. Ask him or her to help you create a weight loss plan, if needed.    Stay active.  Activity can help keep your symptoms from getting worse. Walking is a type of physical activity that helps maintain your strength and improve your mood. Physical activity also helps you manage your weight. Work with your healthcare provider to create an exercise plan that is right for you.         Get vaccines as directed.  The flu, COVID-19, and pneumonia can be severe for a person who has heart failure. Vaccines protect you from  "these infections. Get a flu vaccine every year as soon as it is recommended, usually in September or October. Get a COVID-19 vaccine and booster as directed. You may also need the pneumonia vaccine. Your healthcare provider can tell you if you need other vaccines, and when to get them.       Follow up with your doctor or cardiologist within 7 days or as directed:  You may need to return for other tests. You may need home health care. A healthcare provider will monitor your vital signs, weight, and make sure your medicines are working. Write down your questions so you remember to ask them during your visits.  For support or more information:  Heart failure can be difficult to manage. It may be helpful to talk with others who have heart failure. You may learn how to better manage your condition or get emotional support. For more information:  American Heart Association  [address] [place] [phone]   Phone: [phone] [phone]  Web Address: http://www.heart.org     © Copyright Merative 2023 Information is for End User's use only and may not be sold, redistributed or otherwise used for commercial purposes.  The above information is an  only. It is not intended as medical advice for individual conditions or treatments. Talk to your doctor, nurse or pharmacist before following any medical regimen to see if it is safe and effective for you.       Sandy Marquez MD        \"This note has been constructed using a voice recognition system.Therefore there may be syntax, spelling, and/or grammatical errors. Please call if you have any questions. \"  "

## 2024-05-24 NOTE — TELEPHONE ENCOUNTER
Upon review of the In Basket request we have found/obtained the documentation. After careful review of the document we are unable to complete this request for Diabetic Foot Exam because the documentation does not have the proper verbiage (wording) needed to close the requested care gap(s) and the documentation does not have the result(s) needed to close the requested care gap(s). Documentation does not state filament/Leroy test was performed as part of the neurologi foot exam.    Any additional questions or concerns should be emailed to the Practice Liaisons via the appropriate education email address, please do not reply via In Basket.    Thank you  Bar Mendez

## 2024-05-24 NOTE — ASSESSMENT & PLAN NOTE
Chest pain dyspnea on exertion at baseline seen by cardiology agree and continue secondary prevention as follows    Continue Lipitor 40 mg daily    Aspirin 81 mg daily    Control the blood pressure  Follow-up with cardiology echocardiogram findings reviewed

## 2024-05-24 NOTE — TELEPHONE ENCOUNTER
----- Message from Charito ROMANO sent at 5/24/2024 10:08 AM EDT -----  Regarding: care gap request  05/24/24 10:08 AM    Hello, our patient attached above has had Diabetic Foot Exam completed/performed. Please assist in updating the patient chart by pulling the document from encounters Tab within Chart Review. The date of service is March 19.     Thank you,  Charito Franco [facility]

## 2024-05-24 NOTE — PATIENT INSTRUCTIONS
Heart Failure   AMBULATORY CARE:   Heart failure  is a condition that does not allow your heart to fill or pump properly. Not enough oxygen in your blood gets to your organs and tissues. Fluid may not move through your body properly. Fluid may build up and cause swelling and trouble breathing. This is known as congestive heart failure. It is important to manage your health to improve your quality of life.       Signs and symptoms  depend on the type of heart failure you have and how severe it is. You may have any of the following:  Trouble breathing with activity that worsens to trouble breathing at rest    Shortness of breath while lying flat    Severe shortness of breath and coughing at night that usually wakes you    Feeling lightheaded when you stand up    Purple color around your mouth and nails    Confusion or anxiety    Chest pain at night    Periods of no breathing, then breathing fast    Lack of energy (often worsened by physical activity), or trouble sleeping    Swelling in your ankles, legs, or abdomen    Heartbeat that is fast or not regular    Fingers and toes feel cool to the touch    Call your local emergency number (911 in the ) if:   You have any of the following signs of a heart attack:      Squeezing, pressure, or pain in your chest    You may  also have any of the following:     Discomfort or pain in your back, neck, jaw, stomach, or arm    Shortness of breath    Nausea or vomiting    Lightheadedness or a sudden cold sweat      Seek immediate care if:   Your heartbeat is fast, slow, or uneven all the time.      Call your doctor if:   You have symptoms of worsening heart failure:      Shortness of breath at rest, at night, or that is getting worse in any way    Weight gain of 3 or more pounds (1.4 kg) in a day, or more than your healthcare provider says is okay    More swelling in your legs or ankles    Abdominal pain or swelling    More coughing    Loss of appetite    Feeling tired all the  time    You feel depressed, or you have lost interest in things you used to enjoy.    You often feel worried or afraid.    You have questions or concerns about your condition or care.    Treatment:  Heart failure is often caused by damage or injury to your heart. The damage may be caused by other heart problems, diabetes, or high blood pressure. The damage may have also been caused by an infection. Your healthcare providers will help you manage any other health conditions that may be causing your heart failure. The goals of treatment are to manage, slow, or reverse heart damage. Treatment may include any of the following:  Medicines  may be needed to help regulate your heart rhythm. You may also need medicines to lower your blood pressure, and to decrease extra fluids.    Oxygen  may help you breathe easier if your oxygen level is lower than normal. A CPAP machine may be used to keep your airway open while you sleep.         Cardiac rehab  is a program run by specialists who will help you safely strengthen your heart. In the program you will learn about exercise, relaxation, stress management, and heart-healthy nutrition. Cardiac rehab may be recommended if your heart failure is not severe.    Surgery  can be done to implant a pacemaker or another device in your chest to regulate your heart rhythm. Other types of surgery can open blocked heart vessels, replace a damaged heart valve, or remove scar tissue.    Manage swelling from extra fluid:   Elevate (raise) your legs above the level of your heart.  This will help with fluid that builds up in your legs or ankles. Elevate your legs as often as possible during the day. Prop your legs on pillows or blankets to keep them elevated comfortably. Try not to stand for long periods of time during the day. Move around to keep your blood circulating.         Limit sodium (salt).  Ask how much sodium you can have each day. Your healthcare provider may give you a limit, such as  2,300 milligrams (mg) a day. Your provider or a dietitian can teach you how to read food labels for the number of mg in a food. He or she can also help you find ways to have less salt. For example, if you add salt to food as you cook, do not add more at the table.         Drink liquids as directed.  You may need to limit the amount of liquid you drink within 24 hours. Your healthcare provider will tell you how much liquid to have and which liquids are best for you. He or she may tell you to limit liquid to 1.5 to 2 liters in a day. He or she will also tell you how often to drink liquid throughout the day.    Weigh yourself every morning.  Use the same scale, in the same spot. Do this after you use the bathroom, but before you eat or drink. Wear the same type of clothing each time. Write down your weight and call your healthcare provider if you have a sudden weight gain. Swelling and weight gain are signs of fluid buildup.       Manage heart failure:  Your quality of life may improve with treatment and the following:  Do not smoke.  Nicotine and other chemicals in cigarettes and cigars can cause lung and heart damage. Ask your healthcare provider for information if you currently smoke and need help to quit. E-cigarettes or smokeless tobacco still contain nicotine. Talk to your healthcare provider before you use these products.    Do not drink alcohol or use illegal drugs.  Alcohol and drugs can increase your risk for high blood pressure, diabetes, and coronary artery disease.    Eat heart-healthy foods.  Heart-healthy foods include fruits, vegetables, lean meat (such as beef, chicken, or pork), and low-fat dairy products. Fatty fish such as salmon and tuna are also heart healthy. Other heart-healthy foods include walnuts, whole-grain breads, and legumes such as deng beans. Replace butter and margarine with heart-healthy oils such as olive oil or canola oil. Your provider or a dietitian can help you create  heart-healthy meal plans.         Manage any chronic health conditions you have.  These include high blood pressure, diabetes, obesity, high cholesterol, metabolic syndrome, and COPD. You will have fewer symptoms if you manage these health conditions. Follow your healthcare provider's recommendations and follow up with him or her regularly.    Maintain a healthy weight.  Being overweight can increase your risk for high blood pressure, diabetes, and coronary artery disease. These conditions can make your symptoms worse. Ask your healthcare provider what a healthy weight is for you. Ask him or her to help you create a weight loss plan, if needed.    Stay active.  Activity can help keep your symptoms from getting worse. Walking is a type of physical activity that helps maintain your strength and improve your mood. Physical activity also helps you manage your weight. Work with your healthcare provider to create an exercise plan that is right for you.         Get vaccines as directed.  The flu, COVID-19, and pneumonia can be severe for a person who has heart failure. Vaccines protect you from these infections. Get a flu vaccine every year as soon as it is recommended, usually in September or October. Get a COVID-19 vaccine and booster as directed. You may also need the pneumonia vaccine. Your healthcare provider can tell you if you need other vaccines, and when to get them.       Follow up with your doctor or cardiologist within 7 days or as directed:  You may need to return for other tests. You may need home health care. A healthcare provider will monitor your vital signs, weight, and make sure your medicines are working. Write down your questions so you remember to ask them during your visits.  For support or more information:  Heart failure can be difficult to manage. It may be helpful to talk with others who have heart failure. You may learn how to better manage your condition or get emotional support. For more  information:  American Heart Association  [address] [place] [address]   Phone: 5- 655 - [phone]  Web Address: http://www.heart.org     © Copyright Merative 2023 Information is for End User's use only and may not be sold, redistributed or otherwise used for commercial purposes.  The above information is an  only. It is not intended as medical advice for individual conditions or treatments. Talk to your doctor, nurse or pharmacist before following any medical regimen to see if it is safe and effective for you.

## 2024-05-24 NOTE — ASSESSMENT & PLAN NOTE
Lab Results   Component Value Date    HGBA1C 8.0 (H) 05/10/2024   Above reviewed followed by endocrinology diabetes not controlled patient on Ozempic metformin discussed about switching to Mounjaro awaiting until the colonoscopy is done agree and continue management recommended by endocrinology diabetic diet hypoglycemia protocol in place

## 2024-06-04 DIAGNOSIS — E11.9 TYPE 2 DIABETES MELLITUS WITHOUT COMPLICATION, WITHOUT LONG-TERM CURRENT USE OF INSULIN (HCC): ICD-10-CM

## 2024-06-07 DIAGNOSIS — E11.65 TYPE 2 DIABETES MELLITUS WITH HYPERGLYCEMIA, UNSPECIFIED WHETHER LONG TERM INSULIN USE (HCC): ICD-10-CM

## 2024-06-21 ENCOUNTER — ANESTHESIA (OUTPATIENT)
Dept: ANESTHESIOLOGY | Facility: HOSPITAL | Age: 64
End: 2024-06-21

## 2024-06-21 ENCOUNTER — ANESTHESIA EVENT (OUTPATIENT)
Dept: ANESTHESIOLOGY | Facility: HOSPITAL | Age: 64
End: 2024-06-21

## 2024-06-28 ENCOUNTER — TELEPHONE (OUTPATIENT)
Dept: GASTROENTEROLOGY | Facility: CLINIC | Age: 64
End: 2024-06-28

## 2024-06-28 NOTE — TELEPHONE ENCOUNTER
lmom confirming pt's colonoscopy scheduled on 7/5/24 at [facility] with Dr Ernandez.  Informed [facility] would be calling on 7/3/24 to confirm with the arrival time.  Informed of clear liquid diet day prior as well as the bowel cleansing preparation.  Informed would need a  the day of the procedure due to being under sedation. Informed pt that I did put the instructions in his my chart to review. I asked pt to please call back if has any questions or could send us a message in my chart as well.

## 2024-07-03 RX ORDER — SODIUM CHLORIDE, SODIUM LACTATE, POTASSIUM CHLORIDE, CALCIUM CHLORIDE 600; 310; 30; 20 MG/100ML; MG/100ML; MG/100ML; MG/100ML
125 INJECTION, SOLUTION INTRAVENOUS CONTINUOUS
Status: CANCELLED | OUTPATIENT
Start: 2024-07-03

## 2024-07-05 ENCOUNTER — ANESTHESIA (OUTPATIENT)
Dept: GASTROENTEROLOGY | Facility: AMBULARY SURGERY CENTER | Age: 64
End: 2024-07-05

## 2024-07-05 ENCOUNTER — HOSPITAL ENCOUNTER (OUTPATIENT)
Dept: GASTROENTEROLOGY | Facility: AMBULARY SURGERY CENTER | Age: 64
Setting detail: OUTPATIENT SURGERY
End: 2024-07-05
Attending: INTERNAL MEDICINE
Payer: COMMERCIAL

## 2024-07-05 ENCOUNTER — ANESTHESIA EVENT (OUTPATIENT)
Dept: GASTROENTEROLOGY | Facility: AMBULARY SURGERY CENTER | Age: 64
End: 2024-07-05

## 2024-07-05 VITALS
TEMPERATURE: 97.4 F | HEIGHT: 71 IN | HEART RATE: 62 BPM | WEIGHT: 275.57 LBS | RESPIRATION RATE: 18 BRPM | BODY MASS INDEX: 38.58 KG/M2 | DIASTOLIC BLOOD PRESSURE: 60 MMHG | OXYGEN SATURATION: 96 % | SYSTOLIC BLOOD PRESSURE: 129 MMHG

## 2024-07-05 DIAGNOSIS — K63.5 POLYP OF COLON, UNSPECIFIED PART OF COLON, UNSPECIFIED TYPE: ICD-10-CM

## 2024-07-05 LAB — GLUCOSE SERPL-MCNC: 193 MG/DL (ref 65–140)

## 2024-07-05 PROCEDURE — 82948 REAGENT STRIP/BLOOD GLUCOSE: CPT

## 2024-07-05 PROCEDURE — G0105 COLORECTAL SCRN; HI RISK IND: HCPCS | Performed by: INTERNAL MEDICINE

## 2024-07-05 RX ORDER — SODIUM CHLORIDE, SODIUM LACTATE, POTASSIUM CHLORIDE, CALCIUM CHLORIDE 600; 310; 30; 20 MG/100ML; MG/100ML; MG/100ML; MG/100ML
125 INJECTION, SOLUTION INTRAVENOUS CONTINUOUS
Status: DISPENSED | OUTPATIENT
Start: 2024-07-05

## 2024-07-05 RX ORDER — PROPOFOL 10 MG/ML
INJECTION, EMULSION INTRAVENOUS AS NEEDED
Status: DISCONTINUED | OUTPATIENT
Start: 2024-07-05 | End: 2024-07-05

## 2024-07-05 RX ORDER — LIDOCAINE HYDROCHLORIDE 10 MG/ML
INJECTION, SOLUTION EPIDURAL; INFILTRATION; INTRACAUDAL; PERINEURAL AS NEEDED
Status: DISCONTINUED | OUTPATIENT
Start: 2024-07-05 | End: 2024-07-05

## 2024-07-05 RX ORDER — PROPOFOL 10 MG/ML
INJECTION, EMULSION INTRAVENOUS CONTINUOUS PRN
Status: DISCONTINUED | OUTPATIENT
Start: 2024-07-05 | End: 2024-07-05

## 2024-07-05 RX ORDER — SODIUM CHLORIDE, SODIUM LACTATE, POTASSIUM CHLORIDE, CALCIUM CHLORIDE 600; 310; 30; 20 MG/100ML; MG/100ML; MG/100ML; MG/100ML
INJECTION, SOLUTION INTRAVENOUS CONTINUOUS PRN
Status: DISCONTINUED | OUTPATIENT
Start: 2024-07-05 | End: 2024-07-05

## 2024-07-05 RX ORDER — ONDANSETRON 2 MG/ML
4 INJECTION INTRAMUSCULAR; INTRAVENOUS ONCE AS NEEDED
OUTPATIENT
Start: 2024-07-06

## 2024-07-05 RX ADMIN — LIDOCAINE HYDROCHLORIDE 50 MG: 10 INJECTION, SOLUTION EPIDURAL; INFILTRATION; INTRACAUDAL; PERINEURAL at 07:37

## 2024-07-05 RX ADMIN — SODIUM CHLORIDE, SODIUM LACTATE, POTASSIUM CHLORIDE, AND CALCIUM CHLORIDE: .6; .31; .03; .02 INJECTION, SOLUTION INTRAVENOUS at 07:22

## 2024-07-05 RX ADMIN — PROPOFOL 120 MCG/KG/MIN: 10 INJECTION, EMULSION INTRAVENOUS at 07:37

## 2024-07-05 RX ADMIN — PROPOFOL 150 MG: 10 INJECTION, EMULSION INTRAVENOUS at 07:37

## 2024-07-05 RX ADMIN — SODIUM CHLORIDE, SODIUM LACTATE, POTASSIUM CHLORIDE, AND CALCIUM CHLORIDE 125 ML/HR: .6; .31; .03; .02 INJECTION, SOLUTION INTRAVENOUS at 07:17

## 2024-07-05 NOTE — H&P
History and Physical - [facility]  Bobo Cheng 63 y.o. male MRN: [id number]                  HPI: Bobo Cheng is a 63 y.o. year old male who presents for h/o polyp      REVIEW OF SYSTEMS: Per the HPI, and otherwise unremarkable.    Historical Information   Past Medical History:   Diagnosis Date    Anemia     Bundle branch block, right     CAD (coronary artery disease)     Cataract     CPAP (continuous positive airway pressure) dependence     Diabetes mellitus (HCC)     borderline, controlled with diet and activity    Diverticulitis     GERD (gastroesophageal reflux disease)     History of coronary artery stent placement- pCx and OM2 11/24/2017    Hyperlipidemia     Nasal septal deformity     Neuropathy     Obesity (BMI 30-39.9)     Sleep apnea     wears c-pap    Vitamin D deficiency      Past Surgical History:   Procedure Laterality Date    COLON SIGMOID RESECTION N/A 12/16/2016    Procedure: RESECTION COLON SIGMOID;  Surgeon: KUN Denise MD;  Location:  MAIN OR;  Service:     COLON SIGMOID RESECTION LAPAROSCOPIC N/A 12/16/2016    Procedure: RESECTION COLON SIGMOID LAPAROSCOPIC:CONVERTED TO OPEN@1245;  Surgeon: KUN Denise MD;  Location:  MAIN OR;  Service:     COLON SURGERY      Sigmoidectomy    COLONOSCOPY N/A 10/6/2016    Procedure: COLONOSCOPY;  Surgeon: Farzad Ernandez MD;  Location: [facility] GI LAB;  Service:     CYSTOSCOPY W/ RETROGRADES Left 2/3/2017    Procedure: CYSTOSCOPY WITH BILATERAL RETROGRADES, LEFT STENT REMOVAL;  Surgeon: Miguel A Villalobos MD;  Location: [facility] MAIN OR;  Service:     ESOPHAGOGASTRODUODENOSCOPY N/A 10/6/2016    Procedure: ESOPHAGOGASTRODUODENOSCOPY (EGD);  Surgeon: Farzad Ernandez MD;  Location: [facility] GI LAB;  Service:     HERNIA REPAIR      KNEE ARTHROSCOPY W/ MENISCECTOMY      WY CYSTO BLADDER W/URETERAL CATHETERIZATION Left 12/4/2016    Procedure: CYSTOSCOPY RETROGRADE PYELOGRAM WITH INSERTION STENT URETERAL;  Surgeon: Miguel A Villalobos  MD;  Location: [facility];  Service: Urology    US GUIDED THYROID BIOPSY  2021    VARICOSE VEIN SURGERY       Social History   Social History     Substance and Sexual Activity   Alcohol Use Yes    Alcohol/week: 3.0 standard drinks of alcohol    Types: 3 Cans of beer per week    Comment: occ     Social History     Substance and Sexual Activity   Drug Use No     Social History     Tobacco Use   Smoking Status Former    Current packs/day: 0.00    Average packs/day: 1 pack/day for 37.0 years (37.0 ttl pk-yrs)    Types: Cigarettes    Start date: 3/30/1981    Quit date: 3/30/2018    Years since quittin.2    Passive exposure: Past   Smokeless Tobacco Current    Types: Chew   Tobacco Comments    chewing nicotine     Family History   Problem Relation Age of Onset    Osteoarthritis Mother     Atrial fibrillation Mother     Aortic aneurysm Father     Diabetes Brother     Colon cancer Brother     Diabetes type II Brother     Colon cancer Brother     Heart disease Maternal Grandmother     Cancer Maternal Grandfather     Stroke Paternal Grandmother     Heart attack Paternal Grandfather        Meds/Allergies       Current Outpatient Medications:     Alpha-Lipoic Acid 100 MG CAPS    Ascorbic Acid (VITAMIN C) 100 MG tablet    aspirin 81 MG tablet    atorvastatin (LIPITOR) 40 mg tablet    Cholecalciferol (VITAMIN D3) 1000 units CAPS    cyanocobalamin (VITAMIN B-12) 100 mcg tablet    desloratadine (CLARINEX) 5 MG tablet    famotidine (PEPCID) 20 mg tablet    glucosamine-chondroitin 500-400 MG tablet    hydrochlorothiazide (HYDRODIURIL) 25 mg tablet    losartan (COZAAR) 50 mg tablet    Magnesium Hydroxide (MAGNESIA PO)    metFORMIN (GLUCOPHAGE) 1000 MG tablet    Multiple Vitamin (MULTIVITAMIN) capsule    rOPINIRole (REQUIP) 0.5 mg tablet    semaglutide, 2 mg/dose, (Ozempic, 2 MG/DOSE,) 8 mg/ mL injection pen    torsemide (DEMADEX) 10 mg tablet    albuterol (PROVENTIL HFA,VENTOLIN HFA) 90 mcg/act inhaler    BIOTIN PO     "Continuous Blood Gluc  (FreeStyle Alexei 2 [place]) STEVE    Continuous Blood Gluc Sensor (FreeStyle Alexei 3 Sensor) MISC    Continuous Glucose Sensor (FreeStyle Alexei 2 Sensor) MISC    FREESTYLE LITE test strip    FREESTYLE LITE test strip    nitroglycerin (NITROSTAT) 0.3 mg SL tablet    ONETOUCH DELICA LANCETS 30G MISC    Current Facility-Administered Medications:     cyanocobalamin injection 1,000 mcg, 1,000 mcg, Intramuscular, Q30 Days, 1,000 mcg at 09/10/21 1141    lactated ringers infusion, 125 mL/hr, Intravenous, Continuous, 125 mL/hr at 07/05/24 0717    Allergies   Allergen Reactions    Levaquin [Levofloxacin In D5w] Swelling     Knee swelling    Sulfa Antibiotics GI Intolerance     Abdominal pain         Objective     /73   Pulse 60   Temp (!) 97.4 °F (36.3 °C) (Temporal)   Resp 19   Ht 5' 11\" (1.803 m)   Wt 125 kg (275 lb 9.2 oz)   SpO2 97%   BMI 38.43 kg/m²       PHYSICAL EXAM    Gen: NAD  Head: NCAT  CV: RRR  CHEST: Clear  ABD: soft, NT/ND  EXT: no edema      ASSESSMENT/PLAN:  This is a 63 y.o. year old male here for colonscopy, and he is stable and optimized for his procedure.        "

## 2024-07-05 NOTE — ANESTHESIA PREPROCEDURE EVALUATION
Procedure:  COLONOSCOPY    Relevant Problems   ANESTHESIA (within normal limits)      CARDIO   (+) Asymptomatic PVCs   (+) Bundle branch block, right   (+) Coronary artery disease involving native coronary artery of native heart without angina pectoris   (+) Essential hypertension   (+) Hyperlipidemia   (+) Hypertensive heart disease with congestive heart failure (HCC)   (+) Ocular migraine      ENDO   (+) Type 2 diabetes mellitus with hyperglycemia (HCC)      GI/HEPATIC   (+) GERD (gastroesophageal reflux disease)      /RENAL   (+) BPH (benign prostatic hyperplasia)      MUSCULOSKELETAL   (+) Chronic left-sided low back pain with left-sided sciatica   (+) Disc disease, degenerative, lumbar or lumbosacral      NEURO/PSYCH   (+) Chronic left-sided low back pain with left-sided sciatica   (+) 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)   (+) Ocular migraine      PULMONARY   (+) Obstructive sleep apnea        Physical Exam    Airway    Mallampati score: II  TM Distance: >3 FB  Neck ROM: full     Dental   No notable dental hx     Cardiovascular  Cardiovascular exam normal    Pulmonary  Pulmonary exam normal     Other Findings        Anesthesia Plan  ASA Score- 3     Anesthesia Type- IV sedation with anesthesia with ASA Monitors.         Additional Monitors:     Airway Plan:            Plan Factors-Exercise tolerance (METS): >4 METS.    Chart reviewed. EKG reviewed.  Existing labs reviewed. Patient summary reviewed.                  Induction-     Postoperative Plan-     Perioperative Resuscitation Plan - Level 1 - Full Code.       Informed Consent- Anesthetic plan and risks discussed with patient.  I personally reviewed this patient with the CRNA. Discussed and agreed on the Anesthesia Plan with the CRNA..

## 2024-07-11 ENCOUNTER — TELEPHONE (OUTPATIENT)
Dept: NEUROLOGY | Facility: CLINIC | Age: 64
End: 2024-07-11

## 2024-07-19 DIAGNOSIS — E11.65 TYPE 2 DIABETES MELLITUS WITH HYPERGLYCEMIA, WITHOUT LONG-TERM CURRENT USE OF INSULIN (HCC): Primary | ICD-10-CM

## 2024-07-20 ENCOUNTER — TELEPHONE (OUTPATIENT)
Age: 64
End: 2024-07-20

## 2024-07-20 NOTE — TELEPHONE ENCOUNTER
PA for tirzepatide (Mounjaro) 5 MG/0.5ML     Submitted via    []CMM-KEY    [x][place]-Case ID # [id number]   []Faxed to plan   []Other website    []Phone call Case ID #      Office notes sent, clinical questions answered. Awaiting determination    Turnaround time for your insurance to make a decision on your Prior Authorization can take 7-21 business days.

## 2024-07-21 NOTE — TELEPHONE ENCOUNTER
PA for tirzepatide (Mounjaro) 5 MG/0.5ML   Approved     Date(s) approved June 20, 2024 to July 20, 2025     Case #[id number]    Patient advised by          [x] [place] Message  [] Phone call   []LMOM  []L/M to call office as no active Communication consent on file  []Unable to leave detailed message as VM not approved on Communication consent       Pharmacy advised by    [x]Fax  []Phone call    Approval letter scanned into Media Yes

## 2024-07-22 ENCOUNTER — TELEPHONE (OUTPATIENT)
Age: 64
End: 2024-07-22

## 2024-07-22 NOTE — TELEPHONE ENCOUNTER
Pt called to advise he tested positive for covid and wants to know if it is recommended he still come in to his [facility] on Friday and wear a mask or if he should reschedule appt instead.

## 2024-07-26 ENCOUNTER — TELEMEDICINE (OUTPATIENT)
Dept: NEUROLOGY | Facility: CLINIC | Age: 64
End: 2024-07-26
Payer: COMMERCIAL

## 2024-07-26 DIAGNOSIS — G47.33 OBSTRUCTIVE SLEEP APNEA: ICD-10-CM

## 2024-07-26 DIAGNOSIS — G25.81 RESTLESS LEG SYNDROME: Primary | ICD-10-CM

## 2024-07-26 DIAGNOSIS — E11.40 NEUROPATHY DUE TO TYPE 2 DIABETES MELLITUS (HCC): ICD-10-CM

## 2024-07-26 DIAGNOSIS — R25.1 TREMOR OF LEFT HAND: ICD-10-CM

## 2024-07-26 PROCEDURE — 99214 OFFICE O/P EST MOD 30 MIN: CPT | Performed by: NURSE PRACTITIONER

## 2024-07-26 RX ORDER — ROPINIROLE 0.5 MG/1
TABLET, FILM COATED ORAL
Qty: 45 TABLET | Refills: 5 | Status: SHIPPED | OUTPATIENT
Start: 2024-07-26

## 2024-07-26 NOTE — PROGRESS NOTES
Virtual Regular Visit  Name: Bobo Cheng      : 1960      MRN: [id number]  Encounter Provider: LUCERO Junior  Encounter Date: 2024   Encounter department: [facility] [place]    Verification of patient location: [place]    Patient is located at Home in the following state in which I hold an active license [place]    Assessment & Plan                  1.   Restless leg syndrome        ICD-10-CM: G25.81   Comment: Increase ropinirole to 0.25 mg in the a.m. and 0.5 mg in the p.m.    2.   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)        ICD-10-CM: E11.40   Comment: Continue to work with PCP regarding blood glucose control    3.   Tremor of left hand        ICD-10-CM: R25.1   Comment: Increase ropinirole to 0.25 mg in the a.m., 0.5 mg in the p.m.    4.   Obstructive sleep apnea        ICD-10-CM: G47.33   Comment: Referral for sleep medicine regarding CPAP follow-up and restless leg syndrome.     Increase ropinirole 0.25mg in am and 0.5mg in pm  Referral for sleep medicine re: CPAP changes and RLS  Follow up with Neurology office in 4 months or sooner if needed        Encounter provider LUCERO Junior    The patient was identified by name and date of birth. Bobo Cheng was informed that this is a telemedicine visit and that the visit is being conducted through the Epic Embedded platform. He agrees to proceed..  My office door was closed. No one else was in the room.  He acknowledged consent and understanding of privacy and security of the video platform. The patient has agreed to participate and understands they can discontinue the visit at any time.    Patient is aware this is a billable service.     History of Present Illness     Bobo Cheng is a 63 y.o. male who presents to the neurology office via video visit due to onset of COVID infection approximately 3 to 4 days ago.  Patient follows with outpatient neurology office for medical  management of his diabetic neuropathy, RLS and tremor of the left hand.  He has history of diabetes and has had A1c high in the past, last level was 7.3, most recent level done in May 2024 was 8.0.  Patient has also had vitamin deficiencies and was receiving injections in the past.  MRI of the lumbar spine showed degenerative disc disease, focal  Disc protrusion at L3-4 with possible impingement at L4 descending nerve root.  Had T11 compression fracture with mild kyphosis, bone density testing was normal.  He had EMG in the upper extremities with evidence of median nerve entrapment, consistent with  CTS.  Left ulnar neuropathy as well.  He was followed by orthopedics and had injections which was effective for him.  He also tried the braces which he felt he received some good relief regarding these as well.  Patient does get disrupted sleep from sleep apnea he wears a CPAP machine.  He follows with pulmonary regarding this.  He had a decrease in his CPAP settings approximately 1 year ago, states he is due for a follow-up appointment with his pulmonologist.  He notes restless legs with sharp shooting pains over his extremities related to neuropathy.  He had started on gabapentin however is a  with surgical capabilities, he had difficulties taking gabapentin during the day due to fatigue.  He takes this only at bedtime.  He reported creepy crawly type sensations on his legs, general happens to him at night when he is sitting still.  Can also be during the day, he tried pramipexole reported muscle cramping and twitching, he was recommended magnesium to take twice a day.  Patient continues to have numbness to his feet, mostly great toe and has had some increased burning type pains over the tops of his feet.  Left is worse than the right.  He had continued on gabapentin 100 mg on Wednesday through Monday, holds Tuesday dosing due to his surgery schedule.  He reported left hand tremor as well, saw OT for this  and noted that it continued to be intermittent.  OT had high co-pays therefore had to stop his therapies for this.  Patient felt his tremors were more related to weakness and or carpal tunnel syndrome.  Encouraged him to continue the home exercises.  Patient has family history of Parkinson however did not display signs and symptoms of Parkinson disease on his exam.  Ultimately patient had weaned off of his gabapentin, we considered Cymbalta  Or resuming the gabapentin if he had any changes in his neuropathy.  Patient was started on ropinirole for his restless legs.  He contacted neurology at the end of September in 2023 as he was off the gabapentin and did not notice a significant difference.  We did start him on the ropinirole at that time.  Patient did not feel as though it made much change with that dosage, continue to have twitching and muscle cramping in the a.m.  He repeats a Colles' type sensations  to report less despite the muscle cramping.  Symptoms resolved with themselves once he was up walking around and able to wake up in the morning.  He remained on the magnesium for his muscle cramping and encouraged increased oral hydration.  Last office appointment his ropinirole was titrated upwards at 0.375 mg nightly with an increase to 0.5 mg if he was not experiencing any morning fatigue.  Since stopping gabapentin, patient had noted some increased pain in his knees.  We talked about resuming the medication however with the effects of grogginess patient opted to hold off.  Patient also reported decreasing his surgery schedule, noted less left upper extremity tremor since his changes.  He continued to have symptoms but noted they were less frequent.    Patient reports today having onset of COVID.  Stated he was feeling better over the last couple days.  He continues to have more meat type sensations in the lower extremities, mostly at night when he is resting but has had increased episodes during the day as  well.  Patient states that when they come on during the day he will get up and walk around and this will continue to alleviate the symptoms.  He continues to have the left upper extremity twitching and movement that he has had over the course of time.  He feels as though it may have increased by the increase in his nightly dose.  Patient states that he does take the 0.5 mg nightly but is unable to really discern whether or not this is causing him to have increased sleepiness.  Patient does have a history of sleep apnea, wears a CPAP at night.  There are times when he awakes and has difficulties falling back to sleep.  He continues to have the restlessness with myoclonus or tremor of the left upper extremity during the day.   patient CPAP has been managed by his pulmonary provider.  Patient has not had official sleep medicine evaluation.  Would recommend a sleep medicine consultation to discuss  His sleep studies as well as effects of sleep apnea on his restless leg.  Patient may need to have alternate medication options provided through sleep medicine provider as well.  Patient continued to have some concerns with regards to Parkinson's disease.  Noted he had maternal family history and was concerned that this is what was happening to him given his twitching and hand movements.  We have discussed the signs and symptoms of Parkinson's, at this point in time patient does not have adequate symptoms to indicate onset of parkinsonian is him.  Patient does have a twitch of the upper extremities however has had no change in his vocal tone, no difficulties with swallowing, his handwriting is typical and unchanged of his baseline.  He does not have forward flexion with his ambulation and his gait has a normal pattern stride to it.  Patient states he does get some intermittent off-balance sensations when he is getting up and/or turning.  Is not happening all the time but it does seem to happen more when he is in the shower or  near the shower.  Patient reports he has had some hearing issues in the left ear, patient states that his hearing is intermittently muffled in the left ear.  He has had audiology examination and they were unable to find anything wrong.  Question if there is other inner ear derangement such as eustachian tube dysfunctions that may be disrupting the motion centers within the inner ear.  Patient denies that these episodes are related to positional changes.  May need to evaluate this over time and refer to ENT for persistence in his symptoms.  With regards to his daytime sensation changes and twitches, can consider the use of ropinirole during the day as well.  Patient is unclear as to whether or not this causes him grogginess as he does wake up in the melanite as well.  He is unable to discern cause for any sort of fatigue he may have during the morning.  He tolerates the 0.5 mg daily at bedtime well, encouraged him to trial a 0.25 or half tablet in the a.m.  He can start this on days when he is not working to see if he is able to tolerate it through the day.  He may need to have additional dosing in order to maintain improve comfort and decrease his twitching type movements.  Patient agreeable to make changes, prescription was sent to his pharmacy with updated dosing.  We did discuss the use of OT however due to high co-pays patient is unable to afford it at this point in time.    After discussion patient will increase his ropinirole to 0.25 mg in the a.m. and 0.5 mg in the p.m.  Referral was provided for sleep medicine to reevaluate his CPAP changes and discussed restless leg syndrome.  Patient will follow-up in the neurology office in 4 months or sooner if needed.      Review of Systems   Constitutional:  Negative for chills and fever.   HENT:  Negative for ear pain and sore throat.    Eyes:  Negative for pain and visual disturbance.   Respiratory:  Negative for cough and shortness of breath.    Cardiovascular:   Negative for chest pain and palpitations.   Gastrointestinal:  Negative for abdominal pain and vomiting.   Genitourinary:  Negative for dysuria and hematuria.   Musculoskeletal:  Negative for arthralgias and back pain.   Skin:  Negative for color change and rash.   Neurological:  Positive for tremors. Negative for seizures and syncope.        Twitching tremor in the upper extremities  Creepy crawly sensation to the legs bilaterally   All other systems reviewed and are negative.    ROS reviewed and discussed with the patient.      Past Medical History:   Diagnosis Date    Anemia     Bundle branch block, right     CAD (coronary artery disease)     Cataract     CPAP (continuous positive airway pressure) dependence     Diabetes mellitus (HCC)     borderline, controlled with diet and activity    Diverticulitis     GERD (gastroesophageal reflux disease)     History of coronary artery stent placement- pCx and OM2 11/24/2017    Hyperlipidemia     Nasal septal deformity     Neuropathy     Obesity (BMI 30-39.9)     Sleep apnea     wears c-pap    Vitamin D deficiency        Past Surgical History:   Procedure Laterality Date    COLON SIGMOID RESECTION N/A 12/16/2016    Procedure: RESECTION COLON SIGMOID;  Surgeon: KUN Denise MD;  Location:  MAIN OR;  Service:     COLON SIGMOID RESECTION LAPAROSCOPIC N/A 12/16/2016    Procedure: RESECTION COLON SIGMOID LAPAROSCOPIC:CONVERTED TO OPEN@1245;  Surgeon: KUN Denise MD;  Location:  MAIN OR;  Service:     COLON SURGERY      Sigmoidectomy    COLONOSCOPY N/A 10/6/2016    Procedure: COLONOSCOPY;  Surgeon: Farzad Ernandez MD;  Location: [facility] GI LAB;  Service:     CYSTOSCOPY W/ RETROGRADES Left 2/3/2017    Procedure: CYSTOSCOPY WITH BILATERAL RETROGRADES, LEFT STENT REMOVAL;  Surgeon: Miguel A Villalobos MD;  Location: [facility] MAIN OR;  Service:     ESOPHAGOGASTRODUODENOSCOPY N/A 10/6/2016    Procedure: ESOPHAGOGASTRODUODENOSCOPY (EGD);  Surgeon: Farzad Ernandez MD;  Location: [facility];  Service:     HERNIA REPAIR      KNEE ARTHROSCOPY W/ MENISCECTOMY      TN CYSTO BLADDER W/URETERAL CATHETERIZATION Left 2016    Procedure: CYSTOSCOPY RETROGRADE PYELOGRAM WITH INSERTION STENT URETERAL;  Surgeon: Miguel A Villalobos MD;  Location: [facility] OR;  Service: Urology    US GUIDED THYROID BIOPSY  2021    VARICOSE VEIN SURGERY         Social History     Socioeconomic History    Marital status: Single     Spouse name: None    Number of children: 0    Years of education: None    Highest education level: None   Occupational History    None   Tobacco Use    Smoking status: Former     Current packs/day: 0.00     Average packs/day: 1 pack/day for 37.0 years (37.0 ttl pk-yrs)     Types: Cigarettes     Start date: 3/30/1981     Quit date: 3/30/2018     Years since quittin.3     Passive exposure: Past    Smokeless tobacco: Current     Types: Chew    Tobacco comments:     chewing nicotine   Vaping Use    Vaping status: Never Used   Substance and Sexual Activity    Alcohol use: Yes     Alcohol/week: 3.0 standard drinks of alcohol     Types: 3 Cans of beer per week     Comment: occ    Drug use: No    Sexual activity: Never   Other Topics Concern    None   Social History Narrative    Lives alone.     Social Determinants of Health     Financial Resource Strain: Not on file   Food Insecurity: Not on file   Transportation Needs: Not on file   Physical Activity: Not on file   Stress: Not on file   Social Connections: Not on file   Intimate Partner Violence: Not on file   Housing Stability: Not on file       Family History   Problem Relation Age of Onset    Osteoarthritis Mother     Atrial fibrillation Mother     Aortic aneurysm Father     Diabetes Brother     Colon cancer Brother     Diabetes type II Brother     Colon cancer Brother     Heart disease Maternal Grandmother     Cancer Maternal Grandfather     Stroke Paternal Grandmother     Heart attack Paternal Grandfather          Current Outpatient  Medications:     albuterol (PROVENTIL HFA,VENTOLIN HFA) 90 mcg/act inhaler, Inhale 2 puffs every 4 (four) hours as needed for wheezing or shortness of breath, Disp: 18 g, Rfl: 6    Alpha-Lipoic Acid 100 MG CAPS, Take by mouth, Disp: , Rfl:     Ascorbic Acid (VITAMIN C) 100 MG tablet, Take 500 mg by mouth , Disp: , Rfl:     aspirin 81 MG tablet, Take 162 mg by mouth daily  , Disp: , Rfl:     atorvastatin (LIPITOR) 40 mg tablet, TAKE 1 TABLET BY MOUTH ONCE DAILY WITH SUPPER, Disp: 90 tablet, Rfl: 0    Cholecalciferol (VITAMIN D3) 1000 units CAPS, Take by mouth daily , Disp: , Rfl:     Continuous Blood Gluc  (FreeStyle Alexei 2 [place]) [facility], USE 1 READER 1 TIME FOR 1 DOSE, Disp: , Rfl:     Continuous Blood Gluc Sensor (FreeStyle Alexei 3 Sensor) MISC, Change every 14 days as directed., Disp: 2 each, Rfl: 2    Continuous Glucose Sensor (FreeStyle Alexei 2 Sensor) MISC, USE 1 SENSOR EVERY 14 DAYS., Disp: 7 each, Rfl: 2    cyanocobalamin (VITAMIN B-12) 100 mcg tablet, Take by mouth daily, Disp: , Rfl:     desloratadine (CLARINEX) 5 MG tablet, Take 1 tablet (5 mg total) by mouth daily at bedtime, Disp: 90 tablet, Rfl: 1    famotidine (PEPCID) 20 mg tablet, Take 20 mg by mouth daily, Disp: , Rfl:     FREESTYLE LITE test strip, USE 1 STRIP TO CHECK GLUCOSE ONCE DAILY AS DIRECTED, Disp: 100 each, Rfl: 0    FREESTYLE LITE test strip, USE 1 STRIP TO CHECK GLUCOSE ONCE DAILY AS INSTRUCTED, Disp: 100 each, Rfl: 0    glucosamine-chondroitin 500-400 MG tablet, Take 1 tablet by mouth daily, Disp: , Rfl:     hydrochlorothiazide (HYDRODIURIL) 25 mg tablet, Take 1 tablet by mouth once daily (Patient taking differently: Take 900 mg by mouth daily), Disp: 90 tablet, Rfl: 1    losartan (COZAAR) 50 mg tablet, Take 1 tablet by mouth once daily, Disp: 90 tablet, Rfl: 1    Magnesium Hydroxide (MAGNESIA PO), Take by mouth 2 (two) times a day, Disp: , Rfl:     metFORMIN (GLUCOPHAGE) 1000 MG tablet, Take 1 tablet (1,000 mg total) by mouth 2  (two) times a day with meals, Disp: 180 tablet, Rfl: 1    Multiple Vitamin (MULTIVITAMIN) capsule, Take 1 capsule by mouth daily, Disp: , Rfl:     nitroglycerin (NITROSTAT) 0.3 mg SL tablet, Place 1 tablet (0.3 mg total) under the tongue every 5 (five) minutes as needed for chest pain, Disp: 25 tablet, Rfl: 1    ONETOUCH DELICA LANCETS 30G MISC, 1 Units by Does not apply route daily, Disp: 100 each, Rfl: 6    rOPINIRole (REQUIP) 0.5 mg tablet, Take 1 tablet (0.5 mg total) by mouth daily at bedtime AND 0.5 tablets (0.25 mg total) in the morning., Disp: 45 tablet, Rfl: 5    semaglutide, 2 mg/dose, (Ozempic, 2 MG/DOSE,) 8 mg/ mL injection pen, Inject 0.75 mL (2 mg total) under the skin every 7 days, Disp: 3 mL, Rfl: 2    tirzepatide (Mounjaro) 5 MG/0.5ML, Inject 0.5 mL (5 mg total) under the skin every 7 days, Disp: 2 mL, Rfl: 2    torsemide (DEMADEX) 10 mg tablet, Take 1 tablet (10 mg total) by mouth daily (Patient not taking: Reported on 7/26/2024), Disp: 30 tablet, Rfl: 1    Current Facility-Administered Medications:     cyanocobalamin injection 1,000 mcg, 1,000 mcg, Intramuscular, Q30 Days, LUCERO Junior, 1,000 mcg at 09/10/21 1141    Allergies   Allergen Reactions    Levaquin [Levofloxacin In D5w] Swelling     Knee swelling    Sulfa Antibiotics GI Intolerance     Abdominal pain          There were no vitals taken for this visit.      Objective     There were no vitals taken for this visit.  Physical Exam  Vitals and nursing note reviewed.   Constitutional:       General: He is not in acute distress.     Appearance: Normal appearance. He is well-developed.   HENT:      Head: Normocephalic and atraumatic.   Eyes:      Extraocular Movements: Extraocular movements intact.      Conjunctiva/sclera: Conjunctivae normal.   Cardiovascular:      Rate and Rhythm: Regular rhythm.   Pulmonary:      Effort: Pulmonary effort is normal.   Abdominal:      Palpations: Abdomen is soft.   Musculoskeletal:          General: No swelling.   Skin:     Capillary Refill: Capillary refill takes less than 2 seconds.   Neurological:      General: No focal deficit present.      Mental Status: He is alert and oriented to person, place, and time. Mental status is at baseline.      Cranial Nerves: Cranial nerves 2-12 are intact.      Sensory: Sensation is intact.      Motor: Motor function is intact.      Gait: Gait is intact.      Comments: Normal gait pattern, no forward flexion and has normal stride   Psychiatric:         Attention and Perception: Attention and perception normal.         Mood and Affect: Mood and affect normal.         Speech: Speech normal.         Behavior: Behavior is cooperative.         Cognition and Memory: Cognition and memory normal.         Judgment: Judgment normal.         Visit Time  Total Visit Duration: 30    I have spent a total time of 30 minutes in caring for this patient on the day of the visit/encounter including Risks and benefits of tx options, Patient and family education, Counseling / Coordination of care, Documenting in the medical record, Reviewing / ordering tests, medicine, procedures  , and Obtaining or reviewing history  .

## 2024-07-26 NOTE — PATIENT INSTRUCTIONS
Increase ropinirole 0.25mg in am and 0.5mg in pm  Referral for sleep medicine re: CPAP changes and RLS  Follow up with Neurology office in 4 months or sooner if needed

## 2024-08-21 DIAGNOSIS — I10 ESSENTIAL HYPERTENSION: ICD-10-CM

## 2024-08-21 RX ORDER — HYDROCHLOROTHIAZIDE 25 MG/1
25 TABLET ORAL DAILY
Qty: 90 TABLET | Refills: 1 | Status: SHIPPED | OUTPATIENT
Start: 2024-08-21

## 2024-08-21 NOTE — TELEPHONE ENCOUNTER
Reason for call:   [x] Refill   [] Prior Auth  [] Other:     Office:   [x] PCP/Provider - Jimmy  [] Specialty/Provider -     Medication:       Pharmacy: [facility] - [place]    Does the patient have enough for 3 days?   [] Yes   [x] No - Send as HP to POD

## 2024-09-05 ENCOUNTER — TELEPHONE (OUTPATIENT)
Age: 64
End: 2024-09-05

## 2024-09-05 NOTE — TELEPHONE ENCOUNTER
Left detailed  message for patient.  If he returns my call, kindly transfer him to the office.  I am here until 4:15 p.m.  Thank you.

## 2024-09-05 NOTE — TELEPHONE ENCOUNTER
"Patient called to reschedule his appointment with Dr. Marquez for 9/20 as Dr. Marquez will not be in the office that day.  Patient is ONLY able to do Friday appointments.  Next available Friday appointment is November 22.  Patient accepted that appointment but was VERY upset and concerned Dr. Marquez would not refill his medications.  I let him know I could send a message to Dr. Marquez to refill medications and let him know the soonest appointment he had was November 22nd.  When I asked patient which medication(s) he needed refilled, he stated \" all of them, everyone that he gives me\"  Patient did not give names of medications.  Please review chart to refill the medications that Dr. Marquez fills for patient.    If there are any problems please call patient and let him know  "

## 2024-09-06 LAB
ALBUMIN SERPL-MCNC: 4.6 G/DL (ref 3.9–4.9)
ALP SERPL-CCNC: 55 IU/L (ref 44–121)
ALT SERPL-CCNC: 77 IU/L (ref 0–44)
AST SERPL-CCNC: 63 IU/L (ref 0–40)
BILIRUB SERPL-MCNC: 0.6 MG/DL (ref 0–1.2)
BUN SERPL-MCNC: 15 MG/DL (ref 8–27)
BUN/CREAT SERPL: 15 (ref 10–24)
CALCIUM SERPL-MCNC: 10.1 MG/DL (ref 8.6–10.2)
CHLORIDE SERPL-SCNC: 98 MMOL/L (ref 96–106)
CHOLEST SERPL-MCNC: 132 MG/DL (ref 100–199)
CHOLEST/HDLC SERPL: 2.8 RATIO (ref 0–5)
CO2 SERPL-SCNC: 25 MMOL/L (ref 20–29)
CREAT SERPL-MCNC: 0.98 MG/DL (ref 0.76–1.27)
EGFR: 88 ML/MIN/1.73
EST. AVERAGE GLUCOSE BLD GHB EST-MCNC: 163 MG/DL
GLOBULIN SER-MCNC: 3 G/DL (ref 1.5–4.5)
GLUCOSE SERPL-MCNC: 121 MG/DL (ref 70–99)
HBA1C MFR BLD: 7.3 % (ref 4.8–5.6)
HDLC SERPL-MCNC: 47 MG/DL
LDLC SERPL CALC-MCNC: 62 MG/DL (ref 0–99)
POTASSIUM SERPL-SCNC: 4.4 MMOL/L (ref 3.5–5.2)
PROT SERPL-MCNC: 7.6 G/DL (ref 6–8.5)
SL AMB VLDL CHOLESTEROL CALC: 23 MG/DL (ref 5–40)
SODIUM SERPL-SCNC: 139 MMOL/L (ref 134–144)
TRIGL SERPL-MCNC: 130 MG/DL (ref 0–149)

## 2024-09-13 ENCOUNTER — OFFICE VISIT (OUTPATIENT)
Dept: ENDOCRINOLOGY | Facility: CLINIC | Age: 64
End: 2024-09-13
Payer: COMMERCIAL

## 2024-09-13 VITALS
HEIGHT: 71 IN | WEIGHT: 270.2 LBS | DIASTOLIC BLOOD PRESSURE: 70 MMHG | BODY MASS INDEX: 37.83 KG/M2 | SYSTOLIC BLOOD PRESSURE: 130 MMHG | OXYGEN SATURATION: 98 % | HEART RATE: 58 BPM

## 2024-09-13 DIAGNOSIS — E78.2 MIXED HYPERLIPIDEMIA: ICD-10-CM

## 2024-09-13 DIAGNOSIS — I10 ESSENTIAL HYPERTENSION: ICD-10-CM

## 2024-09-13 DIAGNOSIS — E11.65 TYPE 2 DIABETES MELLITUS WITH HYPERGLYCEMIA, WITHOUT LONG-TERM CURRENT USE OF INSULIN (HCC): Primary | ICD-10-CM

## 2024-09-13 DIAGNOSIS — E55.9 VITAMIN D DEFICIENCY: ICD-10-CM

## 2024-09-13 PROCEDURE — 99204 OFFICE O/P NEW MOD 45 MIN: CPT | Performed by: NURSE PRACTITIONER

## 2024-09-13 PROCEDURE — 95251 CONT GLUC MNTR ANALYSIS I&R: CPT | Performed by: NURSE PRACTITIONER

## 2024-09-13 NOTE — PROGRESS NOTES
Ambulatory Visit  Name: Bobo Cheng      : 1960      MRN: [id number]  Encounter Provider: LUCERO Rios  Encounter Date: 2024   Encounter department: [facility] [place]    Assessment & Plan  Type 2 diabetes mellitus with hyperglycemia, without long-term current use of insulin (Formerly [facility])    Lab Results   Component Value Date    HGBA1C 7.3 (H) 2024     HGA1C close to goal.    BGL Reviewed: Alexei CGM scanned into media.     Treatment regimen:   Recommend increase Mounjaro to 7.5 mg weekly  Continue metformin 1000 mg twice daily, eGFR 88 from 2024.     Discussed risks/complications associated with uncontrolled diabetes including organ involvement, heart attack, stroke, death.    Advised lifestyle modifications including attention to diet including the amount and types of carbohydrates consumed and regular activity.     Call for blood sugars less than 70 mg/dl or patterns over 250 mg/dl.     Recommendation for medical identification either bracelet, necklace.    Recommend routine follow up for diabetic eye and foot exams.     Ordered blood work to complete prior to next visit    Follow up in 3 months.       Orders:    tirzepatide (Mounjaro) 7.5 MG/0.5ML; Inject 0.5 mL (7.5 mg total) under the skin every 7 days Do not start before 2024.    Hemoglobin A1C; Future    Comprehensive metabolic panel; Future    Hemoglobin A1C    Comprehensive metabolic panel      History of Present Illness     Bobo Cheng is a 63 y.o. male with a history of type 2 diabetes without long term use of insulin.     Reports complications of neuropathy follows regularly with podiatry, no concerns today.      Denies retinopathy follows annually with with optho, no concerns today.     Denies recent illness or hospitalizations since his last appointment.     Denies recent severe hypoglycemic or severe hyperglycemic episodes requiring medical attention.       Home glucose monitoring: are performed regularly.  See scanned blood sugars.       CGM Interpretation  Bobo Cheng   Device used Freestyle alexei for Personal Use  Indication: Type of Diabetes: Type 2 Diabetes  More than 72 hours of data was reviewed. Report to be scanned to chart.   Date Range: August 30, 2024- September 12, 2024  Analysis of data:   Average Glucose: 193 mg/dl  Coefficient of Variation: 14.4%  Time in Target Range: 59%  Time Above Range: 41%  Time Below Range: 0%   Interpretation of data:   Some hyperglycemia post meals.      Current regimen:   Metformin 1000 mg orally twice a day  Mounjaro 5 mg weekly     Denies any adverse effects of metformin or Mounjaro including severe abdominal pain, nausea, vomiting, diarrhea, constipation, or  appetite suppression.     On ARB and Statin       History obtained from : patient  Review of Systems  See HPI.   All other systems reviewed and are negative.    Medical History Reviewed by provider this encounter:       Current Outpatient Medications on File Prior to Visit   Medication Sig Dispense Refill    albuterol (PROVENTIL HFA,VENTOLIN HFA) 90 mcg/act inhaler Inhale 2 puffs every 4 (four) hours as needed for wheezing or shortness of breath 18 g 6    Alpha-Lipoic Acid 100 MG CAPS Take by mouth      Ascorbic Acid (VITAMIN C) 100 MG tablet Take 500 mg by mouth       aspirin 81 MG tablet Take 162 mg by mouth daily        atorvastatin (LIPITOR) 40 mg tablet TAKE 1 TABLET BY MOUTH ONCE DAILY WITH SUPPER 90 tablet 0    Cholecalciferol (VITAMIN D3) 1000 units CAPS Take by mouth daily       Continuous Blood Gluc  (FreeStyle Alexei 2 [place]) STEVE USE 1 READER 1 TIME FOR 1 DOSE      cyanocobalamin (VITAMIN B-12) 100 mcg tablet Take by mouth daily      desloratadine (CLARINEX) 5 MG tablet Take 1 tablet (5 mg total) by mouth daily at bedtime 90 tablet 1    famotidine (PEPCID) 20 mg tablet Take 20 mg by mouth daily      glucosamine-chondroitin 500-400 MG tablet  Take 1 tablet by mouth daily      hydroCHLOROthiazide 25 mg tablet Take 1 tablet (25 mg total) by mouth daily 90 tablet 1    losartan (COZAAR) 50 mg tablet Take 1 tablet by mouth once daily 90 tablet 1    Magnesium Hydroxide (MAGNESIA PO) Take by mouth 2 (two) times a day      metFORMIN (GLUCOPHAGE) 1000 MG tablet Take 1 tablet (1,000 mg total) by mouth 2 (two) times a day with meals 180 tablet 1    Multiple Vitamin (MULTIVITAMIN) capsule Take 1 capsule by mouth daily      nitroglycerin (NITROSTAT) 0.3 mg SL tablet Place 1 tablet (0.3 mg total) under the tongue every 5 (five) minutes as needed for chest pain 25 tablet 1    rOPINIRole (REQUIP) 0.5 mg tablet Take 1 tablet (0.5 mg total) by mouth daily at bedtime AND 0.5 tablets (0.25 mg total) in the morning. 45 tablet 5    [DISCONTINUED] semaglutide, 2 mg/dose, (Ozempic, 2 MG/DOSE,) 8 mg/ mL injection pen Inject 0.75 mL (2 mg total) under the skin every 7 days 3 mL 2    [DISCONTINUED] tirzepatide (Mounjaro) 5 MG/0.5ML Inject 0.5 mL (5 mg total) under the skin every 7 days 2 mL 2    Continuous Blood Gluc Sensor (FreeStyle Alexei 3 Sensor) MISC Change every 14 days as directed. 2 each 2    Continuous Glucose Sensor (FreeStyle Alexei 2 Sensor) MISC USE 1 SENSOR EVERY 14 DAYS. 7 each 2    FREESTYLE LITE test strip USE 1 STRIP TO CHECK GLUCOSE ONCE DAILY AS DIRECTED 100 each 0    FREESTYLE LITE test strip USE 1 STRIP TO CHECK GLUCOSE ONCE DAILY AS INSTRUCTED 100 each 0    ONETOUCH DELICA LANCETS 30G MISC 1 Units by Does not apply route daily 100 each 6    torsemide (DEMADEX) 10 mg tablet Take 1 tablet (10 mg total) by mouth daily (Patient not taking: Reported on 7/26/2024) 30 tablet 1     Current Facility-Administered Medications on File Prior to Visit   Medication Dose Route Frequency Provider Last Rate Last Admin    cyanocobalamin injection 1,000 mcg  1,000 mcg Intramuscular Q30 Days LUCERO Junior   1,000 mcg at 09/10/21 1141          Objective     BP  "130/70 (BP Location: Right arm, Patient Position: Sitting, Cuff Size: Large)   Pulse 58   Ht 5' 11\" (1.803 m)   Wt 123 kg (270 lb 3.2 oz)   SpO2 98%   BMI 37.69 kg/m²        Physical Exam  Vitals reviewed.   Constitutional:       Appearance: Normal appearance.   Cardiovascular:      Rate and Rhythm: Normal rate and regular rhythm.      Pulses: Normal pulses.      Heart sounds: Normal heart sounds.   Pulmonary:      Effort: Pulmonary effort is normal.      Breath sounds: Normal breath sounds.   Skin:     General: Skin is warm and dry.      Capillary Refill: Capillary refill takes less than 2 seconds.   Neurological:      General: No focal deficit present.      Mental Status: He is alert and oriented to person, place, and time.   Psychiatric:         Mood and Affect: Mood normal.         Behavior: Behavior normal.     Administrative Statements     "

## 2024-09-13 NOTE — ASSESSMENT & PLAN NOTE
Lab Results   Component Value Date    HGBA1C 7.3 (H) 09/06/2024     HGA1C close to goal.    BGL Reviewed: Alexei CGM scanned into media.     Treatment regimen:   Recommend increase Mounjaro to 7.5 mg weekly  Continue metformin 1000 mg twice daily, eGFR 88 from September 2024.     Discussed risks/complications associated with uncontrolled diabetes including organ involvement, heart attack, stroke, death.    Advised lifestyle modifications including attention to diet including the amount and types of carbohydrates consumed and regular activity.     Call for blood sugars less than 70 mg/dl or patterns over 250 mg/dl.     Recommendation for medical identification either bracelet, necklace.    Recommend routine follow up for diabetic eye and foot exams.     Ordered blood work to complete prior to next visit    Follow up in 3 months.       Orders:    tirzepatide (Mounjaro) 7.5 MG/0.5ML; Inject 0.5 mL (7.5 mg total) under the skin every 7 days Do not start before October 11, 2024.    Hemoglobin A1C; Future    Comprehensive metabolic panel; Future    Hemoglobin A1C    Comprehensive metabolic panel

## 2024-09-16 ENCOUNTER — TELEPHONE (OUTPATIENT)
Age: 64
End: 2024-09-16

## 2024-09-16 NOTE — TELEPHONE ENCOUNTER
PA for tirzepatide (Mounjaro) 7.5 MG/0.5ML  APPROVED     Date(s) approved June 20, 2024 to July 20, 2025     Case #    Patient advised by          []MyChart Message  []Phone call   []LMOM  []L/M to call office as no active Communication consent on file  []Unable to leave detailed message as VM not approved on Communication consent       Pharmacy advised by    [x]Fax  []Phone call    Approval letter scanned into Media No

## 2024-10-04 DIAGNOSIS — J30.1 SEASONAL ALLERGIC RHINITIS DUE TO POLLEN: ICD-10-CM

## 2024-10-07 RX ORDER — DESLORATADINE 5 MG/1
5 TABLET ORAL
Qty: 90 TABLET | Refills: 0 | Status: SHIPPED | OUTPATIENT
Start: 2024-10-07

## 2024-11-01 ENCOUNTER — OFFICE VISIT (OUTPATIENT)
Dept: PULMONOLOGY | Facility: MEDICAL CENTER | Age: 64
End: 2024-11-01
Payer: COMMERCIAL

## 2024-11-01 VITALS
SYSTOLIC BLOOD PRESSURE: 140 MMHG | RESPIRATION RATE: 16 BRPM | HEART RATE: 50 BPM | TEMPERATURE: 98 F | WEIGHT: 269 LBS | HEIGHT: 71 IN | OXYGEN SATURATION: 97 % | DIASTOLIC BLOOD PRESSURE: 78 MMHG | BODY MASS INDEX: 37.66 KG/M2

## 2024-11-01 DIAGNOSIS — G25.81 RESTLESS LEG SYNDROME: ICD-10-CM

## 2024-11-01 DIAGNOSIS — R06.2 WHEEZING: ICD-10-CM

## 2024-11-01 DIAGNOSIS — E66.01 CLASS 2 SEVERE OBESITY DUE TO EXCESS CALORIES WITH SERIOUS COMORBIDITY AND BODY MASS INDEX (BMI) OF 37.0 TO 37.9 IN ADULT (HCC): ICD-10-CM

## 2024-11-01 DIAGNOSIS — E66.812 CLASS 2 SEVERE OBESITY DUE TO EXCESS CALORIES WITH SERIOUS COMORBIDITY AND BODY MASS INDEX (BMI) OF 37.0 TO 37.9 IN ADULT (HCC): ICD-10-CM

## 2024-11-01 DIAGNOSIS — G47.33 OBSTRUCTIVE SLEEP APNEA: Primary | ICD-10-CM

## 2024-11-01 PROCEDURE — 99214 OFFICE O/P EST MOD 30 MIN: CPT | Performed by: INTERNAL MEDICINE

## 2024-11-01 RX ORDER — ALBUTEROL SULFATE 90 UG/1
2 INHALANT RESPIRATORY (INHALATION) EVERY 4 HOURS PRN
Qty: 18 G | Refills: 6 | Status: SHIPPED | OUTPATIENT
Start: 2024-11-01

## 2024-11-01 NOTE — PATIENT INSTRUCTIONS
Check into [employer] or [employer] and see if your supplies would be cheaper purchasing on your own    Follow-up in 1 year

## 2024-11-03 PROBLEM — E66.01 CLASS 2 SEVERE OBESITY DUE TO EXCESS CALORIES WITH SERIOUS COMORBIDITY AND BODY MASS INDEX (BMI) OF 37.0 TO 37.9 IN ADULT (HCC): Status: ACTIVE | Noted: 2024-11-03

## 2024-11-03 PROBLEM — R06.2 WHEEZING: Status: ACTIVE | Noted: 2024-11-03

## 2024-11-03 PROBLEM — E66.812 CLASS 2 SEVERE OBESITY DUE TO EXCESS CALORIES WITH SERIOUS COMORBIDITY AND BODY MASS INDEX (BMI) OF 37.0 TO 37.9 IN ADULT (HCC): Status: ACTIVE | Noted: 2024-11-03

## 2024-11-03 PROBLEM — B35.1 ONYCHOMYCOSIS OF TOENAIL: Status: RESOLVED | Noted: 2020-07-31 | Resolved: 2024-11-03

## 2024-11-03 NOTE — PROGRESS NOTES
Assessment & Plan        Problem List Items Addressed This Visit          Respiratory    Obstructive sleep apnea - Primary     Severe obstructive sleep apnea with good compliance to CPAP therapy.  He is on auto CPAP set at 9 to 12 cm water and I reviewed compliance data for past month.  He used it for every night.  Average CPAP pressure was 10.6 and his resultant AHI 2.5 which is satisfactory.  Does use fullface mask.  DME company is [employer].  Continue same settings            Neurology/Sleep    Restless leg syndrome     Does take Requip 0.5 mg at bedtime and 0.25 mg in the morning.            Other    Wheezing     Does have occasional wheeze and has used albuterol inhaler with improvement.  Does have allergic rhinitis.  Probably has some mild intermittent asthma.  He needed an albuterol inhaler and I did prescribe one for him         Relevant Medications    albuterol (PROVENTIL HFA,VENTOLIN HFA) 90 mcg/act inhaler    Class 2 severe obesity due to excess calories with serious comorbidity and body mass index (BMI) of 37.0 to 37.9 in adult (HCC)     Did talk about diet and importance of weight loss.  He has lost about 7 pounds since July.  He is trying to watch his diet and also he started on mounjaro about 2 months ago.              Cc: Doing okay with CPAP      HPI    Bobo presents for follow-up of his severe obstructive sleep apnea.  He has a ResMed Airview CPAP machine which is set at pressure of 9 to 12 cm water and has been using on a regular basis for average of almost 9 hours per night.  He does use a fullface mask and his DME company is [employer].  Not having any nocturnal dyspnea.  Feels quality sleep is satisfactory.  He does not have any difficulty breathing.    He quit smoking 2018 and smoked 1/2 pack of cigarettes per day for 37 years.  He did have PFT done in July 2021 which was normal.  Does use albuterol inhaler occasionally.    He does have mild seasonal allergies for which she takes  over-the-counter and histamine and steroid nasal spray when needed.  Does have history of coronary disease and in November 2017 had 2 drug-eluting stents.  Also has history of hypertension.  He is a  and works in [place].    He does use Requip 0.5 mg at bedtime and 0.25 mg daily morning for restless legs.  Does have diabetes mellitus type 2 and is on metformin.  He is Mounjaro subcu once a week.  For his hypertension he is on hydrochlorothiazide 25 mg daily.  Last hemoglobin A1c on September 6 was 7.3    Echocardiogram done in May 2024 showed LV systolic function to be normal left ventricular ejection fraction of 55%.      Past Medical History:   Diagnosis Date    Anemia     Bundle branch block, right     CAD (coronary artery disease)     Cataract     CPAP (continuous positive airway pressure) dependence     Diabetes mellitus (HCC)     borderline, controlled with diet and activity    Diverticulitis     GERD (gastroesophageal reflux disease)     History of coronary artery stent placement- pCx and OM2 11/24/2017    Hyperlipidemia     Nasal septal deformity     Neuropathy     Obesity (BMI 30-39.9)     Sleep apnea     wears c-pap    Vitamin D deficiency        Past Surgical History:   Procedure Laterality Date    COLON SIGMOID RESECTION N/A 12/16/2016    Procedure: RESECTION COLON SIGMOID;  Surgeon: KUN Denise MD;  Location:  MAIN OR;  Service:     COLON SIGMOID RESECTION LAPAROSCOPIC N/A 12/16/2016    Procedure: RESECTION COLON SIGMOID LAPAROSCOPIC:CONVERTED TO OPEN@1245;  Surgeon: KUN Denise MD;  Location:  MAIN OR;  Service:     COLON SURGERY      Sigmoidectomy    COLONOSCOPY N/A 10/6/2016    Procedure: COLONOSCOPY;  Surgeon: Farzad Ernandez MD;  Location: [facility] GI LAB;  Service:     CYSTOSCOPY W/ RETROGRADES Left 2/3/2017    Procedure: CYSTOSCOPY WITH BILATERAL RETROGRADES, LEFT STENT REMOVAL;  Surgeon: Miguel A Villalobos MD;  Location: [facility] OR;  Service:      ESOPHAGOGASTRODUODENOSCOPY N/A 10/6/2016    Procedure: ESOPHAGOGASTRODUODENOSCOPY (EGD);  Surgeon: Farzad Ernandez MD;  Location: [facility] GI LAB;  Service:     HERNIA REPAIR      KNEE ARTHROSCOPY W/ MENISCECTOMY      CO CYSTO BLADDER W/URETERAL CATHETERIZATION Left 12/4/2016    Procedure: CYSTOSCOPY RETROGRADE PYELOGRAM WITH INSERTION STENT URETERAL;  Surgeon: Miguel A Villalobos MD;  Location: [facility] MAIN OR;  Service: Urology    US GUIDED THYROID BIOPSY  9/27/2021    VARICOSE VEIN SURGERY           Current Outpatient Medications:     albuterol (PROVENTIL HFA,VENTOLIN HFA) 90 mcg/act inhaler, Inhale 2 puffs every 4 (four) hours as needed for wheezing or shortness of breath, Disp: 18 g, Rfl: 6    Alpha-Lipoic Acid 100 MG CAPS, Take by mouth, Disp: , Rfl:     Ascorbic Acid (VITAMIN C) 100 MG tablet, Take 500 mg by mouth , Disp: , Rfl:     aspirin 81 MG tablet, Take 162 mg by mouth daily  , Disp: , Rfl:     atorvastatin (LIPITOR) 40 mg tablet, TAKE 1 TABLET BY MOUTH ONCE DAILY WITH SUPPER, Disp: 90 tablet, Rfl: 0    Cholecalciferol (VITAMIN D3) 1000 units CAPS, Take by mouth daily , Disp: , Rfl:     Continuous Blood Gluc  (FreeStyle Alexei 2 [place]) STVEE, USE 1 READER 1 TIME FOR 1 DOSE, Disp: , Rfl:     Continuous Blood Gluc Sensor (FreeStyle Alexei 3 Sensor) MISC, Change every 14 days as directed., Disp: 2 each, Rfl: 2    Continuous Glucose Sensor (FreeStyle Alexei 2 Sensor) MISC, USE 1 SENSOR EVERY 14 DAYS., Disp: 7 each, Rfl: 2    cyanocobalamin (VITAMIN B-12) 100 mcg tablet, Take by mouth daily, Disp: , Rfl:     desloratadine (CLARINEX) 5 MG tablet, TAKE 1 TABLET BY MOUTH ONCE DAILY AT BEDTIME, Disp: 90 tablet, Rfl: 0    famotidine (PEPCID) 20 mg tablet, Take 20 mg by mouth daily, Disp: , Rfl:     FREESTYLE LITE test strip, USE 1 STRIP TO CHECK GLUCOSE ONCE DAILY AS DIRECTED, Disp: 100 each, Rfl: 0    FREESTYLE LITE test strip, USE 1 STRIP TO CHECK GLUCOSE ONCE DAILY AS INSTRUCTED, Disp: 100 each, Rfl: 0     glucosamine-chondroitin 500-400 MG tablet, Take 1 tablet by mouth daily, Disp: , Rfl:     hydroCHLOROthiazide 25 mg tablet, Take 1 tablet (25 mg total) by mouth daily, Disp: 90 tablet, Rfl: 1    losartan (COZAAR) 50 mg tablet, Take 1 tablet by mouth once daily, Disp: 90 tablet, Rfl: 1    Magnesium Hydroxide (MAGNESIA PO), Take by mouth 2 (two) times a day, Disp: , Rfl:     metFORMIN (GLUCOPHAGE) 1000 MG tablet, Take 1 tablet (1,000 mg total) by mouth 2 (two) times a day with meals, Disp: 180 tablet, Rfl: 1    Multiple Vitamin (MULTIVITAMIN) capsule, Take 1 capsule by mouth daily, Disp: , Rfl:     nitroglycerin (NITROSTAT) 0.3 mg SL tablet, Place 1 tablet (0.3 mg total) under the tongue every 5 (five) minutes as needed for chest pain, Disp: 25 tablet, Rfl: 1    ONETOUCH DELICA LANCETS 30G MISC, 1 Units by Does not apply route daily, Disp: 100 each, Rfl: 6    rOPINIRole (REQUIP) 0.5 mg tablet, Take 1 tablet (0.5 mg total) by mouth daily at bedtime AND 0.5 tablets (0.25 mg total) in the morning., Disp: 45 tablet, Rfl: 5    tirzepatide (Mounjaro) 7.5 MG/0.5ML, Inject 0.5 mL (7.5 mg total) under the skin every 7 days Do not start before 2024., Disp: 2 mL, Rfl: 2    torsemide (DEMADEX) 10 mg tablet, Take 1 tablet (10 mg total) by mouth daily, Disp: 30 tablet, Rfl: 1    Current Facility-Administered Medications:     cyanocobalamin injection 1,000 mcg, 1,000 mcg, Intramuscular, Q30 Days, LUCERO Junior, 1,000 mcg at 09/10/21 1141    Allergies   Allergen Reactions    Levaquin [Levofloxacin In D5w] Swelling     Knee swelling    Sulfa Antibiotics GI Intolerance     Abdominal pain         Social History     Tobacco Use    Smoking status: Former     Current packs/day: 0.00     Average packs/day: 1 pack/day for 37.0 years (37.0 ttl pk-yrs)     Types: Cigarettes     Start date: 3/30/1981     Quit date: 3/30/2018     Years since quittin.6     Passive exposure: Past    Smokeless tobacco: Current      "Types: Chew    Tobacco comments:     chewing nicotine   Substance Use Topics    Alcohol use: Yes     Alcohol/week: 3.0 standard drinks of alcohol     Types: 3 Cans of beer per week     Comment: occ         Family History   Problem Relation Age of Onset    Osteoarthritis Mother     Atrial fibrillation Mother     Aortic aneurysm Father     Diabetes Brother     Colon cancer Brother     Diabetes type II Brother     Colon cancer Brother     Heart disease Maternal Grandmother     Cancer Maternal Grandfather     Stroke Paternal Grandmother     Heart attack Paternal Grandfather        Review of Systems   Constitutional:  Negative for chills, fever and unexpected weight change.   HENT:  Negative for congestion, rhinorrhea and sore throat.    Eyes:  Negative for discharge and redness.   Respiratory:  Negative for shortness of breath.         Does have occasional wheeze   Cardiovascular:  Negative for chest pain, palpitations and leg swelling.   Gastrointestinal:  Negative for abdominal distention, abdominal pain and nausea.   Endocrine: Negative for polydipsia and polyphagia.   Genitourinary:  Negative for dysuria.   Musculoskeletal:  Negative for joint swelling and myalgias.   Skin:  Negative for rash.   Neurological:  Negative for light-headedness.   Psychiatric/Behavioral:  Negative for decreased concentration.            Vitals:    11/01/24 1042   BP: 140/78   Pulse: (!) 50   Resp: 16   Temp: 98 °F (36.7 °C)   SpO2: 97%     Height: 5' 11\" (180.3 cm)  IBW (Ideal Body Weight): 75.3 kg  Body mass index is 37.52 kg/m².  Weight (last 2 days)       Date/Time Weight    11/01/24 1042 122 (269)                Physical Exam  Constitutional:       Appearance: Normal appearance. He is well-developed. He is obese.   HENT:      Head: Normocephalic and atraumatic.      Right Ear: External ear normal.      Left Ear: External ear normal.      Nose: Nose normal.      Mouth/Throat:      Mouth: Mucous membranes are moist.      Pharynx: " Oropharynx is clear.   Eyes:      General: No scleral icterus.        Right eye: Discharge present.      Conjunctiva/sclera: Conjunctivae normal.      Pupils: Pupils are equal, round, and reactive to light.   Cardiovascular:      Rate and Rhythm: Normal rate and regular rhythm.      Heart sounds: Normal heart sounds.   Pulmonary:      Effort: Pulmonary effort is normal.      Breath sounds: Normal breath sounds.      Comments: Lung sounds are clear.  No wheezes, crackles or rhonchi  Abdominal:      General: Bowel sounds are normal.      Palpations: Abdomen is soft.   Musculoskeletal:         General: Normal range of motion.      Cervical back: Neck supple.      Comments: No edema, cyanosis or clubbing   Skin:     General: Skin is warm and dry.   Neurological:      General: No focal deficit present.      Mental Status: He is alert and oriented to person, place, and time.   Psychiatric:         Mood and Affect: Mood normal.         Speech: Speech normal.         Behavior: Behavior normal.         Thought Content: Thought content normal.

## 2024-11-03 NOTE — ASSESSMENT & PLAN NOTE
Does have occasional wheeze and has used albuterol inhaler with improvement.  Does have allergic rhinitis.  Probably has some mild intermittent asthma.  He needed an albuterol inhaler and I did prescribe one for him

## 2024-11-03 NOTE — ASSESSMENT & PLAN NOTE
Did talk about diet and importance of weight loss.  He has lost about 7 pounds since July.  He is trying to watch his diet and also he started on mounjaro about 2 months ago.

## 2024-11-03 NOTE — ASSESSMENT & PLAN NOTE
Severe obstructive sleep apnea with good compliance to CPAP therapy.  He is on auto CPAP set at 9 to 12 cm water and I reviewed compliance data for past month.  He used it for every night.  Average CPAP pressure was 10.6 and his resultant AHI 2.5 which is satisfactory.  Does use fullface mask.  DME company is [employer].  Continue same settings

## 2024-11-18 DIAGNOSIS — I10 ESSENTIAL HYPERTENSION: ICD-10-CM

## 2024-11-19 RX ORDER — LOSARTAN POTASSIUM 50 MG/1
50 TABLET ORAL DAILY
Qty: 90 TABLET | Refills: 0 | Status: SHIPPED | OUTPATIENT
Start: 2024-11-19

## 2024-11-22 ENCOUNTER — TELEPHONE (OUTPATIENT)
Dept: ADMINISTRATIVE | Facility: OTHER | Age: 64
End: 2024-11-22

## 2024-11-22 ENCOUNTER — TELEPHONE (OUTPATIENT)
Dept: INTERNAL MEDICINE CLINIC | Facility: CLINIC | Age: 64
End: 2024-11-22

## 2024-11-22 ENCOUNTER — OFFICE VISIT (OUTPATIENT)
Dept: INTERNAL MEDICINE CLINIC | Facility: CLINIC | Age: 64
End: 2024-11-22
Payer: COMMERCIAL

## 2024-11-22 VITALS
DIASTOLIC BLOOD PRESSURE: 78 MMHG | TEMPERATURE: 98.3 F | SYSTOLIC BLOOD PRESSURE: 118 MMHG | WEIGHT: 269 LBS | RESPIRATION RATE: 18 BRPM | HEIGHT: 71 IN | HEART RATE: 69 BPM | BODY MASS INDEX: 37.66 KG/M2 | OXYGEN SATURATION: 98 %

## 2024-11-22 DIAGNOSIS — E11.65 TYPE 2 DIABETES MELLITUS WITH HYPERGLYCEMIA, WITHOUT LONG-TERM CURRENT USE OF INSULIN (HCC): Primary | ICD-10-CM

## 2024-11-22 DIAGNOSIS — I25.10 CORONARY ARTERY DISEASE INVOLVING NATIVE CORONARY ARTERY OF NATIVE HEART WITHOUT ANGINA PECTORIS: ICD-10-CM

## 2024-11-22 DIAGNOSIS — Z12.2 SCREENING FOR LUNG CANCER: ICD-10-CM

## 2024-11-22 PROBLEM — S22.000A COMPRESSION FRACTURE OF BODY OF THORACIC VERTEBRA (HCC): Status: RESOLVED | Noted: 2021-08-20 | Resolved: 2024-11-22

## 2024-11-22 PROCEDURE — 99213 OFFICE O/P EST LOW 20 MIN: CPT | Performed by: INTERNAL MEDICINE

## 2024-11-22 RX ORDER — NITROGLYCERIN 0.3 MG/1
0.3 TABLET SUBLINGUAL
Qty: 25 TABLET | Refills: 1 | Status: SHIPPED | OUTPATIENT
Start: 2024-11-22

## 2024-11-22 NOTE — LETTER
Diabetic Foot Exam Form    Date Requested: 24  Patient: Bobo Cheng  Patient : 1960   Referring Provider: Sandy Marquez MD    Diabetic Foot Exam Performed with shoes and socks removed        Yes         No     Date of Diabetic Foot Exam ______________________________  Risk Score ____________________________________________    Left Foot       Visual Inspection         Monofilament Testing Sensory Exam        Pedal Pulses         Additional Comments         Right Foot      Visual Inspection         Monofilament Testing Sensory Exam       Pedal Pulses         Additional Comments         Comments __________________________________________________________    Practice Providing Exam ______________________________________________    Exam Performed By (print name) _______________________________________      Provider Signature ___________________________________________________      These reports are needed for  compliance.    Please fax this completed form and a copy of the Diabetic Foot Exam report to our office located at [address] [place] [address] as soon as possible via Fax [phone] attention [place]: Phone [phone]    We thank you for your assistance in treating our mutual patient.   Dr. Banuelos - [phone] F [phone]

## 2024-11-22 NOTE — ASSESSMENT & PLAN NOTE
Chest pain dyspnea on exertion at baseline seen by cardiology agree and continue secondary prevention as follows    Continue Lipitor 40 mg daily    Aspirin 81 mg daily    Control the blood pressure  Follow-up with cardiology echocardiogram findings reviewed    Nitroglycerin as needed for chest pain renewed for now patient asymptomatic

## 2024-11-22 NOTE — ASSESSMENT & PLAN NOTE
Lab Results   Component Value Date    HGBA1C 7.3 (H) 09/06/2024   A1c improved as above    Patient followed by endocrinology    Diabetic diet    Foot exam normal    Continue Mounjaro 7.5 mg weekly    Continue metformin 1000 mg twice a day hypoglycemia protocol in place advised dilated eye exam

## 2024-11-22 NOTE — TELEPHONE ENCOUNTER
----- Message from Bruce BARCLAY sent at 11/22/2024 10:54 AM EST -----  Regarding: care gap request  11/22/24 10:54 AM    Hello, our patient attached above has had Diabetic Foot Exam completed/performed. Please assist in updating the patient chart by making an External outreach to Dr. Banuelos facility located near [facility]. The date of service is 06/18/2024.    Thank you,  Bruce Polo MA  [facility] INTERNAL MED

## 2024-11-22 NOTE — TELEPHONE ENCOUNTER
----- Message from Bruce BARCLAY sent at 11/22/2024 10:54 AM EST -----  Regarding: care gap request  11/22/24 10:54 AM    Hello, our patient attached above has had Diabetic Foot Exam completed/performed. Please assist in updating the patient chart by making an External outreach to Dr. Banuelos facility located near [facility]. The date of service is 06/18/2024.    Thank you,  Bruce Polo MA  [facility] INTERNAL MED

## 2024-11-22 NOTE — PROGRESS NOTES
Dr. Marquez's Office Visit Note  24     Bobo Cheng 63 y.o. male MRN: [id number]  : 1960    Assessment:     1. Type 2 diabetes mellitus with hyperglycemia, without long-term current use of insulin ([facility])  Assessment & Plan:    Lab Results   Component Value Date    HGBA1C 7.3 (H) 2024   A1c improved as above    Patient followed by endocrinology    Diabetic diet    Foot exam normal    Continue Mounjaro 7.5 mg weekly    Continue metformin 1000 mg twice a day hypoglycemia protocol in place advised dilated eye exam  2. Coronary artery disease involving native coronary artery of native heart without angina pectoris  Assessment & Plan:  Chest pain dyspnea on exertion at baseline seen by cardiology agree and continue secondary prevention as follows    Continue Lipitor 40 mg daily    Aspirin 81 mg daily    Control the blood pressure  Follow-up with cardiology echocardiogram findings reviewed    Nitroglycerin as needed for chest pain renewed for now patient asymptomatic    Orders:  -     PSA, Total Screen; Future  -     nitroglycerin (NITROSTAT) 0.3 mg SL tablet; Place 1 tablet (0.3 mg total) under the tongue every 5 (five) minutes as needed for chest pain  3. Screening for lung cancer  -     CT lung screening program; Future; Expected date: 2024        Discussion Summary and Plan:  Today's care plan and medications were reviewed with patient in detail and all their questions answered to their satisfaction.    Chief Complaint   Patient presents with    Follow-up     DM foot - towards [facility] - 2024 Dr. Banuelos / eye - has not had exam    Review bloodwork    Is he due for CT lung screening or PSA?    Does he need any other vaccines?  (Just had flu and covid)    Diabetic Foot Exam    Patient's shoes and socks removed.    Right Foot/Ankle   Right Foot Inspection  Skin Exam: skin normal and skin intact. No dry skin, no warmth, no callus, no erythema, no maceration, no abnormal color, no  pre-ulcer, no ulcer and no callus.     Toe Exam: ROM and strength within normal limits.     Sensory   Monofilament testing: diminished    Vascular  The right DP pulse is 2+. The right PT pulse is 1+.     Left Foot/Ankle  Left Foot Inspection  Skin Exam: skin normal and skin intact. No dry skin, no warmth, no erythema, no maceration, normal color, no pre-ulcer, no ulcer and no callus.     Toe Exam: ROM and strength within normal limits.     Sensory   Monofilament testing: diminished    Vascular  The left DP pulse is 2+. The left PT pulse is 1+.     Assign Risk Category  No deformity present  Loss of protective sensation  No weak pulses  Risk: 1    Subjective:  Follow-up chronic medical condition list visit diagnosis denies any chest pain difficulty breathing dyspnea on exertion at baseline followed by cardiology endocrinology urology pulmonary previous labs reviewed and given the prescription follows CT lung screening for detail refer to assessment plan visit diagnosis        The following portions of the patient's history were reviewed and updated as appropriate: allergies, current medications, past family history, past medical history, past social history, past surgical history and problem list.    Review of Systems   Constitutional:  Positive for activity change. Negative for appetite change, chills, diaphoresis, fatigue, fever and unexpected weight change.   HENT:  Negative for dental problem, drooling, ear discharge, ear pain, facial swelling, hearing loss, mouth sores, nosebleeds, postnasal drip, sinus pressure, sneezing, sore throat, tinnitus, trouble swallowing and voice change.    Eyes:  Negative for photophobia, pain, discharge, redness, itching and visual disturbance.   Respiratory:  Negative for apnea, cough, choking, chest tightness, shortness of breath, wheezing and stridor.    Cardiovascular:  Negative for chest pain, palpitations and leg swelling.   Gastrointestinal:  Negative for abdominal  distention, abdominal pain, anal bleeding, blood in stool, constipation, diarrhea, nausea, rectal pain and vomiting.   Endocrine: Negative for cold intolerance, heat intolerance, polydipsia, polyphagia and polyuria.   Genitourinary:  Negative for decreased urine volume, difficulty urinating, dysuria, enuresis, flank pain, frequency, genital sores, hematuria and urgency.   Musculoskeletal:  Positive for arthralgias. Negative for back pain, gait problem, joint swelling, myalgias, neck pain and neck stiffness.   Skin:  Negative for color change, pallor, rash and wound.   Allergic/Immunologic: Negative.  Negative for environmental allergies, food allergies and immunocompromised state.   Neurological:  Negative for dizziness, tremors, seizures, syncope, facial asymmetry, speech difficulty, weakness, light-headedness, numbness and headaches.   Psychiatric/Behavioral:  Negative for agitation, behavioral problems, confusion, decreased concentration, dysphoric mood, hallucinations, self-injury, sleep disturbance and suicidal ideas. The patient is not nervous/anxious and is not hyperactive.          Historical Information   Patient Active Problem List   Diagnosis    Diabetes mellitus (HCC)    Hyperlipidemia    Essential hypertension    Elevated liver enzymes    Polyarthralgia    Morbid obesity (HCC)    Chronic pain of both knees    Primary osteoarthritis of knees, bilateral    Coronary artery disease involving native coronary artery of native heart without angina pectoris    Asymptomatic PVCs    Obstructive sleep apnea    Obesity (BMI 30-39.9)    Seasonal allergic rhinitis due to pollen    GERD (gastroesophageal reflux disease)    Nasal septal deformity    Bundle branch block, right    CPAP (continuous positive airway pressure) dependence    Vitamin D deficiency    BPH (benign prostatic hyperplasia)    History of vertebral compression fracture    Colon polyp    History of angioplasty    Ocular migraine    Inguinal hernia     Umbilical hernia    Bradycardia    Asymptomatic varicose veins of both lower extremities    Neuropathy    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)    Carpal tunnel syndrome of left wrist    Ulnar neuropathy at elbow of left upper extremity    Chronic left-sided low back pain with left-sided sciatica    Varicose veins of leg with swelling, bilateral    Thyroid nodule    Vitamin B 12 deficiency    Disc disease, degenerative, lumbar or lumbosacral    History of coronary artery stent placement- pCx and OM2    Carpal tunnel syndrome on right    Tremor of left hand    Restless leg syndrome    Hypertensive heart disease with congestive heart failure (HCC)    Type 2 diabetes mellitus with hyperglycemia (HCC)    Hypomagnesemia    Hearing deficit, right    Screening for lung cancer    Other fatigue    Low testosterone    Wheezing    Class 2 severe obesity due to excess calories with serious comorbidity and body mass index (BMI) of 37.0 to 37.9 in adult (HCC)     Past Medical History:   Diagnosis Date    Anemia     Bundle branch block, right     CAD (coronary artery disease)     Cataract     CPAP (continuous positive airway pressure) dependence     Diabetes mellitus (HCC)     borderline, controlled with diet and activity    Diverticulitis     GERD (gastroesophageal reflux disease)     History of coronary artery stent placement- pCx and OM2 11/24/2017    Hyperlipidemia     Nasal septal deformity     Neuropathy     Obesity (BMI 30-39.9)     Sleep apnea     wears c-pap    Vitamin D deficiency      Past Surgical History:   Procedure Laterality Date    COLON SIGMOID RESECTION N/A 12/16/2016    Procedure: RESECTION COLON SIGMOID;  Surgeon: KUN Denise MD;  Location: [facility];  Service:     COLON SIGMOID RESECTION LAPAROSCOPIC N/A 12/16/2016    Procedure: RESECTION COLON SIGMOID LAPAROSCOPIC:CONVERTED TO OPEN@1245;  Surgeon: KUN Denise MD;  Location: [facility];  Service:     COLON SURGERY      Sigmoidectomy     COLONOSCOPY N/A 10/6/2016    Procedure: COLONOSCOPY;  Surgeon: Farzad Ernandez MD;  Location: [facility];  Service:     CYSTOSCOPY W/ RETROGRADES Left 2/3/2017    Procedure: CYSTOSCOPY WITH BILATERAL RETROGRADES, LEFT STENT REMOVAL;  Surgeon: Miguel A Villalobos MD;  Location: [facility];  Service:     ESOPHAGOGASTRODUODENOSCOPY N/A 10/6/2016    Procedure: ESOPHAGOGASTRODUODENOSCOPY (EGD);  Surgeon: Farzad Ernandez MD;  Location: [facility];  Service:     HERNIA REPAIR      KNEE ARTHROSCOPY W/ MENISCECTOMY      TN CYSTO BLADDER W/URETERAL CATHETERIZATION Left 2016    Procedure: CYSTOSCOPY RETROGRADE PYELOGRAM WITH INSERTION STENT URETERAL;  Surgeon: Miguel A Villalobos MD;  Location: [facility];  Service: Urology    US GUIDED THYROID BIOPSY  2021    VARICOSE VEIN SURGERY       Social History     Substance and Sexual Activity   Alcohol Use Yes    Alcohol/week: 3.0 standard drinks of alcohol    Types: 3 Cans of beer per week    Comment: occ     Social History     Substance and Sexual Activity   Drug Use No     Social History     Tobacco Use   Smoking Status Former    Current packs/day: 0.00    Average packs/day: 1 pack/day for 37.0 years (37.0 ttl pk-yrs)    Types: Cigarettes    Start date: 3/30/1981    Quit date: 3/30/2018    Years since quittin.6    Passive exposure: Past   Smokeless Tobacco Current    Types: Chew   Tobacco Comments    chewing nicotine     Family History   Problem Relation Age of Onset    Osteoarthritis Mother     Atrial fibrillation Mother     Aortic aneurysm Father     Diabetes Brother     Colon cancer Brother     Diabetes type II Brother     Colon cancer Brother     Heart disease Maternal Grandmother     Cancer Maternal Grandfather     Stroke Paternal Grandmother     Heart attack Paternal Grandfather      Health Maintenance Due   Topic    Hepatitis C Screening     HIV Screening     Annual Physical     Zoster Vaccine (1 of 2)    Pneumococcal Vaccine: Pediatrics (0 to 5 Years) and  At-Risk Patients (6 to 64 Years) (2 of 2 - PCV)    RSV Vaccine Age 60+ Years (1 - Risk 60-74 years 1-dose series)    DM Eye Exam     OT PLAN OF CARE     Lung Cancer Screening       Meds/Allergies       Current Outpatient Medications:     albuterol (PROVENTIL HFA,VENTOLIN HFA) 90 mcg/act inhaler, Inhale 2 puffs every 4 (four) hours as needed for wheezing or shortness of breath, Disp: 18 g, Rfl: 6    Alpha-Lipoic Acid 100 MG CAPS, Take by mouth, Disp: , Rfl:     Ascorbic Acid (VITAMIN C) 100 MG tablet, Take 500 mg by mouth , Disp: , Rfl:     aspirin 81 MG tablet, Take 162 mg by mouth daily  , Disp: , Rfl:     atorvastatin (LIPITOR) 40 mg tablet, TAKE 1 TABLET BY MOUTH ONCE DAILY WITH SUPPER, Disp: 90 tablet, Rfl: 0    Cholecalciferol (VITAMIN D3) 1000 units CAPS, Take by mouth daily , Disp: , Rfl:     Continuous Blood Gluc  (FreeStyle Alexei 2 [place]) STEVE, USE 1 READER 1 TIME FOR 1 DOSE, Disp: , Rfl:     Continuous Blood Gluc Sensor (FreeStyle Alexei 3 Sensor) MISC, Change every 14 days as directed., Disp: 2 each, Rfl: 2    Continuous Glucose Sensor (FreeStyle Alexei 2 Sensor) MISC, USE 1 SENSOR EVERY 14 DAYS., Disp: 7 each, Rfl: 2    cyanocobalamin (VITAMIN B-12) 100 mcg tablet, Take by mouth daily, Disp: , Rfl:     desloratadine (CLARINEX) 5 MG tablet, TAKE 1 TABLET BY MOUTH ONCE DAILY AT BEDTIME, Disp: 90 tablet, Rfl: 0    famotidine (PEPCID) 20 mg tablet, Take 20 mg by mouth daily, Disp: , Rfl:     FREESTYLE LITE test strip, USE 1 STRIP TO CHECK GLUCOSE ONCE DAILY AS DIRECTED, Disp: 100 each, Rfl: 0    FREESTYLE LITE test strip, USE 1 STRIP TO CHECK GLUCOSE ONCE DAILY AS INSTRUCTED, Disp: 100 each, Rfl: 0    glucosamine-chondroitin 500-400 MG tablet, Take 1 tablet by mouth daily, Disp: , Rfl:     hydroCHLOROthiazide 25 mg tablet, Take 1 tablet (25 mg total) by mouth daily, Disp: 90 tablet, Rfl: 1    losartan (COZAAR) 50 mg tablet, Take 1 tablet (50 mg total) by mouth daily, Disp: 90 tablet, Rfl: 0     "Magnesium Hydroxide (MAGNESIA PO), Take by mouth 2 (two) times a day, Disp: , Rfl:     metFORMIN (GLUCOPHAGE) 1000 MG tablet, Take 1 tablet (1,000 mg total) by mouth 2 (two) times a day with meals, Disp: 180 tablet, Rfl: 1    Multiple Vitamin (MULTIVITAMIN) capsule, Take 1 capsule by mouth daily, Disp: , Rfl:     nitroglycerin (NITROSTAT) 0.3 mg SL tablet, Place 1 tablet (0.3 mg total) under the tongue every 5 (five) minutes as needed for chest pain, Disp: 25 tablet, Rfl: 1    ONETOUCH DELICA LANCETS 30G MISC, 1 Units by Does not apply route daily, Disp: 100 each, Rfl: 6    rOPINIRole (REQUIP) 0.5 mg tablet, Take 1 tablet (0.5 mg total) by mouth daily at bedtime AND 0.5 tablets (0.25 mg total) in the morning., Disp: 45 tablet, Rfl: 5    tirzepatide (Mounjaro) 7.5 MG/0.5ML, Inject 0.5 mL (7.5 mg total) under the skin every 7 days Do not start before October 11, 2024., Disp: 2 mL, Rfl: 2    torsemide (DEMADEX) 10 mg tablet, Take 1 tablet (10 mg total) by mouth daily, Disp: 30 tablet, Rfl: 1    Current Facility-Administered Medications:     cyanocobalamin injection 1,000 mcg, 1,000 mcg, Intramuscular, Q30 Days, LUCERO Junior, 1,000 mcg at 09/10/21 1141      Objective:    Vitals:   /78   Pulse 69   Temp 98.3 °F (36.8 °C)   Resp 18   Ht 5' 11\" (1.803 m)   Wt 122 kg (269 lb)   SpO2 98%   BMI 37.52 kg/m²   Body mass index is 37.52 kg/m².  Vitals:    11/22/24 0844   Weight: 122 kg (269 lb)       Physical Exam  Vitals and nursing note reviewed.   Constitutional:       General: He is not in acute distress.     Appearance: He is well-developed. He is not ill-appearing, toxic-appearing or diaphoretic.   HENT:      Head: Normocephalic and atraumatic.      Right Ear: External ear normal.      Left Ear: External ear normal.      Nose: Nose normal.      Mouth/Throat:      Pharynx: No oropharyngeal exudate.   Eyes:      General: Lids are normal. Lids are everted, no foreign bodies appreciated. No " scleral icterus.        Right eye: No discharge.         Left eye: No discharge.      Conjunctiva/sclera: Conjunctivae normal.      Pupils: Pupils are equal, round, and reactive to light.   Neck:      Thyroid: No thyromegaly.      Vascular: Normal carotid pulses. No carotid bruit, hepatojugular reflux or JVD.      Trachea: No tracheal tenderness or tracheal deviation.   Cardiovascular:      Rate and Rhythm: Normal rate and regular rhythm.      Pulses: no weak pulses.           Dorsalis pedis pulses are 2+ on the right side and 2+ on the left side.        Posterior tibial pulses are 1+ on the right side and 1+ on the left side.      Heart sounds: Normal heart sounds. No murmur heard.     No friction rub. No gallop.   Pulmonary:      Effort: Pulmonary effort is normal. No respiratory distress.      Breath sounds: Normal breath sounds. No stridor. No wheezing or rales.   Chest:      Chest wall: No tenderness.   Abdominal:      General: Bowel sounds are normal. There is no distension.      Palpations: Abdomen is soft. There is no mass.      Tenderness: There is no abdominal tenderness. There is no guarding or rebound.   Musculoskeletal:         General: No tenderness or deformity. Normal range of motion.      Cervical back: Normal range of motion and neck supple. No edema, erythema or rigidity. No spinous process tenderness or muscular tenderness. Normal range of motion.   Feet:      Right foot:      Skin integrity: No ulcer, skin breakdown, erythema, warmth, callus or dry skin.      Left foot:      Skin integrity: No ulcer, skin breakdown, erythema, warmth, callus or dry skin.   Lymphadenopathy:      Head:      Right side of head: No submental, submandibular, tonsillar, preauricular or posterior auricular adenopathy.      Left side of head: No submental, submandibular, tonsillar, preauricular, posterior auricular or occipital adenopathy.      Cervical: No cervical adenopathy.      Right cervical: No superficial, deep  "or posterior cervical adenopathy.     Left cervical: No superficial, deep or posterior cervical adenopathy.      Upper Body:      Right upper body: No pectoral adenopathy.      Left upper body: No pectoral adenopathy.   Skin:     General: Skin is warm and dry.      Coloration: Skin is not pale.      Findings: No erythema or rash.   Neurological:      General: No focal deficit present.      Mental Status: He is alert and oriented to person, place, and time.      Cranial Nerves: No cranial nerve deficit.      Sensory: No sensory deficit.      Motor: No tremor, abnormal muscle tone or seizure activity.      Coordination: Coordination normal.      Gait: Gait normal.      Deep Tendon Reflexes: Reflexes are normal and symmetric. Reflexes normal.   Psychiatric:         Behavior: Behavior normal.         Thought Content: Thought content normal.         Judgment: Judgment normal.         Lab Review   No visits with results within 2 Month(s) from this visit.   Latest known visit with results is:   Hospital Outpatient Visit on 07/05/2024   Component Date Value Ref Range Status    POC Glucose 07/05/2024 193 (H)  65 - 140 mg/dl Final         Patient Instructions   Pt is symptom free for this problem.  This diagnosis or problem is stable/well controlled  Patient is advised to continue same meds as outlined in medicine list  Pt is advised to continue to follow with specialist if they are following for this problme  Pt should get blood test or other testing as per specialist ( or myself) prior to your next visit.        Sandy Marquez MD        \"This note has been constructed using a voice recognition system.Therefore there may be syntax, spelling, and/or grammatical errors. Please call if you have any questions. \"  "

## 2024-12-08 DIAGNOSIS — E11.65 TYPE 2 DIABETES MELLITUS WITH HYPERGLYCEMIA, WITHOUT LONG-TERM CURRENT USE OF INSULIN (HCC): ICD-10-CM

## 2024-12-09 NOTE — TELEPHONE ENCOUNTER
Upon review of the In Basket request and the patient's chart, initial outreach has been made via fax to facility. Please see Contacts section for details.     Thank you  Lee Martinez MA

## 2024-12-10 RX ORDER — TIRZEPATIDE 7.5 MG/.5ML
INJECTION, SOLUTION SUBCUTANEOUS
Qty: 4 ML | Refills: 0 | Status: SHIPPED | OUTPATIENT
Start: 2024-12-10

## 2024-12-13 ENCOUNTER — OFFICE VISIT (OUTPATIENT)
Dept: CARDIOLOGY CLINIC | Facility: CLINIC | Age: 64
End: 2024-12-13
Payer: COMMERCIAL

## 2024-12-13 ENCOUNTER — TELEPHONE (OUTPATIENT)
Age: 64
End: 2024-12-13

## 2024-12-13 VITALS
HEART RATE: 80 BPM | SYSTOLIC BLOOD PRESSURE: 118 MMHG | BODY MASS INDEX: 36.96 KG/M2 | OXYGEN SATURATION: 95 % | WEIGHT: 264 LBS | HEIGHT: 71 IN | DIASTOLIC BLOOD PRESSURE: 70 MMHG

## 2024-12-13 DIAGNOSIS — I45.10 BUNDLE BRANCH BLOCK, RIGHT: ICD-10-CM

## 2024-12-13 DIAGNOSIS — E78.2 MIXED HYPERLIPIDEMIA: ICD-10-CM

## 2024-12-13 DIAGNOSIS — I25.10 CORONARY ARTERY DISEASE INVOLVING NATIVE CORONARY ARTERY OF NATIVE HEART WITHOUT ANGINA PECTORIS: ICD-10-CM

## 2024-12-13 DIAGNOSIS — I49.3 ASYMPTOMATIC PVCS: ICD-10-CM

## 2024-12-13 DIAGNOSIS — I10 ESSENTIAL HYPERTENSION: Primary | ICD-10-CM

## 2024-12-13 DIAGNOSIS — I50.32 CHRONIC DIASTOLIC CONGESTIVE HEART FAILURE (HCC): ICD-10-CM

## 2024-12-13 DIAGNOSIS — R00.1 BRADYCARDIA: ICD-10-CM

## 2024-12-13 PROCEDURE — 99214 OFFICE O/P EST MOD 30 MIN: CPT | Performed by: INTERNAL MEDICINE

## 2024-12-13 PROCEDURE — 93000 ELECTROCARDIOGRAM COMPLETE: CPT | Performed by: INTERNAL MEDICINE

## 2024-12-13 RX ORDER — TORSEMIDE 10 MG/1
10 TABLET ORAL DAILY
Qty: 30 TABLET | Refills: 1 | Status: SHIPPED | OUTPATIENT
Start: 2024-12-13

## 2024-12-13 RX ORDER — TORSEMIDE 10 MG/1
10 TABLET ORAL AS NEEDED
Qty: 30 TABLET | Refills: 1 | Status: SHIPPED | OUTPATIENT
Start: 2024-12-13 | End: 2024-12-13 | Stop reason: SDUPTHER

## 2024-12-13 NOTE — TELEPHONE ENCOUNTER
Received call from pt's pharmacy stating the new torsemide script needs either a frequency or max daily dose modification. Please review and send new script in, thank you.

## 2024-12-13 NOTE — PROGRESS NOTES
Bobo Cheng  1960  [id number]  [facility] PROFESSIONAL [facility] [place] [id number] [facility] [id number]    Interval History:  Bobo Cheng is a 64 y.o. male who presents for routine coronary artery disease follow-up.   Since his last visit, he has been feeling well.  he denies any palpitations, chest pain, orthopnea or PND.   He has some shortness of breath at times.  Has LE edema after standing surgery  Diet is overall unchanged.  He has lost 10 pounds with Mounjaro.         Most recent blood work showed LDL of 62 mg/dL with TG of 130 and Total cholesterol 132.   He has mild lower extremity edema.  He does not use torsemide which he was taking in the past.    Does not exercise regularly.  He had chest discomfort during his previous visit.  A stress echocardiogram was done which was normal.   He had a PFT study in June 2023 which was normal.    Treadmill stress test in 2019 was normal.  He wore a holter monitor because of resting bradycardia.  Monitor showed an average HR of 75 bpm and no heart blocks.        He continues to refrain from smoking.    In November 2017, he underwent drug-eluting stent placement to left circumflex and OM 2 lesions after presenting to hospital with chest and arm pain, elevated BP and ST depressions.       Past Medical History:   Diagnosis Date    Anemia     Bundle branch block, right     CAD (coronary artery disease)     Cataract     CPAP (continuous positive airway pressure) dependence     Diabetes mellitus (HCC)     borderline, controlled with diet and activity    Diverticulitis     GERD (gastroesophageal reflux disease)     History of coronary artery stent placement- pCx and OM2 11/24/2017    Hyperlipidemia     Nasal septal deformity     Neuropathy     Obesity (BMI 30-39.9)     Sleep apnea     wears c-pap    Vitamin D deficiency      Past Surgical History:   Procedure Laterality Date    COLON SIGMOID RESECTION N/A  2016    Procedure: RESECTION COLON SIGMOID;  Surgeon: KUN Denise MD;  Location:  MAIN OR;  Service:     COLON SIGMOID RESECTION LAPAROSCOPIC N/A 2016    Procedure: RESECTION COLON SIGMOID LAPAROSCOPIC:CONVERTED TO OPEN@1245;  Surgeon: KUN Denise MD;  Location:  MAIN OR;  Service:     COLON SURGERY      Sigmoidectomy    COLONOSCOPY N/A 10/6/2016    Procedure: COLONOSCOPY;  Surgeon: Farzad Ernandez MD;  Location: [facility] GI LAB;  Service:     CYSTOSCOPY W/ RETROGRADES Left 2/3/2017    Procedure: CYSTOSCOPY WITH BILATERAL RETROGRADES, LEFT STENT REMOVAL;  Surgeon: Miguel A Villalobos MD;  Location: [facility] MAIN OR;  Service:     ESOPHAGOGASTRODUODENOSCOPY N/A 10/6/2016    Procedure: ESOPHAGOGASTRODUODENOSCOPY (EGD);  Surgeon: Farzad Ernandez MD;  Location: [facility] GI LAB;  Service:     HERNIA REPAIR      KNEE ARTHROSCOPY W/ MENISCECTOMY      NY CYSTO BLADDER W/URETERAL CATHETERIZATION Left 2016    Procedure: CYSTOSCOPY RETROGRADE PYELOGRAM WITH INSERTION STENT URETERAL;  Surgeon: Miguel A Villalobos MD;  Location: [facility] MAIN OR;  Service: Urology    US GUIDED THYROID BIOPSY  2021    VARICOSE VEIN SURGERY       Social History     Socioeconomic History    Marital status: Single     Spouse name: Not on file    Number of children: 0    Years of education: Not on file    Highest education level: Not on file   Occupational History    Not on file   Tobacco Use    Smoking status: Former     Current packs/day: 0.00     Average packs/day: 1 pack/day for 37.0 years (37.0 ttl pk-yrs)     Types: Cigarettes     Start date: 3/30/1981     Quit date: 3/30/2018     Years since quittin.7     Passive exposure: Past    Smokeless tobacco: Current     Types: Chew    Tobacco comments:     chewing nicotine   Vaping Use    Vaping status: Never Used   Substance and Sexual Activity    Alcohol use: Yes     Alcohol/week: 3.0 standard drinks of alcohol     Types: 3 Cans of beer per week     Comment: occ    Drug use: No     Sexual activity: Never   Other Topics Concern    Not on file   Social History Narrative    Lives alone.     Social Drivers of Health     Financial Resource Strain: Not on file   Food Insecurity: Not on file   Transportation Needs: Not on file   Physical Activity: Not on file   Stress: Not on file   Social Connections: Not on file   Intimate Partner Violence: Not on file   Housing Stability: Not on file     Family History   Problem Relation Age of Onset    Osteoarthritis Mother     Atrial fibrillation Mother     Aortic aneurysm Father     Diabetes Brother     Colon cancer Brother     Diabetes type II Brother     Colon cancer Brother     Heart disease Maternal Grandmother     Cancer Maternal Grandfather     Stroke Paternal Grandmother     Heart attack Paternal Grandfather        Current Outpatient Medications:     albuterol (PROVENTIL HFA,VENTOLIN HFA) 90 mcg/act inhaler, Inhale 2 puffs every 4 (four) hours as needed for wheezing or shortness of breath, Disp: 18 g, Rfl: 6    Alpha-Lipoic Acid 100 MG CAPS, Take by mouth, Disp: , Rfl:     Ascorbic Acid (VITAMIN C) 100 MG tablet, Take 500 mg by mouth , Disp: , Rfl:     aspirin 81 MG tablet, Take 162 mg by mouth daily  , Disp: , Rfl:     atorvastatin (LIPITOR) 40 mg tablet, TAKE 1 TABLET BY MOUTH ONCE DAILY WITH SUPPER, Disp: 90 tablet, Rfl: 0    Cholecalciferol (VITAMIN D3) 1000 units CAPS, Take by mouth daily , Disp: , Rfl:     Continuous Blood Gluc  (FreeStyle Alexei 2 [place]) STEVE, USE 1 READER 1 TIME FOR 1 DOSE, Disp: , Rfl:     Continuous Blood Gluc Sensor (FreeStyle Alexei 3 Sensor) MISC, Change every 14 days as directed., Disp: 2 each, Rfl: 2    Continuous Glucose Sensor (FreeStyle Alexei 2 Sensor) MISC, USE 1 SENSOR EVERY 14 DAYS., Disp: 7 each, Rfl: 2    cyanocobalamin (VITAMIN B-12) 100 mcg tablet, Take by mouth daily, Disp: , Rfl:     desloratadine (CLARINEX) 5 MG tablet, TAKE 1 TABLET BY MOUTH ONCE DAILY AT BEDTIME, Disp: 90 tablet, Rfl: 0    famotidine  (PEPCID) 20 mg tablet, Take 20 mg by mouth daily, Disp: , Rfl:     FREESTYLE LITE test strip, USE 1 STRIP TO CHECK GLUCOSE ONCE DAILY AS DIRECTED, Disp: 100 each, Rfl: 0    FREESTYLE LITE test strip, USE 1 STRIP TO CHECK GLUCOSE ONCE DAILY AS INSTRUCTED, Disp: 100 each, Rfl: 0    glucosamine-chondroitin 500-400 MG tablet, Take 1 tablet by mouth daily, Disp: , Rfl:     hydroCHLOROthiazide 25 mg tablet, Take 1 tablet (25 mg total) by mouth daily, Disp: 90 tablet, Rfl: 1    losartan (COZAAR) 50 mg tablet, Take 1 tablet (50 mg total) by mouth daily, Disp: 90 tablet, Rfl: 0    Magnesium Hydroxide (MAGNESIA PO), Take by mouth 2 (two) times a day, Disp: , Rfl:     metFORMIN (GLUCOPHAGE) 1000 MG tablet, Take 1 tablet (1,000 mg total) by mouth 2 (two) times a day with meals, Disp: 180 tablet, Rfl: 1    Mounjaro 7.5 MG/0.5ML SOAJ, INJECT 0.5 ML (7.5 MG) UNDER THE SKIN EVERY 7 DAYS *DO NOT START BEFORE OCTOBER 11, 2024*, Disp: 4 mL, Rfl: 0    Multiple Vitamin (MULTIVITAMIN) capsule, Take 1 capsule by mouth daily, Disp: , Rfl:     nitroglycerin (NITROSTAT) 0.3 mg SL tablet, Place 1 tablet (0.3 mg total) under the tongue every 5 (five) minutes as needed for chest pain, Disp: 25 tablet, Rfl: 1    ONETOUCH DELICA LANCETS 30G MISC, 1 Units by Does not apply route daily, Disp: 100 each, Rfl: 6    rOPINIRole (REQUIP) 0.5 mg tablet, Take 1 tablet (0.5 mg total) by mouth daily at bedtime AND 0.5 tablets (0.25 mg total) in the morning., Disp: 45 tablet, Rfl: 5    torsemide (DEMADEX) 10 mg tablet, Take 1 tablet (10 mg total) by mouth daily, Disp: 30 tablet, Rfl: 1    Current Facility-Administered Medications:     cyanocobalamin injection 1,000 mcg, 1,000 mcg, Intramuscular, Q30 Days, LUCERO Junior, 1,000 mcg at 09/10/21 1141  The following portions of the patient's history were reviewed and updated as appropriate: allergies, current medications, past family history, past medical history, past social history, past  "surgical history and problem list..    Review of Systems:  Review of Systems   Respiratory:  Negative for shortness of breath.    Cardiovascular:  Positive for leg swelling. Negative for chest pain and palpitations.   Musculoskeletal:  Positive for arthralgias.   All other systems reviewed and are negative.      Physical Exam:  /70 (BP Location: Left arm, Patient Position: Sitting, Cuff Size: Large)   Pulse 80   Ht 5' 11\" (1.803 m)   Wt 120 kg (264 lb)   SpO2 95%   BMI 36.82 kg/m²     Physical Exam  Physical Exam   Constitutional: He appears healthy. No distress.   Eyes: Pupils are equal, round, and reactive to light. Conjunctivae are normal.   Neck: No JVD present.   Cardiovascular: Normal rate, regular rhythm and normal heart sounds. Exam reveals no gallop and no friction rub.   No murmur heard.  Pulmonary/Chest: Effort normal and breath sounds normal. He has no wheezes. He has no rales.   Musculoskeletal:         General: No tenderness, deformity or edema.      Cervical back: Normal range of motion and neck supple.   Neurological: He is alert and oriented to person, place, and time.   Skin: Skin is warm and dry.          Cardiographics  ECG: sinus rhythm with rate 65  LV Ejection Fraction: LV ejection fraction >= 40%.       Lab Review  Lab Results   Component Value Date    TRIG 130 09/06/2024    TRIG 209 (H) 02/09/2024    TRIG 241 (H) 11/03/2023    HDL 47 09/06/2024    HDL 40 02/09/2024    HDL 42 11/03/2023    LDLDIRECT 79 04/10/2020    LDLDIRECT 83 12/27/2019     Assessment & Plan     1. Essential hypertension    2. Coronary artery disease involving native coronary artery of native heart without angina pectoris    3. Mixed hyperlipidemia    4. Chronic diastolic congestive heart failure (HCC)    5. Bundle branch block, right    6. Asymptomatic PVCs    7. Bradycardia      - Encouraged regular exercise.    -  Blood pressure is stable.  Continue current medication regimen including losartan and " hydrochlorothiazide.  Target BP is < 130/80 mmHg.  -  Resume torsemide if edema returns.   - diabetes management per endocrinologist.    - Continue atorvastatin - last LDL was 62 mg/dL.   - Continue current Rx  - recommend using torsemide when edema is present.  He previously developed congestive heart failure.  Encouraged to reduce salt in diet if possible.

## 2024-12-14 ENCOUNTER — HOSPITAL ENCOUNTER (OUTPATIENT)
Dept: RADIOLOGY | Facility: HOSPITAL | Age: 64
Discharge: HOME | End: 2024-12-14
Attending: INTERNAL MEDICINE

## 2024-12-14 DIAGNOSIS — Z12.2 SCREENING FOR LUNG CANCER: ICD-10-CM

## 2024-12-18 NOTE — TELEPHONE ENCOUNTER
As a follow-up, a second attempt has been made for outreach via fax to facility. Please see Contacts section for details.    Thank you  Lee Martinez MA

## 2024-12-20 ENCOUNTER — RESULTS FOLLOW-UP (OUTPATIENT)
Dept: INTERNAL MEDICINE CLINIC | Facility: CLINIC | Age: 64
End: 2024-12-20

## 2025-01-03 LAB
ALBUMIN SERPL-MCNC: 4.4 G/DL (ref 3.9–4.9)
ALP SERPL-CCNC: 69 IU/L (ref 44–121)
ALT SERPL-CCNC: 63 IU/L (ref 0–44)
AST SERPL-CCNC: 56 IU/L (ref 0–40)
BILIRUB SERPL-MCNC: 0.7 MG/DL (ref 0–1.2)
BUN SERPL-MCNC: 16 MG/DL (ref 8–27)
BUN/CREAT SERPL: 16 (ref 10–24)
CALCIUM SERPL-MCNC: 9.7 MG/DL (ref 8.6–10.2)
CHLORIDE SERPL-SCNC: 101 MMOL/L (ref 96–106)
CO2 SERPL-SCNC: 25 MMOL/L (ref 20–29)
CREAT SERPL-MCNC: 1.03 MG/DL (ref 0.76–1.27)
EGFR: 82 ML/MIN/1.73
EST. AVERAGE GLUCOSE BLD GHB EST-MCNC: 174 MG/DL
GLOBULIN SER-MCNC: 2.9 G/DL (ref 1.5–4.5)
GLUCOSE SERPL-MCNC: 188 MG/DL (ref 70–99)
HBA1C MFR BLD: 7.7 % (ref 4.8–5.6)
POTASSIUM SERPL-SCNC: 4 MMOL/L (ref 3.5–5.2)
PROT SERPL-MCNC: 7.3 G/DL (ref 6–8.5)
SODIUM SERPL-SCNC: 140 MMOL/L (ref 134–144)

## 2025-01-04 LAB — PSA SERPL-MCNC: 0.5 NG/ML (ref 0–4)

## 2025-01-06 ENCOUNTER — RESULTS FOLLOW-UP (OUTPATIENT)
Dept: ENDOCRINOLOGY | Facility: CLINIC | Age: 65
End: 2025-01-06

## 2025-01-10 ENCOUNTER — OFFICE VISIT (OUTPATIENT)
Dept: ENDOCRINOLOGY | Facility: CLINIC | Age: 65
End: 2025-01-10
Payer: COMMERCIAL

## 2025-01-10 VITALS
DIASTOLIC BLOOD PRESSURE: 80 MMHG | OXYGEN SATURATION: 98 % | BODY MASS INDEX: 37.27 KG/M2 | WEIGHT: 266.2 LBS | HEART RATE: 81 BPM | HEIGHT: 71 IN | SYSTOLIC BLOOD PRESSURE: 137 MMHG

## 2025-01-10 DIAGNOSIS — I50.32 CHRONIC DIASTOLIC CONGESTIVE HEART FAILURE (HCC): ICD-10-CM

## 2025-01-10 DIAGNOSIS — E04.1 THYROID NODULE: ICD-10-CM

## 2025-01-10 DIAGNOSIS — E11.40 NEUROPATHY DUE TO TYPE 2 DIABETES MELLITUS (HCC): ICD-10-CM

## 2025-01-10 DIAGNOSIS — E11.65 TYPE 2 DIABETES MELLITUS WITH HYPERGLYCEMIA, WITHOUT LONG-TERM CURRENT USE OF INSULIN (HCC): ICD-10-CM

## 2025-01-10 DIAGNOSIS — E78.2 MIXED HYPERLIPIDEMIA: ICD-10-CM

## 2025-01-10 DIAGNOSIS — I10 ESSENTIAL HYPERTENSION: ICD-10-CM

## 2025-01-10 DIAGNOSIS — G62.9 NEUROPATHY: Primary | ICD-10-CM

## 2025-01-10 PROCEDURE — 99204 OFFICE O/P NEW MOD 45 MIN: CPT | Performed by: NURSE PRACTITIONER

## 2025-01-10 PROCEDURE — 95251 CONT GLUC MNTR ANALYSIS I&R: CPT | Performed by: NURSE PRACTITIONER

## 2025-01-10 NOTE — PATIENT INSTRUCTIONS
Restart metformin 1000 mg daily    In 1-2 weeks send in blood sugar logs for review or sooner if blood sugar patterns are less than 70 or over 250 mg/dl.

## 2025-01-10 NOTE — PROGRESS NOTES
Name: Bobo Cheng      : 1960      MRN: [id number]  Encounter Provider: LUCERO Rios  Encounter Date: 1/10/2025   Encounter department: [facility]  :  Assessment & Plan  Type 2 diabetes mellitus with hyperglycemia, without long-term current use of insulin (HCC)    Lab Results   Component Value Date    HGBA1C 7.7 (H) 2025     HGA1C close to goal. Recommend restarting metformin 1000 mg daily and continue Mounjaro 7.5 mg weekly.     Discussed risks/complications associated with uncontrolled diabetes including organ involvement, heart attack, stroke, death.    Advised lifestyle modifications including attention to diet including the amount and types of carbohydrates consumed and regular activity.     Call for blood sugars less than 70 mg/dl or patterns over 250 mg/dl.     Discussed symptoms and treatment of hypoglycemia.  Reviewed risks associated with hypoglycemia. Always carry rapid acting carbohydrates and a glucometer (a way to check your blood sugar).    Recommendation for medical identification either bracelet, necklace.      Recommend routine follow up for diabetic eye and foot exams.     Ordered blood work to complete prior to next visit.    Send glucose logs/CGM download in 1-2 weeks for review    Follow up in 3 months.     Orders:    Comprehensive metabolic panel; Future    Lipid panel; Future    Hemoglobin A1C; Future    Neuropathy  Recommend checking vitamin B12 due to worsening neuropathy.   Orders:    Vitamin B12; Future    Chronic diastolic congestive heart failure (HCC)  Wt Readings from Last 3 Encounters:   01/10/25 121 kg (266 lb 3.2 oz)   24 120 kg (264 lb)   24 122 kg (269 lb)     Followed by cardiology. Denies any current increase in edema.                  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)    Lab Results   Component Value Date    HGBA1C 7.7 (H) 2025   Recommend routine diabetic foot exams  and check vitamin B12 levels.          Essential hypertension  BP slightly above patient normal.   Continues on ARB.          Thyroid nodule  Recommend repeat thyroid ultrasound.  Denies neck compressive symptoms.  Last thyroid ultrasound from November 2023 demonstrated stability in previously biopsied thyroid nodule.     Orders:    US thyroid; Future    Mixed hyperlipidemia  Continues on statin.              History of Present Illness   HPI  Bobo Cheng is a 64 y.o. male who presents  with a history of type 2 diabetes without long term use of insulin.      Reports complications of neuropathy follows regularly with podiatry, reports worsening neuropathy.      Denies retinopathy follows annually with with optho, no concerns today.      Denies recent illness or hospitalizations since his last appointment.      Denies recent severe hypoglycemic or severe hyperglycemic episodes requiring medical attention.     Has noticed worsening glucose levels since discontinuation of metformin.       Home glucose monitoring: are performed regularly.  See scanned blood sugars.       CGM Interpretation  Bobo Cheng   Device used Freestyle lino for Personal Use  Indication: Type of Diabetes: Type 2 Diabetes  More than 72 hours of data was reviewed. Report to be scanned to chart.   Date Range: December 27, 2024-January 9, 2025  Analysis of data:   Average Glucose: 211 mg/dl  Coefficient of Variation: 15.7%  Time in Target Range: 15%  Time Above Range: 85%  Time Below Range: 0%   Interpretation of data:   Hyperglycemia post meals.      Current regimen:   Metformin 1000 mg orally twice a day  Mounjaro 7.5 mg weekly     Denies any adverse effects of metformin or Mounjaro.      On ARB and Statin    History obtained from: patient    Review of Systems  See HPI.   All other systems reviewed and are negative.    Medical History Reviewed by provider this encounter:  Tobacco  Allergies  Meds  Problems  Med Hx  Surg Hx  Fam Hx      .  Current Outpatient Medications on File Prior to Visit   Medication Sig Dispense Refill    albuterol (PROVENTIL HFA,VENTOLIN HFA) 90 mcg/act inhaler Inhale 2 puffs every 4 (four) hours as needed for wheezing or shortness of breath 18 g 6    Alpha-Lipoic Acid 100 MG CAPS Take by mouth      Ascorbic Acid (VITAMIN C) 100 MG tablet Take 500 mg by mouth       aspirin 81 MG tablet Take 162 mg by mouth daily        atorvastatin (LIPITOR) 40 mg tablet TAKE 1 TABLET BY MOUTH ONCE DAILY WITH SUPPER 90 tablet 0    Cholecalciferol (VITAMIN D3) 1000 units CAPS Take by mouth daily       Continuous Blood Gluc  (FreeStyle Alexei 2 [place]) STEVE USE 1 READER 1 TIME FOR 1 DOSE      Continuous Blood Gluc Sensor (FreeStyle Alexei 3 Sensor) MISC Change every 14 days as directed. 2 each 2    Continuous Glucose Sensor (FreeStyle Alexei 2 Sensor) MISC USE 1 SENSOR EVERY 14 DAYS. 7 each 2    cyanocobalamin (VITAMIN B-12) 100 mcg tablet Take by mouth daily      desloratadine (CLARINEX) 5 MG tablet TAKE 1 TABLET BY MOUTH ONCE DAILY AT BEDTIME 90 tablet 0    famotidine (PEPCID) 20 mg tablet Take 20 mg by mouth daily      FREESTYLE LITE test strip USE 1 STRIP TO CHECK GLUCOSE ONCE DAILY AS DIRECTED 100 each 0    FREESTYLE LITE test strip USE 1 STRIP TO CHECK GLUCOSE ONCE DAILY AS INSTRUCTED 100 each 0    glucosamine-chondroitin 500-400 MG tablet Take 1 tablet by mouth daily      hydroCHLOROthiazide 25 mg tablet Take 1 tablet (25 mg total) by mouth daily 90 tablet 1    losartan (COZAAR) 50 mg tablet Take 1 tablet (50 mg total) by mouth daily 90 tablet 0    Magnesium Hydroxide (MAGNESIA PO) Take by mouth 2 (two) times a day      metFORMIN (GLUCOPHAGE) 1000 MG tablet Take 1 tablet (1,000 mg total) by mouth 2 (two) times a day with meals 180 tablet 1    Mounjaro 7.5 MG/0.5ML SOAJ INJECT 0.5 ML (7.5 MG) UNDER THE SKIN EVERY 7 DAYS *DO NOT START BEFORE OCTOBER 11, 2024* 4 mL 0    Multiple Vitamin (MULTIVITAMIN) capsule Take 1 capsule by  "mouth daily      nitroglycerin (NITROSTAT) 0.3 mg SL tablet Place 1 tablet (0.3 mg total) under the tongue every 5 (five) minutes as needed for chest pain 25 tablet 1    ONETOUCH DELICA LANCETS 30G MISC 1 Units by Does not apply route daily 100 each 6    rOPINIRole (REQUIP) 0.5 mg tablet Take 1 tablet (0.5 mg total) by mouth daily at bedtime AND 0.5 tablets (0.25 mg total) in the morning. 45 tablet 5    torsemide (DEMADEX) 10 mg tablet Take 1 tablet (10 mg total) by mouth daily 30 tablet 1     Current Facility-Administered Medications on File Prior to Visit   Medication Dose Route Frequency Provider Last Rate Last Admin    cyanocobalamin injection 1,000 mcg  1,000 mcg Intramuscular Q30 Days LUCERO Junior   1,000 mcg at 09/10/21 1141         Objective   /80 (BP Location: Left arm, Patient Position: Sitting, Cuff Size: Large)   Pulse 81   Ht 5' 11\" (1.803 m)   Wt 121 kg (266 lb 3.2 oz)   SpO2 98%   BMI 37.13 kg/m²         Physical Exam  Vitals reviewed.   Constitutional:       Appearance: Normal appearance.   Cardiovascular:      Rate and Rhythm: Normal rate and regular rhythm.      Pulses: Normal pulses.      Heart sounds: Normal heart sounds.   Pulmonary:      Effort: Pulmonary effort is normal.      Breath sounds: Normal breath sounds.   Skin:     General: Skin is warm and dry.      Capillary Refill: Capillary refill takes less than 2 seconds.   Neurological:      General: No focal deficit present.      Mental Status: He is alert and oriented to person, place, and time.   Psychiatric:         Mood and Affect: Mood normal.         Behavior: Behavior normal.     Labs:   Component      Latest Ref Rng 9/6/2024 1/3/2025   GLUCOSE      70 - 99 mg/dL 121 (H)  188 (H)    BUN      8 - 27 mg/dL 15  16    Creatinine      0.76 - 1.27 mg/dL 0.98  1.03    GFR, Calculated      >59 mL/min/1.73 88  82    SL AMB BUN/CREATININE RATIO      10 - 24  15  16    Sodium      134 - 144 mmol/L 139  140  "   Potassium      3.5 - 5.2 mmol/L 4.4  4.0    Chloride      96 - 106 mmol/L 98  101    Carbon Dioxide      20 - 29 mmol/L 25  25    CALCIUM      8.6 - 10.2 mg/dL 10.1  9.7    Total Protein      6.0 - 8.5 g/dL 7.6  7.3    Albumin      3.9 - 4.9 g/dL 4.6  4.4    Globulin, Total      1.5 - 4.5 g/dL 3.0  2.9    Total Bilirubin      0.0 - 1.2 mg/dL 0.6  0.7    ALKALINE PHOSPHATASE ISOENZYMES      44 - 121 IU/L 55  69    AST      0 - 40 IU/L 63 (H)  56 (H)    ALT      0 - 44 IU/L 77 (H)  63 (H)    Cholesterol      100 - 199 mg/dL 132     Triglycerides      0 - 149 mg/dL 130     HDL      >39 mg/dL 47     VLDL Cholesterol Huber      5 - 40 mg/dL 23     LDL Calculated      0 - 99 mg/dL 62     T. Chol/HDL Ratio      0.0 - 5.0 ratio 2.8     Hemoglobin A1C      4.8 - 5.6 % 7.3 (H)  7.7 (H)    eAG, EST AVG Glucose      mg/dL 163  174        Administrative Statements

## 2025-01-10 NOTE — ASSESSMENT & PLAN NOTE
Lab Results   Component Value Date    HGBA1C 7.7 (H) 01/03/2025   Recommend routine diabetic foot exams and check vitamin B12 levels.

## 2025-01-10 NOTE — ASSESSMENT & PLAN NOTE
Recommend repeat thyroid ultrasound.  Denies neck compressive symptoms.  Last thyroid ultrasound from November 2023 demonstrated stability in previously biopsied thyroid nodule.     Orders:    US thyroid; Future

## 2025-01-10 NOTE — ASSESSMENT & PLAN NOTE
Lab Results   Component Value Date    HGBA1C 7.7 (H) 01/03/2025     HGA1C close to goal. Recommend restarting metformin 1000 mg daily and continue Mounjaro 7.5 mg weekly.     Discussed risks/complications associated with uncontrolled diabetes including organ involvement, heart attack, stroke, death.    Advised lifestyle modifications including attention to diet including the amount and types of carbohydrates consumed and regular activity.     Call for blood sugars less than 70 mg/dl or patterns over 250 mg/dl.     Discussed symptoms and treatment of hypoglycemia.  Reviewed risks associated with hypoglycemia. Always carry rapid acting carbohydrates and a glucometer (a way to check your blood sugar).    Recommendation for medical identification either bracelet, necklace.      Recommend routine follow up for diabetic eye and foot exams.     Ordered blood work to complete prior to next visit.    Send glucose logs/CGM download in 1-2 weeks for review    Follow up in 3 months.     Orders:    Comprehensive metabolic panel; Future    Lipid panel; Future    Hemoglobin A1C; Future

## 2025-01-16 DIAGNOSIS — J30.1 SEASONAL ALLERGIC RHINITIS DUE TO POLLEN: ICD-10-CM

## 2025-01-16 RX ORDER — DESLORATADINE 5 MG/1
5 TABLET ORAL
Qty: 90 TABLET | Refills: 0 | Status: SHIPPED | OUTPATIENT
Start: 2025-01-16

## 2025-01-25 DIAGNOSIS — E11.65 TYPE 2 DIABETES MELLITUS WITH HYPERGLYCEMIA, WITHOUT LONG-TERM CURRENT USE OF INSULIN (HCC): ICD-10-CM

## 2025-01-27 RX ORDER — TIRZEPATIDE 7.5 MG/.5ML
INJECTION, SOLUTION SUBCUTANEOUS
Qty: 8 ML | Refills: 0 | Status: SHIPPED | OUTPATIENT
Start: 2025-01-27

## 2025-01-30 DIAGNOSIS — E11.65 TYPE 2 DIABETES MELLITUS WITH HYPERGLYCEMIA, WITHOUT LONG-TERM CURRENT USE OF INSULIN (HCC): ICD-10-CM

## 2025-01-30 RX ORDER — TIRZEPATIDE 7.5 MG/.5ML
INJECTION, SOLUTION SUBCUTANEOUS
Qty: 8 ML | Refills: 0 | OUTPATIENT
Start: 2025-01-30

## 2025-02-06 NOTE — PROGRESS NOTES
Name: Bobo Cheng      : 1960      MRN: [id number]  Encounter Provider: LUCERO Junior  Encounter Date: 2025   Encounter department: [facility]  :  Assessment & Plan  Restless leg syndrome    Orders:    rOPINIRole (REQUIP) 0.5 mg tablet; Take 1 tablet (0.5 mg total) by mouth daily at bedtime AND 0.5 tablets (0.25 mg total) in the morning.    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)    Lab Results   Component Value Date    HGBA1C 7.7 (H) 2025     Can consider Cymbalta for pain management  Continue following with endocrinology for A1c management       Obstructive sleep apnea  Pressure sleep medicine         Patient Instructions   Continue with ropinirol 0.5mg at bedtime, can add 0.25mg in am when you are ready  Sleep Medicine referral for sleep pattern disruptions  Can consider Cymbalta for the Neuorpathy if you choose, will be 30mg daily in the am to minimize sleep changes  Follow up with Neurology office in 6 months or sooner if needed     History of Present Illness   Bobo Cheng is a 63 y.o. male who follows with outpatient neurology office for medical management of his diabetic neuropathy, RLS and tremor of the left hand. Pt has PMH of DM and vitamin def, received injections in the past.   MRI of the lumbar spine showed degenerative disc disease, focal disc protrusion L3-4 with poss impingement at L4 descending nerve root.  T11 compression fx with mild kyphosis.  Bone density normal.   EMG with median nerve entrapment, consistent with  CTS.  Left ulnar neuropathy as well.  He was followed by orthopedics and had injections which was effective for him.  Pt wore braces with some alleviation of sx.  Pt has BING, wears CPAP at night.  He follows with Pulm.  Pt reports RLS with sharp shooting pains over the extremities related to neuropathy. Started gabapentin however is limited as he is a  w/surgical duties.  He has  daytime fatigue with increased gabapentin dosing.   He tried pramipexole but had muscle cramping and twitching, was discontinued.  He tried magnesium for muscle cramping and takes it 2x a day.  He has numbness over the great toe w/increased burning pain over the dorsum of the feet, L>R.  He was taking gabapentin 100mg W-M but holds on Tuesday which is his surgical day.   Pt has Lt hand tremor, felt this was related to CTS and weakness,  Encouraged him to continue HEP.  He has family h/o of PD, however he does not display PD on exam and had adverse effects with pramipexole with increased tremor.   He was started on Ropinirole and he weaned off gabapentin.   He noted continued sensation in the legs, noted in the am seemed worse. Started to optimize his dosing to 0.375 mg nightly with an increase to 0.5 mg if he was not experiencing any morning fatigue.  Pt also decreased his surgery schedule and had less tremor after making the changes.   Last office eval, pt had COVID and was seen on video.  He reported ongoing symptoms and noted that being up walking around alleviated the sensations of crawling in his legs.   He did increased his ropinirole at night to 0.5mg, however felt his increase dosing may have increased the tremors.    He has sleep apnea and CPAP but had not had follow up evaluation, question is some correlation.  Recommended a sleep medicine consultation to discuss his sleep studies as well as effects of sleep apnea on his restless leg.    Pt continued with PD concerns and we reviewed the presentation. Pt did not have adequate symptoms to diagnose this and has had adverse effects from increased dopamine medications.  Pt had a twitch of the UE, no change in his vocal tone, no difficulties with swallowing, his handwriting is typical and unchanged of his baseline.  He does not have forward flexion with his ambulation and his gait has a fairly normal pattern and stride, although slightly altered with balance  issues when up and turning, likely r/t neuropathy.  Pt reported some hearing issues in the Lt ear, noted sound is muffled. Audiology testing w/o findings, poss eustachian tube dysfunction leading to dysequalibrium. May need ENT eval.  Considered ropinirole during the day, recommended 0.25 in the am and continued 0.5mg at night as he had acclimated to the dosing.     Pt reports back today that he does not his neuropathy comes and goes and at rest he will get a sharp pain occasionally, sometimes a burning pain otherwise he does have some numbness.   He states at times his ambulation is off balance, sense of disequilibrium.  He is able to feel his feet hit the floor however may be feeling more disequilibrium related to his neuropathies.  Patient continues to work with endocrinology, they are making medication changes in order to regulate his A1c better.  He does report some increased neuropathy when his sugars are higher.  He has lab work pending per endocrinology.  He is also to have an ultrasound of his thyroid done for them as well.  He is not on any pain control for this, patient indicated that he would like to avoid the use of Lyrica.  We did discuss the use of Cymbalta however he has had history of sleep disruptions and sleep apnea.  He had not gotten a chance to see sleep medicine due to scheduling issues.  Patient will follow-up on the sleep medicine referral, have discussions with regards to discuss relation to his sleep as well as his neuropathy.  Regarding his restless leg, he has not increased his ropinirole to date, he uses 0.5 mg at night.  He has had some concern with regards to feeling groggy after initiating a.m. dosing but indicates that he will consider doing this soon primarily and we can when he is not needing to do surgery or working the next morning.  Patient will contact neurology office if he should choose to try additional medication.  In the interim he indicated would try a.m. dosing of  ropinirole to see if this is of benefit for his sensations in the lower extremities during the day.  He will update the office if he needs any further medication changes.  Patient will follow-up in outpatient neurology office in 6 months or sooner if needed.    Review of Systems   Constitutional:  Negative for chills and fever.   HENT:  Negative for ear pain and sore throat.    Eyes:  Negative for pain and visual disturbance.   Respiratory:  Negative for cough and shortness of breath.    Cardiovascular:  Negative for chest pain and palpitations.   Gastrointestinal:  Negative for abdominal pain and vomiting.   Genitourinary:  Negative for dysuria and hematuria.   Musculoskeletal:  Negative for arthralgias and back pain.   Skin:  Negative for color change and rash.   Neurological:  Positive for tremors and numbness. Negative for seizures and syncope.   All other systems reviewed and are negative.   I have personally reviewed the MA's review of systems and made changes as necessary.        Past Medical History:   Diagnosis Date    Anemia     Bundle branch block, right     CAD (coronary artery disease)     Cataract     CPAP (continuous positive airway pressure) dependence     CTS (carpal tunnel syndrome)     Diabetes mellitus (HCC)     borderline, controlled with diet and activity    Diverticulitis     GERD (gastroesophageal reflux disease)     History of coronary artery stent placement- pCx and OM2 11/24/2017    Hyperlipidemia     Hypertension     Nasal septal deformity     Neuropathy     Neuropathy in diabetes (HCC)     Obesity (BMI 30-39.9)     Sleep apnea     wears c-pap    Vitamin D deficiency        Past Surgical History:   Procedure Laterality Date    COLON SIGMOID RESECTION N/A 12/16/2016    Procedure: RESECTION COLON SIGMOID;  Surgeon: KUN Denise MD;  Location: [facility];  Service:     COLON SIGMOID RESECTION LAPAROSCOPIC N/A 12/16/2016    Procedure: RESECTION COLON SIGMOID LAPAROSCOPIC:CONVERTED TO  OPEN@1245;  Surgeon: KUN Denise MD;  Location:  MAIN OR;  Service:     COLON SURGERY      Sigmoidectomy    COLONOSCOPY N/A 10/06/2016    Procedure: COLONOSCOPY;  Surgeon: Farzad Ernandez MD;  Location: [facility] GI LAB;  Service:     CYSTOSCOPY W/ RETROGRADES Left 2017    Procedure: CYSTOSCOPY WITH BILATERAL RETROGRADES, LEFT STENT REMOVAL;  Surgeon: Miguel A Villalobos MD;  Location: [facility];  Service:     ESOPHAGOGASTRODUODENOSCOPY N/A 10/06/2016    Procedure: ESOPHAGOGASTRODUODENOSCOPY (EGD);  Surgeon: Farzad Ernandez MD;  Location: [facility] GI LAB;  Service:     HERNIA REPAIR      KNEE ARTHROSCOPY W/ MENISCECTOMY      DE CYSTOURETHROSCOPY W/URETERAL CATHETERIZATION Left 2016    Procedure: CYSTOSCOPY RETROGRADE PYELOGRAM WITH INSERTION STENT URETERAL;  Surgeon: Miguel A Villalobos MD;  Location: [facility];  Service: Urology    US GUIDED THYROID BIOPSY  2021    VARICOSE VEIN SURGERY      VASCULAR SURGERY         Social History     Socioeconomic History    Marital status: Single     Spouse name: None    Number of children: 0    Years of education: None    Highest education level: None   Occupational History    None   Tobacco Use    Smoking status: Former     Current packs/day: 0.00     Average packs/day: 1 pack/day for 37.0 years (37.0 ttl pk-yrs)     Types: Cigarettes     Start date: 3/30/1981     Quit date: 3/30/2018     Years since quittin.8     Passive exposure: Past    Smokeless tobacco: Former     Types: Chew    Tobacco comments:     chewing nicotine   Vaping Use    Vaping status: Never Used   Substance and Sexual Activity    Alcohol use: Not Currently     Alcohol/week: 3.0 standard drinks of alcohol     Types: 3 Cans of beer per week     Comment: occ    Drug use: No    Sexual activity: Not Currently   Other Topics Concern    None   Social History Narrative    Lives alone.     Social Drivers of Health     Financial Resource Strain: Not on file   Food Insecurity: Not on file    Transportation Needs: Not on file   Physical Activity: Not on file   Stress: Not on file   Social Connections: Not on file   Intimate Partner Violence: Not on file   Housing Stability: Not on file       Family History   Problem Relation Age of Onset    Osteoarthritis Mother     Atrial fibrillation Mother     Dementia Mother     Parkinsonism Mother     Aortic aneurysm Father     Diabetes Brother     Colon cancer Brother     Diabetes type II Brother     Colon cancer Brother     Heart disease Maternal Grandmother     Cancer Maternal Grandfather     Stroke Paternal Grandmother     Heart attack Paternal Grandfather          Current Outpatient Medications:     albuterol (PROVENTIL HFA,VENTOLIN HFA) 90 mcg/act inhaler, Inhale 2 puffs every 4 (four) hours as needed for wheezing or shortness of breath, Disp: 18 g, Rfl: 6    Alpha-Lipoic Acid 100 MG CAPS, Take by mouth, Disp: , Rfl:     Ascorbic Acid (VITAMIN C) 100 MG tablet, Take 500 mg by mouth , Disp: , Rfl:     aspirin 81 MG tablet, Take 162 mg by mouth daily  , Disp: , Rfl:     atorvastatin (LIPITOR) 40 mg tablet, TAKE 1 TABLET BY MOUTH ONCE DAILY WITH SUPPER, Disp: 90 tablet, Rfl: 0    Cholecalciferol (VITAMIN D3) 1000 units CAPS, Take by mouth daily , Disp: , Rfl:     Continuous Blood Gluc  (FreeStyle Alexei 2 [place]) STEVE, USE 1 READER 1 TIME FOR 1 DOSE, Disp: , Rfl:     Continuous Blood Gluc Sensor (FreeStyle Alexei 3 Sensor) MISC, Change every 14 days as directed., Disp: 2 each, Rfl: 2    Continuous Glucose Sensor (FreeStyle Alexei 2 Sensor) MISC, USE 1 SENSOR EVERY 14 DAYS., Disp: 7 each, Rfl: 2    cyanocobalamin (VITAMIN B-12) 100 mcg tablet, Take by mouth daily, Disp: , Rfl:     desloratadine (CLARINEX) 5 MG tablet, TAKE 1 TABLET BY MOUTH ONCE DAILY AT BEDTIME, Disp: 90 tablet, Rfl: 0    famotidine (PEPCID) 20 mg tablet, Take 20 mg by mouth daily, Disp: , Rfl:     FREESTYLE LITE test strip, USE 1 STRIP TO CHECK GLUCOSE ONCE DAILY AS DIRECTED, Disp: 100  "each, Rfl: 0    FREESTYLE LITE test strip, USE 1 STRIP TO CHECK GLUCOSE ONCE DAILY AS INSTRUCTED, Disp: 100 each, Rfl: 0    glucosamine-chondroitin 500-400 MG tablet, Take 1 tablet by mouth daily, Disp: , Rfl:     hydroCHLOROthiazide 25 mg tablet, Take 1 tablet (25 mg total) by mouth daily, Disp: 90 tablet, Rfl: 1    losartan (COZAAR) 50 mg tablet, Take 1 tablet (50 mg total) by mouth daily, Disp: 90 tablet, Rfl: 0    Magnesium Hydroxide (MAGNESIA PO), Take by mouth 2 (two) times a day, Disp: , Rfl:     metFORMIN (GLUCOPHAGE) 1000 MG tablet, Take 1 tablet (1,000 mg total) by mouth 2 (two) times a day with meals (Patient taking differently: Take 500 mg by mouth 2 (two) times a day with meals), Disp: 180 tablet, Rfl: 1    Mounjaro 7.5 MG/0.5ML SOAJ, INJECT 1/2 (ONE-HALF) ML SUBCUTANEOUSLY ONCE A WEEK DO NOT START BEFORE 10/11/24, Disp: 8 mL, Rfl: 0    Multiple Vitamin (MULTIVITAMIN) capsule, Take 1 capsule by mouth daily, Disp: , Rfl:     nitroglycerin (NITROSTAT) 0.3 mg SL tablet, Place 1 tablet (0.3 mg total) under the tongue every 5 (five) minutes as needed for chest pain, Disp: 25 tablet, Rfl: 1    ONETOUCH DELICA LANCETS 30G MISC, 1 Units by Does not apply route daily, Disp: 100 each, Rfl: 6    rOPINIRole (REQUIP) 0.5 mg tablet, Take 1 tablet (0.5 mg total) by mouth daily at bedtime AND 0.5 tablets (0.25 mg total) in the morning., Disp: 45 tablet, Rfl: 5    torsemide (DEMADEX) 10 mg tablet, Take 1 tablet (10 mg total) by mouth daily, Disp: 30 tablet, Rfl: 1    Current Facility-Administered Medications:     cyanocobalamin injection 1,000 mcg, 1,000 mcg, Intramuscular, Q30 Days, LUCERO Junior, 1,000 mcg at 09/10/21 1141    Allergies   Allergen Reactions    Levaquin [Levofloxacin In D5w] Swelling     Knee swelling    Sulfa Antibiotics GI Intolerance     Abdominal pain          Blood pressure 122/76, pulse 82, height 5' 11\" (1.803 m), weight 120 kg (264 lb), SpO2 99%.       Objective   /76 " "(Patient Position: Sitting, Cuff Size: Large)   Pulse 82   Ht 5' 11\" (1.803 m)   Wt 120 kg (264 lb)   SpO2 99%   BMI 36.82 kg/m²     Physical Exam  Vitals reviewed.   Constitutional:       Appearance: Normal appearance. He is well-developed.   HENT:      Head: Normocephalic.      Right Ear: Hearing normal.      Left Ear: Hearing normal.      Nose: Nose normal.      Mouth/Throat:      Mouth: Mucous membranes are moist.   Eyes:      General: Lids are normal.      Extraocular Movements: Extraocular movements intact.      Conjunctiva/sclera: Conjunctivae normal.      Pupils: Pupils are equal, round, and reactive to light.   Pulmonary:      Effort: Pulmonary effort is normal. No respiratory distress.   Abdominal:      Palpations: Abdomen is soft.      Tenderness: There is no abdominal tenderness.   Musculoskeletal:         General: Normal range of motion.      Cervical back: Normal range of motion.   Skin:     General: Skin is warm and dry.   Neurological:      Mental Status: He is alert.      Motor: Motor strength is normal.     Coordination: Romberg sign negative.      Deep Tendon Reflexes: Reflexes are normal and symmetric.   Psychiatric:         Attention and Perception: Attention and perception normal.         Mood and Affect: Mood and affect normal.         Speech: Speech normal.         Behavior: Behavior normal. Behavior is cooperative.         Thought Content: Thought content normal.         Cognition and Memory: Cognition and memory normal.         Judgment: Judgment normal.       Neurological Exam  Mental Status  Alert. Oriented to person, place, time and situation. Memory is normal. Recent and remote memory are intact. Speech is normal. Language is fluent with no aphasia. Attention and concentration are normal. Fund of knowledge is appropriate for level of education.    Cranial Nerves  CN II: Visual acuity is normal. Visual fields full to confrontation.  CN III, IV, VI: Extraocular movements intact " bilaterally. Normal lids and orbits bilaterally. Pupils equal round and reactive to light bilaterally.  CN V: Facial sensation is normal.  CN VII: Full and symmetric facial movement.  CN VIII:  Right: Hearing is normal.  Left: Hearing is normal.  CN IX, X: Palate elevates symmetrically. Normal gag reflex.  CN XI: Shoulder shrug strength is normal.  CN XII: Tongue midline without atrophy or fasciculations.    Motor  Normal muscle bulk throughout. Normal muscle tone. No abnormal involuntary movements. Strength is 5/5 throughout all four extremities.    Sensory  Light touch is normal in upper and lower extremities. Temperature is normal in upper and lower extremities. Vibration is normal in upper and lower extremities. Proprioception is normal in upper and lower extremities.     Reflexes  Deep tendon reflexes are 2+ and symmetric in all four extremities.    Right pathological reflexes: Azul's absent.  Left pathological reflexes: Azul's absent.    Coordination  Right: Finger-to-nose normal. Rapid alternating movement normal. Heel-to-shin normal.Left: Finger-to-nose normal. Rapid alternating movement normal. Heel-to-shin normal.    Gait  Casual gait is normal including stance, stride, and arm swing.Normal toe walking. Normal heel walking. Normal tandem gait. Romberg is absent. Able to rise from chair without using arms.      Radiology Results Review : No pertinent imaging studies reviewed.    Administrative Statements   I have spent a total time of 32 minutes in caring for this patient on the day of the visit/encounter including Risks and benefits of tx options, Instructions for management, Patient and family education, Counseling / Coordination of care, Documenting in the medical record, Reviewing / ordering tests, medicine, procedures  , and Obtaining or reviewing history  .

## 2025-02-07 ENCOUNTER — OFFICE VISIT (OUTPATIENT)
Age: 65
End: 2025-02-07
Payer: COMMERCIAL

## 2025-02-07 VITALS
WEIGHT: 264 LBS | DIASTOLIC BLOOD PRESSURE: 76 MMHG | BODY MASS INDEX: 36.96 KG/M2 | HEART RATE: 82 BPM | OXYGEN SATURATION: 99 % | SYSTOLIC BLOOD PRESSURE: 122 MMHG | HEIGHT: 71 IN

## 2025-02-07 DIAGNOSIS — E11.40 NEUROPATHY DUE TO TYPE 2 DIABETES MELLITUS (HCC): Primary | ICD-10-CM

## 2025-02-07 DIAGNOSIS — G25.81 RESTLESS LEG SYNDROME: ICD-10-CM

## 2025-02-07 DIAGNOSIS — G47.33 OBSTRUCTIVE SLEEP APNEA: ICD-10-CM

## 2025-02-07 PROCEDURE — 99214 OFFICE O/P EST MOD 30 MIN: CPT | Performed by: NURSE PRACTITIONER

## 2025-02-07 RX ORDER — ROPINIROLE 0.5 MG/1
TABLET, FILM COATED ORAL
Qty: 45 TABLET | Refills: 5 | Status: SHIPPED | OUTPATIENT
Start: 2025-02-07

## 2025-02-07 NOTE — ASSESSMENT & PLAN NOTE
Lab Results   Component Value Date    HGBA1C 7.7 (H) 01/03/2025     Can consider Cymbalta for pain management  Continue following with endocrinology for A1c management

## 2025-02-07 NOTE — PATIENT INSTRUCTIONS
Continue with ropinirol 0.5mg at bedtime, can add 0.25mg in am when you are ready  Sleep Medicine referral for sleep pattern disruptions  Can consider Cymbalta for the Neuorpathy if you choose, will be 30mg daily in the am to minimize sleep changes  Follow up with Neurology office in 6 months or sooner if needed

## 2025-02-07 NOTE — ASSESSMENT & PLAN NOTE
Orders:    rOPINIRole (REQUIP) 0.5 mg tablet; Take 1 tablet (0.5 mg total) by mouth daily at bedtime AND 0.5 tablets (0.25 mg total) in the morning.

## 2025-03-07 DIAGNOSIS — E78.2 MIXED HYPERLIPIDEMIA: ICD-10-CM

## 2025-03-07 RX ORDER — ATORVASTATIN CALCIUM 40 MG/1
TABLET, FILM COATED ORAL
Qty: 90 TABLET | Refills: 0 | OUTPATIENT
Start: 2025-03-07

## 2025-03-07 NOTE — TELEPHONE ENCOUNTER
Reason for call:   [x] Refill   [] Prior Auth  [] Other:     Office:   [] PCP/Provider -   [] Specialty/Provider -  Jimmy    Medication:   atorvastatin (LIPITOR) 40 mg tablet      Dose/Frequency: 1 daily    Quantity: 90    [facility]    Does the patient have enough for 3 days?   [] Yes   [x] No - Send as HP to POD    MEDICATION WAS NOT SENT TO PHARMACY

## 2025-03-13 DIAGNOSIS — I10 ESSENTIAL HYPERTENSION: ICD-10-CM

## 2025-03-14 ENCOUNTER — RA CDI HCC (OUTPATIENT)
Dept: OTHER | Facility: HOSPITAL | Age: 65
End: 2025-03-14

## 2025-03-14 RX ORDER — HYDROCHLOROTHIAZIDE 25 MG/1
25 TABLET ORAL DAILY
Qty: 90 TABLET | Refills: 0 | Status: SHIPPED | OUTPATIENT
Start: 2025-03-14

## 2025-03-14 NOTE — PROGRESS NOTES
HCC coding opportunities          Chart Reviewed number of suggestions sent to Provider: 3     Patients Insurance        Commercial Insurance: [employer]     E11.36  [id number]  E11.65

## 2025-03-21 ENCOUNTER — OFFICE VISIT (OUTPATIENT)
Dept: INTERNAL MEDICINE CLINIC | Facility: CLINIC | Age: 65
End: 2025-03-21
Payer: COMMERCIAL

## 2025-03-21 VITALS
BODY MASS INDEX: 36.96 KG/M2 | RESPIRATION RATE: 16 BRPM | TEMPERATURE: 98.3 F | DIASTOLIC BLOOD PRESSURE: 76 MMHG | HEIGHT: 71 IN | OXYGEN SATURATION: 98 % | SYSTOLIC BLOOD PRESSURE: 120 MMHG | HEART RATE: 77 BPM | WEIGHT: 264 LBS

## 2025-03-21 DIAGNOSIS — E11.65 TYPE 2 DIABETES MELLITUS WITH HYPERGLYCEMIA, WITHOUT LONG-TERM CURRENT USE OF INSULIN (HCC): Primary | ICD-10-CM

## 2025-03-21 DIAGNOSIS — E66.812 CLASS 2 SEVERE OBESITY DUE TO EXCESS CALORIES WITH SERIOUS COMORBIDITY AND BODY MASS INDEX (BMI) OF 37.0 TO 37.9 IN ADULT (HCC): ICD-10-CM

## 2025-03-21 DIAGNOSIS — Z00.00 ANNUAL PHYSICAL EXAM: ICD-10-CM

## 2025-03-21 DIAGNOSIS — E66.01 CLASS 2 SEVERE OBESITY DUE TO EXCESS CALORIES WITH SERIOUS COMORBIDITY AND BODY MASS INDEX (BMI) OF 37.0 TO 37.9 IN ADULT (HCC): ICD-10-CM

## 2025-03-21 PROCEDURE — 99213 OFFICE O/P EST LOW 20 MIN: CPT | Performed by: INTERNAL MEDICINE

## 2025-03-21 PROCEDURE — 99396 PREV VISIT EST AGE 40-64: CPT | Performed by: INTERNAL MEDICINE

## 2025-03-21 NOTE — PROGRESS NOTES
Adult Annual Physical  Name: Bobo Cheng      : 1960      MRN: [id number]  Encounter Provider: Sandy Marquez MD  Encounter Date: 3/21/2025   Encounter department: [facility] INTERNAL MEDICINE    Assessment & Plan  Annual physical exam  Complete physical done    Preventive measure discussed    The age-appropriate screening done    For detail follow-up finding instruction refer to attached encounter and discharge instruction       Type 2 diabetes mellitus with hyperglycemia, without long-term current use of insulin (HCC)    Lab Results   Component Value Date    HGBA1C 7.7 (H) 2025   Above reviewed A1c improved monitoring blood sugar at home seems to be improving agree continue manage medications follow    Mounjaro 7.5 mg weekly  Metformin 1000 mg twice a day    Hypoglycemia protocol in place    Followed by endocrinology    Awaiting repeat A1c BMP urine for microalbumin  Diabetic diet liquid diet exercise lose weight      Orders:  •  Albumin / creatinine urine ratio; Future    Class 2 severe obesity due to excess calories with serious comorbidity and body mass index (BMI) of 37.0 to 37.9 in adult (HCC)  BMI down to 36.82 now associated with type 2 diabetes hypertension hyperlipidemia congestive heart failure    Followed by endocrinology    Mounjaro 7.5 mg weekly recommended to increase the dosage patient reluctant    No side effects    Advised to cut down the calorie portion change in lifestyle    Counseling done as follows    BMI Counseling:      The BMI is above normal. Know Body weight goal    Weigh yourself daily or weekly as per your doctor    Nutrition recommendations include decreasing portion sizes, encouraging healthy choices of fruits and vegetables, decreasing     fast food intake, consuming healthier snacks, limiting drinks that contain sugar, moderation in carbohydrate intake, increasing     intake of lean protein, reducing intake of saturated and trans fat and  reducing intake of cholesterol  Discussed options of HealthyCORE-Intensive Lifestyle Intervention Program, Very Low Calorie Diet-VLCD and Conservative Program and the role of weight loss medications.  - Explained the importance of making lifestyle changes in addition to starting anti-obesity medications.   -           Preventive Screenings:  - Diabetes Screening: screening not indicated and has diabetes  - Cholesterol Screening: screening not indicated and has hyperlipidemia   - Chlamydia Screening: patient declines   - Hepatitis C screening: patient declines   - HIV screening: patient declines   - Colon cancer screening: screening up-to-date   - Lung cancer screening: screening up-to-date   - Prostate cancer screening: screening up-to-date     Immunizations:  - Immunizations due: Prevnar 20 and Zoster (Shingrix)  - Risks/benefits immunizations discussed      Counseling/Anticipatory Guidance:  - Alcohol: discussed moderation in alcohol intake and recommendations for healthy alcohol use.   - Drug use: discussed harms of illicit drug use and how it can negatively impact mental/physical health.   - Tobacco use: discussed harms of tobacco use and management options for quitting.   - Dental health: discussed importance of regular tooth brushing, flossing, and dental visits.   - Sexual health: discussed sexually transmitted diseases, partner selection, use of condoms, avoidance of unintended pregnancy, and contraceptive alternatives.   - Diet: discussed recommendations for a healthy/well-balanced diet.   - Exercise: the importance of regular exercise/physical activity was discussed. Recommend exercise 3-5 times per week for at least 30 minutes.   - Injury prevention: discussed safety/seat belts, safety helmets, smoke detectors, carbon monoxide detectors, and smoking near bedding or upholstery.          History of Present Illness     Patient came in follow-up for further chronic medical condition denies any chest pain  difficulty breathing seen by neurology for neuropathy left lower extremityAlso seen by podiatry and followed by endocrinology  Also seen by podiatry and followed by endocrinology  Previous labs reviewed    Adult Annual Physical:  Patient presents for annual physical.     Diet and Physical Activity:  - Diet/Nutrition: low fat diet and poor diet.  - Exercise: walking.    Depression Screening:  - PHQ-2 Score: 0    General Health:  - Sleep: 7-8 hours of sleep on average, > 8 hours of sleep on average and uses CPAP machine.  - Hearing: normal hearing bilateral ears. not sure if coworkers mumble or not  - Vision: no vision problems, most recent eye exam < 1 year ago and wears glasses.  - Dental: regular dental visits, brushes teeth once daily and floss regularly.     Health:  - History of STDs: no.   - Urinary symptoms: none.     Advanced Care Planning:  - Has an advanced directive?: yes    - Has a durable medical POA?: yes    - ACP document given to patient?: no      Review of Systems   Constitutional:  Positive for activity change. Negative for appetite change, chills, diaphoresis, fatigue, fever and unexpected weight change.   HENT:  Negative for dental problem, drooling, ear discharge, ear pain, facial swelling, hearing loss, mouth sores, nosebleeds, postnasal drip, sinus pressure, sneezing, sore throat, tinnitus, trouble swallowing and voice change.    Eyes:  Negative for photophobia, pain, discharge, redness, itching and visual disturbance.   Respiratory:  Negative for apnea, cough, choking, chest tightness, shortness of breath, wheezing and stridor.    Cardiovascular:  Negative for chest pain, palpitations and leg swelling.   Gastrointestinal:  Negative for abdominal distention, abdominal pain, anal bleeding, blood in stool, constipation, diarrhea, nausea, rectal pain and vomiting.   Endocrine: Negative for cold intolerance, heat intolerance, polydipsia, polyphagia and polyuria.   Genitourinary:  Negative for  "decreased urine volume, difficulty urinating, dysuria, enuresis, flank pain, frequency, genital sores, hematuria and urgency.   Musculoskeletal:  Positive for arthralgias. Negative for back pain, gait problem, joint swelling, myalgias, neck pain and neck stiffness.   Skin:  Negative for color change, pallor, rash and wound.   Allergic/Immunologic: Negative.  Negative for environmental allergies, food allergies and immunocompromised state.   Neurological:  Negative for dizziness, tremors, seizures, syncope, facial asymmetry, speech difficulty, weakness, light-headedness, numbness and headaches.   Psychiatric/Behavioral:  Negative for agitation, behavioral problems, confusion, decreased concentration, dysphoric mood, hallucinations, self-injury, sleep disturbance and suicidal ideas. The patient is not nervous/anxious and is not hyperactive.          Objective   /76   Pulse 77   Temp 98.3 °F (36.8 °C)   Resp 16   Ht 5' 11\" (1.803 m)   Wt 120 kg (264 lb)   SpO2 98%   BMI 36.82 kg/m²     Physical Exam  Vitals and nursing note reviewed.   Constitutional:       General: He is not in acute distress.     Appearance: He is well-developed. He is not ill-appearing, toxic-appearing or diaphoretic.   HENT:      Head: Normocephalic and atraumatic.      Right Ear: External ear normal.      Left Ear: External ear normal.      Nose: Nose normal.      Mouth/Throat:      Pharynx: No oropharyngeal exudate.   Eyes:      General: Lids are normal. Lids are everted, no foreign bodies appreciated. No scleral icterus.        Right eye: No discharge.         Left eye: No discharge.      Conjunctiva/sclera: Conjunctivae normal.      Pupils: Pupils are equal, round, and reactive to light.   Neck:      Thyroid: No thyromegaly.      Vascular: Normal carotid pulses. No carotid bruit, hepatojugular reflux or JVD.      Trachea: No tracheal tenderness or tracheal deviation.   Cardiovascular:      Rate and Rhythm: Normal rate and regular " rhythm.      Pulses: Normal pulses.      Heart sounds: Normal heart sounds. No murmur heard.     No friction rub. No gallop.   Pulmonary:      Effort: Pulmonary effort is normal. No respiratory distress.      Breath sounds: Normal breath sounds. No stridor. No wheezing or rales.   Chest:      Chest wall: No tenderness.   Abdominal:      General: Bowel sounds are normal. There is no distension.      Palpations: Abdomen is soft. There is no mass.      Tenderness: There is no abdominal tenderness. There is no guarding or rebound.   Musculoskeletal:         General: No tenderness or deformity. Normal range of motion.      Cervical back: Normal range of motion and neck supple. No edema, erythema or rigidity. No spinous process tenderness or muscular tenderness. Normal range of motion.   Lymphadenopathy:      Head:      Right side of head: No submental, submandibular, tonsillar, preauricular or posterior auricular adenopathy.      Left side of head: No submental, submandibular, tonsillar, preauricular, posterior auricular or occipital adenopathy.      Cervical: No cervical adenopathy.      Right cervical: No superficial, deep or posterior cervical adenopathy.     Left cervical: No superficial, deep or posterior cervical adenopathy.      Upper Body:      Right upper body: No pectoral adenopathy.      Left upper body: No pectoral adenopathy.   Skin:     General: Skin is warm and dry.      Coloration: Skin is not pale.      Findings: No erythema or rash.   Neurological:      General: No focal deficit present.      Mental Status: He is alert and oriented to person, place, and time.      Cranial Nerves: No cranial nerve deficit.      Sensory: No sensory deficit.      Motor: No tremor, abnormal muscle tone or seizure activity.      Coordination: Coordination normal.      Gait: Gait normal.      Deep Tendon Reflexes: Reflexes are normal and symmetric. Reflexes normal.   Psychiatric:         Behavior: Behavior normal.          Thought Content: Thought content normal.         Judgment: Judgment normal.

## 2025-03-21 NOTE — ASSESSMENT & PLAN NOTE
Lab Results   Component Value Date    HGBA1C 7.7 (H) 01/03/2025   Above reviewed A1c improved monitoring blood sugar at home seems to be improving agree continue manage medications follow    Mounjaro 7.5 mg weekly  Metformin 1000 mg twice a day    Hypoglycemia protocol in place    Followed by endocrinology    Awaiting repeat A1c BMP urine for microalbumin  Diabetic diet liquid diet exercise lose weight      Orders:  •  Albumin / creatinine urine ratio; Future

## 2025-03-21 NOTE — PATIENT INSTRUCTIONS
"Patient Education     Routine physical for adults   The Basics   Written by the doctors and editors at [facility]   What is a physical? -- A physical is a routine visit, or \"check-up,\" with your doctor. You might also hear it called a \"wellness visit\" or \"preventive visit.\"  During each visit, the doctor will:   Ask about your physical and mental health   Ask about your habits, behaviors, and lifestyle   Do an exam   Give you vaccines if needed   Talk to you about any medicines you take   Give advice about your health   Answer your questions  Getting regular check-ups is an important part of taking care of your health. It can help your doctor find and treat any problems you have. But it's also important for preventing health problems.  A routine physical is different from a \"sick visit.\" A sick visit is when you see a doctor because of a health concern or problem. Since physicals are scheduled ahead of time, you can think about what you want to ask the doctor.  How often should I get a physical? -- It depends on your age and health. In general, for people age 21 years and older:   If you are younger than 50 years, you might be able to get a physical every 3 years.   If you are 50 years or older, your doctor might recommend a physical every year.  If you have an ongoing health condition, like diabetes or high blood pressure, your doctor will probably want to see you more often.  What happens during a physical? -- In general, each visit will include:   Physical exam - The doctor or nurse will check your height, weight, heart rate, and blood pressure. They will also look at your eyes and ears. They will ask about how you are feeling and whether you have any symptoms that bother you.   Medicines - It's a good idea to bring a list of all the medicines you take to each doctor visit. Your doctor will talk to you about your medicines and answer any questions. Tell them if you are having any side effects that bother you. You " "should also tell them if you are having trouble paying for any of your medicines.   Habits and behaviors - This includes:   Your diet   Your exercise habits   Whether you smoke, drink alcohol, or use drugs   Whether you are sexually active   Whether you feel safe at home  Your doctor will talk to you about things you can do to improve your health and lower your risk of health problems. They will also offer help and support. For example, if you want to quit smoking, they can give you advice and might prescribe medicines. If you want to improve your diet or get more physical activity, they can help you with this, too.   Lab tests, if needed - The tests you get will depend on your age and situation. For example, your doctor might want to check your:   Cholesterol   Blood sugar   Iron level   Vaccines - The recommended vaccines will depend on your age, health, and what vaccines you already had. Vaccines are very important because they can prevent certain serious or deadly infections.   Discussion of screening - \"Screening\" means checking for diseases or other health problems before they cause symptoms. Your doctor can recommend screening based on your age, risk, and preferences. This might include tests to check for:   Cancer, such as breast, prostate, cervical, ovarian, colorectal, prostate, lung, or skin cancer   Sexually transmitted infections, such as chlamydia and gonorrhea   Mental health conditions like depression and anxiety  Your doctor will talk to you about the different types of screening tests. They can help you decide which screenings to have. They can also explain what the results might mean.   Answering questions - The physical is a good time to ask the doctor or nurse questions about your health. If needed, they can refer you to other doctors or specialists, too.  Adults older than 65 years often need other care, too. As you get older, your doctor will talk to you about:   How to prevent falling at " home   Hearing or vision tests   Memory testing   How to take your medicines safely   Making sure that you have the help and support you need at home  All topics are updated as new evidence becomes available and our peer review process is complete.  This topic retrieved from [employer] on: May 02, 2024.  Topic 798732 Version 1.0  Release: 32.4.3 - C32.122  © 2024 UpToDate, Inc. and/or its affiliates. All rights reserved.  Consumer Information Use and Disclaimer   Disclaimer: This generalized information is a limited summary of diagnosis, treatment, and/or medication information. It is not meant to be comprehensive and should be used as a tool to help the user understand and/or assess potential diagnostic and treatment options. It does NOT include all information about conditions, treatments, medications, side effects, or risks that may apply to a specific patient. It is not intended to be medical advice or a substitute for the medical advice, diagnosis, or treatment of a health care provider based on the health care provider's examination and assessment of a patient's specific and unique circumstances. Patients must speak with a health care provider for complete information about their health, medical questions, and treatment options, including any risks or benefits regarding use of medications. This information does not endorse any treatments or medications as safe, effective, or approved for treating a specific patient. UpToDate, Inc. and its affiliates disclaim any warranty or liability relating to this information or the use thereof.The use of this information is governed by the Terms of Use, available at https://www.[employer]uwer.com/en/know/clinical-effectiveness-terms. 2024© UpToDate, Inc. and its affiliates and/or licensors. All rights reserved.  Copyright   © 2024 UpToDate, Inc. and/or its affiliates. All rights reserved.

## 2025-03-21 NOTE — ASSESSMENT & PLAN NOTE
BMI down to 36.82 now associated with type 2 diabetes hypertension hyperlipidemia congestive heart failure    Followed by endocrinology    Mounjaro 7.5 mg weekly recommended to increase the dosage patient reluctant    No side effects    Advised to cut down the calorie portion change in lifestyle    Counseling done as follows    BMI Counseling:      The BMI is above normal. Know Body weight goal    Weigh yourself daily or weekly as per your doctor    Nutrition recommendations include decreasing portion sizes, encouraging healthy choices of fruits and vegetables, decreasing     fast food intake, consuming healthier snacks, limiting drinks that contain sugar, moderation in carbohydrate intake, increasing     intake of lean protein, reducing intake of saturated and trans fat and reducing intake of cholesterol  Discussed options of HealthyCORE-Intensive Lifestyle Intervention Program, Very Low Calorie Diet-VLCD and Conservative Program and the role of weight loss medications.  - Explained the importance of making lifestyle changes in addition to starting anti-obesity medications.   -

## 2025-03-24 ENCOUNTER — TELEPHONE (OUTPATIENT)
Dept: ENDOCRINOLOGY | Facility: CLINIC | Age: 65
End: 2025-03-24

## 2025-03-24 DIAGNOSIS — E11.65 TYPE 2 DIABETES MELLITUS WITH HYPERGLYCEMIA, UNSPECIFIED WHETHER LONG TERM INSULIN USE (HCC): ICD-10-CM

## 2025-03-24 RX ORDER — ACYCLOVIR 800 MG/1
TABLET ORAL
Qty: 2 EACH | Refills: 2 | Status: SHIPPED | OUTPATIENT
Start: 2025-03-24

## 2025-03-31 ENCOUNTER — TELEPHONE (OUTPATIENT)
Dept: PULMONOLOGY | Facility: MEDICAL CENTER | Age: 65
End: 2025-03-31

## 2025-04-01 DIAGNOSIS — E11.65 TYPE 2 DIABETES MELLITUS WITH HYPERGLYCEMIA, UNSPECIFIED WHETHER LONG TERM INSULIN USE (HCC): ICD-10-CM

## 2025-04-01 NOTE — TELEPHONE ENCOUNTER
I called and spoke with the patient and he stated that he would like for us to send over the ilno 2 sensor to [facility].

## 2025-04-01 NOTE — TELEPHONE ENCOUNTER
Patient called in stating that the wrong script was sent in for him. He needs the freestyle lino 2 sensors not the lino 3.    He already picked them up and paid 79 dollars.    He would like someone to call him back asap.

## 2025-04-28 ENCOUNTER — TELEPHONE (OUTPATIENT)
Dept: ENDOCRINOLOGY | Facility: CLINIC | Age: 65
End: 2025-04-28

## 2025-04-28 DIAGNOSIS — E11.65 TYPE 2 DIABETES MELLITUS WITH HYPERGLYCEMIA, WITHOUT LONG-TERM CURRENT USE OF INSULIN (HCC): ICD-10-CM

## 2025-04-28 RX ORDER — TIRZEPATIDE 7.5 MG/.5ML
INJECTION, SOLUTION SUBCUTANEOUS
Qty: 8 ML | Refills: 2 | Status: SHIPPED | OUTPATIENT
Start: 2025-04-28

## 2025-05-08 DIAGNOSIS — J30.1 SEASONAL ALLERGIC RHINITIS DUE TO POLLEN: ICD-10-CM

## 2025-05-09 RX ORDER — DESLORATADINE 5 MG/1
5 TABLET ORAL
Qty: 90 TABLET | Refills: 1 | Status: SHIPPED | OUTPATIENT
Start: 2025-05-09

## 2025-05-10 LAB
ALBUMIN SERPL-MCNC: 4.6 G/DL (ref 3.9–4.9)
ALBUMIN/CREAT UR: 7 MG/G CREAT (ref 0–29)
ALP SERPL-CCNC: 64 IU/L (ref 44–121)
ALT SERPL-CCNC: 81 IU/L (ref 0–44)
AST SERPL-CCNC: 52 IU/L (ref 0–40)
BILIRUB SERPL-MCNC: 0.6 MG/DL (ref 0–1.2)
BUN SERPL-MCNC: 15 MG/DL (ref 8–27)
BUN/CREAT SERPL: 16 (ref 10–24)
CALCIUM SERPL-MCNC: 10 MG/DL (ref 8.6–10.2)
CHLORIDE SERPL-SCNC: 101 MMOL/L (ref 96–106)
CHOLEST SERPL-MCNC: 128 MG/DL (ref 100–199)
CHOLEST/HDLC SERPL: 2.8 RATIO (ref 0–5)
CO2 SERPL-SCNC: 22 MMOL/L (ref 20–29)
CREAT SERPL-MCNC: 0.96 MG/DL (ref 0.76–1.27)
CREAT UR-MCNC: 172.9 MG/DL
EGFR: 89 ML/MIN/1.73
EST. AVERAGE GLUCOSE BLD GHB EST-MCNC: 163 MG/DL
GLOBULIN SER-MCNC: 2.8 G/DL (ref 1.5–4.5)
GLUCOSE SERPL-MCNC: 160 MG/DL (ref 70–99)
HBA1C MFR BLD: 7.3 % (ref 4.8–5.6)
HDLC SERPL-MCNC: 46 MG/DL
LDL DIRECT COMMENT: NORMAL
LDLC SERPL CALC-MCNC: 42 MG/DL (ref 0–99)
LDLC SERPL DIRECT ASSAY-MCNC: 53 MG/DL (ref 0–99)
MICROALBUMIN UR-MCNC: 11.8 UG/ML
POTASSIUM SERPL-SCNC: 4.3 MMOL/L (ref 3.5–5.2)
PROT SERPL-MCNC: 7.4 G/DL (ref 6–8.5)
SL AMB VLDL CHOLESTEROL CALC: 40 MG/DL (ref 5–40)
SODIUM SERPL-SCNC: 139 MMOL/L (ref 134–144)
TRIGL SERPL-MCNC: 258 MG/DL (ref 0–149)
VIT B12 SERPL-MCNC: 981 PG/ML (ref 232–1245)

## 2025-05-23 ENCOUNTER — OFFICE VISIT (OUTPATIENT)
Dept: ENDOCRINOLOGY | Facility: CLINIC | Age: 65
End: 2025-05-23
Payer: COMMERCIAL

## 2025-05-23 VITALS
BODY MASS INDEX: 36.68 KG/M2 | WEIGHT: 262 LBS | HEIGHT: 71 IN | HEART RATE: 63 BPM | SYSTOLIC BLOOD PRESSURE: 138 MMHG | DIASTOLIC BLOOD PRESSURE: 86 MMHG

## 2025-05-23 DIAGNOSIS — I10 ESSENTIAL HYPERTENSION: ICD-10-CM

## 2025-05-23 DIAGNOSIS — G62.9 NEUROPATHY: ICD-10-CM

## 2025-05-23 DIAGNOSIS — E11.65 TYPE 2 DIABETES MELLITUS WITH HYPERGLYCEMIA, WITHOUT LONG-TERM CURRENT USE OF INSULIN (HCC): Primary | ICD-10-CM

## 2025-05-23 DIAGNOSIS — E78.2 MIXED HYPERLIPIDEMIA: ICD-10-CM

## 2025-05-23 PROCEDURE — 99214 OFFICE O/P EST MOD 30 MIN: CPT | Performed by: NURSE PRACTITIONER

## 2025-05-23 NOTE — ASSESSMENT & PLAN NOTE
Lab Results   Component Value Date    HGBA1C 7.3 (H) 05/09/2025     HGA1C improving but slightly above goal. Currently on metformin 1000 mg twice daily and mounjaro 7.5 mg weekly.     Discussed risks/complications associated with uncontrolled diabetes including organ involvement, heart attack, stroke, death.    Advised lifestyle modifications including attention to diet including the amount and types of carbohydrates consumed and regular activity.     Call for blood sugars less than 70 mg/dl or patterns over 250 mg/dl.    Recommendation for medical identification either bracelet, necklace.    Routine follow up for diabetic eye and foot exams.     Ordered blood work to complete prior to next visit.    Send glucose logs/CGM download in 1-2 weeks for review    Follow up in 3 months.     Orders:    Hemoglobin A1C; Future    Comprehensive metabolic panel; Future    Lipid panel; Future

## 2025-05-23 NOTE — PROGRESS NOTES
Name: Bobo Cheng      : 1960      MRN: [id number]  Encounter Provider: LUCERO Rios  Encounter Date: 2025   Encounter department: [facility]    Chief Complaint   Patient presents with    Diabetes Mellitus   :  Assessment & Plan  Type 2 diabetes mellitus with hyperglycemia, without long-term current use of insulin ([facility])    Lab Results   Component Value Date    HGBA1C 7.3 (H) 2025     HGA1C improving but slightly above goal. Currently on metformin 1000 mg twice daily and mounjaro 7.5 mg weekly.     Discussed risks/complications associated with uncontrolled diabetes including organ involvement, heart attack, stroke, death.    Advised lifestyle modifications including attention to diet including the amount and types of carbohydrates consumed and regular activity.     Call for blood sugars less than 70 mg/dl or patterns over 250 mg/dl.    Recommendation for medical identification either bracelet, necklace.    Routine follow up for diabetic eye and foot exams.     Ordered blood work to complete prior to next visit.    Send glucose logs/CGM download in 1-2 weeks for review    Follow up in 3 months.     Orders:    Hemoglobin A1C; Future    Comprehensive metabolic panel; Future    Lipid panel; Future    Essential hypertension  BP slightly higher today, asymptomatic. Continues on ARB.          Mixed hyperlipidemia  Continues on statin. LDL at goal from May 2025.          Neuropathy  Worsening neuropathy left lower extremity over right. Recommend discussion with neurology.   Follows with podiatry.            History of Present Illness     Bobo Cheng is a 64 y.o. male who presents  with a history of type 2 diabetes without long term use of insulin.      Reports complications of neuropathy follows regularly with podiatry and neurology, reports worsening neuropathy. Declining since 2025.      Denies retinopathy follows annually with  "with optho, no concerns today.      Denies recent illness or hospitalizations since his last appointment.      Denies recent severe hypo/hyperglycemic episodes requiring medical attention.       Home glucose monitoring: are performed regularly.  See scanned blood sugars.       Current regimen:   Metformin 1000 mg orally twice a day  Mounjaro 7.5 mg weekly     Denies any adverse effects of metformin or Mounjaro.      On ARB and Statin     History obtained from: patient       Review of Systems as per [facility]  Medical History Reviewed by provider this encounter:  Tobacco  Allergies  Meds  Problems  Med Hx  Surg Hx  Fam Hx     .  Medications Ordered Prior to Encounter[1]      Medical History Reviewed by provider this encounter:  Tobacco  Allergies  Meds  Problems  Med Hx  Surg Hx  Fam Hx     .    Objective   /86 (BP Location: Left arm, Patient Position: Sitting, Cuff Size: Large)   Pulse 63   Ht 5' 11\" (1.803 m)   Wt 119 kg (262 lb)   BMI 36.54 kg/m²      Body mass index is 36.54 kg/m².  Wt Readings from Last 3 Encounters:   05/23/25 119 kg (262 lb)   03/21/25 120 kg (264 lb)   02/07/25 120 kg (264 lb)        Physical Exam  Vitals reviewed.   Constitutional:       Appearance: Normal appearance.   Cardiovascular:      Rate and Rhythm: Normal rate and regular rhythm.      Pulses: Normal pulses.      Heart sounds: Normal heart sounds.   Pulmonary:      Effort: Pulmonary effort is normal.      Breath sounds: Normal breath sounds.   Skin:     General: Skin is warm and dry.      Capillary Refill: Capillary refill takes less than 2 seconds.   Neurological:      General: No focal deficit present.      Mental Status: He is alert and oriented to person, place, and time.   Psychiatric:         Mood and Affect: Mood normal.         Behavior: Behavior normal.     Diabetic Foot Exam    Patient's shoes and socks removed.    Right Foot/Ankle   Right Foot Inspection  Skin Exam: skin normal. Skin not intact, no dry " skin, no warmth, no callus, no erythema, no maceration, no abnormal color, no pre-ulcer, no ulcer and no callus.     Toe Exam: ROM and strength within normal limits.     Sensory   Vibration: diminished  Monofilament testing: diminished    Vascular  Capillary refills: < 3 seconds  The right DP pulse is 1+.     Left Foot/Ankle  Left Foot Inspection  Skin Exam: skin normal. Skin not intact, no dry skin, no warmth, no erythema, no maceration, normal color, no pre-ulcer, no ulcer and no callus.     Toe Exam: ROM and strength within normal limits.     Sensory   Vibration: absent  Monofilament testing: absent    Vascular  Capillary refills: < 3 seconds  The left DP pulse is 1+.     Assign Risk Category  No deformity present  Loss of protective sensation  Weak pulses  Risk: 2      Labs: I have reviewed pertinent labs including:   Lab Results   Component Value Date    HGBA1C 7.3 (H) 05/09/2025    HGBA1C 7.7 (H) 01/03/2025    HGBA1C 7.3 (H) 09/06/2024      Lab Results   Component Value Date    CREATININE 0.96 05/09/2025    CREATININE 1.03 01/03/2025    CREATININE 0.98 09/06/2024    BUN 15 05/09/2025     11/30/2017    K 4.3 05/09/2025     05/09/2025    CO2 22 05/09/2025      eGFR   Date Value Ref Range Status   05/09/2025 89 >59 mL/min/1.73 Final   11/25/2017 73 ml/min/1.73sq m Final      HDL   Date Value Ref Range Status   05/09/2025 46 >39 mg/dL Final     Triglycerides   Date Value Ref Range Status   05/09/2025 258 (H) 0 - 149 mg/dL Final     T. Chol/HDL Ratio   Date Value Ref Range Status   05/09/2025 2.8 0.0 - 5.0 ratio Final     Comment:                                       T. Chol/HDL Ratio                                              Men  Women                                1/2 Avg.Risk  3.4    3.3                                    Avg.Risk  5.0    4.4                                 2X Avg.Risk  9.6    7.1                                 3X Avg.Risk 23.4   11.0        ALT   Date Value Ref Range Status  "  05/09/2025 81 (H) 0 - 44 IU/L Final     AST   Date Value Ref Range Status   05/09/2025 52 (H) 0 - 40 IU/L Final     Alkaline Phosphatase   Date Value Ref Range Status   11/23/2017 67 46 - 116 U/L Final      Lab Results   Component Value Date    BIN2EXGAGZCV 2.809 11/22/2017      Lab Results   Component Value Date    FREET4 1.03 02/09/2024    T3FREE 41.8 08/04/2023      No results found for: \"[id number]\"     Discussed with the patient and all questioned fully answered. He will call me if any problems arise.       [1]   Current Outpatient Medications on File Prior to Visit   Medication Sig Dispense Refill    albuterol (PROVENTIL HFA,VENTOLIN HFA) 90 mcg/act inhaler Inhale 2 puffs every 4 (four) hours as needed for wheezing or shortness of breath 18 g 6    Alpha-Lipoic Acid 100 MG CAPS Take by mouth      Ascorbic Acid (VITAMIN C) 100 MG tablet Take 500 mg by mouth      aspirin 81 MG tablet Take 162 mg by mouth in the morning.      atorvastatin (LIPITOR) 40 mg tablet TAKE 1 TABLET BY MOUTH ONCE DAILY WITH SUPPER 90 tablet 0    Cholecalciferol (VITAMIN D3) 1000 units CAPS Take by mouth in the morning.      Continuous Blood Gluc  (FreeStyle Alexei 2 [place]) STEVE       Continuous Glucose Sensor (FreeStyle Alexei 2 Sensor) MISC Change sensor every 14 days as directed 7 each 2    cyanocobalamin (VITAMIN B-12) 100 mcg tablet Take by mouth in the morning.      desloratadine (CLARINEX) 5 MG tablet TAKE 1 TABLET BY MOUTH ONCE DAILY AT BEDTIME 90 tablet 1    famotidine (PEPCID) 20 mg tablet Take 20 mg by mouth in the morning.      FREESTYLE LITE test strip USE 1 STRIP TO CHECK GLUCOSE ONCE DAILY AS DIRECTED 100 each 0    FREESTYLE LITE test strip USE 1 STRIP TO CHECK GLUCOSE ONCE DAILY AS INSTRUCTED 100 each 0    glucosamine-chondroitin 500-400 MG tablet Take 1 tablet by mouth in the morning.      hydroCHLOROthiazide 25 mg tablet Take 1 tablet by mouth once daily 90 tablet 0    losartan (COZAAR) 50 mg tablet Take 1 " tablet by mouth once daily 90 tablet 0    Magnesium Hydroxide (MAGNESIA PO) Take by mouth in the morning and before bedtime.      metFORMIN (GLUCOPHAGE) 1000 MG tablet Take 1 tablet (1,000 mg total) by mouth 2 (two) times a day with meals 180 tablet 1    Multiple Vitamin (MULTIVITAMIN) capsule Take 1 capsule by mouth in the morning.      nitroglycerin (NITROSTAT) 0.3 mg SL tablet Place 1 tablet (0.3 mg total) under the tongue every 5 (five) minutes as needed for chest pain 25 tablet 1    ONETOUCH DELICA LANCETS 30G MISC 1 Units by Does not apply route daily 100 each 6    rOPINIRole (REQUIP) 0.5 mg tablet Take 1 tablet (0.5 mg total) by mouth daily at bedtime AND 0.5 tablets (0.25 mg total) in the morning. 45 tablet 5    Tirzepatide (Mounjaro) 7.5 MG/0.5ML SOAJ Inject 7.5 mg (0.5 mL) once weekly. 8 mL 2    torsemide (DEMADEX) 10 mg tablet Take 1 tablet (10 mg total) by mouth daily 30 tablet 1     Current Facility-Administered Medications on File Prior to Visit   Medication Dose Route Frequency Provider Last Rate Last Admin    cyanocobalamin injection 1,000 mcg  1,000 mcg Intramuscular Q30 Days LUCERO Junior   1,000 mcg at 09/10/21 1149

## 2025-05-23 NOTE — ASSESSMENT & PLAN NOTE
Worsening neuropathy left lower extremity over right. Recommend discussion with neurology.   Follows with podiatry.

## 2025-06-01 NOTE — PROGRESS NOTES
Problem: Respiratory Impairment - Respiratory Therapy 253  Intervention: Inhaled medication delivery  Note: Intervention Status  Done  Intervention: Inhaled gas administration  Note: Intervention Status  Done  Intervention: Respiratory support devices  Note: Intervention Status  Done      Js Galeano  1960  [id number]  [employer]  [facility] [address] [id number]    Interval History:  Js Galeano is a 63 y o  male who presents for routine coronary artery disease follow-up  Since his last visit, he denies any chest pain  He complains of a sensation in the left side of his chest which he has difficulty describing  He denies similar sensation to previous anginal episode  He feels shortness of breath with exertion but unchanged from previous  He has gained weight and sugars have been elevated  He he does not exercise regularly  He had chest discomfort during his last visit  A stress echocardiogram was done which was normal    He had a PFT study in June which was normal     Treadmill stress test in 2019 was normal   He wore a holter monitor because of resting bradycardia  Monitor showed an average HR of 75 bpm and no heart blocks  Previously was having dyspnea  that improved with torsemide  He continues to refrain from smoking  In November 2017, he underwent drug-eluting stent placement to left circumflex and OM 2 lesions after presenting to hospital with chest and arm pain, elevated BP and ST depressions  Past Medical History:   Diagnosis Date    Anemia     Bundle branch block, right     CAD (coronary artery disease)     Cataract     CPAP (continuous positive airway pressure) dependence     Diabetes mellitus (HCC)     borderline, controlled with diet and activity    Diverticulitis     GERD (gastroesophageal reflux disease)     History of coronary artery stent placement- pCx and OM2 11/24/2017    Hyperlipidemia     Nasal septal deformity     Neuropathy     Obesity (BMI 30-39  9)     Sleep apnea     wears c-pap    Vitamin D deficiency      Past Surgical History:   Procedure Laterality Date    COLON SIGMOID RESECTION N/A 12/16/2016    Procedure: RESECTION COLON SIGMOID; Surgeon: Clari Singh MD;  [facility]: [facility];  Service:     COLON SIGMOID RESECTION LAPAROSCOPIC N/A 2016    Procedure: RESECTION COLON SIGMOID LAPAROSCOPIC:CONVERTED TO [email or web address];  Surgeon: Clari Singh MD;  [facility];  Service:     COLON SURGERY      Sigmoidectomy    COLONOSCOPY N/A 10/6/2016    Procedure: COLONOSCOPY;  Surgeon: Homa Newby MD;  Location: [facility]; Service:    Mavis Cousin CYSTOSCOPY W/ RETROGRADES Left 2/3/2017    Procedure: CYSTOSCOPY WITH BILATERAL RETROGRADES, LEFT STENT REMOVAL;  Surgeon: Alex Stewart MD;  Location: [address] [place] [address];  Service:     ESOPHAGOGASTRODUODENOSCOPY N/A 10/6/2016    Procedure: ESOPHAGOGASTRODUODENOSCOPY (EGD); Surgeon: Homa Newby MD;  Location: [facility]; Service:     HERNIA REPAIR      KNEE ARTHROSCOPY W/ MENISCECTOMY      MS CYSTOURETHROSCOPY,URETER CATHETER Left 2016    Procedure: CYSTOSCOPY RETROGRADE PYELOGRAM WITH INSERTION STENT URETERAL;  Surgeon: Alex Stewart MD;  Location: [address] [place] [address];  Service: Urology    US GUIDED THYROID BIOPSY  2021    VARICOSE VEIN SURGERY       Social History     Socioeconomic History    Marital status: Single     Spouse name: Not on file    Number of children: Not on file    Years of education: Not on file    Highest education level: Not on file   Occupational History    Not on file   Tobacco Use    Smoking status: Former Smoker     Packs/day: 0 50     Years: 37 00     Pack years: 18 50     Types: Cigarettes     Quit date: 3/30/2018     Years since quittin 4    Smokeless tobacco: Current User     Types: Chew    Tobacco comment: chewing nicotine   Vaping Use    Vaping Use: Never used   Substance and Sexual Activity    Alcohol use: Yes     Alcohol/week: 3 0 standard drinks     Types: 3 Cans of beer per week    Drug use: No    Sexual activity: Never   Other Topics Concern    Not on file   Social History Narrative    Lives alone       Social Determinants of Health Financial Resource Strain: Not on file   Food Insecurity: Not on file   Transportation Needs: Not on file   Physical Activity: Not on file   Stress: Not on file   Social Connections: Not on file   Intimate Partner Violence: Not on file   Housing Stability: Not on file     Family History   Problem Relation Age of Onset    Diabetes Brother     Osteoarthritis Mother     Atrial fibrillation Mother     Aortic aneurysm Father     Colon cancer Brother        Current Outpatient Medications:     albuterol (PROVENTIL HFA,VENTOLIN HFA) 90 mcg/act inhaler, Inhale 2 puffs every 4 (four) hours as needed for wheezing or shortness of breath, Disp: 18 g, Rfl: 6    Alpha-Lipoic Acid 100 MG CAPS, Take by mouth, Disp: , Rfl:     Ascorbic Acid (VITAMIN C) 100 MG tablet, Take 500 mg by mouth , Disp: , Rfl:     aspirin 81 MG tablet, Take 162 mg by mouth daily  , Disp: , Rfl:     atorvastatin (LIPITOR) 40 mg tablet, TAKE 1 TABLET BY MOUTH ONCE DAILY WITH SUPPER, Disp: 90 tablet, Rfl: 1    BIOTIN PO, Take 5 mg by mouth daily, Disp: , Rfl:     Cholecalciferol (VITAMIN D3) 1000 units CAPS, Take by mouth daily , Disp: , Rfl:     ciclopirox (LOPROX) 0 77 % cream, APPLY CREAM TOPICALLY TWICE DAILY   GENTLY MASSAGE INTO AFFECTED AREAS AND SURROUNDING SKIN, Disp: , Rfl:     cyanocobalamin (VITAMIN B-12) 100 mcg tablet, Take by mouth daily, Disp: , Rfl:     desloratadine (CLARINEX) 5 MG tablet, TAKE 1 TABLET BY MOUTH AT BEDTIME, Disp: 90 tablet, Rfl: 0    famotidine (PEPCID) 20 mg tablet, Take 20 mg by mouth daily, Disp: , Rfl:     FREESTYLE LITE test strip, USE 1 STRIP TO CHECK GLUCOSE ONCE DAILY AS DIRECTED, Disp: 100 each, Rfl: 0    gabapentin (NEURONTIN) 100 mg capsule, TAKE 1 CAPSULE BY MOUTH IN THE MORNING AND 3 AT BEDTIME, Disp: 360 capsule, Rfl: 1    glucosamine-chondroitin 500-400 MG tablet, Take 1 tablet by mouth daily, Disp: , Rfl:     hydrochlorothiazide (HYDRODIURIL) 25 mg tablet, Take 1 tablet by mouth once daily, Disp: 90 tablet, Rfl: 3    losartan (COZAAR) 50 mg tablet, Take 1 tablet by mouth once daily, Disp: 90 tablet, Rfl: 0    Magnesium Hydroxide (MAGNESIA PO), Take by mouth 2 (two) times a day, Disp: , Rfl:     metFORMIN (GLUCOPHAGE) 1000 MG tablet, TAKE 1 TABLET BY MOUTH TWICE DAILY WITH MEALS, Disp: 180 tablet, Rfl: 0    Multiple Vitamin (MULTIVITAMIN) capsule, Take 1 capsule by mouth daily, Disp: , Rfl:     nitroglycerin (NITROSTAT) 0 3 mg SL tablet, Place 1 tablet (0 3 mg total) under the tongue every 5 (five) minutes as needed for chest pain, Disp: 25 tablet, Rfl: 1    ONETOUCH DELICA LANCETS 57Z MISC, 1 Units by Does not apply route daily, Disp: 100 each, Rfl: 6    pramipexole (MIRAPEX) 0 125 mg tablet, Take 1 tablet (0 125 mg total) by mouth in the morning AND 2 tablets (0 25 mg total) daily at bedtime  , Disp: 90 tablet, Rfl: 3    Semaglutide (Rybelsus) 3 MG TABS, Take 3 mg by mouth in the morning, Disp: 90 tablet, Rfl: 3    torsemide (DEMADEX) 10 mg tablet, TAKE 1 TABLET BY MOUTH ONCE DAILY (Patient taking differently: Take by mouth as needed), Disp: 90 tablet, Rfl: 3    Current Facility-Administered Medications:     cyanocobalamin injection 1,000 mcg, 1,000 mcg, Intramuscular, Q30 Days, [address], NP, 1,000 mcg at 09/10/21 1141  The following portions of the patient's history were reviewed and updated as appropriate: allergies, current medications, past family history, past medical history, past social history, past surgical history and problem list     Review of Systems:  Review of Systems   Respiratory: Positive for shortness of breath  Cardiovascular: Negative for chest pain, palpitations and leg swelling  All other systems reviewed and are negative        Physical Exam:  /60 (BP Location: Right arm, Patient Position: Sitting, Cuff Size: Large)   Pulse (!) 51   Temp 98 2 °F (36 8 °C)   Ht 5' 11" (1 803 m)   Wt 127 kg (279 lb)   SpO2 96%   BMI 38 91 kg/m² Physical Exam  Constitutional:       General: He is not in acute distress  Appearance: He is well-developed  He is not diaphoretic  HENT:      Head: Normocephalic and atraumatic  Eyes:      Conjunctiva/sclera: Conjunctivae normal       Pupils: Pupils are equal, round, and reactive to light  Neck:      Thyroid: No thyromegaly  Vascular: No JVD  Cardiovascular:      Rate and Rhythm: Normal rate and regular rhythm  Heart sounds: Normal heart sounds  No murmur heard  No friction rub  No gallop  Pulmonary:      Effort: Pulmonary effort is normal       Breath sounds: Normal breath sounds  Musculoskeletal:      Cervical back: Neck supple  Skin:     General: Skin is warm and dry  Findings: No erythema or rash  Neurological:      General: No focal deficit present  Mental Status: He is alert and oriented to person, place, and time  Cranial Nerves: No cranial nerve deficit  Psychiatric:         Mood and Affect: Mood normal          Behavior: Behavior normal          Thought Content: Thought content normal          Judgment: Judgment normal          Cardiographics  ECG: sinus bradycardia   LV Ejection Fraction: LV ejection fraction >= 40%  Lab Review  Lab Results   Component Value Date    TRIG 220 (H) 07/02/2022    TRIG 183 (H) 12/10/2021    TRIG 142 08/06/2021    HDL 33 (L) 07/02/2022    HDL 42 12/10/2021    HDL 48 08/06/2021    LDLDIRECT 79 04/10/2020    LDLDIRECT 83 12/27/2019     Assessment/Plan     1  Chronic diastolic congestive heart failure ([address] )    2  Essential hypertension    3  Coronary artery disease involving native coronary artery of native heart without angina pectoris    4  Mixed hyperlipidemia    5  Type 2 diabetes mellitus without complication, without long-term current use of insulin ([address] )    6  Bundle branch block, right      - patient with symptoms of shortness of breath with exertion which is likely due to poor conditioning due to lack activity  He also has an unusual sensation in his chest which is not related to exertion and not similar to previous anginal episodes  I discussed further workup with stress test   He does not wish to do 1 at this time because of cost of last study  His last stress echocardiogram was done in the past year which was normal   He will call if symptoms persist and is agreeable to further testing at that time  - encouraged to start exercising regularly and to get blood sugars under control   -  Blood pressure is stable  Continue current medication regimen including losartan and hydrochlorothiazide  Target BP is < 130/80 mmHg  - diabetes management per Dr Francisco Lala  Last A1C was elevated  - Continue atorvastatin - last LDL was 70     - Continue current Rx  - recommend using torsemide when edema is present  He previously developed congestive heart failure  Encouraged to reduce salt in diet if possible

## 2025-06-06 ENCOUNTER — OFFICE VISIT (OUTPATIENT)
Dept: CARDIOLOGY CLINIC | Facility: CLINIC | Age: 65
End: 2025-06-06
Payer: COMMERCIAL

## 2025-06-06 VITALS
HEART RATE: 65 BPM | BODY MASS INDEX: 35.84 KG/M2 | WEIGHT: 257 LBS | OXYGEN SATURATION: 97 % | SYSTOLIC BLOOD PRESSURE: 124 MMHG | DIASTOLIC BLOOD PRESSURE: 72 MMHG

## 2025-06-06 DIAGNOSIS — E78.2 MIXED HYPERLIPIDEMIA: ICD-10-CM

## 2025-06-06 DIAGNOSIS — I25.10 CORONARY ARTERY DISEASE INVOLVING NATIVE CORONARY ARTERY OF NATIVE HEART WITHOUT ANGINA PECTORIS: ICD-10-CM

## 2025-06-06 DIAGNOSIS — M79.641 PAIN IN BOTH HANDS: ICD-10-CM

## 2025-06-06 DIAGNOSIS — M79.642 PAIN IN BOTH HANDS: ICD-10-CM

## 2025-06-06 DIAGNOSIS — I50.32 CHRONIC DIASTOLIC CONGESTIVE HEART FAILURE (HCC): Primary | ICD-10-CM

## 2025-06-06 DIAGNOSIS — I10 ESSENTIAL HYPERTENSION: ICD-10-CM

## 2025-06-06 DIAGNOSIS — I45.10 BUNDLE BRANCH BLOCK, RIGHT: ICD-10-CM

## 2025-06-06 DIAGNOSIS — R00.1 BRADYCARDIA: ICD-10-CM

## 2025-06-06 DIAGNOSIS — I49.3 ASYMPTOMATIC PVCS: ICD-10-CM

## 2025-06-06 PROCEDURE — 99214 OFFICE O/P EST MOD 30 MIN: CPT | Performed by: INTERNAL MEDICINE

## 2025-06-06 PROCEDURE — 93000 ELECTROCARDIOGRAM COMPLETE: CPT | Performed by: INTERNAL MEDICINE

## 2025-06-06 NOTE — PROGRESS NOTES
Bobo Cheng  1960  [id number]  [facility] PROFESSIONAL [facility] [place] [id number] [facility] [id number]    Interval History:  Bobo Cheng is a 64 y.o. male who presents for routine coronary artery disease follow-up.   Since his last visit, he has been feeling well.  he denies any palpitations, chest pain, shortness of breath, LE edema, orthopnea or PND.   Diet is overall unchanged.  There has not been a significant change in weight.  He complains of pain and numbness in both hands.  Also feels paresthesia at night and with arms overhead.  Has not seen neurologist recently.       He has lost 10 pounds with Mounjaro.      Most recent blood work showed LDL of 42 mg/dL with TG of 258 and Total cholesterol 128.    Does not exercise regularly.  He had chest discomfort during his previous visit.  A stress echocardiogram was done which was normal.   He had a PFT study in June 2023 which was normal.    Treadmill stress test in 2019 was normal.  He wore a holter monitor because of resting bradycardia.  Monitor showed an average HR of 75 bpm and no heart blocks.        He continues to refrain from smoking.    In November 2017, he underwent drug-eluting stent placement to left circumflex and OM 2 lesions after presenting to hospital with chest and arm pain, elevated BP and ST depressions.       Past Medical History:   Diagnosis Date    Anemia     Bundle branch block, right     CAD (coronary artery disease)     Cataract     CPAP (continuous positive airway pressure) dependence     CTS (carpal tunnel syndrome)     Diabetes mellitus (HCC)     borderline, controlled with diet and activity    Diverticulitis     GERD (gastroesophageal reflux disease)     History of coronary artery stent placement- pCx and OM2 11/24/2017    Hyperlipidemia     Hypertension     Nasal septal deformity     Neuropathy     Neuropathy in diabetes (HCC)     Obesity (BMI 30-39.9)     Sleep apnea      wears c-pap    Vitamin D deficiency      Past Surgical History:   Procedure Laterality Date    COLON SIGMOID RESECTION N/A 2016    Procedure: RESECTION COLON SIGMOID;  Surgeon: KUN Denise MD;  Location:  MAIN OR;  Service:     COLON SIGMOID RESECTION LAPAROSCOPIC N/A 2016    Procedure: RESECTION COLON SIGMOID LAPAROSCOPIC:CONVERTED TO OPEN@1245;  Surgeon: KUN Denise MD;  Location:  MAIN OR;  Service:     COLON SURGERY      Sigmoidectomy    COLONOSCOPY N/A 10/06/2016    Procedure: COLONOSCOPY;  Surgeon: Farzad Ernandez MD;  Location: [facility] GI LAB;  Service:     CYSTOSCOPY W/ RETROGRADES Left 2017    Procedure: CYSTOSCOPY WITH BILATERAL RETROGRADES, LEFT STENT REMOVAL;  Surgeon: Miguel A Villalobos MD;  Location: [facility] MAIN OR;  Service:     ESOPHAGOGASTRODUODENOSCOPY N/A 10/06/2016    Procedure: ESOPHAGOGASTRODUODENOSCOPY (EGD);  Surgeon: Farzad Ernandez MD;  Location: [facility] GI LAB;  Service:     HERNIA REPAIR      KNEE ARTHROSCOPY W/ MENISCECTOMY      TN CYSTOURETHROSCOPY W/URETERAL CATHETERIZATION Left 2016    Procedure: CYSTOSCOPY RETROGRADE PYELOGRAM WITH INSERTION STENT URETERAL;  Surgeon: Miguel A Villalobos MD;  Location: [facility] MAIN OR;  Service: Urology    US GUIDED THYROID BIOPSY  2021    VARICOSE VEIN SURGERY      VASCULAR SURGERY       Social History     Socioeconomic History    Marital status: Single     Spouse name: Not on file    Number of children: 0    Years of education: Not on file    Highest education level: Not on file   Occupational History    Not on file   Tobacco Use    Smoking status: Former     Current packs/day: 0.00     Average packs/day: 1 pack/day for 37.0 years (37.0 ttl pk-yrs)     Types: Cigarettes     Start date: 3/30/1981     Quit date: 3/30/2018     Years since quittin.1     Passive exposure: Past    Smokeless tobacco: Former     Types: Chew    Tobacco comments:     chewing nicotine   Vaping Use    Vaping status: Never Used   Substance  and Sexual Activity    Alcohol use: Not Currently     Alcohol/week: 3.0 standard drinks of alcohol     Types: 3 Cans of beer per week     Comment: occ    Drug use: No    Sexual activity: Not Currently   Other Topics Concern    Not on file   Social History Narrative    Lives alone.     Social Drivers of Health     Financial Resource Strain: Not on file   Food Insecurity: Patient Declined (3/21/2025)    Nursing - Inadequate Food Risk Classification     Worried About Running Out of Food in the Last Year: Patient declined     Ran Out of Food in the Last Year: Patient declined     Ran Out of Food in the Last Year: Not on file   Transportation Needs: Patient Declined (3/21/2025)    PRAPARE - Transportation     Lack of Transportation (Medical): Patient declined     Lack of Transportation (Non-Medical): Patient declined   Physical Activity: Not on file   Stress: Not on file   Social Connections: Not on file   Intimate Partner Violence: Not on file   Housing Stability: Patient Declined (3/21/2025)    Housing Stability Vital Sign     Unable to Pay for Housing in the Last Year: Patient declined     Number of Times Moved in the Last Year: Not on file     Homeless in the Last Year: Patient declined     Family History   Problem Relation Name Age of Onset    Osteoarthritis Mother Lolis     Atrial fibrillation Mother Lolis     Dementia Mother Lolis     Parkinsonism Mother Lolis     Aortic aneurysm Father      Diabetes Brother      Colon cancer Brother      Diabetes type II Brother      Colon cancer Brother      Heart disease Maternal Grandmother      Cancer Maternal Grandfather      Stroke Paternal Grandmother Juanita     Heart attack Paternal Grandfather         Current Outpatient Medications:     albuterol (PROVENTIL HFA,VENTOLIN HFA) 90 mcg/act inhaler, Inhale 2 puffs every 4 (four) hours as needed for wheezing or shortness of breath, Disp: 18 g, Rfl: 6    Alpha-Lipoic Acid 100 MG CAPS, Take by mouth, Disp: , Rfl:      Ascorbic Acid (VITAMIN C) 100 MG tablet, Take 500 mg by mouth, Disp: , Rfl:     aspirin 81 MG tablet, Take 162 mg by mouth in the morning., Disp: , Rfl:     atorvastatin (LIPITOR) 40 mg tablet, TAKE 1 TABLET BY MOUTH ONCE DAILY WITH SUPPER, Disp: 90 tablet, Rfl: 0    Cholecalciferol (VITAMIN D3) 1000 units CAPS, Take by mouth in the morning., Disp: , Rfl:     Continuous Blood Gluc  (FreeStyle Alexei 2 [place]) STEVE, , Disp: , Rfl:     Continuous Glucose Sensor (FreeStyle Alexei 2 Sensor) MISC, Change sensor every 14 days as directed, Disp: 7 each, Rfl: 2    cyanocobalamin (VITAMIN B-12) 100 mcg tablet, Take by mouth in the morning., Disp: , Rfl:     desloratadine (CLARINEX) 5 MG tablet, TAKE 1 TABLET BY MOUTH ONCE DAILY AT BEDTIME, Disp: 90 tablet, Rfl: 1    famotidine (PEPCID) 20 mg tablet, Take 20 mg by mouth in the morning., Disp: , Rfl:     FREESTYLE LITE test strip, USE 1 STRIP TO CHECK GLUCOSE ONCE DAILY AS DIRECTED, Disp: 100 each, Rfl: 0    FREESTYLE LITE test strip, USE 1 STRIP TO CHECK GLUCOSE ONCE DAILY AS INSTRUCTED, Disp: 100 each, Rfl: 0    glucosamine-chondroitin 500-400 MG tablet, Take 1 tablet by mouth in the morning., Disp: , Rfl:     hydroCHLOROthiazide 25 mg tablet, Take 1 tablet by mouth once daily, Disp: 90 tablet, Rfl: 0    losartan (COZAAR) 50 mg tablet, Take 1 tablet by mouth once daily, Disp: 90 tablet, Rfl: 0    Magnesium Hydroxide (MAGNESIA PO), Take by mouth in the morning and before bedtime., Disp: , Rfl:     metFORMIN (GLUCOPHAGE) 1000 MG tablet, Take 1 tablet (1,000 mg total) by mouth 2 (two) times a day with meals, Disp: 180 tablet, Rfl: 1    Multiple Vitamin (MULTIVITAMIN) capsule, Take 1 capsule by mouth in the morning., Disp: , Rfl:     nitroglycerin (NITROSTAT) 0.3 mg SL tablet, Place 1 tablet (0.3 mg total) under the tongue every 5 (five) minutes as needed for chest pain, Disp: 25 tablet, Rfl: 1    ONETOUCH DELICA LANCETS 30G MISC, 1 Units by Does not apply route daily,  Disp: 100 each, Rfl: 6    rOPINIRole (REQUIP) 0.5 mg tablet, Take 1 tablet (0.5 mg total) by mouth daily at bedtime AND 0.5 tablets (0.25 mg total) in the morning., Disp: 45 tablet, Rfl: 5    Tirzepatide (Mounjaro) 7.5 MG/0.5ML SOAJ, Inject 7.5 mg (0.5 mL) once weekly., Disp: 8 mL, Rfl: 2    torsemide (DEMADEX) 10 mg tablet, Take 1 tablet (10 mg total) by mouth daily, Disp: 30 tablet, Rfl: 1    Current Facility-Administered Medications:     cyanocobalamin injection 1,000 mcg, 1,000 mcg, Intramuscular, Q30 Days, LUCERO Junior, 1,000 mcg at 09/10/21 1141  The following portions of the patient's history were reviewed and updated as appropriate: allergies, current medications, past family history, past medical history, past social history, past surgical history and problem list..    Review of Systems:  Review of Systems   Respiratory:  Negative for shortness of breath.    Cardiovascular:  Negative for chest pain, palpitations and leg swelling.   Musculoskeletal:  Positive for arthralgias.   Neurological:  Positive for numbness.       Physical Exam:  /72 (BP Location: Left arm, Patient Position: Sitting, Cuff Size: Large)   Pulse 65   Wt 117 kg (257 lb)   SpO2 97%   BMI 35.84 kg/m²     Physical Exam  Physical Exam   Constitutional: He appears healthy. No distress.   Eyes: Pupils are equal, round, and reactive to light. Conjunctivae are normal.   Neck: No JVD present.   Cardiovascular: Normal rate, regular rhythm and normal heart sounds. Exam reveals no gallop and no friction rub.   No murmur heard.  Pulmonary/Chest: Effort normal and breath sounds normal. He has no wheezes. He has no rales.   Musculoskeletal:         General: No tenderness, deformity or edema.      Cervical back: Normal range of motion and neck supple.   Neurological: He is alert and oriented to person, place, and time.   Skin: Skin is warm and dry.          Cardiographics  ECG: sinus rhythm with rate 65  LV Ejection  Fraction: LV ejection fraction >= 40%.       Lab Review  Lab Results   Component Value Date    TRIG 258 (H) 05/09/2025    TRIG 130 09/06/2024    TRIG 209 (H) 02/09/2024    HDL 46 05/09/2025    HDL 47 09/06/2024    HDL 40 02/09/2024    LDLDIRECT 53 05/09/2025    LDLDIRECT 79 04/10/2020    LDLDIRECT 83 12/27/2019     Assessment & Plan     1. Chronic diastolic congestive heart failure (HCC)    2. Essential hypertension    3. Coronary artery disease involving native coronary artery of native heart without angina pectoris    4. Mixed hyperlipidemia    5. Bundle branch block, right    6. Asymptomatic PVCs    7. Bradycardia    8. Pain in both hands      - Encouraged regular exercise.    -  Blood pressure is stable.  Continue current medication regimen including losartan and hydrochlorothiazide.  Target BP is < 130/80 mmHg.  -  Resume torsemide if edema returns.   - diabetes management per endocrinologist.    - Continue atorvastatin - last LDL was 42 mg/dL.   - Continue current Rx  - recommend using torsemide when edema is present.  He previously developed congestive heart failure.  Encouraged to reduce salt in diet if possible.  - Will obtain ultrasound of subclavian artery to rule out stenosis as he has paresthesia.

## 2025-06-07 DIAGNOSIS — I10 ESSENTIAL HYPERTENSION: ICD-10-CM

## 2025-06-08 RX ORDER — HYDROCHLOROTHIAZIDE 25 MG/1
25 TABLET ORAL DAILY
Qty: 90 TABLET | Refills: 1 | Status: SHIPPED | OUTPATIENT
Start: 2025-06-08

## 2025-06-09 ENCOUNTER — TELEPHONE (OUTPATIENT)
Age: 65
End: 2025-06-09

## 2025-06-09 NOTE — TELEPHONE ENCOUNTER
----- Message from LUCERO Farooq sent at 6/6/2025  8:01 PM EDT -----  Regarding: Popeye Caro Eileen, Can you reach otu to Bobo about his mood?See the notation below.  Please advise him that his endocrine provider had indicated that his A1C has been improving but his neuropathy has been increasing which is likely a sequela of the prolonged abnormal A1C over time. She was concerned that it was causing him some changes in his mood and asked that we reach out to address it.We did discuss the use of Cymbalta in the past which should not have any sedative effects and can be of benefit with neuropathy and depression symptoms given it is an antidepressant.  Can you see if he wants to consider this?  If so I can order him a starting dose of 30mg to take daily in the am.  It is not likely to cause grogginess. Howard  ----- Message -----  From: LUCERO Ch  Sent: 5/23/2025  12:19 PM EDT  To: LUCERO Junior    Good afternoon,I had the pleasure of seeing Mr. Cheng in follow up of DM. His blood sugars are improving and hemoglobin A1c reducing; however, he is having worsening of neuropathy. He states symptoms are worse left lower extremity over right. This is consistent with his foot exam today. He states that there is progression that is impacting his mood and activity level. I told him I would reach out to you as he is seeing you for this. Please advise if you have any recommendations or would like to see him sooner in follow up. Best,Celeste Choi DNP, [facility]

## 2025-06-10 DIAGNOSIS — F32.A DEPRESSION: ICD-10-CM

## 2025-06-10 DIAGNOSIS — E11.40 NEUROPATHY DUE TO TYPE 2 DIABETES MELLITUS (HCC): Primary | ICD-10-CM

## 2025-06-10 RX ORDER — DULOXETIN HYDROCHLORIDE 30 MG/1
30 CAPSULE, DELAYED RELEASE ORAL DAILY
Qty: 30 CAPSULE | Refills: 6 | Status: SHIPPED | OUTPATIENT
Start: 2025-06-10

## 2025-06-10 NOTE — TELEPHONE ENCOUNTER
I did speak to Patient. He stated his depression has increased, when he raises his hand up it goes numb and his left leg feels like he is wearing a boot.    Patient is agreeable to trying Cymbalta.

## 2025-06-12 ENCOUNTER — APPOINTMENT (EMERGENCY)
Dept: RADIOLOGY | Facility: HOSPITAL | Age: 65
End: 2025-06-12
Payer: COMMERCIAL

## 2025-06-12 ENCOUNTER — HOSPITAL ENCOUNTER (EMERGENCY)
Facility: HOSPITAL | Age: 65
Discharge: HOME | End: 2025-06-12
Attending: EMERGENCY MEDICINE
Payer: COMMERCIAL

## 2025-06-12 ENCOUNTER — APPOINTMENT (EMERGENCY)
Dept: VASCULAR ULTRASOUND | Facility: HOSPITAL | Age: 65
End: 2025-06-12
Payer: COMMERCIAL

## 2025-06-12 VITALS
TEMPERATURE: 98.9 F | OXYGEN SATURATION: 98 % | SYSTOLIC BLOOD PRESSURE: 195 MMHG | HEART RATE: 95 BPM | RESPIRATION RATE: 18 BRPM | DIASTOLIC BLOOD PRESSURE: 90 MMHG

## 2025-06-12 DIAGNOSIS — M25.461 EFFUSION OF BURSA OF RIGHT KNEE: ICD-10-CM

## 2025-06-12 DIAGNOSIS — M25.561 RIGHT ANTERIOR KNEE PAIN: Primary | ICD-10-CM

## 2025-06-12 DIAGNOSIS — E11.40 NEUROPATHY DUE TO TYPE 2 DIABETES MELLITUS (HCC): ICD-10-CM

## 2025-06-12 PROCEDURE — 99283 EMERGENCY DEPT VISIT LOW MDM: CPT

## 2025-06-12 PROCEDURE — 93971 EXTREMITY STUDY: CPT | Performed by: SURGERY

## 2025-06-12 PROCEDURE — 99284 EMERGENCY DEPT VISIT MOD MDM: CPT | Performed by: EMERGENCY MEDICINE

## 2025-06-12 PROCEDURE — 93971 EXTREMITY STUDY: CPT | Performed by: EMERGENCY MEDICINE

## 2025-06-12 PROCEDURE — 93971 EXTREMITY STUDY: CPT

## 2025-06-12 PROCEDURE — 73564 X-RAY EXAM KNEE 4 OR MORE: CPT

## 2025-06-12 PROCEDURE — 76882 US LMTD JT/FCL EVL NVASC XTR: CPT | Performed by: EMERGENCY MEDICINE

## 2025-06-12 RX ORDER — GABAPENTIN 100 MG/1
100 CAPSULE ORAL ONCE
Status: COMPLETED | OUTPATIENT
Start: 2025-06-12 | End: 2025-06-12

## 2025-06-12 RX ORDER — GABAPENTIN 100 MG/1
100 CAPSULE ORAL 2 TIMES DAILY
Qty: 30 CAPSULE | Refills: 0 | Status: SHIPPED | OUTPATIENT
Start: 2025-06-12 | End: 2025-06-27

## 2025-06-12 RX ADMIN — GABAPENTIN 100 MG: 100 CAPSULE ORAL at 16:03

## 2025-06-12 NOTE — ED ATTENDING ATTESTATION
6/12/2025  IGabrielle MD, saw and evaluated the patient. I have discussed the patient with the resident/non-physician practitioner and agree with the resident's/non-physician practitioner's findings, Plan of Care, and MDM as documented in the resident's/non-physician practitioner's note, except where noted. All available labs and Radiology studies were reviewed.  I was present for key portions of any procedure(s) performed by the resident/non-physician practitioner and I was immediately available to provide assistance.       At this point I agree with the current assessment done in the Emergency Department.  I have conducted an independent evaluation of this patient a history and physical is as follows:    64-year-old male with a history of hypertension, hyperlipidemia, GERD, CAD, neuropathy, osteoarthritis presenting for evaluation of right knee pain and swelling.  Patient states his symptoms have progressively worsened over the last 2 days.  Notes he was standing more frequently the last several days and has been moving and packing boxes.  Notes a history of osteoarthritis in that knee and wants to have it replaced but had improvement after serial cortisone injections with orthopedics several years ago.  Patient states he has not had any pain or difficulty since that time.  Denies direct trauma or injury to the area.  Notes neuropathy in his bilateral lower extremities for which he takes ropinirole.  His PCP recently prescribed duloxetine but patient has not picked up this medication.  States previously has had success with gabapentin for his symptoms.  Patient has been able to ambulate.  Denies associated fever, chills, upper respiratory symptoms, chest pain, shortness of breath, nausea, vomiting, abdominal pain.  Did not take anything for his symptoms at home.  Patient has a history of lower extremity edema and usually takes torsemide as needed.  Patient notes pain in his right knee that was  anterior and now wraps into the joint line and down the back of his leg.  Patient is also concerned about a DVT.  Patient works as a  and endorses prolonged periods of time on his feet.  Does not wear compression stockings because he states it makes his toes cold and he is worried about circulation.    Please see resident documentation for histories and review of systems.    Exam: Vital signs and nursing notes reviewed  General: Awake, alert, no acute distress  HEENT: Normocephalic, atraumatic, mucous membranes moist  Neck: Supple  Heart: Regular rate and rhythm  Lungs: Clear to auscultation bilaterally without wheezes, rales, or rhonchi  Abdomen: Soft, nontender, nondistended, no rebound or guarding  Extremities: Bilateral lower extremity edema, nonpitting, with chronic venous stasis changes.  Right knee is mildly swollen but no erythema or warmth.  No tenderness of the right hip, anterior tibia, right ankle, or right foot.  Good distal pulses.  Patient has decreased sensation bilateral lower extremities from the feet extending up into the ankle.  Patient has mild tenderness to palpation of the anterior medial joint line of the right knee and posterior popliteal fossa.  No posterior corner tenderness.  No proximal fibular head tenderness.  Intact flexion and extension of the right hip, knee, and ankle the limited by swelling in the right knee  Skin: Warm, dry, intact, no erythema, chronic venous stasis changes bilaterally to lower extremity  Neuro: As above    ED Course  ED Course as of 25 1608   Thu 2025   1510 Xray R knee per my interpretation: no acute osseous abnormality, osteoarthritis and degenerative changes present, no significant effusion   1603 Per vascular tech, no evidence of DVT     ED course/Medical Decision Makin-year-old male presenting for evaluation of knee pain.  Differential diagnosis includes acute fracture, dislocation, ligamentous injury, meniscal injury,  osteoarthritis, septic arthritis, DVT, Baker's cyst rupture.  On exam, patient's lower extremity is warm and well-perfused with decreased sensation which is at the patient's baseline.  Has evidence of chronic venous stasis changes.  No evidence of compartment syndrome.  No overlying erythema or warmth to suggest septic arthritis.  X-ray of the right knee per my interpretation is negative for acute osseous abnormality; there is evidence of osteoarthritis and degenerative changes but no significant effusion.  Lower extremity venous duplex was performed which showed no evidence of DVT; tech noted possibility of Baker's cyst.  I suspect the patient's knee pain is secondary to his underlying osteoarthritis.  Patient requested cortisone injection in the emergency department and orthopedics consultation, but I explained that these specific interventions are not routinely performed in the emergency department for osteoarthritis. There is no indication for arthrocentesis today.  I explained to the patient we will initiate supportive management including rest, ice, elevation, compression, over-the-counter acetaminophen or ibuprofen as needed for pain control, and other supportive measures.  Offered Ace bandage for support.  Offered compression stockings as another possible intervention for long-term management of lower extremity swelling.  Advised patient on different types of compression stockings he can buy at the store.  Patient states gabapentin has worked well for his pain, so we will provide a short course of this medication; checked patient's recent renal function which was appropriate.  Patient counseled on the use of medication.  Patient referred to orthopedic surgery for further evaluation; patient also requested a different neurologist, so ambulatory referral to neurology was also placed.  Patient is able to ambulate and weight-bear on his extremity but is requesting a cane to assist with ambulation, which was  provided.  Patient educated on Baker's cyst and this pathology, as well.  At this time, patient is appropriate for discharge home with outpatient follow-up.  Return precautions including worsening pain, swelling, redness, inability to ambulate, fever, or any new symptoms discussed.  Patient is in agreement and understanding of these instructions.    Diagnosis: Right knee pain, osteoarthritis  Disposition: Discharge

## 2025-06-12 NOTE — ED PROVIDER NOTES
Time reflects when diagnosis was documented in both MDM as applicable and the Disposition within this note       Time User Action Codes Description Comment    6/12/2025  3:44 PM Rayamrita Josesimon Add [M25.561] Right anterior knee pain     6/12/2025  3:44 PM Rayamrita Josedeidrefoandrei Add [M25.461] Effusion of bursa of right knee     6/12/2025  3:45 PM Nellie Torres Add [E11.40] 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)           ED Disposition       ED Disposition   Discharge    Condition   Stable    Date/Time   Th Jun 12, 2025  3:44 PM    Comment   Bobo Cheng discharge to home/self care.                   Assessment & Plan   {Hyperlinks  Risk Stratification - NIHSS - HEART SCORE - Fill out sepsis note and make sure you call [id number] if severe or septic shock:[id number]}    Medical Decision Making  Amount and/or Complexity of Data Reviewed  Radiology: ordered. Decision-making details documented in ED Course.    Risk  Prescription drug management.        ED Course as of 06/12/25 2032   Thu Jun 12, 2025   1548 VAS VENOUS DUPLEX -LOWER LIMB UNILATERAL  No DVT per vascular tech.        Medications   gabapentin (NEURONTIN) capsule 100 mg (100 mg Oral Given 6/12/25 1603)       ED Risk Strat Scores                    No data recorded                            History of Present Illness   {Hyperlinks  History (Med, Surg, Fam, Social) - Current Medications - Allergies  :[id number]}    Chief Complaint   Patient presents with    Knee Pain     R knee swelling since standing for 2 hrs on Tuesday. States last time this happened it required drainage. Has not attempted any medications for improvement of pain        Past Medical History[1]   Past Surgical History[2]   Family History[3]   Social History[4]   E-Cigarette/Vaping    E-Cigarette Use Never User       E-Cigarette/Vaping Substances    Nicotine No     THC No     CBD No     Flavoring No     Other No     Unknown No       I have reviewed and agree with the  history as documented.     HPI    Review of Systems   Cardiovascular:  Positive for leg swelling.   Musculoskeletal:  Positive for arthralgias.   Neurological:  Positive for numbness.   All other systems reviewed and are negative.          Objective   {Hyperlinks  Historical Vitals - Historical Labs - Chart Review/Microbiology - Last Echo - Code Status  :[id number]}    ED Triage Vitals   Temperature Pulse Blood Pressure Respirations SpO2 Patient Position - Orthostatic VS   06/12/25 1417 06/12/25 1416 06/12/25 1416 06/12/25 1416 06/12/25 1416 06/12/25 1416   98.9 °F (37.2 °C) 95 (!) 195/90 18 98 % Sitting      Temp Source Heart Rate Source BP Location FiO2 (%) Pain Score    06/12/25 1417 06/12/25 1416 06/12/25 1416 -- 06/12/25 1416    Oral Monitor Right arm  8      Vitals      Date and Time Temp Pulse SpO2 Resp BP Pain Score FACES Pain Rating User   06/12/25 1417 98.9 °F (37.2 °C) -- -- -- -- -- -- EM   06/12/25 1416 -- 95 98 % 18 195/90 8 -- EM            Physical Exam  Vitals and nursing note reviewed.   Constitutional:       General: He is not in acute distress.     Appearance: He is well-developed. He is obese.   HENT:      Head: Normocephalic and atraumatic.     Eyes:      Conjunctiva/sclera: Conjunctivae normal.      Pupils: Pupils are equal, round, and reactive to light.       Cardiovascular:      Rate and Rhythm: Normal rate and regular rhythm.      Heart sounds: No murmur heard.  Pulmonary:      Effort: Pulmonary effort is normal. No respiratory distress.      Breath sounds: Normal breath sounds.   Abdominal:      Palpations: Abdomen is soft.      Tenderness: There is no abdominal tenderness.     Musculoskeletal:         General: No swelling.     Skin:     General: Skin is warm and dry.      Capillary Refill: Capillary refill takes less than 2 seconds.     Neurological:      General: No focal deficit present.      Mental Status: He is alert and oriented to person, place, and time.     Psychiatric:          Mood and Affect: Mood normal.         Results Reviewed       None            XR knee 4+ vw right injury   Final Interpretation by Kapil Yoder DO ([id number])      No acute osseous abnormality.      Degenerative changes as described.         Computerized Assisted Algorithm (CAA) may have been used to analyze all applicable images.         Workstation performed: [id number]         VAS VENOUS DUPLEX -LOWER LIMB UNILATERAL    (Results Pending)       Procedures    ED Medication and Procedure Management   Prior to Admission Medications   Prescriptions Last Dose Informant Patient Reported? Taking?   Alpha-Lipoic Acid 100 MG CAPS  Self Yes No   Sig: Take by mouth   Ascorbic Acid (VITAMIN C) 100 MG tablet  Self Yes No   Sig: Take 500 mg by mouth   Cholecalciferol (VITAMIN D3) 1000 units CAPS  Self Yes No   Sig: Take by mouth in the morning.   Continuous Blood Gluc  (FreeStyle Alexei 2 [place]) STEVE  Self Yes No   Continuous Glucose Sensor (FreeStyle Alexei 2 Sensor) MISC   No No   Sig: Change sensor every 14 days as directed   DULoxetine (Cymbalta) 30 mg delayed release capsule   No No   Sig: Take 1 capsule (30 mg total) by mouth daily   FREESTYLE LITE test strip  Self No No   Sig: USE 1 STRIP TO CHECK GLUCOSE ONCE DAILY AS DIRECTED   FREESTYLE LITE test strip  Self No No   Sig: USE 1 STRIP TO CHECK GLUCOSE ONCE DAILY AS INSTRUCTED   Magnesium Hydroxide (MAGNESIA PO)  Self Yes No   Sig: Take by mouth in the morning and before bedtime.   Multiple Vitamin (MULTIVITAMIN) capsule  Self Yes No   Sig: Take 1 capsule by mouth in the morning.   ONETOUCH DELICA LANCETS 30G MISC  Self No No   Si Units by Does not apply route daily   Tirzepatide (Mounjaro) 7.5 MG/0.5ML SOAJ   No No   Sig: Inject 7.5 mg (0.5 mL) once weekly.   albuterol (PROVENTIL HFA,VENTOLIN HFA) 90 mcg/act inhaler  Self No No   Sig: Inhale 2 puffs every 4 (four) hours as needed for wheezing or shortness of breath   aspirin 81 MG tablet  Self Yes No    Sig: Take 162 mg by mouth in the morning.   atorvastatin (LIPITOR) 40 mg tablet   No No   Sig: TAKE 1 TABLET BY MOUTH ONCE DAILY WITH SUPPER   cyanocobalamin (VITAMIN B-12) 100 mcg tablet  Self Yes No   Sig: Take by mouth in the morning.   desloratadine (CLARINEX) 5 MG tablet   No No   Sig: TAKE 1 TABLET BY MOUTH ONCE DAILY AT BEDTIME   famotidine (PEPCID) 20 mg tablet  Self Yes No   Sig: Take 20 mg by mouth in the morning.   glucosamine-chondroitin 500-400 MG tablet  Self Yes No   Sig: Take 1 tablet by mouth in the morning.   hydroCHLOROthiazide 25 mg tablet   No No   Sig: Take 1 tablet by mouth once daily   losartan (COZAAR) 50 mg tablet   No No   Sig: Take 1 tablet by mouth once daily   metFORMIN (GLUCOPHAGE) 1000 MG tablet  Self No No   Sig: Take 1 tablet (1,000 mg total) by mouth 2 (two) times a day with meals   nitroglycerin (NITROSTAT) 0.3 mg SL tablet  Self No No   Sig: Place 1 tablet (0.3 mg total) under the tongue every 5 (five) minutes as needed for chest pain   rOPINIRole (REQUIP) 0.5 mg tablet   No No   Sig: Take 1 tablet (0.5 mg total) by mouth daily at bedtime AND 0.5 tablets (0.25 mg total) in the morning.   torsemide (DEMADEX) 10 mg tablet  Self No No   Sig: Take 1 tablet (10 mg total) by mouth daily      Facility-Administered Medications Last Administration Doses Remaining   cyanocobalamin injection 1,000 mcg 9/10/2021 11:41 AM         Discharge Medication List as of 6/12/2025  4:07 PM        START taking these medications    Details   gabapentin (Neurontin) 100 mg capsule Take 1 capsule (100 mg total) by mouth 2 (two) times a day for 15 days, Starting Thu 6/12/2025, Until Fri 6/27/2025, Normal           CONTINUE these medications which have NOT CHANGED    Details   albuterol (PROVENTIL HFA,VENTOLIN HFA) 90 mcg/act inhaler Inhale 2 puffs every 4 (four) hours as needed for wheezing or shortness of breath, Starting Fri 11/1/2024, Normal      Alpha-Lipoic Acid 100 MG CAPS Take by mouth, Historical  Med      Ascorbic Acid (VITAMIN C) 100 MG tablet Take 500 mg by mouth, Historical Med      aspirin 81 MG tablet Take 162 mg by mouth in the morning., Historical Med      atorvastatin (LIPITOR) 40 mg tablet TAKE 1 TABLET BY MOUTH ONCE DAILY WITH SUPPER, Normal      Cholecalciferol (VITAMIN D3) 1000 units CAPS Take by mouth in the morning., Starting Sat 3/21/2015, Historical Med      Continuous Blood Gluc  (FreeStyle Alexei 2 [place]) STEVE Historical Med      Continuous Glucose Sensor (FreeStyle Alexei 2 Sensor) MISC Change sensor every 14 days as directed, Normal      cyanocobalamin (VITAMIN B-12) 100 mcg tablet Take by mouth in the morning., Historical Med      desloratadine (CLARINEX) 5 MG tablet TAKE 1 TABLET BY MOUTH ONCE DAILY AT BEDTIME, Starting Fri 5/9/2025, Normal      DULoxetine (Cymbalta) 30 mg delayed release capsule Take 1 capsule (30 mg total) by mouth daily, Starting Tue 6/10/2025, Normal      famotidine (PEPCID) 20 mg tablet Take 20 mg by mouth in the morning., Historical Med      !! FREESTYLE LITE test strip USE 1 STRIP TO CHECK GLUCOSE ONCE DAILY AS DIRECTED, Normal      !! FREESTYLE LITE test strip USE 1 STRIP TO CHECK GLUCOSE ONCE DAILY AS INSTRUCTED, Normal      glucosamine-chondroitin 500-400 MG tablet Take 1 tablet by mouth in the morning., Historical Med      hydroCHLOROthiazide 25 mg tablet Take 1 tablet by mouth once daily, Starting Sun 6/8/2025, Normal      losartan (COZAAR) 50 mg tablet Take 1 tablet by mouth once daily, Starting Sat 5/24/2025, Normal      Magnesium Hydroxide (MAGNESIA PO) Take by mouth in the morning and before bedtime., Historical Med      metFORMIN (GLUCOPHAGE) 1000 MG tablet Take 1 tablet (1,000 mg total) by mouth 2 (two) times a day with meals, Starting Tue 6/4/2024, Normal      Multiple Vitamin (MULTIVITAMIN) capsule Take 1 capsule by mouth in the morning., Historical Med      nitroglycerin (NITROSTAT) 0.3 mg SL tablet Place 1 tablet (0.3 mg total) under the  tongue every 5 (five) minutes as needed for chest pain, Starting Fri 11/22/2024, Normal      ONETOUCH DELICA LANCETS 30G MISC 1 Units by Does not apply route daily, Starting Fri 2/7/2020, Normal      rOPINIRole (REQUIP) 0.5 mg tablet Multiple Dosages:Starting Fri 2/7/2025Take 1 tablet (0.5 mg total) by mouth daily at bedtime AND 0.5 tablets (0.25 mg total) in the morning., Normal      Tirzepatide (Mounjaro) 7.5 MG/0.5ML SOAJ Inject 7.5 mg (0.5 mL) once weekly., Normal      torsemide (DEMADEX) 10 mg tablet Take 1 tablet (10 mg total) by mouth daily, Starting Fri 12/13/2024, Normal       !! - Potential duplicate medications found. Please discuss with provider.          ED SEPSIS DOCUMENTATION   Time reflects when diagnosis was documented in both MDM as applicable and the Disposition within this note       Time User Action Codes Description Comment    6/12/2025  3:44 PM Nellie Torres Add [M25.561] Right anterior knee pain     6/12/2025  3:44 PM Nellie Torres Add [M25.461] Effusion of bursa of right knee     6/12/2025  3:45 PM Nellie Torres Add [E11.40] 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)                    [1]   Past Medical History:  Diagnosis Date    Anemia     Bundle branch block, right     CAD (coronary artery disease)     Cataract     CPAP (continuous positive airway pressure) dependence     CTS (carpal tunnel syndrome)     Diabetes mellitus (HCC)     borderline, controlled with diet and activity    Diverticulitis     GERD (gastroesophageal reflux disease)     History of coronary artery stent placement- pCx and OM2 11/24/2017    Hyperlipidemia     Hypertension     Nasal septal deformity     Neuropathy     Neuropathy in diabetes (HCC)     Obesity (BMI 30-39.9)     Sleep apnea     wears c-pap    Vitamin D deficiency    [2]   Past Surgical History:  Procedure Laterality Date    COLON SIGMOID RESECTION N/A 12/16/2016    Procedure: RESECTION COLON SIGMOID;  Surgeon: KUN Denise,  MD;  Location:  MAIN OR;  Service:     COLON SIGMOID RESECTION LAPAROSCOPIC N/A 2016    Procedure: RESECTION COLON SIGMOID LAPAROSCOPIC:CONVERTED TO OPEN@1245;  Surgeon: KUN Denise MD;  Location:  MAIN OR;  Service:     COLON SURGERY      Sigmoidectomy    COLONOSCOPY N/A 10/06/2016    Procedure: COLONOSCOPY;  Surgeon: Farzad Ernandez MD;  Location: [facility] GI LAB;  Service:     CYSTOSCOPY W/ RETROGRADES Left 2017    Procedure: CYSTOSCOPY WITH BILATERAL RETROGRADES, LEFT STENT REMOVAL;  Surgeon: Miguel A Villalobos MD;  Location: [facility];  Service:     ESOPHAGOGASTRODUODENOSCOPY N/A 10/06/2016    Procedure: ESOPHAGOGASTRODUODENOSCOPY (EGD);  Surgeon: Farzad Ernandez MD;  Location: [facility] GI LAB;  Service:     HERNIA REPAIR      KNEE ARTHROSCOPY W/ MENISCECTOMY      [place] CYSTOURETHROSCOPY W/URETERAL CATHETERIZATION Left 2016    Procedure: CYSTOSCOPY RETROGRADE PYELOGRAM WITH INSERTION STENT URETERAL;  Surgeon: Miguel A Villalobos MD;  Location: [facility];  Service: Urology    US GUIDED THYROID BIOPSY  2021    VARICOSE VEIN SURGERY      VASCULAR SURGERY     [3]   Family History  Problem Relation Name Age of Onset    Osteoarthritis Mother Lolis     Atrial fibrillation Mother Lolis     Dementia Mother Lolis     Parkinsonism Mother Lolis     Aortic aneurysm Father      Diabetes Brother      Colon cancer Brother      Diabetes type II Brother      Colon cancer Brother      Heart disease Maternal Grandmother      Cancer Maternal Grandfather      Stroke Paternal Grandmother Juanita     Heart attack Paternal Grandfather     [4]   Social History  Tobacco Use    Smoking status: Former     Current packs/day: 0.00     Average packs/day: 1 pack/day for 37.0 years (37.0 ttl pk-yrs)     Types: Cigarettes     Start date: 3/30/1981     Quit date: 3/30/2018     Years since quittin.2     Passive exposure: Past    Smokeless tobacco: Former     Types: Chew    Tobacco comments:     chewing nicotine    Vaping Use    Vaping status: Never Used   Substance Use Topics    Alcohol use: Not Currently     Alcohol/week: 3.0 standard drinks of alcohol     Types: 3 Cans of beer per week     Comment: occ    Drug use: No      MD Wilrfedo;  Location: [facility];  Service:     ESOPHAGOGASTRODUODENOSCOPY N/A 10/06/2016    Procedure: ESOPHAGOGASTRODUODENOSCOPY (EGD);  Surgeon: Farzad Ernandez MD;  Location: [facility] GI LAB;  Service:     HERNIA REPAIR      KNEE ARTHROSCOPY W/ MENISCECTOMY      MD CYSTOURETHROSCOPY W/URETERAL CATHETERIZATION Left 2016    Procedure: CYSTOSCOPY RETROGRADE PYELOGRAM WITH INSERTION STENT URETERAL;  Surgeon: Miguel A Villalobos MD;  Location: [facility];  Service: Urology    US GUIDED THYROID BIOPSY  2021    VARICOSE VEIN SURGERY      VASCULAR SURGERY     [3]   Family History  Problem Relation Name Age of Onset    Osteoarthritis Mother Lolis     Atrial fibrillation Mother Lolis     Dementia Mother Lolis     Parkinsonism Mother Lolis     Aortic aneurysm Father      Diabetes Brother      Colon cancer Brother      Diabetes type II Brother      Colon cancer Brother      Heart disease Maternal Grandmother      Cancer Maternal Grandfather      Stroke Paternal Grandmother Juanita     Heart attack Paternal Grandfather     [4]   Social History  Tobacco Use    Smoking status: Former     Current packs/day: 0.00     Average packs/day: 1 pack/day for 37.0 years (37.0 ttl pk-yrs)     Types: Cigarettes     Start date: 3/30/1981     Quit date: 3/30/2018     Years since quittin.2     Passive exposure: Past    Smokeless tobacco: Former     Types: Chew    Tobacco comments:     chewing nicotine   Vaping Use    Vaping status: Never Used   Substance Use Topics    Alcohol use: Not Currently     Alcohol/week: 3.0 standard drinks of alcohol     Types: 3 Cans of beer per week     Comment: occ    Drug use: No        Nellie Torres MD  [id number]

## 2025-06-13 ENCOUNTER — RA CDI HCC (OUTPATIENT)
Dept: OTHER | Facility: HOSPITAL | Age: 65
End: 2025-06-13

## 2025-06-13 NOTE — PROGRESS NOTES
HCC coding opportunities          Chart Reviewed number of suggestions sent to Provider: 1  I13.0     Patients Insurance        Commercial Insurance: [employer]

## (undated) DEVICE — CYSTO TUBING TUR Y IRRIGATION

## (undated) DEVICE — GLOVE SRG BIOGEL 7.5

## (undated) DEVICE — CYSTOSCOPY PACK: Brand: CONVERTORS

## (undated) DEVICE — LUBRICANT SURGILUBE TUBE 4 OZ  FLIP TOP

## (undated) DEVICE — FABRIC REINFORCED, SURGICAL GOWN, XL: Brand: CONVERTORS

## (undated) DEVICE — RADIOLOGY STERILE LABELS: Brand: CENTURION

## (undated) DEVICE — POUCH UR CATCHER STERILE